# Patient Record
Sex: FEMALE | Race: WHITE | HISPANIC OR LATINO | Employment: OTHER | URBAN - METROPOLITAN AREA
[De-identification: names, ages, dates, MRNs, and addresses within clinical notes are randomized per-mention and may not be internally consistent; named-entity substitution may affect disease eponyms.]

---

## 2017-02-17 ENCOUNTER — HOSPITAL ENCOUNTER (EMERGENCY)
Facility: MEDICAL CENTER | Age: 57
End: 2017-02-17
Payer: MEDICAID

## 2017-02-17 VITALS
WEIGHT: 178 LBS | DIASTOLIC BLOOD PRESSURE: 72 MMHG | TEMPERATURE: 98.2 F | HEART RATE: 105 BPM | BODY MASS INDEX: 29.66 KG/M2 | SYSTOLIC BLOOD PRESSURE: 163 MMHG | HEIGHT: 65 IN | OXYGEN SATURATION: 100 % | RESPIRATION RATE: 18 BRPM

## 2017-02-17 LAB — GLUCOSE BLD-MCNC: 236 MG/DL (ref 65–99)

## 2017-02-17 PROCEDURE — 302449 STATCHG TRIAGE ONLY (STATISTIC)

## 2017-02-17 PROCEDURE — 82962 GLUCOSE BLOOD TEST: CPT

## 2017-02-17 ASSESSMENT — PAIN SCALES - GENERAL: PAINLEVEL_OUTOF10: 9

## 2017-02-18 ENCOUNTER — HOSPITAL ENCOUNTER (EMERGENCY)
Facility: MEDICAL CENTER | Age: 57
End: 2017-02-18
Payer: MEDICAID

## 2017-02-18 VITALS
RESPIRATION RATE: 16 BRPM | HEIGHT: 65 IN | DIASTOLIC BLOOD PRESSURE: 76 MMHG | WEIGHT: 178 LBS | HEART RATE: 101 BPM | BODY MASS INDEX: 29.66 KG/M2 | TEMPERATURE: 97.9 F | OXYGEN SATURATION: 99 % | SYSTOLIC BLOOD PRESSURE: 155 MMHG

## 2017-02-18 PROCEDURE — 302449 STATCHG TRIAGE ONLY (STATISTIC)

## 2017-02-18 NOTE — ED NOTES
Before this nurse can finish triage pt asking how long the wait is, pt informed that the er has a 10 hour wait time currently, pt does not want to wait to see doctor and leaves er, this nurse attempted to encourage pt to stay but she declines

## 2017-02-18 NOTE — ED NOTES
Pt ambs to triage  Chief Complaint   Patient presents with   • N/V     since yesterday, unable to keep water down   • Abdominal Pain     constant   • Foot Pain     fell a few days ago and had a large blood blister, PCP wanted her to see a wound specialist for it, it popped over night last night.  Pt is diabetic   FSBS 238, pt didn't take her morning meds.  Pt requesting the senior lounge.  Triage process explained. Pt asked to await room assignment in lobby. Pt urged to inform triage RN or staff of changes in condition or concerns

## 2017-02-19 ENCOUNTER — HOSPITAL ENCOUNTER (EMERGENCY)
Facility: MEDICAL CENTER | Age: 57
End: 2017-02-19
Attending: EMERGENCY MEDICINE
Payer: MEDICAID

## 2017-02-19 ENCOUNTER — APPOINTMENT (OUTPATIENT)
Dept: RADIOLOGY | Facility: MEDICAL CENTER | Age: 57
End: 2017-02-19
Attending: EMERGENCY MEDICINE
Payer: MEDICAID

## 2017-02-19 VITALS
TEMPERATURE: 97.5 F | DIASTOLIC BLOOD PRESSURE: 71 MMHG | RESPIRATION RATE: 13 BRPM | HEIGHT: 65 IN | BODY MASS INDEX: 29.24 KG/M2 | WEIGHT: 175.49 LBS | OXYGEN SATURATION: 95 % | SYSTOLIC BLOOD PRESSURE: 150 MMHG | HEART RATE: 85 BPM

## 2017-02-19 DIAGNOSIS — R10.84 GENERALIZED ABDOMINAL PAIN: ICD-10-CM

## 2017-02-19 LAB
ALBUMIN SERPL BCP-MCNC: 3.7 G/DL (ref 3.2–4.9)
ALBUMIN/GLOB SERPL: 0.9 G/DL
ALP SERPL-CCNC: 143 U/L (ref 30–99)
ALT SERPL-CCNC: 17 U/L (ref 2–50)
ANION GAP SERPL CALC-SCNC: 9 MMOL/L (ref 0–11.9)
AST SERPL-CCNC: 14 U/L (ref 12–45)
BASOPHILS # BLD AUTO: 0.7 % (ref 0–1.8)
BASOPHILS # BLD: 0.06 K/UL (ref 0–0.12)
BILIRUB SERPL-MCNC: 0.4 MG/DL (ref 0.1–1.5)
BNP SERPL-MCNC: 91 PG/ML (ref 0–100)
BUN SERPL-MCNC: 17 MG/DL (ref 8–22)
CALCIUM SERPL-MCNC: 9.6 MG/DL (ref 8.5–10.5)
CHLORIDE SERPL-SCNC: 103 MMOL/L (ref 96–112)
CO2 SERPL-SCNC: 25 MMOL/L (ref 20–33)
CREAT SERPL-MCNC: 0.88 MG/DL (ref 0.5–1.4)
EOSINOPHIL # BLD AUTO: 0.24 K/UL (ref 0–0.51)
EOSINOPHIL NFR BLD: 2.7 % (ref 0–6.9)
ERYTHROCYTE [DISTWIDTH] IN BLOOD BY AUTOMATED COUNT: 44 FL (ref 35.9–50)
GFR SERPL CREATININE-BSD FRML MDRD: >60 ML/MIN/1.73 M 2
GLOBULIN SER CALC-MCNC: 4.1 G/DL (ref 1.9–3.5)
GLUCOSE SERPL-MCNC: 191 MG/DL (ref 65–99)
HCT VFR BLD AUTO: 33.8 % (ref 37–47)
HGB BLD-MCNC: 10.9 G/DL (ref 12–16)
IMM GRANULOCYTES # BLD AUTO: 0.04 K/UL (ref 0–0.11)
IMM GRANULOCYTES NFR BLD AUTO: 0.4 % (ref 0–0.9)
INR PPP: 0.96 (ref 0.87–1.13)
LIPASE SERPL-CCNC: 8 U/L (ref 11–82)
LYMPHOCYTES # BLD AUTO: 2.57 K/UL (ref 1–4.8)
LYMPHOCYTES NFR BLD: 28.8 % (ref 22–41)
MCH RBC QN AUTO: 26.5 PG (ref 27–33)
MCHC RBC AUTO-ENTMCNC: 32.2 G/DL (ref 33.6–35)
MCV RBC AUTO: 82 FL (ref 81.4–97.8)
MONOCYTES # BLD AUTO: 0.48 K/UL (ref 0–0.85)
MONOCYTES NFR BLD AUTO: 5.4 % (ref 0–13.4)
NEUTROPHILS # BLD AUTO: 5.54 K/UL (ref 2–7.15)
NEUTROPHILS NFR BLD: 62 % (ref 44–72)
NRBC # BLD AUTO: 0 K/UL
NRBC BLD AUTO-RTO: 0 /100 WBC
PLATELET # BLD AUTO: 557 K/UL (ref 164–446)
PMV BLD AUTO: 8.3 FL (ref 9–12.9)
POTASSIUM SERPL-SCNC: 3.7 MMOL/L (ref 3.6–5.5)
PROT SERPL-MCNC: 7.8 G/DL (ref 6–8.2)
PROTHROMBIN TIME: 13.1 SEC (ref 12–14.6)
RBC # BLD AUTO: 4.12 M/UL (ref 4.2–5.4)
SODIUM SERPL-SCNC: 137 MMOL/L (ref 135–145)
TROPONIN I SERPL-MCNC: <0.01 NG/ML (ref 0–0.04)
WBC # BLD AUTO: 8.9 K/UL (ref 4.8–10.8)

## 2017-02-19 PROCEDURE — 73620 X-RAY EXAM OF FOOT: CPT | Mod: LT

## 2017-02-19 PROCEDURE — 73610 X-RAY EXAM OF ANKLE: CPT | Mod: LT

## 2017-02-19 PROCEDURE — 83880 ASSAY OF NATRIURETIC PEPTIDE: CPT

## 2017-02-19 PROCEDURE — 36415 COLL VENOUS BLD VENIPUNCTURE: CPT

## 2017-02-19 PROCEDURE — 96376 TX/PRO/DX INJ SAME DRUG ADON: CPT

## 2017-02-19 PROCEDURE — 99285 EMERGENCY DEPT VISIT HI MDM: CPT

## 2017-02-19 PROCEDURE — 85610 PROTHROMBIN TIME: CPT

## 2017-02-19 PROCEDURE — 83690 ASSAY OF LIPASE: CPT

## 2017-02-19 PROCEDURE — 74176 CT ABD & PELVIS W/O CONTRAST: CPT

## 2017-02-19 PROCEDURE — 80053 COMPREHEN METABOLIC PANEL: CPT

## 2017-02-19 PROCEDURE — 700111 HCHG RX REV CODE 636 W/ 250 OVERRIDE (IP): Performed by: EMERGENCY MEDICINE

## 2017-02-19 PROCEDURE — 700105 HCHG RX REV CODE 258: Performed by: EMERGENCY MEDICINE

## 2017-02-19 PROCEDURE — 96361 HYDRATE IV INFUSION ADD-ON: CPT

## 2017-02-19 PROCEDURE — 85025 COMPLETE CBC W/AUTO DIFF WBC: CPT

## 2017-02-19 PROCEDURE — 96374 THER/PROPH/DIAG INJ IV PUSH: CPT

## 2017-02-19 PROCEDURE — 84484 ASSAY OF TROPONIN QUANT: CPT

## 2017-02-19 PROCEDURE — 96375 TX/PRO/DX INJ NEW DRUG ADDON: CPT

## 2017-02-19 RX ORDER — HYDROCODONE BITARTRATE AND ACETAMINOPHEN 7.5; 325 MG/1; MG/1
1 TABLET ORAL EVERY 6 HOURS PRN
Qty: 15 TAB | Refills: 0 | Status: SHIPPED | OUTPATIENT
Start: 2017-02-19 | End: 2017-02-26

## 2017-02-19 RX ORDER — ONDANSETRON 2 MG/ML
4 INJECTION INTRAMUSCULAR; INTRAVENOUS ONCE
Status: COMPLETED | OUTPATIENT
Start: 2017-02-19 | End: 2017-02-19

## 2017-02-19 RX ORDER — PANTOPRAZOLE SODIUM 20 MG/1
20 TABLET, DELAYED RELEASE ORAL DAILY
Qty: 10 TAB | Refills: 0 | Status: SHIPPED | OUTPATIENT
Start: 2017-02-19 | End: 2017-03-20

## 2017-02-19 RX ORDER — SODIUM CHLORIDE 9 MG/ML
INJECTION, SOLUTION INTRAVENOUS CONTINUOUS
Status: DISCONTINUED | OUTPATIENT
Start: 2017-02-19 | End: 2017-02-19 | Stop reason: HOSPADM

## 2017-02-19 RX ORDER — ONDANSETRON 4 MG/1
4 TABLET, FILM COATED ORAL EVERY 8 HOURS PRN
Qty: 10 EACH | Refills: 0 | Status: SHIPPED | OUTPATIENT
Start: 2017-02-19 | End: 2017-03-20

## 2017-02-19 RX ADMIN — HYDROMORPHONE HYDROCHLORIDE 1 MG: 1 INJECTION, SOLUTION INTRAMUSCULAR; INTRAVENOUS; SUBCUTANEOUS at 09:52

## 2017-02-19 RX ADMIN — HYDROMORPHONE HYDROCHLORIDE 1 MG: 1 INJECTION, SOLUTION INTRAMUSCULAR; INTRAVENOUS; SUBCUTANEOUS at 11:21

## 2017-02-19 RX ADMIN — SODIUM CHLORIDE: 9 INJECTION, SOLUTION INTRAVENOUS at 09:53

## 2017-02-19 RX ADMIN — HYDROMORPHONE HYDROCHLORIDE 1 MG: 1 INJECTION, SOLUTION INTRAMUSCULAR; INTRAVENOUS; SUBCUTANEOUS at 14:56

## 2017-02-19 RX ADMIN — ONDANSETRON 4 MG: 2 INJECTION, SOLUTION INTRAMUSCULAR; INTRAVENOUS at 09:52

## 2017-02-19 ASSESSMENT — PAIN SCALES - GENERAL
PAINLEVEL_OUTOF10: 9
PAINLEVEL_OUTOF10: 10
PAINLEVEL_OUTOF10: 7
PAINLEVEL_OUTOF10: 10
PAINLEVEL_OUTOF10: 8

## 2017-02-19 NOTE — ED AVS SNAPSHOT
2/19/2017          Julisa Carrion  [address]  [place] [address]    Dear Julisa:    [facility] wants to ensure your discharge home is safe and you or your loved ones have had all your questions answered regarding your care after you leave the hospital.    You may receive a telephone call within two days of your discharge.  This call is to make certain you understand your discharge instructions as well as ensure we provided you with the best care possible during your stay with us.     The call will only last approximately 3-5 minutes and will be done by a nurse.    Once again, we want to ensure your discharge home is safe and that you have a clear understanding of any next steps in your care.  If you have any questions or concerns, please do not hesitate to contact us, we are here for you.  Thank you for choosing [facility] for your healthcare needs.    Sincerely,    Ty Enriquez    [facility]

## 2017-02-19 NOTE — ED AVS SNAPSHOT
[employer] Access Code: [email or web address]  Expires: 3/21/2017  3:30 PM    Your email address is not on file at [facility].  Email Addresses are required for you to sign up for [employer], please contact [phone] to verify your personal information and to provide your email address prior to attempting to register for [employer].    Julisa Amadotz  [address] [place] [address]    [employer]  A secure, online tool to manage your health information     [facility]’s [employer]® is a secure, online tool that connects you to your personalized health information from the privacy of your home -- day or night - making it very easy for you to manage your healthcare. Once the activation process is completed, you can even access your medical information using the [employer] rito, which is available for free in the Apple Rito store or Google Play store.     To learn more about [employer], visit [email or web address]    There are two levels of access available (as shown below):   My Chart Features  [facility] Primary Care Doctor [facility]  Specialists [facility]  Urgent  Care Non-[facility] Primary Care Doctor   Email your healthcare team securely and privately 24/7 X X X    Manage appointments: schedule your next appointment; view details of past/upcoming appointments X      Request prescription refills. X      View recent personal medical records, including lab and immunizations X X X X   View health record, including health history, allergies, medications X X X X   Read reports about your outpatient visits, procedures, consult and ER notes X X X X   See your discharge summary, which is a recap of your hospital and/or ER visit that includes your diagnosis, lab results, and care plan X X  X     How to register for [employer]:  Once your e-mail address has been verified, follow the following steps to sign up for [employer].     1. Go to  [email or web address]  2. Click on the Sign Up Now box, which takes you to the New Member Sign Up page. You  will need to provide the following information:  a. Enter your [employer] Access Code exactly as it appears at the top of this page. (You will not need to use this code after you’ve completed the sign-up process. If you do not sign up before the expiration date, you must request a new code.)   b. Enter your date of birth.   c. Enter your home email address.   d. Click Submit, and follow the next screen’s instructions.  3. Create a [employer] ID. This will be your [employer] login ID and cannot be changed, so think of one that is secure and easy to remember.  4. Create a [employer] password. You can change your password at any time.  5. Enter your Password Reset Question and Answer. This can be used at a later time if you forget your password.   6. Enter your e-mail address. This allows you to receive e-mail notifications when new information is available in [employer].  7. Click Sign Up. You can now view your health information.    For assistance activating your [employer] account, call [phone]

## 2017-02-19 NOTE — ED NOTES
"Julisa Carrion  56 y.o.  Chief Complaint   Patient presents with   • Abdominal Pain     x 3-4 days, midepigastric   • N/V     reports 3 emesis episodes in the last 24 hours \"every time I try to take my medication\"      [facility] for above c/o, no interventions PTA.   "

## 2017-02-19 NOTE — ED PROVIDER NOTES
"ED Provider Note  CHIEF COMPLAINT  Chief Complaint   Patient presents with   • Abdominal Pain     x 3-4 days, midepigastric   • N/V     reports 3 emesis episodes in the last 24 hours \"every time I try to take my medication\"        HPI  Julisa Carrion is a 56 y.o. female who presents to planning of some diffuse intermittent crampy moderate sometimes severe periumbilical and epigastric abdominal pain. Associated nausea and vomiting. She's had her gallbladder removed. Still has her appendix. She has a history of diabetes and hypertension and migraines. No headache, no stiff neck, no difficulty breathing. No cough. No constipation or diarrhea.    REVIEW OF SYSTEMS  No headache, no chest pain, no difficulty breathing.  ALL OTHER SYSTEMS NEGATIVE    ALLERGIES  No Known Allergies    CURRENT MEDICATIONS  Home Medications     Reviewed by Isabel Mccabe R.N. (Registered Nurse) on 02/19/17 at 0915  Med List Status: Unable to Obtain    Medication Last Dose Status    aspirin EC (ECOTRIN) 81 MG Tablet Delayed Response 2/19/2017 Active    cyclobenzaprine (FLEXERIL) 10 MG Tab  Active    gabapentin (NEURONTIN) 800 MG tablet 2-3 days Active    lisinopril (PRINIVIL) 5 MG TABS 2-3 days Active    metoclopramide (REGLAN) 5 MG tablet  Active    omeprazole (PRILOSEC) 20 MG delayed-release capsule 2-3 days Active    omeprazole (PRILOSEC) 40 MG delayed-release capsule  Active    ondansetron (ZOFRAN ODT) 4 MG TABLET DISPERSIBLE <2 weeks Active    ondansetron (ZOFRAN ODT) 4 MG TABLET DISPERSIBLE  Active    ondansetron (ZOFRAN ODT) 4 MG TABLET DISPERSIBLE  Active    oxycodone-acetaminophen (PERCOCET-10)  MG Tab  Active    pravastatin (PRAVACHOL) 20 MG TABS 2-3 days Active                PAST MEDICAL HISTORY  Past Medical History   Diagnosis Date   • Hypertension    • Diabetes    • High cholesterol 5/15/2011   • Staph skin infection    • Migraine    • Intestinal mass 2015       SURGICAL HISTORY  Past Surgical History   Procedure " "Laterality Date   • Other abdominal surgery       cholecystectomy   • Gyn surgery        x 3,tubal ligation   • Other orthopedic surgery       right wrist surgery   • Abdominal exploration       Lower intestine d/t mass - benign       FAMILY HISTORY  Family History   Problem Relation Age of Onset   • Cancer Maternal Aunt      Lung cancer d/t smoking       SOCIAL HISTORY  History of cigarette smoking.    PHYSICAL EXAM  GENERAL: Alert female adult  VITAL SIGNS: /71 mmHg  Pulse 85  Temp(Src) 36.4 °C (97.5 °F) (Temporal)  Resp 14  Ht 1.651 m (5' 5\")  Wt 79.6 kg (175 lb 7.8 oz)  BMI 29.20 kg/m2  SpO2 97%  LMP 2009  Constitutional: Alert female adult   HENT: Scalp is normal size and nontender. Ears are clear. Nose is clear. Throat is clear with no stridor no drooling no trismus. Teeth are all intact.  Eyes: Pupils equal round and reactive to light, extraocular motor fall. There is no scleral icterus.  Neck: Neck is supple and nontender. There is no meningismus. No adenitis. No thyromegaly.  Lymphatic: No adenopathy.   Cardiovascular: Heart regular rhythm without murmurs or gallops   Thorax & Lungs: No chest wall tenderness. Lungs are clear. Patient has good breath sounds bilateral. No rales, no rhonchi, no wheezes.  Abdomen: Abdomen is soft, nontender, not rigid, no guarding, and no organomegaly. There is no palpable hernia   Skin: Warm, pink, and dry with no erythema and no rash.   Back: Nontender, no midline bony tenderness, no flank tenderness.                                          Extremities: Full range of motion  No tenderness to palpation and no deformities noted. No calf or thigh swelling. No calf or thigh tenderness. No clinical DVT.  Neurologic: Alert & oriented . Cranial nerves are grossly intact as tested. Patient moves all 4 extremities well. Patient has good strong flexion and extension of the ankle joints knee joints hip joints and elbow joints. Sensation is normal and " symmetrical in the upper and lower extremities.   Psychiatric: Patient is alert oriented coherent and rational.       RADIOLOGY/PROCEDURES  DX-FOOT-2- LEFT   Final Result      1.  Soft tissue swelling over the dorsum aspect of the medial aspect of the left midfoot.      2.  Nonacute partially healed fracture of the distal metaphysis of the proximal phalanx of the 2nd toe.      3.  No acute fracture or dislocation.      DX-ANKLE 3+ VIEWS LEFT   Final Result      1.  Possible old avulsion fracture fragment adjacent to the posterior aspect of the posterior malleolus.      2.  No other suspected acute fracture.      3.  2 screws in the medial malleolus.      4.  Lateral soft tissue swelling. No subcutaneous gas or air.      CT-RENAL COLIC EVALUATION(A/P W/O)   Final Result         1.  Negative noncontrast CT scan of the abdomen and pelvis.      2.  No evidence of bowel obstruction or inflammation.      3.  Contrast appears to be within the intrarenal collecting system and bladder.      4.  Cholecystectomy.      5.  No evidence of appendicitis.      6.  Small hiatal hernia.              COURSE & MEDICAL DECISION MAKING  Patient arrest for evaluation of abdominal pain. She's had her gallbladder removed. Intermittent crampy periumbilical and epigastric. Differential diagnosis: Gastritis, hepatitis, pancreatitis, irritable bowel syndrome, colitis, now obstruction, etc.    Plan: #1 IV #2 IV fluids #3 intravenous Zofran and Dilaudid for nausea and pain #4. CT of the abdomen to rule out bowel obstruction and free air etc. #5. Laboratory including CBC, CMP, lipase, ProTime etc. To rule out infection, metabolic problems etc.    Review of previous medical records: Hypertension, diabetes, migraine. Medications include Percocet, Reglan, Prilosec, Neurontin.    Laboratory and reexamination: 10,000. Hemoglobin 10 No anemia. Differential normal. CO2 25. Liver enzymes normal. Creatinine 0.8. Lipase 8. CT the abdomen is normal. She's  had a previous cholecystectomy. No appendicitis. No bowel obstruction. No kidney stones or hydronephrosis. X-ray of the foot and ankle shows some old chronic changes but no acute fracture.  Results for orders placed or performed during the hospital encounter of 02/19/17   COMP METABOLIC PANEL   Result Value Ref Range    Sodium 137 135 - 145 mmol/L    Potassium 3.7 3.6 - 5.5 mmol/L    Chloride 103 96 - 112 mmol/L    Co2 25 20 - 33 mmol/L    Anion Gap 9.0 0.0 - 11.9    Glucose 191 (H) 65 - 99 mg/dL    Bun 17 8 - 22 mg/dL    Creatinine 0.88 0.50 - 1.40 mg/dL    Calcium 9.6 8.5 - 10.5 mg/dL    AST(SGOT) 14 12 - 45 U/L    ALT(SGPT) 17 2 - 50 U/L    Alkaline Phosphatase 143 (H) 30 - 99 U/L    Total Bilirubin 0.4 0.1 - 1.5 mg/dL    Albumin 3.7 3.2 - 4.9 g/dL    Total Protein 7.8 6.0 - 8.2 g/dL    Globulin 4.1 (H) 1.9 - 3.5 g/dL    A-G Ratio 0.9 g/dL   LIPASE   Result Value Ref Range    Lipase 8 (L) 11 - 82 U/L   TROPONIN   Result Value Ref Range    Troponin I <0.01 0.00 - 0.04 ng/mL   PROTHROMBIN TIME (INR)   Result Value Ref Range    PT 13.1 12.0 - 14.6 sec    INR 0.96 0.87 - 1.13   CBC WITH DIFFERENTIAL   Result Value Ref Range    WBC 8.9 4.8 - 10.8 K/uL    RBC 4.12 (L) 4.20 - 5.40 M/uL    Hemoglobin 10.9 (L) 12.0 - 16.0 g/dL    Hematocrit 33.8 (L) 37.0 - 47.0 %    MCV 82.0 81.4 - 97.8 fL    MCH 26.5 (L) 27.0 - 33.0 pg    MCHC 32.2 (L) 33.6 - 35.0 g/dL    RDW 44.0 35.9 - 50.0 fL    Platelet Count 557 (H) 164 - 446 K/uL    MPV 8.3 (L) 9.0 - 12.9 fL    Neutrophils-Polys 62.00 44.00 - 72.00 %    Lymphocytes 28.80 22.00 - 41.00 %    Monocytes 5.40 0.00 - 13.40 %    Eosinophils 2.70 0.00 - 6.90 %    Basophils 0.70 0.00 - 1.80 %    Immature Granulocytes 0.40 0.00 - 0.90 %    Nucleated RBC 0.00 /100 WBC    Neutrophils (Absolute) 5.54 2.00 - 7.15 K/uL    Lymphs (Absolute) 2.57 1.00 - 4.80 K/uL    Monos (Absolute) 0.48 0.00 - 0.85 K/uL    Eos (Absolute) 0.24 0.00 - 0.51 K/uL    Baso (Absolute) 0.06 0.00 - 0.12 K/uL    Immature  Granulocytes (abs) 0.04 0.00 - 0.11 K/uL    NRBC (Absolute) 0.00 K/uL   BTYPE NATRIURETIC PEPTIDE   Result Value Ref Range    B Natriuretic Peptide 91 0 - 100 pg/mL   ESTIMATED GFR   Result Value Ref Range    GFR If African American >60 >60 mL/min/1.73 m 2    GFR If Non African American >60 >60 mL/min/1.73 m 2        Prior to discharge with repeat abdominal exam shows that the abdomen is soft and nontender with no rigidity and no guarding. She is well-hydrated. Her vital signs are normal. She stable for discharge. X-ray of her ankle and foot shows no acute fracture.    Home treatment: #1 follow-up with her primary care physician Dr. Jason tomorrow or in the next few days number to return here as needed #3 a prescription for Zofran and Protonix and some hydrocodone. Number for instructions on abdominal pain given. Stable on discharge.  FINAL IMPRESSION  1. Abdominal pain  2. Irritable bowel syndrome versus colitis versus other cause.  3. Foot sprain and ankle sprain.      Electronically signed by: Gary Gansert, 2/19/2017 3 PM

## 2017-02-19 NOTE — ED AVS SNAPSHOT
Home Care Instructions                                                                                                                Julisa Carrion   MRN: [id number]    Department:  [facility], Emergency Dept   Date of Visit:  2/19/2017            [facility], Emergency Dept    [address]    [place] [facility] [address]    Phone:  [phone]      You were seen by     Gary Gansert, M.D.      Your Diagnosis Was     Generalized abdominal pain     R10.84       These are the medications you received during your hospitalization from 02/19/2017 0718 to 02/19/2017 1530     Date/Time Order Dose Route Action    02/19/2017 0953 NS infusion   Intravenous New Bag    02/19/2017 0952 HYDROmorphone (DILAUDID) injection 1 mg 1 mg Intravenous Given    02/19/2017 0952 ondansetron (ZOFRAN) syringe/vial injection 4 mg 4 mg Intravenous Given    02/19/2017 1121 HYDROmorphone (DILAUDID) injection 1 mg 1 mg Intravenous Given    02/19/2017 1456 HYDROmorphone (DILAUDID) injection 1 mg 1 mg Intravenous Given      Follow-up Information     1. Follow up with Tre Jason M.D.. Schedule an appointment as soon as possible for a visit in 1 day.    Specialty:  Internal Medicine    Contact information    [address]  [facility] [id number] [phone]        Medication Information     Review all of your home medications and newly ordered medications with your primary doctor and/or pharmacist as soon as possible. Follow medication instructions as directed by your doctor and/or pharmacist.     Please keep your complete medication list with you and share with your physician. Update the information when medications are discontinued, doses are changed, or new medications (including over-the-counter products) are added; and carry medication information at all times in the event of emergency situations.               Medication List      START taking these medications        Instructions    hydrocodone-acetaminophen 7.5-325 MG per tablet   Commonly known as:  NORCO    Take 1 Tab by mouth every 6 hours as needed for up to 7 days.   Dose:  1 Tab       ondansetron 4 MG Tabs tablet   Commonly known as:  ZOFRAN    Take 1 Tab by mouth every 8 hours as needed for Nausea/Vomiting.   Dose:  4 mg       pantoprazole 20 MG tablet   Commonly known as:  PROTONIX    Take 1 Tab by mouth every day.   Dose:  20 mg         ASK your doctor about these medications        Instructions    aspirin EC 81 MG Tbec   Commonly known as:  ECOTRIN    Take 81 mg by mouth every day.   Dose:  81 mg       cyclobenzaprine 10 MG Tabs   Commonly known as:  FLEXERIL    Take 1 Tab by mouth 3 times a day.   Dose:  10 mg       gabapentin 800 MG tablet   Commonly known as:  NEURONTIN    Take 800 mg by mouth 3 times a day.   Dose:  800 mg       lisinopril 5 MG Tabs   Commonly known as:  PRINIVIL    Take 5 mg by mouth every morning.   Dose:  5 mg       metoclopramide 5 MG tablet   Commonly known as:  REGLAN    Take 1 Tab by mouth as needed (after meals as needed) for up to 5 doses.   Dose:  5 mg       * omeprazole 20 MG delayed-release capsule   Commonly known as:  PRILOSEC    Take 20 mg by mouth every day.   Dose:  20 mg       * omeprazole 40 MG delayed-release capsule   Commonly known as:  PRILOSEC    Take 1 Cap by mouth every day.   Dose:  40 mg       * ondansetron 4 MG Tbdp   Commonly known as:  ZOFRAN ODT    Take 1 Tab by mouth every 8 hours as needed for Nausea/Vomiting (give PO if no IV route available).   Dose:  4 mg       * ondansetron 4 MG Tbdp   Commonly known as:  ZOFRAN ODT    Take 1 Tab by mouth every 8 hours as needed for Nausea/Vomiting.   Dose:  4 mg       * ondansetron 4 MG Tbdp   Commonly known as:  ZOFRAN ODT    Take 1 Tab by mouth every 8 hours as needed for Nausea/Vomiting.   Dose:  4 mg       oxycodone-acetaminophen  MG Tabs   Commonly known as:  PERCOCET-10    Take 1 Tab by mouth every 8 hours as needed for Severe  Pain.   Dose:  1 Tab       pravastatin 20 MG Tabs   Commonly known as:  PRAVACHOL    Take 20 mg by mouth every day.   Dose:  20 mg       * Notice:  This list has 5 medication(s) that are the same as other medications prescribed for you. Read the directions carefully, and ask your doctor or other care provider to review them with you.            Procedures and tests performed during your visit     BTYPE NATRIURETIC PEPTIDE    CBC WITH DIFFERENTIAL    COMP METABOLIC PANEL    CT-RENAL COLIC EVALUATION(A/P W/O)    Cardiac Monitoring    DX-ANKLE 3+ VIEWS LEFT    DX-FOOT-2- LEFT    ESTIMATED GFR    IV Saline Lock    LIPASE    Oxygen    PROTHROMBIN TIME (INR)    Pulse Ox    TROPONIN            Patient Information     Patient Information    Following emergency treatment: all patient requiring follow-up care must return either to a private physician or a clinic if your condition worsens before you are able to obtain further medical attention, please return to the emergency room.     Billing Information    At [facility], we work to make the billing process streamlined for our patients.  Our Representatives are here to answer any questions you may have regarding your hospital bill.  If you have insurance coverage and have supplied your insurance information to us, we will submit a claim to your insurer on your behalf.  Should you have any questions regarding your bill, we can be reached online or by phone as follows:  Online: You are able pay your bills online or live chat with our representatives about any billing questions you may have. We are here to help Monday - Friday from 8:00am to 7:30pm and 9:00am - 12:00pm on Saturdays.  Please visit [email or web address]  for more information.   Phone:  [phone] or [phone]    Please note that your emergency physician, surgeon, pathologist, radiologist, anesthesiologist, and other specialists are not employed by [facility] and will therefore  bill separately for their services.  Please contact them directly for any questions concerning their bills at the numbers below:     Emergency Physician Services:  [phone]  [facility]:  [phone]  [facility]:  [phone]  [facility]:  [phone]    1. Your final bill may vary from the amount quoted upon discharge if all procedures are not complete at that time, or if your doctor has additional procedures of which we are not aware. You will receive an additional bill if you return to the Emergency Department at [facility] for suture removal regardless of the facility of which the sutures were placed.     2. Please arrange for settlement of this account at the emergency registration.    3. All self-pay accounts are due in full at the time of treatment.  If you are unable to meet this obligation then payment is expected within 4-5 days.     4. If you have had radiology studies (CT, X-ray, Ultrasound, MRI), you have received a preliminary result during your emergency department visit. Please contact the radiology department [phone] to receive a copy of your final result. Please discuss the Final result with your primary physician or with the follow up physician provided.     Crisis Hotline:  [place] Crisis Hotline:  6-991-TNGMNTO or 1-177.907.3363  [place] Crisis Hotline:    1-763.236.8091 or 417-941-1500         ED Discharge Follow Up Questions    1. In order to provide you with very good care, we would like to follow up with a phone call in the next few days.  May we have your permission to contact you?     YES /  NO    2. What is the best phone number to call you? (       )_____-__________    3. What is the best time to call you?      Morning  /  Afternoon  /  Evening                   Patient Signature:  ____________________________________________________________    Date:  ____________________________________________________________

## 2017-02-19 NOTE — ED NOTES
Pt discharged home. Explained discharge and medication instruction. Pt verbalized understanding of instructions. Pt taken to lobby via wheelchair. VS stable. Pt calling cab for ride home, pt instructed to not drive while taking pain medication.

## 2017-02-20 ENCOUNTER — HOSPITAL ENCOUNTER (EMERGENCY)
Facility: MEDICAL CENTER | Age: 57
End: 2017-02-20
Attending: EMERGENCY MEDICINE
Payer: MEDICAID

## 2017-02-20 VITALS
BODY MASS INDEX: 28.43 KG/M2 | WEIGHT: 170.64 LBS | HEIGHT: 65 IN | HEART RATE: 94 BPM | DIASTOLIC BLOOD PRESSURE: 76 MMHG | SYSTOLIC BLOOD PRESSURE: 115 MMHG | OXYGEN SATURATION: 96 % | RESPIRATION RATE: 16 BRPM | TEMPERATURE: 96.9 F

## 2017-02-20 DIAGNOSIS — G89.29 CHRONIC ABDOMINAL PAIN: ICD-10-CM

## 2017-02-20 DIAGNOSIS — R10.9 CHRONIC ABDOMINAL PAIN: ICD-10-CM

## 2017-02-20 PROCEDURE — 700102 HCHG RX REV CODE 250 W/ 637 OVERRIDE(OP): Performed by: EMERGENCY MEDICINE

## 2017-02-20 PROCEDURE — 99285 EMERGENCY DEPT VISIT HI MDM: CPT

## 2017-02-20 PROCEDURE — 96374 THER/PROPH/DIAG INJ IV PUSH: CPT

## 2017-02-20 PROCEDURE — 96361 HYDRATE IV INFUSION ADD-ON: CPT

## 2017-02-20 PROCEDURE — A9270 NON-COVERED ITEM OR SERVICE: HCPCS | Performed by: EMERGENCY MEDICINE

## 2017-02-20 PROCEDURE — 700105 HCHG RX REV CODE 258: Performed by: EMERGENCY MEDICINE

## 2017-02-20 PROCEDURE — 700111 HCHG RX REV CODE 636 W/ 250 OVERRIDE (IP): Performed by: EMERGENCY MEDICINE

## 2017-02-20 RX ORDER — HYDROMORPHONE HYDROCHLORIDE 2 MG/1
2 TABLET ORAL ONCE
Status: COMPLETED | OUTPATIENT
Start: 2017-02-20 | End: 2017-02-20

## 2017-02-20 RX ORDER — SODIUM CHLORIDE 9 MG/ML
1000 INJECTION, SOLUTION INTRAVENOUS ONCE
Status: COMPLETED | OUTPATIENT
Start: 2017-02-20 | End: 2017-02-20

## 2017-02-20 RX ADMIN — HYDROMORPHONE HYDROCHLORIDE 1 MG: 1 INJECTION, SOLUTION INTRAMUSCULAR; INTRAVENOUS; SUBCUTANEOUS at 03:51

## 2017-02-20 RX ADMIN — LIDOCAINE HYDROCHLORIDE 30 ML: 20 SOLUTION OROPHARYNGEAL at 04:22

## 2017-02-20 RX ADMIN — SODIUM CHLORIDE 1000 ML: 900 INJECTION, SOLUTION INTRAVENOUS at 04:15

## 2017-02-20 RX ADMIN — HYDROMORPHONE HYDROCHLORIDE 2 MG: 2 TABLET ORAL at 04:21

## 2017-02-20 ASSESSMENT — PAIN SCALES - GENERAL: PAINLEVEL_OUTOF10: 4

## 2017-02-20 NOTE — ED NOTES
".  Chief Complaint   Patient presents with   • Abdominal Pain     .Blood pressure 115/76, pulse 88, temperature 36.1 °C (96.9 °F), resp. rate 16, height 1.651 m (5' 5\"), weight 77.4 kg (170 lb 10.2 oz), last menstrual period 02/05/2009, SpO2 98 %.    "

## 2017-02-20 NOTE — ED NOTES
Discharge instructions provided.  Pt verbalized the understanding of discharge instructions to follow up with PCP and GI and to return to ER if condition worsens.  Pt ambulated out of ER without difficulty.

## 2017-02-20 NOTE — DISCHARGE INSTRUCTIONS
Abdominal Pain (Nonspecific)  Your exam might not show the exact reason you have abdominal pain. Since there are many different causes of abdominal pain, another checkup and more tests may be needed. It is very important to follow up for lasting (persistent) or worsening symptoms. A possible cause of abdominal pain in any person who still has his or her appendix is acute appendicitis. Appendicitis is often hard to diagnose. Normal blood tests, urine tests, ultrasound, and CT scans do not completely rule out early appendicitis or other causes of abdominal pain. Sometimes, only the changes that happen over time will allow appendicitis and other causes of abdominal pain to be determined. Other potential problems that may require surgery may also take time to become more apparent. Because of this, it is important that you follow all of the instructions below.  HOME CARE INSTRUCTIONS   · Rest as much as possible.   · Do not eat solid food until your pain is gone.   · While adults or children have pain: A diet of water, weak decaffeinated tea, broth or bouillon, gelatin, oral rehydration solutions (ORS), frozen ice pops, or ice chips may be helpful.   · When pain is gone in adults or children: Start a light diet (dry toast, crackers, applesauce, or white rice). Increase the diet slowly as long as it does not bother you. Eat no dairy products (including cheese and eggs) and no spicy, fatty, fried, or high-fiber foods.   · Use no alcohol, caffeine, or cigarettes.   · Take your regular medicines unless your caregiver told you not to.   · Take any prescribed medicine as directed.   · Only take over-the-counter or prescription medicines for pain, discomfort, or fever as directed by your caregiver. Do not give aspirin to children.   If your caregiver has given you a follow-up appointment, it is very important to keep that appointment. Not keeping the appointment could result in a permanent injury and/or lasting (chronic) pain  and/or disability. If there is any problem keeping the appointment, you must call to reschedule.   SEEK IMMEDIATE MEDICAL CARE IF:   · Your pain is not gone in 24 hours.   · Your pain becomes worse, changes location, or feels different.   · You or your child has an oral temperature above 102° F (38.9° C), not controlled by medicine.   · Your baby is older than 3 months with a rectal temperature of 102° F (38.9° C) or higher.   · Your baby is 3 months old or younger with a rectal temperature of 100.4° F (38° C) or higher.   · You have shaking chills.   · You keep throwing up (vomiting) or cannot drink liquids.   · There is blood in your vomit or you see blood in your bowel movements.   · Your bowel movements become dark or black.   · You have frequent bowel movements.   · Your bowel movements stop (become blocked) or you cannot pass gas.   · You have bloody, frequent, or painful urination.   · You have yellow discoloration in the skin or whites of the eyes.   · Your stomach becomes bloated or bigger.   · You have dizziness or fainting.   · You have chest or back pain.   MAKE SURE YOU:   · Understand these instructions.   · Will watch your condition.   · Will get help right away if you are not doing well or get worse.   Document Released: 12/18/2006 Document Revised: 03/11/2013 Document Reviewed: 11/15/2010  ExitCare® Patient Information ©2013 [employer].

## 2017-02-20 NOTE — ED AVS SNAPSHOT
Home Care Instructions                                                                                                                Julisa Carrion   MRN: [id number]    Department:  [facility], Emergency Dept   Date of Visit:  2/20/2017            [facility], Emergency Dept    [address]    [place] [address]    Phone:  [phone]      You were seen by     Ty Shaffre M.D.      Your Diagnosis Was     Chronic abdominal pain     R10.9, G89.29       These are the medications you received during your hospitalization from 02/20/2017 0315 to 02/20/2017 0450     Date/Time Order Dose Route Action    02/20/2017 0351 HYDROmorphone (DILAUDID) injection 1 mg 1 mg Intravenous Given    02/20/2017 0415 NS infusion 1,000 mL 1,000 mL Intravenous New Bag    02/20/2017 0421 HYDROmorphone (DILAUDID) tablet 2 mg 2 mg Oral Given    02/20/2017 0422 hyoscyamine-maalox plus-lidocaine viscous (GI COCKTAIL) oral susp 30 mL 30 mL Oral Given      Follow-up Information     1. Follow up with Tre Jason M.D..    Specialty:  Internal Medicine    Contact information    [address]  [place] [address]  [phone]        Medication Information     Review all of your home medications and newly ordered medications with your primary doctor and/or pharmacist as soon as possible. Follow medication instructions as directed by your doctor and/or pharmacist.     Please keep your complete medication list with you and share with your physician. Update the information when medications are discontinued, doses are changed, or new medications (including over-the-counter products) are added; and carry medication information at all times in the event of emergency situations.               Medication List      ASK your doctor about these medications        Instructions    aspirin EC 81 MG Tbec   Commonly known as:  ECOTRIN    Take 81 mg by mouth every day.   Dose:  81 mg       cyclobenzaprine  10 MG Tabs   Commonly known as:  FLEXERIL    Take 1 Tab by mouth 3 times a day.   Dose:  10 mg       gabapentin 800 MG tablet   Commonly known as:  NEURONTIN    Take 800 mg by mouth 3 times a day.   Dose:  800 mg       hydrocodone-acetaminophen 7.5-325 MG per tablet   Commonly known as:  NORCO    Take 1 Tab by mouth every 6 hours as needed for up to 7 days.   Dose:  1 Tab       lisinopril 5 MG Tabs   Commonly known as:  PRINIVIL    Take 5 mg by mouth every morning.   Dose:  5 mg       metoclopramide 5 MG tablet   Commonly known as:  REGLAN    Take 1 Tab by mouth as needed (after meals as needed) for up to 5 doses.   Dose:  5 mg       * omeprazole 20 MG delayed-release capsule   Commonly known as:  PRILOSEC    Take 20 mg by mouth every day.   Dose:  20 mg       * omeprazole 40 MG delayed-release capsule   Commonly known as:  PRILOSEC    Take 1 Cap by mouth every day.   Dose:  40 mg       ondansetron 4 MG Tabs tablet   Commonly known as:  ZOFRAN    Take 1 Tab by mouth every 8 hours as needed for Nausea/Vomiting.   Dose:  4 mg       * ondansetron 4 MG Tbdp   Commonly known as:  ZOFRAN ODT    Take 1 Tab by mouth every 8 hours as needed for Nausea/Vomiting (give PO if no IV route available).   Dose:  4 mg       * ondansetron 4 MG Tbdp   Commonly known as:  ZOFRAN ODT    Take 1 Tab by mouth every 8 hours as needed for Nausea/Vomiting.   Dose:  4 mg       * ondansetron 4 MG Tbdp   Commonly known as:  ZOFRAN ODT    Take 1 Tab by mouth every 8 hours as needed for Nausea/Vomiting.   Dose:  4 mg       oxycodone-acetaminophen  MG Tabs   Commonly known as:  PERCOCET-10    Take 1 Tab by mouth every 8 hours as needed for Severe Pain.   Dose:  1 Tab       pantoprazole 20 MG tablet   Commonly known as:  PROTONIX    Take 1 Tab by mouth every day.   Dose:  20 mg       pravastatin 20 MG Tabs   Commonly known as:  PRAVACHOL    Take 20 mg by mouth every day.   Dose:  20 mg       * Notice:  This list has 5 medication(s) that are the  same as other medications prescribed for you. Read the directions carefully, and ask your doctor or other care provider to review them with you.              Discharge Instructions       Abdominal Pain (Nonspecific)  Your exam might not show the exact reason you have abdominal pain. Since there are many different causes of abdominal pain, another checkup and more tests may be needed. It is very important to follow up for lasting (persistent) or worsening symptoms. A possible cause of abdominal pain in any person who still has his or her appendix is acute appendicitis. Appendicitis is often hard to diagnose. Normal blood tests, urine tests, ultrasound, and CT scans do not completely rule out early appendicitis or other causes of abdominal pain. Sometimes, only the changes that happen over time will allow appendicitis and other causes of abdominal pain to be determined. Other potential problems that may require surgery may also take time to become more apparent. Because of this, it is important that you follow all of the instructions below.  HOME CARE INSTRUCTIONS   · Rest as much as possible.   · Do not eat solid food until your pain is gone.   · While adults or children have pain: A diet of water, weak decaffeinated tea, broth or bouillon, gelatin, oral rehydration solutions (ORS), frozen ice pops, or ice chips may be helpful.   · When pain is gone in adults or children: Start a light diet (dry toast, crackers, applesauce, or white rice). Increase the diet slowly as long as it does not bother you. Eat no dairy products (including cheese and eggs) and no spicy, fatty, fried, or high-fiber foods.   · Use no alcohol, caffeine, or cigarettes.   · Take your regular medicines unless your caregiver told you not to.   · Take any prescribed medicine as directed.   · Only take over-the-counter or prescription medicines for pain, discomfort, or fever as directed by your caregiver. Do not give aspirin to children.   If your  caregiver has given you a follow-up appointment, it is very important to keep that appointment. Not keeping the appointment could result in a permanent injury and/or lasting (chronic) pain and/or disability. If there is any problem keeping the appointment, you must call to reschedule.   SEEK IMMEDIATE MEDICAL CARE IF:   · Your pain is not gone in 24 hours.   · Your pain becomes worse, changes location, or feels different.   · You or your child has an oral temperature above 102° F (38.9° C), not controlled by medicine.   · Your baby is older than 3 months with a rectal temperature of 102° F (38.9° C) or higher.   · Your baby is 3 months old or younger with a rectal temperature of 100.4° F (38° C) or higher.   · You have shaking chills.   · You keep throwing up (vomiting) or cannot drink liquids.   · There is blood in your vomit or you see blood in your bowel movements.   · Your bowel movements become dark or black.   · You have frequent bowel movements.   · Your bowel movements stop (become blocked) or you cannot pass gas.   · You have bloody, frequent, or painful urination.   · You have yellow discoloration in the skin or whites of the eyes.   · Your stomach becomes bloated or bigger.   · You have dizziness or fainting.   · You have chest or back pain.   MAKE SURE YOU:   · Understand these instructions.   · Will watch your condition.   · Will get help right away if you are not doing well or get worse.   Document Released: 12/18/2006 Document Revised: 03/11/2013 Document Reviewed: 11/15/2010  ExitCare® Patient Information ©2013 "[employer].          Patient Information     Patient Information    Following emergency treatment: all patient requiring follow-up care must return either to a private physician or a clinic if your condition worsens before you are able to obtain further medical attention, please return to the emergency room.     Billing Information    At [facility], we work to make the billing process  streamlined for our patients.  Our Representatives are here to answer any questions you may have regarding your hospital bill.  If you have insurance coverage and have supplied your insurance information to us, we will submit a claim to your insurer on your behalf.  Should you have any questions regarding your bill, we can be reached online or by phone as follows:  Online: You are able pay your bills online or live chat with our representatives about any billing questions you may have. We are here to help Monday - Friday from 8:00am to 7:30pm and 9:00am - 12:00pm on Saturdays.  Please visit [email or web address]  for more information.   Phone:  [phone] or [phone]    Please note that your emergency physician, surgeon, pathologist, radiologist, anesthesiologist, and other specialists are not employed by [facility] and will therefore bill separately for their services.  Please contact them directly for any questions concerning their bills at the numbers below:     Emergency Physician Services:  [phone]  [facility]:  [phone]  [facility]:  [phone]  [facility]:  [phone]    1. Your final bill may vary from the amount quoted upon discharge if all procedures are not complete at that time, or if your doctor has additional procedures of which we are not aware. You will receive an additional bill if you return to the Emergency Department at [facility] for suture removal regardless of the facility of which the sutures were placed.     2. Please arrange for settlement of this account at the emergency registration.    3. All self-pay accounts are due in full at the time of treatment.  If you are unable to meet this obligation then payment is expected within 4-5 days.     4. If you have had radiology studies (CT, X-ray, Ultrasound, MRI), you have received a preliminary result during your emergency department visit.  Please contact the radiology department [phone] to receive a copy of your final result. Please discuss the Final result with your primary physician or with the follow up physician provided.     Crisis Hotline:  [place] Crisis Hotline:  8-954-[place]HVOEP or 1-724.783.6971  [place] Crisis Hotline:    1-168.143.2437 or 419-992-3116         ED Discharge Follow Up Questions    1. In order to provide you with very good care, we would like to follow up with a phone call in the next few days.  May we have your permission to contact you?     YES /  NO    2. What is the best phone number to call you? (       )_____-__________    3. What is the best time to call you?      Morning  /  Afternoon  /  Evening                   Patient Signature:  ____________________________________________________________    Date:  ____________________________________________________________

## 2017-02-20 NOTE — ED PROVIDER NOTES
ED Provider Note    ED Provider Note    Primary care provider: Tre Jason M.D.  Means of arrival: EMS  History obtained from: Patient    CHIEF COMPLAINT  Chief Complaint   Patient presents with   • Abdominal Pain     Seen at 3:22 AM.   HPI  Julisa Carrion is a 56 y.o. female who presents to the Emergency Department for years of abdominal pain. She states she is having a flare of her chronic abdominal pain. She reports severe epigastric nonradiating and down pain worse with eating. She states the pain has been there for the past 48 hours. She left [facility] less than 12 hours ago after completing a laboratory evaluation as well as a CT without contrast. She has had a small bowel follow-through in the past that shows delayed gastric emptying. She's had multiple upper GI scopes ultrasounds and CTs that failed to reveal any acute process. She is status post cholecystectomy.    She denies fevers, chills, vomiting, diarrhea, hematochezia, melena, headache.    REVIEW OF SYSTEMS  See HPI,   Remainder of ROS negative.     PAST MEDICAL HISTORY   has a past medical history of Hypertension; Diabetes; High cholesterol (5/15/2011); Staph skin infection; Migraine; and Intestinal mass (2015).    SURGICAL HISTORY   has past surgical history that includes other abdominal surgery; gyn surgery; other orthopedic surgery (6-2014); and abdominal exploration.    SOCIAL HISTORY  Social History   Substance Use Topics   • Smoking status: Former Smoker -- 1.00 packs/day for 20 years     Types: Cigarettes     Quit date: 01/01/1996   • Smokeless tobacco: Former User     Quit date: 06/05/1995   • Alcohol Use: No      History   Drug Use No     Comment: just prescribed meds       FAMILY HISTORY  Family History   Problem Relation Age of Onset   • Cancer Maternal Aunt      Lung cancer d/t smoking       CURRENT MEDICATIONS  Reviewed.  See Encounter Summary.     ALLERGIES  No Known Allergies    PHYSICAL EXAM  VITAL SIGNS: /76  "mmHg  Pulse 88  Temp(Src) 36.1 °C (96.9 °F)  Resp 16  Ht 1.651 m (5' 5\")  Wt 77.4 kg (170 lb 10.2 oz)  BMI 28.40 kg/m2  SpO2 98%  LMP 02/05/2009  Constitutional: Awake, alert in no apparent distress. Obese.  HENT: Normocephalic, Bilateral external ears normal. Nose normal.   Eyes: Conjunctiva normal, non-icteric, EOMI.    Thorax & Lungs: Easy unlabored respirations, Clear to ascultation bilaterally.  Cardiovascular: Regular rate, Regular rhythm, No murmurs, rubs or gallops.  Abdomen:  Soft, pinpoint epigastric tenderness with light palpation, nondistended, normal active bowel sounds. No bilateral lower quadrant tenderness.   :    Skin: Visualized skin is  Dry, No erythema, No rash.   Musculoskeletal:   No cyanosis, clubbing or edema.  Neurologic: Alert, Grossly non-focal.   Psychiatric: Normal affect, Normal mood  Lymphatic:  No cervical LAD    \    COURSE & MEDICAL DECISION MAKING  Pertinent Labs & Imaging studies reviewed. (See chart for details)    Differential diagnoses include but are not limited to: Chronic abdominal pain.    3:22 AM - Patient seen and examined at bedside. Patient will be treated with Dilaudid and discharge.     4:15 AM- watching TV, does not appear to be in any distress. She notes that she has continued severe abdominal pain despite 2 mg of IV Dilaudid. I informed her that we will not administer any additional IV pain medications. She will be given by mouth medications such as a GI cocktail and by mouth Dilaudid. After this I anticipate discharge.      Decision Making:  This is a 56 y.o. year old female who is brought in by EMS for chronic abdominal pain. Examination is benign today. She is status post cholecystectomy. She is hemodynamically stable and not hypertensive, I do not suspect acute aortic dissection. She had blood work completed less than 24 hours ago that did not reveal a leukocytosis, elevated lipase or elevated liver function tests. In addition she did not have any " acute kidney injury.  At this point given her exhaustive workup in the past, I do not feel that there is any indication to repeat this workup tonight. Her presentation today appears to be chronic in nature. Prior records suggest drug-seeking behavior.      The patient be discharged home in stable condition.    FINAL IMPRESSION  1. Chronic abdominal pain

## 2017-02-20 NOTE — ED NOTES
Pt medicated for pain, pt stating she has ride home, waiting for a few minutes to watch for possible medication reaction, pt alert and oriented at this time, Pt denies any needs at this time, will cont. Monitoring.

## 2017-02-20 NOTE — ED AVS SNAPSHOT
[employer] Access Code: [email or web address]  Expires: 3/21/2017  3:30 PM    Your email address is not on file at [facility].  Email Addresses are required for you to sign up for [employer], please contact [phone] to verify your personal information and to provide your email address prior to attempting to register for [employer].    Julisa Amadotz  [address] [place] [address]    [employer]  A secure, online tool to manage your health information     [facility]’s [employer]® is a secure, online tool that connects you to your personalized health information from the privacy of your home -- day or night - making it very easy for you to manage your healthcare. Once the activation process is completed, you can even access your medical information using the [employer] rito, which is available for free in the Apple Rito store or Google Play store.     To learn more about [employer], visit [email or web address]    There are two levels of access available (as shown below):   My Chart Features  [facility] Primary Care Doctor [facility]  Specialists [facility]  Urgent  Care Non-[facility] Primary Care Doctor   Email your healthcare team securely and privately 24/7 X X X    Manage appointments: schedule your next appointment; view details of past/upcoming appointments X      Request prescription refills. X      View recent personal medical records, including lab and immunizations X X X X   View health record, including health history, allergies, medications X X X X   Read reports about your outpatient visits, procedures, consult and ER notes X X X X   See your discharge summary, which is a recap of your hospital and/or ER visit that includes your diagnosis, lab results, and care plan X X  X     How to register for [employer]:  Once your e-mail address has been verified, follow the following steps to sign up for [employer].     1. Go to  [email or web address]  2. Click on the Sign Up Now box, which takes you to the New Member Sign Up page. You  will need to provide the following information:  a. Enter your [employer] Access Code exactly as it appears at the top of this page. (You will not need to use this code after you’ve completed the sign-up process. If you do not sign up before the expiration date, you must request a new code.)   b. Enter your date of birth.   c. Enter your home email address.   d. Click Submit, and follow the next screen’s instructions.  3. Create a [employer] ID. This will be your [employer] login ID and cannot be changed, so think of one that is secure and easy to remember.  4. Create a [employer] password. You can change your password at any time.  5. Enter your Password Reset Question and Answer. This can be used at a later time if you forget your password.   6. Enter your e-mail address. This allows you to receive e-mail notifications when new information is available in [employer].  7. Click Sign Up. You can now view your health information.    For assistance activating your [employer] account, call [phone]

## 2017-02-20 NOTE — ED AVS SNAPSHOT
2/20/2017          Julisa Carrion  [address]  [place] [address]    Dear Julisa:    [facility] wants to ensure your discharge home is safe and you or your loved ones have had all your questions answered regarding your care after you leave the hospital.    You may receive a telephone call within two days of your discharge.  This call is to make certain you understand your discharge instructions as well as ensure we provided you with the best care possible during your stay with us.     The call will only last approximately 3-5 minutes and will be done by a nurse.    Once again, we want to ensure your discharge home is safe and that you have a clear understanding of any next steps in your care.  If you have any questions or concerns, please do not hesitate to contact us, we are here for you.  Thank you for choosing [facility] for your healthcare needs.    Sincerely,    Ty Enriquez    [facility]

## 2017-02-24 ENCOUNTER — HOSPITAL ENCOUNTER (EMERGENCY)
Facility: MEDICAL CENTER | Age: 57
End: 2017-02-25
Attending: GENERAL ACUTE CARE HOSPITAL
Payer: MEDICAID

## 2017-02-24 DIAGNOSIS — K31.84 GASTROPARESIS: ICD-10-CM

## 2017-02-24 DIAGNOSIS — G89.29 CHRONIC EPIGASTRIC PAIN: ICD-10-CM

## 2017-02-24 DIAGNOSIS — L03.116 CELLULITIS OF FOOT, LEFT: ICD-10-CM

## 2017-02-24 DIAGNOSIS — R10.13 CHRONIC EPIGASTRIC PAIN: ICD-10-CM

## 2017-02-24 PROCEDURE — 85652 RBC SED RATE AUTOMATED: CPT

## 2017-02-24 PROCEDURE — 85025 COMPLETE CBC W/AUTO DIFF WBC: CPT

## 2017-02-24 PROCEDURE — 86140 C-REACTIVE PROTEIN: CPT | Mod: 91

## 2017-02-24 PROCEDURE — 80053 COMPREHEN METABOLIC PANEL: CPT

## 2017-02-24 PROCEDURE — 99285 EMERGENCY DEPT VISIT HI MDM: CPT

## 2017-02-24 PROCEDURE — 83690 ASSAY OF LIPASE: CPT

## 2017-02-24 NOTE — ED AVS SNAPSHOT
Home Care Instructions                                                                                                                Julisa Carrion   MRN: [id number]    Department:  [facility], Emergency Dept   Date of Visit:  2/24/2017            [facility], Emergency Dept    [address] [address]    Phone:  [phone]      You were seen by     Brendan Gaines M.D.      Your Diagnosis Was     Gastroparesis     K31.84       These are the medications you received during your hospitalization from 02/24/2017 1817 to 02/25/2017 0220     Date/Time Order Dose Route Action    02/25/2017 0022 HYDROmorphone (DILAUDID) injection 1 mg 1 mg Intravenous Given    02/25/2017 0021 ondansetron (ZOFRAN) syringe/vial injection 8 mg 8 mg Intravenous Given    02/25/2017 0021 NS infusion 1,000 mL 1,000 mL Intravenous New Bag    02/25/2017 0021 metoclopramide (REGLAN) injection 10 mg 10 mg Intravenous Given    02/25/2017 0021 lorazepam (ATIVAN) tablet 1 mg 1 mg Oral Given    02/25/2017 0213 ampicillin/sulbactam (UNASYN) injection 3 g 3 g Intravenous Given      Follow-up Information     1. Follow up with Tre Jason M.D..    Specialty:  Internal Medicine    Why:  please see your doctor early next week return if worse    Contact information    [address]  [place] [address] [phone]        Medication Information     Review all of your home medications and newly ordered medications with your primary doctor and/or pharmacist as soon as possible. Follow medication instructions as directed by your doctor and/or pharmacist.     Please keep your complete medication list with you and share with your physician. Update the information when medications are discontinued, doses are changed, or new medications (including over-the-counter products) are added; and carry medication information at all times in the event of emergency situations.               Medication List         START taking these medications        Instructions    amoxicillin-clavulanate 500-125 MG Tabs   Commonly known as:  AUGMENTIN    Take 1 Tab by mouth 3 times a day.   Dose:  1 Tab       famotidine 20 MG Tabs   Commonly known as:  PEPCID    Take 1 Tab by mouth 2 times a day.   Dose:  20 mg         ASK your doctor about these medications        Instructions    aspirin EC 81 MG Tbec   Commonly known as:  ECOTRIN    Take 81 mg by mouth every day.   Dose:  81 mg       cyclobenzaprine 10 MG Tabs   Commonly known as:  FLEXERIL    Take 1 Tab by mouth 3 times a day.   Dose:  10 mg       gabapentin 800 MG tablet   Commonly known as:  NEURONTIN    Take 800 mg by mouth 3 times a day.   Dose:  800 mg       hydrocodone-acetaminophen 7.5-325 MG per tablet   Commonly known as:  NORCO    Take 1 Tab by mouth every 6 hours as needed for up to 7 days.   Dose:  1 Tab       lisinopril 5 MG Tabs   Commonly known as:  PRINIVIL    Take 5 mg by mouth every morning.   Dose:  5 mg       * metoclopramide 5 MG tablet   What changed:  Another medication with the same name was added. Make sure you understand how and when to take each.   Commonly known as:  REGLAN   Ask about: Which instructions should I use?    Take 1 Tab by mouth as needed (after meals as needed) for up to 5 doses.   Dose:  5 mg       * metoclopramide 10 MG Tabs   What changed:  You were already taking a medication with the same name, and this prescription was added. Make sure you understand how and when to take each.   Commonly known as:  REGLAN   Ask about: Which instructions should I use?    Take 1 Tab by mouth 3 times a day before meals.   Dose:  10 mg       * omeprazole 20 MG delayed-release capsule   Commonly known as:  PRILOSEC    Take 20 mg by mouth every day.   Dose:  20 mg       * omeprazole 40 MG delayed-release capsule   Commonly known as:  PRILOSEC    Take 1 Cap by mouth every day.   Dose:  40 mg       * ondansetron 4 MG Tabs tablet   What changed:  Another  medication with the same name was added. Make sure you understand how and when to take each.   Commonly known as:  ZOFRAN   Ask about: Which instructions should I use?    Take 1 Tab by mouth every 8 hours as needed for Nausea/Vomiting.   Dose:  4 mg       * ondansetron 4 MG Tabs tablet   What changed:  You were already taking a medication with the same name, and this prescription was added. Make sure you understand how and when to take each.   Commonly known as:  ZOFRAN   Ask about: Which instructions should I use?    Take 1 Tab by mouth every four hours as needed for Nausea/Vomiting.   Dose:  4 mg       * ondansetron 4 MG Tbdp   Commonly known as:  ZOFRAN ODT    Take 1 Tab by mouth every 8 hours as needed for Nausea/Vomiting (give PO if no IV route available).   Dose:  4 mg       * ondansetron 4 MG Tbdp   Commonly known as:  ZOFRAN ODT    Take 1 Tab by mouth every 8 hours as needed for Nausea/Vomiting.   Dose:  4 mg       * ondansetron 4 MG Tbdp   Commonly known as:  ZOFRAN ODT    Take 1 Tab by mouth every 8 hours as needed for Nausea/Vomiting.   Dose:  4 mg       oxycodone-acetaminophen  MG Tabs   Commonly known as:  PERCOCET-10    Take 1 Tab by mouth every 8 hours as needed for Severe Pain.   Dose:  1 Tab       pantoprazole 20 MG tablet   Commonly known as:  PROTONIX    Take 1 Tab by mouth every day.   Dose:  20 mg       pravastatin 20 MG Tabs   Commonly known as:  PRAVACHOL    Take 20 mg by mouth every day.   Dose:  20 mg       * Notice:  This list has 9 medication(s) that are the same as other medications prescribed for you. Read the directions carefully, and ask your doctor or other care provider to review them with you.            Procedures and tests performed during your visit     Procedure/Test Number of Times Performed    CARDIAC MONITORING 1    CBC WITH DIFFERENTIAL 1    COMP METABOLIC PANEL 1    CRP QUANTITIVE (NON-CARDIAC) 2    DX-FOOT-2- LEFT 1    ESTIMATED GFR 1    LIPASE 1    O2 Protocol 1     SALINE LOCK 1    WESTERGREN SED RATE 1        Discharge Instructions       PLEASE RETURN TO THE HOSPITAL FOR ANY WORSENING SYMPTOMS, start taking Pepcid twice a day as directed along with the other stomach acid medicine your artery on. Tried taking the Reglan every day before you eat to help with the stomach pain. Take the antibiotics 3 times a day until finished for the swelling in her foot please return to the hospital for any worsening signs of infection in her feet like pain redness swelling or drainage. Please wash the skin with soap and water every day and use regular hydrating lotion as needed. They're also welcome to put antibiotic ointment of your choice on the scabs on your feet.     Cellulitis  Cellulitis is an infection of the skin and the tissue under the skin. The infected area is usually red and tender. This happens most often in the arms and lower legs.  HOME CARE   · Take your antibiotic medicine as told. Finish the medicine even if you start to feel better.  · Keep the infected arm or leg raised (elevated).  · Put a warm cloth on the area up to 4 times per day.  · Only take medicines as told by your doctor.  · Keep all doctor visits as told.  GET HELP IF:  · You see red streaks on the skin coming from the infected area.  · Your red area gets bigger or turns a dark color.  · Your bone or joint under the infected area is painful after the skin heals.  · Your infection comes back in the same area or different area.  · You have a puffy (swollen) bump in the infected area.  · You have new symptoms.  · You have a fever.  GET HELP RIGHT AWAY IF:   · You feel very sleepy.  · You throw up (vomit) or have watery poop (diarrhea).  · You feel sick and have muscle aches and pains.  MAKE SURE YOU:   · Understand these instructions.  · Will watch your condition.  · Will get help right away if you are not doing well or get worse.     This information is not intended to replace advice given to you by your health  care provider. Make sure you discuss any questions you have with your health care provider.     Document Released: 06/05/2009 Document Revised: 01/08/2016 Document Reviewed: 03/04/2013  [employer] Interactive Patient Education ©2016 [employer]  Gastroparesis  Gastroparesis, also called delayed gastric emptying, is a condition in which food takes longer than normal to empty from the stomach. The condition is usually long-lasting (chronic).  CAUSES  This condition may be caused by:  · An endocrine disorder, such as hypothyroidism or diabetes. Diabetes is the most common cause of this condition.  · A nervous system disease, such as Parkinson disease or multiple sclerosis.  · Cancer, infection, or surgery of the stomach or vagus nerve.  · A connective tissue disorder, such as scleroderma.  · Certain medicines.  In most cases, the cause is not known.  RISK FACTORS  This condition is more likely to develop in:  · People with certain disorders, including endocrine disorders, eating disorders, amyloidosis, and scleroderma.  · People with certain diseases, including Parkinson disease or multiple sclerosis.  · People with cancer or infection of the stomach or vagus nerve.  · People who have had surgery on the stomach or vagus nerve.  · People who take certain medicines.  · Women.  SYMPTOMS  Symptoms of this condition include:  · An early feeling of fullness when eating.  · Nausea.  · Weight loss.  · Vomiting.  · Heartburn.  · Abdominal bloating.  · Inconsistent blood glucose levels.  · Lack of appetite.  · Acid from the stomach coming up into the esophagus (gastroesophageal reflux).  · Spasms of the stomach.  Symptoms may come and go.  DIAGNOSIS  This condition is diagnosed with tests, such as:  · Tests that check how long it takes food to move through the stomach and intestines. These tests include:  · Upper gastrointestinal (GI) series. In this test, X-rays of the intestines are taken after you drink a liquid. The liquid  makes the intestines show up better on the X-rays.  · Gastric emptying scintigraphy. In this test, scans are taken after you eat food that contains a small amount of radioactive material.  · Wireless capsule GI monitoring system. This test involves swallowing a capsule that records information about movement through the stomach.  · Gastric manometry. This test measures electrical and muscular activity in the stomach. It is done with a thin tube that is passed down the throat and into the stomach.  · Endoscopy. This test checks for abnormalities in the lining of the stomach. It is done with a long, thin tube that is passed down the throat and into the stomach.  · An ultrasound. This test can help rule out gallbladder disease or pancreatitis as a cause of your symptoms. It uses sound waves to take pictures of the inside of your body.  TREATMENT  There is no cure for gastroparesis. This condition may be managed with:  · Treatment of the underlying condition causing the gastroparesis.  · Lifestyle changes, including exercise and dietary changes. Dietary changes can include:  ¨ Changes in what and when you eat.  ¨ Eating smaller meals more often.  ¨ Eating low-fat foods.  ¨ Eating low-fiber forms of high-fiber foods, such as cooked vegetables instead of raw vegetables.  ¨ Having liquid foods in place of solid foods. Liquid foods are easier to digest.  · Medicines. These may be given to control nausea and vomiting and to stimulate stomach muscles.  · Getting food through a feeding tube. This may be done in severe cases.  · A gastric neurostimulator. This is a device that is inserted into the body with surgery. It helps improve stomach emptying and control nausea and vomiting.  HOME CARE INSTRUCTIONS  · Follow your health care provider's instructions about exercise and diet.  · Take medicines only as directed by your health care provider.  SEEK MEDICAL CARE IF:  · Your symptoms do not improve with treatment.  · You have  new symptoms.  SEEK IMMEDIATE MEDICAL CARE IF:  · You have severe abdominal pain that does not improve with treatment.  · You have nausea that does not go away.  · You cannot keep fluids down.     This information is not intended to replace advice given to you by your health care provider. Make sure you discuss any questions you have with your health care provider.     Document Released: 12/18/2006 Document Revised: 05/03/2016 Document Reviewed: 12/14/2015  [employer] Interactive Patient Education ©2016 [employer]            Patient Information     Patient Information    Following emergency treatment: all patient requiring follow-up care must return either to a private physician or a clinic if your condition worsens before you are able to obtain further medical attention, please return to the emergency room.     Billing Information    At [facility], we work to make the billing process streamlined for our patients.  Our Representatives are here to answer any questions you may have regarding your hospital bill.  If you have insurance coverage and have supplied your insurance information to us, we will submit a claim to your insurer on your behalf.  Should you have any questions regarding your bill, we can be reached online or by phone as follows:  Online: You are able pay your bills online or live chat with our representatives about any billing questions you may have. We are here to help Monday - Friday from 8:00am to 7:30pm and 9:00am - 12:00pm on Saturdays.  Please visit [email or web address]  for more information.   Phone:  [phone] or [phone]    Please note that your emergency physician, surgeon, pathologist, radiologist, anesthesiologist, and other specialists are not employed by [facility] and will therefore bill separately for their services.  Please contact them directly for any questions concerning their bills at the numbers below:     Emergency Physician Services:   [phone]  [facility]:  [phone]  [facility]:  [phone]  [facility]:  [phone]    1. Your final bill may vary from the amount quoted upon discharge if all procedures are not complete at that time, or if your doctor has additional procedures of which we are not aware. You will receive an additional bill if you return to the Emergency Department at [facility] for suture removal regardless of the facility of which the sutures were placed.     2. Please arrange for settlement of this account at the emergency registration.    3. All self-pay accounts are due in full at the time of treatment.  If you are unable to meet this obligation then payment is expected within 4-5 days.     4. If you have had radiology studies (CT, X-ray, Ultrasound, MRI), you have received a preliminary result during your emergency department visit. Please contact the radiology department [phone] to receive a copy of your final result. Please discuss the Final result with your primary physician or with the follow up physician provided.     Crisis Hotline:  [place] Crisis Hotline:  1-386-JUFOZFI or 1-349.833.8618  [place] Crisis Hotline:    1-295.274.3452 or 593-906-5508         ED Discharge Follow Up Questions    1. In order to provide you with very good care, we would like to follow up with a phone call in the next few days.  May we have your permission to contact you?     YES /  NO    2. What is the best phone number to call you? (       )_____-__________    3. What is the best time to call you?      Morning  /  Afternoon  /  Evening                   Patient Signature:  ____________________________________________________________    Date:  ____________________________________________________________

## 2017-02-24 NOTE — ED AVS SNAPSHOT
2/25/2017          Julisa Carrion  [address]  [place] [address]    Dear Julisa:    [facility] wants to ensure your discharge home is safe and you or your loved ones have had all your questions answered regarding your care after you leave the hospital.    You may receive a telephone call within two days of your discharge.  This call is to make certain you understand your discharge instructions as well as ensure we provided you with the best care possible during your stay with us.     The call will only last approximately 3-5 minutes and will be done by a nurse.    Once again, we want to ensure your discharge home is safe and that you have a clear understanding of any next steps in your care.  If you have any questions or concerns, please do not hesitate to contact us, we are here for you.  Thank you for choosing [facility] for your healthcare needs.    Sincerely,    Ty Enriquez    [facility]

## 2017-02-24 NOTE — ED AVS SNAPSHOT
PurePlay Access Code: [email or web address]  Expires: 3/21/2017  3:30 PM    Your email address is not on file at [facility].  Email Addresses are required for you to sign up for PurePlay, please contact [phone] to verify your personal information and to provide your email address prior to attempting to register for PurePlay.    Julisa Amadotz  [address] [place] [address]    PurePlay  A secure, online tool to manage your health information     [facility]’s PurePlay® is a secure, online tool that connects you to your personalized health information from the privacy of your home -- day or night - making it very easy for you to manage your healthcare. Once the activation process is completed, you can even access your medical information using the PurePlay rito, which is available for free in the Apple Rito store or Google Play store.     To learn more about PurePlay, visit [email or web address]    There are two levels of access available (as shown below):   My Chart Features  [facility] Primary Care Doctor [facility]  Specialists [facility]  Urgent  Care Non-[facility] Primary Care Doctor   Email your healthcare team securely and privately 24/7 X X X    Manage appointments: schedule your next appointment; view details of past/upcoming appointments X      Request prescription refills. X      View recent personal medical records, including lab and immunizations X X X X   View health record, including health history, allergies, medications X X X X   Read reports about your outpatient visits, procedures, consult and ER notes X X X X   See your discharge summary, which is a recap of your hospital and/or ER visit that includes your diagnosis, lab results, and care plan X X  X     How to register for Status Overloadt:  Once your e-mail address has been verified, follow the following steps to sign up for Status Overloadt.     1. Go to  [email or web address]  2. Click on the Sign Up Now box, which takes you to the New Member Sign Up page. You  will need to provide the following information:  a. Enter your "[employer] Access Code exactly as it appears at the top of this page. (You will not need to use this code after you’ve completed the sign-up process. If you do not sign up before the expiration date, you must request a new code.)   b. Enter your date of birth.   c. Enter your home email address.   d. Click Submit, and follow the next screen’s instructions.  3. Create a GetBulbt ID. This will be your "[employer] login ID and cannot be changed, so think of one that is secure and easy to remember.  4. Create a "[employer] password. You can change your password at any time.  5. Enter your Password Reset Question and Answer. This can be used at a later time if you forget your password.   6. Enter your e-mail address. This allows you to receive e-mail notifications when new information is available in "[employer].  7. Click Sign Up. You can now view your health information.    For assistance activating your "[employer] account, call [phone]

## 2017-02-25 ENCOUNTER — APPOINTMENT (OUTPATIENT)
Dept: RADIOLOGY | Facility: MEDICAL CENTER | Age: 57
End: 2017-02-25
Attending: GENERAL ACUTE CARE HOSPITAL
Payer: MEDICAID

## 2017-02-25 VITALS
OXYGEN SATURATION: 96 % | DIASTOLIC BLOOD PRESSURE: 74 MMHG | BODY MASS INDEX: 29.38 KG/M2 | HEART RATE: 106 BPM | HEIGHT: 65 IN | SYSTOLIC BLOOD PRESSURE: 137 MMHG | TEMPERATURE: 97.1 F | WEIGHT: 176.37 LBS | RESPIRATION RATE: 16 BRPM

## 2017-02-25 LAB
ALBUMIN SERPL BCP-MCNC: 3.8 G/DL (ref 3.2–4.9)
ALBUMIN/GLOB SERPL: 1 G/DL
ALP SERPL-CCNC: 162 U/L (ref 30–99)
ALT SERPL-CCNC: 7 U/L (ref 2–50)
ANION GAP SERPL CALC-SCNC: 8 MMOL/L (ref 0–11.9)
AST SERPL-CCNC: 7 U/L (ref 12–45)
BASOPHILS # BLD AUTO: 0.6 % (ref 0–1.8)
BASOPHILS # BLD: 0.06 K/UL (ref 0–0.12)
BILIRUB SERPL-MCNC: 0.2 MG/DL (ref 0.1–1.5)
BUN SERPL-MCNC: 24 MG/DL (ref 8–22)
CALCIUM SERPL-MCNC: 9.6 MG/DL (ref 8.5–10.5)
CHLORIDE SERPL-SCNC: 106 MMOL/L (ref 96–112)
CO2 SERPL-SCNC: 26 MMOL/L (ref 20–33)
CREAT SERPL-MCNC: 0.87 MG/DL (ref 0.5–1.4)
CRP SERPL HS-MCNC: 0.19 MG/DL (ref 0–0.75)
CRP SERPL HS-MCNC: 0.19 MG/DL (ref 0–0.75)
EOSINOPHIL # BLD AUTO: 0.15 K/UL (ref 0–0.51)
EOSINOPHIL NFR BLD: 1.6 % (ref 0–6.9)
ERYTHROCYTE [DISTWIDTH] IN BLOOD BY AUTOMATED COUNT: 46.2 FL (ref 35.9–50)
ERYTHROCYTE [SEDIMENTATION RATE] IN BLOOD BY WESTERGREN METHOD: 54 MM/HOUR (ref 0–30)
GFR SERPL CREATININE-BSD FRML MDRD: >60 ML/MIN/1.73 M 2
GLOBULIN SER CALC-MCNC: 3.8 G/DL (ref 1.9–3.5)
GLUCOSE SERPL-MCNC: 228 MG/DL (ref 65–99)
HCT VFR BLD AUTO: 35.1 % (ref 37–47)
HGB BLD-MCNC: 11 G/DL (ref 12–16)
IMM GRANULOCYTES # BLD AUTO: 0.03 K/UL (ref 0–0.11)
IMM GRANULOCYTES NFR BLD AUTO: 0.3 % (ref 0–0.9)
LIPASE SERPL-CCNC: 19 U/L (ref 11–82)
LYMPHOCYTES # BLD AUTO: 3.33 K/UL (ref 1–4.8)
LYMPHOCYTES NFR BLD: 35.3 % (ref 22–41)
MCH RBC QN AUTO: 26 PG (ref 27–33)
MCHC RBC AUTO-ENTMCNC: 31.3 G/DL (ref 33.6–35)
MCV RBC AUTO: 83 FL (ref 81.4–97.8)
MONOCYTES # BLD AUTO: 0.54 K/UL (ref 0–0.85)
MONOCYTES NFR BLD AUTO: 5.7 % (ref 0–13.4)
NEUTROPHILS # BLD AUTO: 5.32 K/UL (ref 2–7.15)
NEUTROPHILS NFR BLD: 56.5 % (ref 44–72)
NRBC # BLD AUTO: 0 K/UL
NRBC BLD AUTO-RTO: 0 /100 WBC
PLATELET # BLD AUTO: 488 K/UL (ref 164–446)
PMV BLD AUTO: 8.6 FL (ref 9–12.9)
POTASSIUM SERPL-SCNC: 3.4 MMOL/L (ref 3.6–5.5)
PROT SERPL-MCNC: 7.6 G/DL (ref 6–8.2)
RBC # BLD AUTO: 4.23 M/UL (ref 4.2–5.4)
SODIUM SERPL-SCNC: 140 MMOL/L (ref 135–145)
WBC # BLD AUTO: 9.4 K/UL (ref 4.8–10.8)

## 2017-02-25 PROCEDURE — A9270 NON-COVERED ITEM OR SERVICE: HCPCS | Performed by: GENERAL ACUTE CARE HOSPITAL

## 2017-02-25 PROCEDURE — 700102 HCHG RX REV CODE 250 W/ 637 OVERRIDE(OP): Performed by: GENERAL ACUTE CARE HOSPITAL

## 2017-02-25 PROCEDURE — 96375 TX/PRO/DX INJ NEW DRUG ADDON: CPT

## 2017-02-25 PROCEDURE — 96365 THER/PROPH/DIAG IV INF INIT: CPT

## 2017-02-25 PROCEDURE — 96361 HYDRATE IV INFUSION ADD-ON: CPT

## 2017-02-25 PROCEDURE — 700111 HCHG RX REV CODE 636 W/ 250 OVERRIDE (IP): Performed by: GENERAL ACUTE CARE HOSPITAL

## 2017-02-25 PROCEDURE — 700105 HCHG RX REV CODE 258: Performed by: GENERAL ACUTE CARE HOSPITAL

## 2017-02-25 PROCEDURE — 73620 X-RAY EXAM OF FOOT: CPT | Mod: LT

## 2017-02-25 RX ORDER — ONDANSETRON 4 MG/1
4 TABLET, ORALLY DISINTEGRATING ORAL EVERY 8 HOURS PRN
Qty: 20 TAB | Refills: 0 | Status: SHIPPED | OUTPATIENT
Start: 2017-02-25 | End: 2017-03-20

## 2017-02-25 RX ORDER — ONDANSETRON 4 MG/1
4 TABLET, FILM COATED ORAL EVERY 4 HOURS PRN
Qty: 20 TAB | Refills: 1 | Status: SHIPPED | OUTPATIENT
Start: 2017-02-25 | End: 2017-03-20

## 2017-02-25 RX ORDER — FAMOTIDINE 20 MG/1
20 TABLET, FILM COATED ORAL 2 TIMES DAILY
Qty: 60 TAB | Refills: 0 | Status: SHIPPED | OUTPATIENT
Start: 2017-02-25 | End: 2017-03-20

## 2017-02-25 RX ORDER — AMOXICILLIN AND CLAVULANATE POTASSIUM 500; 125 MG/1; MG/1
1 TABLET, FILM COATED ORAL 3 TIMES DAILY
Qty: 30 TAB | Refills: 0 | Status: SHIPPED | OUTPATIENT
Start: 2017-02-25 | End: 2017-03-20

## 2017-02-25 RX ORDER — ONDANSETRON 2 MG/ML
8 INJECTION INTRAMUSCULAR; INTRAVENOUS ONCE
Status: COMPLETED | OUTPATIENT
Start: 2017-02-25 | End: 2017-02-25

## 2017-02-25 RX ORDER — METOCLOPRAMIDE HYDROCHLORIDE 5 MG/ML
10 INJECTION INTRAMUSCULAR; INTRAVENOUS ONCE
Status: COMPLETED | OUTPATIENT
Start: 2017-02-25 | End: 2017-02-25

## 2017-02-25 RX ORDER — LORAZEPAM 1 MG/1
1 TABLET ORAL ONCE
Status: COMPLETED | OUTPATIENT
Start: 2017-02-25 | End: 2017-02-25

## 2017-02-25 RX ORDER — SODIUM CHLORIDE 9 MG/ML
1000 INJECTION, SOLUTION INTRAVENOUS ONCE
Status: COMPLETED | OUTPATIENT
Start: 2017-02-25 | End: 2017-02-25

## 2017-02-25 RX ORDER — METOCLOPRAMIDE 10 MG/1
10 TABLET ORAL
Qty: 60 TAB | Refills: 1 | Status: SHIPPED | OUTPATIENT
Start: 2017-02-25 | End: 2017-05-07

## 2017-02-25 RX ORDER — AMPICILLIN AND SULBACTAM 2; 1 G/1; G/1
3 INJECTION, POWDER, FOR SOLUTION INTRAMUSCULAR; INTRAVENOUS ONCE
Status: COMPLETED | OUTPATIENT
Start: 2017-02-25 | End: 2017-02-25

## 2017-02-25 RX ADMIN — AMPICILLIN AND SULBACTAM 3 G: 2; 1 INJECTION, POWDER, FOR SOLUTION INTRAMUSCULAR; INTRAVENOUS at 02:13

## 2017-02-25 RX ADMIN — LORAZEPAM 1 MG: 1 TABLET ORAL at 00:21

## 2017-02-25 RX ADMIN — METOCLOPRAMIDE 10 MG: 5 INJECTION, SOLUTION INTRAMUSCULAR; INTRAVENOUS at 00:21

## 2017-02-25 RX ADMIN — HYDROMORPHONE HYDROCHLORIDE 1 MG: 1 INJECTION, SOLUTION INTRAMUSCULAR; INTRAVENOUS; SUBCUTANEOUS at 00:22

## 2017-02-25 RX ADMIN — SODIUM CHLORIDE 1000 ML: 9 INJECTION, SOLUTION INTRAVENOUS at 00:21

## 2017-02-25 RX ADMIN — ONDANSETRON 8 MG: 2 INJECTION, SOLUTION INTRAMUSCULAR; INTRAVENOUS at 00:21

## 2017-02-25 NOTE — ED NOTES
Pt ambs to triage  Chief Complaint   Patient presents with   • Abdominal Pain     seen here for same a few days ago, received script for zofran and pain.  Not improving   had some food today and kept it down but may pain worse.  Painful urination at the end of voiding.  No diarrhea, no vomiting.  Also c/o foot pain, states she had a blister to her foot. Does not appear red and has healed over.  Pt is a diabetic.     Triage process explained. Pt asked to await room assignment in [facility]. Pt urged to inform triage RN or staff of changes in condition or concerns     Multiple visits for abd pain in last week.

## 2017-02-25 NOTE — DISCHARGE INSTRUCTIONS
PLEASE RETURN TO THE HOSPITAL FOR ANY WORSENING SYMPTOMS, start taking Pepcid twice a day as directed along with the other stomach acid medicine your artery on. Tried taking the Reglan every day before you eat to help with the stomach pain. Take the antibiotics 3 times a day until finished for the swelling in her foot please return to the hospital for any worsening signs of infection in her feet like pain redness swelling or drainage. Please wash the skin with soap and water every day and use regular hydrating lotion as needed. They're also welcome to put antibiotic ointment of your choice on the scabs on your feet.     Cellulitis  Cellulitis is an infection of the skin and the tissue under the skin. The infected area is usually red and tender. This happens most often in the arms and lower legs.  HOME CARE   · Take your antibiotic medicine as told. Finish the medicine even if you start to feel better.  · Keep the infected arm or leg raised (elevated).  · Put a warm cloth on the area up to 4 times per day.  · Only take medicines as told by your doctor.  · Keep all doctor visits as told.  GET HELP IF:  · You see red streaks on the skin coming from the infected area.  · Your red area gets bigger or turns a dark color.  · Your bone or joint under the infected area is painful after the skin heals.  · Your infection comes back in the same area or different area.  · You have a puffy (swollen) bump in the infected area.  · You have new symptoms.  · You have a fever.  GET HELP RIGHT AWAY IF:   · You feel very sleepy.  · You throw up (vomit) or have watery poop (diarrhea).  · You feel sick and have muscle aches and pains.  MAKE SURE YOU:   · Understand these instructions.  · Will watch your condition.  · Will get help right away if you are not doing well or get worse.     This information is not intended to replace advice given to you by your health care provider. Make sure you discuss any questions you have with your health  care provider.     Document Released: 06/05/2009 Document Revised: 01/08/2016 Document Reviewed: 03/04/2013  [employer] Interactive Patient Education ©2016 [employer]  Gastroparesis  Gastroparesis, also called delayed gastric emptying, is a condition in which food takes longer than normal to empty from the stomach. The condition is usually long-lasting (chronic).  CAUSES  This condition may be caused by:  · An endocrine disorder, such as hypothyroidism or diabetes. Diabetes is the most common cause of this condition.  · A nervous system disease, such as Parkinson disease or multiple sclerosis.  · Cancer, infection, or surgery of the stomach or vagus nerve.  · A connective tissue disorder, such as scleroderma.  · Certain medicines.  In most cases, the cause is not known.  RISK FACTORS  This condition is more likely to develop in:  · People with certain disorders, including endocrine disorders, eating disorders, amyloidosis, and scleroderma.  · People with certain diseases, including Parkinson disease or multiple sclerosis.  · People with cancer or infection of the stomach or vagus nerve.  · People who have had surgery on the stomach or vagus nerve.  · People who take certain medicines.  · Women.  SYMPTOMS  Symptoms of this condition include:  · An early feeling of fullness when eating.  · Nausea.  · Weight loss.  · Vomiting.  · Heartburn.  · Abdominal bloating.  · Inconsistent blood glucose levels.  · Lack of appetite.  · Acid from the stomach coming up into the esophagus (gastroesophageal reflux).  · Spasms of the stomach.  Symptoms may come and go.  DIAGNOSIS  This condition is diagnosed with tests, such as:  · Tests that check how long it takes food to move through the stomach and intestines. These tests include:  · Upper gastrointestinal (GI) series. In this test, X-rays of the intestines are taken after you drink a liquid. The liquid makes the intestines show up better on the X-rays.  · Gastric emptying  scintigraphy. In this test, scans are taken after you eat food that contains a small amount of radioactive material.  · Wireless capsule GI monitoring system. This test involves swallowing a capsule that records information about movement through the stomach.  · Gastric manometry. This test measures electrical and muscular activity in the stomach. It is done with a thin tube that is passed down the throat and into the stomach.  · Endoscopy. This test checks for abnormalities in the lining of the stomach. It is done with a long, thin tube that is passed down the throat and into the stomach.  · An ultrasound. This test can help rule out gallbladder disease or pancreatitis as a cause of your symptoms. It uses sound waves to take pictures of the inside of your body.  TREATMENT  There is no cure for gastroparesis. This condition may be managed with:  · Treatment of the underlying condition causing the gastroparesis.  · Lifestyle changes, including exercise and dietary changes. Dietary changes can include:  ¨ Changes in what and when you eat.  ¨ Eating smaller meals more often.  ¨ Eating low-fat foods.  ¨ Eating low-fiber forms of high-fiber foods, such as cooked vegetables instead of raw vegetables.  ¨ Having liquid foods in place of solid foods. Liquid foods are easier to digest.  · Medicines. These may be given to control nausea and vomiting and to stimulate stomach muscles.  · Getting food through a feeding tube. This may be done in severe cases.  · A gastric neurostimulator. This is a device that is inserted into the body with surgery. It helps improve stomach emptying and control nausea and vomiting.  HOME CARE INSTRUCTIONS  · Follow your health care provider's instructions about exercise and diet.  · Take medicines only as directed by your health care provider.  SEEK MEDICAL CARE IF:  · Your symptoms do not improve with treatment.  · You have new symptoms.  SEEK IMMEDIATE MEDICAL CARE IF:  · You have severe  abdominal pain that does not improve with treatment.  · You have nausea that does not go away.  · You cannot keep fluids down.     This information is not intended to replace advice given to you by your health care provider. Make sure you discuss any questions you have with your health care provider.     Document Released: 12/18/2006 Document Revised: 05/03/2016 Document Reviewed: 12/14/2015  [employer] Patient Education ©2016 [employer]

## 2017-02-25 NOTE — ED NOTES
Patient called multiple times to be protocoled and there was no response. The pt was discharged from the ER

## 2017-02-25 NOTE — ED NOTES
.Pt discharged in stable condition, ambulatory to waiting room in all belongings with pt, given written and verbal dc instructions no questions voiced

## 2017-02-25 NOTE — ED PROVIDER NOTES
ED Provider Note    Scribed for Brendan Gaines M.D. by Danielle Snyder. 2/24/2017, 11:39 PM.    Primary care provider: Tre Jason M.D.  Means of arrival: Walk-In  History obtained from: Patient  History limited by: None    CHIEF COMPLAINT  Chief Complaint   Patient presents with   • Abdominal Pain     seen here for same a few days ago, received script for zofran and pain.  Not improving       HPI  Julisa Carrion is a 56 y.o. female who presents to the Emergency Department for diffuse abdominal pain that radiates to her back with associated nausea and vomiting, onset five days ago. She was seen here a couple of days ago for the same symptoms and was prescribed Zofran and pain medication with no alleviation. The pain is exacerbated when she eats or drinks and she is unable to hold down food and fluids. She further complains of dysuria at the end of voiding. She denies any hematuria or hematochezia. She complains of left foot pain that started two weeks ago. She says she was seen for her foot and the doctor told her to keep it clean and sent her home. She had a normal x-ray of the foot. She has tried to take her pain medication but is unable to tolerate. The patient has a history of hypertension, diabetes, neuropathy, and hypercholesteremia. She has an appointment with a GI specialist on 3/13/17.     REVIEW OF SYSTEMS  See HPI for further details. All other systems are negative. C.    PAST MEDICAL HISTORY   has a past medical history of Hypertension; Diabetes; High cholesterol (5/15/2011); Staph skin infection; Migraine; and Intestinal mass (2015).    SURGICAL HISTORY   has past surgical history that includes other abdominal surgery; gyn surgery; other orthopedic surgery (6-2014); and abdominal exploration.    SOCIAL HISTORY  Social History   Substance Use Topics   • Smoking status: Former Smoker -- 1.00 packs/day for 20 years     Types: Cigarettes     Quit date: 01/01/1996   • Smokeless tobacco: Former  User     Quit date: 06/05/1995   • Alcohol Use: No      History   Drug Use No     Comment: just prescribed meds       FAMILY HISTORY  Family History   Problem Relation Age of Onset   • Cancer Maternal Aunt      Lung cancer d/t smoking       CURRENT MEDICATIONS  No current facility-administered medications on file prior to encounter.     Current Outpatient Prescriptions on File Prior to Encounter   Medication Sig Dispense Refill   • ondansetron (ZOFRAN) 4 MG Tab tablet Take 1 Tab by mouth every 8 hours as needed for Nausea/Vomiting. 10 Each 0   • pantoprazole (PROTONIX) 20 MG tablet Take 1 Tab by mouth every day. 10 Tab 0   • hydrocodone-acetaminophen (NORCO) 7.5-325 MG per tablet Take 1 Tab by mouth every 6 hours as needed for up to 7 days. 15 Tab 0   • oxycodone-acetaminophen (PERCOCET-10)  MG Tab Take 1 Tab by mouth every 8 hours as needed for Severe Pain. 5 Tab 0   • metoclopramide (REGLAN) 5 MG tablet Take 1 Tab by mouth as needed (after meals as needed) for up to 5 doses. 5 Tab 0   • omeprazole (PRILOSEC) 40 MG delayed-release capsule Take 1 Cap by mouth every day. 30 Cap 0   • ondansetron (ZOFRAN ODT) 4 MG TABLET DISPERSIBLE Take 1 Tab by mouth every 8 hours as needed for Nausea/Vomiting. 10 Tab 0   • ondansetron (ZOFRAN ODT) 4 MG TABLET DISPERSIBLE Take 1 Tab by mouth every 8 hours as needed for Nausea/Vomiting. 10 Tab 0   • cyclobenzaprine (FLEXERIL) 10 MG Tab Take 1 Tab by mouth 3 times a day. 30 Tab 0   • omeprazole (PRILOSEC) 20 MG delayed-release capsule Take 20 mg by mouth every day.     • ondansetron (ZOFRAN ODT) 4 MG TABLET DISPERSIBLE Take 1 Tab by mouth every 8 hours as needed for Nausea/Vomiting (give PO if no IV route available). 20 Tab 0   • aspirin EC (ECOTRIN) 81 MG Tablet Delayed Response Take 81 mg by mouth every day.     • gabapentin (NEURONTIN) 800 MG tablet Take 800 mg by mouth 3 times a day.     • pravastatin (PRAVACHOL) 20 MG TABS Take 20 mg by mouth every day.     • lisinopril  "(PRINIVIL) 5 MG TABS Take 5 mg by mouth every morning.     Reviewed. See Encounter Summary.     ALLERGIES  No Known Allergies    PHYSICAL EXAM  VITAL SIGNS: /74 mmHg  Pulse 88  Temp(Src) 36.2 °C (97.1 °F) (Temporal)  Resp 16  Ht 1.651 m (5' 5\")  Wt 80 kg (176 lb 5.9 oz)  BMI 29.35 kg/m2  SpO2 97%  LMP 02/05/2009   Pulse ox interpretation: I interpret this pulse ox as normal.  Constitutional: Alert in no painful or respiratory distress. Non-toxic   HENT: No signs of trauma, Bilateral external ears normal, Nose normal. MMM  Eyes: Pupils are equal and reactive, Conjunctiva normal, Non-icteric.   Neck: Normal range of motion, No tenderness, Supple, No stridor. No JVD  Lymphatic: No lymphadenopathy noted.   Cardiovascular: Regular rate and rhythm, no murmurs. 2+ radial and DP pulses  Thorax & Lungs: Normal breath sounds, No respiratory distress, No wheezing, No chest tenderness.   Abdomen: Epigastric tenderness, Bowel sounds normal, Soft, no rebound or guarding, No tenderness at McBurney's point, No masses, No pulsatile masses. No peritoneal signs.  Skin: Warm, Dry, No rash.   Back: No bony tenderness, No CVA tenderness.   Extremities: Left dorsum of foot mild swelling and erythema, positive tenderness, scab over third and fourth MTP healing well, no fluctuance or drainage, dry scaly skin. Anterior shin dry chronic appearing stage 1-2 stasis ulcers, no induration, drainage, erythema, or warmth. Intact distal pulses, No edema, No cyanosis,  No warmth or asymmetry  Musculoskeletal: Good range of motion in all major joints. No major deformities noted.   Neurologic: Alert , Normal motor function, Normal sensory function, No focal deficits noted.   Psychiatric: Anxious Affect, Insight poor, judgment poor, no SI.    DIAGNOSTIC STUDIES / PROCEDURES     LABS  Labs Reviewed   CBC WITH DIFFERENTIAL - Abnormal; Notable for the following:     Hemoglobin 11.0 (*)     Hematocrit 35.1 (*)     MCH 26.0 (*)     MCHC 31.3 " (*)     Platelet Count 488 (*)     MPV 8.6 (*)     All other components within normal limits   COMP METABOLIC PANEL - Abnormal; Notable for the following:     Potassium 3.4 (*)     Glucose 228 (*)     Bun 24 (*)     AST(SGOT) 7 (*)     Alkaline Phosphatase 162 (*)     Globulin 3.8 (*)     All other components within normal limits   WESTERGREN SED RATE - Abnormal; Notable for the following:     Sed Rate Westergren 54 (*)     All other components within normal limits   LIPASE   CRP QUANTITIVE (NON-CARDIAC)   ESTIMATED GFR   CRP QUANTITIVE (NON-CARDIAC)   POC URINE PREGNANCY   POC URINALYSIS   All labs were reviewed by me.    RADIOLOGY  DX-FOOT-2- LEFT   Final Result      1.  Dorsal soft tissue swelling of the forefoot.   2.  Deformity of the 2nd proximal phalanx again seen, consistent with partially healed fracture or osteomyelitis.   3.  Superimposed acute osteomyelitis is not excluded on plain film.   4.  No soft tissue gas.      The radiologist's interpretation of all radiological studies have been reviewed by me.    COURSE & MEDICAL DECISION MAKING  Nursing notes, VS, PMSFHx reviewed in chart. Pertinent Labs & Imaging studies reviewed. (See chart for details)    11:39 PM Patient seen and examined at bedside. Ordered for DX left foot, westergren sed rate, CRP quantitive, POC urine pregnancy, CBC with differential, CMP, lipase, and POC urinalysis to evaluate. Patient will be treated with 1mg Dilaudid, 8mg Zofran, 1,000mL NS infusion, 10mg Reglan, and 1mg Ativan for her symptoms. The patient was informed that she will be given medication for the pain. It was discussed with the patient that general lab work will be ordered to evaluate. She understood and verbalized agreement.    Decision Making:  This is a 56 y.o. year old female who presents with epigastric abdominal pain chronic in nature most likely related to gastroparesis. She was seen here recently with a unremarkable workup for abdominal pain. She is now  tolerating by mouth without abdominal pain her abdomen is soft and nontender. She has a old scab on the top of her foot but does have some mild swelling on the dorsum of her foot and we will empirically start antibiotics for her. Because of her diabetes she was given Unasyn in the emergency department and will be sent home with Augmentin by mouth for 10 days. Although foot x-ray says that osteomyelitis is not excluded and her ESR is slightly elevated, her white blood cell count and CRP are normal here today.  Patient does have very poor insight about her diabetes, gastroparesis and foot infection but is very pleasant and nontoxic appearing and states that she understands her discharge diagnosis and instructions and medicines.     The patient will return for new or worsening symptoms and is stable at the time of discharge.    The patient is referred to a primary physician for blood pressure management, diabetic screening, and for all other preventative health concerns.    DISPOSITION:  Patient will be discharged home in stable condition.    FOLLOW UP:  Tre Jason M.D.  [address] [place] [id number]  [phone]      please see your doctor early next week return if worse      OUTPATIENT MEDICATIONS:  New Prescriptions    AMOXICILLIN-CLAVULANATE (AUGMENTIN) 500-125 MG TAB    Take 1 Tab by mouth 3 times a day.    FAMOTIDINE (PEPCID) 20 MG TAB    Take 1 Tab by mouth 2 times a day.    METOCLOPRAMIDE (REGLAN) 10 MG TAB    Take 1 Tab by mouth 3 times a day before meals.    ONDANSETRON (ZOFRAN) 4 MG TAB TABLET    Take 1 Tab by mouth every four hours as needed for Nausea/Vomiting.     FINAL IMPRESSION  1. Gastroparesis    2. Chronic epigastric pain    3. Cellulitis of foot, left          Danielle DELAROSA (Pool), am scribing for, and in the presence of, Brendan Gaines M.D..    Electronically signed by: Danielle Brewer), 2/24/2017    Brendan DELAROSA M.D. personally performed the services  described in this documentation, as scribed by Danielle Snyder in my presence, and it is both accurate and complete.    The note accurately reflects work and decisions made by me.  Brendan Gaines  2/25/2017  2:19 AM

## 2017-03-20 ENCOUNTER — HOSPITAL ENCOUNTER (EMERGENCY)
Facility: MEDICAL CENTER | Age: 57
End: 2017-03-20
Attending: EMERGENCY MEDICINE
Payer: MEDICAID

## 2017-03-20 VITALS
DIASTOLIC BLOOD PRESSURE: 80 MMHG | OXYGEN SATURATION: 97 % | WEIGHT: 173.28 LBS | RESPIRATION RATE: 16 BRPM | TEMPERATURE: 97.3 F | SYSTOLIC BLOOD PRESSURE: 148 MMHG | BODY MASS INDEX: 28.87 KG/M2 | HEART RATE: 92 BPM | HEIGHT: 65 IN

## 2017-03-20 VITALS
HEART RATE: 92 BPM | OXYGEN SATURATION: 94 % | BODY MASS INDEX: 29.66 KG/M2 | WEIGHT: 178 LBS | SYSTOLIC BLOOD PRESSURE: 161 MMHG | HEIGHT: 65 IN | RESPIRATION RATE: 18 BRPM | TEMPERATURE: 98.3 F | DIASTOLIC BLOOD PRESSURE: 69 MMHG

## 2017-03-20 DIAGNOSIS — R11.2 NAUSEA AND VOMITING, INTRACTABILITY OF VOMITING NOT SPECIFIED, UNSPECIFIED VOMITING TYPE: ICD-10-CM

## 2017-03-20 DIAGNOSIS — G89.29 CHRONIC ABDOMINAL PAIN: ICD-10-CM

## 2017-03-20 DIAGNOSIS — F11.93 NARCOTIC WITHDRAWAL (HCC): ICD-10-CM

## 2017-03-20 DIAGNOSIS — H66.003 ACUTE SUPPURATIVE OTITIS MEDIA OF BOTH EARS WITHOUT SPONTANEOUS RUPTURE OF TYMPANIC MEMBRANES, RECURRENCE NOT SPECIFIED: ICD-10-CM

## 2017-03-20 DIAGNOSIS — R10.9 CHRONIC ABDOMINAL PAIN: ICD-10-CM

## 2017-03-20 LAB
ALBUMIN SERPL BCP-MCNC: 3.6 G/DL (ref 3.2–4.9)
ALBUMIN/GLOB SERPL: 0.8 G/DL
ALP SERPL-CCNC: 157 U/L (ref 30–99)
ALT SERPL-CCNC: 9 U/L (ref 2–50)
ANION GAP SERPL CALC-SCNC: 11 MMOL/L (ref 0–11.9)
APPEARANCE UR: CLEAR
AST SERPL-CCNC: 9 U/L (ref 12–45)
BASOPHILS # BLD AUTO: 0.7 % (ref 0–1.8)
BASOPHILS # BLD: 0.05 K/UL (ref 0–0.12)
BILIRUB SERPL-MCNC: 0.2 MG/DL (ref 0.1–1.5)
BILIRUB UR QL STRIP.AUTO: NEGATIVE
BUN SERPL-MCNC: 22 MG/DL (ref 8–22)
CALCIUM SERPL-MCNC: 8.8 MG/DL (ref 8.5–10.5)
CHLORIDE SERPL-SCNC: 103 MMOL/L (ref 96–112)
CO2 SERPL-SCNC: 23 MMOL/L (ref 20–33)
COLOR UR: YELLOW
CREAT SERPL-MCNC: 0.78 MG/DL (ref 0.5–1.4)
CULTURE IF INDICATED INDCX: YES UA CULTURE
EOSINOPHIL # BLD AUTO: 0.04 K/UL (ref 0–0.51)
EOSINOPHIL NFR BLD: 0.6 % (ref 0–6.9)
EPI CELLS #/AREA URNS HPF: ABNORMAL /HPF
ERYTHROCYTE [DISTWIDTH] IN BLOOD BY AUTOMATED COUNT: 45.1 FL (ref 35.9–50)
GFR SERPL CREATININE-BSD FRML MDRD: >60 ML/MIN/1.73 M 2
GLOBULIN SER CALC-MCNC: 4.3 G/DL (ref 1.9–3.5)
GLUCOSE SERPL-MCNC: 295 MG/DL (ref 65–99)
GLUCOSE UR STRIP.AUTO-MCNC: >1000 MG/DL
HCT VFR BLD AUTO: 33.5 % (ref 37–47)
HGB BLD-MCNC: 10.5 G/DL (ref 12–16)
HYALINE CASTS #/AREA URNS LPF: ABNORMAL /LPF
IMM GRANULOCYTES # BLD AUTO: 0.02 K/UL (ref 0–0.11)
IMM GRANULOCYTES NFR BLD AUTO: 0.3 % (ref 0–0.9)
KETONES UR STRIP.AUTO-MCNC: 60 MG/DL
LEUKOCYTE ESTERASE UR QL STRIP.AUTO: NEGATIVE
LIPASE SERPL-CCNC: 9 U/L (ref 11–82)
LYMPHOCYTES # BLD AUTO: 1.98 K/UL (ref 1–4.8)
LYMPHOCYTES NFR BLD: 28.3 % (ref 22–41)
MCH RBC QN AUTO: 26.2 PG (ref 27–33)
MCHC RBC AUTO-ENTMCNC: 31.3 G/DL (ref 33.6–35)
MCV RBC AUTO: 83.5 FL (ref 81.4–97.8)
MICRO URNS: ABNORMAL
MONOCYTES # BLD AUTO: 0.58 K/UL (ref 0–0.85)
MONOCYTES NFR BLD AUTO: 8.3 % (ref 0–13.4)
MUCOUS THREADS #/AREA URNS HPF: ABNORMAL /HPF
NEUTROPHILS # BLD AUTO: 4.33 K/UL (ref 2–7.15)
NEUTROPHILS NFR BLD: 61.8 % (ref 44–72)
NITRITE UR QL STRIP.AUTO: NEGATIVE
NRBC # BLD AUTO: 0 K/UL
NRBC BLD AUTO-RTO: 0 /100 WBC
PH UR STRIP.AUTO: 5.5 [PH]
PLATELET # BLD AUTO: 523 K/UL (ref 164–446)
PMV BLD AUTO: 9.2 FL (ref 9–12.9)
POTASSIUM SERPL-SCNC: 3.7 MMOL/L (ref 3.6–5.5)
PROT SERPL-MCNC: 7.9 G/DL (ref 6–8.2)
PROT UR QL STRIP: 100 MG/DL
RBC # BLD AUTO: 4.01 M/UL (ref 4.2–5.4)
RBC # URNS HPF: ABNORMAL /HPF
RBC UR QL AUTO: NEGATIVE
SODIUM SERPL-SCNC: 137 MMOL/L (ref 135–145)
SP GR UR STRIP.AUTO: 1.03
WBC # BLD AUTO: 7 K/UL (ref 4.8–10.8)
WBC #/AREA URNS HPF: ABNORMAL /HPF
YEAST BUDDING URNS QL: PRESENT /HPF

## 2017-03-20 PROCEDURE — 700111 HCHG RX REV CODE 636 W/ 250 OVERRIDE (IP): Performed by: EMERGENCY MEDICINE

## 2017-03-20 PROCEDURE — 87086 URINE CULTURE/COLONY COUNT: CPT

## 2017-03-20 PROCEDURE — 99284 EMERGENCY DEPT VISIT MOD MDM: CPT

## 2017-03-20 PROCEDURE — 96374 THER/PROPH/DIAG INJ IV PUSH: CPT

## 2017-03-20 PROCEDURE — 96372 THER/PROPH/DIAG INJ SC/IM: CPT

## 2017-03-20 PROCEDURE — 81001 URINALYSIS AUTO W/SCOPE: CPT

## 2017-03-20 PROCEDURE — 85025 COMPLETE CBC W/AUTO DIFF WBC: CPT

## 2017-03-20 PROCEDURE — 96376 TX/PRO/DX INJ SAME DRUG ADON: CPT

## 2017-03-20 PROCEDURE — 80053 COMPREHEN METABOLIC PANEL: CPT

## 2017-03-20 PROCEDURE — 83690 ASSAY OF LIPASE: CPT

## 2017-03-20 PROCEDURE — 96375 TX/PRO/DX INJ NEW DRUG ADDON: CPT

## 2017-03-20 RX ORDER — MELOXICAM 7.5 MG/1
7.5 TABLET ORAL DAILY
Qty: 30 TAB | Refills: 0 | Status: SHIPPED | OUTPATIENT
Start: 2017-03-20 | End: 2017-05-07

## 2017-03-20 RX ORDER — MORPHINE SULFATE 10 MG/ML
5 INJECTION, SOLUTION INTRAMUSCULAR; INTRAVENOUS ONCE
Status: COMPLETED | OUTPATIENT
Start: 2017-03-20 | End: 2017-03-20

## 2017-03-20 RX ORDER — PROMETHAZINE HYDROCHLORIDE 25 MG/1
25 SUPPOSITORY RECTAL EVERY 6 HOURS PRN
Qty: 10 SUPPOSITORY | Refills: 0 | Status: SHIPPED | OUTPATIENT
Start: 2017-03-20 | End: 2017-08-31

## 2017-03-20 RX ORDER — KETOROLAC TROMETHAMINE 30 MG/ML
30 INJECTION, SOLUTION INTRAMUSCULAR; INTRAVENOUS ONCE
Status: COMPLETED | OUTPATIENT
Start: 2017-03-20 | End: 2017-03-20

## 2017-03-20 RX ORDER — AMOXICILLIN 500 MG/1
500 CAPSULE ORAL 3 TIMES DAILY
Qty: 21 CAP | Refills: 0 | Status: SHIPPED | OUTPATIENT
Start: 2017-03-20 | End: 2017-03-27

## 2017-03-20 RX ORDER — ONDANSETRON 2 MG/ML
4 INJECTION INTRAMUSCULAR; INTRAVENOUS ONCE
Status: COMPLETED | OUTPATIENT
Start: 2017-03-20 | End: 2017-03-20

## 2017-03-20 RX ORDER — MORPHINE SULFATE 4 MG/ML
4 INJECTION, SOLUTION INTRAMUSCULAR; INTRAVENOUS ONCE
Status: COMPLETED | OUTPATIENT
Start: 2017-03-20 | End: 2017-03-20

## 2017-03-20 RX ADMIN — ONDANSETRON 4 MG: 2 INJECTION, SOLUTION INTRAMUSCULAR; INTRAVENOUS at 01:21

## 2017-03-20 RX ADMIN — KETOROLAC TROMETHAMINE 30 MG: 30 INJECTION, SOLUTION INTRAMUSCULAR; INTRAVENOUS at 21:29

## 2017-03-20 RX ADMIN — MORPHINE SULFATE 5 MG: 10 INJECTION INTRAVENOUS at 01:21

## 2017-03-20 RX ADMIN — MORPHINE SULFATE 4 MG: 4 INJECTION INTRAVENOUS at 02:20

## 2017-03-20 ASSESSMENT — PAIN SCALES - GENERAL: PAINLEVEL_OUTOF10: 8

## 2017-03-20 NOTE — ED NOTES
Pt arrived ems with c/o n/v. Per ems pt ran out of morphinwe yesterday. Pt hooked to monitor, bp and spo2. alalrm on and audible./ chart up fopr erp

## 2017-03-20 NOTE — ED PROVIDER NOTES
ED Provider Note    Scribed for Salo Godfrey M.D. by Roseline Yusuf. 3/20/2017, 12:41 AM.    Primary care provider: Tre Jason M.D.  Means of arrival: Ambulance  History obtained from: Patient  History limited by: None    CHIEF COMPLAINT  Chief Complaint   Patient presents with   • N/V       HPI  Julisa Carrion is a 56 y.o. female who presents to the Emergency Department via ambulance for abdominal pain, nausea, and vomiting onset two days ago. The pain has gotten progressively worse, and is currently a 9/10 in severity. She reports having had similar symptoms before but is unable to recall what caused the pain. The patient denies dysuria, abnormal discharge, hematemeis, diarrhea, or chest pain.  Does state that she's been having slight constipation. No fevers no chills no headache altered mental status neck pain skin changes or other concerns.    REVIEW OF SYSTEMS  10 systems reviewed and otherwise negative, pertinent positives and negatives listed in the history of present illness.      PAST MEDICAL HISTORY   has a past medical history of Hypertension; Diabetes; High cholesterol (5/15/2011); Staph skin infection; Migraine; and Intestinal mass (2015).    SURGICAL HISTORY   has past surgical history that includes other abdominal surgery; gyn surgery; other orthopedic surgery (6-2014); and abdominal exploration.    SOCIAL HISTORY  Social History   Substance Use Topics   • Smoking status: Former Smoker -- 1.00 packs/day for 20 years     Types: Cigarettes     Quit date: 01/01/1996   • Smokeless tobacco: Former User     Quit date: 06/05/1995   • Alcohol Use: No      History   Drug Use No     Comment: just prescribed meds       FAMILY HISTORY  Non-Contributory    CURRENT MEDICATIONS    No current facility-administered medications on file prior to encounter.     Current Outpatient Prescriptions on File Prior to Encounter   Medication Sig Dispense Refill   • metoclopramide (REGLAN) 10 MG Tab Take 1 Tab by mouth  3 times a day before meals. 60 Tab 1   • ondansetron (ZOFRAN) 4 MG Tab tablet Take 1 Tab by mouth every four hours as needed for Nausea/Vomiting. 20 Tab 1   • amoxicillin-clavulanate (AUGMENTIN) 500-125 MG Tab Take 1 Tab by mouth 3 times a day. 30 Tab 0   • famotidine (PEPCID) 20 MG Tab Take 1 Tab by mouth 2 times a day. 60 Tab 0   • ondansetron (ZOFRAN ODT) 4 MG TABLET DISPERSIBLE Take 1 Tab by mouth every 8 hours as needed for Nausea/Vomiting. 20 Tab 0   • ondansetron (ZOFRAN) 4 MG Tab tablet Take 1 Tab by mouth every 8 hours as needed for Nausea/Vomiting. 10 Each 0   • pantoprazole (PROTONIX) 20 MG tablet Take 1 Tab by mouth every day. 10 Tab 0   • oxycodone-acetaminophen (PERCOCET-10)  MG Tab Take 1 Tab by mouth every 8 hours as needed for Severe Pain. 5 Tab 0   • metoclopramide (REGLAN) 5 MG tablet Take 1 Tab by mouth as needed (after meals as needed) for up to 5 doses. 5 Tab 0   • omeprazole (PRILOSEC) 40 MG delayed-release capsule Take 1 Cap by mouth every day. 30 Cap 0   • ondansetron (ZOFRAN ODT) 4 MG TABLET DISPERSIBLE Take 1 Tab by mouth every 8 hours as needed for Nausea/Vomiting. 10 Tab 0   • ondansetron (ZOFRAN ODT) 4 MG TABLET DISPERSIBLE Take 1 Tab by mouth every 8 hours as needed for Nausea/Vomiting. 10 Tab 0   • cyclobenzaprine (FLEXERIL) 10 MG Tab Take 1 Tab by mouth 3 times a day. 30 Tab 0   • omeprazole (PRILOSEC) 20 MG delayed-release capsule Take 20 mg by mouth every day.     • ondansetron (ZOFRAN ODT) 4 MG TABLET DISPERSIBLE Take 1 Tab by mouth every 8 hours as needed for Nausea/Vomiting (give PO if no IV route available). 20 Tab 0   • aspirin EC (ECOTRIN) 81 MG Tablet Delayed Response Take 81 mg by mouth every day.     • gabapentin (NEURONTIN) 800 MG tablet Take 800 mg by mouth 3 times a day.     • pravastatin (PRAVACHOL) 20 MG TABS Take 20 mg by mouth every day.     • lisinopril (PRINIVIL) 5 MG TABS Take 5 mg by mouth every morning.       ALLERGIES  No Known Allergies    PHYSICAL  "EXAM  VITAL SIGNS: /69 mmHg  Pulse 97  Temp(Src) 36.8 °C (98.3 °F)  Resp 18  Ht 1.651 m (5' 5\")  Wt 80.74 kg (178 lb)  BMI 29.62 kg/m2  LMP 02/05/2009  Pulse ox interpretation: I interpret this pulse ox as normal.  Constitutional: Alert and oriented x 3, Minimal Distress  HEENT: Atraumatic normocephalic, pupils are equal round reactive to light extraocular movements are intact. The nares is clear, external ears are normal, mouth shows moist mucous membranes  Neck: Supple, no JVD no tracheal deviation  Cardiovascular: Regular rate and rhythm no murmur rub or gallop 2+ pulses peripherally x4  Thorax & Lungs: No respiratory distress, no wheezes rales or rhonchi, No chest tenderness.   GI: Soft nontender nondistended positive bowel sounds, no peritoneal signs  Skin: Warm dry no acute rash or lesion  Musculoskeletal: Moving all extremities with full range and 5 of 5 strength, no acute  deformity  Neurologic: Cranial nerves III through XII are grossly intact, no sensory deficit, no cerebellar dysfunction   Psychiatric: Appropriate affect for situation at this time    LABS  Results for orders placed or performed during the hospital encounter of 03/20/17   CBC WITH DIFFERENTIAL   Result Value Ref Range    WBC 7.0 4.8 - 10.8 K/uL    RBC 4.01 (L) 4.20 - 5.40 M/uL    Hemoglobin 10.5 (L) 12.0 - 16.0 g/dL    Hematocrit 33.5 (L) 37.0 - 47.0 %    MCV 83.5 81.4 - 97.8 fL    MCH 26.2 (L) 27.0 - 33.0 pg    MCHC 31.3 (L) 33.6 - 35.0 g/dL    RDW 45.1 35.9 - 50.0 fL    Platelet Count 523 (H) 164 - 446 K/uL    MPV 9.2 9.0 - 12.9 fL    Neutrophils-Polys 61.80 44.00 - 72.00 %    Lymphocytes 28.30 22.00 - 41.00 %    Monocytes 8.30 0.00 - 13.40 %    Eosinophils 0.60 0.00 - 6.90 %    Basophils 0.70 0.00 - 1.80 %    Immature Granulocytes 0.30 0.00 - 0.90 %    Nucleated RBC 0.00 /100 WBC    Neutrophils (Absolute) 4.33 2.00 - 7.15 K/uL    Lymphs (Absolute) 1.98 1.00 - 4.80 K/uL    Monos (Absolute) 0.58 0.00 - 0.85 K/uL    Eos " (Absolute) 0.04 0.00 - 0.51 K/uL    Baso (Absolute) 0.05 0.00 - 0.12 K/uL    Immature Granulocytes (abs) 0.02 0.00 - 0.11 K/uL    NRBC (Absolute) 0.00 K/uL   COMP METABOLIC PANEL   Result Value Ref Range    Sodium 137 135 - 145 mmol/L    Potassium 3.7 3.6 - 5.5 mmol/L    Chloride 103 96 - 112 mmol/L    Co2 23 20 - 33 mmol/L    Anion Gap 11.0 0.0 - 11.9    Glucose 295 (H) 65 - 99 mg/dL    Bun 22 8 - 22 mg/dL    Creatinine 0.78 0.50 - 1.40 mg/dL    Calcium 8.8 8.5 - 10.5 mg/dL    AST(SGOT) 9 (L) 12 - 45 U/L    ALT(SGPT) 9 2 - 50 U/L    Alkaline Phosphatase 157 (H) 30 - 99 U/L    Total Bilirubin 0.2 0.1 - 1.5 mg/dL    Albumin 3.6 3.2 - 4.9 g/dL    Total Protein 7.9 6.0 - 8.2 g/dL    Globulin 4.3 (H) 1.9 - 3.5 g/dL    A-G Ratio 0.8 g/dL   LIPASE   Result Value Ref Range    Lipase 9 (L) 11 - 82 U/L   URINALYSIS CULTURE, IF INDICATED   Result Value Ref Range    Micro Urine Req Microscopic     Color Yellow     Character Clear     Specific Gravity 1.031 <1.035    Ph 5.5 5.0-8.0    Glucose >1000 (A) Negative mg/dL    Ketones 60 (A) Negative mg/dL    Protein 100 (A) Negative mg/dL    Bilirubin Negative Negative    Nitrite Negative Negative    Leukocyte Esterase Negative Negative    Occult Blood Negative Negative    Culture Indicated Yes UA Culture   URINE MICROSCOPIC (W/UA)   Result Value Ref Range    WBC 2-5 /hpf    RBC 2-5 (A) /hpf    Epithelial Cells Few /hpf    Mucous Threads Few /hpf    Hyaline Cast 6-10 (A) /lpf    Budding Yeast Present (A) Absent /hpf   ESTIMATED GFR   Result Value Ref Range    GFR If African American >60 >60 mL/min/1.73 m 2    GFR If Non African American >60 >60 mL/min/1.73 m 2       COURSE & MEDICAL DECISION MAKING  Pertinent Labs & Imaging studies reviewed. (See chart for details)    12:41 AM - Patient seen and examined at bedside. Ordered urine culture, estimated GFR, Urinalysis, Lipase, CMP, CBC, Urine microscopic to evaluate. The patient will be treated with morphine 4mg IV, Zofrn 4mg IV.  "      Patient's prescription monitoring program reviewed stating that she been given large doses of both OxyContin as well as sustained release morphine. I questioned patient has this and she states that she has ran out of her morphine. She was written for 30 day supply of this 10 days prior. Now we explained that she has been taking this inappropriately. Her symptoms E's evening or likely as a result of narcotic withdrawal. She has no further concerning laboratory findings she is hyperglycemic also states that she hasn't been taking her diabetic management appropriately. Instructed to do so she's instructed to follow-up primary care physician 1st thing tomorrow morning to discuss diabetic management as well as to come up with a plan to manage her and manage her chronic pain.    2:59 AM I rechecked with the patient and discussed further plan of care including discharge.   I informed the patient that she will not be receiving narcotics. The patient understands and verbalizes agreement. She is in no acute distress at this time vital signs improved. Discharged home stable condition. Instructed to return for any blood in emesis blood and diarrhea worsening abdominal pain any other acute concerns.  /69 mmHg  Pulse 92  Temp(Src) 36.8 °C (98.3 °F)  Resp 18  Ht 1.651 m (5' 5\")  Wt 80.74 kg (178 lb)  BMI 29.62 kg/m2  SpO2 94%  LMP 02/05/2009       The patient will return for new or worsening symptoms and is stable at the time of discharge.    The patient is referred to a primary physician for blood pressure management, diabetic screening, and for all other preventative health concerns.    DISPOSITION:  Patient will be discharged home in stable condition.    FOLLOW UP:  Tre Jason M.D.  [address] [place] [id number]  [phone]    Schedule an appointment as soon as possible for a visit      [facility], Emergency Dept  [address] [facility] [address] [phone]    in " 12-24 hours if symptoms persist,, immediately if symptoms worsen      FINAL IMPRESSION  1. Chronic abdominal pain    2. Nausea and vomiting, intractability of vomiting not specified, unspecified vomiting type    3. Narcotic withdrawal (CMS-HCC)         This dictation has been created using voice recognition software and/or scribes. The accuracy of the dictation is limited by the abilities of the software and the expertise of the scribes. I expect there may be some errors of grammar and possibly content. I made every attempt to manually correct the errors within my dictation. However, errors related to voice recognition software and/or scribes may still exist and should be interpreted within the appropriate context.     Roseline DELAROSA (Scribe), am scribing for, and in the presence of, Salo Godfrey M.D..    Electronically signed by: Roseline Yusuf (Scribe), 3/20/2017    Salo DELAROSA M.D. personally performed the services described in this documentation, as scribed by Roseline Yusuf in my presence, and it is both accurate and complete.    The note accurately reflects work and decisions made by me.  Salo Godfrey  3/20/2017  7:47 AM

## 2017-03-20 NOTE — ED AVS SNAPSHOT
Home Care Instructions                                                                                                                Julisa Carrion   MRN: [id number]    Department:  [facility], Emergency Dept   Date of Visit:  3/20/2017            [facility], Emergency Dept    [address] [place] [id number]    Phone:  [phone]      You were seen by     Salo Godfrey M.D.      Your Diagnosis Was     Chronic abdominal pain     R10.9, G89.29       These are the medications you received during your hospitalization from 03/20/2017 0013 to 03/20/2017 0317     Date/Time Order Dose Route Action    03/20/2017 0121 morphine (pf) 10 mg/ml injection 5 mg 5 mg Intravenous Given    03/20/2017 0121 ondansetron (ZOFRAN) syringe/vial injection 4 mg 4 mg Intravenous Given    03/20/2017 0220 morphine (pf) 4 mg/ml injection 4 mg 4 mg Intravenous Given      Follow-up Information     1. Schedule an appointment as soon as possible for a visit with Tre Jason M.D..    Specialty:  Internal Medicine    Contact information    [address] [facility] [address] [facility] [address]  [facility] [address] [phone]          2. Follow up with [facility], Emergency Dept.    Specialty:  Emergency Medicine    Why:  in 12-24 hours if symptoms persist,, immediately if symptoms worsen    Contact information    [address] [place] [address] [phone]      Medication Information     Review all of your home medications and newly ordered medications with your primary doctor and/or pharmacist as soon as possible. Follow medication instructions as directed by your doctor and/or pharmacist.     Please keep your complete medication list with you and share with your physician. Update the information when medications are discontinued, doses are changed, or new medications (including over-the-counter products) are added; and carry medication information at all times in the event of emergency  situations.               Medication List      ASK your doctor about these medications        Instructions    Morning Afternoon Evening Bedtime    amoxicillin-clavulanate 500-125 MG Tabs   Commonly known as:  AUGMENTIN        Take 1 Tab by mouth 3 times a day.   Dose:  1 Tab                        aspirin EC 81 MG Tbec   Commonly known as:  ECOTRIN        Take 81 mg by mouth every day.   Dose:  81 mg                        cyclobenzaprine 10 MG Tabs   Commonly known as:  FLEXERIL        Take 1 Tab by mouth 3 times a day.   Dose:  10 mg                        famotidine 20 MG Tabs   Commonly known as:  PEPCID        Take 1 Tab by mouth 2 times a day.   Dose:  20 mg                        gabapentin 800 MG tablet   Commonly known as:  NEURONTIN        Take 800 mg by mouth 3 times a day.   Dose:  800 mg                        lisinopril 5 MG Tabs   Commonly known as:  PRINIVIL        Take 5 mg by mouth every morning.   Dose:  5 mg                        * metoclopramide 5 MG tablet   Commonly known as:  REGLAN        Take 1 Tab by mouth as needed (after meals as needed) for up to 5 doses.   Dose:  5 mg                        * metoclopramide 10 MG Tabs   Commonly known as:  REGLAN        Take 1 Tab by mouth 3 times a day before meals.   Dose:  10 mg                        * omeprazole 20 MG delayed-release capsule   Commonly known as:  PRILOSEC        Take 20 mg by mouth every day.   Dose:  20 mg                        * omeprazole 40 MG delayed-release capsule   Commonly known as:  PRILOSEC        Take 1 Cap by mouth every day.   Dose:  40 mg                        * ondansetron 4 MG Tabs tablet   Commonly known as:  ZOFRAN        Take 1 Tab by mouth every 8 hours as needed for Nausea/Vomiting.   Dose:  4 mg                        * ondansetron 4 MG Tabs tablet   Commonly known as:  ZOFRAN        Take 1 Tab by mouth every four hours as needed for Nausea/Vomiting.   Dose:  4 mg                        * ondansetron 4 MG  Tbdp   Commonly known as:  ZOFRAN ODT        Take 1 Tab by mouth every 8 hours as needed for Nausea/Vomiting (give PO if no IV route available).   Dose:  4 mg                        * ondansetron 4 MG Tbdp   Commonly known as:  ZOFRAN ODT        Take 1 Tab by mouth every 8 hours as needed for Nausea/Vomiting.   Dose:  4 mg                        * ondansetron 4 MG Tbdp   Commonly known as:  ZOFRAN ODT        Take 1 Tab by mouth every 8 hours as needed for Nausea/Vomiting.   Dose:  4 mg                        * ondansetron 4 MG Tbdp   Commonly known as:  ZOFRAN ODT        Take 1 Tab by mouth every 8 hours as needed for Nausea/Vomiting.   Dose:  4 mg                        oxycodone-acetaminophen  MG Tabs   Commonly known as:  PERCOCET-10        Take 1 Tab by mouth every 8 hours as needed for Severe Pain.   Dose:  1 Tab                        pantoprazole 20 MG tablet   Commonly known as:  PROTONIX        Take 1 Tab by mouth every day.   Dose:  20 mg                        pravastatin 20 MG Tabs   Commonly known as:  PRAVACHOL        Take 20 mg by mouth every day.   Dose:  20 mg                        * Notice:  This list has 10 medication(s) that are the same as other medications prescribed for you. Read the directions carefully, and ask your doctor or other care provider to review them with you.            Procedures and tests performed during your visit     CBC WITH DIFFERENTIAL    COMP METABOLIC PANEL    ESTIMATED GFR    LIPASE    URINALYSIS CULTURE, IF INDICATED    URINE CULTURE(NEW)    URINE MICROSCOPIC (W/UA)        Discharge Instructions       Abdominal Pain, Adult  Many things can cause abdominal pain. Usually, abdominal pain is not caused by a disease and will improve without treatment. It can often be observed and treated at home. Your health care provider will do a physical exam and possibly order blood tests and X-rays to help determine the seriousness of your pain. However, in many cases, more time  must pass before a clear cause of the pain can be found. Before that point, your health care provider may not know if you need more testing or further treatment.  HOME CARE INSTRUCTIONS   Monitor your abdominal pain for any changes. The following actions may help to alleviate any discomfort you are experiencing:  · Only take over-the-counter or prescription medicines as directed by your health care provider.  · Do not take laxatives unless directed to do so by your health care provider.  · Try a clear liquid diet (broth, tea, or water) as directed by your health care provider. Slowly move to a bland diet as tolerated.  SEEK MEDICAL CARE IF:  · You have unexplained abdominal pain.  · You have abdominal pain associated with nausea or diarrhea.  · You have pain when you urinate or have a bowel movement.  · You experience abdominal pain that wakes you in the night.  · You have abdominal pain that is worsened or improved by eating food.  · You have abdominal pain that is worsened with eating fatty foods.  · You have a fever.  SEEK IMMEDIATE MEDICAL CARE IF:   · Your pain does not go away within 2 hours.  · You keep throwing up (vomiting).  · Your pain is felt only in portions of the abdomen, such as the right side or the left lower portion of the abdomen.  · You pass bloody or black tarry stools.  MAKE SURE YOU:  · Understand these instructions.    · Will watch your condition.    · Will get help right away if you are not doing well or get worse.       This information is not intended to replace advice given to you by your health care provider. Make sure you discuss any questions you have with your health care provider.     Document Released: 09/27/2006 Document Revised: 01/08/2016 Document Reviewed: 08/27/2014  [employer] Interactive Patient Education ©2016 [employer]    Narcotic Withdrawal  When drug use interferes with normal living activities and relationships, it is abuse. Abuse includes problems with family and  "friends. Psychological dependence has developed when your mind tells you that the drug is needed. This is usually followed by a physical dependence in which you need more of the drug to get the same feeling or \"high.\" This is known as addiction or chemical dependency. Risk is greater when chemical dependency exists in the family.  SYMPTOMS   When tolerance to narcotics has developed, stopping of the narcotic suddenly can cause uncomfortable physical symptoms. Most of the time these are mild and consist of shakes or jitters (tremors) in the hands,a rapid heart rate, rapid breathing, and temperature. Sometimes these symptoms are associated with anxiety, panic attacks, and bad dreams. Other symptoms include:  · Irritability.  · Anxiety.  · Runny nose.  · \"Goose flesh.\"  · Diarrhea.  · Feeling sick to the stomach (nauseous).  · Muscle spasms.  · Sleeplessness.  · Chills.  · Sweats.  · Drug cravings.  · Confusion.  The severity of the withdrawal is based on the individual and varies from person to person. Many people choose to continue using narcotics to get rid of the discomfort of withdrawal. They also use to try to feel normal.  TREATMENT   Quitting an addiction means stopping use of all chemicals. This is hard but may save your life. With continual drug use, possible outcomes are often loss of self respect and esteem, violence, death, and eventually senior living if the use of narcotics has led to the death of another.  Addiction cannot be cured, but it can be stopped. This often requires outside help and the care of professionals. Most hospitals and clinics can refer you to a specialized care center.  It is not necessary for you to go through the uncomfortable symptoms of withdrawal. Your caregiver can provide you with medications that will help you through this difficult period. Try to avoid situations, friends, or alcohol, which may have made it possible for you to continue using narcotics in the past. Learn how to say " no!  HOME CARE INSTRUCTIONS   · Drink fluids, get plenty of rest, and take hot baths.  · Medicines may be prescribed to help control withdrawal symptoms.  · Over-the-counter medicines may be helpful to control diarrhea or an upset stomach.  · If your problems resulted from taking prescription pain medicines, make sure you have a follow-up visit with your caregiver within the next few days. Be open about this problem.  · If you are dependent or addicted to street drugs, contact a local drug and alcohol treatment center or Narcotics Anonymous.  · Have someone with you to monitor your symptoms.  · Engage in healthy activities with friends who do not use drugs.  · Stay away from the drug scene.  It takes a long period of time to overcome addictions to all drugs. There may be times when you feel as though you want to use. Following loss of a physical addiction and going through withdrawal, you have conquered the most difficult part of getting rid of an addiction. Gradually, you will have a lessening of the craving that is telling you that you need narcotics to feel normal. Call your caregiver or a member of your support group if more support is needed. Learn who to talk to in your family and among your friends so that during these periods you can receive outside help.  SEEK IMMEDIATE MEDICAL CARE IF:   · You have vomiting that cannot be controlled, especially if you cannot keep liquids down.  · You are seeing things or hearing voices that are not really there (hallucinating).  · You have a seizure.  Document Released: 03/09/2004 Document Revised: 03/11/2013 Document Reviewed: 12/13/2009  ExitCare® Patient Information ©2014 [employer].            Patient Information     Patient Information    Following emergency treatment: all patient requiring follow-up care must return either to a private physician or a clinic if your condition worsens before you are able to obtain further medical attention, please return to the  emergency room.     Billing Information    [facility] we work to make the billing process streamlined for our patients.  Our Representatives are here to answer any questions you may have regarding your hospital bill.  If you have insurance coverage and have supplied your insurance information to us, we will submit a claim to your insurer on your behalf.  Should you have any questions regarding your bill, we can be reached online or by phone as follows:  Online: You are able pay your bills online or live chat with our representatives about any billing questions you may have. We are here to help Monday - Friday from 8:00am to 7:30pm and 9:00am - 12:00pm on Saturdays.  Please visit [email or web address]  for more information.   Phone:  [phone] or [phone]    Please note that your emergency physician, surgeon, pathologist, radiologist, anesthesiologist, and other specialists are not employed by [facility] and will therefore bill separately for their services.  Please contact them directly for any questions concerning their bills at the numbers below:     Emergency Physician Services:  [phone]  [facility]:  [phone]  [facility]:  [phone]  [facility]:  [phone]    1. Your final bill may vary from the amount quoted upon discharge if all procedures are not complete at that time, or if your doctor has additional procedures of which we are not aware. You will receive an additional bill if you return to the [facility] at [facility] for [facility] regardless of the facility of which the sutures were placed.     2. Please arrange for settlement of this account at the emergency registration.    3. All self-pay accounts are due in full at the time of treatment.  If you are unable to meet this obligation then payment is expected within 4-5 days.     4. If you have had radiology studies (CT, X-ray,  Ultrasound, MRI), you have received a preliminary result during your emergency department visit. Please contact the radiology department [phone] to receive a copy of your final result. Please discuss the Final result with your primary physician or with the follow up physician provided.     Crisis Hotline:  [place] Crisis Hotline:  3-891-SFNCTBJ or 1-918.245.7959  [place] Crisis Hotline:    1-832.231.5405 or 505-966-0172         ED Discharge Follow Up Questions    1. In order to provide you with very good care, we would like to follow up with a phone call in the next few days.  May we have your permission to contact you?     YES /  NO    2. What is the best phone number to call you? (       )_____-__________    3. What is the best time to call you?      Morning  /  Afternoon  /  Evening                   Patient Signature:  ____________________________________________________________    Date:  ____________________________________________________________

## 2017-03-20 NOTE — ED AVS SNAPSHOT
3/20/2017          Julisa Carrion  [address]  [place] [address]    Dear Julisa:    [facility] wants to ensure your discharge home is safe and you or your loved ones have had all your questions answered regarding your care after you leave the hospital.    You may receive a telephone call within two days of your discharge.  This call is to make certain you understand your discharge instructions as well as ensure we provided you with the best care possible during your stay with us.     The call will only last approximately 3-5 minutes and will be done by a nurse.    Once again, we want to ensure your discharge home is safe and that you have a clear understanding of any next steps in your care.  If you have any questions or concerns, please do not hesitate to contact us, we are here for you.  Thank you for choosing [facility] for your healthcare needs.    Sincerely,    Ty Enriquez    [facility]

## 2017-03-20 NOTE — ED AVS SNAPSHOT
Apprema Access Code: [email or web address]  Expires: 3/21/2017  4:30 PM    Your email address is not on file at [facility].  Email Addresses are required for you to sign up for Apprema, please contact [phone] to verify your personal information and to provide your email address prior to attempting to register for Apprema.    Julisa Amadotz  [address] [place] [address]    Apprema  A secure, online tool to manage your health information     [facility]’s Apprema® is a secure, online tool that connects you to your personalized health information from the privacy of your home -- day or night - making it very easy for you to manage your healthcare. Once the activation process is completed, you can even access your medical information using the Apprema rito, which is available for free in the Apple Rito store or Google Play store.     To learn more about Apprema, visit [email or web address]    There are two levels of access available (as shown below):   My Chart Features  [facility] Primary Care Doctor [facility]  Specialists [facility]  Urgent  Care Non-[facility] Primary Care Doctor   Email your healthcare team securely and privately 24/7 X X X    Manage appointments: schedule your next appointment; view details of past/upcoming appointments X      Request prescription refills. X      View recent personal medical records, including lab and immunizations X X X X   View health record, including health history, allergies, medications X X X X   Read reports about your outpatient visits, procedures, consult and ER notes X X X X   See your discharge summary, which is a recap of your hospital and/or ER visit that includes your diagnosis, lab results, and care plan X X  X     How to register for Percentilt:  Once your e-mail address has been verified, follow the following steps to sign up for Percentilt.     1. Go to  [email or web address]  2. Click on the Sign Up Now box, which takes you to the New Member Sign Up page. You  will need to provide the following information:  a. Enter your [facility] Access Code exactly as it appears at the top of this page. (You will not need to use this code after you’ve completed the sign-up process. If you do not sign up before the expiration date, you must request a new code.)   b. Enter your date of birth.   c. Enter your home email address.   d. Click Submit, and follow the next screen’s instructions.  3. Create a [employer] ID. This will be your [facility] login ID and cannot be changed, so think of one that is secure and easy to remember.  4. Create a [facility] password. You can change your password at any time.  5. Enter your Password Reset Question and Answer. This can be used at a later time if you forget your password.   6. Enter your e-mail address. This allows you to receive e-mail notifications when new information is available in [facility].  7. Click Sign Up. You can now view your health information.    For assistance activating your [facility] account, call [phone]

## 2017-03-20 NOTE — ED AVS SNAPSHOT
Home Care Instructions                                                                                                                Julisa Carrion   MRN: [id number]    Department:  [facility], Emergency Dept   Date of Visit:  3/20/2017            [facility], Emergency Dept    [address]    [place] [address]    Phone:  [phone]      You were seen by     1. Alvaro Jacobs M.D.    2. Cristiano Guerra M.D.      Your Diagnosis Was     Acute suppurative otitis media of both ears without spontaneous rupture of tympanic membranes, recurrence not specified     H66.003       Follow-up Information     1. Follow up with Tre Jason M.D..    Specialty:  Internal Medicine    Contact information    [address]  [place] [address] [phone]        Medication Information     Review all of your home medications and newly ordered medications with your primary doctor and/or pharmacist as soon as possible. Follow medication instructions as directed by your doctor and/or pharmacist.     Please keep your complete medication list with you and share with your physician. Update the information when medications are discontinued, doses are changed, or new medications (including over-the-counter products) are added; and carry medication information at all times in the event of emergency situations.               Medication List      START taking these medications        Instructions    Morning Afternoon Evening Bedtime    amoxicillin 500 MG Caps   Commonly known as:  AMOXIL        Take 1 Cap by mouth 3 times a day for 7 days.   Dose:  500 mg                        meloxicam 7.5 MG Tabs   Commonly known as:  MOBIC        Take 1 Tab by mouth every day.   Dose:  7.5 mg                        promethazine 25 MG Supp   Commonly known as:  PHENERGAN        Insert 1 Suppository in rectum every 6 hours as needed for Nausea/Vomiting.   Dose:  25 mg                             ASK your doctor about these medications        Instructions    Morning Afternoon Evening Bedtime    aspirin EC 81 MG Tbec   Commonly known as:  ECOTRIN        Take 81 mg by mouth every day.   Dose:  81 mg                        gabapentin 800 MG tablet   Commonly known as:  NEURONTIN        Take 800 mg by mouth 3 times a day.   Dose:  800 mg                        lisinopril 5 MG Tabs   Commonly known as:  PRINIVIL        Take 5 mg by mouth every morning.   Dose:  5 mg                        metoclopramide 10 MG Tabs   Commonly known as:  REGLAN        Take 1 Tab by mouth 3 times a day before meals.   Dose:  10 mg                        ondansetron 4 MG Tbdp   Commonly known as:  ZOFRAN ODT        Take 1 Tab by mouth every 8 hours as needed for Nausea/Vomiting (give PO if no IV route available).   Dose:  4 mg                        oxycodone-acetaminophen  MG Tabs   Commonly known as:  PERCOCET-10        Take 1 Tab by mouth every 8 hours as needed for Severe Pain.   Dose:  1 Tab                        pravastatin 20 MG Tabs   Commonly known as:  PRAVACHOL        Take 20 mg by mouth every day.   Dose:  20 mg                             Where to Get Your Medications      You can get these medications from any pharmacy     Bring a paper prescription for each of these medications    - amoxicillin 500 MG Caps  - meloxicam 7.5 MG Tabs  - promethazine 25 MG Supp              Discharge Instructions       Otitis Media, Adult  Otitis media is redness, soreness, and inflammation of the middle ear. Otitis media may be caused by allergies or, most commonly, by infection. Often it occurs as a complication of the common cold.  SIGNS AND SYMPTOMS  Symptoms of otitis media may include:  · Earache.  · Fever.  · Ringing in your ear.  · Headache.  · Leakage of fluid from the ear.  DIAGNOSIS  To diagnose otitis media, your health care provider will examine your ear with an otoscope. This is an instrument that allows your health  care provider to see into your ear in order to examine your eardrum. Your health care provider also will ask you questions about your symptoms.  TREATMENT   Typically, otitis media resolves on its own within 3-5 days. Your health care provider may prescribe medicine to ease your symptoms of pain. If otitis media does not resolve within 5 days or is recurrent, your health care provider may prescribe antibiotic medicines if he or she suspects that a bacterial infection is the cause.  HOME CARE INSTRUCTIONS   · If you were prescribed an antibiotic medicine, finish it all even if you start to feel better.  · Take medicines only as directed by your health care provider.  · Keep all follow-up visits as directed by your health care provider.  SEEK MEDICAL CARE IF:  · You have otitis media only in one ear, or bleeding from your nose, or both.  · You notice a lump on your neck.  · You are not getting better in 3-5 days.  · You feel worse instead of better.  SEEK IMMEDIATE MEDICAL CARE IF:   · You have pain that is not controlled with medicine.  · You have swelling, redness, or pain around your ear or stiffness in your neck.  · You notice that part of your face is paralyzed.  · You notice that the bone behind your ear (mastoid) is tender when you touch it.  MAKE SURE YOU:   · Understand these instructions.  · Will watch your condition.  · Will get help right away if you are not doing well or get worse.     This information is not intended to replace advice given to you by your health care provider. Make sure you discuss any questions you have with your health care provider.     Document Released: 09/22/2005 Document Revised: 01/08/2016 Document Reviewed: 07/15/2014  Elsevier Interactive Patient Education ©2016 Elsevier Inc.            Patient Information     Patient Information    Following emergency treatment: all patient requiring follow-up care must return either to a private physician or a clinic if your condition worsens  before you are able to obtain further medical attention, please return to the emergency room.     Billing Information    At [facility], we work to make the billing process streamlined for our patients.  Our Representatives are here to answer any questions you may have regarding your hospital bill.  If you have insurance coverage and have supplied your insurance information to us, we will submit a claim to your insurer on your behalf.  Should you have any questions regarding your bill, we can be reached online or by phone as follows:  Online: You are able pay your bills online or live chat with our representatives about any billing questions you may have. We are here to help Monday - Friday from 8:00am to 7:30pm and 9:00am - 12:00pm on Saturdays.  Please visit [email or web address]  for more information.   Phone:  [phone] or [phone]    Please note that your emergency physician, surgeon, pathologist, radiologist, anesthesiologist, and other specialists are not employed by [facility] and will therefore bill separately for their services.  Please contact them directly for any questions concerning their bills at the numbers below:     Emergency Physician Services:  [phone]  [facility]:  [phone]  [facility]:  [phone]  [facility]:  [phone]    1. Your final bill may vary from the amount quoted upon discharge if all procedures are not complete at that time, or if your doctor has additional procedures of which we are not aware. You will receive an additional bill if you return to the Emergency Department at [facility] for suture removal regardless of the facility of which the sutures were placed.     2. Please arrange for settlement of this account at the emergency registration.    3. All self-pay accounts are due in full at the time of treatment.  If you are unable to meet this obligation then payment is  expected within 4-5 days.     4. If you have had radiology studies (CT, X-ray, Ultrasound, MRI), you have received a preliminary result during your emergency department visit. Please contact the radiology department [phone] to receive a copy of your final result. Please discuss the Final result with your primary physician or with the follow up physician provided.     Crisis Hotline:  [place] Crisis Hotline:  5-725-JXZHHSB or 1-459.765.1091  [place] Crisis Hotline:    1-470.919.7670 or 529-942-3522         ED Discharge Follow Up Questions    1. In order to provide you with very good care, we would like to follow up with a phone call in the next few days.  May we have your permission to contact you?     YES /  NO    2. What is the best phone number to call you? (       )_____-__________    3. What is the best time to call you?      Morning  /  Afternoon  /  Evening                   Patient Signature:  ____________________________________________________________    Date:  ____________________________________________________________

## 2017-03-20 NOTE — ED AVS SNAPSHOT
3/20/2017          Julisa Carrion  [address]  [place] [address]    Dear Julisa:    [facility] wants to ensure your discharge home is safe and you or your loved ones have had all your questions answered regarding your care after you leave the hospital.    You may receive a telephone call within two days of your discharge.  This call is to make certain you understand your discharge instructions as well as ensure we provided you with the best care possible during your stay with us.     The call will only last approximately 3-5 minutes and will be done by a nurse.    Once again, we want to ensure your discharge home is safe and that you have a clear understanding of any next steps in your care.  If you have any questions or concerns, please do not hesitate to contact us, we are here for you.  Thank you for choosing [facility] for your healthcare needs.    Sincerely,    Ty Enriquez    [facility]

## 2017-03-20 NOTE — DISCHARGE INSTRUCTIONS
Abdominal Pain, Adult  Many things can cause abdominal pain. Usually, abdominal pain is not caused by a disease and will improve without treatment. It can often be observed and treated at home. Your health care provider will do a physical exam and possibly order blood tests and X-rays to help determine the seriousness of your pain. However, in many cases, more time must pass before a clear cause of the pain can be found. Before that point, your health care provider may not know if you need more testing or further treatment.  HOME CARE INSTRUCTIONS   Monitor your abdominal pain for any changes. The following actions may help to alleviate any discomfort you are experiencing:  · Only take over-the-counter or prescription medicines as directed by your health care provider.  · Do not take laxatives unless directed to do so by your health care provider.  · Try a clear liquid diet (broth, tea, or water) as directed by your health care provider. Slowly move to a bland diet as tolerated.  SEEK MEDICAL CARE IF:  · You have unexplained abdominal pain.  · You have abdominal pain associated with nausea or diarrhea.  · You have pain when you urinate or have a bowel movement.  · You experience abdominal pain that wakes you in the night.  · You have abdominal pain that is worsened or improved by eating food.  · You have abdominal pain that is worsened with eating fatty foods.  · You have a fever.  SEEK IMMEDIATE MEDICAL CARE IF:   · Your pain does not go away within 2 hours.  · You keep throwing up (vomiting).  · Your pain is felt only in portions of the abdomen, such as the right side or the left lower portion of the abdomen.  · You pass bloody or black tarry stools.  MAKE SURE YOU:  · Understand these instructions.    · Will watch your condition.    · Will get help right away if you are not doing well or get worse.       This information is not intended to replace advice given to you by your health care provider. Make sure you  "discuss any questions you have with your health care provider.     Document Released: 09/27/2006 Document Revised: 01/08/2016 Document Reviewed: 08/27/2014  [employer] Interactive Patient Education ©2016 [employer] Inc.    Narcotic Withdrawal  When drug use interferes with normal living activities and relationships, it is abuse. Abuse includes problems with family and friends. Psychological dependence has developed when your mind tells you that the drug is needed. This is usually followed by a physical dependence in which you need more of the drug to get the same feeling or \"high.\" This is known as addiction or chemical dependency. Risk is greater when chemical dependency exists in the family.  SYMPTOMS   When tolerance to narcotics has developed, stopping of the narcotic suddenly can cause uncomfortable physical symptoms. Most of the time these are mild and consist of shakes or jitters (tremors) in the hands,a rapid heart rate, rapid breathing, and temperature. Sometimes these symptoms are associated with anxiety, panic attacks, and bad dreams. Other symptoms include:  · Irritability.  · Anxiety.  · Runny nose.  · \"Goose flesh.\"  · Diarrhea.  · Feeling sick to the stomach (nauseous).  · Muscle spasms.  · Sleeplessness.  · Chills.  · Sweats.  · Drug cravings.  · Confusion.  The severity of the withdrawal is based on the individual and varies from person to person. Many people choose to continue using narcotics to get rid of the discomfort of withdrawal. They also use to try to feel normal.  TREATMENT   Quitting an addiction means stopping use of all chemicals. This is hard but may save your life. With continual drug use, possible outcomes are often loss of self respect and esteem, violence, death, and eventually jail if the use of narcotics has led to the death of another.  Addiction cannot be cured, but it can be stopped. This often requires outside help and the care of professionals. Most hospitals and clinics can " refer you to a specialized care center.  It is not necessary for you to go through the uncomfortable symptoms of withdrawal. Your caregiver can provide you with medications that will help you through this difficult period. Try to avoid situations, friends, or alcohol, which may have made it possible for you to continue using narcotics in the past. Learn how to say no!  HOME CARE INSTRUCTIONS   · Drink fluids, get plenty of rest, and take hot baths.  · Medicines may be prescribed to help control withdrawal symptoms.  · Over-the-counter medicines may be helpful to control diarrhea or an upset stomach.  · If your problems resulted from taking prescription pain medicines, make sure you have a follow-up visit with your caregiver within the next few days. Be open about this problem.  · If you are dependent or addicted to street drugs, contact a local drug and alcohol treatment center or Narcotics Anonymous.  · Have someone with you to monitor your symptoms.  · Engage in healthy activities with friends who do not use drugs.  · Stay away from the drug scene.  It takes a long period of time to overcome addictions to all drugs. There may be times when you feel as though you want to use. Following loss of a physical addiction and going through withdrawal, you have conquered the most difficult part of getting rid of an addiction. Gradually, you will have a lessening of the craving that is telling you that you need narcotics to feel normal. Call your caregiver or a member of your support group if more support is needed. Learn who to talk to in your family and among your friends so that during these periods you can receive outside help.  SEEK IMMEDIATE MEDICAL CARE IF:   · You have vomiting that cannot be controlled, especially if you cannot keep liquids down.  · You are seeing things or hearing voices that are not really there (hallucinating).  · You have a seizure.  Document Released: 03/09/2004 Document Revised: 03/11/2013  Document Reviewed: 12/13/2009  [employer]® Patient Information ©2014 [employer].

## 2017-03-20 NOTE — ED AVS SNAPSHOT
[employer] Access Code: [email or web address]  Expires: 3/21/2017  4:30 PM    Your email address is not on file at [facility].  Email Addresses are required for you to sign up for [employer], please contact [phone] to verify your personal information and to provide your email address prior to attempting to register for [employer].    Julisa Amdaotz  [address] [place] [address]    [employer]  A secure, online tool to manage your health information     [employer]’s [employer]® is a secure, online tool that connects you to your personalized health information from the privacy of your home -- day or night - making it very easy for you to manage your healthcare. Once the activation process is completed, you can even access your medical information using the [employer] rito, which is available for free in the Apple Rito store or Google Play store.     To learn more about [employer], visit [email or web address]    There are two levels of access available (as shown below):   My Chart Features  [facility] Primary Care Doctor [facility]  Specialists [facility]  Urgent  Care Non-[facility] Primary Care Doctor   Email your healthcare team securely and privately 24/7 X X X    Manage appointments: schedule your next appointment; view details of past/upcoming appointments X      Request prescription refills. X      View recent personal medical records, including lab and immunizations X X X X   View health record, including health history, allergies, medications X X X X   Read reports about your outpatient visits, procedures, consult and ER notes X X X X   See your discharge summary, which is a recap of your hospital and/or ER visit that includes your diagnosis, lab results, and care plan X X  X     How to register for [employer]:  Once your e-mail address has been verified, follow the following steps to sign up for [employer].     1. Go to  [email or web address]  2. Click on the Sign Up Now box, which takes you to the New Member Sign Up page. You  will need to provide the following information:  a. Enter your [employer] Access Code exactly as it appears at the top of this page. (You will not need to use this code after you’ve completed the sign-up process. If you do not sign up before the expiration date, you must request a new code.)   b. Enter your date of birth.   c. Enter your home email address.   d. Click Submit, and follow the next screen’s instructions.  3. Create a R[employer] ID. This will be your [employer] login ID and cannot be changed, so think of one that is secure and easy to remember.  4. Create a [employer] password. You can change your password at any time.  5. Enter your Password Reset Question and Answer. This can be used at a later time if you forget your password.   6. Enter your e-mail address. This allows you to receive e-mail notifications when new information is available in [employer].  7. Click Sign Up. You can now view your health information.    For assistance activating your [employer] account, call [phone]

## 2017-03-21 ENCOUNTER — HOSPITAL ENCOUNTER (EMERGENCY)
Facility: MEDICAL CENTER | Age: 57
End: 2017-03-21
Attending: EMERGENCY MEDICINE
Payer: MEDICAID

## 2017-03-21 VITALS
WEIGHT: 173 LBS | HEIGHT: 65 IN | DIASTOLIC BLOOD PRESSURE: 89 MMHG | TEMPERATURE: 98 F | RESPIRATION RATE: 15 BRPM | SYSTOLIC BLOOD PRESSURE: 165 MMHG | HEART RATE: 99 BPM | OXYGEN SATURATION: 96 % | BODY MASS INDEX: 28.82 KG/M2

## 2017-03-21 DIAGNOSIS — Z76.5 DRUG-SEEKING BEHAVIOR: ICD-10-CM

## 2017-03-21 DIAGNOSIS — G89.4 CHRONIC PAIN SYNDROME: ICD-10-CM

## 2017-03-21 DIAGNOSIS — H92.03 EARACHE SYMPTOMS IN BOTH EARS: ICD-10-CM

## 2017-03-21 PROCEDURE — 99284 EMERGENCY DEPT VISIT MOD MDM: CPT

## 2017-03-21 RX ORDER — NAPROXEN 500 MG/1
500 TABLET ORAL 2 TIMES DAILY WITH MEALS
Qty: 14 TAB | Refills: 0 | Status: ON HOLD | OUTPATIENT
Start: 2017-03-21 | End: 2017-05-02

## 2017-03-21 NOTE — ED AVS SNAPSHOT
[employer] Access Code: [email or web address]  Expires: 3/21/2017  4:30 PM    Your email address is not on file at [employer] [facility].  Email Addresses are required for you to sign up for [employer], please contact [phone] to verify your personal information and to provide your email address prior to attempting to register for [employer].    Julisa Amadotz  [address] [place] [address]    [employer]  A secure, online tool to manage your health information     [employer]’s [employer]® is a secure, online tool that connects you to your personalized health information from the privacy of your home -- day or night - making it very easy for you to manage your healthcare. Once the activation process is completed, you can even access your medical information using the [employer] rito, which is available for free in the Apple Rito store or Google Play store.     To learn more about [employer], visit [email or web address]    There are two levels of access available (as shown below):   My Chart Features  [facility] Primary Care Doctor [facility]  Specialists [facility]  Urgent  Care Non-[facility] Primary Care Doctor   Email your healthcare team securely and privately 24/7 X X X    Manage appointments: schedule your next appointment; view details of past/upcoming appointments X      Request prescription refills. X      View recent personal medical records, including lab and immunizations X X X X   View health record, including health history, allergies, medications X X X X   Read reports about your outpatient visits, procedures, consult and ER notes X X X X   See your discharge summary, which is a recap of your hospital and/or ER visit that includes your diagnosis, lab results, and care plan X X  X     How to register for [employer]:  Once your e-mail address has been verified, follow the following steps to sign up for [employer].     1. Go to  [email or web address]  2. Click on the Sign Up Now box, which takes you to the New Member Sign Up page. You  will need to provide the following information:  a. Enter your [employer] Access Code exactly as it appears at the top of this page. (You will not need to use this code after you’ve completed the sign-up process. If you do not sign up before the expiration date, you must request a new code.)   b. Enter your date of birth.   c. Enter your home email address.   d. Click Submit, and follow the next screen’s instructions.  3. Create a [employer] ID. This will be your [employer] login ID and cannot be changed, so think of one that is secure and easy to remember.  4. Create a [employer] password. You can change your password at any time.  5. Enter your Password Reset Question and Answer. This can be used at a later time if you forget your password.   6. Enter your e-mail address. This allows you to receive e-mail notifications when new information is available in [employer].  7. Click Sign Up. You can now view your health information.    For assistance activating your [employer] account, call [phone]

## 2017-03-21 NOTE — ED PROVIDER NOTES
ED Provider Note    CHIEF COMPLAINT  Chief Complaint   Patient presents with   • Ear Pain     Bilateral ear pain that began this am       HPI  Julisa Carrion is a 56 y.o. female who presents for evaluation of bilateral ear pain. She states her symptoms started this morning. She's had no fevers. She states she feels like she has a little discomfort going down towards her throat from her ears. She's had no drainage. She denies cough or sputum production. She has had some nausea as well as vomiting. In reviewing her past medical history and her prescription monitoring report her narcotic score is 662 meaning that she has 2 active narcotic prescriptions for chronic pain.    REVIEW OF SYSTEMS  See HPI for further details. All other systems are negative.     PAST MEDICAL HISTORY  Past Medical History   Diagnosis Date   • Hypertension    • Diabetes    • High cholesterol 5/15/2011   • Staph skin infection    • Migraine    • Intestinal mass 2015       FAMILY HISTORY  Family History   Problem Relation Age of Onset   • Cancer Maternal Aunt      Lung cancer d/t smoking       SOCIAL HISTORY  Social History     Social History   • Marital Status: Single     Spouse Name: N/A   • Number of Children: N/A   • Years of Education: N/A     Social History Main Topics   • Smoking status: Former Smoker -- 1.00 packs/day for 20 years     Types: Cigarettes     Quit date: 01/01/1996   • Smokeless tobacco: Former User     Quit date: 06/05/1995   • Alcohol Use: No   • Drug Use: No      Comment: just prescribed meds   • Sexual Activity: Not Asked     Other Topics Concern   • None     Social History Narrative    No known exposure to asbestos, dyes, chemicals, or pesticides.  Patient does not work.  She has 3 children and many grand-children.  Has a significant other for about 20 years. Moved to [place] in 2011.        SURGICAL HISTORY  Past Surgical History   Procedure Laterality Date   • Other abdominal surgery       cholecystectomy   • Gyn  "surgery        x 3,tubal ligation   • Other orthopedic surgery  6-     right wrist surgery   • Abdominal exploration       Lower intestine d/t mass - benign       CURRENT MEDICATIONS  Home Medications     Reviewed by Ciera Matamoros R.N. (Registered Nurse) on 17 at 2018  Med List Status: Complete    Medication Last Dose Status    aspirin EC (ECOTRIN) 81 MG Tablet Delayed Response HOLD Active    gabapentin (NEURONTIN) 800 MG tablet 3/20/2017 Active    lisinopril (PRINIVIL) 5 MG TABS 3/20/2017 Active    metoclopramide (REGLAN) 10 MG Tab 3/20/2017 Active    ondansetron (ZOFRAN ODT) 4 MG TABLET DISPERSIBLE  Active    oxycodone-acetaminophen (PERCOCET-10)  MG Tab 3/19/2017 Active    pravastatin (PRAVACHOL) 20 MG TABS 3/19/2017 Active                ALLERGIES  No Known Allergies    PHYSICAL EXAM  VITAL SIGNS: /82 mmHg  Pulse 103  Temp(Src) 36.3 °C (97.3 °F)  Resp 18  Ht 1.651 m (5' 5\")  Wt 78.6 kg (173 lb 4.5 oz)  BMI 28.84 kg/m2  SpO2 97%  LMP 2009    Constitutional: Well developed, Well nourished, No acute distress, Non-toxic appearance.   HENT: Normocephalic, Atraumatic. TMs bilaterally are red and slightly bulging. Oropharynx is clear.  Eyes:  EOMI, Conjunctiva normal, No discharge.   Cardiovascular: Slight tachycardia.   Thorax & Lungs: No respiratory distress.  Skin: Warm, Dry.   Musculoskeletal: Good range of motion in all major joints.  Neurologic: Awake alert.    COURSE & MEDICAL DECISION MAKING  Pertinent Labs & Imaging studies reviewed. (See chart for details)  This is a 56-year-old here for evaluation of bilateral ear pain. She does appear to have otitis media. I will start the patient on amoxicillin. I've explained to her that I cannot provide her a narcotic prescription due to her currently having 2 active narcotic prescriptions for her chronic pain. I will provide her a prescription for meloxicam. I will also provide her a prescription for Phenergan suppositories " and she's been having some vomiting and has a prescription for Zofran at home. She is discharged home in stable condition. She is contact Dr. Jason her primary care provider for follow-up. She is given a discharge instruction sheet on otitis media.    FINAL IMPRESSION  1. Bilateral otitis media  2.   3.         Electronically signed by: Cristiano Guerra, 3/20/2017 9:13 PM

## 2017-03-21 NOTE — ED NOTES
Pt ambualtory to room. Speaking normally and managing secretions  Patient states she was told to return to ED if throat became very sore and felt swollen

## 2017-03-21 NOTE — ED AVS SNAPSHOT
3/21/2017          Julisa Carrion  [address]  [place] [address]    Dear Julisa:    [facility] wants to ensure your discharge home is safe and you or your loved ones have had all your questions answered regarding your care after you leave the hospital.    You may receive a telephone call within two days of your discharge.  This call is to make certain you understand your discharge instructions as well as ensure we provided you with the best care possible during your stay with us.     The call will only last approximately 3-5 minutes and will be done by a nurse.    Once again, we want to ensure your discharge home is safe and that you have a clear understanding of any next steps in your care.  If you have any questions or concerns, please do not hesitate to contact us, we are here for you.  Thank you for choosing [facility] for your healthcare needs.    Sincerely,    Ty Enriquez    [facility]

## 2017-03-21 NOTE — ED NOTES
Pt instructed to have someone to drive home after narcotic administration. Pt agreed on not driving after receiving narcotics. Pt medicated as directed by ERP. Pt to call taxi home

## 2017-03-21 NOTE — ED NOTES
PT has had 3 visits to the ED in the last 24 hours and seen in the ER 7 times since 2/17/2017. PT requesting to see the doctor and requesting pain medication

## 2017-03-21 NOTE — ED NOTES
Pt verbalizes understanding of dishcarge instructions. Patient to follow p with PCP for further treatment. Patient ambulatory to discharge with steady gait. NAD or deficits noted at time of discharge

## 2017-03-21 NOTE — ED AVS SNAPSHOT
Home Care Instructions                                                                                                                Julisa Carrion   MRN: [id number]    Department:  [facility], Emergency Dept   Date of Visit:  3/21/2017            [facility], Emergency Dept    [address]    [place] [address]    Phone:  [phone]      You were seen by     Gary Gansert, M.D.      Your Diagnosis Was     Earache symptoms in both ears     H92.03       Follow-up Information     1. Follow up with [facility]. Schedule an appointment as soon as possible for a visit in 1 day.    Contact information    [facility]  [place] [address]  [phone]        Medication Information     Review all of your home medications and newly ordered medications with your primary doctor and/or pharmacist as soon as possible. Follow medication instructions as directed by your doctor and/or pharmacist.     Please keep your complete medication list with you and share with your physician. Update the information when medications are discontinued, doses are changed, or new medications (including over-the-counter products) are added; and carry medication information at all times in the event of emergency situations.               Medication List      START taking these medications        Instructions    Morning Afternoon Evening Bedtime    naproxen 500 MG Tabs   Commonly known as:  NAPROSYN        Take 1 Tab by mouth 2 times a day, with meals.   Dose:  500 mg                          ASK your doctor about these medications        Instructions    Morning Afternoon Evening Bedtime    amoxicillin 500 MG Caps   Commonly known as:  AMOXIL        Take 1 Cap by mouth 3 times a day for 7 days.   Dose:  500 mg                        aspirin EC 81 MG Tbec   Commonly known as:  ECOTRIN        Take 81 mg by mouth every day.   Dose:  81 mg                        gabapentin 800 MG tablet   Commonly known  as:  NEURONTIN        Take 800 mg by mouth 3 times a day.   Dose:  800 mg                        lisinopril 5 MG Tabs   Commonly known as:  PRINIVIL        Take 5 mg by mouth every morning.   Dose:  5 mg                        meloxicam 7.5 MG Tabs   Commonly known as:  MOBIC        Take 1 Tab by mouth every day.   Dose:  7.5 mg                        metoclopramide 10 MG Tabs   Commonly known as:  REGLAN        Take 1 Tab by mouth 3 times a day before meals.   Dose:  10 mg                        ondansetron 4 MG Tbdp   Commonly known as:  ZOFRAN ODT        Take 1 Tab by mouth every 8 hours as needed for Nausea/Vomiting (give PO if no IV route available).   Dose:  4 mg                        oxycodone-acetaminophen  MG Tabs   Commonly known as:  PERCOCET-10        Take 1 Tab by mouth every 8 hours as needed for Severe Pain.   Dose:  1 Tab                        pravastatin 20 MG Tabs   Commonly known as:  PRAVACHOL        Take 20 mg by mouth every day.   Dose:  20 mg                        promethazine 25 MG Supp   Commonly known as:  PHENERGAN        Insert 1 Suppository in rectum every 6 hours as needed for Nausea/Vomiting.   Dose:  25 mg                             Where to Get Your Medications      These [id number] - [place], [place] - [address] [facility]  [facility] [place] [address] [place] [address]     Phone:  [phone]    - naproxen 500 MG Tabs            Patient Information     Patient Information    Following emergency treatment: all patient requiring follow-up care must return either to a private physician or a clinic if your condition worsens before you are able to obtain further medical attention, please return to the emergency room.     Billing Information    At [facility], we work to make the billing process streamlined for our patients.  Our Representatives are here to answer any questions you may have regarding your hospital bill.  If you  have insurance coverage and have supplied your insurance information to us, we will submit a claim to your insurer on your behalf.  Should you have any questions regarding your bill, we can be reached online or by phone as follows:  Online: You are able pay your bills online or live chat with our representatives about any billing questions you may have. We are here to help Monday - Friday from 8:00am to 7:30pm and 9:00am - 12:00pm on Saturdays.  Please visit [email or web address]  for more information.   Phone:  [phone] or [phone]    Please note that your emergency physician, surgeon, pathologist, radiologist, anesthesiologist, and other specialists are not employed by [facility] and will therefore bill separately for their services.  Please contact them directly for any questions concerning their bills at the numbers below:     [facility]ician [facility]:  [phone]  [facility]:  [phone]  [facility]:  [phone]  [facility]:  [phone]    1. Your final bill may vary from the amount quoted upon discharge if all procedures are not complete at that time, or if your doctor has additional procedures of which we are not aware. You will receive an additional bill if you return to the Emergency Department at [facility] for suture removal regardless of the facility of which the sutures were placed.     2. Please arrange for settlement of this account at the emergency registration.    3. All self-pay accounts are due in full at the time of treatment.  If you are unable to meet this obligation then payment is expected within 4-5 days.     4. If you have had radiology studies (CT, X-ray, Ultrasound, MRI), you have received a preliminary result during your emergency department visit. Please contact the radiology department [phone] to receive a copy of your final result. Please discuss the Final result with your  primary physician or with the follow up physician provided.     Crisis Hotline:  [place] Crisis Hotline:  2-309-NMAGFNQ or 1-692.687.4679  [place] Crisis Hotline:    1-525.636.4623 or 038-231-0470         ED Discharge Follow Up Questions    1. In order to provide you with very good care, we would like to follow up with a phone call in the next few days.  May we have your permission to contact you?     YES /  NO    2. What is the best phone number to call you? (       )_____-__________    3. What is the best time to call you?      Morning  /  Afternoon  /  Evening                   Patient Signature:  ____________________________________________________________    Date:  ____________________________________________________________

## 2017-03-21 NOTE — ED PROVIDER NOTES
"ED Provider Note  CHIEF COMPLAINT  Chief Complaint   Patient presents with   • Ear Pain       HPI  Julisa Carrion is a 56 y.o. female who presents requesting pain medication. The patient was seen at [facility] yesterday and diagnosed with an ear infection . She complains of persistent ear pain. She has a history of chronic pain. She's had multiple visits the ED for various pain complaints. She was given a prescription for some amoxicillin yesterday. No headache, no stiff neck, no chest pain. She has chronic pain syndrome. Chronic back pain and wrist pain.    REVIEW OF SYSTEMS  See HPI for further details. No headache, no stiff neck, chest pain, no abdominal pain.  PAST MEDICAL HISTORY  Past Medical History   Diagnosis Date   • Hypertension    • Diabetes    • High cholesterol 5/15/2011   • Staph skin infection    • Migraine    • Intestinal mass        FAMILY HISTORY  Family History   Problem Relation Age of Onset   • Cancer Maternal Aunt      Lung cancer d/t smoking       SOCIAL HISTORY  Cigarette smoker    SURGICAL HISTORY  Past Surgical History   Procedure Laterality Date   • Other abdominal surgery       cholecystectomy   • Gyn surgery        x 3,tubal ligation   • Other orthopedic surgery       right wrist surgery   • Abdominal exploration       Lower intestine d/t mass - benign       CURRENT MEDICATIONS  Amoxicillin, Phenergan, Reglan, Neurontin, Prinivil,    ALLERGIES  No Known Allergies    PHYSICAL EXAM  VITAL SIGNS: /90 mmHg  Pulse 108  Temp(Src) 36.7 °C (98 °F)  Resp 16  Ht 1.651 m (5' 5\")  Wt 78.472 kg (173 lb)  BMI 28.79 kg/m2  SpO2 97%  LMP 2009  vital signs are noted  Constitutional: Alert healthy-appearing adult in no distress   HENT: Normocephalic . Both TMs are clear. Ears are clear. Throat is clear. No stridor no drooling or trismus.  Eyes: No sign of infection.  Neck: Supple no adenitis.  Cardiovascular: Regular rhythm   Thorax & Lungs: No respiratory " distress   Abdomen: nontender  Skin: Warm, Dry, No rash.   Neurologic: Alert  Normal motor function,  No obvious focal deficits noted.   Psychiatric: Affect normal, and mood normal.       COURSE & MEDICAL DECISION MAKING  Patient has a history of drug-seeking behavior. Multiple visits to ED. She was given a prescription for amoxicillin for an ear infection yesterday. Her ears are clear today. She'll continue on her amoxicillin. I gave her prescription for some Naprosyn. She can return as needed or follow-up with clinic. Stable on discharge.    FINAL IMPRESSION  1. Recent earache treated with amoxicillin  2. Drug-seeking behavior  3. Chronic pain syndrome      Electronically signed by: Gary Gansert, 3/21/2017 8:14 AM

## 2017-03-21 NOTE — ED NOTES
"Chief Complaint   Patient presents with   • Ear Pain     Pt BIB [facility] for ear pain. Was taken to [facility] last night for the same and was given a script for amoxicillin. Pt states it isn't helping.      /90 mmHg  Pulse 108  Temp(Src) 36.7 °C (98 °F)  Resp 16  Ht 1.651 m (5' 5\")  Wt 78.472 kg (173 lb)  BMI 28.79 kg/m2  SpO2 97%  LMP 02/05/2009      Pt Informed regarding triage process and verbalized understanding to inform triage tech or RN for any changes in condition.  Placed in lobby.    "

## 2017-03-21 NOTE — ED NOTES
"Chief Complaint   Patient presents with   • Ear Pain     Bilateral ear pain that began this am     /82 mmHg  Pulse 103  Temp(Src) 36.3 °C (97.3 °F)  Resp 18  Ht 1.651 m (5' 5\")  Wt 78.6 kg (173 lb 4.5 oz)  BMI 28.84 kg/m2  SpO2 97%  LMP 02/05/2009    "

## 2017-03-21 NOTE — DISCHARGE INSTRUCTIONS
Otitis Media, Adult  Otitis media is redness, soreness, and inflammation of the middle ear. Otitis media may be caused by allergies or, most commonly, by infection. Often it occurs as a complication of the common cold.  SIGNS AND SYMPTOMS  Symptoms of otitis media may include:  · Earache.  · Fever.  · Ringing in your ear.  · Headache.  · Leakage of fluid from the ear.  DIAGNOSIS  To diagnose otitis media, your health care provider will examine your ear with an otoscope. This is an instrument that allows your health care provider to see into your ear in order to examine your eardrum. Your health care provider also will ask you questions about your symptoms.  TREATMENT   Typically, otitis media resolves on its own within 3-5 days. Your health care provider may prescribe medicine to ease your symptoms of pain. If otitis media does not resolve within 5 days or is recurrent, your health care provider may prescribe antibiotic medicines if he or she suspects that a bacterial infection is the cause.  HOME CARE INSTRUCTIONS   · If you were prescribed an antibiotic medicine, finish it all even if you start to feel better.  · Take medicines only as directed by your health care provider.  · Keep all follow-up visits as directed by your health care provider.  SEEK MEDICAL CARE IF:  · You have otitis media only in one ear, or bleeding from your nose, or both.  · You notice a lump on your neck.  · You are not getting better in 3-5 days.  · You feel worse instead of better.  SEEK IMMEDIATE MEDICAL CARE IF:   · You have pain that is not controlled with medicine.  · You have swelling, redness, or pain around your ear or stiffness in your neck.  · You notice that part of your face is paralyzed.  · You notice that the bone behind your ear (mastoid) is tender when you touch it.  MAKE SURE YOU:   · Understand these instructions.  · Will watch your condition.  · Will get help right away if you are not doing well or get worse.     This  information is not intended to replace advice given to you by your health care provider. Make sure you discuss any questions you have with your health care provider.     Document Released: 09/22/2005 Document Revised: 01/08/2016 Document Reviewed: 07/15/2014  [employer] Interactive Patient Education ©2016 [employer]

## 2017-03-22 LAB
BACTERIA UR CULT: NORMAL
SIGNIFICANT IND 70042: NORMAL
SITE SITE: NORMAL
SOURCE SOURCE: NORMAL

## 2017-04-26 ENCOUNTER — APPOINTMENT (OUTPATIENT)
Dept: RADIOLOGY | Facility: MEDICAL CENTER | Age: 57
DRG: 389 | End: 2017-04-26
Attending: EMERGENCY MEDICINE
Payer: MEDICAID

## 2017-04-26 ENCOUNTER — HOSPITAL ENCOUNTER (INPATIENT)
Facility: MEDICAL CENTER | Age: 57
LOS: 5 days | DRG: 389 | End: 2017-05-02
Attending: EMERGENCY MEDICINE | Admitting: HOSPITALIST
Payer: MEDICAID

## 2017-04-26 DIAGNOSIS — K56.609 SMALL BOWEL OBSTRUCTION (HCC): ICD-10-CM

## 2017-04-26 LAB
ALBUMIN SERPL BCP-MCNC: 3.3 G/DL (ref 3.2–4.9)
ALBUMIN/GLOB SERPL: 0.8 G/DL
ALP SERPL-CCNC: 99 U/L (ref 30–99)
ALT SERPL-CCNC: 8 U/L (ref 2–50)
ANION GAP SERPL CALC-SCNC: 8 MMOL/L (ref 0–11.9)
APPEARANCE UR: ABNORMAL
AST SERPL-CCNC: 9 U/L (ref 12–45)
BASOPHILS # BLD AUTO: 0.6 % (ref 0–1.8)
BASOPHILS # BLD: 0.04 K/UL (ref 0–0.12)
BILIRUB SERPL-MCNC: 0.2 MG/DL (ref 0.1–1.5)
BILIRUB UR QL STRIP.AUTO: NEGATIVE
BUN SERPL-MCNC: 14 MG/DL (ref 8–22)
CALCIUM SERPL-MCNC: 8.8 MG/DL (ref 8.5–10.5)
CHLORIDE SERPL-SCNC: 107 MMOL/L (ref 96–112)
CO2 SERPL-SCNC: 24 MMOL/L (ref 20–33)
COLOR UR: YELLOW
CREAT SERPL-MCNC: 0.62 MG/DL (ref 0.5–1.4)
CULTURE IF INDICATED INDCX: YES UA CULTURE
EOSINOPHIL # BLD AUTO: 0.05 K/UL (ref 0–0.51)
EOSINOPHIL NFR BLD: 0.7 % (ref 0–6.9)
EPI CELLS #/AREA URNS HPF: ABNORMAL /HPF
ERYTHROCYTE [DISTWIDTH] IN BLOOD BY AUTOMATED COUNT: 42.6 FL (ref 35.9–50)
GFR SERPL CREATININE-BSD FRML MDRD: >60 ML/MIN/1.73 M 2
GLOBULIN SER CALC-MCNC: 4.2 G/DL (ref 1.9–3.5)
GLUCOSE SERPL-MCNC: 162 MG/DL (ref 65–99)
GLUCOSE UR STRIP.AUTO-MCNC: 200 MG/DL
HCT VFR BLD AUTO: 32.1 % (ref 37–47)
HGB BLD-MCNC: 10.2 G/DL (ref 12–16)
HYALINE CASTS #/AREA URNS LPF: ABNORMAL /LPF
IMM GRANULOCYTES # BLD AUTO: 0.02 K/UL (ref 0–0.11)
IMM GRANULOCYTES NFR BLD AUTO: 0.3 % (ref 0–0.9)
KETONES UR STRIP.AUTO-MCNC: ABNORMAL MG/DL
LEUKOCYTE ESTERASE UR QL STRIP.AUTO: ABNORMAL
LIPASE SERPL-CCNC: 13 U/L (ref 11–82)
LYMPHOCYTES # BLD AUTO: 2.22 K/UL (ref 1–4.8)
LYMPHOCYTES NFR BLD: 32.7 % (ref 22–41)
MCH RBC QN AUTO: 26 PG (ref 27–33)
MCHC RBC AUTO-ENTMCNC: 31.8 G/DL (ref 33.6–35)
MCV RBC AUTO: 81.9 FL (ref 81.4–97.8)
MICRO URNS: ABNORMAL
MONOCYTES # BLD AUTO: 0.4 K/UL (ref 0–0.85)
MONOCYTES NFR BLD AUTO: 5.9 % (ref 0–13.4)
MUCOUS THREADS #/AREA URNS HPF: ABNORMAL /HPF
NEUTROPHILS # BLD AUTO: 4.05 K/UL (ref 2–7.15)
NEUTROPHILS NFR BLD: 59.8 % (ref 44–72)
NITRITE UR QL STRIP.AUTO: NEGATIVE
NRBC # BLD AUTO: 0 K/UL
NRBC BLD AUTO-RTO: 0 /100 WBC
PH UR STRIP.AUTO: 6 [PH]
PLATELET # BLD AUTO: 658 K/UL (ref 164–446)
PMV BLD AUTO: 7.9 FL (ref 9–12.9)
POTASSIUM SERPL-SCNC: 3.7 MMOL/L (ref 3.6–5.5)
PROT SERPL-MCNC: 7.5 G/DL (ref 6–8.2)
PROT UR QL STRIP: 30 MG/DL
RBC # BLD AUTO: 3.92 M/UL (ref 4.2–5.4)
RBC # URNS HPF: ABNORMAL /HPF
RBC UR QL AUTO: NEGATIVE
SODIUM SERPL-SCNC: 139 MMOL/L (ref 135–145)
SP GR UR STRIP.AUTO: 1.02
WBC # BLD AUTO: 6.8 K/UL (ref 4.8–10.8)
WBC #/AREA URNS HPF: ABNORMAL /HPF

## 2017-04-26 PROCEDURE — 81001 URINALYSIS AUTO W/SCOPE: CPT

## 2017-04-26 PROCEDURE — 700102 HCHG RX REV CODE 250 W/ 637 OVERRIDE(OP): Performed by: EMERGENCY MEDICINE

## 2017-04-26 PROCEDURE — 85025 COMPLETE CBC W/AUTO DIFF WBC: CPT

## 2017-04-26 PROCEDURE — A9270 NON-COVERED ITEM OR SERVICE: HCPCS | Performed by: EMERGENCY MEDICINE

## 2017-04-26 PROCEDURE — 700105 HCHG RX REV CODE 258: Performed by: EMERGENCY MEDICINE

## 2017-04-26 PROCEDURE — 74022 RADEX COMPL AQT ABD SERIES: CPT

## 2017-04-26 PROCEDURE — 96361 HYDRATE IV INFUSION ADD-ON: CPT

## 2017-04-26 PROCEDURE — 96374 THER/PROPH/DIAG INJ IV PUSH: CPT

## 2017-04-26 PROCEDURE — 80053 COMPREHEN METABOLIC PANEL: CPT

## 2017-04-26 PROCEDURE — 700111 HCHG RX REV CODE 636 W/ 250 OVERRIDE (IP): Performed by: EMERGENCY MEDICINE

## 2017-04-26 PROCEDURE — 87086 URINE CULTURE/COLONY COUNT: CPT

## 2017-04-26 PROCEDURE — 99285 EMERGENCY DEPT VISIT HI MDM: CPT

## 2017-04-26 PROCEDURE — 83690 ASSAY OF LIPASE: CPT

## 2017-04-26 PROCEDURE — 36415 COLL VENOUS BLD VENIPUNCTURE: CPT

## 2017-04-26 PROCEDURE — 80307 DRUG TEST PRSMV CHEM ANLYZR: CPT

## 2017-04-26 PROCEDURE — 700112 HCHG RX REV CODE 229: Performed by: EMERGENCY MEDICINE

## 2017-04-26 RX ORDER — ONDANSETRON 2 MG/ML
4 INJECTION INTRAMUSCULAR; INTRAVENOUS ONCE
Status: COMPLETED | OUTPATIENT
Start: 2017-04-26 | End: 2017-04-26

## 2017-04-26 RX ORDER — SODIUM CHLORIDE 9 MG/ML
1000 INJECTION, SOLUTION INTRAVENOUS ONCE
Status: COMPLETED | OUTPATIENT
Start: 2017-04-26 | End: 2017-04-27

## 2017-04-26 RX ORDER — OXYCODONE AND ACETAMINOPHEN 10; 325 MG/1; MG/1
1 TABLET ORAL ONCE
Status: COMPLETED | OUTPATIENT
Start: 2017-04-27 | End: 2017-04-26

## 2017-04-26 RX ORDER — DOCUSATE SODIUM 50 MG/5ML
100 LIQUID ORAL ONCE
Status: COMPLETED | OUTPATIENT
Start: 2017-04-26 | End: 2017-04-26

## 2017-04-26 RX ADMIN — SODIUM CHLORIDE 1000 ML: 9 INJECTION, SOLUTION INTRAVENOUS at 23:03

## 2017-04-26 RX ADMIN — LIDOCAINE HYDROCHLORIDE 15 ML: 20 SOLUTION OROPHARYNGEAL at 23:03

## 2017-04-26 RX ADMIN — OXYCODONE HYDROCHLORIDE AND ACETAMINOPHEN 1 TABLET: 10; 325 TABLET ORAL at 23:33

## 2017-04-26 RX ADMIN — DOCUSATE SODIUM 100 MG: 50 LIQUID ORAL at 23:03

## 2017-04-26 RX ADMIN — ONDANSETRON 4 MG: 2 INJECTION INTRAMUSCULAR; INTRAVENOUS at 23:03

## 2017-04-26 ASSESSMENT — LIFESTYLE VARIABLES: DO YOU DRINK ALCOHOL: NO

## 2017-04-26 ASSESSMENT — PAIN SCALES - GENERAL
PAINLEVEL_OUTOF10: 10
PAINLEVEL_OUTOF10: 10

## 2017-04-26 NOTE — IP AVS SNAPSHOT
" <p align=\"LEFT\"><IMG SRC=\"//[email or web address]\" alt=\"Image\" WIDTH=\"50%\" HEIGHT=\"200\" BORDER=\"\"></p>                   Name:Julisa Carrion  Medical Record Number:[id number]  CSN: [id number]    YOB: 1960   Age: 56 y.o.  Sex: female  HT:1.651 m (5' 5\") WT: 78 kg (171 lb 15.3 oz)          Admit Date: 4/26/2017     Discharge Date:   Today's Date: 5/2/2017  Attending Doctor:  Waqar Lowe M.D.                  Allergies:  Review of patient's allergies indicates no known allergies.          Follow-up Information     1. Schedule an appointment as soon as possible for a visit with Tre Jason M.D..    Specialty:  Internal Medicine    Why:  Follow up    Contact information    [address] [place] [address]  [phone]           Medication List      Take these Medications        Instructions    aspirin EC 81 MG Tbec   Commonly known as:  ECOTRIN    Take 81 mg by mouth every day.   Dose:  81 mg       ciprofloxacin 500 MG Tabs   Commonly known as:  CIPRO    Take 1 Tab by mouth 2 times a day for 3 days.   Dose:  500 mg       gabapentin 800 MG tablet   Commonly known as:  NEURONTIN    Take 800 mg by mouth 3 times a day.   Dose:  800 mg       lisinopril 5 MG Tabs   Commonly known as:  PRINIVIL    Take 5 mg by mouth every morning.   Dose:  5 mg       meloxicam 7.5 MG Tabs   Commonly known as:  MOBIC    Take 1 Tab by mouth every day.   Dose:  7.5 mg       metoclopramide 10 MG Tabs   Commonly known as:  REGLAN    Take 1 Tab by mouth 3 times a day before meals.   Dose:  10 mg       metronidazole 500 MG Tabs   Commonly known as:  FLAGYL    Take 1 Tab by mouth every 8 hours for 3 days.   Dose:  500 mg       omeprazole 20 MG delayed-release capsule   Commonly known as:  PRILOSEC    Take 1 Cap by mouth every day.   Dose:  20 mg       ondansetron 4 MG Tbdp   Commonly known as:  ZOFRAN ODT    Take 1 Tab by mouth every 8 hours as needed for Nausea/Vomiting (give PO if no IV route available).   Dose:  4 mg      " oxycodone-acetaminophen  MG Tabs   Commonly known as:  PERCOCET-10    Take 1 Tab by mouth every 8 hours as needed for Severe Pain.   Dose:  1 Tab       pravastatin 20 MG Tabs   Commonly known as:  PRAVACHOL    Take 20 mg by mouth every day.   Dose:  20 mg       promethazine 25 MG Supp   Commonly known as:  PHENERGAN    Insert 1 Suppository in rectum every 6 hours as needed for Nausea/Vomiting.   Dose:  25 mg

## 2017-04-26 NOTE — IP AVS SNAPSHOT
5/2/2017    Julsia Carrion  [address] [place] [address]    Dear Julisa:    [facility] wants to ensure your discharge home is safe and you or your loved ones have had all of your questions answered regarding your care after you leave the hospital.    Below is a list of resources and contact information should you have any questions regarding your hospital stay, follow-up instructions, or active medical symptoms.    Questions or Concerns Regarding… Contact   Medical Questions Related to Your Discharge  (7 days a week, 8am-5pm) Contact a Nurse Care Coordinator   [phone]   Medical Questions Not Related to Your Discharge  (24 hours a day / 7 days a week)  Contact the Nurse Health Line   [phone]    Medications or Discharge Instructions Refer to your discharge packet   or contact your [facility] Primary Care Provider   [phone]   Follow-up Appointment(s) Schedule your appointment via [employer]   or contact Scheduling [phone]   Billing Review your statement via [employer]  or contact Billing [phone]   Medical Records Review your records via [employer]   or contact Medical Records [phone]     You may receive a telephone call within two days of discharge. This call is to make certain you understand your discharge instructions and have the opportunity to have any questions answered. You can also easily access your medical information, test results and upcoming appointments via the [employer] free online health management tool. You can learn more and sign up at [employer]. For assistance setting up your [employer] account, please call [phone].    Once again, we want to ensure your discharge home is safe and that you have a clear understanding of any next steps in your care. If you have any questions or concerns, please do not hesitate to contact us, we are here for you. Thank you for choosing [facility] for your healthcare needs.    Sincerely,    Your [facility] Healthcare Team

## 2017-04-26 NOTE — IP AVS SNAPSHOT
" Home Care Instructions                                                                                                                  Name:Julisa Carrion  Medical Record Number:[id number]  CSN: [id number]    YOB: 1960   Age: 56 y.o.  Sex: female  HT:1.651 m (5' 5\") WT: 78 kg (171 lb 15.3 oz)          Admit Date: 4/26/2017     Discharge Date:   Today's Date: 5/2/2017  Attending Doctor:  Waqar Lowe M.D.                  Allergies:  Review of patient's allergies indicates no known allergies.            Discharge Instructions       Discharge Instructions    Discharged to home by car with escort. Discharged via wheelchair, hospital escort: Yes.  Special equipment needed: Not Applicable    Be sure to schedule a follow-up appointment with your primary care doctor or any specialists as instructed.     Discharge Plan:   Diet Plan: Discussed  Activity Level: Discussed  Confirmed Follow up Appointment: Patient to Call and Schedule Appointment  Confirmed Symptoms Management: Discussed  Medication Reconciliation Updated: Yes  Influenza Vaccine Indication: Not indicated: Previously immunized this influenza season and > 8 years of age    I understand that a diet low in cholesterol, fat, and sodium is recommended for good health. Unless I have been given specific instructions below for another diet, I accept this instruction as my diet prescription.   Other diet: Diabetic Diet    Special Instructions: None    · Is patient discharged on Warfarin / Coumadin?   No     · Is patient Post Blood Transfusion?  No    Depression / Suicide Risk    As you are discharged from this [facility] facility, it is important to learn how to keep safe from harming yourself.    Recognize the warning signs:  · Abrupt changes in personality, positive or negative- including increase in energy   · Giving away possessions  · Change in eating patterns- significant weight changes-  positive or negative  · Change in sleeping patterns- unable " to sleep or sleeping all the time   · Unwillingness or inability to communicate  · Depression  · Unusual sadness, discouragement and loneliness  · Talk of wanting to die  · Neglect of personal appearance   · Rebelliousness- reckless behavior  · Withdrawal from people/activities they love  · Confusion- inability to concentrate     If you or a loved one observes any of these behaviors or has concerns about self-harm, here's what you can do:  · Talk about it- your feelings and reasons for harming yourself  · Remove any means that you might use to hurt yourself (examples: pills, rope, extension cords, firearm)  · Get professional help from the community (Mental Health, Substance Abuse, psychological counseling)  · Do not be alone:Call your Safe Contact- someone whom you trust who will be there for you.  · Call your local CRISIS HOTLINE 480-0533 or 906-707-9386  · Call your local Children's Mobile Crisis Response Team [place] (669) 753-8628 or www.Glycosan  · Call the toll free National Suicide Prevention Hotlines   · National Suicide Prevention Lifeline 092-153-DKHR (5282)  · National Hope Line Network 800-SUICIDE (089-2329)    Diabetes Mellitus and Food  It is important for you to manage your blood sugar (glucose) level. Your blood glucose level can be greatly affected by what you eat. Eating healthier foods in the appropriate amounts throughout the day at about the same time each day will help you control your blood glucose level. It can also help slow or prevent worsening of your diabetes mellitus. Healthy eating may even help you improve the level of your blood pressure and reach or maintain a healthy weight.   General recommendations for healthful eating and cooking habits include:  · Eating meals and snacks regularly. Avoid going long periods of time without eating to lose weight.  · Eating a diet that consists mainly of plant-based foods, such as fruits, vegetables, nuts, legumes, and whole  grains.  · Using low-heat cooking methods, such as baking, instead of high-heat cooking methods, such as deep frying.  Work with your dietitian to make sure you understand how to use the Nutrition Facts information on food labels.  HOW CAN FOOD AFFECT ME?  Carbohydrates  Carbohydrates affect your blood glucose level more than any other type of food. Your dietitian will help you determine how many carbohydrates to eat at each meal and teach you how to count carbohydrates. Counting carbohydrates is important to keep your blood glucose at a healthy level, especially if you are using insulin or taking certain medicines for diabetes mellitus.  Alcohol  Alcohol can cause sudden decreases in blood glucose (hypoglycemia), especially if you use insulin or take certain medicines for diabetes mellitus. Hypoglycemia can be a life-threatening condition. Symptoms of hypoglycemia (sleepiness, dizziness, and disorientation) are similar to symptoms of having too much alcohol.   If your health care provider has given you approval to drink alcohol, do so in moderation and use the following guidelines:  · Women should not have more than one drink per day, and men should not have more than two drinks per day. One drink is equal to:  ¨ 12 oz of beer.  ¨ 5 oz of wine.  ¨ 1½ oz of hard liquor.  · Do not drink on an empty stomach.  · Keep yourself hydrated. Have water, diet soda, or unsweetened iced tea.  · Regular soda, juice, and other mixers might contain a lot of carbohydrates and should be counted.  WHAT FOODS ARE NOT RECOMMENDED?  As you make food choices, it is important to remember that all foods are not the same. Some foods have fewer nutrients per serving than other foods, even though they might have the same number of calories or carbohydrates. It is difficult to get your body what it needs when you eat foods with fewer nutrients. Examples of foods that you should avoid that are high in calories and carbohydrates but low in  nutrients include:  · Trans fats (most processed foods list trans fats on the Nutrition Facts label).  · Regular soda.  · Juice.  · Candy.  · Sweets, such as cake, pie, doughnuts, and cookies.  · Fried foods.  WHAT FOODS CAN I EAT?  Eat nutrient-rich foods, which will nourish your body and keep you healthy. The food you should eat also will depend on several factors, including:  · The calories you need.  · The medicines you take.  · Your weight.  · Your blood glucose level.  · Your blood pressure level.  · Your cholesterol level.  You should eat a variety of foods, including:  · Protein.  ¨ Lean cuts of meat.  ¨ Proteins low in saturated fats, such as fish, egg whites, and beans. Avoid processed meats.  · Fruits and vegetables.  ¨ Fruits and vegetables that may help control blood glucose levels, such as apples, mangoes, and yams.  · Dairy products.  ¨ Choose fat-free or low-fat dairy products, such as milk, yogurt, and cheese.  · Grains, bread, pasta, and rice.  ¨ Choose whole grain products, such as multigrain bread, whole oats, and brown rice. These foods may help control blood pressure.  · Fats.  ¨ Foods containing healthful fats, such as nuts, avocado, olive oil, canola oil, and fish.  DOES EVERYONE WITH DIABETES MELLITUS HAVE THE SAME MEAL PLAN?  Because every person with diabetes mellitus is different, there is not one meal plan that works for everyone. It is very important that you meet with a dietitian who will help you create a meal plan that is just right for you.     This information is not intended to replace advice given to you by your health care provider. Make sure you discuss any questions you have with your health care provider.     Document Released: 09/14/2006 Document Revised: 01/08/2016 Document Reviewed: 11/14/2014  [employer] Interactive Patient Education ©2016 [employer]        Follow-up Information     1. Schedule an appointment as soon as possible for a visit with Tre Jason M.D..     Specialty:  Internal Medicine    Why:  Follow up    Contact information    [address]  [place] [address]  [phone]           Discharge Medication Instructions:    Below are the medications your physician expects you to take upon discharge:    Review all your home medications and newly ordered medications with your doctor and/or pharmacist. Follow medication instructions as directed by your doctor and/or pharmacist.    Please keep your medication list with you and share with your physician.               Medication List      START taking these medications        Instructions    Morning Afternoon Evening Bedtime    ciprofloxacin 500 MG Tabs   Commonly known as:  CIPRO        Take 1 Tab by mouth 2 times a day for 3 days.   Dose:  500 mg                        metronidazole 500 MG Tabs   Commonly known as:  FLAGYL        Take 1 Tab by mouth every 8 hours for 3 days.   Dose:  500 mg                        omeprazole 20 MG delayed-release capsule   Last time this was given:  20 mg on 5/2/2017  9:16 AM   Commonly known as:  PRILOSEC        Take 1 Cap by mouth every day.   Dose:  20 mg                          CONTINUE taking these medications        Instructions    Morning Afternoon Evening Bedtime    aspirin EC 81 MG Tbec   Last time this was given:  81 mg on 5/2/2017  9:16 AM   Commonly known as:  ECOTRIN        Take 81 mg by mouth every day.   Dose:  81 mg                        gabapentin 800 MG tablet   Commonly known as:  NEURONTIN        Take 800 mg by mouth 3 times a day.   Dose:  800 mg                        lisinopril 5 MG Tabs   Last time this was given:  5 mg on 5/2/2017  9:16 AM   Commonly known as:  PRINIVIL        Take 5 mg by mouth every morning.   Dose:  5 mg                        meloxicam 7.5 MG Tabs   Commonly known as:  MOBIC        Take 1 Tab by mouth every day.   Dose:  7.5 mg                        metoclopramide 10 MG Tabs   Last time this was given:  10 mg on 5/2/2017 11:07 AM   Commonly  known as:  REGLAN        Take 1 Tab by mouth 3 times a day before meals.   Dose:  10 mg                        ondansetron 4 MG Tbdp   Commonly known as:  ZOFRAN ODT        Take 1 Tab by mouth every 8 hours as needed for Nausea/Vomiting (give PO if no IV route available).   Dose:  4 mg                        oxycodone-acetaminophen  MG Tabs   Last time this was given:  1 Tab on 5/2/2017  9:16 AM   Commonly known as:  PERCOCET-10        Take 1 Tab by mouth every 8 hours as needed for Severe Pain.   Dose:  1 Tab                        pravastatin 20 MG Tabs   Last time this was given:  20 mg on 5/2/2017  9:16 AM   Commonly known as:  PRAVACHOL        Take 20 mg by mouth every day.   Dose:  20 mg                        promethazine 25 MG Supp   Commonly known as:  PHENERGAN        Insert 1 Suppository in rectum every 6 hours as needed for Nausea/Vomiting.   Dose:  25 mg                          STOP taking these medications     naproxen 500 MG Tabs   Commonly known as:  NAPROSYN                    Where to Get Your Medications      Information about where to get these medications is not yet available     ! Ask your nurse or doctor about these medications    - ciprofloxacin 500 MG Tabs  - metronidazole 500 MG Tabs  - omeprazole 20 MG delayed-release capsule            Instructions           Diet / Nutrition:    Follow any diet instructions given to you by your doctor or the dietician, including how much salt (sodium) you are allowed each day.    If you are overweight, talk to your doctor about a weight reduction plan.    Activity:    Remain physically active following your doctor's instructions about exercise and activity.    Rest often.     Any time you become even a little tired or short of breath, SIT DOWN and rest.    Worsening Symptoms:    Report any of the following signs and symptoms to the doctor's office immediately:    *Pain of jaw, arm, or neck  *Chest pain not relieved by medication                                *Dizziness or loss of consciousness  *Difficulty breathing even when at rest   *More tired than usual                                       *Bleeding drainage or swelling of surgical site  *Swelling of feet, ankles, legs or stomach                 *Fever (>100ºF)  *Pink or blood tinged sputum  *Weight gain (3lbs/day or 5lbs /week)           *Shock from internal defibrillator (if applicable)  *Palpitations or irregular heartbeats                *Cool and/or numb extremities    Stroke Awareness    Common Risk Factors for Stroke include:    Age  Atrial Fibrillation  Carotid Artery Stenosis  Diabetes Mellitus  Excessive alcohol consumption  High blood pressure  Overweight   Physical inactivity  Smoking    Warning signs and symptoms of a stroke include:    *Sudden numbness or weakness of the face, arm or leg (especially on one side of the body).  *Sudden confusion, trouble speaking or understanding.  *Sudden trouble seeing in one or both eyes.  *Sudden trouble walking, dizziness, loss of balance or coordination.Sudden severe headache with no known cause.    It is very important to get treatment quickly when a stroke occurs. If you experience any of the above warning signs, call 911 immediately.                   Disclaimer         Quit Smoking / Tobacco Use:    I understand the use of any tobacco products increases my chance of suffering from future heart disease or stroke and could cause other illnesses which may shorten my life. Quitting the use of tobacco products is the single most important thing I can do to improve my health. For further information on smoking / tobacco cessation call a Toll Free Quit Line at 1-120.767.9781 (*National [employer]) or 1-567.941.3558 (American Lung Association) or you can access the web based program at www.lungusa.org.    [place] Tobacco Users Help Line:  (224) 138-7669       Toll Free: 1-483.670.8450  Quit Tobacco Program [facility]  (190) 695-7390    Crisis Hotline:    [place] Crisis Hotline:  7-079-FNYAISO or 1-461.928.6304    [place] Crisis Hotline:    1-444.897.1794 or 232-014-0432    Discharge Survey:   Thank you for choosing [facility]. We hope we did everything we could to make your hospital stay a pleasant one. You may be receiving a phone survey and we would appreciate your time and participation in answering the questions. Your input is very valuable to us in our efforts to improve our service to our patients and their families.        My signature on this form indicates that:    1. I have reviewed and understand the above information.  2. My questions regarding this information have been answered to my satisfaction.  3. I have formulated a plan with my discharge nurse to obtain my prescribed medications for home.                  Disclaimer         __________________________________                     __________       ________                       Patient Signature                                                 Date                    Time

## 2017-04-26 NOTE — IP AVS SNAPSHOT
Better Place Access Code: [email or web address]  Expires: 6/1/2017 11:42 AM    Your email address is not on file at [employer].  Email Addresses are required for you to sign up for Better Place, please contact [phone] to verify your personal information and to provide your email address prior to attempting to register for Better Place.    Julisa Amadotz  [address] [place] [address]    Better Place  A secure, online tool to manage your health information     [employer]’s Better Place® is a secure, online tool that connects you to your personalized health information from the privacy of your home -- day or night - making it very easy for you to manage your healthcare. Once the activation process is completed, you can even access your medical information using the Better Place rito, which is available for free in the Apple Rito store or Google Play store.     To learn more about Better Place, visit [email or web address]    There are two levels of access available (as shown below):   My Chart Features  [facility] Primary Care Doctor [facility]  Specialists [facility]  Urgent  Care Non-[facility] Primary Care Doctor   Email your healthcare team securely and privately 24/7 X X X    Manage appointments: schedule your next appointment; view details of past/upcoming appointments X      Request prescription refills. X      View recent personal medical records, including lab and immunizations X X X X   View health record, including health history, allergies, medications X X X X   Read reports about your outpatient visits, procedures, consult and ER notes X X X X   See your discharge summary, which is a recap of your hospital and/or ER visit that includes your diagnosis, lab results, and care plan X X  X     How to register for PAKt:  Once your e-mail address has been verified, follow the following steps to sign up for PAKt.     1. Go to  [email or web address]  2. Click on the Sign Up Now box, which takes you to the New Member Sign Up page. You  will need to provide the following information:  a. Enter your [employer] Access Code exactly as it appears at the top of this page. (You will not need to use this code after you’ve completed the sign-up process. If you do not sign up before the expiration date, you must request a new code.)   b. Enter your date of birth.   c. Enter your home email address.   d. Click Submit, and follow the next screen’s instructions.  3. Create a [employer] ID. This will be your [employer] login ID and cannot be changed, so think of one that is secure and easy to remember.  4. Create a [employer] password. You can change your password at any time.  5. Enter your Password Reset Question and Answer. This can be used at a later time if you forget your password.   6. Enter your e-mail address. This allows you to receive e-mail notifications when new information is available in [employer].  7. Click Sign Up. You can now view your health information.    For assistance activating your [employer] account, call [phone]

## 2017-04-27 ENCOUNTER — APPOINTMENT (OUTPATIENT)
Dept: RADIOLOGY | Facility: MEDICAL CENTER | Age: 57
DRG: 389 | End: 2017-04-27
Attending: EMERGENCY MEDICINE
Payer: MEDICAID

## 2017-04-27 ENCOUNTER — RESOLUTE PROFESSIONAL BILLING HOSPITAL PROF FEE (OUTPATIENT)
Dept: HOSPITALIST | Facility: MEDICAL CENTER | Age: 57
End: 2017-04-27
Payer: MEDICAID

## 2017-04-27 PROBLEM — K56.600 PARTIAL SMALL BOWEL OBSTRUCTION (HCC): Status: ACTIVE | Noted: 2017-04-27

## 2017-04-27 PROBLEM — E11.43 DIABETIC GASTROPARESIS ASSOCIATED WITH TYPE 2 DIABETES MELLITUS (HCC): Status: ACTIVE | Noted: 2017-04-27

## 2017-04-27 PROBLEM — K31.84 DIABETIC GASTROPARESIS ASSOCIATED WITH TYPE 2 DIABETES MELLITUS (HCC): Status: ACTIVE | Noted: 2017-04-27

## 2017-04-27 PROBLEM — G89.4 CHRONIC PAIN SYNDROME: Status: ACTIVE | Noted: 2017-04-27

## 2017-04-27 PROBLEM — K56.609 SBO (SMALL BOWEL OBSTRUCTION) (HCC): Status: ACTIVE | Noted: 2017-04-27

## 2017-04-27 LAB
AMPHET UR QL SCN: NEGATIVE
BARBITURATES UR QL SCN: NEGATIVE
BENZODIAZ UR QL SCN: NEGATIVE
BZE UR QL SCN: NEGATIVE
CANNABINOIDS UR QL SCN: NEGATIVE
GLUCOSE BLD-MCNC: 106 MG/DL (ref 65–99)
GLUCOSE BLD-MCNC: 120 MG/DL (ref 65–99)
GLUCOSE BLD-MCNC: 122 MG/DL (ref 65–99)
GLUCOSE BLD-MCNC: 97 MG/DL (ref 65–99)
MDMA UR QL SCN: NEGATIVE
METHADONE UR QL SCN: NEGATIVE
OPIATES UR QL SCN: POSITIVE
OXYCODONE UR QL SCN: NEGATIVE
PCP UR QL SCN: NEGATIVE
PROPOXYPH UR QL SCN: NEGATIVE

## 2017-04-27 PROCEDURE — 99223 1ST HOSP IP/OBS HIGH 75: CPT | Performed by: HOSPITALIST

## 2017-04-27 PROCEDURE — 770006 HCHG ROOM/CARE - MED/SURG/GYN SEMI*

## 2017-04-27 PROCEDURE — 700102 HCHG RX REV CODE 250 W/ 637 OVERRIDE(OP): Performed by: NURSE PRACTITIONER

## 2017-04-27 PROCEDURE — 700111 HCHG RX REV CODE 636 W/ 250 OVERRIDE (IP): Performed by: EMERGENCY MEDICINE

## 2017-04-27 PROCEDURE — A9270 NON-COVERED ITEM OR SERVICE: HCPCS | Performed by: HOSPITALIST

## 2017-04-27 PROCEDURE — 700111 HCHG RX REV CODE 636 W/ 250 OVERRIDE (IP): Performed by: INTERNAL MEDICINE

## 2017-04-27 PROCEDURE — 770001 HCHG ROOM/CARE - MED/SURG/GYN PRIV*

## 2017-04-27 PROCEDURE — A9270 NON-COVERED ITEM OR SERVICE: HCPCS | Performed by: NURSE PRACTITIONER

## 2017-04-27 PROCEDURE — 74177 CT ABD & PELVIS W/CONTRAST: CPT

## 2017-04-27 PROCEDURE — 700105 HCHG RX REV CODE 258: Performed by: HOSPITALIST

## 2017-04-27 PROCEDURE — 700111 HCHG RX REV CODE 636 W/ 250 OVERRIDE (IP): Performed by: HOSPITALIST

## 2017-04-27 PROCEDURE — 96375 TX/PRO/DX INJ NEW DRUG ADDON: CPT

## 2017-04-27 PROCEDURE — 82962 GLUCOSE BLOOD TEST: CPT

## 2017-04-27 PROCEDURE — 96376 TX/PRO/DX INJ SAME DRUG ADON: CPT

## 2017-04-27 PROCEDURE — 700102 HCHG RX REV CODE 250 W/ 637 OVERRIDE(OP): Performed by: HOSPITALIST

## 2017-04-27 PROCEDURE — 700117 HCHG RX CONTRAST REV CODE 255: Performed by: EMERGENCY MEDICINE

## 2017-04-27 PROCEDURE — 96361 HYDRATE IV INFUSION ADD-ON: CPT

## 2017-04-27 RX ORDER — ONDANSETRON 4 MG/1
4 TABLET, ORALLY DISINTEGRATING ORAL EVERY 4 HOURS PRN
Status: DISCONTINUED | OUTPATIENT
Start: 2017-04-27 | End: 2017-05-02 | Stop reason: HOSPADM

## 2017-04-27 RX ORDER — MORPHINE SULFATE 4 MG/ML
4 INJECTION, SOLUTION INTRAMUSCULAR; INTRAVENOUS ONCE
Status: COMPLETED | OUTPATIENT
Start: 2017-04-27 | End: 2017-04-27

## 2017-04-27 RX ORDER — PRAVASTATIN SODIUM 10 MG
20 TABLET ORAL DAILY
Status: DISCONTINUED | OUTPATIENT
Start: 2017-04-27 | End: 2017-05-02 | Stop reason: HOSPADM

## 2017-04-27 RX ORDER — GABAPENTIN 400 MG/1
800 CAPSULE ORAL 3 TIMES DAILY
Status: DISCONTINUED | OUTPATIENT
Start: 2017-04-27 | End: 2017-05-02 | Stop reason: HOSPADM

## 2017-04-27 RX ORDER — DIPHENHYDRAMINE HCL 25 MG
25 TABLET ORAL EVERY 6 HOURS PRN
Status: DISCONTINUED | OUTPATIENT
Start: 2017-04-27 | End: 2017-04-27

## 2017-04-27 RX ORDER — MORPHINE SULFATE 4 MG/ML
4 INJECTION, SOLUTION INTRAMUSCULAR; INTRAVENOUS
Status: DISCONTINUED | OUTPATIENT
Start: 2017-04-27 | End: 2017-04-27

## 2017-04-27 RX ORDER — AMOXICILLIN 250 MG
2 CAPSULE ORAL 2 TIMES DAILY
Status: DISCONTINUED | OUTPATIENT
Start: 2017-04-27 | End: 2017-05-02 | Stop reason: HOSPADM

## 2017-04-27 RX ORDER — SODIUM CHLORIDE 9 MG/ML
INJECTION, SOLUTION INTRAVENOUS CONTINUOUS
Status: DISCONTINUED | OUTPATIENT
Start: 2017-04-27 | End: 2017-04-28

## 2017-04-27 RX ORDER — OXYCODONE HYDROCHLORIDE 5 MG/1
5 TABLET ORAL
Status: DISCONTINUED | OUTPATIENT
Start: 2017-04-27 | End: 2017-05-02 | Stop reason: HOSPADM

## 2017-04-27 RX ORDER — LISINOPRIL 10 MG/1
5 TABLET ORAL EVERY MORNING
Status: DISCONTINUED | OUTPATIENT
Start: 2017-04-27 | End: 2017-05-02 | Stop reason: HOSPADM

## 2017-04-27 RX ORDER — DEXTROSE MONOHYDRATE 25 G/50ML
25 INJECTION, SOLUTION INTRAVENOUS
Status: DISCONTINUED | OUTPATIENT
Start: 2017-04-27 | End: 2017-05-02 | Stop reason: HOSPADM

## 2017-04-27 RX ORDER — ONDANSETRON 2 MG/ML
4 INJECTION INTRAMUSCULAR; INTRAVENOUS EVERY 4 HOURS PRN
Status: DISCONTINUED | OUTPATIENT
Start: 2017-04-27 | End: 2017-05-02 | Stop reason: HOSPADM

## 2017-04-27 RX ORDER — OXYCODONE HYDROCHLORIDE 10 MG/1
10 TABLET ORAL
Status: DISCONTINUED | OUTPATIENT
Start: 2017-04-27 | End: 2017-05-02 | Stop reason: HOSPADM

## 2017-04-27 RX ORDER — BISACODYL 10 MG
10 SUPPOSITORY, RECTAL RECTAL
Status: DISCONTINUED | OUTPATIENT
Start: 2017-04-27 | End: 2017-05-02 | Stop reason: HOSPADM

## 2017-04-27 RX ORDER — PROMETHAZINE HYDROCHLORIDE 25 MG/1
12.5-25 TABLET ORAL EVERY 4 HOURS PRN
Status: DISCONTINUED | OUTPATIENT
Start: 2017-04-27 | End: 2017-05-02 | Stop reason: HOSPADM

## 2017-04-27 RX ORDER — ACETAMINOPHEN 325 MG/1
650 TABLET ORAL EVERY 6 HOURS PRN
Status: DISCONTINUED | OUTPATIENT
Start: 2017-04-27 | End: 2017-05-02 | Stop reason: HOSPADM

## 2017-04-27 RX ORDER — METOCLOPRAMIDE 10 MG/1
10 TABLET ORAL
Status: DISCONTINUED | OUTPATIENT
Start: 2017-04-27 | End: 2017-05-02 | Stop reason: HOSPADM

## 2017-04-27 RX ORDER — PROMETHAZINE HYDROCHLORIDE 25 MG/1
12.5-25 SUPPOSITORY RECTAL EVERY 4 HOURS PRN
Status: DISCONTINUED | OUTPATIENT
Start: 2017-04-27 | End: 2017-05-02 | Stop reason: HOSPADM

## 2017-04-27 RX ORDER — DIPHENHYDRAMINE HCL 25 MG
25 TABLET ORAL EVERY 6 HOURS PRN
Status: DISCONTINUED | OUTPATIENT
Start: 2017-04-27 | End: 2017-05-02 | Stop reason: HOSPADM

## 2017-04-27 RX ORDER — POLYETHYLENE GLYCOL 3350 17 G/17G
1 POWDER, FOR SOLUTION ORAL
Status: DISCONTINUED | OUTPATIENT
Start: 2017-04-27 | End: 2017-05-02 | Stop reason: HOSPADM

## 2017-04-27 RX ADMIN — SODIUM CHLORIDE: 9 INJECTION, SOLUTION INTRAVENOUS at 18:17

## 2017-04-27 RX ADMIN — OXYCODONE HYDROCHLORIDE 10 MG: 10 TABLET ORAL at 09:13

## 2017-04-27 RX ADMIN — SODIUM CHLORIDE: 9 INJECTION, SOLUTION INTRAVENOUS at 09:13

## 2017-04-27 RX ADMIN — GABAPENTIN 800 MG: 400 CAPSULE ORAL at 20:10

## 2017-04-27 RX ADMIN — OXYCODONE HYDROCHLORIDE 10 MG: 10 TABLET ORAL at 21:25

## 2017-04-27 RX ADMIN — OXYCODONE HYDROCHLORIDE 10 MG: 10 TABLET ORAL at 12:22

## 2017-04-27 RX ADMIN — OXYCODONE HYDROCHLORIDE 10 MG: 10 TABLET ORAL at 18:17

## 2017-04-27 RX ADMIN — HYDROMORPHONE HYDROCHLORIDE 1 MG: 1 INJECTION, SOLUTION INTRAMUSCULAR; INTRAVENOUS; SUBCUTANEOUS at 23:11

## 2017-04-27 RX ADMIN — STANDARDIZED SENNA CONCENTRATE AND DOCUSATE SODIUM 2 TABLET: 8.6; 5 TABLET, FILM COATED ORAL at 20:10

## 2017-04-27 RX ADMIN — MORPHINE SULFATE 4 MG: 4 INJECTION INTRAVENOUS at 11:04

## 2017-04-27 RX ADMIN — MORPHINE SULFATE 4 MG: 4 INJECTION INTRAVENOUS at 01:09

## 2017-04-27 RX ADMIN — METOCLOPRAMIDE 10 MG: 10 TABLET ORAL at 06:14

## 2017-04-27 RX ADMIN — MORPHINE SULFATE 4 MG: 4 INJECTION INTRAVENOUS at 07:46

## 2017-04-27 RX ADMIN — HYDROMORPHONE HYDROCHLORIDE 1 MG: 1 INJECTION, SOLUTION INTRAMUSCULAR; INTRAVENOUS; SUBCUTANEOUS at 20:11

## 2017-04-27 RX ADMIN — DIPHENHYDRAMINE HCL 25 MG: 25 TABLET ORAL at 23:15

## 2017-04-27 RX ADMIN — IOHEXOL 100 ML: 350 INJECTION, SOLUTION INTRAVENOUS at 00:57

## 2017-04-27 RX ADMIN — ACETAMINOPHEN 650 MG: 325 TABLET, FILM COATED ORAL at 23:11

## 2017-04-27 RX ADMIN — METOCLOPRAMIDE 10 MG: 10 TABLET ORAL at 11:04

## 2017-04-27 RX ADMIN — OXYCODONE HYDROCHLORIDE 10 MG: 10 TABLET ORAL at 05:00

## 2017-04-27 RX ADMIN — OXYCODONE HYDROCHLORIDE 10 MG: 10 TABLET ORAL at 15:45

## 2017-04-27 RX ADMIN — METOCLOPRAMIDE 10 MG: 10 TABLET ORAL at 17:04

## 2017-04-27 RX ADMIN — SODIUM CHLORIDE: 9 INJECTION, SOLUTION INTRAVENOUS at 01:46

## 2017-04-27 RX ADMIN — MORPHINE SULFATE 4 MG: 4 INJECTION INTRAVENOUS at 04:02

## 2017-04-27 RX ADMIN — ONDANSETRON 4 MG: 2 INJECTION INTRAMUSCULAR; INTRAVENOUS at 04:59

## 2017-04-27 RX ADMIN — HYDROMORPHONE HYDROCHLORIDE 0.5 MG: 1 INJECTION, SOLUTION INTRAMUSCULAR; INTRAVENOUS; SUBCUTANEOUS at 14:07

## 2017-04-27 RX ADMIN — GABAPENTIN 800 MG: 400 CAPSULE ORAL at 14:07

## 2017-04-27 RX ADMIN — LISINOPRIL 5 MG: 10 TABLET ORAL at 07:45

## 2017-04-27 RX ADMIN — HYDROMORPHONE HYDROCHLORIDE 1 MG: 1 INJECTION, SOLUTION INTRAMUSCULAR; INTRAVENOUS; SUBCUTANEOUS at 17:04

## 2017-04-27 RX ADMIN — GABAPENTIN 800 MG: 400 CAPSULE ORAL at 07:46

## 2017-04-27 RX ADMIN — PRAVASTATIN SODIUM 20 MG: 10 TABLET ORAL at 07:46

## 2017-04-27 ASSESSMENT — PAIN SCALES - GENERAL
PAINLEVEL_OUTOF10: 10
PAINLEVEL_OUTOF10: 10
PAINLEVEL_OUTOF10: 8
PAINLEVEL_OUTOF10: 10
PAINLEVEL_OUTOF10: 10

## 2017-04-27 ASSESSMENT — LIFESTYLE VARIABLES
ALCOHOL_USE: NO
EVER_SMOKED: YES

## 2017-04-27 NOTE — ED PROVIDER NOTES
ED Provider Note    Scribed for Nani Worrell M.D. by Nguyen Penn. 4/26/2017, 10:24 PM.    Primary care provider: Tre Jason M.D.  Means of arrival: Walk-in  History obtained from: Patient  History limited by: None    CHIEF COMPLAINT  Chief Complaint   Patient presents with   • Abdominal Pain     epigastric pain, x2d   • N/V       HPI  Julisa Carrion is a 56 y.o. female with a history of gastroparesis who presents to the Emergency Department with abdominal pain, nausea, and vomiting for two days. Patient tells me she has had chronic abdominal pain for 4 years and her symptoms feel like an exacerbation of this. She describes her pain as constant, dull, and located in her mid abdomen. She has been taking Zofran, Gapapentin, and Percocet at home for her nausea, chronic back pain and neuropathy. She states she has not been able to tolerate her pain medication due to vomiting for the last two days. Her last bowel movement was yesterday and was normal. She is not currently taking any stool softeners with her chronic pain medications. She denies fever, chills, diarrhea, rash, constipation, painful urination, or other symptoms. Patient is scheduled to see her gastroenterologist next week. Patient denies illicit drug ise.     REVIEW OF SYSTEMS  Pertinent positives include abdominal pain, nausea, vomiting. Pertinent negatives include no fever, chills, diarrhea, rash, constipation, painful urination.  All other systems reviewed and negative. C.    PAST MEDICAL HISTORY   has a past medical history of Hypertension; Diabetes; High cholesterol (5/15/2011); Staph skin infection; Migraine; and Intestinal mass (2015).    SURGICAL HISTORY   has past surgical history that includes other abdominal surgery; gyn surgery; other orthopedic surgery (6-2014); and abdominal exploration.    SOCIAL HISTORY  Social History   Substance Use Topics   • Smoking status: Former Smoker -- 1.00 packs/day for 20 years     Types: Cigarettes  "    Quit date: 01/01/1996   • Smokeless tobacco: Former User     Quit date: 06/05/1995   • Alcohol Use: No      History   Drug Use No     Comment: just prescribed meds       FAMILY HISTORY  Family History   Problem Relation Age of Onset   • Cancer Maternal Aunt      Lung cancer d/t smoking       CURRENT MEDICATIONS  Home Medications     Reviewed by Damari Mahoney R.N. (Registered Nurse) on 04/26/17 at 2212  Med List Status: Complete    Medication Last Dose Status    aspirin EC (ECOTRIN) 81 MG Tablet Delayed Response HOLD Active    gabapentin (NEURONTIN) 800 MG tablet 3/20/2017 Active    lisinopril (PRINIVIL) 5 MG TABS 3/20/2017 Active    meloxicam (MOBIC) 7.5 MG Tab  Active    metoclopramide (REGLAN) 10 MG Tab 3/20/2017 Active    naproxen (NAPROSYN) 500 MG Tab  Active    ondansetron (ZOFRAN ODT) 4 MG TABLET DISPERSIBLE  Active    oxycodone-acetaminophen (PERCOCET-10)  MG Tab 3/19/2017 Active    pravastatin (PRAVACHOL) 20 MG TABS 3/19/2017 Active    promethazine (PHENERGAN) 25 MG Suppos  Active                ALLERGIES  No Known Allergies    PHYSICAL EXAM  Vital Signs: /68 mmHg  Pulse 110  Temp(Src) 36.7 °C (98.1 °F)  Resp 14  Ht 1.651 m (5' 5\")  Wt 78 kg (171 lb 15.3 oz)  BMI 28.62 kg/m2  SpO2 99%  LMP 02/05/2009  Constitutional: Alert, mild distress  HENT: Normocephalic, atraumatic, moist mucus membranes  Eyes: Pupils equal and reactive, normal conjunctiva, non-icteric  Neck: Supple, normal range of motion, no stridor  Cardiovascular: Regular rhythm, Normal peripheral perfusion, no cyanosis, Normal cardiac auscultation  Pulmonary: No respiratory distress, normal work of breathing, no accessory muscle usage, Clear to auscultation bilaterally  Abdomen: Mild diffuse discomfort on abdominal palpation. No guarding, no rebound. Soft, no peritoneal signs, bowel sounds are present.   Skin: Warm, dry, no rashes or lesions  Back: No pain with active range of motion  Musculoskeletal: Normal range of " motion in all extremities, no swelling or deformity noted  Neurologic: Alert, oriented, normal motor function, no speech deficits  Psychiatric: Normal and appropriate mood and affect    DIAGNOSTIC STUDIES/PROCEDURES:    LABS  Labs Reviewed   CBC WITH DIFFERENTIAL - Abnormal; Notable for the following:     RBC 3.92 (*)     Hemoglobin 10.2 (*)     Hematocrit 32.1 (*)     MCH 26.0 (*)     MCHC 31.8 (*)     Platelet Count 658 (*)     MPV 7.9 (*)     All other components within normal limits   COMP METABOLIC PANEL - Abnormal; Notable for the following:     Glucose 162 (*)     AST(SGOT) 9 (*)     Globulin 4.2 (*)     All other components within normal limits   URINE DRUG SCREEN - Abnormal; Notable for the following:     Opiates Positive (*)     All other components within normal limits   URINALYSIS,CULTURE IF INDICATED - Abnormal; Notable for the following:     Character Sl Cloudy (*)     Glucose 200 (*)     Ketones Trace (*)     Protein 30 (*)     Leukocyte Esterase Moderate (*)     All other components within normal limits   URINE MICROSCOPIC (W/UA) - Abnormal; Notable for the following:     WBC 10-20 (*)     RBC 2-5 (*)     All other components within normal limits   LIPASE   ESTIMATED GFR   URINE CULTURE(NEW)   [facility] POC GLUCOSE   [facility] POC GLUCOSE   [facility] POC GLUCOSE   [facility] POC GLUCOSE   [facility] POC GLUCOSE   [facility] POC GLUCOSE   [facility] POC GLUCOSE   [facility] POC GLUCOSE     All labs reviewed by me.    Radiology results revealed:   CT-ABDOMEN-PELVIS WITH   Final Result      1.  Findings suggest enteritis involving mid to distal small bowel with partial obstruction.   2.  No evidence for bowel perforation.   3.  Appendix is partially obscured.  Appendicitis is felt unlikely.      DX-ABDOMEN COMPLETE WITH AP OR PA CXR   Final Result      1.  No pneumonia or pneumothorax.   2.  No bowel obstruction or pneumoperitoneum.   3.  Mild compression deformity of L2, of uncertain acuity, but new from prior exam  dated 5/12/2016.           COURSE & MEDICAL DECISION MAKING  Pertinent Labs & Imaging studies reviewed. (See chart for details)    Differential diagnoses include but are not limited to: Gastroparesis, bowel obstruction, pancreatitis, chronic abdominal pain    10:24 PM - Patient seen and examined at bedside. I explained to the patient I will order labs and an xray to evaluate, and her symptoms will be treated. Patient will be treated with Zofran 4 mg (IV), GI cocktail 30 mL PO, Colace 100 mg PO. Ordered DX-chest, POC urine pregnancy, CBC, CMP, lipase, POC urinalysis, eGFR to evaluate her symptoms. Fluid bolus initiated for tachycardia. On reassessment patient is no longer tachycardic.     12:02 AM Recheck: Patient continues to complaining of pain. Ordered CT-abdomen pelvis for further evaluation.     1:11 AM Spoke with Dr. Pak, hospitalist, concerning patient case. Agreed to admit patient for further treatment and evaluation. Patient will be treated with morphine 4 mg IV.    Decision Making:  This is a 56 y.o. year old female who presents with exacerbation of her chronic abdominal pain. She does have a history of gastroparesis, states that due to vomiting she is not able to tolerate her pain medications. On physical exam she has a benign abdominal exam, no peritoneal signs, no distention. She has not reported any fevers, has no hypotension, normal heart rate, no tachycardia, no evidence of severe infection.    On laboratory evaluation she has a normal white blood count, low hemoglobin but not requiring transfusion. She has no electrolyte abnormalities, glucose is mildly elevated at 162, she does have a normal anion gap, doubt vomiting is secondary to hyperglycemia nor DKA.    She was treated with Zofran as well as her home dose of pain medication but continued to have persistent pain. CT of the abdomen and pelvis was obtained which demonstrates dilated loops of small bowel concerning for enteritis with partial  obstruction. In the emergency department she has not had any vomiting, do not believe NG tube is necessary at this time. Plan is for admission for nothing by mouth status, IV fluids until improving and able to tolerate clears, pain control, and further evaluation if necessary.    Case discussed with hospitalist who kindly agrees to admit the patient    Disposition  Patient will be admitted to Dr. Pak in guarded condition.      FINAL IMPRESSION  1. Small bowel obstruction (CMS-HCC)          Nguyen DELAROSA (Scribe), am scribing for, and in the presence of, Nani Worrell M.D..    Electronically signed by: Nguyen Penn (Scribe), 4/26/2017    Nani DELAROSA M.D. personally performed the services described in this documentation, as scribed by Nguyen Penn in my presence, and it is both accurate and complete.    The note accurately reflects work and decisions made by me.  Nani Worrell  4/27/2017  3:54 AM

## 2017-04-27 NOTE — H&P
CHIEF COMPLAINT:  Abdominal pain.    PRIMARY MEDICAL PHYSICIAN:  Tre Jason M.D.    HISTORY OF PRESENT ILLNESS:  This is a 56-year-old female with the multiple   medical issues including hypertension, diabetes mellitus, hyperlipidemia,   gastroparesis, and chronic pain syndrome, who comes in with complaints of   abdominal pain for the last 2 days.  As noted above, the patient carries a   history of chronic abdominal pain that she does feel like this is worsening of   her usual baseline symptoms.  The pain has been in periumbicular constant,   and associated with nausea, but no vomiting.  She has not been able to   tolerate p.o. at home of either solids or fluids and in fact states that she   has been unable to take her home pain medications leading to this   exacerbation.  She is unsure of her last flatus, but she does know that she   had a formed and regular bowel movement yesterday.  Otherwise, she denies any   fevers, chills, chest pain, shortness of breath, recent diarrhea, or dysuria.    Additionally, the patient has no diarrhea.  I will repeat an abdominal x-ray   after she has had a daily bowel rest, to assess for improvement.    REVIEW OF SYSTEMS:  A full review of systems was completed and all pertinent   positives and negatives are included in the HPI above.    PAST MEDICAL HISTORY:  1.  Hypertension.  2.  Hyperlipidemia.  3.  Type 2 diabetes mellitus.  4.  Gastroparesis.  5.  Chronic pain.    PAST SURGICAL HISTORY:  1.  Cholecystectomy.  2.  .  3.  Tubal ligation.  4.  Right wrist open reduction and internal fixation.  5.  Exploratory laparotomy.    SOCIAL HISTORY:  Patient quit smoking in , but prior to that, she does   have a [id number] history.  She denies any alcohol or illicit drugs.    FAMILY HISTORY:  Maternal aunt with lung cancer.  She was a smoker.    ALLERGIES:  No known drug allergies.    HOME MEDICATIONS:  1.  Naproxen over-the-counter as needed.  2.  Mobic 7.5 mg p.o.  daily.  3.  Phenergan 25 mg suppository every 6 hours p.r.n. for nausea and vomiting.  4.  Reglan 10 mg p.o. t.i.d.  5.  Percocet 10/325 p.o. q. 8 hours p.r.n. for severe pain.  6.  Zofran 4 mg p.o. q. 8 hours p.r.n. for nausea and vomiting.  7.  Aspirin 81 mg p.o. daily.  8.  Gabapentin 800 mg p.o. t.i.d.  9.  Pravachol 20 mg p.o. daily.  10.  Lisinopril 5 mg p.o. daily.    PHYSICAL EXAMINATION:  VITAL SIGNS:  Temperature 98.1, heart rate 110, respiration 14, blood pressure   115/68, and patient is saturating at 99% on room air.  GENERAL:  This is a middle-aged white female who is in no acute distress.  HEENT:  Normocephalic and atraumatic.  EOMI, moist mucous membranes.  NECK:  Supple.  There is no JVD and there is no supraclavicular adenopathy.  CARDIOVASCULAR:  Positive S1 and S2, regular rate and rhythm.  No murmurs,   rubs, or gallops appreciated.  PULMONARY:  Clear to auscultation bilaterally.  No wheezes, rubs, or rhonchi   heard.  GASTROINTESTINAL:  Soft, nontender, and nondistended.  Positive bowel sounds.    No hepatosplenomegaly.  EXTREMITIES:  Warm and well-perfused:  No clubbing, cyanosis, or edema.    Capillary refill is less than 3 seconds.  Distal pulses are intact.  NEUROLOGICAL:  A and O x3.  Cranial nerves II-XII grossly intact.    LABORATORY STUDIES:  WBC 6.8, hemoglobin 10.2, hematocrit 32.1, and platelet   count ____.  Sodium 139, potassium 3.4, chloride 107, CO2 24, glucose ____ 32,   BUN 14, and creatinine 0.62.  GFR is greater than 60, lipase is 13.  Urine   tox screen is positive for opiates.    Urinalysis is positive for leukocyte esterase.    RADIOGRAPHIC STUDIES:  CT of the abdomen and pelvis:  Findings suggest   enteritis involving the mid to distal small bowel partial obstruction, no   evidence for bowel perforation.    ASSESSMENT AND PLAN:  This is a 56-year-old female who comes in with   complaints of abdominal pain.  Review of the medical records indicates that   she has frequent  hospital visits for similar complaints and she does carry a   history of chronic pain.  She has utilized the ER multiple times in the past   for narcotic refills.  During assessment in the ER, she is noted to have CT   evidence of enteritis with partial small-bowel obstruction.  1.  Partial small-bowel obstruction:  The patient has nausea, but she is not   actively vomiting.  I am holding off on placing an nasogastric tube for now.    I do suspect that this is related to her gastroparesis.  I will keep her   n.p.o. for bowel rest except for sips with medications.  She will be on IV   fluid resuscitation and symptomatic management for pain and nausea.  She does   have reported enteritis noted on the CT scan; however, she has no other   evidence of acute infection.  She has no leukocytosis and she is afebrile.  I   am holding off on antibiotic therapy for now.  However, if she has any   worsening, then we will consider initiating empiric antibiotics.  2.  Hypertension - continue home Prinivil.  3.  Hyperlipidemia.  Continue home Pravachol and aspirin.  4.  Diabetes mellitus:  The patient is apparently not on any home medications,   we will monitor with Accu-Cheks and cover with insulin sliding scales for   now.  5.  Gastroparesis:  Continue home Reglan.    DISPOSITION:  Surgical floor.    PROPHYLAXIS:  Sequential compression devices for DVT prophylaxis, no PPI   indicated, and stool softeners were ordered.    CODE:  Full.       ____________________________________     SHOBHA MIRANDA MD    DTC / NTS    DD:  04/27/2017 02:34:42  DT:  04/27/2017 03:16:02    D#:  [id number]  Job#:  [id number]

## 2017-04-27 NOTE — ED NOTES
Pt to blue 22.  AB protocol initiated.  Patient c/o ab pain x2 days.  +N/V.  Pt states last emesis was yesterday.  Pain is describes sharp and located midline.

## 2017-04-27 NOTE — DISCHARGE PLANNING
Medical Social Work    Pt is a 55 y/o female who was admitted for a small bowel obstruction. Pt has insurance through Medicaid HMO.     Plan: Monitor pt on the floor. D/C plan in progress.

## 2017-04-27 NOTE — PROGRESS NOTES
Arrived to unit from er, ambulate to bed w sba, immediately asked me to call Dr for more pain med, head to toe skin assessment revealed multiple wounds including: many scattered scabs across BLE, scabs @ randy shoulders, scab @ R elbow, scab/dark spot @ R heel, open pressure ulcer @ R buttock, skin assessment assisted by Myrna Dyer RN, po oxycodone admin & patient cont to ask to call md for more pain med, i instructed patient that we must first assess results of administered meds

## 2017-04-27 NOTE — PROGRESS NOTES
- seen and admitted by my partner, Dr. Pak,  this morning.  - 56/F with HTN, HLD, T2DM, gastroparesis, and chronic pain, admitted for abdominal pain x 2 days more than her baseline chronic pain. CT A/P showed enteritis involving the mid to distal bowel, with partial obstruction, with no evidence of bowel perforation. Placed on bowel rest, and IVF. Holding antibiotics as no leukocytosis and afebrile.   - agree with A&P per Dr. Pak's H&P.

## 2017-04-27 NOTE — DIETARY
NUTRITION SERVICES - Alert received for newly identified wound. Pt is currently NPO - daily nutrient needs to encourage wound healing are not met at this time.  Consult to wound team is currently pending.      RECOMMEND:  Please start PO diet when medically feasible.   1. For stage I,2 wounds:  Daily vitamin therapy for wound healing: Multivitamin with minerals (Theragran-M) and 500 mg Vitamin C.  2. For stage 3,4 wounds: Daily vitamin therapy for wound healing: Multivitamin with minerals  (Theragran-M) daily and 500 mg Vitamin C BID for 14 days.  3. For patients with renal issues, please start renal MVI (Allbee+C).

## 2017-04-27 NOTE — ED NOTES
Pt amb to triage.  Chief Complaint   Patient presents with   • Abdominal Pain     epigastric pain, x2d   • N/V     Pt given urine collection supplies. Instructed on clean catch technique.

## 2017-04-27 NOTE — PROGRESS NOTES
Cont to complain of abd pain of 10 & requesting to call md for more meds, bp 161/84, Hamzah Hill ANP notified of this & of wounds, nno r/t pain, okay with current wound interventions, will place dressing over buttocks wound & request lalm & wound care consult

## 2017-04-28 ENCOUNTER — APPOINTMENT (OUTPATIENT)
Dept: RADIOLOGY | Facility: MEDICAL CENTER | Age: 57
DRG: 389 | End: 2017-04-28
Attending: HOSPITALIST
Payer: MEDICAID

## 2017-04-28 PROBLEM — L89.309 PRESSURE SORE ON BUTTOCKS: Status: ACTIVE | Noted: 2017-04-28

## 2017-04-28 LAB
ANION GAP SERPL CALC-SCNC: 7 MMOL/L (ref 0–11.9)
BACTERIA UR CULT: NORMAL
BUN SERPL-MCNC: 7 MG/DL (ref 8–22)
CALCIUM SERPL-MCNC: 8.6 MG/DL (ref 8.5–10.5)
CHLORIDE SERPL-SCNC: 106 MMOL/L (ref 96–112)
CO2 SERPL-SCNC: 26 MMOL/L (ref 20–33)
CREAT SERPL-MCNC: 0.59 MG/DL (ref 0.5–1.4)
ERYTHROCYTE [DISTWIDTH] IN BLOOD BY AUTOMATED COUNT: 41.7 FL (ref 35.9–50)
GFR SERPL CREATININE-BSD FRML MDRD: >60 ML/MIN/1.73 M 2
GLUCOSE BLD-MCNC: 122 MG/DL (ref 65–99)
GLUCOSE BLD-MCNC: 79 MG/DL (ref 65–99)
GLUCOSE BLD-MCNC: 86 MG/DL (ref 65–99)
GLUCOSE BLD-MCNC: 91 MG/DL (ref 65–99)
GLUCOSE SERPL-MCNC: 92 MG/DL (ref 65–99)
HCT VFR BLD AUTO: 29.5 % (ref 37–47)
HGB BLD-MCNC: 9.3 G/DL (ref 12–16)
MCH RBC QN AUTO: 25.9 PG (ref 27–33)
MCHC RBC AUTO-ENTMCNC: 31.5 G/DL (ref 33.6–35)
MCV RBC AUTO: 82.2 FL (ref 81.4–97.8)
PLATELET # BLD AUTO: 529 K/UL (ref 164–446)
PMV BLD AUTO: 8.4 FL (ref 9–12.9)
POTASSIUM SERPL-SCNC: 3.4 MMOL/L (ref 3.6–5.5)
RBC # BLD AUTO: 3.59 M/UL (ref 4.2–5.4)
SIGNIFICANT IND 70042: NORMAL
SITE SITE: NORMAL
SODIUM SERPL-SCNC: 139 MMOL/L (ref 135–145)
SOURCE SOURCE: NORMAL
WBC # BLD AUTO: 7.4 K/UL (ref 4.8–10.8)

## 2017-04-28 PROCEDURE — A9270 NON-COVERED ITEM OR SERVICE: HCPCS | Performed by: HOSPITALIST

## 2017-04-28 PROCEDURE — 700102 HCHG RX REV CODE 250 W/ 637 OVERRIDE(OP): Performed by: HOSPITALIST

## 2017-04-28 PROCEDURE — 36415 COLL VENOUS BLD VENIPUNCTURE: CPT

## 2017-04-28 PROCEDURE — 80048 BASIC METABOLIC PNL TOTAL CA: CPT

## 2017-04-28 PROCEDURE — 700102 HCHG RX REV CODE 250 W/ 637 OVERRIDE(OP): Performed by: INTERNAL MEDICINE

## 2017-04-28 PROCEDURE — 82962 GLUCOSE BLOOD TEST: CPT | Mod: 91

## 2017-04-28 PROCEDURE — A9270 NON-COVERED ITEM OR SERVICE: HCPCS | Performed by: INTERNAL MEDICINE

## 2017-04-28 PROCEDURE — 700111 HCHG RX REV CODE 636 W/ 250 OVERRIDE (IP): Performed by: INTERNAL MEDICINE

## 2017-04-28 PROCEDURE — 700105 HCHG RX REV CODE 258: Performed by: HOSPITALIST

## 2017-04-28 PROCEDURE — 74000 DX-ABDOMEN-1 VIEW: CPT

## 2017-04-28 PROCEDURE — A9270 NON-COVERED ITEM OR SERVICE: HCPCS | Performed by: NURSE PRACTITIONER

## 2017-04-28 PROCEDURE — 700102 HCHG RX REV CODE 250 W/ 637 OVERRIDE(OP): Performed by: NURSE PRACTITIONER

## 2017-04-28 PROCEDURE — 85027 COMPLETE CBC AUTOMATED: CPT

## 2017-04-28 PROCEDURE — 770001 HCHG ROOM/CARE - MED/SURG/GYN PRIV*

## 2017-04-28 PROCEDURE — 700101 HCHG RX REV CODE 250: Performed by: INTERNAL MEDICINE

## 2017-04-28 PROCEDURE — 99233 SBSQ HOSP IP/OBS HIGH 50: CPT | Performed by: INTERNAL MEDICINE

## 2017-04-28 RX ORDER — HEPARIN SODIUM 5000 [USP'U]/ML
5000 INJECTION, SOLUTION INTRAVENOUS; SUBCUTANEOUS EVERY 8 HOURS
Status: DISCONTINUED | OUTPATIENT
Start: 2017-04-28 | End: 2017-05-02 | Stop reason: HOSPADM

## 2017-04-28 RX ORDER — SODIUM CHLORIDE AND POTASSIUM CHLORIDE 150; 900 MG/100ML; MG/100ML
INJECTION, SOLUTION INTRAVENOUS CONTINUOUS
Status: DISCONTINUED | OUTPATIENT
Start: 2017-04-28 | End: 2017-05-02 | Stop reason: HOSPADM

## 2017-04-28 RX ORDER — MORPHINE SULFATE 30 MG/1
30 TABLET, FILM COATED, EXTENDED RELEASE ORAL EVERY 12 HOURS
Status: DISCONTINUED | OUTPATIENT
Start: 2017-04-28 | End: 2017-05-02 | Stop reason: HOSPADM

## 2017-04-28 RX ADMIN — OXYCODONE HYDROCHLORIDE 10 MG: 10 TABLET ORAL at 17:50

## 2017-04-28 RX ADMIN — HEPARIN SODIUM 5000 UNITS: 5000 INJECTION, SOLUTION INTRAVENOUS; SUBCUTANEOUS at 21:37

## 2017-04-28 RX ADMIN — HYDROMORPHONE HYDROCHLORIDE 1 MG: 1 INJECTION, SOLUTION INTRAMUSCULAR; INTRAVENOUS; SUBCUTANEOUS at 02:12

## 2017-04-28 RX ADMIN — STANDARDIZED SENNA CONCENTRATE AND DOCUSATE SODIUM 2 TABLET: 8.6; 5 TABLET, FILM COATED ORAL at 08:45

## 2017-04-28 RX ADMIN — MORPHINE SULFATE 30 MG: 30 TABLET, EXTENDED RELEASE ORAL at 21:37

## 2017-04-28 RX ADMIN — OXYCODONE HYDROCHLORIDE 10 MG: 10 TABLET ORAL at 11:20

## 2017-04-28 RX ADMIN — GABAPENTIN 800 MG: 400 CAPSULE ORAL at 14:34

## 2017-04-28 RX ADMIN — OXYCODONE HYDROCHLORIDE 10 MG: 10 TABLET ORAL at 14:34

## 2017-04-28 RX ADMIN — OXYCODONE HYDROCHLORIDE 10 MG: 10 TABLET ORAL at 03:25

## 2017-04-28 RX ADMIN — OXYCODONE HYDROCHLORIDE 10 MG: 10 TABLET ORAL at 00:25

## 2017-04-28 RX ADMIN — HYDROMORPHONE HYDROCHLORIDE 1 MG: 1 INJECTION, SOLUTION INTRAMUSCULAR; INTRAVENOUS; SUBCUTANEOUS at 12:38

## 2017-04-28 RX ADMIN — HEPARIN SODIUM 5000 UNITS: 5000 INJECTION, SOLUTION INTRAVENOUS; SUBCUTANEOUS at 14:34

## 2017-04-28 RX ADMIN — DIPHENHYDRAMINE HCL 25 MG: 25 TABLET ORAL at 06:27

## 2017-04-28 RX ADMIN — POTASSIUM CHLORIDE AND SODIUM CHLORIDE: 900; 150 INJECTION, SOLUTION INTRAVENOUS at 08:53

## 2017-04-28 RX ADMIN — HYDROMORPHONE HYDROCHLORIDE 1 MG: 1 INJECTION, SOLUTION INTRAMUSCULAR; INTRAVENOUS; SUBCUTANEOUS at 22:53

## 2017-04-28 RX ADMIN — GABAPENTIN 800 MG: 400 CAPSULE ORAL at 08:44

## 2017-04-28 RX ADMIN — POTASSIUM CHLORIDE AND SODIUM CHLORIDE: 900; 150 INJECTION, SOLUTION INTRAVENOUS at 21:44

## 2017-04-28 RX ADMIN — OXYCODONE HYDROCHLORIDE 10 MG: 10 TABLET ORAL at 06:27

## 2017-04-28 RX ADMIN — LISINOPRIL 5 MG: 10 TABLET ORAL at 08:46

## 2017-04-28 RX ADMIN — STANDARDIZED SENNA CONCENTRATE AND DOCUSATE SODIUM 2 TABLET: 8.6; 5 TABLET, FILM COATED ORAL at 21:37

## 2017-04-28 RX ADMIN — PRAVASTATIN SODIUM 20 MG: 10 TABLET ORAL at 08:46

## 2017-04-28 RX ADMIN — HYDROMORPHONE HYDROCHLORIDE 1 MG: 1 INJECTION, SOLUTION INTRAMUSCULAR; INTRAVENOUS; SUBCUTANEOUS at 08:40

## 2017-04-28 RX ADMIN — HYDROMORPHONE HYDROCHLORIDE 1 MG: 1 INJECTION, SOLUTION INTRAMUSCULAR; INTRAVENOUS; SUBCUTANEOUS at 19:36

## 2017-04-28 RX ADMIN — MORPHINE SULFATE 30 MG: 30 TABLET, EXTENDED RELEASE ORAL at 08:45

## 2017-04-28 RX ADMIN — HYDROMORPHONE HYDROCHLORIDE 1 MG: 1 INJECTION, SOLUTION INTRAMUSCULAR; INTRAVENOUS; SUBCUTANEOUS at 05:30

## 2017-04-28 RX ADMIN — METOCLOPRAMIDE 10 MG: 10 TABLET ORAL at 17:50

## 2017-04-28 RX ADMIN — HYDROMORPHONE HYDROCHLORIDE 1 MG: 1 INJECTION, SOLUTION INTRAMUSCULAR; INTRAVENOUS; SUBCUTANEOUS at 15:59

## 2017-04-28 RX ADMIN — GABAPENTIN 800 MG: 400 CAPSULE ORAL at 21:37

## 2017-04-28 RX ADMIN — HEPARIN SODIUM 5000 UNITS: 5000 INJECTION, SOLUTION INTRAVENOUS; SUBCUTANEOUS at 08:43

## 2017-04-28 RX ADMIN — SODIUM CHLORIDE: 9 INJECTION, SOLUTION INTRAVENOUS at 03:28

## 2017-04-28 RX ADMIN — METOCLOPRAMIDE 10 MG: 10 TABLET ORAL at 11:20

## 2017-04-28 RX ADMIN — METOCLOPRAMIDE 10 MG: 10 TABLET ORAL at 05:33

## 2017-04-28 ASSESSMENT — PAIN SCALES - GENERAL
PAINLEVEL_OUTOF10: 8
PAINLEVEL_OUTOF10: 9
PAINLEVEL_OUTOF10: 9
PAINLEVEL_OUTOF10: 8
PAINLEVEL_OUTOF10: 10
PAINLEVEL_OUTOF10: 8
PAINLEVEL_OUTOF10: 10
PAINLEVEL_OUTOF10: 8

## 2017-04-28 NOTE — DISCHARGE PLANNING
Medical Social Work    SW spoke with RN regarding pt. Anticipated D/C plan is home with no needs when medically cleared. Pt to D/C once she is tolerating food and pain is managed.

## 2017-04-28 NOTE — PROGRESS NOTES
Report received from Azalia MCWILLIAMS 1430. Assumed care at 1430. Pt in bed T309. A/O x4. VSS. Responds appropriately. Denies SOB or chest pain. Pain is 10/10, previous RN is going to medicate her. Assessment complete.  Discussed POC, pt verbalizes understanding.Explained importance of calling before getting OOB. Call light and belongings within reach. Bed alarm is on due to pain medications on board. Bed in the lowest position. Treaded socks in place. Hourly rounding in progress.

## 2017-04-28 NOTE — WOUND TEAM
"[facility]  Inpatient Services  Initial Wound and Skin Care Evaluation    Admission Date:  04/26/17       HPI, PMH, SH: Reviewed  Unit where seen by Wound Team:  Ortho     WOUND CONSULT RELATED TO:  Right IT and BLE      SUBJECTIVE: \"They start really small then get bigger.\"     Self Report / Pain Level:  No complaints of pain      OBJECTIVE:  In bed, wounds open to air, hydrocolloid thin to right IT      WOUND TYPE, LOCATION, CHARACTERISTICS (Pressure ulcers: location, stage, POA or date identified)    Location and type of wound:  BLE, scattered partial thickness wound crusted over          Periwound:  Dry, intact       Drainage:   None    Tissue Type and %:  100% brown crusted over    Wound Edges:  Attached    Odor:    None    Exposed structure(s): None   S&S of Infection:  None       Location and type of wound:  Right IT, unstageable pressure ulcer, POA          Periwound:  Intact       Drainage:   Scant, SS   Tissue Type and %:  70% pink, 30 yellow    Wound Edges:  Attached    Odor:    None    Exposed structure(s): None   S&S of Infection:  None       Measurements:  (Taken 04/2717)   Length:   3 cm    Width:    3 cm    Depth:   SINAI     INTERVENTIONS BY WOUND TEAM:  Assessed BLE and right IT.  Patient expressed wanted to leave wounds dry and intact as opposed to moistening crust to remove.  Presented patient with both options.  Patient states her doctor advised her to leave them dry.  Wrapped BLE with roll gauze for protection and to prevent patient from scratching.  Patient turned self to left side.  Dressing removed from right IT, assessed and replaced hydrocolloid thin.     Interdisciplinary consultation:  Patient      EVALUATION:  Patient with crusted areas scattered all over BLE posterior and anterior.  Patient states she does not know nor does anyone she has seen know what these wounds are from.  She states they start small and gradually grow.  She appears to scratch and pick at them.  "   Right IT has linear yellow slough through midline.  Patient states it is painful and she thinks she got it from sitting in bed.  Hydrocolloid thin for autolytic debridement and protection.       Factors affecting wound healing:  DM, chronic pain    Goals:  Decrease in wound sizes by 1% each week.     NURSING PLAN OF CARE ORDERS (X):    Dressing changes: See Dressing Maintenance orders: X  Skin care: See Skin Care orders: X  Rectal tube care: See Rectal Tube Care orders:   Other orders:    RSKIN: CURRENT (X) ORDERED (O)  Q shift Jd:  X  Q shift pressure point assessments:  X  Pressure redistribution mattress       X  CORINNE      Bariatric CORINNE      Bariatric foam        Heel float boots       Heels floated on pillows      Barrier wipes      Barrier Cream      Barrier paste      Sacral silicone dressing      Silicone O2 tubing      Anchorfast      Trach with Optifoam split foam       Waffle cushion      Rectal tube or BMS      Antifungal tx    Turn q 2 hours   Ensure patient is turning self.   Up to chair     Ambulate   PT/OT     Dietician      PO  X   TF   TPN     PVN    NPO   # days   Other       WOUND TEAM PLAN OF CARE (X):   NPWT change 3 x week:        Dressing changes by wound team:       Follow up as needed:     X  Other (explain):    Anticipated discharge plans (X):  SNF:           Home Care:         X  Outpatient Wound Center:            Self Care:            Other:

## 2017-04-28 NOTE — PROGRESS NOTES
Hospital Medicine Progress Note, Adult, Complex               Author: Theomontez Birch Date & Time created: 4/28/2017  7:41 AM     Interval History:  56/F with HTN, HLD, T2DM, gastroparesis, and chronic pain, admitted for abdominal pain x 2 days more than her baseline chronic pain. CT A/P showed enteritis involving the mid to distal bowel, with partial obstruction, with no evidence of bowel perforation. Placed on bowel rest, and IVF. Holding antibiotics as no leukocytosis and afebrile.      4/28/2017 - uneventful night. VSS. Afebrile. Saturating well on RA. Remains without leukocytosis. Hgb stable. K 3.4. WOund service seen her for unstageable right IT pressure sore.     > Seen and examined. States still with abdominal pain but improved. She takes 30mg MS contin BID at home. Denied any CP, SOB, nausea, vomiting. (+) small flatus but no BM yet.         Review of Systems:  ROS     Pertinent positives/negatives as mentioned above.     A complete review of systems was done. All other systems were negative.       Physical Exam:  Physical Exam   Constitutional: She is oriented to person, place, and time. She appears well-developed and well-nourished. No distress.   HENT:   Head: Normocephalic and atraumatic.   Mouth/Throat: No oropharyngeal exudate.   Eyes: Conjunctivae are normal. Pupils are equal, round, and reactive to light. No scleral icterus.   Neck: Normal range of motion. Neck supple.   Cardiovascular: Normal rate and regular rhythm.  Exam reveals no gallop and no friction rub.    No murmur heard.  Pulmonary/Chest: Effort normal and breath sounds normal. No respiratory distress. She has no wheezes. She has no rales. She exhibits no tenderness.   Abdominal: Soft. Bowel sounds are normal. She exhibits no distension. There is tenderness (diffuse, more on epigastrium). There is no rebound and no guarding.   Musculoskeletal: Normal range of motion. She exhibits no edema or tenderness.   Lymphadenopathy:     She has no  cervical adenopathy.   Neurological: She is alert and oriented to person, place, and time. No cranial nerve deficit.   Skin: Skin is warm and dry. No rash noted. No erythema. No pallor.   (+) unstageable pressure sore on the right intertrochanteric area   Psychiatric: She has a normal mood and affect. Her behavior is normal. Judgment and thought content normal.   Nursing note and vitals reviewed.      Labs:        Invalid input(s): [id number]      Recent Labs      17   020   SODIUM  139  139   POTASSIUM  3.7  3.4*   CHLORIDE  107  106   CO2  24  26   BUN  14  7*   CREATININE  0.62  0.59   CALCIUM  8.8  8.6     Recent Labs      17   ALTSGPT  8   --    ASTSGOT  9*   --    ALKPHOSPHAT  99   --    TBILIRUBIN  0.2   --    LIPASE  13   --    GLUCOSE  162*  92     Recent Labs      17   RBC  3.92*  3.59*   HEMOGLOBIN  10.2*  9.3*   HEMATOCRIT  32.1*  29.5*   PLATELETCT  658*  529*     Recent Labs      17   0206   WBC  6.8  7.4   NEUTSPOLYS  59.80   --    LYMPHOCYTES  32.70   --    MONOCYTES  5.90   --    EOSINOPHILS  0.70   --    BASOPHILS  0.60   --    ASTSGOT  9*   --    ALTSGPT  8   --    ALKPHOSPHAT  99   --    TBILIRUBIN  0.2   --            Hemodynamics:  Temp (24hrs), Av.6 °C (97.8 °F), Min:36.4 °C (97.5 °F), Max:36.8 °C (98.2 °F)  Temperature: 36.6 °C (97.8 °F)  Pulse  Av.6  Min: 73  Max: 110   Blood Pressure: 144/66 mmHg     Respiratory:    Respiration: 18, Pulse Oximetry: 94 %           Fluids:    Intake/Output Summary (Last 24 hours) at 17 0741  Last data filed at 17 0606   Gross per 24 hour   Intake   2385 ml   Output      0 ml   Net   2385 ml        GI/Nutrition:  Orders Placed This Encounter   Procedures   • Diet NPO     Standing Status: Standing      Number of Occurrences: 1      Standing Expiration Date:      Order Specific Question:  Restrict to:     Answer:  Sips with  Medications [3]     Medical Decision Making, by Problem:  Active Hospital Problems    Diagnosis   • Obesity   - Body mass index is 28.62 kg/(m^2).     • HTN (hypertension) [I10]  - well controlled. Continue home lisinopril.      • Enteritis with partial small bowel obstruction (CMS-HCC) [K56.69]  - will do trial of clear liquid diet. If pain accelerates, will back-off to NPO again. Await return of bowel function.   - resume home dose MS contin 30mg BID. Continue PRN IV dilaudid q3H. Continue PRN antiemetics.   - continue IVF NS+20Meq KCl at 100cc/hr.   - hold off on antibiotics for enteritis as no leukocytosis or fevers. If pain persists or worsens, or spikes fevers/leukocytosis, will consider CTX/Flagyl.      • Chronic pain syndrome with narcotic dependence [G89.4]  - resume MS contin.      • Diabetic gastroparesis associated with type 2 diabetes mellitus (CMS-[facility]) [E11.43, K31.84]  - continue SSI. Continue neurontin, antiemetics.      • Type 2 diabetes mellitus with neuropathy, and autonomic neuropathy [E11.65]  - as above. - continue SSI. Continue neurontin, antiemetics.      • Hyperlipidemia [E78.5]  - continue statin.     Right intertrochanteric pressure sore, unstageable, POA  - continue wound care per wound service.     Hypokalemia  - change IVF to NS+20Meq KCl at 100cc/hr. Check Mg level. BMP in AM.     Dispo - monitor on the floors.       Umanzor catheter: No Umanzor      DVT Prophylaxis: Heparin    Ulcer prophylaxis: Not indicated    Assessed for rehab: Patient returned to prior level of function, rehabilitation not indicated at this time

## 2017-04-28 NOTE — PROGRESS NOTES
Report received from Azalia at 1430. Assumed care at 1430. Pt in bed T306. A/O x2 and very confused. Charge RN notified and pt is going to be moved to T316 bed 1. VSS. Responds appropriately. Denies SOB or chest pain. Pain is 5/10. Assessment complete. Pt is in a chair currently with a lap belt and he is able to unhook it. Explained importance of calling before getting OOB. Call light and belongings within reach. Bed and chair alarms are on. Bed in the lowest position. Treaded socks in place. Hourly rounding in progress.

## 2017-04-28 NOTE — PROGRESS NOTES
Pt a & o x 4. Ambulates with 1 assistance. C/o pain 10 /10 meds given. Pt states she has passed gas, advanced to clear liquid. Per dr cope, if pt still has pain, must be NPO  .   Clear liq diet.  IV intact, infusing . Skin assessment stage II sacrum, dressings intact BLE      Call light and belongings within reach.   Assistive ambulation devices out of reach of pt.   All questions answered.  Will continue to assess.

## 2017-04-29 ENCOUNTER — APPOINTMENT (OUTPATIENT)
Dept: RADIOLOGY | Facility: MEDICAL CENTER | Age: 57
DRG: 389 | End: 2017-04-29
Attending: INTERNAL MEDICINE
Payer: MEDICAID

## 2017-04-29 LAB
ANION GAP SERPL CALC-SCNC: 10 MMOL/L (ref 0–11.9)
BASOPHILS # BLD AUTO: 0.8 % (ref 0–1.8)
BASOPHILS # BLD: 0.06 K/UL (ref 0–0.12)
BUN SERPL-MCNC: 7 MG/DL (ref 8–22)
CALCIUM SERPL-MCNC: 9 MG/DL (ref 8.5–10.5)
CHLORIDE SERPL-SCNC: 104 MMOL/L (ref 96–112)
CO2 SERPL-SCNC: 24 MMOL/L (ref 20–33)
CREAT SERPL-MCNC: 0.62 MG/DL (ref 0.5–1.4)
EOSINOPHIL # BLD AUTO: 0.2 K/UL (ref 0–0.51)
EOSINOPHIL NFR BLD: 2.7 % (ref 0–6.9)
ERYTHROCYTE [DISTWIDTH] IN BLOOD BY AUTOMATED COUNT: 40.2 FL (ref 35.9–50)
GFR SERPL CREATININE-BSD FRML MDRD: >60 ML/MIN/1.73 M 2
GLUCOSE BLD-MCNC: 74 MG/DL (ref 65–99)
GLUCOSE BLD-MCNC: 79 MG/DL (ref 65–99)
GLUCOSE BLD-MCNC: 80 MG/DL (ref 65–99)
GLUCOSE SERPL-MCNC: 86 MG/DL (ref 65–99)
HCT VFR BLD AUTO: 32.3 % (ref 37–47)
HGB BLD-MCNC: 10.2 G/DL (ref 12–16)
IMM GRANULOCYTES # BLD AUTO: 0.02 K/UL (ref 0–0.11)
IMM GRANULOCYTES NFR BLD AUTO: 0.3 % (ref 0–0.9)
LYMPHOCYTES # BLD AUTO: 3.84 K/UL (ref 1–4.8)
LYMPHOCYTES NFR BLD: 51.8 % (ref 22–41)
MCH RBC QN AUTO: 25.6 PG (ref 27–33)
MCHC RBC AUTO-ENTMCNC: 31.6 G/DL (ref 33.6–35)
MCV RBC AUTO: 81.2 FL (ref 81.4–97.8)
MONOCYTES # BLD AUTO: 0.43 K/UL (ref 0–0.85)
MONOCYTES NFR BLD AUTO: 5.8 % (ref 0–13.4)
NEUTROPHILS # BLD AUTO: 2.87 K/UL (ref 2–7.15)
NEUTROPHILS NFR BLD: 38.6 % (ref 44–72)
NRBC # BLD AUTO: 0 K/UL
NRBC BLD AUTO-RTO: 0 /100 WBC
PLATELET # BLD AUTO: 572 K/UL (ref 164–446)
PMV BLD AUTO: 8.1 FL (ref 9–12.9)
POTASSIUM SERPL-SCNC: 4.1 MMOL/L (ref 3.6–5.5)
RBC # BLD AUTO: 3.98 M/UL (ref 4.2–5.4)
SODIUM SERPL-SCNC: 138 MMOL/L (ref 135–145)
WBC # BLD AUTO: 7.4 K/UL (ref 4.8–10.8)

## 2017-04-29 PROCEDURE — 700105 HCHG RX REV CODE 258: Performed by: INTERNAL MEDICINE

## 2017-04-29 PROCEDURE — 80048 BASIC METABOLIC PNL TOTAL CA: CPT

## 2017-04-29 PROCEDURE — 700101 HCHG RX REV CODE 250: Performed by: INTERNAL MEDICINE

## 2017-04-29 PROCEDURE — 82962 GLUCOSE BLOOD TEST: CPT | Mod: 91

## 2017-04-29 PROCEDURE — 700102 HCHG RX REV CODE 250 W/ 637 OVERRIDE(OP): Performed by: HOSPITALIST

## 2017-04-29 PROCEDURE — 36415 COLL VENOUS BLD VENIPUNCTURE: CPT

## 2017-04-29 PROCEDURE — 700102 HCHG RX REV CODE 250 W/ 637 OVERRIDE(OP): Performed by: INTERNAL MEDICINE

## 2017-04-29 PROCEDURE — 85025 COMPLETE CBC W/AUTO DIFF WBC: CPT

## 2017-04-29 PROCEDURE — 99233 SBSQ HOSP IP/OBS HIGH 50: CPT | Performed by: INTERNAL MEDICINE

## 2017-04-29 PROCEDURE — 74020 DX-ABDOMEN-2 VIEWS: CPT

## 2017-04-29 PROCEDURE — A9270 NON-COVERED ITEM OR SERVICE: HCPCS | Performed by: INTERNAL MEDICINE

## 2017-04-29 PROCEDURE — 770001 HCHG ROOM/CARE - MED/SURG/GYN PRIV*

## 2017-04-29 PROCEDURE — 700111 HCHG RX REV CODE 636 W/ 250 OVERRIDE (IP): Performed by: INTERNAL MEDICINE

## 2017-04-29 PROCEDURE — A9270 NON-COVERED ITEM OR SERVICE: HCPCS | Performed by: HOSPITALIST

## 2017-04-29 RX ADMIN — HEPARIN SODIUM 5000 UNITS: 5000 INJECTION, SOLUTION INTRAVENOUS; SUBCUTANEOUS at 05:05

## 2017-04-29 RX ADMIN — HEPARIN SODIUM 5000 UNITS: 5000 INJECTION, SOLUTION INTRAVENOUS; SUBCUTANEOUS at 20:23

## 2017-04-29 RX ADMIN — POTASSIUM CHLORIDE AND SODIUM CHLORIDE: 900; 150 INJECTION, SOLUTION INTRAVENOUS at 20:29

## 2017-04-29 RX ADMIN — OXYCODONE HYDROCHLORIDE 10 MG: 10 TABLET ORAL at 17:58

## 2017-04-29 RX ADMIN — METRONIDAZOLE 500 MG: 500 INJECTION, SOLUTION INTRAVENOUS at 14:35

## 2017-04-29 RX ADMIN — HYDROMORPHONE HYDROCHLORIDE 1 MG: 1 INJECTION, SOLUTION INTRAMUSCULAR; INTRAVENOUS; SUBCUTANEOUS at 02:29

## 2017-04-29 RX ADMIN — METRONIDAZOLE 500 MG: 500 INJECTION, SOLUTION INTRAVENOUS at 08:03

## 2017-04-29 RX ADMIN — LISINOPRIL 5 MG: 10 TABLET ORAL at 08:01

## 2017-04-29 RX ADMIN — HYDROMORPHONE HYDROCHLORIDE 1 MG: 1 INJECTION, SOLUTION INTRAMUSCULAR; INTRAVENOUS; SUBCUTANEOUS at 07:48

## 2017-04-29 RX ADMIN — HEPARIN SODIUM 5000 UNITS: 5000 INJECTION, SOLUTION INTRAVENOUS; SUBCUTANEOUS at 14:34

## 2017-04-29 RX ADMIN — GABAPENTIN 800 MG: 400 CAPSULE ORAL at 14:34

## 2017-04-29 RX ADMIN — GABAPENTIN 800 MG: 400 CAPSULE ORAL at 08:01

## 2017-04-29 RX ADMIN — PRAVASTATIN SODIUM 20 MG: 10 TABLET ORAL at 08:01

## 2017-04-29 RX ADMIN — METOCLOPRAMIDE 10 MG: 10 TABLET ORAL at 05:05

## 2017-04-29 RX ADMIN — HYDROMORPHONE HYDROCHLORIDE 1 MG: 1 INJECTION, SOLUTION INTRAMUSCULAR; INTRAVENOUS; SUBCUTANEOUS at 12:31

## 2017-04-29 RX ADMIN — POTASSIUM CHLORIDE AND SODIUM CHLORIDE: 900; 150 INJECTION, SOLUTION INTRAVENOUS at 07:46

## 2017-04-29 RX ADMIN — CEFTRIAXONE SODIUM 1 G: 1 INJECTION, POWDER, FOR SOLUTION INTRAMUSCULAR; INTRAVENOUS at 09:12

## 2017-04-29 RX ADMIN — METRONIDAZOLE 500 MG: 500 INJECTION, SOLUTION INTRAVENOUS at 20:23

## 2017-04-29 RX ADMIN — OXYCODONE HYDROCHLORIDE 10 MG: 10 TABLET ORAL at 05:05

## 2017-04-29 RX ADMIN — OXYCODONE HYDROCHLORIDE 10 MG: 10 TABLET ORAL at 11:16

## 2017-04-29 RX ADMIN — METOCLOPRAMIDE 10 MG: 10 TABLET ORAL at 11:16

## 2017-04-29 RX ADMIN — GABAPENTIN 800 MG: 400 CAPSULE ORAL at 20:23

## 2017-04-29 RX ADMIN — ASPIRIN 81 MG: 81 TABLET ORAL at 08:01

## 2017-04-29 RX ADMIN — OXYCODONE HYDROCHLORIDE 10 MG: 10 TABLET ORAL at 01:15

## 2017-04-29 RX ADMIN — METOCLOPRAMIDE 10 MG: 10 TABLET ORAL at 17:58

## 2017-04-29 RX ADMIN — HYDROMORPHONE HYDROCHLORIDE 1 MG: 1 INJECTION, SOLUTION INTRAMUSCULAR; INTRAVENOUS; SUBCUTANEOUS at 16:17

## 2017-04-29 RX ADMIN — HYDROMORPHONE HYDROCHLORIDE 1 MG: 1 INJECTION, SOLUTION INTRAMUSCULAR; INTRAVENOUS; SUBCUTANEOUS at 20:24

## 2017-04-29 RX ADMIN — MORPHINE SULFATE 30 MG: 30 TABLET, EXTENDED RELEASE ORAL at 08:01

## 2017-04-29 RX ADMIN — MORPHINE SULFATE 30 MG: 30 TABLET, EXTENDED RELEASE ORAL at 20:24

## 2017-04-29 RX ADMIN — OXYCODONE HYDROCHLORIDE 10 MG: 10 TABLET ORAL at 14:38

## 2017-04-29 RX ADMIN — OXYCODONE HYDROCHLORIDE 10 MG: 10 TABLET ORAL at 22:10

## 2017-04-29 ASSESSMENT — PAIN SCALES - GENERAL
PAINLEVEL_OUTOF10: 7
PAINLEVEL_OUTOF10: 3
PAINLEVEL_OUTOF10: 3
PAINLEVEL_OUTOF10: 7
PAINLEVEL_OUTOF10: 8

## 2017-04-29 NOTE — PROGRESS NOTES
Hospital Medicine Progress Note, Adult, Complex               Author: Theo Birch Date & Time created: 4/29/2017  7:22 AM     Interval History:  56/F with HTN, HLD, T2DM, gastroparesis, and chronic pain, admitted for abdominal pain x 2 days more than her baseline chronic pain. CT A/P showed enteritis involving the mid to distal bowel, with partial obstruction, with no evidence of bowel perforation. Placed on bowel rest, and IVF. Holding antibiotics as no leukocytosis and afebrile.  Wound service seen her for unstageable right IT pressure sore.     4/29/2017 - no overnight events. Remains hemodynamically stable and afebrile. Saturating well on RA. No leukocytosis. Hgb stable. BMP unimpressive. BG well controlled. (+) flatus with stool smear last night.      > Seen and examined. Still with significant abdominal pain, worse with PO intake. No nausea/vomiting, CP, SOB.           Review of Systems:  ROS     Pertinent positives/negatives as mentioned above.     A complete review of systems was done. All other systems were negative.       Physical Exam:  Physical Exam   Constitutional: She is oriented to person, place, and time. She appears well-developed and well-nourished. No distress.   HENT:   Head: Normocephalic and atraumatic.   Mouth/Throat: No oropharyngeal exudate.   Eyes: Conjunctivae are normal. Pupils are equal, round, and reactive to light. No scleral icterus.   Neck: Normal range of motion. Neck supple.   Cardiovascular: Normal rate and regular rhythm.  Exam reveals no gallop and no friction rub.    No murmur heard.  Pulmonary/Chest: Effort normal and breath sounds normal. No respiratory distress. She has no wheezes. She has no rales. She exhibits no tenderness.   Abdominal: Soft. Bowel sounds are normal. She exhibits no distension. There is tenderness (diffuse, more on epigastrium). There is no rebound and no guarding.   Musculoskeletal: Normal range of motion. She exhibits no edema or tenderness.    Lymphadenopathy:     She has no cervical adenopathy.   Neurological: She is alert and oriented to person, place, and time. No cranial nerve deficit.   Skin: Skin is warm and dry. No rash noted. No erythema. No pallor.   (+) unstageable pressure sore on the right intertrochanteric area   Psychiatric: She has a normal mood and affect. Her behavior is normal. Judgment and thought content normal.   Nursing note and vitals reviewed.      Labs:        Invalid input(s): [id number]      Recent Labs      17   SODIUM  139  139  138   POTASSIUM  3.7  3.4*  4.1   CHLORIDE  107  106  104   CO2  24  26  24   BUN  14  7*  7*   CREATININE  0.62  0.59  0.62   CALCIUM  8.8  8.6  9.0     Recent Labs      17   ALTSGPT  8   --    --    ASTSGOT  9*   --    --    ALKPHOSPHAT  99   --    --    TBILIRUBIN  0.2   --    --    LIPASE  13   --    --    GLUCOSE  162*  92  86     Recent Labs      17   RBC  3.92*  3.59*  3.98*   HEMOGLOBIN  10.2*  9.3*  10.2*   HEMATOCRIT  32.1*  29.5*  32.3*   PLATELETCT  658*  529*  572*     Recent Labs      17   WBC  6.8  7.4  7.4   NEUTSPOLYS  59.80   --   38.60*   LYMPHOCYTES  32.70   --   51.80*   MONOCYTES  5.90   --   5.80   EOSINOPHILS  0.70   --   2.70   BASOPHILS  0.60   --   0.80   ASTSGOT  9*   --    --    ALTSGPT  8   --    --    ALKPHOSPHAT  99   --    --    TBILIRUBIN  0.2   --    --            Hemodynamics:  Temp (24hrs), Av.7 °C (98.1 °F), Min:36.4 °C (97.5 °F), Max:37 °C (98.6 °F)  Temperature: 36.7 °C (98.1 °F)  Pulse  Av.5  Min: 71  Max: 110   Blood Pressure: 139/73 mmHg     Respiratory:    Respiration: 16, Pulse Oximetry: 95 %           Fluids:  No intake or output data in the 24 hours ending 17 0722     GI/Nutrition:  Orders Placed This Encounter   Procedures   • DIET NPO     Standing  Status: Standing      Number of Occurrences: 1      Standing Expiration Date:      Order Specific Question:  Restrict to:     Answer:  Sips with Medications [3]     Medical Decision Making, by Problem:  Active Hospital Problems    Diagnosis   • Obesity   - Body mass index is 28.62 kg/(m^2).     • HTN (hypertension) [I10]  - well controlled. Continue home lisinopril.      • Enteritis with partial small bowel obstruction (CMS-[facility]) [K56.69]  - still with significant pain. Maintain on NPO. Start ceftriaxone and flagyl IV for enteritis.   - continue pain control with home dose MS contin 30mg BID, and PRN IV dilaudid q3H. Continue PRN antiemetics. Encourgaed to ambulate.   - continue IVF NS+20Meq KCl at 100cc/hr.      • Chronic pain syndrome with narcotic dependence [G89.4]  - continue MS contin.      • Diabetic gastroparesis associated with type 2 diabetes mellitus (CMS-HCC) [E11.43, K31.84]  - continue SSI. Continue neurontin, antiemetics.      • Type 2 diabetes mellitus with neuropathy, and autonomic neuropathy [E11.65]  - as above.   - continue SSI. Continue neurontin, antiemetics.      • Hyperlipidemia [E78.5]  - continue statin.     Right intertrochanteric pressure sore, unstageable, POA  - continue wound care per wound service.     Hypokalemia  - continue IVF to NS+20Meq KCl at 100cc/hr. Mg WNL. Repeat BMP in AM.     Dispo - monitor on the floors.       Umanzor catheter: No Umanzor      DVT Prophylaxis: Heparin    Ulcer prophylaxis: Not indicated    Assessed for rehab: Patient returned to prior level of function, rehabilitation not indicated at this time

## 2017-04-30 ENCOUNTER — APPOINTMENT (OUTPATIENT)
Dept: RADIOLOGY | Facility: MEDICAL CENTER | Age: 57
DRG: 389 | End: 2017-04-30
Attending: INTERNAL MEDICINE
Payer: MEDICAID

## 2017-04-30 LAB
ANION GAP SERPL CALC-SCNC: 9 MMOL/L (ref 0–11.9)
BASOPHILS # BLD AUTO: 0.8 % (ref 0–1.8)
BASOPHILS # BLD: 0.05 K/UL (ref 0–0.12)
BUN SERPL-MCNC: 10 MG/DL (ref 8–22)
CALCIUM SERPL-MCNC: 8.9 MG/DL (ref 8.5–10.5)
CHLORIDE SERPL-SCNC: 102 MMOL/L (ref 96–112)
CO2 SERPL-SCNC: 25 MMOL/L (ref 20–33)
CREAT SERPL-MCNC: 0.66 MG/DL (ref 0.5–1.4)
EOSINOPHIL # BLD AUTO: 0.16 K/UL (ref 0–0.51)
EOSINOPHIL NFR BLD: 2.4 % (ref 0–6.9)
ERYTHROCYTE [DISTWIDTH] IN BLOOD BY AUTOMATED COUNT: 40.8 FL (ref 35.9–50)
GFR SERPL CREATININE-BSD FRML MDRD: >60 ML/MIN/1.73 M 2
GLUCOSE BLD-MCNC: 102 MG/DL (ref 65–99)
GLUCOSE BLD-MCNC: 115 MG/DL (ref 65–99)
GLUCOSE BLD-MCNC: 67 MG/DL (ref 65–99)
GLUCOSE BLD-MCNC: 78 MG/DL (ref 65–99)
GLUCOSE BLD-MCNC: 97 MG/DL (ref 65–99)
GLUCOSE SERPL-MCNC: 150 MG/DL (ref 65–99)
HCT VFR BLD AUTO: 30.9 % (ref 37–47)
HGB BLD-MCNC: 9.6 G/DL (ref 12–16)
IMM GRANULOCYTES # BLD AUTO: 0.02 K/UL (ref 0–0.11)
IMM GRANULOCYTES NFR BLD AUTO: 0.3 % (ref 0–0.9)
LYMPHOCYTES # BLD AUTO: 2.61 K/UL (ref 1–4.8)
LYMPHOCYTES NFR BLD: 39.9 % (ref 22–41)
MCH RBC QN AUTO: 25.5 PG (ref 27–33)
MCHC RBC AUTO-ENTMCNC: 31.1 G/DL (ref 33.6–35)
MCV RBC AUTO: 82 FL (ref 81.4–97.8)
MONOCYTES # BLD AUTO: 0.4 K/UL (ref 0–0.85)
MONOCYTES NFR BLD AUTO: 6.1 % (ref 0–13.4)
NEUTROPHILS # BLD AUTO: 3.3 K/UL (ref 2–7.15)
NEUTROPHILS NFR BLD: 50.5 % (ref 44–72)
NRBC # BLD AUTO: 0 K/UL
NRBC BLD AUTO-RTO: 0 /100 WBC
PLATELET # BLD AUTO: 506 K/UL (ref 164–446)
PMV BLD AUTO: 8.4 FL (ref 9–12.9)
POTASSIUM SERPL-SCNC: 4.1 MMOL/L (ref 3.6–5.5)
RBC # BLD AUTO: 3.77 M/UL (ref 4.2–5.4)
SODIUM SERPL-SCNC: 136 MMOL/L (ref 135–145)
WBC # BLD AUTO: 6.5 K/UL (ref 4.8–10.8)

## 2017-04-30 PROCEDURE — 36415 COLL VENOUS BLD VENIPUNCTURE: CPT

## 2017-04-30 PROCEDURE — 770001 HCHG ROOM/CARE - MED/SURG/GYN PRIV*

## 2017-04-30 PROCEDURE — 85025 COMPLETE CBC W/AUTO DIFF WBC: CPT

## 2017-04-30 PROCEDURE — 700105 HCHG RX REV CODE 258: Performed by: INTERNAL MEDICINE

## 2017-04-30 PROCEDURE — 80048 BASIC METABOLIC PNL TOTAL CA: CPT

## 2017-04-30 PROCEDURE — 82962 GLUCOSE BLOOD TEST: CPT | Mod: 91

## 2017-04-30 PROCEDURE — 700102 HCHG RX REV CODE 250 W/ 637 OVERRIDE(OP): Performed by: INTERNAL MEDICINE

## 2017-04-30 PROCEDURE — A9270 NON-COVERED ITEM OR SERVICE: HCPCS | Performed by: HOSPITALIST

## 2017-04-30 PROCEDURE — A9270 NON-COVERED ITEM OR SERVICE: HCPCS | Performed by: INTERNAL MEDICINE

## 2017-04-30 PROCEDURE — 74250 X-RAY XM SM INT 1CNTRST STD: CPT

## 2017-04-30 PROCEDURE — 700111 HCHG RX REV CODE 636 W/ 250 OVERRIDE (IP): Performed by: INTERNAL MEDICINE

## 2017-04-30 PROCEDURE — 99233 SBSQ HOSP IP/OBS HIGH 50: CPT | Performed by: INTERNAL MEDICINE

## 2017-04-30 PROCEDURE — 700101 HCHG RX REV CODE 250: Performed by: INTERNAL MEDICINE

## 2017-04-30 PROCEDURE — 700102 HCHG RX REV CODE 250 W/ 637 OVERRIDE(OP): Performed by: HOSPITALIST

## 2017-04-30 RX ADMIN — OXYCODONE HYDROCHLORIDE 10 MG: 10 TABLET ORAL at 16:23

## 2017-04-30 RX ADMIN — OXYCODONE HYDROCHLORIDE 10 MG: 10 TABLET ORAL at 13:22

## 2017-04-30 RX ADMIN — METOCLOPRAMIDE 10 MG: 10 TABLET ORAL at 10:25

## 2017-04-30 RX ADMIN — HYDROMORPHONE HYDROCHLORIDE 1 MG: 1 INJECTION, SOLUTION INTRAMUSCULAR; INTRAVENOUS; SUBCUTANEOUS at 00:18

## 2017-04-30 RX ADMIN — Medication 16 G: at 00:23

## 2017-04-30 RX ADMIN — HYDROMORPHONE HYDROCHLORIDE 1 MG: 1 INJECTION, SOLUTION INTRAMUSCULAR; INTRAVENOUS; SUBCUTANEOUS at 14:51

## 2017-04-30 RX ADMIN — MORPHINE SULFATE 30 MG: 30 TABLET, EXTENDED RELEASE ORAL at 20:01

## 2017-04-30 RX ADMIN — HYDROMORPHONE HYDROCHLORIDE 1 MG: 1 INJECTION, SOLUTION INTRAMUSCULAR; INTRAVENOUS; SUBCUTANEOUS at 03:52

## 2017-04-30 RX ADMIN — METRONIDAZOLE 500 MG: 500 INJECTION, SOLUTION INTRAVENOUS at 14:04

## 2017-04-30 RX ADMIN — METOCLOPRAMIDE 10 MG: 10 TABLET ORAL at 16:23

## 2017-04-30 RX ADMIN — CEFTRIAXONE SODIUM 1 G: 1 INJECTION, POWDER, FOR SOLUTION INTRAMUSCULAR; INTRAVENOUS at 08:40

## 2017-04-30 RX ADMIN — HYDROMORPHONE HYDROCHLORIDE 1 MG: 1 INJECTION, SOLUTION INTRAMUSCULAR; INTRAVENOUS; SUBCUTANEOUS at 11:36

## 2017-04-30 RX ADMIN — POTASSIUM CHLORIDE AND SODIUM CHLORIDE: 900; 150 INJECTION, SOLUTION INTRAVENOUS at 08:45

## 2017-04-30 RX ADMIN — HEPARIN SODIUM 5000 UNITS: 5000 INJECTION, SOLUTION INTRAVENOUS; SUBCUTANEOUS at 06:00

## 2017-04-30 RX ADMIN — OXYCODONE HYDROCHLORIDE 10 MG: 10 TABLET ORAL at 10:25

## 2017-04-30 RX ADMIN — MORPHINE SULFATE 30 MG: 30 TABLET, EXTENDED RELEASE ORAL at 08:39

## 2017-04-30 RX ADMIN — PRAVASTATIN SODIUM 20 MG: 10 TABLET ORAL at 08:39

## 2017-04-30 RX ADMIN — HYDROMORPHONE HYDROCHLORIDE 1 MG: 1 INJECTION, SOLUTION INTRAMUSCULAR; INTRAVENOUS; SUBCUTANEOUS at 17:52

## 2017-04-30 RX ADMIN — HEPARIN SODIUM 5000 UNITS: 5000 INJECTION, SOLUTION INTRAVENOUS; SUBCUTANEOUS at 14:03

## 2017-04-30 RX ADMIN — METRONIDAZOLE 500 MG: 500 INJECTION, SOLUTION INTRAVENOUS at 20:03

## 2017-04-30 RX ADMIN — GABAPENTIN 800 MG: 400 CAPSULE ORAL at 08:39

## 2017-04-30 RX ADMIN — METOCLOPRAMIDE 10 MG: 10 TABLET ORAL at 05:48

## 2017-04-30 RX ADMIN — METRONIDAZOLE 500 MG: 500 INJECTION, SOLUTION INTRAVENOUS at 05:43

## 2017-04-30 RX ADMIN — HYDROMORPHONE HYDROCHLORIDE 1 MG: 1 INJECTION, SOLUTION INTRAMUSCULAR; INTRAVENOUS; SUBCUTANEOUS at 08:39

## 2017-04-30 RX ADMIN — LISINOPRIL 5 MG: 10 TABLET ORAL at 08:39

## 2017-04-30 RX ADMIN — HYDROMORPHONE HYDROCHLORIDE 1 MG: 1 INJECTION, SOLUTION INTRAMUSCULAR; INTRAVENOUS; SUBCUTANEOUS at 21:25

## 2017-04-30 RX ADMIN — HEPARIN SODIUM 5000 UNITS: 5000 INJECTION, SOLUTION INTRAVENOUS; SUBCUTANEOUS at 20:01

## 2017-04-30 RX ADMIN — GABAPENTIN 800 MG: 400 CAPSULE ORAL at 14:03

## 2017-04-30 RX ADMIN — OXYCODONE HYDROCHLORIDE 10 MG: 10 TABLET ORAL at 05:48

## 2017-04-30 RX ADMIN — GABAPENTIN 800 MG: 400 CAPSULE ORAL at 20:01

## 2017-04-30 ASSESSMENT — PAIN SCALES - GENERAL
PAINLEVEL_OUTOF10: 7
PAINLEVEL_OUTOF10: 10
PAINLEVEL_OUTOF10: 8
PAINLEVEL_OUTOF10: 7
PAINLEVEL_OUTOF10: 8
PAINLEVEL_OUTOF10: 7
PAINLEVEL_OUTOF10: 9
PAINLEVEL_OUTOF10: 7
PAINLEVEL_OUTOF10: 7
PAINLEVEL_OUTOF10: 8

## 2017-04-30 ASSESSMENT — COPD QUESTIONNAIRES
DURING THE PAST 4 WEEKS HOW MUCH DID YOU FEEL SHORT OF BREATH: NONE/LITTLE OF THE TIME
COPD SCREENING SCORE: 1
DO YOU EVER COUGH UP ANY MUCUS OR PHLEGM?: NO/ONLY WITH OCCASIONAL COLDS OR INFECTIONS
HAVE YOU SMOKED AT LEAST 100 CIGARETTES IN YOUR ENTIRE LIFE: NO/DON'T KNOW

## 2017-04-30 NOTE — PROGRESS NOTES
Educated pt re fall precautions and received verbal understanding.  However, pt refuses bed alarms at this time.  Pt is alert, oriented, steady on feet and calls appropriately.  Bed alarm turned off per patient's request.

## 2017-04-30 NOTE — PROGRESS NOTES
Patient taken down to x-ray for small bowel study in wheelchair by transport.  Medicated patient for pain prior to leaving.

## 2017-04-30 NOTE — PROGRESS NOTES
Hospital Medicine Progress Note, Adult, Complex               Author: Theo AMOS La Date & Time created: 4/30/2017  7:51 AM     Interval History:  56/F with HTN, HLD, T2DM, gastroparesis, and chronic pain with frequent admissions for pain, admitted for abdominal pain x 2 days more than her baseline chronic pain. CT A/P showed enteritis involving the mid to distal bowel, with partial obstruction, with no evidence of bowel perforation. Placed on bowel rest, and IVF. Holding antibiotics as no leukocytosis and afebrile.  Wound service seen her for unstageable right IT pressure sore.     4/30/2017 - uneventful night. VSS. Afebrile. Stable on RA. No leukocytosis. BMP unimpressive.     > Seen and examined. States she still has pain. (+) nausea. Per RN, had BM last night. No CP, SOB.       Review of Systems:  ROS     Pertinent positives/negatives as mentioned above.     A complete review of systems was done. All other systems were negative.       Physical Exam:  Physical Exam   Constitutional: She is oriented to person, place, and time. She appears well-developed and well-nourished. No distress.   HENT:   Head: Normocephalic and atraumatic.   Mouth/Throat: No oropharyngeal exudate.   Eyes: Conjunctivae are normal. Pupils are equal, round, and reactive to light. No scleral icterus.   Neck: Normal range of motion. Neck supple.   Cardiovascular: Normal rate and regular rhythm.  Exam reveals no gallop and no friction rub.    No murmur heard.  Pulmonary/Chest: Effort normal and breath sounds normal. No respiratory distress. She has no wheezes. She has no rales. She exhibits no tenderness.   Abdominal: Soft. Bowel sounds are normal. She exhibits no distension. There is tenderness (diffuse, more on epigastrium). There is no rebound and no guarding.   Musculoskeletal: Normal range of motion. She exhibits no edema or tenderness.   Lymphadenopathy:     She has no cervical adenopathy.   Neurological: She is alert and oriented to  person, place, and time. No cranial nerve deficit.   Skin: Skin is warm and dry. No rash noted. No erythema. No pallor.   (+) unstageable pressure sore on the right intertrochanteric area   Psychiatric: She has a normal mood and affect. Her behavior is normal. Judgment and thought content normal.   Nursing note and vitals reviewed.      Labs:        Invalid input(s): [id number]      Recent Labs      17   SODIUM  139  138  136   POTASSIUM  3.4*  4.1  4.1   CHLORIDE  106  104  102   CO2  26  24  25   BUN  7*  7*  10   CREATININE  0.59  0.62  0.66   CALCIUM  8.6  9.0  8.9     Recent Labs      17   GLUCOSE  92  86  150*     Recent Labs      17   RBC  3.59*  3.98*  3.77*   HEMOGLOBIN  9.3*  10.2*  9.6*   HEMATOCRIT  29.5*  32.3*  30.9*   PLATELETCT  529*  572*  506*     Recent Labs      17   WBC  7.4  7.4  6.5   NEUTSPOLYS   --   38.60*  50.50   LYMPHOCYTES   --   51.80*  39.90   MONOCYTES   --   5.80  6.10   EOSINOPHILS   --   2.70  2.40   BASOPHILS   --   0.80  0.80           Hemodynamics:  Temp (24hrs), Av.8 °C (98.3 °F), Min:36.7 °C (98 °F), Max:36.9 °C (98.5 °F)  Temperature: 36.9 °C (98.5 °F)  Pulse  Av.6  Min: 71  Max: 110   Blood Pressure: 156/72 mmHg     Respiratory:    Respiration: 17, Pulse Oximetry: 91 %           Fluids:  No intake or output data in the 24 hours ending 17 0751     GI/Nutrition:  Orders Placed This Encounter   Procedures   • DIET NPO     Standing Status: Standing      Number of Occurrences: 1      Standing Expiration Date:      Order Specific Question:  Restrict to:     Answer:  Sips with Medications [3]     Medical Decision Making, by Problem:  Active Hospital Problems    Diagnosis   • Obesity   - Body mass index is 28.62 kg/(m^2).     • HTN (hypertension) [I10]  - well controlled.  Continue home lisinopril.      • Enteritis with partial small bowel obstruction (CMS-[facility]) [K56.69]  - still with significant pain, but has h/o of narcotic seeking behavior.   - will get SBFT. Will keep NPO as long as she's requiring IV pain meds and complaining of pain. Continue ceftriaxone and flagyl IV for enteritis.   - continue pain control with home dose MS contin 30mg BID, and PRN IV dilaudid q3H. Continue PRN antiemetics. Encourage to ambulate.   - continue IVF NS+20Meq KCl at 100cc/hr.      • Chronic pain syndrome with narcotic dependence [G89.4]  - continue MS contin.      • Diabetic gastroparesis associated with type 2 diabetes mellitus (CMS-[facility]) [E11.43, K31.84]  - continue SSI. Continue neurontin, antiemetics.      • Type 2 diabetes mellitus with neuropathy, and autonomic neuropathy [E11.65]  - as above.   - continue SSI. Continue neurontin, antiemetics.      • Hyperlipidemia [E78.5]  - continue statin.     Right intertrochanteric pressure sore, unstageable, POA  - continue wound care per wound service.     Hypokalemia  - continue IVF to NS+20Meq KCl at 100cc/hr. Mg WNL. BMP in AM.     Dispo - monitor on the floors.       Umanzor catheter: No Umanzor      DVT Prophylaxis: Heparin    Ulcer prophylaxis: Not indicated    Assessed for rehab: Patient returned to prior level of function, rehabilitation not indicated at this time

## 2017-04-30 NOTE — PROGRESS NOTES
Pt resting in bed and watching TV in NAD-pt requesting pain medication regularly all shift-pt remains NPO x sips with meds-denies N/V.

## 2017-04-30 NOTE — PROGRESS NOTES
Patient aox4. Pain meds needed throughout shift. VSS. Patient became hypoglycemic and hypoglycemic protocol implemented. Patient finger stick records 102 at 0600. Npo x sips with meds. Patient watching TV and resting in bed. Plan of care ongoing.

## 2017-04-30 NOTE — PROGRESS NOTES
Received report from night shift RN. Assumed care of patient. Pt assessed and stable. VSS. Patient reports 5/10 pain at this time.  No medication available for pain control.  Pt refused non-pharmacologic methods for pain management at this time.  Discussed plan of care for day with patient and received verbal understanding. Call light within reach, bed in low position.  Educated pt re fall precautions and received verbal understanding.  However, pt continues to refuse bed alarms.  Pt is alert, oriented, steady on feet and calls appropriately.

## 2017-04-30 NOTE — PROGRESS NOTES
Pt returned from x-ray early due to feeling dizzy and hypoglycemic.  Checked patient's FSBS and it is 97.  Administered pain medication per MAR and MD order.  Patient resting comfortably in bed.

## 2017-05-01 LAB
ANION GAP SERPL CALC-SCNC: 11 MMOL/L (ref 0–11.9)
BASOPHILS # BLD AUTO: 0.8 % (ref 0–1.8)
BASOPHILS # BLD: 0.05 K/UL (ref 0–0.12)
BUN SERPL-MCNC: 7 MG/DL (ref 8–22)
CALCIUM SERPL-MCNC: 9 MG/DL (ref 8.5–10.5)
CHLORIDE SERPL-SCNC: 106 MMOL/L (ref 96–112)
CO2 SERPL-SCNC: 22 MMOL/L (ref 20–33)
CREAT SERPL-MCNC: 0.53 MG/DL (ref 0.5–1.4)
EOSINOPHIL # BLD AUTO: 0.13 K/UL (ref 0–0.51)
EOSINOPHIL NFR BLD: 2.2 % (ref 0–6.9)
ERYTHROCYTE [DISTWIDTH] IN BLOOD BY AUTOMATED COUNT: 40.9 FL (ref 35.9–50)
GFR SERPL CREATININE-BSD FRML MDRD: >60 ML/MIN/1.73 M 2
GLUCOSE BLD-MCNC: 112 MG/DL (ref 65–99)
GLUCOSE BLD-MCNC: 180 MG/DL (ref 65–99)
GLUCOSE BLD-MCNC: 83 MG/DL (ref 65–99)
GLUCOSE BLD-MCNC: 88 MG/DL (ref 65–99)
GLUCOSE SERPL-MCNC: 89 MG/DL (ref 65–99)
HCT VFR BLD AUTO: 31.9 % (ref 37–47)
HGB BLD-MCNC: 10 G/DL (ref 12–16)
IMM GRANULOCYTES # BLD AUTO: 0.01 K/UL (ref 0–0.11)
IMM GRANULOCYTES NFR BLD AUTO: 0.2 % (ref 0–0.9)
LYMPHOCYTES # BLD AUTO: 2.51 K/UL (ref 1–4.8)
LYMPHOCYTES NFR BLD: 42.1 % (ref 22–41)
MCH RBC QN AUTO: 25.5 PG (ref 27–33)
MCHC RBC AUTO-ENTMCNC: 31.3 G/DL (ref 33.6–35)
MCV RBC AUTO: 81.4 FL (ref 81.4–97.8)
MONOCYTES # BLD AUTO: 0.4 K/UL (ref 0–0.85)
MONOCYTES NFR BLD AUTO: 6.7 % (ref 0–13.4)
NEUTROPHILS # BLD AUTO: 2.86 K/UL (ref 2–7.15)
NEUTROPHILS NFR BLD: 48 % (ref 44–72)
NRBC # BLD AUTO: 0 K/UL
NRBC BLD AUTO-RTO: 0 /100 WBC
PLATELET # BLD AUTO: 484 K/UL (ref 164–446)
PMV BLD AUTO: 8.7 FL (ref 9–12.9)
POTASSIUM SERPL-SCNC: 3.6 MMOL/L (ref 3.6–5.5)
RBC # BLD AUTO: 3.92 M/UL (ref 4.2–5.4)
SODIUM SERPL-SCNC: 139 MMOL/L (ref 135–145)
WBC # BLD AUTO: 6 K/UL (ref 4.8–10.8)

## 2017-05-01 PROCEDURE — 770001 HCHG ROOM/CARE - MED/SURG/GYN PRIV*

## 2017-05-01 PROCEDURE — 80048 BASIC METABOLIC PNL TOTAL CA: CPT

## 2017-05-01 PROCEDURE — 700111 HCHG RX REV CODE 636 W/ 250 OVERRIDE (IP): Performed by: INTERNAL MEDICINE

## 2017-05-01 PROCEDURE — 99232 SBSQ HOSP IP/OBS MODERATE 35: CPT | Performed by: INTERNAL MEDICINE

## 2017-05-01 PROCEDURE — A9270 NON-COVERED ITEM OR SERVICE: HCPCS | Performed by: INTERNAL MEDICINE

## 2017-05-01 PROCEDURE — 700102 HCHG RX REV CODE 250 W/ 637 OVERRIDE(OP): Performed by: HOSPITALIST

## 2017-05-01 PROCEDURE — 700102 HCHG RX REV CODE 250 W/ 637 OVERRIDE(OP): Performed by: INTERNAL MEDICINE

## 2017-05-01 PROCEDURE — A9270 NON-COVERED ITEM OR SERVICE: HCPCS | Performed by: HOSPITALIST

## 2017-05-01 PROCEDURE — 36415 COLL VENOUS BLD VENIPUNCTURE: CPT

## 2017-05-01 PROCEDURE — 82962 GLUCOSE BLOOD TEST: CPT | Mod: 91

## 2017-05-01 PROCEDURE — 700101 HCHG RX REV CODE 250: Performed by: INTERNAL MEDICINE

## 2017-05-01 PROCEDURE — 700105 HCHG RX REV CODE 258: Performed by: INTERNAL MEDICINE

## 2017-05-01 PROCEDURE — 85025 COMPLETE CBC W/AUTO DIFF WBC: CPT

## 2017-05-01 RX ORDER — OMEPRAZOLE 20 MG/1
20 CAPSULE, DELAYED RELEASE ORAL DAILY
Status: DISCONTINUED | OUTPATIENT
Start: 2017-05-01 | End: 2017-05-02 | Stop reason: HOSPADM

## 2017-05-01 RX ORDER — OXYCODONE AND ACETAMINOPHEN 10; 325 MG/1; MG/1
1 TABLET ORAL EVERY 8 HOURS PRN
Status: DISCONTINUED | OUTPATIENT
Start: 2017-05-01 | End: 2017-05-02 | Stop reason: HOSPADM

## 2017-05-01 RX ORDER — ALUMINA, MAGNESIA, AND SIMETHICONE 2400; 2400; 240 MG/30ML; MG/30ML; MG/30ML
10 SUSPENSION ORAL 4 TIMES DAILY PRN
Status: DISCONTINUED | OUTPATIENT
Start: 2017-05-01 | End: 2017-05-02 | Stop reason: HOSPADM

## 2017-05-01 RX ADMIN — HYDROMORPHONE HYDROCHLORIDE 1 MG: 1 INJECTION, SOLUTION INTRAMUSCULAR; INTRAVENOUS; SUBCUTANEOUS at 00:07

## 2017-05-01 RX ADMIN — GABAPENTIN 800 MG: 400 CAPSULE ORAL at 07:42

## 2017-05-01 RX ADMIN — METRONIDAZOLE 500 MG: 500 INJECTION, SOLUTION INTRAVENOUS at 20:29

## 2017-05-01 RX ADMIN — METRONIDAZOLE 500 MG: 500 INJECTION, SOLUTION INTRAVENOUS at 14:42

## 2017-05-01 RX ADMIN — METOCLOPRAMIDE 10 MG: 10 TABLET ORAL at 16:39

## 2017-05-01 RX ADMIN — HEPARIN SODIUM 5000 UNITS: 5000 INJECTION, SOLUTION INTRAVENOUS; SUBCUTANEOUS at 06:00

## 2017-05-01 RX ADMIN — HYDROMORPHONE HYDROCHLORIDE 1 MG: 1 INJECTION, SOLUTION INTRAMUSCULAR; INTRAVENOUS; SUBCUTANEOUS at 07:43

## 2017-05-01 RX ADMIN — INSULIN LISPRO 2 UNITS: 100 INJECTION, SOLUTION INTRAVENOUS; SUBCUTANEOUS at 16:40

## 2017-05-01 RX ADMIN — MORPHINE SULFATE 30 MG: 30 TABLET, EXTENDED RELEASE ORAL at 20:29

## 2017-05-01 RX ADMIN — LISINOPRIL 5 MG: 10 TABLET ORAL at 07:42

## 2017-05-01 RX ADMIN — OXYCODONE HYDROCHLORIDE AND ACETAMINOPHEN 1 TABLET: 10; 325 TABLET ORAL at 14:47

## 2017-05-01 RX ADMIN — HEPARIN SODIUM 5000 UNITS: 5000 INJECTION, SOLUTION INTRAVENOUS; SUBCUTANEOUS at 14:42

## 2017-05-01 RX ADMIN — HYDROMORPHONE HYDROCHLORIDE 1 MG: 1 INJECTION, SOLUTION INTRAMUSCULAR; INTRAVENOUS; SUBCUTANEOUS at 03:57

## 2017-05-01 RX ADMIN — HEPARIN SODIUM 5000 UNITS: 5000 INJECTION, SOLUTION INTRAVENOUS; SUBCUTANEOUS at 20:27

## 2017-05-01 RX ADMIN — CEFTRIAXONE SODIUM 1 G: 1 INJECTION, POWDER, FOR SOLUTION INTRAMUSCULAR; INTRAVENOUS at 07:43

## 2017-05-01 RX ADMIN — POTASSIUM CHLORIDE AND SODIUM CHLORIDE: 900; 150 INJECTION, SOLUTION INTRAVENOUS at 18:24

## 2017-05-01 RX ADMIN — OXYCODONE HYDROCHLORIDE 10 MG: 10 TABLET ORAL at 09:46

## 2017-05-01 RX ADMIN — GABAPENTIN 800 MG: 400 CAPSULE ORAL at 14:42

## 2017-05-01 RX ADMIN — OXYCODONE HYDROCHLORIDE AND ACETAMINOPHEN 1 TABLET: 10; 325 TABLET ORAL at 23:53

## 2017-05-01 RX ADMIN — MORPHINE SULFATE 30 MG: 30 TABLET, EXTENDED RELEASE ORAL at 07:42

## 2017-05-01 RX ADMIN — METRONIDAZOLE 500 MG: 500 INJECTION, SOLUTION INTRAVENOUS at 06:00

## 2017-05-01 RX ADMIN — OXYCODONE HYDROCHLORIDE 10 MG: 10 TABLET ORAL at 18:24

## 2017-05-01 RX ADMIN — METOCLOPRAMIDE 10 MG: 10 TABLET ORAL at 07:00

## 2017-05-01 RX ADMIN — INSULIN LISPRO 2 UNITS: 100 INJECTION, SOLUTION INTRAVENOUS; SUBCUTANEOUS at 23:49

## 2017-05-01 RX ADMIN — METOCLOPRAMIDE 10 MG: 10 TABLET ORAL at 11:01

## 2017-05-01 RX ADMIN — OMEPRAZOLE 20 MG: 20 CAPSULE, DELAYED RELEASE ORAL at 09:46

## 2017-05-01 RX ADMIN — OXYCODONE HYDROCHLORIDE 10 MG: 10 TABLET ORAL at 02:39

## 2017-05-01 RX ADMIN — PRAVASTATIN SODIUM 20 MG: 10 TABLET ORAL at 07:42

## 2017-05-01 RX ADMIN — GABAPENTIN 800 MG: 400 CAPSULE ORAL at 20:26

## 2017-05-01 ASSESSMENT — COPD QUESTIONNAIRES
DURING THE PAST 4 WEEKS HOW MUCH DID YOU FEEL SHORT OF BREATH: NONE/LITTLE OF THE TIME
DO YOU EVER COUGH UP ANY MUCUS OR PHLEGM?: NO/ONLY WITH OCCASIONAL COLDS OR INFECTIONS
HAVE YOU SMOKED AT LEAST 100 CIGARETTES IN YOUR ENTIRE LIFE: NO/DON'T KNOW
COPD SCREENING SCORE: 1

## 2017-05-01 ASSESSMENT — PAIN SCALES - GENERAL
PAINLEVEL_OUTOF10: 7
PAINLEVEL_OUTOF10: 6
PAINLEVEL_OUTOF10: 8
PAINLEVEL_OUTOF10: 7
PAINLEVEL_OUTOF10: 9
PAINLEVEL_OUTOF10: 10
PAINLEVEL_OUTOF10: 7

## 2017-05-01 ASSESSMENT — LIFESTYLE VARIABLES: DO YOU DRINK ALCOHOL: NO

## 2017-05-01 NOTE — PROGRESS NOTES
Patient aox4. Call light within reach. VSS. Pain meds given. Refuses bed alarm. Plan of care ongoing.

## 2017-05-01 NOTE — PROGRESS NOTES
Pt tolerating full liquid diet well. Advanced to GI soft for dinner. No complaints of pain or nausea.

## 2017-05-01 NOTE — PROGRESS NOTES
Assumed care of pt at 0700, pt AAOx4, c/o pain in abd 8/10, PRN medication given, tolerating well. Will advance diet as tolerated, pt educated. Bed is in low and locked position, call light within reach and hourly rounding in place.

## 2017-05-01 NOTE — PROGRESS NOTES
Hospital Medicine Progress Note, Adult, Complex               Author: Theo Birch Date & Time created: 5/1/2017  7:47 AM     Interval History:  56/F with HTN, HLD, T2DM, gastroparesis, and chronic pain with frequent admissions for pain, admitted for abdominal pain x 2 days more than her baseline chronic pain. CT A/P showed enteritis involving the mid to distal bowel, with partial obstruction, with no evidence of bowel perforation. Placed on bowel rest, and IVF. Started on antibiotics for enteritis.  Wound service seen her for unstageable right IT pressure sore.     5/1/2017 - no overnight events. Remains hemodynamically stable and afebrile. Saturating well on RA. No leukocytosis. Hgb 10. BMP unimpressive. SBFT showed NO evidence of bowel obstruction. BG low normal.     > Seen and examined. (+) epigastric pain, exacerbated by drinking pineapple juice. Slight nausea, no vomiting. (+) BM, and flatus. No CP, SOB.       Review of Systems:  ROS     Pertinent positives/negatives as mentioned above.     A complete review of systems was done. All other systems were negative.       Physical Exam:  Physical Exam   Constitutional: She is oriented to person, place, and time. She appears well-developed and well-nourished. No distress.   HENT:   Head: Normocephalic and atraumatic.   Mouth/Throat: No oropharyngeal exudate.   Eyes: Conjunctivae are normal. Pupils are equal, round, and reactive to light. No scleral icterus.   Neck: Normal range of motion. Neck supple.   Cardiovascular: Normal rate and regular rhythm.  Exam reveals no gallop and no friction rub.    No murmur heard.  Pulmonary/Chest: Effort normal and breath sounds normal. No respiratory distress. She has no wheezes. She has no rales. She exhibits no tenderness.   Abdominal: Soft. Bowel sounds are normal. She exhibits no distension. There is tenderness (mild epigastrium, very distractible, pain out of proportion to exam). There is no rebound and no guarding.    Musculoskeletal: Normal range of motion. She exhibits no edema or tenderness.   Lymphadenopathy:     She has no cervical adenopathy.   Neurological: She is alert and oriented to person, place, and time. No cranial nerve deficit.   Skin: Skin is warm and dry. No rash noted. No erythema. No pallor.   (+) unstageable pressure sore on the right intertrochanteric area   Psychiatric: She has a normal mood and affect. Her behavior is normal. Judgment and thought content normal.   Nursing note and vitals reviewed.      Labs:        Invalid input(s): [id number]      Recent Labs      17   0350   SODIUM  138  136  139   POTASSIUM  4.1  4.1  3.6   CHLORIDE  104  102  106   CO2  24  25  22   BUN  7*  10  7*   CREATININE  0.62  0.66  0.53   CALCIUM  9.0  8.9  9.0     Recent Labs      17   0350   GLUCOSE  86  150*  89     Recent Labs      17   0350   RBC  3.98*  3.77*  3.92*   HEMOGLOBIN  10.2*  9.6*  10.0*   HEMATOCRIT  32.3*  30.9*  31.9*   PLATELETCT  572*  506*  484*     Recent Labs      17   0350   WBC  7.4  6.5  6.0   NEUTSPOLYS  38.60*  50.50  48.00   LYMPHOCYTES  51.80*  39.90  42.10*   MONOCYTES  5.80  6.10  6.70   EOSINOPHILS  2.70  2.40  2.20   BASOPHILS  0.80  0.80  0.80           Hemodynamics:  Temp (24hrs), Av.7 °C (98.1 °F), Min:36.4 °C (97.5 °F), Max:36.9 °C (98.5 °F)  Temperature: 36.8 °C (98.2 °F)  Pulse  Av.6  Min: 71  Max: 110   Blood Pressure: 158/76 mmHg     Respiratory:    Respiration: 17, Pulse Oximetry: 96 %           Fluids:    Intake/Output Summary (Last 24 hours) at 17 0757  Last data filed at 17 1800   Gross per 24 hour   Intake   1200 ml   Output      0 ml   Net   1200 ml        GI/Nutrition:  Orders Placed This Encounter   Procedures   • DIET NPO     Standing Status: Standing      Number of Occurrences: 1       Standing Expiration Date:      Order Specific Question:  Restrict to:     Answer:  Sips with Medications [3]     Medical Decision Making, by Problem:  Active Hospital Problems    Diagnosis   • Obesity   - Body mass index is 28.62 kg/(m^2).     • HTN (hypertension) [I10]  - well controlled. Continue home lisinopril.      • Enteritis with partial small bowel obstruction (CMS-HCC) [K56.69]  - still with significant pain, but out of proportion to exam. has h/o of narcotic seeking behavior. No SBO on SBFT. Suspect gastritis too as on NSAIDs and pain worse with food.   - D/C IV narcotics. Resume PO PRN percocet. Continue MS contin. Continue PRN antiemetics.  - Hold NSAIDs. Start PO prilosec, and PRN maalox.   - advance diet as tolerated. Avoid acidic juices.   - continue IVF NS+20Meq KCl at 100cc/hr, D/C once with adequate food intake.       • Chronic pain syndrome with narcotic dependence [G89.4]  - continue MS contin, resume home percocet.      • Diabetic gastroparesis associated with type 2 diabetes mellitus (CMS-HCC) [E11.43, K31.84]  - continue SSI. Continue neurontin, antiemetics.      • Type 2 diabetes mellitus with neuropathy, and autonomic neuropathy [E11.65]  - as above.   - continue SSI. Continue neurontin, antiemetics.      • Hyperlipidemia [E78.5]  - continue statin.     Right intertrochanteric pressure sore, unstageable, POA  - continue wound care per wound service.     Hypokalemia  - continue IVF to NS+20Meq KCl at 100cc/hr. BMP in AM.     Dispo - monitor on the floors. [facility] D/C tomorrow.      Umanzor catheter: No Umanzor      DVT Prophylaxis: Heparin    Ulcer prophylaxis: Not indicated    Assessed for rehab: Patient returned to prior level of function, rehabilitation not indicated at this time

## 2017-05-02 VITALS
HEIGHT: 65 IN | BODY MASS INDEX: 28.65 KG/M2 | OXYGEN SATURATION: 96 % | DIASTOLIC BLOOD PRESSURE: 55 MMHG | WEIGHT: 171.96 LBS | TEMPERATURE: 98.2 F | SYSTOLIC BLOOD PRESSURE: 143 MMHG | HEART RATE: 78 BPM | RESPIRATION RATE: 16 BRPM

## 2017-05-02 LAB
ANION GAP SERPL CALC-SCNC: 9 MMOL/L (ref 0–11.9)
BUN SERPL-MCNC: 9 MG/DL (ref 8–22)
CALCIUM SERPL-MCNC: 8.8 MG/DL (ref 8.5–10.5)
CHLORIDE SERPL-SCNC: 108 MMOL/L (ref 96–112)
CO2 SERPL-SCNC: 22 MMOL/L (ref 20–33)
CREAT SERPL-MCNC: 0.74 MG/DL (ref 0.5–1.4)
GFR SERPL CREATININE-BSD FRML MDRD: >60 ML/MIN/1.73 M 2
GLUCOSE BLD-MCNC: 172 MG/DL (ref 65–99)
GLUCOSE BLD-MCNC: 177 MG/DL (ref 65–99)
GLUCOSE BLD-MCNC: 211 MG/DL (ref 65–99)
GLUCOSE SERPL-MCNC: 186 MG/DL (ref 65–99)
POTASSIUM SERPL-SCNC: 4 MMOL/L (ref 3.6–5.5)
SODIUM SERPL-SCNC: 139 MMOL/L (ref 135–145)

## 2017-05-02 PROCEDURE — 700102 HCHG RX REV CODE 250 W/ 637 OVERRIDE(OP): Performed by: HOSPITALIST

## 2017-05-02 PROCEDURE — 82962 GLUCOSE BLOOD TEST: CPT

## 2017-05-02 PROCEDURE — 36415 COLL VENOUS BLD VENIPUNCTURE: CPT

## 2017-05-02 PROCEDURE — 700105 HCHG RX REV CODE 258: Performed by: INTERNAL MEDICINE

## 2017-05-02 PROCEDURE — 700111 HCHG RX REV CODE 636 W/ 250 OVERRIDE (IP): Performed by: INTERNAL MEDICINE

## 2017-05-02 PROCEDURE — 700102 HCHG RX REV CODE 250 W/ 637 OVERRIDE(OP): Performed by: INTERNAL MEDICINE

## 2017-05-02 PROCEDURE — A9270 NON-COVERED ITEM OR SERVICE: HCPCS | Performed by: HOSPITALIST

## 2017-05-02 PROCEDURE — 700101 HCHG RX REV CODE 250: Performed by: INTERNAL MEDICINE

## 2017-05-02 PROCEDURE — A9270 NON-COVERED ITEM OR SERVICE: HCPCS | Performed by: INTERNAL MEDICINE

## 2017-05-02 PROCEDURE — 80048 BASIC METABOLIC PNL TOTAL CA: CPT

## 2017-05-02 PROCEDURE — 99239 HOSP IP/OBS DSCHRG MGMT >30: CPT | Performed by: INTERNAL MEDICINE

## 2017-05-02 RX ORDER — CIPROFLOXACIN 500 MG/1
500 TABLET, FILM COATED ORAL 2 TIMES DAILY
Qty: 6 TAB | Refills: 0 | Status: SHIPPED | OUTPATIENT
Start: 2017-05-02 | End: 2017-05-05

## 2017-05-02 RX ORDER — OMEPRAZOLE 20 MG/1
20 CAPSULE, DELAYED RELEASE ORAL DAILY
Qty: 30 CAP
Start: 2017-05-02 | End: 2017-08-31

## 2017-05-02 RX ORDER — METRONIDAZOLE 500 MG/1
500 TABLET ORAL EVERY 8 HOURS
Qty: 9 TAB | Refills: 0 | Status: SHIPPED | OUTPATIENT
Start: 2017-05-02 | End: 2017-05-05

## 2017-05-02 RX ADMIN — OXYCODONE HYDROCHLORIDE AND ACETAMINOPHEN 1 TABLET: 10; 325 TABLET ORAL at 09:16

## 2017-05-02 RX ADMIN — HEPARIN SODIUM 5000 UNITS: 5000 INJECTION, SOLUTION INTRAVENOUS; SUBCUTANEOUS at 05:35

## 2017-05-02 RX ADMIN — GABAPENTIN 800 MG: 400 CAPSULE ORAL at 09:16

## 2017-05-02 RX ADMIN — ASPIRIN 81 MG: 81 TABLET ORAL at 09:16

## 2017-05-02 RX ADMIN — INSULIN LISPRO 2 UNITS: 100 INJECTION, SOLUTION INTRAVENOUS; SUBCUTANEOUS at 05:43

## 2017-05-02 RX ADMIN — MORPHINE SULFATE 30 MG: 30 TABLET, EXTENDED RELEASE ORAL at 09:16

## 2017-05-02 RX ADMIN — METOCLOPRAMIDE 10 MG: 10 TABLET ORAL at 05:35

## 2017-05-02 RX ADMIN — POTASSIUM CHLORIDE AND SODIUM CHLORIDE: 900; 150 INJECTION, SOLUTION INTRAVENOUS at 05:34

## 2017-05-02 RX ADMIN — METOCLOPRAMIDE 10 MG: 10 TABLET ORAL at 11:07

## 2017-05-02 RX ADMIN — OXYCODONE HYDROCHLORIDE 10 MG: 10 TABLET ORAL at 05:37

## 2017-05-02 RX ADMIN — OMEPRAZOLE 20 MG: 20 CAPSULE, DELAYED RELEASE ORAL at 09:16

## 2017-05-02 RX ADMIN — METRONIDAZOLE 500 MG: 500 INJECTION, SOLUTION INTRAVENOUS at 05:36

## 2017-05-02 RX ADMIN — LISINOPRIL 5 MG: 10 TABLET ORAL at 09:16

## 2017-05-02 RX ADMIN — CEFTRIAXONE SODIUM 1 G: 1 INJECTION, POWDER, FOR SOLUTION INTRAMUSCULAR; INTRAVENOUS at 09:20

## 2017-05-02 RX ADMIN — INSULIN LISPRO 3 UNITS: 100 INJECTION, SOLUTION INTRAVENOUS; SUBCUTANEOUS at 11:10

## 2017-05-02 RX ADMIN — PRAVASTATIN SODIUM 20 MG: 10 TABLET ORAL at 09:16

## 2017-05-02 ASSESSMENT — PAIN SCALES - GENERAL
PAINLEVEL_OUTOF10: 9
PAINLEVEL_OUTOF10: 7
PAINLEVEL_OUTOF10: 9

## 2017-05-02 ASSESSMENT — LIFESTYLE VARIABLES: EVER_SMOKED: YES

## 2017-05-02 NOTE — DISCHARGE PLANNING
Medical Social Work    SW called MT at [phone] and verified that the pt is eligible for the [facility] transportation benefit. [facility] provides pts with transportation to and from medical appointments and hospitals. SW verified that [facility] would transport the pt to a pharmacy.

## 2017-05-02 NOTE — DISCHARGE PLANNING
Medical Social Work     SW provided pt with care chest information to diabetic supplies, [facility] transportation information, [facility] resource information and [facility] resource information. Pt states that she does not have a valid ID and needs someone with an ID to be able to  her prescriptions from the pharmacy. SW explained that  would not be able to find someone that would be able to  the pt's narcotic prescriptions from the pharmacy.

## 2017-05-02 NOTE — DISCHARGE INSTRUCTIONS
Discharge Instructions    Discharged to home by car with escort. Discharged via wheelchair, hospital escort: Yes.  Special equipment needed: Not Applicable    Be sure to schedule a follow-up appointment with your primary care doctor or any specialists as instructed.     Discharge Plan:   Diet Plan: Discussed  Activity Level: Discussed  Confirmed Follow up Appointment: Patient to Call and Schedule Appointment  Confirmed Symptoms Management: Discussed  Medication Reconciliation Updated: Yes  Influenza Vaccine Indication: Not indicated: Previously immunized this influenza season and > 8 years of age    I understand that a diet low in cholesterol, fat, and sodium is recommended for good health. Unless I have been given specific instructions below for another diet, I accept this instruction as my diet prescription.   Other diet: Diabetic Diet    Special Instructions: None    · Is patient discharged on Warfarin / Coumadin?   No     · Is patient Post Blood Transfusion?  No    Depression / Suicide Risk    As you are discharged from this [facility] facility, it is important to learn how to keep safe from harming yourself.    Recognize the warning signs:  · Abrupt changes in personality, positive or negative- including increase in energy   · Giving away possessions  · Change in eating patterns- significant weight changes-  positive or negative  · Change in sleeping patterns- unable to sleep or sleeping all the time   · Unwillingness or inability to communicate  · Depression  · Unusual sadness, discouragement and loneliness  · Talk of wanting to die  · Neglect of personal appearance   · Rebelliousness- reckless behavior  · Withdrawal from people/activities they love  · Confusion- inability to concentrate     If you or a loved one observes any of these behaviors or has concerns about self-harm, here's what you can do:  · Talk about it- your feelings and reasons for harming yourself  · Remove any means that you might use to  hurt yourself (examples: pills, rope, extension cords, firearm)  · Get professional help from the community (Mental Health, Substance Abuse, psychological counseling)  · Do not be alone:Call your Safe Contact- someone whom you trust who will be there for you.  · Call your local CRISIS HOTLINE 601-0640 or 596-944-9878  · Call your local Children's Mobile Crisis Response Team [place] (605) 366-2970 or [employer]  · Call the toll free National Suicide Prevention Hotlines   · National Suicide Prevention Lifeline 686-064-NXLW (8841)  · [place] Hope Line Network 800-SUICIDE (976-7816)    Diabetes Mellitus and Food  It is important for you to manage your blood sugar (glucose) level. Your blood glucose level can be greatly affected by what you eat. Eating healthier foods in the appropriate amounts throughout the day at about the same time each day will help you control your blood glucose level. It can also help slow or prevent worsening of your diabetes mellitus. Healthy eating may even help you improve the level of your blood pressure and reach or maintain a healthy weight.   General recommendations for healthful eating and cooking habits include:  · Eating meals and snacks regularly. Avoid going long periods of time without eating to lose weight.  · Eating a diet that consists mainly of plant-based foods, such as fruits, vegetables, nuts, legumes, and whole grains.  · Using low-heat cooking methods, such as baking, instead of high-heat cooking methods, such as deep frying.  Work with your dietitian to make sure you understand how to use the Nutrition Facts information on food labels.  HOW CAN FOOD AFFECT ME?  Carbohydrates  Carbohydrates affect your blood glucose level more than any other type of food. Your dietitian will help you determine how many carbohydrates to eat at each meal and teach you how to count carbohydrates. Counting carbohydrates is important to keep your blood glucose at a healthy level,  especially if you are using insulin or taking certain medicines for diabetes mellitus.  Alcohol  Alcohol can cause sudden decreases in blood glucose (hypoglycemia), especially if you use insulin or take certain medicines for diabetes mellitus. Hypoglycemia can be a life-threatening condition. Symptoms of hypoglycemia (sleepiness, dizziness, and disorientation) are similar to symptoms of having too much alcohol.   If your health care provider has given you approval to drink alcohol, do so in moderation and use the following guidelines:  · Women should not have more than one drink per day, and men should not have more than two drinks per day. One drink is equal to:  ¨ 12 oz of beer.  ¨ 5 oz of wine.  ¨ 1½ oz of hard liquor.  · Do not drink on an empty stomach.  · Keep yourself hydrated. Have water, diet soda, or unsweetened iced tea.  · Regular soda, juice, and other mixers might contain a lot of carbohydrates and should be counted.  WHAT FOODS ARE NOT RECOMMENDED?  As you make food choices, it is important to remember that all foods are not the same. Some foods have fewer nutrients per serving than other foods, even though they might have the same number of calories or carbohydrates. It is difficult to get your body what it needs when you eat foods with fewer nutrients. Examples of foods that you should avoid that are high in calories and carbohydrates but low in nutrients include:  · Trans fats (most processed foods list trans fats on the Nutrition Facts label).  · Regular soda.  · Juice.  · Candy.  · Sweets, such as cake, pie, doughnuts, and cookies.  · Fried foods.  WHAT FOODS CAN I EAT?  Eat nutrient-rich foods, which will nourish your body and keep you healthy. The food you should eat also will depend on several factors, including:  · The calories you need.  · The medicines you take.  · Your weight.  · Your blood glucose level.  · Your blood pressure level.  · Your cholesterol level.  You should eat a variety of  foods, including:  · Protein.  ¨ Lean cuts of meat.  ¨ Proteins low in saturated fats, such as fish, egg whites, and beans. Avoid processed meats.  · Fruits and vegetables.  ¨ Fruits and vegetables that may help control blood glucose levels, such as apples, mangoes, and yams.  · Dairy products.  ¨ Choose fat-free or low-fat dairy products, such as milk, yogurt, and cheese.  · Grains, bread, pasta, and rice.  ¨ Choose whole grain products, such as multigrain bread, whole oats, and brown rice. These foods may help control blood pressure.  · Fats.  ¨ Foods containing healthful fats, such as nuts, avocado, olive oil, canola oil, and fish.  DOES EVERYONE WITH DIABETES MELLITUS HAVE THE SAME MEAL PLAN?  Because every person with diabetes mellitus is different, there is not one meal plan that works for everyone. It is very important that you meet with a dietitian who will help you create a meal plan that is just right for you.     This information is not intended to replace advice given to you by your health care provider. Make sure you discuss any questions you have with your health care provider.     Document Released: 09/14/2006 Document Revised: 01/08/2016 Document Reviewed: 11/14/2014  [employer] Patient Education ©2016 [employer]

## 2017-05-02 NOTE — PROGRESS NOTES
Pt dc'd via wheelchair with hospital escort, pt left with all belongings, discharge instructions, prescriptions, and social work resources. Discharge reviewed and all questions addressed at this time.

## 2017-05-02 NOTE — PROGRESS NOTES
Assumed care of pt at 0700, pt AAOx4, c/o generalized pain, medicated with PRN medication, pt tolerating well. Dressings to bilateral shins CDI. Social work notified of pts needs and request to speak with a SW today. Bed is in low and locked position, call light within reach and hourly rounding in place.

## 2017-05-03 ENCOUNTER — PATIENT OUTREACH (OUTPATIENT)
Dept: HEALTH INFORMATION MANAGEMENT | Facility: OTHER | Age: 57
End: 2017-05-03

## 2017-05-03 NOTE — DISCHARGE SUMMARY
DISCHARGE DIAGNOSES:  1.  Enteritis with partial small-bowel obstruction, improved.  2.  Chronic pain syndrome with narcotic dependence.  3.  Diabetic gastroparesis from type 2 diabetes mellitus.  4.  Right intertrochanteric pressure sore.  5.  Essential hypertension.  6.  Obesity.    CONSULTANT:  None.    HOSPITAL SUMMARY:  Please refer to H and P for details.  In short, the patient   is a 56-year-old female with history of chronic pain syndrome with frequent   admission to the hospital for same pain and initially came to the ER with   abdominal pain for 2 days.  Abdominal CT scan was done and found to have   enteritis involving mid and distal bowel with partial obstruction with no   evidence of bowel perforation.  The patient was treated conservatively with IV   fluids and proper pain management.  Also, patient was empirically started on   antibiotic as well.  Patient continued to do better in the hospital and she   was able to tolerate a regular diet.  Her abdominal pain has been improving as   well.  Patient will be discharged home today.    DISCHARGE MEDICATIONS:  Ciprofloxacin 500 mg b.i.d. for 3 days, metronidazole   500 mg t.i.d. for 3 days, omeprazole 20 mg daily, aspirin 81 mg daily,   gabapentin 800 mg t.i.d., lisinopril 5 mg daily, meloxicam 7.5 mg daily,   metoclopramide 10 mg t.i.d., Zofran 4 mg q. 8 hours p.r.n., Percocet 10/325 mg   q. 8 hours p.r.n., pravastatin 20 mg daily, and promethazine 25 mg   suppository q. 6 hours p.r.n.    INSTRUCTIONS:  Patient was instructed to return to the ER in the event for   worsening symptoms.  Follow up with PCP in 1 week.    Time spent on discharge 36 minutes.       ____________________________________     MD USHA MUNOZ / RODRIGO    DD:  05/02/2017 11:20:21  DT:  05/02/2017 23:18:41    D#:  [id number]  Job#:  [id number]

## 2017-05-07 ENCOUNTER — APPOINTMENT (OUTPATIENT)
Dept: RADIOLOGY | Facility: MEDICAL CENTER | Age: 57
DRG: 074 | End: 2017-05-07
Attending: EMERGENCY MEDICINE
Payer: MEDICAID

## 2017-05-07 ENCOUNTER — HOSPITAL ENCOUNTER (INPATIENT)
Facility: MEDICAL CENTER | Age: 57
LOS: 4 days | DRG: 074 | End: 2017-05-11
Attending: EMERGENCY MEDICINE | Admitting: HOSPITALIST
Payer: MEDICAID

## 2017-05-07 ENCOUNTER — RESOLUTE PROFESSIONAL BILLING HOSPITAL PROF FEE (OUTPATIENT)
Dept: HOSPITALIST | Facility: MEDICAL CENTER | Age: 57
End: 2017-05-07
Payer: MEDICAID

## 2017-05-07 DIAGNOSIS — K31.84 GASTROPARESIS: ICD-10-CM

## 2017-05-07 DIAGNOSIS — R11.2 INTRACTABLE VOMITING WITH NAUSEA, UNSPECIFIED VOMITING TYPE: ICD-10-CM

## 2017-05-07 DIAGNOSIS — E86.0 DEHYDRATION: ICD-10-CM

## 2017-05-07 PROBLEM — E87.20 LACTIC ACIDOSIS: Status: ACTIVE | Noted: 2017-05-07

## 2017-05-07 PROBLEM — R11.15 EMESIS, PERSISTENT: Status: ACTIVE | Noted: 2017-05-07

## 2017-05-07 LAB
ALBUMIN SERPL BCP-MCNC: 3.9 G/DL (ref 3.2–4.9)
ALBUMIN/GLOB SERPL: 1 G/DL
ALP SERPL-CCNC: 111 U/L (ref 30–99)
ALT SERPL-CCNC: <5 U/L (ref 2–50)
ANION GAP SERPL CALC-SCNC: 7 MMOL/L (ref 0–11.9)
APPEARANCE UR: CLEAR
AST SERPL-CCNC: 12 U/L (ref 12–45)
BASOPHILS # BLD AUTO: 1 % (ref 0–1.8)
BASOPHILS # BLD: 0.07 K/UL (ref 0–0.12)
BILIRUB SERPL-MCNC: 0.2 MG/DL (ref 0.1–1.5)
BILIRUB UR QL STRIP.AUTO: NEGATIVE
BLOOD CULTURE HOLD CXBCH: NORMAL
BUN SERPL-MCNC: 24 MG/DL (ref 8–22)
CALCIUM SERPL-MCNC: 9.9 MG/DL (ref 8.5–10.5)
CHLORIDE SERPL-SCNC: 103 MMOL/L (ref 96–112)
CO2 SERPL-SCNC: 25 MMOL/L (ref 20–33)
COLOR UR: ABNORMAL
CREAT SERPL-MCNC: 1.05 MG/DL (ref 0.5–1.4)
CRP SERPL HS-MCNC: 0.18 MG/DL (ref 0–0.75)
CULTURE IF INDICATED INDCX: NO UA CULTURE
EKG IMPRESSION: NORMAL
EOSINOPHIL # BLD AUTO: 0.11 K/UL (ref 0–0.51)
EOSINOPHIL NFR BLD: 1.6 % (ref 0–6.9)
ERYTHROCYTE [DISTWIDTH] IN BLOOD BY AUTOMATED COUNT: 47.4 FL (ref 35.9–50)
ERYTHROCYTE [SEDIMENTATION RATE] IN BLOOD BY WESTERGREN METHOD: 63 MM/HOUR (ref 0–30)
EST. AVERAGE GLUCOSE BLD GHB EST-MCNC: 183 MG/DL
GFR SERPL CREATININE-BSD FRML MDRD: 54 ML/MIN/1.73 M 2
GLOBULIN SER CALC-MCNC: 4 G/DL (ref 1.9–3.5)
GLUCOSE SERPL-MCNC: 135 MG/DL (ref 65–99)
GLUCOSE UR STRIP.AUTO-MCNC: 70 MG/DL
HBA1C MFR BLD: 8 % (ref 0–5.6)
HCT VFR BLD AUTO: 34 % (ref 37–47)
HGB BLD-MCNC: 10.4 G/DL (ref 12–16)
IMM GRANULOCYTES # BLD AUTO: 0.02 K/UL (ref 0–0.11)
IMM GRANULOCYTES NFR BLD AUTO: 0.3 % (ref 0–0.9)
KETONES UR STRIP.AUTO-MCNC: NEGATIVE MG/DL
LACTATE BLD-SCNC: 1 MMOL/L (ref 0.5–2)
LACTATE BLD-SCNC: 2.5 MMOL/L (ref 0.5–2)
LEUKOCYTE ESTERASE UR QL STRIP.AUTO: NEGATIVE
LIPASE SERPL-CCNC: 7 U/L (ref 11–82)
LYMPHOCYTES # BLD AUTO: 2.18 K/UL (ref 1–4.8)
LYMPHOCYTES NFR BLD: 31.2 % (ref 22–41)
MCH RBC QN AUTO: 25.7 PG (ref 27–33)
MCHC RBC AUTO-ENTMCNC: 30.6 G/DL (ref 33.6–35)
MCV RBC AUTO: 84 FL (ref 81.4–97.8)
MICRO URNS: ABNORMAL
MONOCYTES # BLD AUTO: 0.29 K/UL (ref 0–0.85)
MONOCYTES NFR BLD AUTO: 4.2 % (ref 0–13.4)
NEUTROPHILS # BLD AUTO: 4.31 K/UL (ref 2–7.15)
NEUTROPHILS NFR BLD: 61.7 % (ref 44–72)
NITRITE UR QL STRIP.AUTO: NEGATIVE
NRBC # BLD AUTO: 0 K/UL
NRBC BLD AUTO-RTO: 0 /100 WBC
PH UR STRIP.AUTO: 6.5 [PH]
PLATELET # BLD AUTO: 434 K/UL (ref 164–446)
PMV BLD AUTO: 8.8 FL (ref 9–12.9)
POTASSIUM SERPL-SCNC: 4.4 MMOL/L (ref 3.6–5.5)
PROT SERPL-MCNC: 7.9 G/DL (ref 6–8.2)
PROT UR QL STRIP: NEGATIVE MG/DL
RBC # BLD AUTO: 4.05 M/UL (ref 4.2–5.4)
RBC UR QL AUTO: NEGATIVE
SODIUM SERPL-SCNC: 135 MMOL/L (ref 135–145)
SP GR UR STRIP.AUTO: >1.035
WBC # BLD AUTO: 7 K/UL (ref 4.8–10.8)

## 2017-05-07 PROCEDURE — 93005 ELECTROCARDIOGRAM TRACING: CPT | Performed by: EMERGENCY MEDICINE

## 2017-05-07 PROCEDURE — 700111 HCHG RX REV CODE 636 W/ 250 OVERRIDE (IP): Performed by: HOSPITALIST

## 2017-05-07 PROCEDURE — 304562 HCHG STAT O2 MASK/CANNULA

## 2017-05-07 PROCEDURE — 85652 RBC SED RATE AUTOMATED: CPT

## 2017-05-07 PROCEDURE — 700111 HCHG RX REV CODE 636 W/ 250 OVERRIDE (IP): Performed by: EMERGENCY MEDICINE

## 2017-05-07 PROCEDURE — 86140 C-REACTIVE PROTEIN: CPT

## 2017-05-07 PROCEDURE — 87040 BLOOD CULTURE FOR BACTERIA: CPT

## 2017-05-07 PROCEDURE — 304561 HCHG STAT O2

## 2017-05-07 PROCEDURE — 83690 ASSAY OF LIPASE: CPT

## 2017-05-07 PROCEDURE — 81003 URINALYSIS AUTO W/O SCOPE: CPT

## 2017-05-07 PROCEDURE — 83036 HEMOGLOBIN GLYCOSYLATED A1C: CPT

## 2017-05-07 PROCEDURE — 700102 HCHG RX REV CODE 250 W/ 637 OVERRIDE(OP): Performed by: HOSPITALIST

## 2017-05-07 PROCEDURE — 36415 COLL VENOUS BLD VENIPUNCTURE: CPT

## 2017-05-07 PROCEDURE — 85025 COMPLETE CBC W/AUTO DIFF WBC: CPT

## 2017-05-07 PROCEDURE — 99285 EMERGENCY DEPT VISIT HI MDM: CPT

## 2017-05-07 PROCEDURE — A9270 NON-COVERED ITEM OR SERVICE: HCPCS | Performed by: HOSPITALIST

## 2017-05-07 PROCEDURE — 96375 TX/PRO/DX INJ NEW DRUG ADDON: CPT

## 2017-05-07 PROCEDURE — 700117 HCHG RX CONTRAST REV CODE 255: Performed by: EMERGENCY MEDICINE

## 2017-05-07 PROCEDURE — 99223 1ST HOSP IP/OBS HIGH 75: CPT | Performed by: HOSPITALIST

## 2017-05-07 PROCEDURE — 83605 ASSAY OF LACTIC ACID: CPT

## 2017-05-07 PROCEDURE — 96361 HYDRATE IV INFUSION ADD-ON: CPT

## 2017-05-07 PROCEDURE — 74177 CT ABD & PELVIS W/CONTRAST: CPT

## 2017-05-07 PROCEDURE — 700105 HCHG RX REV CODE 258: Performed by: EMERGENCY MEDICINE

## 2017-05-07 PROCEDURE — 770009 HCHG ROOM/CARE - ONCOLOGY SEMI PRI*

## 2017-05-07 PROCEDURE — 80053 COMPREHEN METABOLIC PANEL: CPT

## 2017-05-07 PROCEDURE — 96374 THER/PROPH/DIAG INJ IV PUSH: CPT

## 2017-05-07 RX ORDER — PROMETHAZINE HYDROCHLORIDE 25 MG/1
12.5-25 SUPPOSITORY RECTAL EVERY 4 HOURS PRN
Status: DISCONTINUED | OUTPATIENT
Start: 2017-05-07 | End: 2017-05-11 | Stop reason: HOSPADM

## 2017-05-07 RX ORDER — LISINOPRIL 10 MG/1
5 TABLET ORAL EVERY MORNING
Status: DISCONTINUED | OUTPATIENT
Start: 2017-05-08 | End: 2017-05-10

## 2017-05-07 RX ORDER — MORPHINE SULFATE 30 MG/1
30 TABLET, FILM COATED, EXTENDED RELEASE ORAL EVERY 12 HOURS
COMMUNITY
End: 2017-08-31

## 2017-05-07 RX ORDER — METRONIDAZOLE 500 MG/1
500 TABLET ORAL 3 TIMES DAILY
COMMUNITY
End: 2017-05-11

## 2017-05-07 RX ORDER — GABAPENTIN 400 MG/1
800 CAPSULE ORAL 3 TIMES DAILY
Status: DISCONTINUED | OUTPATIENT
Start: 2017-05-07 | End: 2017-05-11 | Stop reason: HOSPADM

## 2017-05-07 RX ORDER — OXYCODONE AND ACETAMINOPHEN 10; 325 MG/1; MG/1
1 TABLET ORAL EVERY 4 HOURS PRN
Status: DISCONTINUED | OUTPATIENT
Start: 2017-05-07 | End: 2017-05-11 | Stop reason: HOSPADM

## 2017-05-07 RX ORDER — ONDANSETRON 2 MG/ML
4 INJECTION INTRAMUSCULAR; INTRAVENOUS ONCE
Status: COMPLETED | OUTPATIENT
Start: 2017-05-07 | End: 2017-05-07

## 2017-05-07 RX ORDER — BISACODYL 10 MG
10 SUPPOSITORY, RECTAL RECTAL
Status: DISCONTINUED | OUTPATIENT
Start: 2017-05-07 | End: 2017-05-11 | Stop reason: HOSPADM

## 2017-05-07 RX ORDER — MORPHINE SULFATE 30 MG/1
30 TABLET, FILM COATED, EXTENDED RELEASE ORAL EVERY 12 HOURS
Status: DISCONTINUED | OUTPATIENT
Start: 2017-05-07 | End: 2017-05-11 | Stop reason: HOSPADM

## 2017-05-07 RX ORDER — ONDANSETRON 4 MG/1
4 TABLET, ORALLY DISINTEGRATING ORAL EVERY 4 HOURS PRN
Status: DISCONTINUED | OUTPATIENT
Start: 2017-05-07 | End: 2017-05-11 | Stop reason: HOSPADM

## 2017-05-07 RX ORDER — SODIUM CHLORIDE 9 MG/ML
INJECTION, SOLUTION INTRAVENOUS CONTINUOUS
Status: DISCONTINUED | OUTPATIENT
Start: 2017-05-07 | End: 2017-05-10

## 2017-05-07 RX ORDER — LORAZEPAM 2 MG/ML
1-2 INJECTION INTRAMUSCULAR
Status: COMPLETED | OUTPATIENT
Start: 2017-05-07 | End: 2017-05-10

## 2017-05-07 RX ORDER — CIPROFLOXACIN 500 MG/1
500 TABLET, FILM COATED ORAL 2 TIMES DAILY
COMMUNITY
End: 2017-05-11

## 2017-05-07 RX ORDER — METOCLOPRAMIDE HYDROCHLORIDE 5 MG/ML
10 INJECTION INTRAMUSCULAR; INTRAVENOUS EVERY 6 HOURS
Status: DISCONTINUED | OUTPATIENT
Start: 2017-05-08 | End: 2017-05-11 | Stop reason: HOSPADM

## 2017-05-07 RX ORDER — PRAVASTATIN SODIUM 10 MG
20 TABLET ORAL EVERY MORNING
Status: DISCONTINUED | OUTPATIENT
Start: 2017-05-08 | End: 2017-05-11 | Stop reason: HOSPADM

## 2017-05-07 RX ORDER — ACETAMINOPHEN 325 MG/1
650 TABLET ORAL EVERY 6 HOURS PRN
Status: DISCONTINUED | OUTPATIENT
Start: 2017-05-07 | End: 2017-05-11 | Stop reason: HOSPADM

## 2017-05-07 RX ORDER — ONDANSETRON 2 MG/ML
4 INJECTION INTRAMUSCULAR; INTRAVENOUS EVERY 4 HOURS PRN
Status: DISCONTINUED | OUTPATIENT
Start: 2017-05-07 | End: 2017-05-11 | Stop reason: HOSPADM

## 2017-05-07 RX ORDER — SODIUM CHLORIDE 9 MG/ML
1000 INJECTION, SOLUTION INTRAVENOUS ONCE
Status: COMPLETED | OUTPATIENT
Start: 2017-05-07 | End: 2017-05-07

## 2017-05-07 RX ORDER — POLYETHYLENE GLYCOL 3350 17 G/17G
1 POWDER, FOR SOLUTION ORAL DAILY
Status: DISCONTINUED | OUTPATIENT
Start: 2017-05-08 | End: 2017-05-11 | Stop reason: HOSPADM

## 2017-05-07 RX ORDER — AMOXICILLIN 250 MG
2 CAPSULE ORAL 2 TIMES DAILY
Status: DISCONTINUED | OUTPATIENT
Start: 2017-05-07 | End: 2017-05-11 | Stop reason: HOSPADM

## 2017-05-07 RX ORDER — PROMETHAZINE HYDROCHLORIDE 25 MG/1
12.5-25 TABLET ORAL EVERY 4 HOURS PRN
Status: DISCONTINUED | OUTPATIENT
Start: 2017-05-07 | End: 2017-05-11 | Stop reason: HOSPADM

## 2017-05-07 RX ORDER — DEXTROSE MONOHYDRATE 25 G/50ML
25 INJECTION, SOLUTION INTRAVENOUS
Status: DISCONTINUED | OUTPATIENT
Start: 2017-05-07 | End: 2017-05-11 | Stop reason: HOSPADM

## 2017-05-07 RX ORDER — POLYETHYLENE GLYCOL 3350 17 G/17G
1 POWDER, FOR SOLUTION ORAL
Status: DISCONTINUED | OUTPATIENT
Start: 2017-05-07 | End: 2017-05-11 | Stop reason: HOSPADM

## 2017-05-07 RX ORDER — MORPHINE SULFATE 4 MG/ML
4 INJECTION, SOLUTION INTRAMUSCULAR; INTRAVENOUS ONCE
Status: COMPLETED | OUTPATIENT
Start: 2017-05-07 | End: 2017-05-07

## 2017-05-07 RX ORDER — MORPHINE SULFATE 4 MG/ML
2-4 INJECTION, SOLUTION INTRAMUSCULAR; INTRAVENOUS
Status: DISCONTINUED | OUTPATIENT
Start: 2017-05-07 | End: 2017-05-09

## 2017-05-07 RX ORDER — OMEPRAZOLE 20 MG/1
20 CAPSULE, DELAYED RELEASE ORAL DAILY
Status: DISCONTINUED | OUTPATIENT
Start: 2017-05-08 | End: 2017-05-11 | Stop reason: HOSPADM

## 2017-05-07 RX ADMIN — IOHEXOL 100 ML: 350 INJECTION, SOLUTION INTRAVENOUS at 14:47

## 2017-05-07 RX ADMIN — HYDROMORPHONE HYDROCHLORIDE 0.5 MG: 1 INJECTION, SOLUTION INTRAMUSCULAR; INTRAVENOUS; SUBCUTANEOUS at 14:45

## 2017-05-07 RX ADMIN — MORPHINE SULFATE 4 MG: 4 INJECTION INTRAVENOUS at 13:21

## 2017-05-07 RX ADMIN — ONDANSETRON 4 MG: 2 INJECTION INTRAMUSCULAR; INTRAVENOUS at 13:21

## 2017-05-07 RX ADMIN — MORPHINE SULFATE 30 MG: 30 TABLET, EXTENDED RELEASE ORAL at 22:09

## 2017-05-07 RX ADMIN — SODIUM CHLORIDE 1000 ML: 9 INJECTION, SOLUTION INTRAVENOUS at 13:23

## 2017-05-07 RX ADMIN — METOCLOPRAMIDE 10 MG: 5 INJECTION, SOLUTION INTRAMUSCULAR; INTRAVENOUS at 23:50

## 2017-05-07 RX ADMIN — MORPHINE SULFATE 2 MG: 4 INJECTION INTRAVENOUS at 23:50

## 2017-05-07 RX ADMIN — GABAPENTIN 800 MG: 400 CAPSULE ORAL at 22:07

## 2017-05-07 ASSESSMENT — PAIN SCALES - GENERAL
PAINLEVEL_OUTOF10: 10
PAINLEVEL_OUTOF10: 8
PAINLEVEL_OUTOF10: 10
PAINLEVEL_OUTOF10: 8

## 2017-05-07 ASSESSMENT — PATIENT HEALTH QUESTIONNAIRE - PHQ9
1. LITTLE INTEREST OR PLEASURE IN DOING THINGS: NOT AT ALL
2. FEELING DOWN, DEPRESSED, IRRITABLE, OR HOPELESS: NOT AT ALL
SUM OF ALL RESPONSES TO PHQ9 QUESTIONS 1 AND 2: 0
SUM OF ALL RESPONSES TO PHQ QUESTIONS 1-9: 0

## 2017-05-07 ASSESSMENT — COPD QUESTIONNAIRES
DO YOU EVER COUGH UP ANY MUCUS OR PHLEGM?: NO/ONLY WITH OCCASIONAL COLDS OR INFECTIONS
COPD SCREENING SCORE: 1
HAVE YOU SMOKED AT LEAST 100 CIGARETTES IN YOUR ENTIRE LIFE: NO/DON'T KNOW
DURING THE PAST 4 WEEKS HOW MUCH DID YOU FEEL SHORT OF BREATH: NONE/LITTLE OF THE TIME

## 2017-05-07 ASSESSMENT — LIFESTYLE VARIABLES
EVER_SMOKED: YES
ALCOHOL_USE: NO

## 2017-05-07 NOTE — ED PROVIDER NOTES
ED Provider Note    CHIEF COMPLAINT  Chief Complaint   Patient presents with   • N/V       HPI  Julisa Carrion is a 56 y.o. female who presents to the ER complaining of nausea, vomiting and epigastric abdominal pain.  Patient's been having epigastric abdominal pain for some time.  It is been acutely worse over the last 2 weeks.  She came into the ER and she was evaluated and was diagnosed with a partial small bowel obstruction was admitted.  She was kept in the hospital for several days until she felt better massaging fluids and was discharged.  Since being home, she had worsening pain, decreasing ability to tolerate both food and fluids.  Now she is unable tolerate food or fluids.  If she has persistent vomiting.  The pain is in her epigastrium is moderately severe and radiates to both upper quadrants.  Other than eating.  Nothing makes it better or worse.  She denies any hematemesis.  No bilious emesis.  She is moving her bowels and passing gas.  No urinary complaints.  No chest pain, shortness of breath, fevers or chills.  No other aggravating or alleviating factors or associated complaints    REVIEW OF SYSTEMS  See HPI for further details. All other systems are negative.    PAST MEDICAL HISTORY  Past Medical History   Diagnosis Date   • Hypertension    • Diabetes    • High cholesterol 5/15/2011   • Staph skin infection    • Migraine    • Intestinal mass 2015       FAMILY HISTORY  Family History   Problem Relation Age of Onset   • Cancer Maternal Aunt      Lung cancer d/t smoking       SOCIAL HISTORY  Social History     Social History   • Marital Status: Single     Spouse Name: N/A   • Number of Children: N/A   • Years of Education: N/A     Social History Main Topics   • Smoking status: Former Smoker -- 1.00 packs/day for 20 years     Types: Cigarettes     Quit date: 01/01/1996   • Smokeless tobacco: Former User     Quit date: 06/05/1995   • Alcohol Use: No   • Drug Use: No      Comment: just prescribed meds  "  • Sexual Activity: Not on file     Other Topics Concern   • Not on file     Social History Narrative    No known exposure to asbestos, dyes, chemicals, or pesticides.  Patient does not work.  She has 3 children and many grand-children.  Has a significant other for about 20 years. Moved to [place] in .        SURGICAL HISTORY  Past Surgical History   Procedure Laterality Date   • Other abdominal surgery       cholecystectomy   • Gyn surgery        x 3,tubal ligation   • Other orthopedic surgery       right wrist surgery   • Abdominal exploration       Lower intestine d/t mass - benign       CURRENT MEDICATIONS  Home Medications     **Home medications have not yet been reviewed for this encounter**       medications on the nurse's notes and have been reviewed    ALLERGIES  No Known Allergies    PHYSICAL EXAM  VITAL SIGNS: /72 mmHg  Pulse 101  Temp(Src) 36.4 °C (97.5 °F)  Resp 16  Ht 1.651 m (5' 5\")  Wt 71.668 kg (158 lb)  BMI 26.29 kg/m2  SpO2 91%  LMP 2009 *  Constitutional: Awake, alert, ill-appearing but in no acute distress   HENT: Normocephalic, Atraumatic, Bilateral external ears normal, Oropharynx dry, No oral exudates, Nose normal.   Eyes: PERRL, EOMI, Conjunctiva normal, No discharge.   Neck: Normal range of motion, No tenderness, Supple, No stridor.   Lymphatic: No lymphadenopathy noted.   Cardiovascular: Normal heart rate, Normal rhythm, No murmurs, No rubs, No gallops.   Thorax & Lungs: Normal breath sounds, No respiratory distress, No wheezing   Abdomen: Bowel sounds normal, Soft, tenderness in the epigastrium.  Skin: Warm, Dry, No erythema, No rash.   Back: No tenderness, No CVA tenderness.   Musculoskeletal: Good range of motion in all major joints.   Neurologic: Alert & oriented x 3, No focal deficits noted.   Psychiatric: Affect normal      EKG Interpretation    Interpreted by me    Rhythm: normal sinus   Rate: normal  Axis: normal  Ectopy: none  Conduction: " normal  ST Segments: no acute change  T Waves: no acute change  Q Waves: none    Clinical Impression: no acute changes and normal EKG      RADIOLOGY/PROCEDURES  CT-ABDOMEN-PELVIS WITH   Final Result      1.  Increased colonic stool suggesting constipation.   2.  No bowel obstruction or evidence of perforation.   3.  Normal appendix.   4.  No focal mesenteric inflammatory process.   5.  Subtle fluid collection with peripheral enhancement in the RIGHT lateral gluteal musculature, just lateral to RIGHT hip, concerning for 3 cm inflammatory phlegmon or possibly developing abscess.        *    COURSE & MEDICAL DECISION MAKING  Pertinent Labs & Imaging studies reviewed. (See chart for details)  An IV is established, brought official diagnosis was considered but not limited to small bowel obstruction, bowel perforation, pancreatitis, or constipation.      The patient's chart that showed a history of diabetic gastroparesis.  I suspect this is the etiology.  Given fluids, pain medications and antibiotics and despite that she continues to have vomiting.  Given additional antiemetics and she again continues to vomit.  A CT was obtained because of the most recent CT showed a small bowel obstruction.  I was concerned that she may have a recurrent small bowel obstruction and arrival.  His CT was obtained.  CT has an incidental findings of fluid collection in her right hip is notable tenderness or redness or signs of infection.  She also some mild constipation but nothing in the vault to suggest impaction.    Despite the ER care, pain medicines, fluids, antiemetics.  She is unable tolerate even ice chips.  She is clinically dry appearing.  She'll be admitted for intractable nausea, vomiting.  Care is transferred to hospice.  Dr. Santiago at 1730.    FINAL IMPRESSION  1. Gastroparesis    2. Intractable vomiting with nausea, unspecified vomiting type    3. Dehydration        2.   3.         Electronically signed by: Erik Baker  5/7/2017 1:47 PM

## 2017-05-07 NOTE — ED NOTES
Pt ambulatory to triage bib ems c/o n/v x 2 weeks. Pt states she was admitted for same approx a week ago and then released when able to hold food down however pt states it has now returned.

## 2017-05-07 NOTE — IP AVS SNAPSHOT
" Home Care Instructions                                                                                                                  Name:Julisa Carrion  Medical Record Number:[id number]  CSN: [id number]    YOB: 1960   Age: 56 y.o.  Sex: female  HT:1.651 m (5' 5\") WT: 71.668 kg (158 lb)          Admit Date: 5/7/2017     Discharge Date:   Today's Date: 5/11/2017  Attending Doctor:  Maite Santiago M.D.                  Allergies:  Review of patient's allergies indicates no known allergies.            Discharge Instructions       Discharge Instructions    Discharged to home by car with friend. Discharged via wheelchair, hospital escort: Yes.  Special equipment needed: Not Applicable    Be sure to schedule a follow-up appointment with your primary care doctor or any specialists as instructed.     Discharge Plan:   Diet Plan: Discussed  Activity Level: Discussed  Confirmed Follow up Appointment: Patient to Call and Schedule Appointment  Confirmed Symptoms Management: Discussed  Medication Reconciliation Updated: Yes  Influenza Vaccine Indication: Not indicated: Previously immunized this influenza season and > 8 years of age    I understand that a diet low in cholesterol, fat, and sodium is recommended for good health. Unless I have been given specific instructions below for another diet, I accept this instruction as my diet prescription.   Other diet: Diabetic    Special Instructions: None    · Is patient discharged on Warfarin / Coumadin?   No     · Is patient Post Blood Transfusion?  No    Depression / Suicide Risk    As [facility], it is important to learn how to keep safe from harming yourself.    Recognize the warning signs:  · Abrupt changes in personality, positive or negative- including increase in energy   · Giving away possessions  · Change in eating patterns- significant weight changes-  positive or negative  · Change in sleeping patterns- unable to " sleep or sleeping all the time   · Unwillingness or inability to communicate  · Depression  · Unusual sadness, discouragement and loneliness  · Talk of wanting to die  · Neglect of personal appearance   · Rebelliousness- reckless behavior  · Withdrawal from people/activities they love  · Confusion- inability to concentrate     If you or a loved one observes any of these behaviors or has concerns about self-harm, here's what you can do:  · Talk about it- your feelings and reasons for harming yourself  · Remove any means that you might use to hurt yourself (examples: pills, rope, extension cords, firearm)  · Get professional help from the community (Mental Health, Substance Abuse, psychological counseling)  · Do not be alone:Call your Safe Contact- someone whom you trust who will be there for you.  · Call your local CRISIS HOTLINE 764-2628 or 381-963-3500  · Call your local Children's Mobile Crisis Response Team [place] (501) 720-8834 or www.[employer]  · Call the toll free National Suicide Prevention Hotlines   · National Suicide Prevention Lifeline 089-166-SBOC (8487)  · National Hope Line Network 800-SUICIDE (900-3012)        Follow-up Information     1. Follow up with Tre Jason M.D. In 1 week.    Specialty:  Internal Medicine    Contact information    [address] [address] [phone]           Discharge Medication Instructions:    Below are the medications your physician expects you to take upon discharge:    Review all your home medications and newly ordered medications with your doctor and/or pharmacist. Follow medication instructions as directed by your doctor and/or pharmacist.    Please keep your medication list with you and share with your physician.               Medication List      START taking these medications        Instructions    Morning Afternoon Evening Bedtime    dicyclomine 10 MG Caps   Commonly known as:  BENTYL        Take 1 Cap by mouth 4 times a day as needed  (abdominal pain).   Dose:  10 mg                          CONTINUE taking these medications        Instructions    Morning Afternoon Evening Bedtime    aspirin EC 81 MG Tbec   Last time this was given:  81 mg on 5/11/2017  8:46 AM   Commonly known as:  ECOTRIN        Take 81 mg by mouth every day.   Dose:  81 mg                        ciprofloxacin 500 MG Tabs   Commonly known as:  CIPRO        Take 500 mg by mouth 2 times a day. Completed 3 days on 5/5/2017   Dose:  500 mg                        gabapentin 800 MG tablet   Commonly known as:  NEURONTIN        Take 800 mg by mouth 3 times a day.   Dose:  800 mg                        lisinopril 5 MG Tabs   Last time this was given:  10 mg on 5/11/2017  8:46 AM   Commonly known as:  PRINIVIL        Take 5 mg by mouth every morning.   Dose:  5 mg                        metronidazole 500 MG Tabs   Commonly known as:  FLAGYL        Take 500 mg by mouth 3 times a day. Completed 3 Days on 5/5/2017   Dose:  500 mg                        morphine ER 30 MG Tbcr tablet   Last time this was given:  30 mg on 5/11/2017  8:46 AM   Commonly known as:  MS CONTIN        Take 30 mg by mouth every 12 hours.   Dose:  30 mg                        omeprazole 20 MG delayed-release capsule   Last time this was given:  20 mg on 5/11/2017  8:45 AM   Commonly known as:  PRILOSEC        Take 1 Cap by mouth every day.   Dose:  20 mg                        oxycodone-acetaminophen  MG Tabs   Last time this was given:  1 Tab on 5/11/2017 10:18 AM   Commonly known as:  PERCOCET-10        Take 1 Tab by mouth every 8 hours as needed for Severe Pain.   Dose:  1 Tab                        pravastatin 20 MG Tabs   Last time this was given:  20 mg on 5/11/2017  8:46 AM   Commonly known as:  PRAVACHOL        Take 20 mg by mouth every morning.   Dose:  20 mg                        promethazine 25 MG Supp   Commonly known as:  PHENERGAN        Insert 1 Suppository in rectum every 6 hours as needed for  Nausea/Vomiting.   Dose:  25 mg                        TRESIBA FLEXTOUCH 100 UNIT/ML Sopn   Generic drug:  Insulin Degludec        Inject 40 Units as instructed every day.   Dose:  40 Units                        VICTOZA 18 MG/3ML Sopn injection   Generic drug:  liraglutide        Inject 1.8 mg as instructed every day.   Dose:  1.8 mg                             Where to Get Your Medications      Information about where to get these medications is not yet available     ! Ask your nurse or doctor about these medications    - dicyclomine 10 MG Caps            Instructions           Diet / Nutrition:    Follow any diet instructions given to you by your doctor or the dietician, including how much salt (sodium) you are allowed each day.    If you are overweight, talk to your doctor about a weight reduction plan.    Activity:    Remain physically active following your doctor's instructions about exercise and activity.    Rest often.     Any time you become even a little tired or short of breath, SIT DOWN and rest.    Worsening Symptoms:    Report any of the following signs and symptoms to the doctor's office immediately:    *Pain of jaw, arm, or neck  *Chest pain not relieved by medication                               *Dizziness or loss of consciousness  *Difficulty breathing even when at rest   *More tired than usual                                       *Bleeding drainage or swelling of surgical site  *Swelling of feet, ankles, legs or stomach                 *Fever (>100ºF)  *Pink or blood tinged sputum  *Weight gain (3lbs/day or 5lbs /week)           *Shock from internal defibrillator (if applicable)  *Palpitations or irregular heartbeats                *Cool and/or numb extremities    Stroke Awareness    Common Risk Factors for Stroke include:    Age  Atrial Fibrillation  Carotid Artery Stenosis  Diabetes Mellitus  Excessive alcohol consumption  High blood pressure  Overweight   Physical  inactivity  Smoking    Warning signs and symptoms of a stroke include:    *Sudden numbness or weakness of the face, arm or leg (especially on one side of the body).  *Sudden confusion, trouble speaking or understanding.  *Sudden trouble seeing in one or both eyes.  *Sudden trouble walking, dizziness, loss of balance or coordination.Sudden severe headache with no known cause.    It is very important to get treatment quickly when a stroke occurs. If you experience any of the above warning signs, call 911 immediately.                   Disclaimer         Quit Smoking / Tobacco Use:    I understand the use of any tobacco products increases my chance of suffering from future heart disease or stroke and could cause other illnesses which may shorten my life. Quitting the use of tobacco products is the single most important thing I can do to improve my health. For further information on smoking / tobacco cessation call a Toll Free Quit Line at 1-675.895.5079 (*National Cancer [place]) or 1-638.898.9957 (American Lung Association) or you can access the web based program at www.[employer]Ne[employer].org.    [place] Tobacco Users Help Line:  (580) 229-6834       Toll Free: 1-503.152.2542  Quit Tobacco Program [facility] Management Services ([phone]    Crisis Hotline:    [place] Crisis Hotline:  6-506-HPGPOXP or 1-369.907.9066    [place] Crisis Hotline:    1-263.960.6638 or 996-650-1232    Discharge Survey:   Thank you for choosing [facility]. We hope we did everything we could to make your hospital stay a pleasant one. You may be receiving a phone survey and we would appreciate your time and participation in answering the questions. Your input is very valuable to us in our efforts to improve our service to our patients and their families.        My signature on this form indicates that:    1. I have reviewed and understand the above information.  2. My questions regarding this information have been answered to my  satisfaction.  3. I have formulated a plan with my discharge nurse to obtain my prescribed medications for home.                  Disclaimer         __________________________________                     __________       ________                       Patient Signature                                                 Date                    Time

## 2017-05-07 NOTE — IP AVS SNAPSHOT
Joule Unlimited Access Code: [email or web address]  Expires: 6/1/2017 11:42 AM    Your email address is not on file at [facility].  Email Addresses are required for you to sign up for [employer], please contact [phone] to verify your personal information and to provide your email address prior to attempting to register for J[employer] Unlimited.    Julisa Amadotz  [address] [place] [address]    Joule Unlimited  A secure, online tool to manage your health information     [facility]’s Joule Unlimited® is a secure, online tool that connects you to your personalized health information from the privacy of your home -- day or night - making it very easy for you to manage your healthcare. Once the activation process is completed, you can even access your medical information using the Joule Unlimited rito, which is available for free in the Apple Rito store or Google Play store.     To learn more about Joule Unlimited, visit [email or web address] [email or web address]    There are two levels of access available (as shown below):   My Chart Features  [facility] Primary Care Doctor [facility]  Specialists [facility]  Urgent  Care Non-[facility] Primary Care Doctor   Email your healthcare team securely and privately 24/7 X X X    Manage appointments: schedule your next appointment; view details of past/upcoming appointments X      Request prescription refills. X      View recent personal medical records, including lab and immunizations X X X X   View health record, including health history, allergies, medications X X X X   Read reports about your outpatient visits, procedures, consult and ER notes X X X X   See your discharge summary, which is a recap of your hospital and/or ER visit that includes your diagnosis, lab results, and care plan X X  X     How to register for Practical EHR Solutionst:  Once your e-mail address has been verified, follow the following steps to sign up for Practical EHR Solutionst.     1. Go to  [email or web address]  2. Click on the Sign Up Now box, which takes you to the New Member Sign Up page. You  will need to provide the following information:  a. Enter your Perfect Access Code exactly as it appears at the top of this page. (You will not need to use this code after you’ve completed the sign-up process. If you do not sign up before the expiration date, you must request a new code.)   b. Enter your date of birth.   c. Enter your home email address.   d. Click Submit, and follow the next screen’s instructions.  3. Create a allyvet ID. This will be your Perfect login ID and cannot be changed, so think of one that is secure and easy to remember.  4. Create a Perfect password. You can change your password at any time.  5. Enter your Password Reset Question and Answer. This can be used at a later time if you forget your password.   6. Enter your e-mail address. This allows you to receive e-mail notifications when new information is available in Perfect.  7. Click Sign Up. You can now view your health information.    For assistance activating your Perfect account, call [phone]

## 2017-05-07 NOTE — IP AVS SNAPSHOT
" <p align=\"LEFT\"><IMG SRC=\"//[email or web address]/blob$/[email or web address]\" alt=\"Image\" WIDTH=\"50%\" HEIGHT=\"200\" BORDER=\"\"></p>                   Name:Julisa Carrion  Medical Record Number:[id number]  CSN: [id number]    YOB: 1960   Age: 56 y.o.  Sex: female  HT:1.651 m (5' 5\") WT: 71.668 kg (158 lb)          Admit Date: 5/7/2017     Discharge Date:   Today's Date: 5/11/2017  Attending Doctor:  Maite Santiago M.D.                  Allergies:  Review of patient's allergies indicates no known allergies.          Follow-up Information     1. Follow up with Tre Jason M.D. In 1 week.    Specialty:  Internal Medicine    Contact information    [address] [id number] [phone]           Medication List      Take these Medications        Instructions    aspirin EC 81 MG Tbec   Commonly known as:  ECOTRIN    Take 81 mg by mouth every day.   Dose:  81 mg       ciprofloxacin 500 MG Tabs   Commonly known as:  CIPRO    Take 500 mg by mouth 2 times a day. Completed 3 days on 5/5/2017   Dose:  500 mg       dicyclomine 10 MG Caps   Commonly known as:  BENTYL    Take 1 Cap by mouth 4 times a day as needed (abdominal pain).   Dose:  10 mg       gabapentin 800 MG tablet   Commonly known as:  NEURONTIN    Take 800 mg by mouth 3 times a day.   Dose:  800 mg       lisinopril 5 MG Tabs   Commonly known as:  PRINIVIL    Take 5 mg by mouth every morning.   Dose:  5 mg       metronidazole 500 MG Tabs   Commonly known as:  FLAGYL    Take 500 mg by mouth 3 times a day. Completed 3 Days on 5/5/2017   Dose:  500 mg       morphine ER 30 MG Tbcr tablet   Commonly known as:  MS CONTIN    Take 30 mg by mouth every 12 hours.   Dose:  30 mg       omeprazole 20 MG delayed-release capsule   Commonly known as:  PRILOSEC    Take 1 Cap by mouth every day.   Dose:  20 mg       oxycodone-acetaminophen  MG Tabs   Commonly known as:  PERCOCET-10    Take 1 Tab by mouth every 8 hours as needed for Severe Pain.   Dose:  1 Tab      " pravastatin 20 MG Tabs   Commonly known as:  PRAVACHOL    Take 20 mg by mouth every morning.   Dose:  20 mg       promethazine 25 MG Supp   Commonly known as:  PHENERGAN    Insert 1 Suppository in rectum every 6 hours as needed for Nausea/Vomiting.   Dose:  25 mg       TRESIBA FLEXTOUCH 100 UNIT/ML Sopn   Generic drug:  Insulin Degludec    Inject 40 Units as instructed every day.   Dose:  40 Units       VICTOZA 18 MG/3ML Sopn injection   Generic drug:  liraglutide    Inject 1.8 mg as instructed every day.   Dose:  1.8 mg

## 2017-05-07 NOTE — IP AVS SNAPSHOT
5/11/2017    Julisa Carrion  [address] [place] [address]    Dear Julisa:    [facility] wants to ensure your discharge home is safe and you or your loved ones have had all of your questions answered regarding your care after you leave the hospital.    Below is a list of resources and contact information should you have any questions regarding your hospital stay, follow-up instructions, or active medical symptoms.    Questions or Concerns Regarding… Contact   Medical Questions Related to Your Discharge  (7 days a week, 8am-5pm) Contact a Nurse Care Coordinator   [phone]   Medical Questions Not Related to Your Discharge  (24 hours a day / 7 days a week)  Contact the Nurse Health Line   [phone]    Medications or Discharge Instructions Refer to your discharge packet   or contact your [facility] Primary Care Provider   [phone]   Follow-up Appointment(s) Schedule your appointment via [employer]   or contact Scheduling [phone]   Billing Review your statement via [employer]  or contact Billing [phone]   Medical Records Review your records via [employer]ayers   or contact Medical Records [phone]     You may receive a telephone call within two days of discharge. This call is to make certain you understand your discharge instructions and have the opportunity to have any questions answered. You can also easily access your medical information, test results and upcoming appointments via the FireLayers free online health management tool. You can learn more and sign up at [employer]/FireLayers. For assistance setting up your FireLayers account, please call [phone].    Once again, we want to ensure your discharge home is safe and that you have a clear understanding of any next steps in your care. If you have any questions or concerns, please do not hesitate to contact us, we are here for you. Thank you for choosing [facility] for your healthcare needs.    Sincerely,    Your [facility] Healthcare Team

## 2017-05-08 ENCOUNTER — APPOINTMENT (OUTPATIENT)
Dept: RADIOLOGY | Facility: MEDICAL CENTER | Age: 57
DRG: 074 | End: 2017-05-08
Attending: INTERNAL MEDICINE
Payer: MEDICAID

## 2017-05-08 PROBLEM — L02.415 ABSCESS OF RIGHT THIGH: Status: ACTIVE | Noted: 2017-05-08

## 2017-05-08 PROBLEM — S81.809A WOUND OF LOWER EXTREMITY: Status: ACTIVE | Noted: 2017-05-08

## 2017-05-08 LAB
ANION GAP SERPL CALC-SCNC: 6 MMOL/L (ref 0–11.9)
APPEARANCE FLD: NORMAL
BASOPHILS # BLD AUTO: 0.8 % (ref 0–1.8)
BASOPHILS # BLD: 0.05 K/UL (ref 0–0.12)
BODY FLD TYPE: NORMAL
BUN SERPL-MCNC: 10 MG/DL (ref 8–22)
CALCIUM SERPL-MCNC: 9 MG/DL (ref 8.5–10.5)
CHLORIDE SERPL-SCNC: 106 MMOL/L (ref 96–112)
CO2 SERPL-SCNC: 23 MMOL/L (ref 20–33)
COLOR FLD: NORMAL
CREAT SERPL-MCNC: 0.62 MG/DL (ref 0.5–1.4)
EOSINOPHIL # BLD AUTO: 0.16 K/UL (ref 0–0.51)
EOSINOPHIL NFR BLD: 2.5 % (ref 0–6.9)
EOSINOPHIL NFR FLD: 3 %
ERYTHROCYTE [DISTWIDTH] IN BLOOD BY AUTOMATED COUNT: 47.3 FL (ref 35.9–50)
GFR SERPL CREATININE-BSD FRML MDRD: >60 ML/MIN/1.73 M 2
GLUCOSE BLD-MCNC: 124 MG/DL (ref 65–99)
GLUCOSE BLD-MCNC: 80 MG/DL (ref 65–99)
GLUCOSE BLD-MCNC: 83 MG/DL (ref 65–99)
GLUCOSE BLD-MCNC: 86 MG/DL (ref 65–99)
GLUCOSE SERPL-MCNC: 92 MG/DL (ref 65–99)
GRAM STN SPEC: NORMAL
HCT VFR BLD AUTO: 31.7 % (ref 37–47)
HGB BLD-MCNC: 10 G/DL (ref 12–16)
IMM GRANULOCYTES # BLD AUTO: 0.02 K/UL (ref 0–0.11)
IMM GRANULOCYTES NFR BLD AUTO: 0.3 % (ref 0–0.9)
INR PPP: 1.05 (ref 0.87–1.13)
IRON SATN MFR SERPL: 11 % (ref 15–55)
IRON SERPL-MCNC: 39 UG/DL (ref 40–170)
LACTATE BLD-SCNC: 1.1 MMOL/L (ref 0.5–2)
LYMPHOCYTES # BLD AUTO: 2.58 K/UL (ref 1–4.8)
LYMPHOCYTES NFR BLD: 41.1 % (ref 22–41)
LYMPHOCYTES NFR FLD: 44 %
MCH RBC QN AUTO: 26.2 PG (ref 27–33)
MCHC RBC AUTO-ENTMCNC: 31.5 G/DL (ref 33.6–35)
MCV RBC AUTO: 83.2 FL (ref 81.4–97.8)
MONOCYTES # BLD AUTO: 0.38 K/UL (ref 0–0.85)
MONOCYTES NFR BLD AUTO: 6.1 % (ref 0–13.4)
MONONUC CELLS NFR FLD: 3 %
NEUTROPHILS # BLD AUTO: 3.09 K/UL (ref 2–7.15)
NEUTROPHILS NFR BLD: 49.2 % (ref 44–72)
NEUTROPHILS NFR FLD: 50 %
NRBC # BLD AUTO: 0 K/UL
NRBC BLD AUTO-RTO: 0 /100 WBC
PLATELET # BLD AUTO: 352 K/UL (ref 164–446)
PMV BLD AUTO: 8.8 FL (ref 9–12.9)
POTASSIUM SERPL-SCNC: 4.7 MMOL/L (ref 3.6–5.5)
PROTHROMBIN TIME: 14 SEC (ref 12–14.6)
RBC # BLD AUTO: 3.81 M/UL (ref 4.2–5.4)
SIGNIFICANT IND 70042: NORMAL
SITE SITE: NORMAL
SODIUM SERPL-SCNC: 135 MMOL/L (ref 135–145)
SOURCE SOURCE: NORMAL
TIBC SERPL-MCNC: 357 UG/DL (ref 250–450)
WBC # BLD AUTO: 6.3 K/UL (ref 4.8–10.8)
WBC # FLD: NORMAL CELLS/UL

## 2017-05-08 PROCEDURE — 700111 HCHG RX REV CODE 636 W/ 250 OVERRIDE (IP)

## 2017-05-08 PROCEDURE — 770006 HCHG ROOM/CARE - MED/SURG/GYN SEMI*

## 2017-05-08 PROCEDURE — 700111 HCHG RX REV CODE 636 W/ 250 OVERRIDE (IP): Performed by: HOSPITALIST

## 2017-05-08 PROCEDURE — 85610 PROTHROMBIN TIME: CPT

## 2017-05-08 PROCEDURE — 99233 SBSQ HOSP IP/OBS HIGH 50: CPT | Performed by: INTERNAL MEDICINE

## 2017-05-08 PROCEDURE — 700105 HCHG RX REV CODE 258: Performed by: HOSPITALIST

## 2017-05-08 PROCEDURE — A6235 HYDROCOLLD DRG >16<=48 W/O B: HCPCS

## 2017-05-08 PROCEDURE — 700105 HCHG RX REV CODE 258

## 2017-05-08 PROCEDURE — 36415 COLL VENOUS BLD VENIPUNCTURE: CPT

## 2017-05-08 PROCEDURE — 87075 CULTR BACTERIA EXCEPT BLOOD: CPT

## 2017-05-08 PROCEDURE — 83540 ASSAY OF IRON: CPT

## 2017-05-08 PROCEDURE — 85025 COMPLETE CBC W/AUTO DIFF WBC: CPT

## 2017-05-08 PROCEDURE — 700111 HCHG RX REV CODE 636 W/ 250 OVERRIDE (IP): Performed by: RADIOLOGY

## 2017-05-08 PROCEDURE — 83605 ASSAY OF LACTIC ACID: CPT

## 2017-05-08 PROCEDURE — G8979 MOBILITY GOAL STATUS: HCPCS | Mod: CI

## 2017-05-08 PROCEDURE — 82962 GLUCOSE BLOOD TEST: CPT

## 2017-05-08 PROCEDURE — 80048 BASIC METABOLIC PNL TOTAL CA: CPT

## 2017-05-08 PROCEDURE — 0J9L3ZZ DRAINAGE OF RIGHT UPPER LEG SUBCUTANEOUS TISSUE AND FASCIA, PERCUTANEOUS APPROACH: ICD-10-PCS | Performed by: RADIOLOGY

## 2017-05-08 PROCEDURE — A9270 NON-COVERED ITEM OR SERVICE: HCPCS | Performed by: HOSPITALIST

## 2017-05-08 PROCEDURE — 89051 BODY FLUID CELL COUNT: CPT

## 2017-05-08 PROCEDURE — 700111 HCHG RX REV CODE 636 W/ 250 OVERRIDE (IP): Performed by: INTERNAL MEDICINE

## 2017-05-08 PROCEDURE — 87205 SMEAR GRAM STAIN: CPT

## 2017-05-08 PROCEDURE — 83550 IRON BINDING TEST: CPT

## 2017-05-08 PROCEDURE — 87070 CULTURE OTHR SPECIMN AEROBIC: CPT

## 2017-05-08 PROCEDURE — 97161 PT EVAL LOW COMPLEX 20 MIN: CPT

## 2017-05-08 PROCEDURE — 10060 I&D ABSCESS SIMPLE/SINGLE: CPT

## 2017-05-08 PROCEDURE — G8978 MOBILITY CURRENT STATUS: HCPCS | Mod: CJ

## 2017-05-08 PROCEDURE — 700105 HCHG RX REV CODE 258: Performed by: INTERNAL MEDICINE

## 2017-05-08 PROCEDURE — 700102 HCHG RX REV CODE 250 W/ 637 OVERRIDE(OP): Performed by: HOSPITALIST

## 2017-05-08 RX ORDER — ONDANSETRON 2 MG/ML
4 INJECTION INTRAMUSCULAR; INTRAVENOUS PRN
Status: ACTIVE | OUTPATIENT
Start: 2017-05-08 | End: 2017-05-08

## 2017-05-08 RX ORDER — MIDAZOLAM HYDROCHLORIDE 1 MG/ML
INJECTION INTRAMUSCULAR; INTRAVENOUS
Status: COMPLETED
Start: 2017-05-08 | End: 2017-05-08

## 2017-05-08 RX ORDER — MIDAZOLAM HYDROCHLORIDE 1 MG/ML
.5-2 INJECTION INTRAMUSCULAR; INTRAVENOUS PRN
Status: ACTIVE | OUTPATIENT
Start: 2017-05-08 | End: 2017-05-08

## 2017-05-08 RX ORDER — SODIUM CHLORIDE 9 MG/ML
500 INJECTION, SOLUTION INTRAVENOUS PRN
Status: DISCONTINUED | OUTPATIENT
Start: 2017-05-08 | End: 2017-05-11 | Stop reason: HOSPADM

## 2017-05-08 RX ORDER — ZOLPIDEM TARTRATE 5 MG/1
5 TABLET ORAL NIGHTLY PRN
Status: DISCONTINUED | OUTPATIENT
Start: 2017-05-08 | End: 2017-05-11 | Stop reason: HOSPADM

## 2017-05-08 RX ADMIN — MORPHINE SULFATE 30 MG: 30 TABLET, EXTENDED RELEASE ORAL at 20:00

## 2017-05-08 RX ADMIN — STANDARDIZED SENNA CONCENTRATE AND DOCUSATE SODIUM 2 TABLET: 8.6; 5 TABLET, FILM COATED ORAL at 20:00

## 2017-05-08 RX ADMIN — MIDAZOLAM HYDROCHLORIDE 1 MG: 1 INJECTION, SOLUTION INTRAMUSCULAR; INTRAVENOUS at 14:17

## 2017-05-08 RX ADMIN — OMEPRAZOLE 20 MG: 20 CAPSULE, DELAYED RELEASE ORAL at 08:02

## 2017-05-08 RX ADMIN — MORPHINE SULFATE 2 MG: 4 INJECTION INTRAVENOUS at 11:38

## 2017-05-08 RX ADMIN — METOCLOPRAMIDE 10 MG: 5 INJECTION, SOLUTION INTRAMUSCULAR; INTRAVENOUS at 05:38

## 2017-05-08 RX ADMIN — MORPHINE SULFATE 2 MG: 4 INJECTION INTRAVENOUS at 22:57

## 2017-05-08 RX ADMIN — METOCLOPRAMIDE 10 MG: 5 INJECTION, SOLUTION INTRAMUSCULAR; INTRAVENOUS at 17:47

## 2017-05-08 RX ADMIN — GABAPENTIN 800 MG: 400 CAPSULE ORAL at 08:02

## 2017-05-08 RX ADMIN — AMPICILLIN SODIUM AND SULBACTAM SODIUM 3 G: 2; 1 INJECTION, POWDER, FOR SOLUTION INTRAMUSCULAR; INTRAVENOUS at 22:57

## 2017-05-08 RX ADMIN — AMPICILLIN SODIUM AND SULBACTAM SODIUM 3 G: 2; 1 INJECTION, POWDER, FOR SOLUTION INTRAMUSCULAR; INTRAVENOUS at 17:48

## 2017-05-08 RX ADMIN — GABAPENTIN 800 MG: 400 CAPSULE ORAL at 20:00

## 2017-05-08 RX ADMIN — FENTANYL CITRATE 25 MCG: 50 INJECTION, SOLUTION INTRAMUSCULAR; INTRAVENOUS at 14:13

## 2017-05-08 RX ADMIN — MORPHINE SULFATE 2 MG: 4 INJECTION INTRAVENOUS at 08:55

## 2017-05-08 RX ADMIN — MIDAZOLAM 1 MG: 1 INJECTION INTRAMUSCULAR; INTRAVENOUS at 14:13

## 2017-05-08 RX ADMIN — VANCOMYCIN HYDROCHLORIDE 1800 MG: 100 INJECTION, POWDER, LYOPHILIZED, FOR SOLUTION INTRAVENOUS at 15:39

## 2017-05-08 RX ADMIN — LISINOPRIL 5 MG: 10 TABLET ORAL at 08:01

## 2017-05-08 RX ADMIN — ASPIRIN 81 MG: 81 TABLET ORAL at 08:01

## 2017-05-08 RX ADMIN — MIDAZOLAM HYDROCHLORIDE 1 MG: 1 INJECTION, SOLUTION INTRAMUSCULAR; INTRAVENOUS at 14:13

## 2017-05-08 RX ADMIN — AMPICILLIN SODIUM AND SULBACTAM SODIUM 3 G: 2; 1 INJECTION, POWDER, FOR SOLUTION INTRAMUSCULAR; INTRAVENOUS at 12:09

## 2017-05-08 RX ADMIN — PRAVASTATIN SODIUM 20 MG: 10 TABLET ORAL at 08:01

## 2017-05-08 RX ADMIN — MORPHINE SULFATE 4 MG: 4 INJECTION INTRAVENOUS at 16:28

## 2017-05-08 RX ADMIN — ENOXAPARIN SODIUM 40 MG: 100 INJECTION SUBCUTANEOUS at 08:02

## 2017-05-08 RX ADMIN — OXYCODONE HYDROCHLORIDE AND ACETAMINOPHEN 1 TABLET: 10; 325 TABLET ORAL at 23:27

## 2017-05-08 RX ADMIN — MORPHINE SULFATE 2 MG: 4 INJECTION INTRAVENOUS at 19:59

## 2017-05-08 RX ADMIN — SODIUM CHLORIDE: 9 INJECTION, SOLUTION INTRAVENOUS at 00:02

## 2017-05-08 RX ADMIN — STANDARDIZED SENNA CONCENTRATE AND DOCUSATE SODIUM 2 TABLET: 8.6; 5 TABLET, FILM COATED ORAL at 08:02

## 2017-05-08 RX ADMIN — FENTANYL CITRATE 25 MCG: 50 INJECTION, SOLUTION INTRAMUSCULAR; INTRAVENOUS at 15:00

## 2017-05-08 RX ADMIN — SODIUM CHLORIDE: 9 INJECTION, SOLUTION INTRAVENOUS at 08:55

## 2017-05-08 RX ADMIN — MIDAZOLAM HYDROCHLORIDE 2 MG: 1 INJECTION, SOLUTION INTRAMUSCULAR; INTRAVENOUS at 14:57

## 2017-05-08 RX ADMIN — METOCLOPRAMIDE 10 MG: 5 INJECTION, SOLUTION INTRAMUSCULAR; INTRAVENOUS at 22:57

## 2017-05-08 RX ADMIN — GABAPENTIN 800 MG: 400 CAPSULE ORAL at 15:39

## 2017-05-08 RX ADMIN — METOCLOPRAMIDE 10 MG: 5 INJECTION, SOLUTION INTRAMUSCULAR; INTRAVENOUS at 11:38

## 2017-05-08 RX ADMIN — SODIUM CHLORIDE: 9 INJECTION, SOLUTION INTRAVENOUS at 22:57

## 2017-05-08 RX ADMIN — FENTANYL CITRATE 50 MCG: 50 INJECTION, SOLUTION INTRAMUSCULAR; INTRAVENOUS at 14:57

## 2017-05-08 RX ADMIN — MORPHINE SULFATE 2 MG: 4 INJECTION INTRAVENOUS at 03:45

## 2017-05-08 RX ADMIN — MORPHINE SULFATE 30 MG: 30 TABLET, EXTENDED RELEASE ORAL at 08:00

## 2017-05-08 ASSESSMENT — GAIT ASSESSMENTS
GAIT LEVEL OF ASSIST: CONTACT GUARD ASSIST
DISTANCE (FEET): 15
ASSISTIVE DEVICE: FRONT WHEEL WALKER
DEVIATION: ANTALGIC;STEP TO

## 2017-05-08 ASSESSMENT — ENCOUNTER SYMPTOMS
COUGH: 0
WEIGHT LOSS: 0
DIZZINESS: 0
STRIDOR: 0
SEIZURES: 0
EYE PAIN: 0
SHORTNESS OF BREATH: 0
CHILLS: 0
MYALGIAS: 0
PALPITATIONS: 0
HEARTBURN: 0
EYE DISCHARGE: 0
BACK PAIN: 0
EYE REDNESS: 0
ORTHOPNEA: 0
FOCAL WEAKNESS: 0
ABDOMINAL PAIN: 1
SPUTUM PRODUCTION: 0
HEADACHES: 0
NECK PAIN: 0
DIARRHEA: 0
BLURRED VISION: 0
WEAKNESS: 1
VOMITING: 1
FEVER: 0
NAUSEA: 1

## 2017-05-08 ASSESSMENT — COPD QUESTIONNAIRES
DO YOU EVER COUGH UP ANY MUCUS OR PHLEGM?: NO/ONLY WITH OCCASIONAL COLDS OR INFECTIONS
DURING THE PAST 4 WEEKS HOW MUCH DID YOU FEEL SHORT OF BREATH: NONE/LITTLE OF THE TIME
HAVE YOU SMOKED AT LEAST 100 CIGARETTES IN YOUR ENTIRE LIFE: YES
COPD SCREENING SCORE: 3

## 2017-05-08 ASSESSMENT — LIFESTYLE VARIABLES: EVER_SMOKED: YES

## 2017-05-08 ASSESSMENT — PAIN SCALES - GENERAL
PAINLEVEL_OUTOF10: 9
PAINLEVEL_OUTOF10: 8
PAINLEVEL_OUTOF10: 0
PAINLEVEL_OUTOF10: 10
PAINLEVEL_OUTOF10: 6

## 2017-05-08 NOTE — ED NOTES
Pt resting quietly in bed.  NAD noted.  Respirations even and unlabored.  VS stable.  No questions or concerns to address at this time.

## 2017-05-08 NOTE — PROGRESS NOTES
Pt arrived from Oncology unit. 2 RN assessment done with Molly.  Pt has multiple scabs and sores on lower legs, 1 on coccyx, left thigh and right elbow. Wound care consulted for proper dressings and plan of care.

## 2017-05-08 NOTE — PROGRESS NOTES
IR RN note:    right thigh abscess drain by MD Fonseca assisted by RT Hernandez, right thigh access site; Patient moved from IR2 to CT due to abscess location after sedation, continue of sedation order  0.5 ml of fluid abscess sent to lab in [place]    Patient tolerated procedure, hemodynamically stable; report given to OJ Zaman;   patient transported back to room via transport

## 2017-05-08 NOTE — PROGRESS NOTES
Called report to OJ Samaniego. Patient is to transfer to [place] [address] bed 2. Patient is sleeping and appears to be comfortable at this time. Will continue to monitor for transport.

## 2017-05-08 NOTE — ED NOTES
Report from OJ Gibson.  Pt resting quietly in bed.  NAD noted.  Respirations even and unlabored.  VS stable.  Pt asking questions about when the hospitalist will see her.  Pt was educated on admit process.  No other questions or concerns at this time.

## 2017-05-08 NOTE — ED NOTES
Report given to OJ Roach.  VS stable.  No complaints or concerns from patient at this time.  Pt going to R 317.

## 2017-05-08 NOTE — DIETARY
NUTRITION SERVICES - Alert received for newly identified wound (per prior hospitalization on 4/27, WT determined patient to have BLE, scattered part thick crusted over; Right IT unstageable pressure ulcer, POA). Pt is on Diabetic/Clear Liquids diet to aid in meeting daily nutrient needs to encourage wound healing.  Consult to wound team is currently pending.      RECOMMEND:   1. For stage I,2 wounds:  Daily vitamin therapy for wound healing: Multivitamin with minerals (Theragran-M) and 500 mg Vitamin C.  2. For stage 3,4 wounds: Daily vitamin therapy for wound healing: Multivitamin with minerals  (Theragran-M) daily and 500 mg Vitamin C BID for 14 days.  3. For patients with renal issues, please start renal MVI (Allbee+C).

## 2017-05-08 NOTE — DISCHARGE PLANNING
Care Transition Team Assessment    Completed screening. Pt has new Medicaid card and would like SW to double check transportation MTM eligibility prior to discharge.     Information Source  Orientation : Oriented x 4  Information Given By: Patient  Informant's Name: Julisa  Who is responsible for making decisions for patient? : Patient    Elopement Risk  Legal Hold: No  Ambulatory or Self Mobile in Wheelchair: Yes  Disoriented: No  Psychiatric Symptoms: None  History of Wandering: No  Elopement this Admit: No  Vocalizing Wanting to Leave: No  Displays Behaviors, Body Language Wanting to Leave: No-Not at Risk for Elopement  Elopement Risk: Not at Risk for Elopement    Interdisciplinary Discharge Planning  Does Admitting Nurse Feel This Could be a Complex Discharge?: No  Primary Care Physician:   Lives with - Patient's Self Care Capacity: Significant Other  Patient or legal guardian wants to designate a caregiver (see row info): No  Support Systems: Spouse / Significant Other  Housing / Facility: [address]  Do You Take your Prescribed Medications Regularly: Yes  Able to Return to Previous ADL's: Yes  Mobility Issues: Yes  Prior Services: None  Patient Expects to be Discharged to:: Home  Assistance Needed: No  Durable Medical Equipment: Walker    Discharge Preparedness  What is your plan after discharge?: Home with help  What are your discharge supports?: Partner  Prior Functional Level: Independent with Activities of Daily Living, Independent with Medication Management, Uses Walker  Difficulity with ADLs: Walking  Difficulty with ADLs Comment: Uses walker and wheelchair  Difficulity with IADLs: Driving  Difficulity with IADL Comments: Uses MTM    Functional Assesment  Prior Functional Level: Independent with Activities of Daily Living, Independent with Medication Management, Uses Walker    Finances  Financial Barriers to Discharge: No  Prescription Coverage: Yes ([facility])    Vision / Hearing  Impairment  Vision Impairment : Yes  Right Eye Vision: Impaired (cataracts)  Left Eye Vision: Impaired (cataracts)  Hearing Impairment : No    Values / Beliefs / Concerns  Values / Beliefs Concerns : Yes (Anab[employer]ist)    Domestic Abuse  Have you ever been the victim of abuse or violence?: No  Physical Abuse or Sexual Abuse: No  Verbal Abuse or Emotional Abuse: No  Possible Abuse Reported to:: Not Applicable    Psychological Assessment  History of Substance Abuse: None    Discharge Risks or Barriers  Discharge risks or barriers?: No    Anticipated Discharge Information  Anticipated discharge disposition: Home  Discharge Address: [address] [place] [address]  Discharge Contact Phone Number: [phone]

## 2017-05-08 NOTE — OR SURGEON
Immediate Post- Operative Note        PostOp Diagnosis: small fluid collection or hematoma overlying RIGHT greater trochanter      Procedure(s): CT guided aspiration      Estimated Blood Loss: Less than 5 ml        Complications: None            5/8/2017     3:02 PM     Paramjit Fonseca

## 2017-05-08 NOTE — PROGRESS NOTES
Received report from ER RN. Patient arrived to  317 at 2330. Patient reports 8/10 pain, medications provided per MAR.     PIV in place to left AC is patent, no s/s of infection or infiltration noted. NS running at 100 mL per MD orders.    Plan of care discussed, questions answered, patient oriented to room and unit. Bed in lowest position, call light within reach, non skid socks provided, hourly rounding. Patient presents no further needs at this time.

## 2017-05-08 NOTE — CARE PLAN
Problem: Pain Management  Goal: Pain level will decrease to patient’s comfort goal  Outcome: PROGRESSING SLOWER THAN EXPECTED  Patient reports 8/10 pain. Medicated per MAR. Will continue to assess pain in hourly rounding.    Problem: Fluid Volume:  Goal: Will maintain balanced intake and output  Outcome: PROGRESSING AS EXPECTED  NS infusing per provider orders at 100 mL per hour. Will continue to monitor for emesis and fluid status.

## 2017-05-08 NOTE — H&P
PRIMARY CARE PROVIDER:  Tre Jason MD    CHIEF COMPLAINT:  Nausea, vomiting for the past 2 weeks, unable to keep down   any food, only small sips of water.    HISTORY OF PRESENT ILLNESS:  This is a 56-year-old female with history of   insulin-dependent type 2 diabetes mellitus, diabetic gastroparesis, chronic   pain syndrome, on chronic pain medications long-acting, who uses a walker and   uses wheelchair long distances, recently admitted to the hospital on   04/26/2017 for partial small-bowel obstruction release on 05/02/2017, presents   with nausea, vomiting, and upper epigastric pain.  She states she has been   feeling weaker.  She states that she was discharged on 05/02/2017 after she   was able to tolerate a small amount of food in the morning, chooses a soft   diet; however, the following day on 05/03/2017, she started vomiting and   having same difficulties.  She denies any hemetemesis, denies any diarrhea.    No melena.  No bilious emesis.  She denies any chest pain.  She does say she   gets short of breath with exertion.  She also has multiple skin breakdowns in   her lower extremities.  She states it is from the bandages that were placed on   her lower legs during her acute hospitalization.  When she took off the   bandages, they tore off the scabs.  She was not getting any wound care   followup.  She also developed a right gluteal decubitus ulcer during her last   hospitalization as far as I can gather, and she has not been getting any wound   care.  She states that it is difficult to heal and she tries to stay off of   her right side as much as possible.    REVIEW OF SYSTEMS:  All systems reviewed and negative other than pertinent   positives and negatives in the HPI.    PAST MEDICAL HISTORY:  Includes diabetes mellitus type 2, insulin-dependent   with complications of diabetic gastroparesis.  She has not seen a GI physician   as of date, but she is scheduled to see Dr. Thomas as an outpatient for the    first time.  She has hypertension, hyperlipidemia, chronic pain syndrome,   recent admission on 2017, discharged on 2017 for a partial   small-bowel obstruction and enteritis.  She was placed on Cipro and Flagyl as   an outpatient.  Development of right gluteal, stage II pressure ulcer appears   to be during hospitalization.    PAST SURGICAL HISTORY:  Cholecystectomy,  x3, tubal ligation, right   wrist ORIF and exploratory laparotomy in the past.    SOCIAL HISTORY:  She states she lives with her boyfriend.  She does not use   any oxygen at home.  She normally needs a walker to get around and a   wheelchair for going out or any distances.  She does not drink any alcohol.    She quit smoking in , formerly was a 20-pack-year with no home O2.    FAMILY HISTORY:  Mother had a CVA.  Father had aortic valve replacement.    Sister also with aortic valve replacement in her 50s.  Diabetes mellitus has   run on her father's side.  Her paternal uncles all had diabetes.  Maternal   aunt had lung cancer.    ALLERGIES:  She has no known drug allergies.    CURRENT MEDICATIONS:  Aspirin 81 mg daily, Cipro 500 b.i.d. completed on   2017, Neurontin 800 mg 2 times daily, insulin degludec or Tresiba, 40   units subQ daily; Victoza 1.8 mg daily, lisinopril 5 mg daily, Flagyl 500 mg 2   times daily completed 2017, MS Contin 30 mg b.i.d., omeprazole 20 mg   daily, Percocet 10/325 one tablet every 8 hours as needed for severe pain,   Pravachol 20 mg p.o. daily, Phenergan 25 mg every 6 hours per rectum as needed   for nausea and vomiting.    PHYSICAL EXAMINATION:  VITAL SIGNS:  Blood pressure 151/72, pulse 101, temperature 36.4, respirations   16, weight 71 Kg, BMI 26, O2 sat 91% on room air.  GENERAL:  She does appear to be chronically ill, pale appearing; but does not   appear to be in any acute respiratory distress or toxic appearing.  HEENT:  Normocephalic, atraumatic.  She does have dry mucous  membranes.    Symmetrical smile and eyebrow raise.  Eyes, pupils equal, round and reactive   to light bilaterally.  EOMI intact.  Conjunctivae normal, no icterus, no   discharge.  LYMPHATICS:  There is no anterior cervical or supraclavicular lymphadenopathy   appreciated.  NECK:  She has no meningeal signs, supple.  Trachea midline.  No JVD or   crepitus.  CHEST:  Normal.  No tenderness.  CARDIOVASCULAR:  She does have IV/VI holosystolic murmur appreciated.  No rub   or gallop noted.  LUNGS:  Clear to auscultation in all lung fields.  No crackles, wheezes or   rhonchi.  BACK:  There is no CVA tenderness or spinal tenderness on palpation.  ABDOMEN:  Soft, nontender, nondistended.  The only tender she had is in the   epigastrium.  Normal bowel sounds throughout.  No distention, rebound, or   guarding.  SKIN:  She has lower extremity open wound areas of her anterior shins   bilaterally.  They do appear to have full thickness into the dermis.  There is   no surrounding erythema or drainage seen.  Examination of her right buttock   and hip, she does have tenderness to palpation of the ischial spine.  She does   have a breakdown of skin, stage II decubitus ulcer about right buttock with   some surrounding erythema.  I do not see any obvious drainage or abscess on my   exam.  NEUROLOGIC:  Cranial nerves II-XII normal.  Motor 5+/5 in all 4 extremities.    Sensory intact in all 4 extremities.  EXTREMITIES:  No edema on the ankles.  She has normal peripheral pulses, 2+,   cap refill.  Normal distal perfusion.  PSYCHIATRIC:  Normal mood, affect, and judgment.  Alert and oriented x3.    DIAGNOSTICS:  EKG, sinus rhythm at 95.  There are Q's noted at II, III, aVF,   otherwise no ST elevations or depression.  A CT abdomen and pelvis showing   increased colonic stool, suggestive of constipation.  No obstruction or   perforation, normal appendix.  There is a rather subtle fluid collection in   the right lateral gluteal musculature,  concerning for a 3 cm inflammatory   process, possible abscess.    LABORATORY DATA:  WBC is 7.0.  H and H 10 and 34, platelet counts 434.  Sodium   135, potassium 4.4, chloride 103, CO2 of 25, glucose 135, BUN 24, creatinine   1.05.  ASTs are normal of 12, ALT 5, alkaline phosphatase 111.  Total   bilirubin 0.2.  GFR of 54.  Urinalysis, specific gravity 1.035.  Negative   nitrites and leukocyte esterase.  I have ordered blood cultures x2.  Lactic   acid is elevated at 2.5.    IMPRESSION:  1.  Nausea, vomiting for 2 weeks with recent admission for partial small-bowel   obstruction and enteritis.  Completed Cipro, Flagyl course of antibiotics now   with epigastric tenderness.  Constipation is seen on CT abdomen and pelvis,   otherwise no acute inflammatory processes noted.  She does not have an acute   abdomen.  I suspect this is partly due to diabetic gastroparesis.  She has   never had any formal testing such as transit time for small bowel.  Transit to   formally document gastroparesis.  I will place the patient on Reglan, IV   antiemetics.  2.  Diabetes mellitus type 2, insulin-dependent, holding her insulin at this   time since she has decreased appetite and ability to take in p.o. foods.    Sliding scale insulin, Accu-Cheks q.a.c. and at bedtime, placed on a clear   liquid diet, diabetic.  3.  Right hip abscess, 3 cm seen on CT scan of the abdomen and pelvis.  I will   order the sed rate, CRP, blood cultures x2 and an MRI of the right hip.  She   does have a decubitus ulcer noted on the right buttock, apparently developed   during the last hospitalization.  I have ordered wound care, frequent position   changes and padding as to keep off the area.  Lactic acid is elevated at 2.5.    I will trend the lactic acid.  I am concerned that possibly her nausea,   vomiting is related to the infectious process given the diabetes.  However,   she does not have any white count.  I hesitate to start vancomycin and Zosyn   at  this time and we will hold off on these antibiotics.  4.  Acute dehydration, specific gravity of 1.035; nausea, vomiting for a week.    I have started normal saline at 100 an hour.  5.  Multiple wounds to the lower extremities with full thickness open areas.    No obvious cellulitis.  I have ordered wound care consult for these concerning   open lesions as well.  6.  Generalized debility.  Patient uses a walker or wheelchair for long   distances.  I have ordered PT and OT evaluation.  7.  Significant heart murmur, IV/VI.  Father and sister both had aortic valve   replacements.  I have ordered an echocardiogram.  She does get short of breath   with exertion, possibly leading to some of her debility.  8.  EKG with old Q-waves.  She does not complain of any chest pain.  She does   not appear to be in acute cardiac event.  EKG without ST elevations or   depressions.  9.  History of claustrophobia.  I have ordered Ativan 1-2 mg IV prior to MRI   of the hip to help even this process.  10.  Chronic pain syndrome, mild.  Continue patient's home chronic pain   medications including MS Contin 30 mg b.i.d., gabapentin 800 t.i.d.  I   increased her oxycodone or Percocet 10/325 from one tablet every 8 hours to 1   tablet every 4 hours as needed and morphine sulfate 2-4 mg every 3 hours as   needed for severe pain.  She seems to relate her pain related to the   epigastrium as opposed to the right hip.  Repeat BMP, CBC, and lactic acid   trending and an A1c.    CODE STATUS:  The patient is full code status.    Deep venous thrombosis prophylaxis is with Lovenox 40 subQ daily.    I anticipate greater than 2 midnight stay.  Admission time is 70 minutes.       ____________________________________     MD LARS Shelton / RODRIGO    DD:  05/07/2017 21:55:55  DT:  05/07/2017 23:11:49    D#:  [id number]  Job#:  [id number]

## 2017-05-08 NOTE — PROGRESS NOTES
Hospital Medicine Progress Note, Adult, Complex               Author: Waqar ROE Mynor Date & Time created: 5/8/2017  8:10 AM     CC: abdominal pain    Interval History:  57 y/o F with pmh of chronic pain syndrome, gastroparesis, DM: admitted for above.    Still having nausea, vomiting, abdominal pain.  Review of Systems:  Review of Systems   Constitutional: Positive for malaise/fatigue. Negative for fever, chills and weight loss.   HENT: Negative for congestion and nosebleeds.    Eyes: Negative for blurred vision, pain, discharge and redness.   Respiratory: Negative for cough, sputum production, shortness of breath and stridor.    Cardiovascular: Negative for chest pain, palpitations and orthopnea.   Gastrointestinal: Positive for nausea, vomiting and abdominal pain. Negative for heartburn and diarrhea.   Genitourinary: Negative for dysuria, urgency and frequency.   Musculoskeletal: Negative for myalgias, back pain and neck pain.   Skin: Negative for itching and rash.   Neurological: Positive for weakness. Negative for dizziness, focal weakness, seizures and headaches.       Physical Exam:  Physical Exam   Constitutional: She is oriented to person, place, and time. No distress.   HENT:   Head: Normocephalic and atraumatic.   Mouth/Throat: Oropharynx is clear and moist.   Eyes: Conjunctivae and EOM are normal. Pupils are equal, round, and reactive to light.   Neck: Normal range of motion. Neck supple. No tracheal deviation present. No thyromegaly present.   Cardiovascular: Normal rate and regular rhythm.    Murmur heard.  Pulmonary/Chest: Effort normal and breath sounds normal. No respiratory distress. She has no wheezes.   Abdominal: Soft. Bowel sounds are normal. She exhibits no distension. There is tenderness. There is no rebound and no guarding.   Musculoskeletal: She exhibits no edema or tenderness.   Bilateral LE wounds multiple   Neurological: She is alert and oriented to person, place, and time. No cranial nerve  deficit.   Skin: Skin is warm and dry. She is not diaphoretic. No erythema.       Labs:        Invalid input(s): [id number]      Recent Labs      17   1324   SODIUM  135   POTASSIUM  4.4   CHLORIDE  103   CO2  25   BUN  24*   CREATININE  1.05   CALCIUM  9.9     Recent Labs      17   1324   ALTSGPT  <5   ASTSGOT  12   ALKPHOSPHAT  111*   TBILIRUBIN  0.2   LIPASE  7*   GLUCOSE  135*     Recent Labs      17   1350   RBC  4.05*   HEMOGLOBIN  10.4*   HEMATOCRIT  34.0*   PLATELETCT  434     Recent Labs      17   1324  17   1350   WBC   --   7.0   NEUTSPOLYS   --   61.70   LYMPHOCYTES   --   31.20   MONOCYTES   --   4.20   EOSINOPHILS   --   1.60   BASOPHILS   --   1.00   ASTSGOT  12   --    ALTSGPT  <5   --    ALKPHOSPHAT  111*   --    TBILIRUBIN  0.2   --            Hemodynamics:  Temp (24hrs), Av.5 °C (97.7 °F), Min:36.3 °C (97.3 °F), Max:36.7 °C (98.1 °F)  Temperature: 36.7 °C (98 °F)  Pulse  Av.3  Min: 71  Max: 110   Blood Pressure: 138/83 mmHg, NIBP: 131/55 mmHg     Respiratory:    Respiration: 18, Pulse Oximetry: 98 %     Work Of Breathing / Effort: Mild  RUL Breath Sounds: Clear, RML Breath Sounds: Clear, RLL Breath Sounds: Clear, LUIS Breath Sounds: Clear, LLL Breath Sounds: Clear  Fluids:  No intake or output data in the 24 hours ending 17 0810  Weight: 71.668 kg (158 lb)  GI/Nutrition:  Orders Placed This Encounter   Procedures   • Diet Order     Standing Status: Standing      Number of Occurrences: 1      Standing Expiration Date:      Order Specific Question:  Diet:     Answer:  Diabetic [3]     Order Specific Question:  Diet:     Answer:  Clear Liquid [10]     Medical Decision Making, by Problem:  Active Hospital Problems    Diagnosis   • Gastroparesis [K31.84]  - related to diabetes  - supportive care     • Dehydration [E86.0]  - on IVF     • Abdominal pain [R10.9]  - CT abd: benign  - supportive care     • HTN (hypertension) [I10]     • Abscess of right thigh  [L02.415]  - 3 cm on CT   - IR drainage ordered  - holding abx for the procedure for better culture result  - to start on abx once IR drainage of abscess with IV vanco and unasyn     • Wound of lower extremity [S81.809A]  - wound care     • Lactic acidosis [E87.2]  - improving     • Emesis, persistent [R11.10]  - likely related to gastroparesis     • Chronic pain [G89.29]  - with history of drug seeking behavior     • Type 2 diabetes mellitus with neuropathy, and autonomic neuropathy [E11.65]         Labs reviewed, Medications reviewed and Radiology images reviewed        DVT Prophylaxis: Enoxaparin (Lovenox)      Antibiotics: Treating active infection/contamination beyond 24 hours perioperative coverage  Assessed for rehab: Patient was assess for and/or received rehabilitation services during this hospitalization

## 2017-05-08 NOTE — WOUND TEAM
"[facility]  Inpatient Services  Initial Wound and Skin Care Evaluation    Admission Date:   5/7/17  HPI, PMH, SH: Reviewed  Unit where seen by Wound Team: T 3    WOUND CONSULT RELATED TO: POA unstageable pressure ulcer R IT, POA multiple partial thickness wounds BLE, L thigh.    SUBJECTIVE:  \" When I took the gauze they put on last time off everything stuck and bled like crazy. \"    Self Report / Pain Level: some soreness with palpation, but minimal    OBJECTIVE: Cooperative, requests follow up for leg wounds post hospitalization    WOUND TYPE, LOCATION, CHARACTERISTICS (Pressure ulcers: location, stage, POA or date identified)    Location and type of wound: POA unstageable pressure ulcer R IT        Periwound: discolored     Drainage: scant serous    Tissue Type and %:  30% red, 70% yellow   Wound Edges: attached    Odor:  none     Exposed structure(s): none  S&S of Infection: wound bed      Measurements: 5/8/17  Length: 3 cm     Width:  0.5 cm     Depth: [facility]   Location and type of wound: POA multiple partial thickness wounds BLE, L thigh        Periwound:  Discolored, flaking    Drainage: min serosanguinous     Tissue Type and %: 100% red/brown/black    Wound Edges: attached    Odor:  none     Exposed structure(s): none  S&S of Infection: none            INTERVENTIONS BY WOUND TEAM: Patient supine in bed, R IT wound examined, cleaned with NS then pictured and measured, Wound covered with thin hydrocolloid. Multiple leg wounds examined, cleaned with NS then wrapped with Xeroform and roll gauze for protection and to prevent sticking to gauze. Dressing maintenance orders written.      Interdisciplinary consultation: Mendy, patient    EVALUATION:Patient reports these wounds appear spontaneously.Over the years she has had clusters of them on her buttocks, abdomen ( pannus ) and now over her legs. She reports having not been given a dx re: cause. Patient was DC'de from hospital on 4/28 and re admitted on " 5/7. Wound team saw patient on previous visit also.    Factors affecting wound healing:unknown dx for wounds, chronicity, DM  Goals:decrease wound size by 1%/ week    NURSING PLAN OF CARE ORDERS (X):    Dressing changes: See Dressing Maintenance orders: X  Skin care: See Skin Care orders:   Rectal tube care: See Rectal Tube Care orders:   Other orders:    RSKIN: CURRENT (X) ORDERED (O)  Q shift Jd:  X  Q shift pressure point assessments:  X  Pressure redistribution mattress  X      CORINNE      Bariatric CORINNE      Bariatric foam        Heel float boots       Heels floated on pillows X     Barrier wipes      Barrier Cream      Barrier paste      Sacral silicone dressing      Silicone O2 tubing      Anchorfast      Trach with Optifoam split foam       Waffle cushion      Rectal tube or BMS      Antifungal tx    Turn q 2 hours x- remind patient to turn herself    Up to chair     Ambulate X  PT/OT     Dietician      PO     TF   TPN     PVN    NPO   # days   Other       WOUND TEAM PLAN OF CARE (X):   NPWT change 3 x week:        Dressing changes by wound team:       Follow up as needed: X      Other (explain):    Anticipated discharge plans (X):  SNF:           Home Care:           Outpatient Wound Center:            Self Care:            Other:   TBD

## 2017-05-08 NOTE — ED NOTES
Pt continues to call on call light. Pt updated multiple times by multiple staff members. Pt continues to ask for pain medications. ERP aware and awaiting evaluation from hospitalist. Pt has been educated multiples times on this.

## 2017-05-09 ENCOUNTER — APPOINTMENT (OUTPATIENT)
Dept: RADIOLOGY | Facility: MEDICAL CENTER | Age: 57
DRG: 074 | End: 2017-05-09
Attending: HOSPITALIST
Payer: MEDICAID

## 2017-05-09 LAB
GLUCOSE BLD-MCNC: 117 MG/DL (ref 65–99)
GLUCOSE BLD-MCNC: 136 MG/DL (ref 65–99)
GLUCOSE BLD-MCNC: 92 MG/DL (ref 65–99)
GLUCOSE BLD-MCNC: 95 MG/DL (ref 65–99)
LV EJECT FRACT  99904: 65
LV EJECT FRACT MOD 2C 99903: 54.82
LV EJECT FRACT MOD 4C 99902: 55.22
LV EJECT FRACT MOD BP 99901: 56.94

## 2017-05-09 PROCEDURE — 700111 HCHG RX REV CODE 636 W/ 250 OVERRIDE (IP)

## 2017-05-09 PROCEDURE — 700102 HCHG RX REV CODE 250 W/ 637 OVERRIDE(OP): Performed by: INTERNAL MEDICINE

## 2017-05-09 PROCEDURE — 97165 OT EVAL LOW COMPLEX 30 MIN: CPT

## 2017-05-09 PROCEDURE — A9270 NON-COVERED ITEM OR SERVICE: HCPCS | Performed by: HOSPITALIST

## 2017-05-09 PROCEDURE — 700102 HCHG RX REV CODE 250 W/ 637 OVERRIDE(OP): Performed by: NURSE PRACTITIONER

## 2017-05-09 PROCEDURE — 700111 HCHG RX REV CODE 636 W/ 250 OVERRIDE (IP): Performed by: INTERNAL MEDICINE

## 2017-05-09 PROCEDURE — 700105 HCHG RX REV CODE 258: Performed by: INTERNAL MEDICINE

## 2017-05-09 PROCEDURE — 93306 TTE W/DOPPLER COMPLETE: CPT | Mod: 26 | Performed by: INTERNAL MEDICINE

## 2017-05-09 PROCEDURE — 700105 HCHG RX REV CODE 258

## 2017-05-09 PROCEDURE — A9270 NON-COVERED ITEM OR SERVICE: HCPCS | Performed by: NURSE PRACTITIONER

## 2017-05-09 PROCEDURE — 93306 TTE W/DOPPLER COMPLETE: CPT

## 2017-05-09 PROCEDURE — 99232 SBSQ HOSP IP/OBS MODERATE 35: CPT | Performed by: INTERNAL MEDICINE

## 2017-05-09 PROCEDURE — 700111 HCHG RX REV CODE 636 W/ 250 OVERRIDE (IP): Performed by: HOSPITALIST

## 2017-05-09 PROCEDURE — 82962 GLUCOSE BLOOD TEST: CPT

## 2017-05-09 PROCEDURE — A9270 NON-COVERED ITEM OR SERVICE: HCPCS | Performed by: INTERNAL MEDICINE

## 2017-05-09 PROCEDURE — 700105 HCHG RX REV CODE 258: Performed by: HOSPITALIST

## 2017-05-09 PROCEDURE — G8989 SELF CARE D/C STATUS: HCPCS | Mod: CI

## 2017-05-09 PROCEDURE — 700102 HCHG RX REV CODE 250 W/ 637 OVERRIDE(OP): Performed by: HOSPITALIST

## 2017-05-09 PROCEDURE — 770006 HCHG ROOM/CARE - MED/SURG/GYN SEMI*

## 2017-05-09 PROCEDURE — G8988 SELF CARE GOAL STATUS: HCPCS | Mod: CI

## 2017-05-09 PROCEDURE — G8987 SELF CARE CURRENT STATUS: HCPCS | Mod: CI

## 2017-05-09 PROCEDURE — 700111 HCHG RX REV CODE 636 W/ 250 OVERRIDE (IP): Performed by: NURSE PRACTITIONER

## 2017-05-09 RX ORDER — DICYCLOMINE HYDROCHLORIDE 10 MG/1
10 CAPSULE ORAL 4 TIMES DAILY PRN
Status: DISCONTINUED | OUTPATIENT
Start: 2017-05-09 | End: 2017-05-11 | Stop reason: HOSPADM

## 2017-05-09 RX ORDER — TRAMADOL HYDROCHLORIDE 50 MG/1
50 TABLET ORAL EVERY 6 HOURS PRN
Status: DISCONTINUED | OUTPATIENT
Start: 2017-05-09 | End: 2017-05-11 | Stop reason: HOSPADM

## 2017-05-09 RX ORDER — KETOROLAC TROMETHAMINE 30 MG/ML
30 INJECTION, SOLUTION INTRAMUSCULAR; INTRAVENOUS EVERY 6 HOURS PRN
Status: DISCONTINUED | OUTPATIENT
Start: 2017-05-09 | End: 2017-05-11 | Stop reason: HOSPADM

## 2017-05-09 RX ADMIN — METOCLOPRAMIDE 10 MG: 5 INJECTION, SOLUTION INTRAMUSCULAR; INTRAVENOUS at 23:41

## 2017-05-09 RX ADMIN — PRAVASTATIN SODIUM 20 MG: 10 TABLET ORAL at 09:01

## 2017-05-09 RX ADMIN — AMPICILLIN SODIUM AND SULBACTAM SODIUM 3 G: 2; 1 INJECTION, POWDER, FOR SOLUTION INTRAMUSCULAR; INTRAVENOUS at 11:10

## 2017-05-09 RX ADMIN — OXYCODONE HYDROCHLORIDE AND ACETAMINOPHEN 1 TABLET: 10; 325 TABLET ORAL at 15:12

## 2017-05-09 RX ADMIN — ENOXAPARIN SODIUM 40 MG: 100 INJECTION SUBCUTANEOUS at 09:00

## 2017-05-09 RX ADMIN — ASPIRIN 81 MG: 81 TABLET ORAL at 09:00

## 2017-05-09 RX ADMIN — MORPHINE SULFATE 2 MG: 4 INJECTION INTRAVENOUS at 13:17

## 2017-05-09 RX ADMIN — SODIUM CHLORIDE: 9 INJECTION, SOLUTION INTRAVENOUS at 11:12

## 2017-05-09 RX ADMIN — GABAPENTIN 800 MG: 400 CAPSULE ORAL at 09:00

## 2017-05-09 RX ADMIN — KETOROLAC TROMETHAMINE 30 MG: 30 INJECTION, SOLUTION INTRAMUSCULAR at 22:22

## 2017-05-09 RX ADMIN — GABAPENTIN 800 MG: 400 CAPSULE ORAL at 15:12

## 2017-05-09 RX ADMIN — MORPHINE SULFATE 30 MG: 30 TABLET, EXTENDED RELEASE ORAL at 20:26

## 2017-05-09 RX ADMIN — VANCOMYCIN HYDROCHLORIDE 1100 MG: 100 INJECTION, POWDER, LYOPHILIZED, FOR SOLUTION INTRAVENOUS at 15:12

## 2017-05-09 RX ADMIN — METOCLOPRAMIDE 10 MG: 5 INJECTION, SOLUTION INTRAMUSCULAR; INTRAVENOUS at 05:39

## 2017-05-09 RX ADMIN — OMEPRAZOLE 20 MG: 20 CAPSULE, DELAYED RELEASE ORAL at 09:01

## 2017-05-09 RX ADMIN — ZOLPIDEM TARTRATE 5 MG: 5 TABLET, FILM COATED ORAL at 23:47

## 2017-05-09 RX ADMIN — LISINOPRIL 5 MG: 10 TABLET ORAL at 09:01

## 2017-05-09 RX ADMIN — OXYCODONE HYDROCHLORIDE AND ACETAMINOPHEN 1 TABLET: 10; 325 TABLET ORAL at 20:26

## 2017-05-09 RX ADMIN — ZOLPIDEM TARTRATE 5 MG: 5 TABLET, FILM COATED ORAL at 00:31

## 2017-05-09 RX ADMIN — VANCOMYCIN HYDROCHLORIDE 1100 MG: 100 INJECTION, POWDER, LYOPHILIZED, FOR SOLUTION INTRAVENOUS at 03:56

## 2017-05-09 RX ADMIN — METOCLOPRAMIDE 10 MG: 5 INJECTION, SOLUTION INTRAMUSCULAR; INTRAVENOUS at 11:09

## 2017-05-09 RX ADMIN — MORPHINE SULFATE 2 MG: 4 INJECTION INTRAVENOUS at 05:39

## 2017-05-09 RX ADMIN — MORPHINE SULFATE 2 MG: 4 INJECTION INTRAVENOUS at 02:42

## 2017-05-09 RX ADMIN — TRAMADOL HYDROCHLORIDE 50 MG: 50 TABLET, COATED ORAL at 18:06

## 2017-05-09 RX ADMIN — GABAPENTIN 800 MG: 400 CAPSULE ORAL at 20:26

## 2017-05-09 RX ADMIN — AMPICILLIN SODIUM AND SULBACTAM SODIUM 3 G: 2; 1 INJECTION, POWDER, FOR SOLUTION INTRAMUSCULAR; INTRAVENOUS at 05:39

## 2017-05-09 RX ADMIN — METOCLOPRAMIDE 10 MG: 5 INJECTION, SOLUTION INTRAMUSCULAR; INTRAVENOUS at 16:49

## 2017-05-09 RX ADMIN — OXYCODONE HYDROCHLORIDE AND ACETAMINOPHEN 1 TABLET: 10; 325 TABLET ORAL at 03:48

## 2017-05-09 RX ADMIN — MORPHINE SULFATE 2 MG: 4 INJECTION INTRAVENOUS at 09:06

## 2017-05-09 RX ADMIN — OXYCODONE HYDROCHLORIDE AND ACETAMINOPHEN 1 TABLET: 10; 325 TABLET ORAL at 11:05

## 2017-05-09 RX ADMIN — TRAMADOL HYDROCHLORIDE 50 MG: 50 TABLET, COATED ORAL at 23:41

## 2017-05-09 RX ADMIN — MORPHINE SULFATE 30 MG: 30 TABLET, EXTENDED RELEASE ORAL at 09:01

## 2017-05-09 RX ADMIN — AMPICILLIN SODIUM AND SULBACTAM SODIUM 3 G: 2; 1 INJECTION, POWDER, FOR SOLUTION INTRAMUSCULAR; INTRAVENOUS at 18:06

## 2017-05-09 RX ADMIN — SODIUM CHLORIDE: 9 INJECTION, SOLUTION INTRAVENOUS at 23:41

## 2017-05-09 RX ADMIN — AMPICILLIN SODIUM AND SULBACTAM SODIUM 3 G: 2; 1 INJECTION, POWDER, FOR SOLUTION INTRAMUSCULAR; INTRAVENOUS at 23:41

## 2017-05-09 ASSESSMENT — ENCOUNTER SYMPTOMS
PALPITATIONS: 0
HEADACHES: 0
NAUSEA: 0
CONSTIPATION: 0
VOMITING: 0
FEVER: 0
DIARRHEA: 0
ABDOMINAL PAIN: 1
SHORTNESS OF BREATH: 0
DIAPHORESIS: 0
CHILLS: 0
COUGH: 0
MYALGIAS: 0

## 2017-05-09 ASSESSMENT — ACTIVITIES OF DAILY LIVING (ADL): TOILETING: INDEPENDENT

## 2017-05-09 ASSESSMENT — PAIN SCALES - GENERAL
PAINLEVEL_OUTOF10: 9
PAINLEVEL_OUTOF10: 10
PAINLEVEL_OUTOF10: 8
PAINLEVEL_OUTOF10: 10
PAINLEVEL_OUTOF10: 8
PAINLEVEL_OUTOF10: 8

## 2017-05-09 NOTE — PROGRESS NOTES
"Pharmacy Kinetics 56 y.o. female on vancomycin day # 1 5/8/2017    Currently loaded with 1800 mg once    Indication for Treatment: SSTI    Pertinent history per medical record: Admitted on 5/7/2017 for Nausea, vomiting for the past 2 weeks, unable to keep down any food, only small sips of water. PMH includes DMT2, diabetic gastroparesis, and chronic pain syndrome. She uses a walker/wheelchair. She was recently admitted to the hospital on 04/26/2017 for partial small-bowel obstruction. She presented to the ED yesterday with nausea, vomiting, and upper epigastric pain. She states she has been feeling weaker. She states that after she was discharged on 05/02/2017 she was able to tolerate a small amount of food in the morning, however, the following day on 05/03/2017, she started vomiting. She denies any hemetemesis, denies any diarrhea. No melena. No bilious emesis. She denies any chest pain. She does say she gets short of breath with exertion. She has multiple skin breakdowns on her lower extremities - states it is from the bandages that were placed on her lower legs during her acute hospitalization. When she took off the bandages, they tore off the scabs. She was not getting any wound care followup. She also developed a right gluteal decubitus ulcer during her last hospitalization. She states that it is difficult to heal and she tries to stay off of her right side as much as possible.    Other antibiotics: ampicillin/sulbactam 6 gm q6h    Allergies: Review of patient's allergies indicates no known allergies.     Pertinent cultures to date:   (5/7) blood x1 - NGTD    Recent Labs      05/07/17   1350  05/08/17   1553   WBC  7.0  6.3   NEUTSPOLYS  61.70  49.20     Recent Labs      05/07/17   1324  05/08/17   1346   BUN  24*  10   CREATININE  1.05  0.62   ALBUMIN  3.9   --      Blood pressure 135/75, pulse 89, temperature 36.6 °C (97.8 °F), resp. rate 16, height 1.651 m (5' 5\"), weight 71.668 kg (158 lb), last menstrual " period 2009, SpO2 95 %, not currently breastfeeding. Temp (24hrs), Av.6 °C (97.8 °F), Min:36.3 °C (97.3 °F), Max:36.7 °C (98.1 °F)    A/P   1. Vancomycin dose change: start 1100 mg q12h  2. Next vancomycin level: 5/10/17 at 0330  3. Goal trough: 12-16 mcg/mL  4. Comments: Patient tolerated vancomycin 1300 mg q12h but that was in . Dose was increased to 1500 mg q12h based on trough of ~10. I will start the patient on our procotol dosing of 15 mg /kg q12h and check a trough. Patient has diabetic neuropathy; may benefit from education and wound care. Recommend control of blood glucose to assist with wound healing. Pharmacy will continue to monitor and adjust dosing as clinically appropriate.    Chanel Nicholson, AmritD

## 2017-05-09 NOTE — PROGRESS NOTES
Pt a x o x 4, up ad ivelisse, steady gait, dsg dry intact, pain 9/10 in abdomen and r hip, no other needs at this time.

## 2017-05-09 NOTE — PROGRESS NOTES
Pt states the pain is better with the percocet 10mg and 2mg of morphine alternating, still rates pain 9/10 though.

## 2017-05-09 NOTE — PROGRESS NOTES
Hospital Medicine Progress Note, Adult, Complex               Author: Siena Lai Date & Time created: 5/9/2017  2:26 PM     Interval History:  The patient has known chronic pain, diabetes and gastroparesis. She presented with intractable nausea and vomiting and concern for abscess of the hip on imaging of 3cm.    Nausea and vomiting have resolved.  Hip fluid aspiration shows no organisms, she is afebrile and no leukocytosis  She is requiring iv morphine regularly.    Review of Systems:  Review of Systems   Constitutional: Negative for fever, chills and diaphoresis.   Respiratory: Negative for cough and shortness of breath.    Cardiovascular: Negative for chest pain and palpitations.   Gastrointestinal: Positive for abdominal pain. Negative for nausea, vomiting, diarrhea and constipation.   Genitourinary: Negative for dysuria and urgency.   Musculoskeletal: Negative for myalgias.   Skin: Negative for rash.   Neurological: Negative for headaches.       Physical Exam:  Physical Exam   Constitutional: No distress.   HENT:   Mouth/Throat: No oropharyngeal exudate.   Eyes: Conjunctivae are normal.   Cardiovascular: Normal rate and regular rhythm.    No murmur heard.  Pulmonary/Chest: Effort normal and breath sounds normal.   Abdominal: Soft. She exhibits no distension. There is no tenderness. There is no rebound and no guarding.   Musculoskeletal: She exhibits no edema.   Neurological: She is alert.   Skin: Skin is warm and dry.   Nursing note and vitals reviewed.      Labs:        Invalid input(s): [id number]      Recent Labs      05/07/17   1324  05/08/17   1346   SODIUM  135  135   POTASSIUM  4.4  4.7   CHLORIDE  103  106   CO2  25  23   BUN  24*  10   CREATININE  1.05  0.62   CALCIUM  9.9  9.0     Recent Labs      05/07/17   1324  05/08/17   1346   ALTSGPT  <5   --    ASTSGOT  12   --    ALKPHOSPHAT  111*   --    TBILIRUBIN  0.2   --    LIPASE  7*   --    GLUCOSE  135*  92     Recent Labs      05/07/17   1350   17   1346  17   1553   RBC  4.05*   --   3.81*   HEMOGLOBIN  10.4*   --   10.0*   HEMATOCRIT  34.0*   --   31.7*   PLATELETCT  434   --   352   PROTHROMBTM   --    --   14.0   INR   --    --   1.05   IRON   --   39*   --    TOTIRONBC   --   357   --      Recent Labs      17   1324  17   1350  17   1500  17   1553   WBC   --   7.0   --   6.3   NEUTSPOLYS   --   61.70   --   49.20   LYMPHOCYTES   --   31.20   --   41.10*   MONOCYTES   --   4.20   --   6.10   EOSINOPHILS   --   1.60  3  2.50   BASOPHILS   --   1.00   --   0.80   ASTSGOT  12   --    --    --    ALTSGPT  <5   --    --    --    ALKPHOSPHAT  111*   --    --    --    TBILIRUBIN  0.2   --    --    --            Hemodynamics:  Temp (24hrs), Av.3 °C (97.4 °F), Min:36.1 °C (96.9 °F), Max:36.6 °C (97.8 °F)  Temperature: 36.1 °C (96.9 °F)  Pulse  Av.8  Min: 71  Max: 110Heart Rate (Monitored): 80  Blood Pressure: 157/84 mmHg, NIBP: 117/50 mmHg     Respiratory:    Respiration: 18, Pulse Oximetry: 99 %     Work Of Breathing / Effort: Mild  RUL Breath Sounds: Clear, RML Breath Sounds: Clear, RLL Breath Sounds: Clear, LUIS Breath Sounds: Clear, LLL Breath Sounds: Clear  Fluids:    Intake/Output Summary (Last 24 hours) at 17 1426  Last data filed at 17 0000   Gross per 24 hour   Intake   1020 ml   Output      0 ml   Net   1020 ml        GI/Nutrition:  Orders Placed This Encounter   Procedures   • Diet Order     Standing Status: Standing      Number of Occurrences: 1      Standing Expiration Date:      Order Specific Question:  Diet:     Answer:  Diabetic [3]     Order Specific Question:  Diet:     Answer:  Full Liquid [11]     Medical Decision Making, by Problem:  Active Hospital Problems    Diagnosis   • Gastroparesis [K31.84] reglan with resolution   • Dehydration [E86.0] iv fluids with resolution   • Abdominal pain [R10.9] improving, will stop iv pain meds and advance diet   • HTN (hypertension) [I10]  controlled on lisinopril will monitor   • Abscess of right thigh [L02.415] drained, continue antibiotics for now   • Wound of lower extremity [S81.809A] wound care   • Lactic acidosis [E87.2] resolved, due to dehydration   • Emesis, persistent [R11.10] reglan   • Chronic pain [G89.29] stop iv pain meds, continue ms contin   • Type 2 diabetes mellitus with neuropathy, and autonomic neuropathy [E11.65] gabapentin       Labs reviewed and Medications reviewed  Umanzor catheter: No Umanzor      DVT Prophylaxis: Enoxaparin (Lovenox)    Ulcer prophylaxis: Yes  Antibiotics: Treating active infection/contamination beyond 24 hours perioperative coverage  Assessed for rehab: Patient returned to prior level of function, rehabilitation not indicated at this time

## 2017-05-09 NOTE — PROGRESS NOTES
Pharmacy Kinetics 56 y.o. female on vancomycin day # 2 5/9/2017    Currently on Vancomycin 1100 mg iv q12hr    Indication for Treatment: SSTI    Pertinent history per medical record: Admitted on 5/7/2017 for Nausea, vomiting for the past 2 weeks, unable to keep down any food, only small sips of water. PMH includes DMT2, diabetic gastroparesis, and chronic pain syndrome. She uses a walker/wheelchair. She was recently admitted to the hospital on 04/26/2017 for partial small-bowel obstruction. She presented to the ED yesterday with nausea, vomiting, and upper epigastric pain. She states she has been feeling weaker. She states that after she was discharged on 05/02/2017 she was able to tolerate a small amount of food in the morning, however, the following day on 05/03/2017, she started vomiting. She denies any hemetemesis, denies any diarrhea. No melena. No bilious emesis. She denies any chest pain. She does say she gets short of breath with exertion. She has multiple skin breakdowns on her lower extremities - states it is from the bandages that were placed on her lower legs during her acute hospitalization. When she took off the bandages, they tore off the scabs. She was not getting any wound care followup. She also developed a right gluteal decubitus ulcer during her last hospitalization. She states that it is difficult to heal and she tries to stay off of her right side as much as possible.    Other antibiotics: ampicillin/sulbactam 6 gm q6h    Allergies: Review of patient's allergies indicates no known allergies.     Pertinent cultures to date:    (5/7) blood x1 - NGTD    Recent Labs      05/07/17   1350  05/08/17   1553   WBC  7.0  6.3   NEUTSPOLYS  61.70  49.20     Recent Labs      05/07/17   1324  05/08/17   1346   BUN  24*  10   CREATININE  1.05  0.62   ALBUMIN  3.9   --      Intake/Output Summary (Last 24 hours) at 05/09/17 0946  Last data filed at 05/09/17 0000   Gross per 24 hour   Intake   1020 ml   Output    "   0 ml   Net   1020 ml      Blood pressure 157/84, pulse 95, temperature 36.1 °C (96.9 °F), resp. rate 18, height 1.651 m (5' 5\"), weight 71.668 kg (158 lb), last menstrual period 2009, SpO2 99 %, not currently breastfeeding. Temp (24hrs), Av.3 °C (97.4 °F), Min:36.1 °C (96.9 °F), Max:36.6 °C (97.8 °F)    A/P   1. Next vancomycin level: 5/10/17 at 0330  2. Goal trough: 12-16 mcg/mL  3. Comments: Patient started on procotol dosing of 15 mg /kg q12h. Trough for tomorrow to assess dosing due to patient requiring higher dosing in . Patient has diabetic neuropathy; may benefit from education and wound care. Recommend control of blood glucose to assist with wound healing. Pharmacy will continue to monitor and adjust dosing as clinically appropriate.    Chanel Nicholson, PharmD      "

## 2017-05-09 NOTE — PROGRESS NOTES
Bedside report received, assumed patient care. A&O x 4, labs/meds/VS reviewed. Resting in bed with eyes closed, chest rising and falling. Call light within reach, will continue to monitor.

## 2017-05-09 NOTE — THERAPY
"Occupational Therapy Evaluation completed.   Functional Status:  Mod I supine > < EOB, supv transfers without AD, supv toileting, supv LB dressing without AE  Plan of Care: Patient with no further skilled OT needs in the acute care setting at this time  Discharge Recommendations:  Equipment: No Equipment Needed. Post-acute therapy Currently anticipate no further skilled therapy needs once patient is discharged from the inpatient setting.    See \"Rehab Therapy-Acute\" Patient Summary Report for complete documentation.    56 y.o. female with recent admission for SBO who was admitted for N+V and weakness. Seen for OT eval. Pt is completing basic self-care and transfers with no more than supv. Pt is able to use standard toilet without difficulty, but wants BSC due to occasional urgency. Educated pt that insurance will not cover BSC for ambulatory patients. Advised pt to utilize [facility] for BSC. Pt is registered with [facility] and agrees to follow up. No further acute OT needs at this time.     "

## 2017-05-09 NOTE — CARE PLAN
Problem: Pain Management  Goal: Pain level will decrease to patient’s comfort goal  Outcome: PROGRESSING AS EXPECTED    Problem: Safety  Goal: Will remain free from injury  Outcome: PROGRESSING AS EXPECTED

## 2017-05-10 ENCOUNTER — APPOINTMENT (OUTPATIENT)
Dept: RADIOLOGY | Facility: MEDICAL CENTER | Age: 57
DRG: 074 | End: 2017-05-10
Attending: HOSPITALIST
Payer: MEDICAID

## 2017-05-10 LAB
GLUCOSE BLD-MCNC: 137 MG/DL (ref 65–99)
GLUCOSE BLD-MCNC: 152 MG/DL (ref 65–99)
GLUCOSE BLD-MCNC: 159 MG/DL (ref 65–99)
GLUCOSE BLD-MCNC: 165 MG/DL (ref 65–99)
VANCOMYCIN TROUGH SERPL-MCNC: 16.8 UG/ML (ref 10–20)

## 2017-05-10 PROCEDURE — A9270 NON-COVERED ITEM OR SERVICE: HCPCS | Performed by: NURSE PRACTITIONER

## 2017-05-10 PROCEDURE — 700111 HCHG RX REV CODE 636 W/ 250 OVERRIDE (IP): Performed by: INTERNAL MEDICINE

## 2017-05-10 PROCEDURE — A9270 NON-COVERED ITEM OR SERVICE: HCPCS | Performed by: HOSPITALIST

## 2017-05-10 PROCEDURE — A9270 NON-COVERED ITEM OR SERVICE: HCPCS | Performed by: INTERNAL MEDICINE

## 2017-05-10 PROCEDURE — 700105 HCHG RX REV CODE 258: Performed by: INTERNAL MEDICINE

## 2017-05-10 PROCEDURE — 700111 HCHG RX REV CODE 636 W/ 250 OVERRIDE (IP): Performed by: NURSE PRACTITIONER

## 2017-05-10 PROCEDURE — 700102 HCHG RX REV CODE 250 W/ 637 OVERRIDE(OP): Performed by: HOSPITALIST

## 2017-05-10 PROCEDURE — 82962 GLUCOSE BLOOD TEST: CPT | Mod: 91

## 2017-05-10 PROCEDURE — 73723 MRI JOINT LWR EXTR W/O&W/DYE: CPT | Mod: RT

## 2017-05-10 PROCEDURE — 700117 HCHG RX CONTRAST REV CODE 255: Performed by: HOSPITALIST

## 2017-05-10 PROCEDURE — 700105 HCHG RX REV CODE 258

## 2017-05-10 PROCEDURE — 700111 HCHG RX REV CODE 636 W/ 250 OVERRIDE (IP): Performed by: HOSPITALIST

## 2017-05-10 PROCEDURE — 36415 COLL VENOUS BLD VENIPUNCTURE: CPT

## 2017-05-10 PROCEDURE — 700111 HCHG RX REV CODE 636 W/ 250 OVERRIDE (IP)

## 2017-05-10 PROCEDURE — 700102 HCHG RX REV CODE 250 W/ 637 OVERRIDE(OP): Performed by: NURSE PRACTITIONER

## 2017-05-10 PROCEDURE — 80202 ASSAY OF VANCOMYCIN: CPT

## 2017-05-10 PROCEDURE — A9579 GAD-BASE MR CONTRAST NOS,1ML: HCPCS | Performed by: HOSPITALIST

## 2017-05-10 PROCEDURE — 770006 HCHG ROOM/CARE - MED/SURG/GYN SEMI*

## 2017-05-10 PROCEDURE — 99232 SBSQ HOSP IP/OBS MODERATE 35: CPT | Performed by: INTERNAL MEDICINE

## 2017-05-10 PROCEDURE — 700102 HCHG RX REV CODE 250 W/ 637 OVERRIDE(OP): Performed by: INTERNAL MEDICINE

## 2017-05-10 RX ORDER — LISINOPRIL 10 MG/1
10 TABLET ORAL EVERY MORNING
Status: DISCONTINUED | OUTPATIENT
Start: 2017-05-11 | End: 2017-05-11 | Stop reason: HOSPADM

## 2017-05-10 RX ADMIN — GABAPENTIN 800 MG: 400 CAPSULE ORAL at 08:00

## 2017-05-10 RX ADMIN — TRAMADOL HYDROCHLORIDE 50 MG: 50 TABLET, COATED ORAL at 06:19

## 2017-05-10 RX ADMIN — GABAPENTIN 800 MG: 400 CAPSULE ORAL at 20:29

## 2017-05-10 RX ADMIN — OXYCODONE HYDROCHLORIDE AND ACETAMINOPHEN 1 TABLET: 10; 325 TABLET ORAL at 21:36

## 2017-05-10 RX ADMIN — VANCOMYCIN HYDROCHLORIDE 1100 MG: 100 INJECTION, POWDER, LYOPHILIZED, FOR SOLUTION INTRAVENOUS at 04:20

## 2017-05-10 RX ADMIN — KETOROLAC TROMETHAMINE 30 MG: 30 INJECTION, SOLUTION INTRAMUSCULAR at 04:20

## 2017-05-10 RX ADMIN — METOCLOPRAMIDE 10 MG: 5 INJECTION, SOLUTION INTRAMUSCULAR; INTRAVENOUS at 17:15

## 2017-05-10 RX ADMIN — METOCLOPRAMIDE 10 MG: 5 INJECTION, SOLUTION INTRAMUSCULAR; INTRAVENOUS at 12:27

## 2017-05-10 RX ADMIN — OXYCODONE HYDROCHLORIDE AND ACETAMINOPHEN 1 TABLET: 10; 325 TABLET ORAL at 04:20

## 2017-05-10 RX ADMIN — TRAMADOL HYDROCHLORIDE 50 MG: 50 TABLET, COATED ORAL at 12:27

## 2017-05-10 RX ADMIN — PRAVASTATIN SODIUM 20 MG: 10 TABLET ORAL at 07:59

## 2017-05-10 RX ADMIN — MORPHINE SULFATE 30 MG: 30 TABLET, EXTENDED RELEASE ORAL at 08:01

## 2017-05-10 RX ADMIN — GADODIAMIDE 15 ML: 287 INJECTION INTRAVENOUS at 09:32

## 2017-05-10 RX ADMIN — INSULIN LISPRO 1 UNITS: 100 INJECTION, SOLUTION INTRAVENOUS; SUBCUTANEOUS at 17:15

## 2017-05-10 RX ADMIN — AMPICILLIN SODIUM AND SULBACTAM SODIUM 3 G: 2; 1 INJECTION, POWDER, FOR SOLUTION INTRAMUSCULAR; INTRAVENOUS at 18:11

## 2017-05-10 RX ADMIN — LISINOPRIL 5 MG: 10 TABLET ORAL at 08:00

## 2017-05-10 RX ADMIN — GABAPENTIN 800 MG: 400 CAPSULE ORAL at 15:33

## 2017-05-10 RX ADMIN — ASPIRIN 81 MG: 81 TABLET ORAL at 08:00

## 2017-05-10 RX ADMIN — MORPHINE SULFATE 30 MG: 30 TABLET, EXTENDED RELEASE ORAL at 20:28

## 2017-05-10 RX ADMIN — ENOXAPARIN SODIUM 40 MG: 100 INJECTION SUBCUTANEOUS at 08:02

## 2017-05-10 RX ADMIN — OXYCODONE HYDROCHLORIDE AND ACETAMINOPHEN 1 TABLET: 10; 325 TABLET ORAL at 07:59

## 2017-05-10 RX ADMIN — OXYCODONE HYDROCHLORIDE AND ACETAMINOPHEN 1 TABLET: 10; 325 TABLET ORAL at 17:14

## 2017-05-10 RX ADMIN — METOCLOPRAMIDE 10 MG: 5 INJECTION, SOLUTION INTRAMUSCULAR; INTRAVENOUS at 06:19

## 2017-05-10 RX ADMIN — INSULIN LISPRO 1 UNITS: 100 INJECTION, SOLUTION INTRAVENOUS; SUBCUTANEOUS at 06:26

## 2017-05-10 RX ADMIN — ZOLPIDEM TARTRATE 5 MG: 5 TABLET, FILM COATED ORAL at 22:33

## 2017-05-10 RX ADMIN — LORAZEPAM 1 MG: 2 INJECTION INTRAMUSCULAR; INTRAVENOUS at 09:10

## 2017-05-10 RX ADMIN — TRAMADOL HYDROCHLORIDE 50 MG: 50 TABLET, COATED ORAL at 20:28

## 2017-05-10 RX ADMIN — AMPICILLIN SODIUM AND SULBACTAM SODIUM 3 G: 2; 1 INJECTION, POWDER, FOR SOLUTION INTRAMUSCULAR; INTRAVENOUS at 06:19

## 2017-05-10 RX ADMIN — AMPICILLIN SODIUM AND SULBACTAM SODIUM 3 G: 2; 1 INJECTION, POWDER, FOR SOLUTION INTRAMUSCULAR; INTRAVENOUS at 12:27

## 2017-05-10 RX ADMIN — OMEPRAZOLE 20 MG: 20 CAPSULE, DELAYED RELEASE ORAL at 08:01

## 2017-05-10 RX ADMIN — VANCOMYCIN HYDROCHLORIDE 1100 MG: 100 INJECTION, POWDER, LYOPHILIZED, FOR SOLUTION INTRAVENOUS at 15:33

## 2017-05-10 ASSESSMENT — ENCOUNTER SYMPTOMS
SHORTNESS OF BREATH: 0
HEADACHES: 0
FEVER: 0
NAUSEA: 0
PALPITATIONS: 0
ABDOMINAL PAIN: 1
COUGH: 0
VOMITING: 0
MYALGIAS: 1
CONSTIPATION: 0
DIARRHEA: 0

## 2017-05-10 ASSESSMENT — PAIN SCALES - GENERAL
PAINLEVEL_OUTOF10: ASSUMED PAIN PRESENT
PAINLEVEL_OUTOF10: 9
PAINLEVEL_OUTOF10: 6
PAINLEVEL_OUTOF10: 6
PAINLEVEL_OUTOF10: 7
PAINLEVEL_OUTOF10: 9

## 2017-05-10 NOTE — PROGRESS NOTES
Bedside report received, assumed care. Pt A&O x4, labs/meds/VS reviewed. RR even and unlabored. Call light in place, will continue to monitor.

## 2017-05-10 NOTE — PROCEDURES
PT brought down for MRI @ approx 2030 Half way thru exam when MRI machine started to malfunction, attempted to restart machine, PT unable to continue on with scan due to pain, images acquired sent to PACS, PT sent back upstairs, RN aware, will finish remainder of exam tomorrow 5/10.

## 2017-05-10 NOTE — PROGRESS NOTES
Pt a x o x 4, advanced diet today tolerating okay, c/o pain 10/10, all dsg dry intact, cont to monitor.

## 2017-05-10 NOTE — PROGRESS NOTES
Paged Kylee BENNETT about pt pain 10/10, refuses the bentyl because she has pain all over. Orders to be placed.

## 2017-05-10 NOTE — PROGRESS NOTES
Pharmacy Kinetics 56 y.o. female on vancomycin day #3 5/10/2017    Currently on Vancomycin 1100 mg iv q12hr    Indication for Treatment: SSTI    Pertinent history per medical record: Admitted on 5/7/2017 for Nausea, vomiting for the past 2 weeks, unable to keep down any food, only small sips of water. PMH includes DMT2, diabetic gastroparesis, and chronic pain syndrome. She uses a walker/wheelchair. She was recently admitted to the hospital on 04/26/2017 for partial small-bowel obstruction. She presented to the ED yesterday with nausea, vomiting, and upper epigastric pain. She states she has been feeling weaker. She states that after she was discharged on 05/02/2017 she was able to tolerate a small amount of food in the morning, however, the following day on 05/03/2017, she started vomiting. She denies any hemetemesis, denies any diarrhea. No melena. No bilious emesis. She denies any chest pain. She does say she gets short of breath with exertion. She has multiple skin breakdowns on her lower extremities - states it is from the bandages that were placed on her lower legs during her acute hospitalization. When she took off the bandages, they tore off the scabs. She was not getting any wound care followup. She also developed a right gluteal decubitus ulcer during her last hospitalization. She states that it is difficult to heal and she tries to stay off of her right side as much as possible.    Other antibiotics: ampicillin/sulbactam 6 gm q6h    Allergies: Review of patient's allergies indicates no known allergies.     Pertinent cultures to date:    (5/8) wound right thigh abscess x3 - NGTD  (5/7) blood x2 - NGTD    Recent Labs      05/08/17   1553   WBC  6.3   NEUTSPOLYS  49.20     Recent Labs      05/08/17   1346   BUN  10   CREATININE  0.62     Recent Labs      05/10/17   0330   VANCOTROUGH  16.8     Intake/Output Summary (Last 24 hours) at 05/10/17 1601  Last data filed at 05/10/17 1000   Gross per 24 hour  "  Intake   1720 ml   Output      0 ml   Net   1720 ml      Blood pressure 166/84, pulse 97, temperature 36.9 °C (98.4 °F), resp. rate 16, height 1.651 m (5' 5\"), weight 71.668 kg (158 lb), last menstrual period 2009, SpO2 98 %, not currently breastfeeding. Temp (24hrs), Av.8 °C (98.3 °F), Min:36.7 °C (98.1 °F), Max:36.9 °C (98.4 °F)    A/P   1. Vancomycin dose change: decrease to 900 mg q12h  2. Next vancomycin level: 17 at 1530  3. Goal trough: 12-16 mcg/mL  4. Comments: Level above goal and not at steady state. Will decrease dose to target a trough closer to 12 mcg/mL and check a level after 3 additional doses. I would recommend de-escalation of vancomycin tomorrow after MRI results are back. Cultures all have no growth to date. Pharmacy will continue to monitor and adjust dosing as clinically appropriate.    Chanel Nicholson, PharmD  "

## 2017-05-10 NOTE — PROGRESS NOTES
"Hospital Medicine Progress Note, Adult, Complex               Author: Siena Lai Date & Time created: 5/10/2017  8:39 AM     Interval History:  The patient has known chronic pain, diabetes and gastroparesis. She presented with intractable nausea and vomiting and concern for abscess of the hip on imaging of 3cm.    Nausea and vomiting have resolved.  Hip fluid aspiration shows no organisms, she is afebrile and no leukocytosis  Overnight she has tolerated a regular diet but could not complete the MRI of her hip due to pain and is requesting iv pain medication again for \"all over pain\"  She is refusing bentyl    Review of Systems:  Review of Systems   Constitutional: Negative for fever.   Respiratory: Negative for cough and shortness of breath.    Cardiovascular: Negative for chest pain and palpitations.   Gastrointestinal: Positive for abdominal pain. Negative for nausea, vomiting, diarrhea and constipation.        Patient is eating without difficulty   Genitourinary: Negative for dysuria and urgency.   Musculoskeletal: Positive for myalgias.        Pain is all over   Skin: Negative for itching and rash.   Neurological: Negative for headaches.       Physical Exam:  Physical Exam   Constitutional: She is oriented to person, place, and time. No distress.   Eyes: No scleral icterus.   Cardiovascular: Normal rate and regular rhythm.    No murmur heard.  Pulmonary/Chest: Effort normal and breath sounds normal.   Abdominal: Soft. Bowel sounds are normal. There is no tenderness. There is no rebound and no guarding.   Musculoskeletal: She exhibits no edema.   Neurological: She is alert and oriented to person, place, and time. Coordination normal.   Skin: Skin is warm and dry.   Psychiatric: Her behavior is normal.   Nursing note and vitals reviewed.      Labs:        Invalid input(s): [id number]      Recent Labs      05/07/17   1324  05/08/17   1346   SODIUM  135  135   POTASSIUM  4.4  4.7   CHLORIDE  103  106   CO2  " 25  23   BUN  24*  10   CREATININE  1.05  0.62   CALCIUM  9.9  9.0     Recent Labs      17   1324  17   1346   ALTSGPT  <5   --    ASTSGOT  12   --    ALKPHOSPHAT  111*   --    TBILIRUBIN  0.2   --    LIPASE  7*   --    GLUCOSE  135*  92     Recent Labs      17   1350  17   1346  17   1553   RBC  4.05*   --   3.81*   HEMOGLOBIN  10.4*   --   10.0*   HEMATOCRIT  34.0*   --   31.7*   PLATELETCT  434   --   352   PROTHROMBTM   --    --   14.0   INR   --    --   1.05   IRON   --   39*   --    TOTIRONBC   --   357   --      Recent Labs      17   1324  17   1350  17   1500  17   1553   WBC   --   7.0   --   6.3   NEUTSPOLYS   --   61.70   --   49.20   LYMPHOCYTES   --   31.20   --   41.10*   MONOCYTES   --   4.20   --   6.10   EOSINOPHILS   --   1.60  3  2.50   BASOPHILS   --   1.00   --   0.80   ASTSGOT  12   --    --    --    ALTSGPT  <5   --    --    --    ALKPHOSPHAT  111*   --    --    --    TBILIRUBIN  0.2   --    --    --            Hemodynamics:  Temp (24hrs), Av.7 °C (98 °F), Min:36.1 °C (97 °F), Max:36.9 °C (98.4 °F)  Temperature: 36.9 °C (98.4 °F)  Pulse  Av  Min: 71  Max: 110   Blood Pressure: (!) 166/84 mmHg     Respiratory:    Respiration: 16, Pulse Oximetry: 98 %     Work Of Breathing / Effort: Mild  RUL Breath Sounds: Clear, RML Breath Sounds: Clear, RLL Breath Sounds: Clear, LUIS Breath Sounds: Clear, LLL Breath Sounds: Clear  Fluids:    Intake/Output Summary (Last 24 hours) at 05/10/17 0839  Last data filed at 17 1800   Gross per 24 hour   Intake   1960 ml   Output      0 ml   Net   1960 ml        GI/Nutrition:  Orders Placed This Encounter   Procedures   • Diet Order     Standing Status: Standing      Number of Occurrences: 1      Standing Expiration Date:      Order Specific Question:  Diet:     Answer:  Diabetic [3]     Medical Decision Making, by Problem:  Active Hospital Problems    Diagnosis   • Gastroparesis [K31.84] reglan with  resolution, no nausea now   • Dehydration [E86.0] iv fluids given with resolution patient drinking fluids   • Abdominal pain [R10.9] improved, will continue to offer bentyl as needed   • HTN (hypertension) [I10] elevated blood pressure today, will increase lisinopril   • Abscess of right thigh [L02.415] drained, continue antibiotics for now, if culture still with no growth today may get MRI outpatient IF she cannot tolerate laying flat, patient states she wants to have it done while she is here and feels she will be able to tolerate laying flat for the MRI, will plan for today   • Wound of lower extremity [S81.809A] wound care   • Lactic acidosis [E87.2] resolved, due to dehydration   • Emesis, persistent [R11.10] reglan   • Chronic pain [G89.29]  continue ms contin   • Type 2 diabetes mellitus with neuropathy, and autonomic neuropathy [E11.65] gabapentin       Labs reviewed and Medications reviewed  Umanzor catheter: No Umanzor      DVT Prophylaxis: Enoxaparin (Lovenox)    Ulcer prophylaxis: Yes  Antibiotics: Treating active infection/contamination beyond 24 hours perioperative coverage  Assessed for rehab: Patient returned to prior level of function, rehabilitation not indicated at this time

## 2017-05-10 NOTE — DISCHARGE PLANNING
Medical Social Work    Pt is eligible for the [facility] transportation benefit. [facility] provides pts with transportation to and from medical appointments and hospitals. SW verified that [facility] would transport the pt to a pharmacy.

## 2017-05-11 VITALS
HEIGHT: 65 IN | SYSTOLIC BLOOD PRESSURE: 147 MMHG | OXYGEN SATURATION: 96 % | RESPIRATION RATE: 17 BRPM | DIASTOLIC BLOOD PRESSURE: 78 MMHG | BODY MASS INDEX: 26.33 KG/M2 | HEART RATE: 87 BPM | WEIGHT: 158 LBS | TEMPERATURE: 96.3 F

## 2017-05-11 LAB
ANION GAP SERPL CALC-SCNC: 9 MMOL/L (ref 0–11.9)
BACTERIA WND AEROBE CULT: NORMAL
BASOPHILS # BLD AUTO: 0.9 % (ref 0–1.8)
BASOPHILS # BLD: 0.06 K/UL (ref 0–0.12)
BUN SERPL-MCNC: 11 MG/DL (ref 8–22)
CALCIUM SERPL-MCNC: 8.6 MG/DL (ref 8.5–10.5)
CHLORIDE SERPL-SCNC: 105 MMOL/L (ref 96–112)
CK SERPL-CCNC: 57 U/L (ref 0–154)
CO2 SERPL-SCNC: 23 MMOL/L (ref 20–33)
CREAT SERPL-MCNC: 0.72 MG/DL (ref 0.5–1.4)
EOSINOPHIL # BLD AUTO: 0.31 K/UL (ref 0–0.51)
EOSINOPHIL NFR BLD: 4.7 % (ref 0–6.9)
ERYTHROCYTE [DISTWIDTH] IN BLOOD BY AUTOMATED COUNT: 46.5 FL (ref 35.9–50)
GFR SERPL CREATININE-BSD FRML MDRD: >60 ML/MIN/1.73 M 2
GLUCOSE BLD-MCNC: 142 MG/DL (ref 65–99)
GLUCOSE BLD-MCNC: 197 MG/DL (ref 65–99)
GLUCOSE SERPL-MCNC: 186 MG/DL (ref 65–99)
GRAM STN SPEC: NORMAL
HCT VFR BLD AUTO: 29.6 % (ref 37–47)
HGB BLD-MCNC: 9.6 G/DL (ref 12–16)
IMM GRANULOCYTES # BLD AUTO: 0.02 K/UL (ref 0–0.11)
IMM GRANULOCYTES NFR BLD AUTO: 0.3 % (ref 0–0.9)
LYMPHOCYTES # BLD AUTO: 2.93 K/UL (ref 1–4.8)
LYMPHOCYTES NFR BLD: 44.8 % (ref 22–41)
MCH RBC QN AUTO: 27.1 PG (ref 27–33)
MCHC RBC AUTO-ENTMCNC: 32.4 G/DL (ref 33.6–35)
MCV RBC AUTO: 83.6 FL (ref 81.4–97.8)
MONOCYTES # BLD AUTO: 0.49 K/UL (ref 0–0.85)
MONOCYTES NFR BLD AUTO: 7.5 % (ref 0–13.4)
NEUTROPHILS # BLD AUTO: 2.73 K/UL (ref 2–7.15)
NEUTROPHILS NFR BLD: 41.8 % (ref 44–72)
NRBC # BLD AUTO: 0 K/UL
NRBC BLD AUTO-RTO: 0 /100 WBC
PLATELET # BLD AUTO: 364 K/UL (ref 164–446)
PMV BLD AUTO: 9.1 FL (ref 9–12.9)
POTASSIUM SERPL-SCNC: 3.7 MMOL/L (ref 3.6–5.5)
RBC # BLD AUTO: 3.54 M/UL (ref 4.2–5.4)
SIGNIFICANT IND 70042: NORMAL
SITE SITE: NORMAL
SODIUM SERPL-SCNC: 137 MMOL/L (ref 135–145)
SOURCE SOURCE: NORMAL
WBC # BLD AUTO: 6.5 K/UL (ref 4.8–10.8)

## 2017-05-11 PROCEDURE — 36415 COLL VENOUS BLD VENIPUNCTURE: CPT

## 2017-05-11 PROCEDURE — 700111 HCHG RX REV CODE 636 W/ 250 OVERRIDE (IP)

## 2017-05-11 PROCEDURE — 80048 BASIC METABOLIC PNL TOTAL CA: CPT

## 2017-05-11 PROCEDURE — 97535 SELF CARE MNGMENT TRAINING: CPT

## 2017-05-11 PROCEDURE — 700105 HCHG RX REV CODE 258: Performed by: INTERNAL MEDICINE

## 2017-05-11 PROCEDURE — 700111 HCHG RX REV CODE 636 W/ 250 OVERRIDE (IP): Performed by: HOSPITALIST

## 2017-05-11 PROCEDURE — A9270 NON-COVERED ITEM OR SERVICE: HCPCS | Performed by: HOSPITALIST

## 2017-05-11 PROCEDURE — 700102 HCHG RX REV CODE 250 W/ 637 OVERRIDE(OP): Performed by: INTERNAL MEDICINE

## 2017-05-11 PROCEDURE — 82962 GLUCOSE BLOOD TEST: CPT | Mod: 91

## 2017-05-11 PROCEDURE — 85025 COMPLETE CBC W/AUTO DIFF WBC: CPT

## 2017-05-11 PROCEDURE — 99239 HOSP IP/OBS DSCHRG MGMT >30: CPT | Performed by: INTERNAL MEDICINE

## 2017-05-11 PROCEDURE — 82550 ASSAY OF CK (CPK): CPT

## 2017-05-11 PROCEDURE — 700111 HCHG RX REV CODE 636 W/ 250 OVERRIDE (IP): Performed by: INTERNAL MEDICINE

## 2017-05-11 PROCEDURE — A9270 NON-COVERED ITEM OR SERVICE: HCPCS | Performed by: INTERNAL MEDICINE

## 2017-05-11 PROCEDURE — 700105 HCHG RX REV CODE 258

## 2017-05-11 PROCEDURE — 97116 GAIT TRAINING THERAPY: CPT

## 2017-05-11 PROCEDURE — 700102 HCHG RX REV CODE 250 W/ 637 OVERRIDE(OP): Performed by: HOSPITALIST

## 2017-05-11 RX ORDER — DICYCLOMINE HYDROCHLORIDE 10 MG/1
10 CAPSULE ORAL 4 TIMES DAILY PRN
Qty: 120 CAP | Refills: 0 | Status: SHIPPED | OUTPATIENT
Start: 2017-05-11 | End: 2017-05-31

## 2017-05-11 RX ADMIN — ASPIRIN 81 MG: 81 TABLET ORAL at 08:46

## 2017-05-11 RX ADMIN — PRAVASTATIN SODIUM 20 MG: 10 TABLET ORAL at 08:46

## 2017-05-11 RX ADMIN — AMPICILLIN SODIUM AND SULBACTAM SODIUM 3 G: 2; 1 INJECTION, POWDER, FOR SOLUTION INTRAMUSCULAR; INTRAVENOUS at 11:56

## 2017-05-11 RX ADMIN — OXYCODONE HYDROCHLORIDE AND ACETAMINOPHEN 1 TABLET: 10; 325 TABLET ORAL at 01:46

## 2017-05-11 RX ADMIN — GABAPENTIN 800 MG: 400 CAPSULE ORAL at 08:46

## 2017-05-11 RX ADMIN — ENOXAPARIN SODIUM 40 MG: 100 INJECTION SUBCUTANEOUS at 08:46

## 2017-05-11 RX ADMIN — METOCLOPRAMIDE 10 MG: 5 INJECTION, SOLUTION INTRAMUSCULAR; INTRAVENOUS at 11:55

## 2017-05-11 RX ADMIN — OXYCODONE HYDROCHLORIDE AND ACETAMINOPHEN 1 TABLET: 10; 325 TABLET ORAL at 10:18

## 2017-05-11 RX ADMIN — AMPICILLIN SODIUM AND SULBACTAM SODIUM 3 G: 2; 1 INJECTION, POWDER, FOR SOLUTION INTRAMUSCULAR; INTRAVENOUS at 06:28

## 2017-05-11 RX ADMIN — MORPHINE SULFATE 30 MG: 30 TABLET, EXTENDED RELEASE ORAL at 08:46

## 2017-05-11 RX ADMIN — VANCOMYCIN HYDROCHLORIDE 900 MG: 100 INJECTION, POWDER, LYOPHILIZED, FOR SOLUTION INTRAVENOUS at 04:55

## 2017-05-11 RX ADMIN — OMEPRAZOLE 20 MG: 20 CAPSULE, DELAYED RELEASE ORAL at 08:45

## 2017-05-11 RX ADMIN — LISINOPRIL 10 MG: 10 TABLET ORAL at 08:46

## 2017-05-11 RX ADMIN — AMPICILLIN SODIUM AND SULBACTAM SODIUM 3 G: 2; 1 INJECTION, POWDER, FOR SOLUTION INTRAMUSCULAR; INTRAVENOUS at 01:06

## 2017-05-11 RX ADMIN — METOCLOPRAMIDE 10 MG: 5 INJECTION, SOLUTION INTRAMUSCULAR; INTRAVENOUS at 06:02

## 2017-05-11 RX ADMIN — OXYCODONE HYDROCHLORIDE AND ACETAMINOPHEN 1 TABLET: 10; 325 TABLET ORAL at 06:08

## 2017-05-11 RX ADMIN — METOCLOPRAMIDE 10 MG: 5 INJECTION, SOLUTION INTRAMUSCULAR; INTRAVENOUS at 01:06

## 2017-05-11 ASSESSMENT — PAIN SCALES - GENERAL
PAINLEVEL_OUTOF10: 9
PAINLEVEL_OUTOF10: 5
PAINLEVEL_OUTOF10: 9
PAINLEVEL_OUTOF10: 5
PAINLEVEL_OUTOF10: ASSUMED PAIN PRESENT
PAINLEVEL_OUTOF10: 5

## 2017-05-11 ASSESSMENT — GAIT ASSESSMENTS
GAIT LEVEL OF ASSIST: SUPERVISED
DEVIATION: DECREASED BASE OF SUPPORT
DISTANCE (FEET): 500
ASSISTIVE DEVICE: FRONT WHEEL WALKER

## 2017-05-11 NOTE — DISCHARGE INSTRUCTIONS
Discharge Instructions    Discharged to home by car with friend. Discharged via wheelchair, hospital escort: Yes.  Special equipment needed: Not Applicable    Be sure to schedule a follow-up appointment with your primary care doctor or any specialists as instructed.     Discharge Plan:   Diet Plan: Discussed  Activity Level: Discussed  Confirmed Follow up Appointment: Patient to Call and Schedule Appointment  Confirmed Symptoms Management: Discussed  Medication Reconciliation Updated: Yes  Influenza Vaccine Indication: Not indicated: Previously immunized this influenza season and > 8 years of age    I understand that a diet low in cholesterol, fat, and sodium is recommended for good health. Unless I have been given specific instructions below for another diet, I accept this instruction as my diet prescription.   Other diet: Diabetic    Special Instructions: None    · Is patient discharged on Warfarin / Coumadin?   No     · Is patient Post Blood Transfusion?  No    Depression / Suicide Risk    As you are discharged from this [facility] facility, it is important to learn how to keep safe from harming yourself.    Recognize the warning signs:  · Abrupt changes in personality, positive or negative- including increase in energy   · Giving away possessions  · Change in eating patterns- significant weight changes-  positive or negative  · Change in sleeping patterns- unable to sleep or sleeping all the time   · Unwillingness or inability to communicate  · Depression  · Unusual sadness, discouragement and loneliness  · Talk of wanting to die  · Neglect of personal appearance   · Rebelliousness- reckless behavior  · Withdrawal from people/activities they love  · Confusion- inability to concentrate     If you or a loved one observes any of these behaviors or has concerns about self-harm, here's what you can do:  · Talk about it- your feelings and reasons for harming yourself  · Remove any means that you might use to hurt  yourself (examples: pills, rope, extension cords, firearm)  · Get professional help from the community (Mental Health, Substance Abuse, psychological counseling)  · Do not be alone:Call your Safe Contact- someone whom you trust who will be there for you.  · Call your local CRISIS HOTLINE 000-4121 or 441-029-6029  · Call your local Children's Mobile Crisis Response Team [place] (482) 289-9073 or www.[employer]  · Call the toll free National Suicide Prevention Hotlines   · National Suicide Prevention Lifeline 904-271-TBPW (1977)  · National Hope Line Network 800-SUICIDE (331-1215)

## 2017-05-11 NOTE — THERAPY
"Pt willing and was LADARIUS for bed mob, supv transfers and gait w/ feet x 2 on indoor outdoor surfaces w/o LOB, Pt needs education regarding DM I mngt and eating habits, Pt open to education ,willing to improve and motivated for change. Pt sat [facility] post tx and will benifit from post acute rehab after D/C home to address endurance and safety awreness issuesPhysical Therapy Treatment completed.   Bed Mobility:  Supine to Sit: Modified Independent  Transfers: Sit to Stand: Modified Independent  Gait: Level Of Assist: Supervised with Front-Wheel Walker       Plan of Care: Will benefit from Physical Therapy 3 times per week  Discharge Recommendations: Equipment: Front-Wheel Walker. Post-acute therapy Currently anticipate no further skilled therapy needs once patient is discharged from the inpatient setting.     See \"Rehab Therapy-Acute\" Patient Summary Report for complete documentation.       "

## 2017-05-11 NOTE — PROGRESS NOTES
Received bedside shift report from days RN  Assessment complete  C/o pain 9/10. Medicated per MAR with po pain medications  Ambulating in hallway independently. Gait steady.  Tolerating diet. Requested lunch box tonight from cafeteria.  fsbs 152. Refused insulin tonight.  LBM today 5/10  Voiding qs  All needs met at this time  Call light within reach  Will continue to monitor.

## 2017-05-11 NOTE — CARE PLAN
Problem: Knowledge Deficit  Goal: Knowledge of disease process/condition, treatment plan, diagnostic tests, and medications will improve  POC: IV Vanco and Unasyn    Problem: Discharge Barriers/Planning  Goal: Patient’s continuum of care needs will be met  DC home today

## 2017-05-11 NOTE — PROGRESS NOTES
Pt D/C'd. PIV removed.  Discharge instructions provided to pt.  Pt states understanding.  Pt states all questions have been answered.  Copy of discharge provided to pt.  Signed copy in chart.  Prescriptions provided to pt, copy in chart. Pt states that all personal belongings are in possession.

## 2017-05-11 NOTE — PROGRESS NOTES
Received report, assumed pt care. Pt a&o 4, VSS, Assessment completed. Resting comfortably in bed with call light, bedside table in reach. No c/o at this time. Side rails up 2. Instructed to use call light when needing to get OOB verbalized understanding. Bed in low position. Will continue to monitor.

## 2017-05-12 ENCOUNTER — PATIENT OUTREACH (OUTPATIENT)
Dept: HEALTH INFORMATION MANAGEMENT | Facility: OTHER | Age: 57
End: 2017-05-12

## 2017-05-12 LAB
BACTERIA BLD CULT: NORMAL
SIGNIFICANT IND 70042: NORMAL
SITE SITE: NORMAL
SOURCE SOURCE: NORMAL

## 2017-05-12 NOTE — DISCHARGE SUMMARY
DATE OF ADMISSION:  05/07/2017    DATE OF DISCHARGE:  05/11/2017.    DISCHARGE DIAGNOSES:  1.  Intractable nausea and vomiting.  2.  Acute dehydration.  3.  Gastroparesis.  4.  Type 2 diabetes mellitus.  5.  Chronic pain.  6.  Essential hypertension.  7.  Dyslipidemia.  8.  Right hip fluid collection with no evidence of infection, abscess had been   ruled out.  9.  Heart murmur, which is chronic.    HOSPITAL COURSE:  The patient is a 56-year-old female who presented to the   hospital with intractable nausea and vomiting ongoing for 2 weeks.  She was   admitted to the hospital and treated with IV fluids as she had evidence of   dehydration.  She was also given pain medication and CT scan of abdomen and   pelvis showed increased stool consistent with constipation and a 3 cm   inflammatory process in the right lateral gluteal muscles concerning for   possible abscess.  Patient was admitted to the hospital and treated with   intravenous antibiotics.  Interventional radiology was consulted on the case   for drainage of the fluid collection in the right muscle.  This showed no   evidence of bacteria and cultures were negative for any growth.  MRI of the   hip was obtained for further evaluation, which showed enhancing gluteus   claudio muscle over the greater trochanter with a central lack of enhancement,   indicating mild necrosis of possible rhabdomyolysis.  Creatinine kinase level   was checked and found to be normal.  It was felt to be due to muscle, injury   such as in the setting of extensive pressure on the area.  Patient had no   evidence of infection, antibiotics were discontinued.  Her nausea and vomiting   did resolve and she was passing stool and ambulating steadily independently.    DISPOSITION:  The patient is discharged home.    DISCHARGE MEDICATIONS:  Victoza 1.8 mg daily, Tresiba FlexTouch 40 units   daily, MS Contin 30 mg twice daily, Bentyl 10 mg 4 times daily as needed for   abdominal pain, Prilosec 20  mg daily, Phenergan 25 mg rectal suppository every   6 hours as needed for nausea or vomiting.    FOLLOWUP:  Follow up is with her primary care provider, Dr. Tre Mercer as   soon as possible.    Time of discharge was 36 minutes reviewing patient's laboratory studies as   well as imaging studies and results there of.       ____________________________________     MD LOGAN ISRAEL / RODRIGO    DD:  05/11/2017 13:12:57  DT:  05/12/2017 02:51:12    D#:  [id number]  Job#:  [id number]    cc: TRE MERCER MD

## 2017-05-13 LAB
BACTERIA BLD CULT: NORMAL
BACTERIA SPEC ANAEROBE CULT: NORMAL
SIGNIFICANT IND 70042: NORMAL
SIGNIFICANT IND 70042: NORMAL
SITE SITE: NORMAL
SITE SITE: NORMAL
SOURCE SOURCE: NORMAL
SOURCE SOURCE: NORMAL

## 2017-05-24 PROCEDURE — 99284 EMERGENCY DEPT VISIT MOD MDM: CPT

## 2017-05-25 ENCOUNTER — HOSPITAL ENCOUNTER (EMERGENCY)
Facility: MEDICAL CENTER | Age: 57
End: 2017-05-25
Attending: EMERGENCY MEDICINE
Payer: MEDICAID

## 2017-05-25 VITALS
WEIGHT: 168.21 LBS | OXYGEN SATURATION: 95 % | SYSTOLIC BLOOD PRESSURE: 143 MMHG | HEIGHT: 65 IN | BODY MASS INDEX: 28.03 KG/M2 | DIASTOLIC BLOOD PRESSURE: 77 MMHG | HEART RATE: 69 BPM | TEMPERATURE: 98.1 F | RESPIRATION RATE: 16 BRPM

## 2017-05-25 DIAGNOSIS — R10.9 ABDOMINAL PAIN, UNSPECIFIED LOCATION: ICD-10-CM

## 2017-05-25 LAB
ALBUMIN SERPL BCP-MCNC: 3.8 G/DL (ref 3.2–4.9)
ALBUMIN/GLOB SERPL: 0.9 G/DL
ALP SERPL-CCNC: 143 U/L (ref 30–99)
ALT SERPL-CCNC: 14 U/L (ref 2–50)
ANION GAP SERPL CALC-SCNC: 9 MMOL/L (ref 0–11.9)
AST SERPL-CCNC: 16 U/L (ref 12–45)
BASOPHILS # BLD AUTO: 0.7 % (ref 0–1.8)
BASOPHILS # BLD: 0.06 K/UL (ref 0–0.12)
BILIRUB SERPL-MCNC: 0.2 MG/DL (ref 0.1–1.5)
BUN SERPL-MCNC: 17 MG/DL (ref 8–22)
CALCIUM SERPL-MCNC: 9.4 MG/DL (ref 8.5–10.5)
CHLORIDE SERPL-SCNC: 106 MMOL/L (ref 96–112)
CO2 SERPL-SCNC: 24 MMOL/L (ref 20–33)
CREAT SERPL-MCNC: 0.69 MG/DL (ref 0.5–1.4)
EOSINOPHIL # BLD AUTO: 0.11 K/UL (ref 0–0.51)
EOSINOPHIL NFR BLD: 1.3 % (ref 0–6.9)
ERYTHROCYTE [DISTWIDTH] IN BLOOD BY AUTOMATED COUNT: 47.8 FL (ref 35.9–50)
GFR SERPL CREATININE-BSD FRML MDRD: >60 ML/MIN/1.73 M 2
GLOBULIN SER CALC-MCNC: 4.3 G/DL (ref 1.9–3.5)
GLUCOSE SERPL-MCNC: 118 MG/DL (ref 65–99)
HCT VFR BLD AUTO: 36.1 % (ref 37–47)
HGB BLD-MCNC: 11.1 G/DL (ref 12–16)
IMM GRANULOCYTES # BLD AUTO: 0.02 K/UL (ref 0–0.11)
IMM GRANULOCYTES NFR BLD AUTO: 0.2 % (ref 0–0.9)
LIPASE SERPL-CCNC: 6 U/L (ref 11–82)
LYMPHOCYTES # BLD AUTO: 2.35 K/UL (ref 1–4.8)
LYMPHOCYTES NFR BLD: 27.6 % (ref 22–41)
MCH RBC QN AUTO: 25.3 PG (ref 27–33)
MCHC RBC AUTO-ENTMCNC: 30.7 G/DL (ref 33.6–35)
MCV RBC AUTO: 82.4 FL (ref 81.4–97.8)
MONOCYTES # BLD AUTO: 0.46 K/UL (ref 0–0.85)
MONOCYTES NFR BLD AUTO: 5.4 % (ref 0–13.4)
NEUTROPHILS # BLD AUTO: 5.51 K/UL (ref 2–7.15)
NEUTROPHILS NFR BLD: 64.8 % (ref 44–72)
NRBC # BLD AUTO: 0 K/UL
NRBC BLD AUTO-RTO: 0 /100 WBC
PLATELET # BLD AUTO: 499 K/UL (ref 164–446)
PMV BLD AUTO: 8.7 FL (ref 9–12.9)
POTASSIUM SERPL-SCNC: 4 MMOL/L (ref 3.6–5.5)
PROT SERPL-MCNC: 8.1 G/DL (ref 6–8.2)
RBC # BLD AUTO: 4.38 M/UL (ref 4.2–5.4)
SODIUM SERPL-SCNC: 139 MMOL/L (ref 135–145)
WBC # BLD AUTO: 8.5 K/UL (ref 4.8–10.8)

## 2017-05-25 PROCEDURE — 80053 COMPREHEN METABOLIC PANEL: CPT

## 2017-05-25 PROCEDURE — 83690 ASSAY OF LIPASE: CPT

## 2017-05-25 PROCEDURE — 700111 HCHG RX REV CODE 636 W/ 250 OVERRIDE (IP): Performed by: EMERGENCY MEDICINE

## 2017-05-25 PROCEDURE — 85025 COMPLETE CBC W/AUTO DIFF WBC: CPT

## 2017-05-25 PROCEDURE — 36415 COLL VENOUS BLD VENIPUNCTURE: CPT

## 2017-05-25 PROCEDURE — 96372 THER/PROPH/DIAG INJ SC/IM: CPT

## 2017-05-25 RX ORDER — DIPHENHYDRAMINE HYDROCHLORIDE 50 MG/ML
25 INJECTION INTRAMUSCULAR; INTRAVENOUS ONCE
Status: COMPLETED | OUTPATIENT
Start: 2017-05-25 | End: 2017-05-25

## 2017-05-25 RX ORDER — HALOPERIDOL 5 MG/ML
5 INJECTION INTRAMUSCULAR ONCE
Status: COMPLETED | OUTPATIENT
Start: 2017-05-25 | End: 2017-05-25

## 2017-05-25 RX ADMIN — DIPHENHYDRAMINE HYDROCHLORIDE 25 MG: 50 INJECTION, SOLUTION INTRAMUSCULAR; INTRAVENOUS at 01:32

## 2017-05-25 RX ADMIN — HALOPERIDOL LACTATE 5 MG: 5 INJECTION, SOLUTION INTRAMUSCULAR at 01:33

## 2017-05-25 NOTE — ED PROVIDER NOTES
"ED Provider Note    CHIEF COMPLAINT  Chief Complaint   Patient presents with   • Abdominal Pain   • N/V       HPI  Julisa Carrion is a 56 y.o. female who presents to the emergency department with abdominal discomfort. The patient states she was recently admitted to the hospital for similar pain. She states she was discharged in there do not tell her was causing her symptoms. She states she's had continued epigastric abdominal pain. She describes it as cramping. She is unaware of any exacerbating or relieving factors. The patient states she has associated nausea and vomiting. She does have diabetes myelitis but states her blood sugar has been controlled. The patient denies marijuana abuse. She states she has not any recent alcohol abuse. She does have chronic pain.    REVIEW OF SYSTEMS  See HPI for further details. All other systems are negative.     PAST MEDICAL HISTORY  Past Medical History   Diagnosis Date   • Hypertension    • Diabetes    • High cholesterol 5/15/2011   • Staph skin infection    • Migraine    • Intestinal mass 2015       SOCIAL HISTORY  Social History     Social History   • Marital Status: Single     Spouse Name: N/A   • Number of Children: N/A   • Years of Education: N/A     Social History Main Topics   • Smoking status: Former Smoker -- 1.00 packs/day for 20 years     Types: Cigarettes     Quit date: 01/01/1996   • Smokeless tobacco: Former User     Quit date: 06/05/1995   • Alcohol Use: No   • Drug Use: No      Comment: just prescribed meds   • Sexual Activity: Not on file     Other Topics Concern   • Not on file     Social History Narrative    No known exposure to asbestos, dyes, chemicals, or pesticides.  Patient does not work.  She has 3 children and many grand-children.  Has a significant other for about 20 years. Moved to [place] in 2011.            PHYSICAL EXAM  VITAL SIGNS: /74 mmHg  Pulse 100  Temp(Src) 36.7 °C (98.1 °F) (Temporal)  Resp 18  Ht 1.651 m (5' 5\")  Wt 76.3 kg " (168 lb 3.4 oz)  BMI 27.99 kg/m2  SpO2 93%  LMP 02/05/2009  Constitutional: Well developed, Well nourished, No acute distress, Non-toxic appearance.   HENT: Normocephalic, Atraumatic, tympanic membranes are intact and nonerythematous bilaterally, Oropharynx moist without exudates or erythema, Nose normal.   Eyes: PERRLA, EOMI, Conjunctiva normal.  Neck: Supple without meningismus  Lymphatic: No lymphadenopathy noted.   Cardiovascular: Normal heart rate, Normal rhythm, No murmurs, No rubs, No gallops.   Thorax & Lungs: Normal breath sounds, No respiratory distress, No wheezing, No chest tenderness.   Abdomen: Epigastric discomfort to deep palpation, no rebound, no guarding, normal bowel sounds  Skin: Warm, Dry, No erythema, No rash.   Back: No tenderness, No CVA tenderness.   Extremities: Atraumatic with symmetric distal pulses, No edema, No tenderness, No cyanosis, No clubbing.   Neurologic: Alert & oriented x 3, cranial nerves II through XII are intact, Normal motor function, Normal sensory function, No focal deficits noted.   Psychiatric: Affect normal, Judgment normal, Mood normal.       COURSE & MEDICAL DECISION MAKING  Pertinent Labs & Imaging studies reviewed. (See chart for details)  This 56-year-old female who presents with abdominal discomfort. Blood work does not show any significant changes from her multiple workups in the past. I suspect she does have gastroparesis. The patient was treated with Haldol and Benadryl intramuscularly. She has not had any emesis here in the emergency department. The patient's abdomen is benign at the time of discharge and she is instructed to follow-up with her primary care provider. She does have chronic pain and this may be adding to her perceived discomfort from her abdominal pain. The patient's anemia is actually improved from her last lab draw.    FINAL IMPRESSION  1. Abdominal pain  2. Chronic pain  3. Stable anemia  Disposition  The patient will be discharged in  stable condition    Electronically signed by: Myles Nicholson, 5/25/2017 1:11 AM

## 2017-05-25 NOTE — ED NOTES
"Chief Complaint   Patient presents with   • Abdominal Pain   • N/V     Pt ambulatory to room for above complaints. Extensive history for same, pt states she was just DC'd from here for above complaints. Pt stating \"no one ever tells me what's going on\". Pt AOx4, denies CP/SOB. VSS. Chart up for ERP.   "

## 2017-05-25 NOTE — ED NOTES
Discharge orders received, monitor discontinued, instructions and education given, follow-up discussed, pt verbalized understanding.

## 2017-05-25 NOTE — ED AVS SNAPSHOT
5/25/2017    Julisa Carrion  [address] [place] [address]    Dear Julisa:    [facility] wants to ensure your discharge home is safe and you or your loved ones have had all of your questions answered regarding your care after you leave the hospital.    Below is a list of resources and contact information should you have any questions regarding your hospital stay, follow-up instructions, or active medical symptoms.    Questions or Concerns Regarding… Contact   Medical Questions Related to Your Discharge  (7 days a week, 8am-5pm) Contact a Nurse Care Coordinator   [phone]   Medical Questions Not Related to Your Discharge  (24 hours a day / 7 days a week)  Contact the Nurse Health Line   [phone]    Medications or Discharge Instructions Refer to your discharge packet   or contact your [facility] Primary Care Provider   [phone]   Follow-up Appointment(s) Schedule your appointment via [employer]   or contact Scheduling [phone]   Billing Review your statement via [employer]  or contact Billing [phone]   Medical Records Review your records via [employer]   or contact Medical Records [phone]     You may receive a telephone call within two days of discharge. This call is to make certain you understand your discharge instructions and have the opportunity to have any questions answered. You can also easily access your medical information, test results and upcoming appointments via the [employer] free online health management tool. You can learn more and sign up at [employer]. For assistance setting up your [employer] account, please call [phone].    Once again, we want to ensure your discharge home is safe and that you have a clear understanding of any next steps in your care. If you have any questions or concerns, please do not hesitate to contact us, we are here for you. Thank you for choosing [facility] for your healthcare needs.    Sincerely,    Your [facility] Healthcare Team

## 2017-05-25 NOTE — ED NOTES
Chief Complaint   Patient presents with   • Abdominal Pain   • N/V     States pain is dull, 10/10, non radiating.  Pt states that she has been unable to keep anything down.  States that she was admitted last week for similar complaints.  States that her symptoms have not resolved since discharge and would like to be re-evaluated.

## 2017-05-25 NOTE — ED NOTES
"Pt on call light. Pt states \"I need some water to take that medication\" when pt asked about medication pt states \"oh I thought you were going to give me something.\" Explain to pt that pt already received her medication.  "

## 2017-05-25 NOTE — ED AVS SNAPSHOT
Home Care Instructions                                                                                                                Julisa Carrion   MRN: [id number]    Department:  [facility], Emergency Dept   Date of Visit:  5/24/2017            [facility], Emergency Dept    [address] [place] [id number]    Phone:  [phone]      You were seen by     Myles Nicholson M.D.      Your Diagnosis Was     Abdominal pain, unspecified location     R10.9       These are the medications you received during your hospitalization from 05/24/2017 2213 to 05/25/2017 0230     Date/Time Order Dose Route Action    05/25/2017 0133 haloperidol lactate (HALDOL) injection 5 mg 5 mg Intramuscular Given    05/25/2017 0132 diphenhydrAMINE (BENADRYL) injection 25 mg 25 mg Intramuscular Given      Follow-up Information     1. Follow up with [facility], Emergency Dept.    Specialty:  Emergency Medicine    Why:  If symptoms worsen    Contact information    [address] [place] [id number] [phone]      Medication Information     Review all of your home medications and newly ordered medications with your primary doctor and/or pharmacist as soon as possible. Follow medication instructions as directed by your doctor and/or pharmacist.     Please keep your complete medication list with you and share with your physician. Update the information when medications are discontinued, doses are changed, or new medications (including over-the-counter products) are added; and carry medication information at all times in the event of emergency situations.               Medication List      ASK your doctor about these medications        Instructions    Morning Afternoon Evening Bedtime    aspirin EC 81 MG Tbec   Commonly known as:  ECOTRIN        Take 81 mg by mouth every day.   Dose:  81 mg                        dicyclomine 10 MG Caps   Commonly known as:  BENTYL       Take 1 Cap by mouth 4 times a day as needed (abdominal pain).   Dose:  10 mg                        gabapentin 800 MG tablet   Commonly known as:  NEURONTIN        Take 800 mg by mouth 3 times a day.   Dose:  800 mg                        lisinopril 5 MG Tabs   Commonly known as:  PRINIVIL        Take 5 mg by mouth every morning.   Dose:  5 mg                        morphine ER 30 MG Tbcr tablet   Commonly known as:  MS CONTIN        Take 30 mg by mouth every 12 hours.   Dose:  30 mg                        omeprazole 20 MG delayed-release capsule   Commonly known as:  PRILOSEC        Take 1 Cap by mouth every day.   Dose:  20 mg                        pravastatin 20 MG Tabs   Commonly known as:  PRAVACHOL        Take 20 mg by mouth every morning.   Dose:  20 mg                        promethazine 25 MG Supp   Commonly known as:  PHENERGAN        Insert 1 Suppository in rectum every 6 hours as needed for Nausea/Vomiting.   Dose:  25 mg                        TRESIBA FLEXTOUCH 100 UNIT/ML Sopn   Generic drug:  Insulin Degludec        Inject 40 Units as instructed every day.   Dose:  40 Units                        VICTOZA 18 MG/3ML Sopn injection   Generic drug:  liraglutide        Inject 1.8 mg as instructed every day.   Dose:  1.8 mg                                Procedures and tests performed during your visit     CBC WITH DIFFERENTIAL    COMP METABOLIC PANEL    ESTIMATED GFR    LIPASE        Discharge Instructions       Abdominal Pain (Nonspecific)  Your exam might not show the exact reason you have abdominal pain. Since there are many different causes of abdominal pain, another checkup and more tests may be needed. It is very important to follow up for lasting (persistent) or worsening symptoms. A possible cause of abdominal pain in any person who still has his or her appendix is acute appendicitis. Appendicitis is often hard to diagnose. Normal blood tests, urine tests, ultrasound, and CT scans do not  completely rule out early appendicitis or other causes of abdominal pain. Sometimes, only the changes that happen over time will allow appendicitis and other causes of abdominal pain to be determined. Other potential problems that may require surgery may also take time to become more apparent. Because of this, it is important that you follow all of the instructions below.  HOME CARE INSTRUCTIONS   · Rest as much as possible.   · Do not eat solid food until your pain is gone.   · While adults or children have pain: A diet of water, weak decaffeinated tea, broth or bouillon, gelatin, oral rehydration solutions (ORS), frozen ice pops, or ice chips may be helpful.   · When pain is gone in adults or children: Start a light diet (dry toast, crackers, applesauce, or white rice). Increase the diet slowly as long as it does not bother you. Eat no dairy products (including cheese and eggs) and no spicy, fatty, fried, or high-fiber foods.   · Use no alcohol, caffeine, or cigarettes.   · Take your regular medicines unless your caregiver told you not to.   · Take any prescribed medicine as directed.   · Only take over-the-counter or prescription medicines for pain, discomfort, or fever as directed by your caregiver. Do not give aspirin to children.   If your caregiver has given you a follow-up appointment, it is very important to keep that appointment. Not keeping the appointment could result in a permanent injury and/or lasting (chronic) pain and/or disability. If there is any problem keeping the appointment, you must call to reschedule.   SEEK IMMEDIATE MEDICAL CARE IF:   · Your pain is not gone in 24 hours.   · Your pain becomes worse, changes location, or feels different.   · You or your child has an oral temperature above 102° F (38.9° C), not controlled by medicine.   · Your baby is older than 3 months with a rectal temperature of 102° F (38.9° C) or higher.   · Your baby is 3 months old or younger with a rectal  temperature of 100.4° F (38° C) or higher.   · You have shaking chills.   · You keep throwing up (vomiting) or cannot drink liquids.   · There is blood in your vomit or you see blood in your bowel movements.   · Your bowel movements become dark or black.   · You have frequent bowel movements.   · Your bowel movements stop (become blocked) or you cannot pass gas.   · You have bloody, frequent, or painful urination.   · You have yellow discoloration in the skin or whites of the eyes.   · Your stomach becomes bloated or bigger.   · You have dizziness or fainting.   · You have chest or back pain.   MAKE SURE YOU:   · Understand these instructions.   · Will watch your condition.   · Will get help right away if you are not doing well or get worse.   Document Released: 12/18/2006 Document Revised: 03/11/2013 Document Reviewed: 11/15/2010  [employer]® Patient Information ©2013 [address].          Patient Information     Patient Information    Following emergency treatment: all patient requiring follow-up care must return either to a private physician or a clinic if your condition worsens before you are able to obtain further medical attention, please return to the emergency room.     Billing Information    At [facility], we work to make the billing process streamlined for our patients.  Our Representatives are here to answer any questions you may have regarding your hospital bill.  If you have insurance coverage and have supplied your insurance information to us, we will submit a claim to your insurer on your behalf.  Should you have any questions regarding your bill, we can be reached online or by phone as follows:  Online: You are able pay your bills online or live chat with our representatives about any billing questions you may have. We are here to help Monday - Friday from 8:00am to 7:30pm and 9:00am - 12:00pm on Saturdays.  Please visit [email or web address]  for more information.    Phone:  [phone] or [phone]    Please note that your emergency physician, surgeon, pathologist, radiologist, anesthesiologist, and other specialists are not employed by [facility] and will therefore bill separately for their services.  Please contact them directly for any questions concerning their bills at the numbers below:     Emergency Physician Services:  [phone]  [facility]:  [phone]  [facility]:  [phone]  [facility]:  [phone]    1. Your final bill may vary from the amount quoted upon discharge if all procedures are not complete at that time, or if your doctor has additional procedures of which we are not aware. You will receive an additional bill if you return to the Emergency Department at [facility] for suture removal regardless of the facility of which the sutures were placed.     2. Please arrange for settlement of this account at the emergency registration.    3. All self-pay accounts are due in full at the time of treatment.  If you are unable to meet this obligation then payment is expected within 4-5 days.     4. If you have had radiology studies (CT, X-ray, Ultrasound, MRI), you have received a preliminary result during your emergency department visit. Please contact the radiology department [phone] to receive a copy of your final result. Please discuss the Final result with your primary physician or with the follow up physician provided.     Crisis Hotline:  [place] Crisis Hotline:  7-075-WIJAMFK or 1-225.268.1911  [place] Crisis Hotline:    1-813.567.3119 or 730-362-2728         ED Discharge Follow Up Questions    1. In order to provide you with very good care, we would like to follow up with a phone call in the next few days.  May we have your permission to contact you?     YES /  NO    2. What is the best phone number to call you? (       )_____-__________    3. What is the best time to call you?       Morning  /  Afternoon  /  Evening                   Patient Signature:  ____________________________________________________________    Date:  ____________________________________________________________

## 2017-05-25 NOTE — ED AVS SNAPSHOT
Avocadoâ„¢ Access Code: [email or web address]  Expires: 6/1/2017 11:42 AM    Your email address is not on file at [facility].  Email Addresses are required for you to sign up for Avocadoâ„¢, please contact [phone] to verify your personal information and to provide your email address prior to attempting to register for Avocadoâ„¢.    Julisa Amadotz  [address] [place] [address]    Avocadoâ„¢  A secure, online tool to manage your health information     [facility]’s Avocadoâ„¢® is a secure, online tool that connects you to your personalized health information from the privacy of your home -- day or night - making it very easy for you to manage your healthcare. Once the activation process is completed, you can even access your medical information using the Avocadoâ„¢ rito, which is available for free in the Apple Rito store or Google Play store.     To learn more about Avocadoâ„¢, visit [email or web address]    There are two levels of access available (as shown below):   My Chart Features  [facility] Primary Care Doctor [facility]  Specialists [facility]  Urgent  Care Non-[facility] Primary Care Doctor   Email your healthcare team securely and privately 24/7 X X X    Manage appointments: schedule your next appointment; view details of past/upcoming appointments X      Request prescription refills. X      View recent personal medical records, including lab and immunizations X X X X   View health record, including health history, allergies, medications X X X X   Read reports about your outpatient visits, procedures, consult and ER notes X X X X   See your discharge summary, which is a recap of your hospital and/or ER visit that includes your diagnosis, lab results, and care plan X X  X     How to register for [employer]:  Once your e-mail address has been verified, follow the following steps to sign up for [employer].     1. Go to  [email or web address]  2. Click on the Sign Up Now box, which takes you to the New Member Sign Up page. You  will need to provide the following information:  a. Enter your [employer] Access Code exactly as it appears at the top of this page. (You will not need to use this code after you’ve completed the sign-up process. If you do not sign up before the expiration date, you must request a new code.)   b. Enter your date of birth.   c. Enter your home email address.   d. Click Submit, and follow the next screen’s instructions.  3. Create a [facility] ID. This will be your [employer] login ID and cannot be changed, so think of one that is secure and easy to remember.  4. Create a [employer] password. You can change your password at any time.  5. Enter your Password Reset Question and Answer. This can be used at a later time if you forget your password.   6. Enter your e-mail address. This allows you to receive e-mail notifications when new information is available in [employer].  7. Click Sign Up. You can now view your health information.    For assistance activating your [employer] account, call [phone]

## 2017-05-25 NOTE — ED NOTES
"Pt on call light. Pt asking that RN give her a shot of pain medication. Explained to pt that medication was already given. Pt states \"well if your not going to give me any more medication than bring me my discharge paperwork.\"  "

## 2017-05-25 NOTE — DISCHARGE INSTRUCTIONS
Abdominal Pain (Nonspecific)  Your exam might not show the exact reason you have abdominal pain. Since there are many different causes of abdominal pain, another checkup and more tests may be needed. It is very important to follow up for lasting (persistent) or worsening symptoms. A possible cause of abdominal pain in any person who still has his or her appendix is acute appendicitis. Appendicitis is often hard to diagnose. Normal blood tests, urine tests, ultrasound, and CT scans do not completely rule out early appendicitis or other causes of abdominal pain. Sometimes, only the changes that happen over time will allow appendicitis and other causes of abdominal pain to be determined. Other potential problems that may require surgery may also take time to become more apparent. Because of this, it is important that you follow all of the instructions below.  HOME CARE INSTRUCTIONS   · Rest as much as possible.   · Do not eat solid food until your pain is gone.   · While adults or children have pain: A diet of water, weak decaffeinated tea, broth or bouillon, gelatin, oral rehydration solutions (ORS), frozen ice pops, or ice chips may be helpful.   · When pain is gone in adults or children: Start a light diet (dry toast, crackers, applesauce, or white rice). Increase the diet slowly as long as it does not bother you. Eat no dairy products (including cheese and eggs) and no spicy, fatty, fried, or high-fiber foods.   · Use no alcohol, caffeine, or cigarettes.   · Take your regular medicines unless your caregiver told you not to.   · Take any prescribed medicine as directed.   · Only take over-the-counter or prescription medicines for pain, discomfort, or fever as directed by your caregiver. Do not give aspirin to children.   If your caregiver has given you a follow-up appointment, it is very important to keep that appointment. Not keeping the appointment could result in a permanent injury and/or lasting (chronic) pain  and/or disability. If there is any problem keeping the appointment, you must call to reschedule.   SEEK IMMEDIATE MEDICAL CARE IF:   · Your pain is not gone in 24 hours.   · Your pain becomes worse, changes location, or feels different.   · You or your child has an oral temperature above 102° F (38.9° C), not controlled by medicine.   · Your baby is older than 3 months with a rectal temperature of 102° F (38.9° C) or higher.   · Your baby is 3 months old or younger with a rectal temperature of 100.4° F (38° C) or higher.   · You have shaking chills.   · You keep throwing up (vomiting) or cannot drink liquids.   · There is blood in your vomit or you see blood in your bowel movements.   · Your bowel movements become dark or black.   · You have frequent bowel movements.   · Your bowel movements stop (become blocked) or you cannot pass gas.   · You have bloody, frequent, or painful urination.   · You have yellow discoloration in the skin or whites of the eyes.   · Your stomach becomes bloated or bigger.   · You have dizziness or fainting.   · You have chest or back pain.   MAKE SURE YOU:   · Understand these instructions.   · Will watch your condition.   · Will get help right away if you are not doing well or get worse.   Document Released: 12/18/2006 Document Revised: 03/11/2013 Document Reviewed: 11/15/2010  [employer]® Patient Information ©2013 [employer].

## 2017-05-31 ENCOUNTER — RESOLUTE PROFESSIONAL BILLING HOSPITAL PROF FEE (OUTPATIENT)
Dept: HOSPITALIST | Facility: MEDICAL CENTER | Age: 57
End: 2017-05-31
Payer: MEDICAID

## 2017-05-31 ENCOUNTER — APPOINTMENT (OUTPATIENT)
Dept: RADIOLOGY | Facility: MEDICAL CENTER | Age: 57
End: 2017-05-31
Attending: EMERGENCY MEDICINE
Payer: MEDICAID

## 2017-05-31 ENCOUNTER — HOSPITAL ENCOUNTER (OUTPATIENT)
Facility: MEDICAL CENTER | Age: 57
End: 2017-06-02
Attending: EMERGENCY MEDICINE | Admitting: HOSPITALIST
Payer: MEDICAID

## 2017-05-31 DIAGNOSIS — F11.20 NARCOTIC DEPENDENCE (HCC): ICD-10-CM

## 2017-05-31 DIAGNOSIS — R11.2 INTRACTABLE VOMITING WITH NAUSEA, UNSPECIFIED VOMITING TYPE: ICD-10-CM

## 2017-05-31 DIAGNOSIS — G89.29 CHRONIC ABDOMINAL PAIN: ICD-10-CM

## 2017-05-31 DIAGNOSIS — R10.9 CHRONIC ABDOMINAL PAIN: ICD-10-CM

## 2017-05-31 PROBLEM — R11.10 INTRACTABLE VOMITING: Status: ACTIVE | Noted: 2017-05-31

## 2017-05-31 LAB
ALBUMIN SERPL BCP-MCNC: 3.8 G/DL (ref 3.2–4.9)
ALBUMIN/GLOB SERPL: 0.8 G/DL
ALP SERPL-CCNC: 149 U/L (ref 30–99)
ALT SERPL-CCNC: 14 U/L (ref 2–50)
ANION GAP SERPL CALC-SCNC: 10 MMOL/L (ref 0–11.9)
AST SERPL-CCNC: 13 U/L (ref 12–45)
BASOPHILS # BLD AUTO: 0.6 % (ref 0–1.8)
BASOPHILS # BLD: 0.05 K/UL (ref 0–0.12)
BILIRUB SERPL-MCNC: 0.3 MG/DL (ref 0.1–1.5)
BUN SERPL-MCNC: 23 MG/DL (ref 8–22)
CALCIUM SERPL-MCNC: 9.2 MG/DL (ref 8.5–10.5)
CHLORIDE SERPL-SCNC: 104 MMOL/L (ref 96–112)
CO2 SERPL-SCNC: 22 MMOL/L (ref 20–33)
CREAT SERPL-MCNC: 0.68 MG/DL (ref 0.5–1.4)
EOSINOPHIL # BLD AUTO: 0.17 K/UL (ref 0–0.51)
EOSINOPHIL NFR BLD: 2.1 % (ref 0–6.9)
ERYTHROCYTE [DISTWIDTH] IN BLOOD BY AUTOMATED COUNT: 44.4 FL (ref 35.9–50)
GFR SERPL CREATININE-BSD FRML MDRD: >60 ML/MIN/1.73 M 2
GLOBULIN SER CALC-MCNC: 4.5 G/DL (ref 1.9–3.5)
GLUCOSE BLD-MCNC: 116 MG/DL (ref 65–99)
GLUCOSE SERPL-MCNC: 173 MG/DL (ref 65–99)
HCT VFR BLD AUTO: 37 % (ref 37–47)
HGB BLD-MCNC: 11.8 G/DL (ref 12–16)
IMM GRANULOCYTES # BLD AUTO: 0.02 K/UL (ref 0–0.11)
IMM GRANULOCYTES NFR BLD AUTO: 0.2 % (ref 0–0.9)
LIPASE SERPL-CCNC: 15 U/L (ref 11–82)
LYMPHOCYTES # BLD AUTO: 2.46 K/UL (ref 1–4.8)
LYMPHOCYTES NFR BLD: 30.4 % (ref 22–41)
MCH RBC QN AUTO: 25.8 PG (ref 27–33)
MCHC RBC AUTO-ENTMCNC: 31.9 G/DL (ref 33.6–35)
MCV RBC AUTO: 80.8 FL (ref 81.4–97.8)
MONOCYTES # BLD AUTO: 0.34 K/UL (ref 0–0.85)
MONOCYTES NFR BLD AUTO: 4.2 % (ref 0–13.4)
NEUTROPHILS # BLD AUTO: 5.04 K/UL (ref 2–7.15)
NEUTROPHILS NFR BLD: 62.5 % (ref 44–72)
NRBC # BLD AUTO: 0.02 K/UL
NRBC BLD AUTO-RTO: 0.2 /100 WBC
PLATELET # BLD AUTO: 571 K/UL (ref 164–446)
PMV BLD AUTO: 8.5 FL (ref 9–12.9)
POTASSIUM SERPL-SCNC: 4.2 MMOL/L (ref 3.6–5.5)
PROT SERPL-MCNC: 8.3 G/DL (ref 6–8.2)
RBC # BLD AUTO: 4.58 M/UL (ref 4.2–5.4)
SODIUM SERPL-SCNC: 136 MMOL/L (ref 135–145)
WBC # BLD AUTO: 8.1 K/UL (ref 4.8–10.8)

## 2017-05-31 PROCEDURE — 83550 IRON BINDING TEST: CPT

## 2017-05-31 PROCEDURE — G0378 HOSPITAL OBSERVATION PER HR: HCPCS

## 2017-05-31 PROCEDURE — 700102 HCHG RX REV CODE 250 W/ 637 OVERRIDE(OP): Performed by: HOSPITALIST

## 2017-05-31 PROCEDURE — 93005 ELECTROCARDIOGRAM TRACING: CPT | Performed by: EMERGENCY MEDICINE

## 2017-05-31 PROCEDURE — 96375 TX/PRO/DX INJ NEW DRUG ADDON: CPT

## 2017-05-31 PROCEDURE — 96361 HYDRATE IV INFUSION ADD-ON: CPT

## 2017-05-31 PROCEDURE — 99285 EMERGENCY DEPT VISIT HI MDM: CPT

## 2017-05-31 PROCEDURE — 83540 ASSAY OF IRON: CPT

## 2017-05-31 PROCEDURE — 80053 COMPREHEN METABOLIC PANEL: CPT

## 2017-05-31 PROCEDURE — 700105 HCHG RX REV CODE 258: Performed by: EMERGENCY MEDICINE

## 2017-05-31 PROCEDURE — 71010 DX-CHEST-PORTABLE (1 VIEW): CPT

## 2017-05-31 PROCEDURE — 96374 THER/PROPH/DIAG INJ IV PUSH: CPT

## 2017-05-31 PROCEDURE — 700111 HCHG RX REV CODE 636 W/ 250 OVERRIDE (IP): Performed by: EMERGENCY MEDICINE

## 2017-05-31 PROCEDURE — 85025 COMPLETE CBC W/AUTO DIFF WBC: CPT

## 2017-05-31 PROCEDURE — 99220 PR INITIAL OBSERVATION CARE,LEVL III: CPT | Performed by: HOSPITALIST

## 2017-05-31 PROCEDURE — 83690 ASSAY OF LIPASE: CPT

## 2017-05-31 PROCEDURE — 700105 HCHG RX REV CODE 258: Performed by: HOSPITALIST

## 2017-05-31 PROCEDURE — 82728 ASSAY OF FERRITIN: CPT

## 2017-05-31 PROCEDURE — A9270 NON-COVERED ITEM OR SERVICE: HCPCS | Performed by: HOSPITALIST

## 2017-05-31 PROCEDURE — 82962 GLUCOSE BLOOD TEST: CPT

## 2017-05-31 RX ORDER — OMEPRAZOLE 20 MG/1
20 CAPSULE, DELAYED RELEASE ORAL DAILY
Status: DISCONTINUED | OUTPATIENT
Start: 2017-06-01 | End: 2017-06-02 | Stop reason: HOSPADM

## 2017-05-31 RX ORDER — PROMETHAZINE HYDROCHLORIDE 25 MG/1
12.5-25 SUPPOSITORY RECTAL EVERY 4 HOURS PRN
Status: DISCONTINUED | OUTPATIENT
Start: 2017-05-31 | End: 2017-06-02 | Stop reason: HOSPADM

## 2017-05-31 RX ORDER — PROMETHAZINE HYDROCHLORIDE 25 MG/1
12.5-25 TABLET ORAL EVERY 4 HOURS PRN
Status: DISCONTINUED | OUTPATIENT
Start: 2017-05-31 | End: 2017-06-02 | Stop reason: HOSPADM

## 2017-05-31 RX ORDER — MORPHINE SULFATE 15 MG/1
30 TABLET, FILM COATED, EXTENDED RELEASE ORAL EVERY 12 HOURS
Status: DISCONTINUED | OUTPATIENT
Start: 2017-05-31 | End: 2017-06-02 | Stop reason: HOSPADM

## 2017-05-31 RX ORDER — SODIUM CHLORIDE 9 MG/ML
INJECTION, SOLUTION INTRAVENOUS CONTINUOUS
Status: DISCONTINUED | OUTPATIENT
Start: 2017-05-31 | End: 2017-06-02 | Stop reason: HOSPADM

## 2017-05-31 RX ORDER — SODIUM CHLORIDE 9 MG/ML
1000 INJECTION, SOLUTION INTRAVENOUS ONCE
Status: COMPLETED | OUTPATIENT
Start: 2017-05-31 | End: 2017-05-31

## 2017-05-31 RX ORDER — HEPARIN SODIUM 5000 [USP'U]/ML
5000 INJECTION, SOLUTION INTRAVENOUS; SUBCUTANEOUS EVERY 8 HOURS
Status: DISCONTINUED | OUTPATIENT
Start: 2017-06-01 | End: 2017-06-02 | Stop reason: HOSPADM

## 2017-05-31 RX ORDER — OXYCODONE AND ACETAMINOPHEN 10; 325 MG/1; MG/1
1 TABLET ORAL EVERY 6 HOURS PRN
Status: ON HOLD | COMMUNITY
End: 2018-10-02

## 2017-05-31 RX ORDER — DEXTROSE MONOHYDRATE 25 G/50ML
25 INJECTION, SOLUTION INTRAVENOUS
Status: DISCONTINUED | OUTPATIENT
Start: 2017-05-31 | End: 2017-06-02 | Stop reason: HOSPADM

## 2017-05-31 RX ORDER — ONDANSETRON 2 MG/ML
4 INJECTION INTRAMUSCULAR; INTRAVENOUS ONCE
Status: COMPLETED | OUTPATIENT
Start: 2017-05-31 | End: 2017-05-31

## 2017-05-31 RX ORDER — OXYCODONE AND ACETAMINOPHEN 10; 325 MG/1; MG/1
1 TABLET ORAL EVERY 8 HOURS PRN
Status: DISCONTINUED | OUTPATIENT
Start: 2017-05-31 | End: 2017-06-02 | Stop reason: HOSPADM

## 2017-05-31 RX ORDER — LISINOPRIL 10 MG/1
5 TABLET ORAL EVERY MORNING
Status: DISCONTINUED | OUTPATIENT
Start: 2017-06-01 | End: 2017-06-02 | Stop reason: HOSPADM

## 2017-05-31 RX ORDER — LORAZEPAM 2 MG/ML
0.5 INJECTION INTRAMUSCULAR EVERY 6 HOURS PRN
Status: DISCONTINUED | OUTPATIENT
Start: 2017-05-31 | End: 2017-06-02 | Stop reason: HOSPADM

## 2017-05-31 RX ORDER — METOCLOPRAMIDE HYDROCHLORIDE 5 MG/ML
10 INJECTION INTRAMUSCULAR; INTRAVENOUS EVERY 6 HOURS
Status: DISCONTINUED | OUTPATIENT
Start: 2017-06-01 | End: 2017-06-02 | Stop reason: HOSPADM

## 2017-05-31 RX ORDER — HALOPERIDOL 5 MG/ML
1 INJECTION INTRAMUSCULAR ONCE
Status: COMPLETED | OUTPATIENT
Start: 2017-05-31 | End: 2017-05-31

## 2017-05-31 RX ORDER — HALOPERIDOL 5 MG/ML
5 INJECTION INTRAMUSCULAR EVERY 4 HOURS PRN
Status: DISCONTINUED | OUTPATIENT
Start: 2017-05-31 | End: 2017-06-02 | Stop reason: HOSPADM

## 2017-05-31 RX ORDER — DEXAMETHASONE SODIUM PHOSPHATE 4 MG/ML
4 INJECTION, SOLUTION INTRA-ARTICULAR; INTRALESIONAL; INTRAMUSCULAR; INTRAVENOUS; SOFT TISSUE EVERY 6 HOURS PRN
Status: DISCONTINUED | OUTPATIENT
Start: 2017-05-31 | End: 2017-06-02 | Stop reason: HOSPADM

## 2017-05-31 RX ORDER — GABAPENTIN 400 MG/1
800 CAPSULE ORAL 3 TIMES DAILY
Status: DISCONTINUED | OUTPATIENT
Start: 2017-05-31 | End: 2017-06-02 | Stop reason: HOSPADM

## 2017-05-31 RX ORDER — PROMETHAZINE HYDROCHLORIDE 25 MG/1
25 SUPPOSITORY RECTAL EVERY 6 HOURS PRN
Status: DISCONTINUED | OUTPATIENT
Start: 2017-05-31 | End: 2017-05-31

## 2017-05-31 RX ORDER — ONDANSETRON 4 MG/1
4 TABLET, ORALLY DISINTEGRATING ORAL EVERY 4 HOURS PRN
Status: DISCONTINUED | OUTPATIENT
Start: 2017-05-31 | End: 2017-06-02 | Stop reason: HOSPADM

## 2017-05-31 RX ORDER — LORAZEPAM 1 MG/1
0.5 TABLET ORAL EVERY 6 HOURS PRN
Status: DISCONTINUED | OUTPATIENT
Start: 2017-05-31 | End: 2017-06-02 | Stop reason: HOSPADM

## 2017-05-31 RX ORDER — ONDANSETRON 2 MG/ML
4 INJECTION INTRAMUSCULAR; INTRAVENOUS EVERY 4 HOURS PRN
Status: DISCONTINUED | OUTPATIENT
Start: 2017-05-31 | End: 2017-06-02 | Stop reason: HOSPADM

## 2017-05-31 RX ORDER — SODIUM CHLORIDE 9 MG/ML
1000 INJECTION, SOLUTION INTRAVENOUS CONTINUOUS
Status: DISCONTINUED | OUTPATIENT
Start: 2017-05-31 | End: 2017-05-31

## 2017-05-31 RX ORDER — PRAVASTATIN SODIUM 20 MG
20 TABLET ORAL EVERY MORNING
Status: DISCONTINUED | OUTPATIENT
Start: 2017-06-01 | End: 2017-06-02 | Stop reason: HOSPADM

## 2017-05-31 RX ADMIN — SODIUM CHLORIDE: 9 INJECTION, SOLUTION INTRAVENOUS at 21:40

## 2017-05-31 RX ADMIN — ONDANSETRON 4 MG: 2 INJECTION INTRAMUSCULAR; INTRAVENOUS at 18:33

## 2017-05-31 RX ADMIN — MORPHINE SULFATE 30 MG: 15 TABLET, EXTENDED RELEASE ORAL at 21:39

## 2017-05-31 RX ADMIN — GABAPENTIN 800 MG: 400 CAPSULE ORAL at 21:39

## 2017-05-31 RX ADMIN — SODIUM CHLORIDE 1000 ML: 9 INJECTION, SOLUTION INTRAVENOUS at 20:45

## 2017-05-31 RX ADMIN — SODIUM CHLORIDE 1000 ML: 9 INJECTION, SOLUTION INTRAVENOUS at 18:00

## 2017-05-31 RX ADMIN — HALOPERIDOL LACTATE 1 MG: 5 INJECTION, SOLUTION INTRAMUSCULAR at 18:33

## 2017-05-31 RX ADMIN — HYDROMORPHONE HYDROCHLORIDE 1 MG: 1 INJECTION, SOLUTION INTRAMUSCULAR; INTRAVENOUS; SUBCUTANEOUS at 18:33

## 2017-05-31 RX ADMIN — LORAZEPAM 0.5 MG: 1 TABLET ORAL at 22:11

## 2017-05-31 ASSESSMENT — COPD QUESTIONNAIRES
DURING THE PAST 4 WEEKS HOW MUCH DID YOU FEEL SHORT OF BREATH: NONE/LITTLE OF THE TIME
DO YOU EVER COUGH UP ANY MUCUS OR PHLEGM?: NO/ONLY WITH OCCASIONAL COLDS OR INFECTIONS

## 2017-05-31 ASSESSMENT — LIFESTYLE VARIABLES
DO YOU DRINK ALCOHOL: NO
EVER_SMOKED: YES
DO YOU DRINK ALCOHOL: NO

## 2017-05-31 ASSESSMENT — PAIN SCALES - GENERAL
PAINLEVEL_OUTOF10: 9
PAINLEVEL_OUTOF10: 7

## 2017-05-31 NOTE — IP AVS SNAPSHOT
" Home Care Instructions                                                                                                                  Name:Julisa Carrion  Medical Record Number:[id number]  CSN: [id number]    YOB: 1960   Age: 56 y.o.  Sex: female  HT:1.651 m (5' 5\") WT: 77.7 kg (171 lb 4.8 oz)          Admit Date: 5/31/2017     Discharge Date:   Today's Date: 6/2/2017  Attending Doctor:  Abisai Monroy M.D.                  Allergies:  Review of patient's allergies indicates no known allergies.            Discharge Instructions       Discharge Instructions    Discharged to home by car with self. Discharged via walking, hospital escort: Refused.  Special equipment needed: Not Applicable    Be sure to schedule a follow-up appointment with your primary care doctor or any specialists as instructed.     Discharge Plan:   Diet Plan: Discussed  Activity Level: Discussed  Confirmed Follow up Appointment: Patient to Call and Schedule Appointment  Confirmed Symptoms Management: Discussed  Medication Reconciliation Updated: Yes  Influenza Vaccine Indication: Not indicated: Previously immunized this influenza season and > 8 years of age    I understand that a diet low in cholesterol, fat, and sodium is recommended for good health. Unless I have been given specific instructions below for another diet, I accept this instruction as my diet prescription.   Other diet: heart healthy    Special Instructions: None    · Is patient discharged on Warfarin / Coumadin?   No     · Is patient Post Blood Transfusion?  No          Depression / Suicide Risk    As you are discharged from this [facility] facility, it is important to learn how to keep safe from harming yourself.    Recognize the warning signs:  · Abrupt changes in personality, positive or negative- including increase in energy   · Giving away possessions  · Change in eating patterns- significant weight changes-  positive or negative  · Change in sleeping patterns- " unable to sleep or sleeping all the time   · Unwillingness or inability to communicate  · Depression  · Unusual sadness, discouragement and loneliness  · Talk of wanting to die  · Neglect of personal appearance   · Rebelliousness- reckless behavior  · Withdrawal from people/activities they love  · Confusion- inability to concentrate     If you or a loved one observes any of these behaviors or has concerns about self-harm, here's what you can do:  · Talk about it- your feelings and reasons for harming yourself  · Remove any means that you might use to hurt yourself (examples: pills, rope, extension cords, firearm)  · Get professional help from the community (Mental Health, Substance Abuse, psychological counseling)  · Do not be alone:Call your Safe Contact- someone whom you trust who will be there for you.  · Call your local CRISIS HOTLINE 827-3898 or 346-279-6303  · Call your local Children's Mobile Crisis Response Team [place] (181) 353-2367 or www.Cabify  · Call the toll free National Suicide Prevention Hotlines   · National Suicide Prevention Lifeline 943-300-GQYD (7590)  · National Hope Line Network 800-SUICIDE (499-7580)        Follow-up Information     1. Follow up with Tre Jason M.D.. Call in 3 days.    Specialty:  Internal Medicine    Why:  to schedule follow up appt to discuss recent hospitalization and need for colonscopy due to age and recently low Iron levels.    Contact information    [address]  [place] [address]  [phone]           Discharge Medication Instructions:    Below are the medications your physician expects you to take upon discharge:    Review all your home medications and newly ordered medications with your doctor and/or pharmacist. Follow medication instructions as directed by your doctor and/or pharmacist.    Please keep your medication list with you and share with your physician.               Medication List      START taking these medications         Instructions    Morning Afternoon Evening Bedtime    ferrous gluconate 324 (38 FE) MG Tabs   Commonly known as:  FERGON        Doctor's comments:  Take with food   Take 1 Tab by mouth with dinner.   Dose:  324 mg                          CONTINUE taking these medications        Instructions    Morning Afternoon Evening Bedtime    gabapentin 800 MG tablet   Commonly known as:  NEURONTIN        Take 800 mg by mouth 3 times a day.   Dose:  800 mg                        lisinopril 5 MG Tabs   Last time this was given:  5 mg on 6/2/2017  7:56 AM   Commonly known as:  PRINIVIL        Take 5 mg by mouth every morning.   Dose:  5 mg                        morphine ER 30 MG Tbcr tablet   Last time this was given:  30 mg on 6/2/2017  7:56 AM   Commonly known as:  MS CONTIN        Take 30 mg by mouth every 12 hours.   Dose:  30 mg                        omeprazole 20 MG delayed-release capsule   Last time this was given:  20 mg on 6/2/2017  7:56 AM   Commonly known as:  PRILOSEC        Take 1 Cap by mouth every day.   Dose:  20 mg                        oxycodone-acetaminophen  MG Tabs   Last time this was given:  1 Tab on 6/2/2017  1:09 AM   Commonly known as:  PERCOCET-10        Take 1 Tab by mouth every 8 hours as needed for Severe Pain.   Dose:  1 Tab                        pravastatin 20 MG Tabs   Last time this was given:  20 mg on 6/2/2017  7:55 AM   Commonly known as:  PRAVACHOL        Take 20 mg by mouth every morning.   Dose:  20 mg                        promethazine 25 MG Supp   Commonly known as:  PHENERGAN        Insert 1 Suppository in rectum every 6 hours as needed for Nausea/Vomiting.   Dose:  25 mg                        TRESIBA FLEXTOUCH 100 UNIT/ML Sopn   Generic drug:  Insulin Degludec        Inject 40 Units as instructed 1 time daily as needed (per pt.).   Dose:  40 Units                        VICTOZA 18 MG/3ML Sopn injection   Generic drug:  liraglutide        Inject 0.6 mg as instructed 1 time  daily as needed (per pt).   Dose:  0.6 mg                          STOP taking these medications     aspirin EC 81 MG Tbec   Commonly known as:  ECOTRIN                    Where to Get Your Medications      Information about where to get these medications is not yet available     ! Ask your nurse or doctor about these medications    - ferrous gluconate 324 (38 FE) MG Tabs            Instructions           Diet / Nutrition:    Follow any diet instructions given to you by your doctor or the dietician, including how much salt (sodium) you are allowed each day.    If you are overweight, talk to your doctor about a weight reduction plan.    Activity:    Remain physically active following your doctor's instructions about exercise and activity.    Rest often.     Any time you become even a little tired or short of breath, SIT DOWN and rest.    Worsening Symptoms:    Report any of the following signs and symptoms to the doctor's office immediately:    *Pain of jaw, arm, or neck  *Chest pain not relieved by medication                               *Dizziness or loss of consciousness  *Difficulty breathing even when at rest   *More tired than usual                                       *Bleeding drainage or swelling of surgical site  *Swelling of feet, ankles, legs or stomach                 *Fever (>100ºF)  *Pink or blood tinged sputum  *Weight gain (3lbs/day or 5lbs /week)           *Shock from internal defibrillator (if applicable)  *Palpitations or irregular heartbeats                *Cool and/or numb extremities    Stroke Awareness    Common Risk Factors for Stroke include:    Age  Atrial Fibrillation  Carotid Artery Stenosis  Diabetes Mellitus  Excessive alcohol consumption  High blood pressure  Overweight   Physical inactivity  Smoking    Warning signs and symptoms of a stroke include:    *Sudden numbness or weakness of the face, arm or leg (especially on one side of the body).  *Sudden confusion, trouble speaking or  understanding.  *Sudden trouble seeing in one or both eyes.  *Sudden trouble walking, dizziness, loss of balance or coordination.Sudden severe headache with no known cause.    It is very important to get treatment quickly when a stroke occurs. If you experience any of the above warning signs, call 911 immediately.                   Disclaimer         Quit Smoking / Tobacco Use:    I understand the use of any tobacco products increases my chance of suffering from future heart disease or stroke and could cause other illnesses which may shorten my life. Quitting the use of tobacco products is the single most important thing I can do to improve my health. For further information on smoking / tobacco cessation call a Toll Free Quit Line at 1-197.336.2998 (*National Cancer [place]) or 1-807.278.5629 (American Lung Association) or you can access the web based program at www.lungusa.org.    [place] Tobacco Users Help Line:  (825) 392-4385       Toll Free: 1-472.712.2371  Quit Tobacco Program [facility] (542)785-2046    Crisis Hotline:    [place] Crisis Hotline:  4-898-PHJRAUQ or 1-557.929.1244    [place] Crisis Hotline:    1-717.785.5304 or 361-722-7620    Discharge Survey:   Thank you for choosing [facility]. We hope we did everything we could to make your hospital stay a pleasant one. You may be receiving a phone survey and we would appreciate your time and participation in answering the questions. Your input is very valuable to us in our efforts to improve our service to our patients and their families.        My signature on this form indicates that:    1. I have reviewed and understand the above information.  2. My questions regarding this information have been answered to my satisfaction.  3. I have formulated a plan with my discharge nurse to obtain my prescribed medications for home.                  Disclaimer         __________________________________                     __________        ________                       Patient Signature                                                 Date                    Time

## 2017-06-01 PROBLEM — E11.43 DIABETIC GASTROPARESIS ASSOCIATED WITH TYPE 2 DIABETES MELLITUS (HCC): Chronic | Status: ACTIVE | Noted: 2017-04-27

## 2017-06-01 PROBLEM — D50.9 IRON DEFICIENCY ANEMIA: Chronic | Status: ACTIVE | Noted: 2017-06-01

## 2017-06-01 PROBLEM — K31.84 DIABETIC GASTROPARESIS ASSOCIATED WITH TYPE 2 DIABETES MELLITUS (HCC): Chronic | Status: ACTIVE | Noted: 2017-04-27

## 2017-06-01 PROBLEM — G89.4 CHRONIC PAIN SYNDROME: Chronic | Status: ACTIVE | Noted: 2017-04-27

## 2017-06-01 LAB
FERRITIN SERPL-MCNC: 35.4 NG/ML (ref 10–291)
GLUCOSE BLD-MCNC: 134 MG/DL (ref 65–99)
GLUCOSE BLD-MCNC: 239 MG/DL (ref 65–99)
GLUCOSE BLD-MCNC: 248 MG/DL (ref 65–99)
GLUCOSE BLD-MCNC: 261 MG/DL (ref 65–99)
IRON SATN MFR SERPL: 11 % (ref 15–55)
IRON SERPL-MCNC: 52 UG/DL (ref 40–170)
TIBC SERPL-MCNC: 461 UG/DL (ref 250–450)

## 2017-06-01 PROCEDURE — A9270 NON-COVERED ITEM OR SERVICE: HCPCS | Performed by: HOSPITALIST

## 2017-06-01 PROCEDURE — 700105 HCHG RX REV CODE 258: Performed by: HOSPITALIST

## 2017-06-01 PROCEDURE — 96376 TX/PRO/DX INJ SAME DRUG ADON: CPT

## 2017-06-01 PROCEDURE — 700102 HCHG RX REV CODE 250 W/ 637 OVERRIDE(OP): Performed by: NURSE PRACTITIONER

## 2017-06-01 PROCEDURE — 99225 PR SUBSEQUENT OBSERVATION CARE,LEVEL II: CPT | Performed by: HOSPITALIST

## 2017-06-01 PROCEDURE — 700102 HCHG RX REV CODE 250 W/ 637 OVERRIDE(OP): Performed by: HOSPITALIST

## 2017-06-01 PROCEDURE — G0378 HOSPITAL OBSERVATION PER HR: HCPCS

## 2017-06-01 PROCEDURE — 96361 HYDRATE IV INFUSION ADD-ON: CPT

## 2017-06-01 PROCEDURE — 96372 THER/PROPH/DIAG INJ SC/IM: CPT

## 2017-06-01 PROCEDURE — 96375 TX/PRO/DX INJ NEW DRUG ADDON: CPT

## 2017-06-01 PROCEDURE — 700111 HCHG RX REV CODE 636 W/ 250 OVERRIDE (IP): Performed by: HOSPITALIST

## 2017-06-01 PROCEDURE — A9270 NON-COVERED ITEM OR SERVICE: HCPCS | Performed by: NURSE PRACTITIONER

## 2017-06-01 PROCEDURE — 82962 GLUCOSE BLOOD TEST: CPT | Mod: 91

## 2017-06-01 RX ORDER — ZOLPIDEM TARTRATE 5 MG/1
2.5 TABLET ORAL NIGHTLY PRN
Status: DISCONTINUED | OUTPATIENT
Start: 2017-06-01 | End: 2017-06-02 | Stop reason: HOSPADM

## 2017-06-01 RX ADMIN — INSULIN LISPRO 2 UNITS: 100 INJECTION, SOLUTION INTRAVENOUS; SUBCUTANEOUS at 17:59

## 2017-06-01 RX ADMIN — ONDANSETRON 4 MG: 2 INJECTION INTRAMUSCULAR; INTRAVENOUS at 07:58

## 2017-06-01 RX ADMIN — OXYCODONE HYDROCHLORIDE AND ACETAMINOPHEN 1 TABLET: 10; 325 TABLET ORAL at 00:32

## 2017-06-01 RX ADMIN — GABAPENTIN 800 MG: 400 CAPSULE ORAL at 14:48

## 2017-06-01 RX ADMIN — SODIUM CHLORIDE: 9 INJECTION, SOLUTION INTRAVENOUS at 03:33

## 2017-06-01 RX ADMIN — ASPIRIN 81 MG: 81 TABLET ORAL at 07:53

## 2017-06-01 RX ADMIN — OMEPRAZOLE 20 MG: 20 CAPSULE, DELAYED RELEASE ORAL at 07:54

## 2017-06-01 RX ADMIN — HEPARIN SODIUM 5000 UNITS: 5000 INJECTION, SOLUTION INTRAVENOUS; SUBCUTANEOUS at 06:14

## 2017-06-01 RX ADMIN — INSULIN LISPRO 2 UNITS: 100 INJECTION, SOLUTION INTRAVENOUS; SUBCUTANEOUS at 21:34

## 2017-06-01 RX ADMIN — GABAPENTIN 800 MG: 400 CAPSULE ORAL at 22:05

## 2017-06-01 RX ADMIN — MORPHINE SULFATE 30 MG: 15 TABLET, EXTENDED RELEASE ORAL at 21:30

## 2017-06-01 RX ADMIN — PRAVASTATIN SODIUM 20 MG: 20 TABLET ORAL at 07:55

## 2017-06-01 RX ADMIN — METOCLOPRAMIDE 10 MG: 5 INJECTION, SOLUTION INTRAMUSCULAR; INTRAVENOUS at 06:12

## 2017-06-01 RX ADMIN — SODIUM CHLORIDE: 9 INJECTION, SOLUTION INTRAVENOUS at 11:45

## 2017-06-01 RX ADMIN — ZOLPIDEM TARTRATE 2.5 MG: 5 TABLET, FILM COATED ORAL at 22:05

## 2017-06-01 RX ADMIN — MORPHINE SULFATE 30 MG: 15 TABLET, EXTENDED RELEASE ORAL at 07:55

## 2017-06-01 RX ADMIN — OXYCODONE HYDROCHLORIDE AND ACETAMINOPHEN 1 TABLET: 10; 325 TABLET ORAL at 17:09

## 2017-06-01 RX ADMIN — INSULIN LISPRO 3 UNITS: 100 INJECTION, SOLUTION INTRAVENOUS; SUBCUTANEOUS at 11:46

## 2017-06-01 RX ADMIN — HEPARIN SODIUM 5000 UNITS: 5000 INJECTION, SOLUTION INTRAVENOUS; SUBCUTANEOUS at 21:31

## 2017-06-01 RX ADMIN — SODIUM CHLORIDE: 9 INJECTION, SOLUTION INTRAVENOUS at 19:52

## 2017-06-01 RX ADMIN — HEPARIN SODIUM 5000 UNITS: 5000 INJECTION, SOLUTION INTRAVENOUS; SUBCUTANEOUS at 14:48

## 2017-06-01 RX ADMIN — METOCLOPRAMIDE 10 MG: 5 INJECTION, SOLUTION INTRAMUSCULAR; INTRAVENOUS at 00:33

## 2017-06-01 RX ADMIN — OXYCODONE HYDROCHLORIDE AND ACETAMINOPHEN 1 TABLET: 10; 325 TABLET ORAL at 09:05

## 2017-06-01 RX ADMIN — METOCLOPRAMIDE 10 MG: 5 INJECTION, SOLUTION INTRAMUSCULAR; INTRAVENOUS at 11:43

## 2017-06-01 RX ADMIN — METOCLOPRAMIDE 10 MG: 5 INJECTION, SOLUTION INTRAMUSCULAR; INTRAVENOUS at 17:57

## 2017-06-01 RX ADMIN — GABAPENTIN 800 MG: 400 CAPSULE ORAL at 07:54

## 2017-06-01 RX ADMIN — LISINOPRIL 5 MG: 10 TABLET ORAL at 07:54

## 2017-06-01 ASSESSMENT — ENCOUNTER SYMPTOMS
SHORTNESS OF BREATH: 0
PALPITATIONS: 0
WEAKNESS: 0
BLURRED VISION: 0
VOMITING: 0
COUGH: 0
CHILLS: 0
EYES NEGATIVE: 1
SORE THROAT: 0
ABDOMINAL PAIN: 1
DOUBLE VISION: 0
DIARRHEA: 0
RESPIRATORY NEGATIVE: 1
NAUSEA: 1
HEADACHES: 0
BACK PAIN: 1
DIZZINESS: 0
FOCAL WEAKNESS: 0
MYALGIAS: 0
FEVER: 0
CONSTIPATION: 0
NEUROLOGICAL NEGATIVE: 1
CONSTITUTIONAL NEGATIVE: 1
BRUISES/BLEEDS EASILY: 0
CARDIOVASCULAR NEGATIVE: 1

## 2017-06-01 ASSESSMENT — PAIN SCALES - GENERAL
PAINLEVEL_OUTOF10: 8
PAINLEVEL_OUTOF10: 4
PAINLEVEL_OUTOF10: 6
PAINLEVEL_OUTOF10: 9

## 2017-06-01 NOTE — PROGRESS NOTES
Pt arrived to unit via gurney.  VS stable.  Assessment complete. No signs of distress noted at this time. Denies nausea states last emesis was yesterday.  Pt has no signs of distress.  Pt educated regarding fall precautions, encouraged use of call light for assistance. Pt denies any additional needs at this time.  Call light within reach. Will continue to monitor.

## 2017-06-01 NOTE — ASSESSMENT & PLAN NOTE
Reviewed anemia and low iron levels  No repletion for now given abd complaints  Rec f/u with PCP when abd issues more stable

## 2017-06-01 NOTE — ASSESSMENT & PLAN NOTE
Cont Reglan- may need short course for home when dc'd  Needs to cont close f/u with PCP, consider endocrinology, and referral to PM

## 2017-06-01 NOTE — PROGRESS NOTES
Patient awake watching television. /56 asymptomatic, IV fluids running per MAR. Other VS stable. No nausea no vomiting.

## 2017-06-01 NOTE — DISCHARGE PLANNING
Care Transition Team Assessment    Pharmacy:  [facility]    Information Source  Orientation : Oriented x 4  Information Given By: Patient  Who is responsible for making decisions for patient? : Patient         Elopement Risk  Legal Hold: No  Ambulatory or Self Mobile in Wheelchair: No-Not an Elopement Risk  Elopement Risk: Not at Risk for Elopement    Interdisciplinary Discharge Planning  Does Admitting Nurse Feel This Could be a Complex Discharge?: No  Primary Care Physician:  (Tre Jason)  Lives with - Patient's Self Care Capacity: Significant Other  Patient or legal guardian wants to designate a caregiver (see row info): No  Support Systems: Family Member(s)  Housing / Facility: [address]  Do You Take your Prescribed Medications Regularly: Yes  Able to Return to Previous ADL's: Yes  Mobility Issues: No  Prior Services: None  Patient Expects to be Discharged to::  (home)  Assistance Needed: No  Durable Medical Equipment: Walker, Other - Specify (walker and w/c)    Discharge Preparedness  What is your plan after discharge?:  (home with Significant Other)  What are your discharge supports?: Other (comment) (significant other)  Prior Functional Level: Independent with Activities of Daily Living    Functional Assesment  Prior Functional Level: Independent with Activities of Daily Living    Finances  Financial Barriers to Discharge: No  Prescription Coverage: Yes    Vision / Hearing Impairment  Vision Impairment : Yes  Right Eye Vision: Wears Glasses (per patient has cataracts)  Left Eye Vision: Wears Glasses  Hearing Impairment : No    Values / Beliefs / Concerns  Values / Beliefs Concerns : No         Domestic Abuse  Have you ever been the victim of abuse or violence?: No  Physical Abuse or Sexual Abuse: No  Verbal Abuse or Emotional Abuse: No  Possible Abuse Reported to:: Not Applicable    Psychological Assessment  History of Substance Abuse: None  History of Psychiatric Problems: No    Discharge  Risks or Barriers  Discharge risks or barriers?: No    Anticipated Discharge Information  Anticipated discharge disposition: Home  Discharge Address:  ([address], [place])  Discharge Contact Phone Number:  ([phone])

## 2017-06-01 NOTE — ASSESSMENT & PLAN NOTE
Unfortunate- likely causing recurrent  N/V  Cont chronic meds for now  Rec close f/u with PCP and get official referral to pain management

## 2017-06-01 NOTE — PROGRESS NOTES
"Hospital Medicine Interval Note  Date of Service:  6/1/2017    Chief Complaint  56 y.o.-year-old female admitted 5/31/2017 with DM gastroparesis with intractable N/V. Started on Reglan, IVF, and anti-emetics    Interval Problem Update  6/1- tolerated cl lquids, concerned because it recurs within a day or two of leaving the hospital. Needs to get into pain management to assist getting off narcotic pain meds- to avoid another exacerbation.    Consultants/Specialty  None    Disposition  -cont IV anti-emetics, IVF resusc, and adving diet as hayden, asked schedulers to try to get her into pain management- got call back that has to be a referral from PCP and usually not able to get in for 4-5 months     Review of Systems   Constitutional: Negative.  Negative for fever, chills and malaise/fatigue.   HENT: Negative.  Negative for congestion and sore throat.    Eyes: Negative.  Negative for blurred vision and double vision.   Respiratory: Negative.  Negative for cough and shortness of breath.    Cardiovascular: Negative.  Negative for chest pain, palpitations and leg swelling.   Gastrointestinal: Positive for nausea and abdominal pain (mild upper, improving). Negative for vomiting, diarrhea and constipation.   Genitourinary: Negative.  Negative for dysuria.   Musculoskeletal: Positive for back pain. Negative for myalgias and joint pain.   Skin: Negative.    Neurological: Negative.  Negative for dizziness, focal weakness, weakness and headaches.   Endo/Heme/Allergies: Negative.  Does not bruise/bleed easily.      Physical Exam Laboratory/Imaging   Filed Vitals:    05/31/17 2133 05/31/17 2344 06/01/17 0339 06/01/17 0725   BP: 143/67 150/70 104/56 121/63   Pulse: 92 92 86 73   Temp: 36.3 °C (97.4 °F) 36.6 °C (97.9 °F) 36.8 °C (98.2 °F) 36.7 °C (98 °F)   Resp: 18 18 18 16   Height: 1.651 m (5' 5\")      Weight: 77.7 kg (171 lb 4.8 oz)      SpO2: 94% 96% 98% 96%     Physical Exam   Constitutional: She is oriented to person, place, " and time. She appears well-developed and well-nourished.   HENT:   Head: Normocephalic and atraumatic.   Mouth/Throat: Oropharynx is clear and moist. No oropharyngeal exudate.   Eyes: Conjunctivae are normal. Right eye exhibits no discharge. Left eye exhibits no discharge. No scleral icterus.   Neck: Normal range of motion. Neck supple. No tracheal deviation present.   Cardiovascular: Normal rate, regular rhythm, normal heart sounds and intact distal pulses.    Pulmonary/Chest: Effort normal and breath sounds normal.   Abdominal: Soft. Bowel sounds are normal. She exhibits no distension. There is no tenderness.   Musculoskeletal: Normal range of motion. She exhibits no edema or tenderness.   Neurological: She is alert and oriented to person, place, and time.   Skin: Skin is warm and dry. No rash noted. She is not diaphoretic. No erythema.   Psychiatric: She has a normal mood and affect. Her behavior is normal. Judgment and thought content normal.    Lab Results   Component Value Date/Time    WBC 8.1 05/31/2017 06:02 PM    HEMOGLOBIN 11.8* 05/31/2017 06:02 PM    HEMATOCRIT 37.0 05/31/2017 06:02 PM    PLATELET COUNT 571* 05/31/2017 06:02 PM     Lab Results   Component Value Date/Time    SODIUM 136 05/31/2017 06:02 PM    POTASSIUM 4.2 05/31/2017 06:02 PM    CHLORIDE 104 05/31/2017 06:02 PM    CO2 22 05/31/2017 06:02 PM    GLUCOSE 173* 05/31/2017 06:02 PM    BUN 23* 05/31/2017 06:02 PM    CREATININE 0.68 05/31/2017 06:02 PM      Assessment/Plan    * Intractable vomiting (present on admission)  Assessment & Plan  Improving with anti-emetics  Expect recurrent DM gastroparesis r/t chronic narcotic use  Cont to adv diet slowly  Monitor today to be sure she tolerates actual solid foods today/tomorrow  Cont IVF    Chronic pain syndrome with narcotic dependence (present on admission)  Assessment & Plan  Unfortunate- likely causing recurrent  N/V  Cont chronic meds for now  Rec close f/u with PCP and get official referral to  pain management    Diabetic gastroparesis associated with type 2 diabetes mellitus (CMS-HCC) (present on admission)  Assessment & Plan  Cont Reglan- may need short course for home when dc'd  Needs to cont close f/u with PCP, consider endocrinology, and referral to PM    Dehydration (present on admission)  Assessment & Plan  Improving  Taking PO better today as well  Cont IVF for now    Iron deficiency anemia (present on admission)  Assessment & Plan  Reviewed anemia and low iron levels  No repletion for now given abd complaints  Rec f/u with PCP when abd issues more stable    HTN (hypertension) (present on admission)  Assessment & Plan  Cont lisinopril  stable    Hyperlipidemia (present on admission)  Assessment & Plan  Cont pravastatin    Diabetes mellitus (CMS-HCC) (present on admission)  Assessment & Plan  Stable BSs  Cont accuchecks  ISS  DM diet when hayden    Neuropathy in diabetes (CMS-HCC) (present on admission)  Assessment & Plan  Cont gabapentin       Radiology images reviewed, Labs reviewed and Medications reviewed  Umanzor catheter: No Umanzor      DVT Prophylaxis: Heparin  DVT prophylaxis - mechanical: Not indicated at this time, ambulatory  Ulcer prophylaxis: Yes (omeprazole)    Assessed for rehab: Patient returned to prior level of function, rehabilitation not indicated at this time

## 2017-06-01 NOTE — ED NOTES
Pt presents via EMS with c/o abdominal pain x2 days. Also c/o n/v. Denies diarrhea. Pt received 100ml NS, 100mcg Fentanyl, 4mg Zofran en route. Pre arrival VS: 140/78, 98, 16, 98% RA.
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
## 2017-06-01 NOTE — PROGRESS NOTES
Assist RN: Pt arrived via gurney w/ transport. Pt ambulated self from gurney to bed. Pt resting comfortably in bed.

## 2017-06-01 NOTE — ASSESSMENT & PLAN NOTE
Improving with anti-emetics  Expect recurrent DM gastroparesis r/t chronic narcotic use  Cont to adv diet slowly  Monitor today to be sure she tolerates actual solid foods today/tomorrow  Cont IVF

## 2017-06-01 NOTE — PROGRESS NOTES
"Assumed patient care. Report received from OJ Ortiz.  Pt A&Ox4.  Respirations even, unlabored on room air.  Pt complains of 9/10 abdominal pain, medicated per MAR.  Pt report \"little\" nausea, medicated per MAR.    Call light and personal belongings within reach.  Pt updated on POC, updated communication board.  Iced water provided.  Needs met, will continue to monitor.   "

## 2017-06-01 NOTE — PROGRESS NOTES
Patient VS stable. Patient reports 8/10 pain to stomach. Medicated per MAR. Patient seems relaxed, no grimicing, no signs of distress noted. Declines nausea. Will continue to monitor.

## 2017-06-02 VITALS
OXYGEN SATURATION: 91 % | WEIGHT: 171.3 LBS | RESPIRATION RATE: 17 BRPM | BODY MASS INDEX: 28.54 KG/M2 | HEART RATE: 87 BPM | HEIGHT: 65 IN | TEMPERATURE: 98.4 F | SYSTOLIC BLOOD PRESSURE: 132 MMHG | DIASTOLIC BLOOD PRESSURE: 87 MMHG

## 2017-06-02 PROBLEM — R11.10 INTRACTABLE VOMITING: Status: RESOLVED | Noted: 2017-05-31 | Resolved: 2017-06-02

## 2017-06-02 LAB
ANION GAP SERPL CALC-SCNC: 7 MMOL/L (ref 0–11.9)
BUN SERPL-MCNC: 21 MG/DL (ref 8–22)
CALCIUM SERPL-MCNC: 8.8 MG/DL (ref 8.5–10.5)
CHLORIDE SERPL-SCNC: 109 MMOL/L (ref 96–112)
CO2 SERPL-SCNC: 19 MMOL/L (ref 20–33)
CREAT SERPL-MCNC: 0.84 MG/DL (ref 0.5–1.4)
ERYTHROCYTE [DISTWIDTH] IN BLOOD BY AUTOMATED COUNT: 46.7 FL (ref 35.9–50)
GFR SERPL CREATININE-BSD FRML MDRD: >60 ML/MIN/1.73 M 2
GLUCOSE BLD-MCNC: 257 MG/DL (ref 65–99)
GLUCOSE SERPL-MCNC: 214 MG/DL (ref 65–99)
HCT VFR BLD AUTO: 28.7 % (ref 37–47)
HGB BLD-MCNC: 8.9 G/DL (ref 12–16)
MCH RBC QN AUTO: 25.6 PG (ref 27–33)
MCHC RBC AUTO-ENTMCNC: 30.6 G/DL (ref 33.6–35)
MCV RBC AUTO: 83.7 FL (ref 81.4–97.8)
PLATELET # BLD AUTO: 439 K/UL (ref 164–446)
PMV BLD AUTO: 8.6 FL (ref 9–12.9)
POTASSIUM SERPL-SCNC: 4.3 MMOL/L (ref 3.6–5.5)
RBC # BLD AUTO: 3.44 M/UL (ref 4.2–5.4)
SODIUM SERPL-SCNC: 135 MMOL/L (ref 135–145)
WBC # BLD AUTO: 7.7 K/UL (ref 4.8–10.8)

## 2017-06-02 PROCEDURE — 36415 COLL VENOUS BLD VENIPUNCTURE: CPT

## 2017-06-02 PROCEDURE — 96376 TX/PRO/DX INJ SAME DRUG ADON: CPT

## 2017-06-02 PROCEDURE — 85027 COMPLETE CBC AUTOMATED: CPT

## 2017-06-02 PROCEDURE — 80048 BASIC METABOLIC PNL TOTAL CA: CPT

## 2017-06-02 PROCEDURE — 700111 HCHG RX REV CODE 636 W/ 250 OVERRIDE (IP): Performed by: HOSPITALIST

## 2017-06-02 PROCEDURE — 96372 THER/PROPH/DIAG INJ SC/IM: CPT

## 2017-06-02 PROCEDURE — 96361 HYDRATE IV INFUSION ADD-ON: CPT

## 2017-06-02 PROCEDURE — G0378 HOSPITAL OBSERVATION PER HR: HCPCS

## 2017-06-02 PROCEDURE — 700102 HCHG RX REV CODE 250 W/ 637 OVERRIDE(OP): Performed by: HOSPITALIST

## 2017-06-02 PROCEDURE — 82962 GLUCOSE BLOOD TEST: CPT

## 2017-06-02 PROCEDURE — 99217 PR OBSERVATION CARE DISCHARGE: CPT | Performed by: HOSPITALIST

## 2017-06-02 PROCEDURE — A9270 NON-COVERED ITEM OR SERVICE: HCPCS | Performed by: HOSPITALIST

## 2017-06-02 RX ORDER — FERROUS GLUCONATE 324(38)MG
324 TABLET ORAL
Status: DISCONTINUED | OUTPATIENT
Start: 2017-06-02 | End: 2017-06-02 | Stop reason: HOSPADM

## 2017-06-02 RX ORDER — FERROUS GLUCONATE 324(38)MG
324 TABLET ORAL
Qty: 30 TAB | Refills: 0 | Status: SHIPPED | OUTPATIENT
Start: 2017-06-02 | End: 2017-06-02

## 2017-06-02 RX ORDER — FERROUS GLUCONATE 324(38)MG
324 TABLET ORAL
Qty: 30 TAB | Refills: 0 | Status: SHIPPED | OUTPATIENT
Start: 2017-06-02 | End: 2017-08-31

## 2017-06-02 RX ADMIN — OMEPRAZOLE 20 MG: 20 CAPSULE, DELAYED RELEASE ORAL at 07:56

## 2017-06-02 RX ADMIN — PRAVASTATIN SODIUM 20 MG: 20 TABLET ORAL at 07:55

## 2017-06-02 RX ADMIN — GABAPENTIN 800 MG: 400 CAPSULE ORAL at 07:55

## 2017-06-02 RX ADMIN — HEPARIN SODIUM 5000 UNITS: 5000 INJECTION, SOLUTION INTRAVENOUS; SUBCUTANEOUS at 06:22

## 2017-06-02 RX ADMIN — OXYCODONE HYDROCHLORIDE AND ACETAMINOPHEN 1 TABLET: 10; 325 TABLET ORAL at 01:09

## 2017-06-02 RX ADMIN — MORPHINE SULFATE 30 MG: 15 TABLET, EXTENDED RELEASE ORAL at 07:56

## 2017-06-02 RX ADMIN — METOCLOPRAMIDE 10 MG: 5 INJECTION, SOLUTION INTRAMUSCULAR; INTRAVENOUS at 07:59

## 2017-06-02 RX ADMIN — ASPIRIN 81 MG: 81 TABLET ORAL at 07:55

## 2017-06-02 RX ADMIN — INSULIN LISPRO 3 UNITS: 100 INJECTION, SOLUTION INTRAVENOUS; SUBCUTANEOUS at 06:25

## 2017-06-02 RX ADMIN — LISINOPRIL 5 MG: 10 TABLET ORAL at 07:56

## 2017-06-02 RX ADMIN — METOCLOPRAMIDE 10 MG: 5 INJECTION, SOLUTION INTRAMUSCULAR; INTRAVENOUS at 00:16

## 2017-06-02 ASSESSMENT — PAIN SCALES - GENERAL
PAINLEVEL_OUTOF10: 10
PAINLEVEL_OUTOF10: 10
PAINLEVEL_OUTOF10: 8
PAINLEVEL_OUTOF10: 4
PAINLEVEL_OUTOF10: 10

## 2017-06-02 NOTE — PROGRESS NOTES
[facility]or/Jonah Cab to  patient within 30 minutes.    IV Dc'd. Discharge instructions provided to patient. Pt verbalizes understanding. Pt states all questions have been answered. Copy of DC provided to patient. Signed copy in chart. 1 prescription provided to patient. Pt states all personal belongings are in possession. Pt escorted off unit by tech via w/c to [facility] to wait for cab, no incident.

## 2017-06-02 NOTE — DISCHARGE INSTRUCTIONS
Discharge Instructions    Discharged to home by car with self. Discharged via walking, hospital escort: Refused.  Special equipment needed: Not Applicable    Be sure to schedule a follow-up appointment with your primary care doctor or any specialists as instructed.     Discharge Plan:   Diet Plan: Discussed  Activity Level: Discussed  Confirmed Follow up Appointment: Patient to Call and Schedule Appointment  Confirmed Symptoms Management: Discussed  Medication Reconciliation Updated: Yes  Influenza Vaccine Indication: Not indicated: Previously immunized this influenza season and > 8 years of age    I understand that a diet low in cholesterol, fat, and sodium is recommended for good health. Unless I have been given specific instructions below for another diet, I accept this instruction as my diet prescription.   Other diet: heart healthy    Special Instructions: None    · Is patient discharged on Warfarin / Coumadin?   No     · Is patient Post Blood Transfusion?  No          Depression / Suicide Risk    As you are discharged from this [facility] facility, it is important to learn how to keep safe from harming yourself.    Recognize the warning signs:  · Abrupt changes in personality, positive or negative- including increase in energy   · Giving away possessions  · Change in eating patterns- significant weight changes-  positive or negative  · Change in sleeping patterns- unable to sleep or sleeping all the time   · Unwillingness or inability to communicate  · Depression  · Unusual sadness, discouragement and loneliness  · Talk of wanting to die  · Neglect of personal appearance   · Rebelliousness- reckless behavior  · Withdrawal from people/activities they love  · Confusion- inability to concentrate     If you or a loved one observes any of these behaviors or has concerns about self-harm, here's what you can do:  · Talk about it- your feelings and reasons for harming yourself  · Remove any means that you might use  to hurt yourself (examples: pills, rope, extension cords, firearm)  · Get professional help from the community (Mental Health, Substance Abuse, psychological counseling)  · Do not be alone:Call your Safe Contact- someone whom you trust who will be there for you.  · Call your local CRISIS HOTLINE 341-7350 or 986-583-5619  · Call your local Children's Mobile Crisis Response Team [place] (132) 589-5640 or www.[facility] [place]  · Call the toll free National Suicide Prevention Hotlines   · National Suicide Prevention Lifeline 082-305-WWQA (2486)  · National Hope Line Network 800-SUICIDE (151-3643)

## 2017-06-02 NOTE — PROGRESS NOTES
Called Medicaid to arrange transportation for patient for d/c.  Will call this RN back with time of pickup.

## 2017-06-02 NOTE — PROGRESS NOTES
Assessment completed. Pt A&O. Respirations are even and unlabored on RA. POC updated. Pt educated on room and call light, pt verbalized understanding. Pain level to abdomen 10/10, previously medicated per MAR. Monitors applied, call light within reach. Needs met.

## 2017-06-02 NOTE — PROGRESS NOTES
Assessment complete. A&Ox4. C/o pain 6/10. Will be medicated per MAR prn. Discussed POC w/ pt. Educated on medications per MAR. Discussed the importance of calling for needs. Pt verbalizes understanding. Call light in reach. Bed in lowest position. No other needs identified. Will continue hourly rounding.

## 2017-06-03 ENCOUNTER — HOSPITAL ENCOUNTER (EMERGENCY)
Facility: MEDICAL CENTER | Age: 57
End: 2017-06-03
Attending: EMERGENCY MEDICINE
Payer: MEDICAID

## 2017-06-03 VITALS
HEART RATE: 85 BPM | BODY MASS INDEX: 29.06 KG/M2 | WEIGHT: 174.6 LBS | DIASTOLIC BLOOD PRESSURE: 62 MMHG | SYSTOLIC BLOOD PRESSURE: 139 MMHG | RESPIRATION RATE: 13 BRPM | TEMPERATURE: 98.6 F | OXYGEN SATURATION: 95 %

## 2017-06-03 DIAGNOSIS — Z76.5 DRUG-SEEKING BEHAVIOR: ICD-10-CM

## 2017-06-03 DIAGNOSIS — G89.29 CHRONIC ABDOMINAL PAIN: ICD-10-CM

## 2017-06-03 DIAGNOSIS — R10.9 CHRONIC ABDOMINAL PAIN: ICD-10-CM

## 2017-06-03 LAB
ALBUMIN SERPL BCP-MCNC: 3.7 G/DL (ref 3.2–4.9)
ALBUMIN/GLOB SERPL: 0.9 G/DL
ALP SERPL-CCNC: 263 U/L (ref 30–99)
ALT SERPL-CCNC: 164 U/L (ref 2–50)
AMPHET UR QL SCN: NEGATIVE
ANION GAP SERPL CALC-SCNC: 8 MMOL/L (ref 0–11.9)
APPEARANCE UR: CLEAR
APPEARANCE UR: CLEAR
AST SERPL-CCNC: 52 U/L (ref 12–45)
BARBITURATES UR QL SCN: NEGATIVE
BASOPHILS # BLD AUTO: 0.7 % (ref 0–1.8)
BASOPHILS # BLD: 0.05 K/UL (ref 0–0.12)
BENZODIAZ UR QL SCN: NEGATIVE
BILIRUB SERPL-MCNC: 0.3 MG/DL (ref 0.1–1.5)
BILIRUB UR QL STRIP.AUTO: NEGATIVE
BUN SERPL-MCNC: 15 MG/DL (ref 8–22)
BZE UR QL SCN: NEGATIVE
CALCIUM SERPL-MCNC: 9.7 MG/DL (ref 8.5–10.5)
CANNABINOIDS UR QL SCN: NEGATIVE
CHLORIDE SERPL-SCNC: 103 MMOL/L (ref 96–112)
CO2 SERPL-SCNC: 26 MMOL/L (ref 20–33)
COLOR UR AUTO: YELLOW
COLOR UR: ABNORMAL
CREAT SERPL-MCNC: 0.65 MG/DL (ref 0.5–1.4)
CULTURE IF INDICATED INDCX: NO UA CULTURE
EOSINOPHIL # BLD AUTO: 0.18 K/UL (ref 0–0.51)
EOSINOPHIL NFR BLD: 2.7 % (ref 0–6.9)
ERYTHROCYTE [DISTWIDTH] IN BLOOD BY AUTOMATED COUNT: 44.2 FL (ref 35.9–50)
GFR SERPL CREATININE-BSD FRML MDRD: >60 ML/MIN/1.73 M 2
GLOBULIN SER CALC-MCNC: 4 G/DL (ref 1.9–3.5)
GLUCOSE BLD-MCNC: 157 MG/DL (ref 65–99)
GLUCOSE SERPL-MCNC: 170 MG/DL (ref 65–99)
GLUCOSE UR QL STRIP.AUTO: 100 MG/DL
GLUCOSE UR STRIP.AUTO-MCNC: 100 MG/DL
HAV IGM SERPL QL IA: NEGATIVE
HBV CORE IGM SER QL: NEGATIVE
HBV SURFACE AG SER QL: NEGATIVE
HCG UR QL: NEGATIVE
HCT VFR BLD AUTO: 32.3 % (ref 37–47)
HCV AB SER QL: NEGATIVE
HGB BLD-MCNC: 10.2 G/DL (ref 12–16)
IMM GRANULOCYTES # BLD AUTO: 0.01 K/UL (ref 0–0.11)
IMM GRANULOCYTES NFR BLD AUTO: 0.1 % (ref 0–0.9)
KETONES UR QL STRIP.AUTO: NEGATIVE MG/DL
KETONES UR STRIP.AUTO-MCNC: NEGATIVE MG/DL
LEUKOCYTE ESTERASE UR QL STRIP.AUTO: NEGATIVE
LEUKOCYTE ESTERASE UR QL STRIP.AUTO: NEGATIVE
LIPASE SERPL-CCNC: 7 U/L (ref 11–82)
LYMPHOCYTES # BLD AUTO: 2.61 K/UL (ref 1–4.8)
LYMPHOCYTES NFR BLD: 38.6 % (ref 22–41)
MCH RBC QN AUTO: 25.5 PG (ref 27–33)
MCHC RBC AUTO-ENTMCNC: 31.6 G/DL (ref 33.6–35)
MCV RBC AUTO: 80.8 FL (ref 81.4–97.8)
MDMA UR QL SCN: NEGATIVE
METHADONE UR QL SCN: NEGATIVE
MICRO URNS: ABNORMAL
MONOCYTES # BLD AUTO: 0.39 K/UL (ref 0–0.85)
MONOCYTES NFR BLD AUTO: 5.8 % (ref 0–13.4)
NEUTROPHILS # BLD AUTO: 3.53 K/UL (ref 2–7.15)
NEUTROPHILS NFR BLD: 52.1 % (ref 44–72)
NITRITE UR QL STRIP.AUTO: NEGATIVE
NITRITE UR QL STRIP.AUTO: NEGATIVE
NRBC # BLD AUTO: 0 K/UL
NRBC BLD AUTO-RTO: 0 /100 WBC
OPIATES UR QL SCN: POSITIVE
OXYCODONE UR QL SCN: POSITIVE
PCP UR QL SCN: NEGATIVE
PH UR STRIP.AUTO: 6 [PH]
PH UR STRIP.AUTO: 6 [PH]
PLATELET # BLD AUTO: 512 K/UL (ref 164–446)
PMV BLD AUTO: 9 FL (ref 9–12.9)
POTASSIUM SERPL-SCNC: 3.8 MMOL/L (ref 3.6–5.5)
PROPOXYPH UR QL SCN: NEGATIVE
PROT SERPL-MCNC: 7.7 G/DL (ref 6–8.2)
PROT UR QL STRIP: ABNORMAL MG/DL
PROT UR QL STRIP: NEGATIVE MG/DL
RBC # BLD AUTO: 4 M/UL (ref 4.2–5.4)
RBC UR QL AUTO: NEGATIVE
RBC UR QL AUTO: NEGATIVE
SODIUM SERPL-SCNC: 137 MMOL/L (ref 135–145)
SP GR UR STRIP.AUTO: 1.01
SP GR UR: 1.02
WBC # BLD AUTO: 6.8 K/UL (ref 4.8–10.8)

## 2017-06-03 RX ORDER — KETOROLAC TROMETHAMINE 30 MG/ML
30 INJECTION, SOLUTION INTRAMUSCULAR; INTRAVENOUS ONCE
Status: COMPLETED | OUTPATIENT
Start: 2017-06-03 | End: 2017-06-03

## 2017-06-03 RX ORDER — SODIUM CHLORIDE 9 MG/ML
1000 INJECTION, SOLUTION INTRAVENOUS ONCE
Status: COMPLETED | OUTPATIENT
Start: 2017-06-03 | End: 2017-06-03

## 2017-06-03 RX ORDER — ONDANSETRON 2 MG/ML
4 INJECTION INTRAMUSCULAR; INTRAVENOUS ONCE
Status: COMPLETED | OUTPATIENT
Start: 2017-06-03 | End: 2017-06-03

## 2017-06-03 RX ADMIN — SODIUM CHLORIDE 1000 ML: 9 INJECTION, SOLUTION INTRAVENOUS at 02:06

## 2017-06-03 RX ADMIN — KETOROLAC TROMETHAMINE 30 MG: 30 INJECTION, SOLUTION INTRAMUSCULAR at 02:06

## 2017-06-03 RX ADMIN — ONDANSETRON 4 MG: 2 INJECTION INTRAMUSCULAR; INTRAVENOUS at 02:06

## 2017-06-03 ASSESSMENT — PAIN SCALES - GENERAL: PAINLEVEL_OUTOF10: 9

## 2017-06-03 NOTE — ED NOTES
Blood drawn in triage, sent to lab. Pt reports she is diabetic, has not checked her BS or taken meds today. FSBG 157 in triage. Apologized for delay. Pt calm, cooperative.
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
## 2017-06-03 NOTE — DISCHARGE INSTRUCTIONS
Please follow-up with your primary care physician for complete recheck. Additionally please contact your gastroenterologist or the gastroenterologists listed for follow-up. Please have your primary care physician recheck your liver enzymes. Return to the emergency department if he developed worsening nausea, vomiting, fevers or any further concerns.      Abdominal Pain, Women  Abdominal (stomach, pelvic, or belly) pain can be caused by many things. It is important to tell your doctor:  · The location of the pain.  · Does it come and go or is it present all the time?  · Are there things that start the pain (eating certain foods, exercise)?  · Are there other symptoms associated with the pain (fever, nausea, vomiting, diarrhea)?  All of this is helpful to know when trying to find the cause of the pain.  CAUSES   · Stomach: virus or bacteria infection, or ulcer.  · Intestine: appendicitis (inflamed appendix), regional ileitis (Crohn's disease), ulcerative colitis (inflamed colon), irritable bowel syndrome, diverticulitis (inflamed diverticulum of the colon), or cancer of the stomach or intestine.  · Gallbladder disease or stones in the gallbladder.  · Kidney disease, kidney stones, or infection.  · Pancreas infection or cancer.  · Fibromyalgia (pain disorder).  · Diseases of the female organs:  · Uterus: fibroid (non-cancerous) tumors or infection.  · Fallopian tubes: infection or tubal pregnancy.  · Ovary: cysts or tumors.  · Pelvic adhesions (scar tissue).  · Endometriosis (uterus lining tissue growing in the pelvis and on the pelvic organs).  · Pelvic congestion syndrome (female organs filling up with blood just before the menstrual period).  · Pain with the menstrual period.  · Pain with ovulation (producing an egg).  · Pain with an IUD (intrauterine device, birth control) in the uterus.  · Cancer of the female organs.  · Functional pain (pain not caused by a disease, may improve without  treatment).  · Psychological pain.  · Depression.  DIAGNOSIS   Your doctor will decide the seriousness of your pain by doing an examination.  · Blood tests.  · X-rays.  · Ultrasound.  · CT scan (computed tomography, special type of X-ray).  · MRI (magnetic resonance imaging).  · Cultures, for infection.  · Barium enema (dye inserted in the large intestine, to better view it with X-rays).  · Colonoscopy (looking in intestine with a lighted tube).  · Laparoscopy (minor surgery, looking in abdomen with a lighted tube).  · Major abdominal exploratory surgery (looking in abdomen with a large incision).  TREATMENT   The treatment will depend on the cause of the pain.   · Many cases can be observed and treated at home.  · Over-the-counter medicines recommended by your caregiver.  · Prescription medicine.  · Antibiotics, for infection.  · Birth control pills, for painful periods or for ovulation pain.  · Hormone treatment, for endometriosis.  · Nerve blocking injections.  · Physical therapy.  · Antidepressants.  · Counseling with a psychologist or psychiatrist.  · Minor or major surgery.  HOME CARE INSTRUCTIONS   · Do not take laxatives, unless directed by your caregiver.  · Take over-the-counter pain medicine only if ordered by your caregiver. Do not take aspirin because it can cause an upset stomach or bleeding.  · Try a clear liquid diet (broth or water) as ordered by your caregiver. Slowly move to a bland diet, as tolerated, if the pain is related to the stomach or intestine.  · Have a thermometer and take your temperature several times a day, and record it.  · Bed rest and sleep, if it helps the pain.  · Avoid sexual intercourse, if it causes pain.  · Avoid stressful situations.  · Keep your follow-up appointments and tests, as your caregiver orders.  · If the pain does not go away with medicine or surgery, you may try:  · Acupuncture.  · Relaxation exercises (yoga, meditation).  · Group therapy.  · Counseling.  SEEK  MEDICAL CARE IF:   · You notice certain foods cause stomach pain.  · Your home care treatment is not helping your pain.  · You need stronger pain medicine.  · You want your IUD removed.  · You feel faint or lightheaded.  · You develop nausea and vomiting.  · You develop a rash.  · You are having side effects or an allergy to your medicine.  SEEK IMMEDIATE MEDICAL CARE IF:   · Your pain does not go away or gets worse.  · You have a fever.  · Your pain is felt only in portions of the abdomen. The right side could possibly be appendicitis. The left lower portion of the abdomen could be colitis or diverticulitis.  · You are passing blood in your stools (bright red or black tarry stools, with or without vomiting).  · You have blood in your urine.  · You develop chills, with or without a fever.  · You pass out.  MAKE SURE YOU:   · Understand these instructions.  · Will watch your condition.  · Will get help right away if you are not doing well or get worse.  Document Released: 10/14/2008 Document Revised: 03/11/2013 Document Reviewed: 11/04/2010  [email or web address]® Patient Information ©2014 [email or web address], [employer].

## 2017-06-03 NOTE — ED NOTES
Pt ambulatory to triage c/o increasing mid abd pain/ n/v x 2 since being d/c from [facility] this afternoon. Denies diarrhea. VSS. Educated on triage process, encouraged to inform staff of any changes.

## 2017-06-03 NOTE — ED PROVIDER NOTES
ED Provider Note    Scribed for Nani Worrell M.D. by Roseline Yusuf. 6/3/2017, 1:18 AM.    Primary care provider: Tre Jason M.D.  Means of arrival: Walk-in  History obtained from: Patient  History limited by: None    CHIEF COMPLAINT  Chief Complaint   Patient presents with   • Abdominal Pain   • N/V       HPI  Julisa Carrion is a 56 y.o. female who presents to the Emergency Department with abdominal pain, nausea, and vomiting 1200 yesterday after being discharged from the ED.  She was recently hospitalized for the same thing and reports that she began to experience the same localized abdominal pain that occasionally migrates to her back when she returned home. The patient also began to experience nausea and vomiting but no diarrhea. She states that she has had chronic dysuria but denies any new pain. While in the hospital, she was told that she might need a colonoscopy and that she has low iron. The patient denies marijuana usage, lower extremity swelling or pain, fever, chest pain, shortness of breath.     REVIEW OF SYSTEMS  Pertinent positives include abdominal pain, nausea, dysuria, neuropathy, impaired immunity secondary to diabetes. Pertinent negatives include no fever, chest pain, shortness of breath, marijuana usage, lower extremity swelling or pain.  All other systems reviewed and negative.  C    PAST MEDICAL HISTORY   has a past medical history of Hypertension; Diabetes; High cholesterol (5/15/2011); Staph skin infection; Migraine; and Intestinal mass (2015).    SURGICAL HISTORY   has past surgical history that includes other abdominal surgery; gyn surgery; other orthopedic surgery (6-2014); and abdominal exploration.    SOCIAL HISTORY  Social History   Substance Use Topics   • Smoking status: Former Smoker -- 1.00 packs/day for 20 years     Types: Cigarettes     Quit date: 01/01/1996   • Smokeless tobacco: Former User     Quit date: 06/05/1995   • Alcohol Use: No      History   Drug Use No      Comment: just prescribed meds       FAMILY HISTORY  Family History   Problem Relation Age of Onset   • Cancer Maternal Aunt      Lung cancer d/t smoking       CURRENT MEDICATIONS    No current facility-administered medications on file prior to encounter.     Current Outpatient Prescriptions on File Prior to Encounter   Medication Sig Dispense Refill   • ferrous gluconate (FERGON) 324 (38 FE) MG Tab Take 1 Tab by mouth with dinner. 30 Tab 0   • oxycodone-acetaminophen (PERCOCET-10)  MG Tab Take 1 Tab by mouth every 8 hours as needed for Severe Pain.     • liraglutide (VICTOZA) 18 MG/3ML Solution Pen-injector injection Inject 0.6 mg as instructed 1 time daily as needed (per pt).     • Insulin Degludec (TRESIBA FLEXTOUCH) 100 UNIT/ML Solution Pen-injector Inject 40 Units as instructed 1 time daily as needed (per pt.).     • morphine ER (MS CONTIN) 30 MG Tab CR tablet Take 30 mg by mouth every 12 hours.     • omeprazole (PRILOSEC) 20 MG delayed-release capsule Take 1 Cap by mouth every day. 30 Cap    • promethazine (PHENERGAN) 25 MG Suppos Insert 1 Suppository in rectum every 6 hours as needed for Nausea/Vomiting. 10 Suppository 0   • gabapentin (NEURONTIN) 800 MG tablet Take 800 mg by mouth 3 times a day.     • pravastatin (PRAVACHOL) 20 MG TABS Take 20 mg by mouth every morning.     • lisinopril (PRINIVIL) 5 MG TABS Take 5 mg by mouth every morning.       ALLERGIES  No Known Allergies    PHYSICAL EXAM  Vital Signs: /62 mmHg  Pulse 86  Temp(Src) 37 °C (98.6 °F)  Resp 14  Wt 79.2 kg (174 lb 9.7 oz)  SpO2 96%  LMP 02/05/2009  Constitutional: Alert, no acute distress  HENT: Normocephalic, atraumatic, moist mucus membranes  Eyes: Pupils equal and reactive, normal conjunctiva, non-icteric  Neck: Supple, normal range of motion, no stridor  Cardiovascular: Regular rhythm, Normal peripheral perfusion, no cyanosis, Normal cardiac auscultation  Pulmonary: No respiratory distress, normal work of breathing, no  accessory muscle usage, Clear to auscultation bilaterally  Abdomen: Soft, no peritoneal signs, bowel sounds are present. Diffuse mild abdominal discomfort  along lower abdomen palpation, no rebound or guarding  Skin: Warm, dry, Chronic excoriated lesions on bilateral shins secondary to diabetes  Back: No pain with active range of motion  Musculoskeletal: Normal range of motion in all extremities, no swelling or deformity noted  Neurologic: Alert, oriented, normal motor function, no speech deficits  Psychiatric: Normal and appropriate mood and affect    DIAGNOSTIC STUDIES/PROCEDURES:    LABS  Labs Reviewed   CBC WITH DIFFERENTIAL - Abnormal; Notable for the following:     RBC 4.00 (*)     Hemoglobin 10.2 (*)     Hematocrit 32.3 (*)     MCV 80.8 (*)     MCH 25.5 (*)     MCHC 31.6 (*)     Platelet Count 512 (*)     All other components within normal limits   COMP METABOLIC PANEL - Abnormal; Notable for the following:     Glucose 170 (*)     AST(SGOT) 52 (*)     ALT(SGPT) 164 (*)     Alkaline Phosphatase 263 (*)     Globulin 4.0 (*)     All other components within normal limits   LIPASE - Abnormal; Notable for the following:     Lipase 7 (*)     All other components within normal limits   URINALYSIS,CULTURE IF INDICATED - Abnormal; Notable for the following:     Glucose 100 (*)     All other components within normal limits   URINE DRUG SCREEN - Abnormal; Notable for the following:     Opiates Positive (*)     Oxycodone Positive (*)     All other components within normal limits   ACCU-CHEK GLUCOSE - Abnormal; Notable for the following:     Glucose - Accu-Ck 157 (*)     All other components within normal limits   POC UA - Abnormal; Notable for the following:     POC Glucose 100 (*)     POC Protein Trace (*)     All other components within normal limits   ESTIMATED GFR   HEPATITIS PANEL ACUTE(4 COMPONENTS)   POC URINE PREGNANCY   POC URINE PREGNANCY   POC URINALYSIS     All labs reviewed by me.    COURSE & MEDICAL DECISION  MAKING  Pertinent Labs & Imaging studies reviewed. (See chart for details)    Review of old medical records for continuity of care. Emergency department visit reviewed from 6/1/2017. Patient presented for nausea and vomiting. Noted to have a history of narcotic dependence. White blood count elevated at that time. She was treated with Dilaudid, Haldol and Zofran. States she felt nauseated and requesting additional pain medication, admitted for intractable nausea and vomiting.    Differential diagnoses include but are not limited to: Gastroparesis, chronic abdominal pain, intra-abdominal infection, appendicitis, urinary tract infection, pyelonephritis    1:39 AM - Patient seen and examined at bedside. Patient will be treated with IV fluids secondary to being unable to tolerate oral fluids at this time, Toradol 30mg IV, Zofran 4mg IVl. Ordered Urine pregnancy, UA, CBC, CMP, Lipase, Urinalysis, Estimated GFR, Accu-chek glucose to evaluate her symptoms.     Decision Making:  This is a 56 y.o. year old female who presents with complaint of vomiting and lower abdominal pain. She has a long-standing history of abdominal pain, states he pain is similar to previous exacerbations. On physical exam she has got some mild discomfort on lower abdominal palpation, no peritoneal signs, no evidence of acute abdomen. Urinalysis is negative for evidence of infection. Urine drug screen positive for oxycodone. White blood count is normal at 6.8. Hemoglobin 10.2 which is consistent with her baseline. She has no significant electrolyte abnormalities, liver enzymes are mildly elevated, etiology uncertain. Lipase is negative without evidence of pancreatitis. Her hepatitis panel is negative. She has had cholecystectomy ruling out cholecystitis as cause of her pain.    Her stay in the emergency department she has had no episodes of vomiting. She was treated with fluids due to her reported inability to tolerate by mouth fluids. She was treated  with Zofran. At this time I do not believe she requires admission for further diagnostics. Her pain was treated with Toradol. She continued to request Dilaudid multiple times for her nausea. She states she is not able to keep her opiates down at home. I did offer a trial of her home pain medications by mouth to see if she was able to tolerate them as she has had no vomiting here. She declined this stating that she only wanted IV pain medications. I explained that there is no indication for IV narcotics at this time, she then requested discharge without any further interventions.     Plan at this time is for discharge home. She will follow up with her primary care physician for liver enzyme recheck in 5-7 days. She'll return to the emergency department if she develops any new or worsening symptoms including worsening pain, if her vomiting returns, she has fevers or any further concerns.    Discharge home in stable condition    FINAL IMPRESSION  1. Chronic abdominal pain    2. Drug-seeking behavior          Roseline DELAROSA (Pool), am scribing for, and in the presence of, Nani Worrell M.D..    Electronically signed by: Roseline Yusuf (Korinaibsonal), 6/3/2017    Nani DELAROSA M.D. personally performed the services described in this documentation, as scribed by Roseline Yusuf in my presence, and it is both accurate and complete.    The note accurately reflects work and decisions made by me.  Nani Worrell  6/3/2017  4:50 AM

## 2017-07-07 ENCOUNTER — APPOINTMENT (OUTPATIENT)
Dept: RADIOLOGY | Facility: MEDICAL CENTER | Age: 57
End: 2017-07-07
Attending: EMERGENCY MEDICINE
Payer: MEDICAID

## 2017-07-07 ENCOUNTER — HOSPITAL ENCOUNTER (EMERGENCY)
Facility: MEDICAL CENTER | Age: 57
End: 2017-07-07
Attending: EMERGENCY MEDICINE | Admitting: EMERGENCY MEDICINE
Payer: MEDICAID

## 2017-07-07 VITALS
DIASTOLIC BLOOD PRESSURE: 75 MMHG | WEIGHT: 170 LBS | RESPIRATION RATE: 16 BRPM | BODY MASS INDEX: 28.32 KG/M2 | HEART RATE: 80 BPM | OXYGEN SATURATION: 97 % | HEIGHT: 65 IN | TEMPERATURE: 98.2 F | SYSTOLIC BLOOD PRESSURE: 165 MMHG

## 2017-07-07 DIAGNOSIS — G89.29 CHRONIC ABDOMINAL PAIN: ICD-10-CM

## 2017-07-07 DIAGNOSIS — R10.9 CHRONIC ABDOMINAL PAIN: ICD-10-CM

## 2017-07-07 DIAGNOSIS — K31.84 GASTROPARESIS: ICD-10-CM

## 2017-07-07 LAB
ALBUMIN SERPL BCP-MCNC: 3.4 G/DL (ref 3.2–4.9)
ALBUMIN/GLOB SERPL: 0.8 G/DL
ALP SERPL-CCNC: 138 U/L (ref 30–99)
ALT SERPL-CCNC: 7 U/L (ref 2–50)
ANION GAP SERPL CALC-SCNC: 8 MMOL/L (ref 0–11.9)
ANISOCYTOSIS BLD QL SMEAR: ABNORMAL
APPEARANCE UR: CLEAR
AST SERPL-CCNC: 9 U/L (ref 12–45)
BACTERIA #/AREA URNS HPF: NEGATIVE /HPF
BASOPHILS # BLD AUTO: 0.9 % (ref 0–1.8)
BASOPHILS # BLD: 0.07 K/UL (ref 0–0.12)
BILIRUB SERPL-MCNC: 0.3 MG/DL (ref 0.1–1.5)
BILIRUB UR QL STRIP.AUTO: NEGATIVE
BUN SERPL-MCNC: 11 MG/DL (ref 8–22)
CALCIUM SERPL-MCNC: 9.1 MG/DL (ref 8.5–10.5)
CHLORIDE SERPL-SCNC: 102 MMOL/L (ref 96–112)
CO2 SERPL-SCNC: 24 MMOL/L (ref 20–33)
COLOR UR: YELLOW
CREAT SERPL-MCNC: 0.72 MG/DL (ref 0.5–1.4)
EOSINOPHIL # BLD AUTO: 0.14 K/UL (ref 0–0.51)
EOSINOPHIL NFR BLD: 1.7 % (ref 0–6.9)
EPI CELLS #/AREA URNS HPF: NEGATIVE /HPF
ERYTHROCYTE [DISTWIDTH] IN BLOOD BY AUTOMATED COUNT: 42.8 FL (ref 35.9–50)
GFR SERPL CREATININE-BSD FRML MDRD: >60 ML/MIN/1.73 M 2
GLOBULIN SER CALC-MCNC: 4.3 G/DL (ref 1.9–3.5)
GLUCOSE SERPL-MCNC: 311 MG/DL (ref 65–99)
GLUCOSE UR STRIP.AUTO-MCNC: >=1000 MG/DL
HCT VFR BLD AUTO: 32.6 % (ref 37–47)
HGB BLD-MCNC: 10.2 G/DL (ref 12–16)
HYALINE CASTS #/AREA URNS LPF: NORMAL /LPF
KETONES UR STRIP.AUTO-MCNC: NEGATIVE MG/DL
LEUKOCYTE ESTERASE UR QL STRIP.AUTO: NEGATIVE
LIPASE SERPL-CCNC: 9 U/L (ref 11–82)
LYMPHOCYTES # BLD AUTO: 1.92 K/UL (ref 1–4.8)
LYMPHOCYTES NFR BLD: 23.7 % (ref 22–41)
MANUAL DIFF BLD: NORMAL
MCH RBC QN AUTO: 24.8 PG (ref 27–33)
MCHC RBC AUTO-ENTMCNC: 31.3 G/DL (ref 33.6–35)
MCV RBC AUTO: 79.1 FL (ref 81.4–97.8)
MICRO URNS: ABNORMAL
MICROCYTES BLD QL SMEAR: ABNORMAL
MONOCYTES # BLD AUTO: 0.36 K/UL (ref 0–0.85)
MONOCYTES NFR BLD AUTO: 4.4 % (ref 0–13.4)
MORPHOLOGY BLD-IMP: NORMAL
NEUTROPHILS # BLD AUTO: 5.61 K/UL (ref 2–7.15)
NEUTROPHILS NFR BLD: 69.3 % (ref 44–72)
NITRITE UR QL STRIP.AUTO: NEGATIVE
NRBC # BLD AUTO: 0 K/UL
NRBC BLD AUTO-RTO: 0 /100 WBC
PH UR STRIP.AUTO: 7 [PH]
PLATELET # BLD AUTO: 466 K/UL (ref 164–446)
PLATELET BLD QL SMEAR: NORMAL
PMV BLD AUTO: 8.8 FL (ref 9–12.9)
POTASSIUM SERPL-SCNC: 4 MMOL/L (ref 3.6–5.5)
PROT SERPL-MCNC: 7.7 G/DL (ref 6–8.2)
PROT UR QL STRIP: 30 MG/DL
RBC # BLD AUTO: 4.12 M/UL (ref 4.2–5.4)
RBC # URNS HPF: NORMAL /HPF
RBC BLD AUTO: PRESENT
RBC UR QL AUTO: NEGATIVE
SODIUM SERPL-SCNC: 134 MMOL/L (ref 135–145)
SP GR UR STRIP.AUTO: 1.02
WBC # BLD AUTO: 8.1 K/UL (ref 4.8–10.8)
WBC #/AREA URNS HPF: NORMAL /HPF

## 2017-07-07 PROCEDURE — 96376 TX/PRO/DX INJ SAME DRUG ADON: CPT

## 2017-07-07 PROCEDURE — 85027 COMPLETE CBC AUTOMATED: CPT

## 2017-07-07 PROCEDURE — 83690 ASSAY OF LIPASE: CPT

## 2017-07-07 PROCEDURE — 96375 TX/PRO/DX INJ NEW DRUG ADDON: CPT

## 2017-07-07 PROCEDURE — 81001 URINALYSIS AUTO W/SCOPE: CPT

## 2017-07-07 PROCEDURE — 80053 COMPREHEN METABOLIC PANEL: CPT

## 2017-07-07 PROCEDURE — 85007 BL SMEAR W/DIFF WBC COUNT: CPT

## 2017-07-07 PROCEDURE — 700117 HCHG RX CONTRAST REV CODE 255: Performed by: EMERGENCY MEDICINE

## 2017-07-07 PROCEDURE — 74177 CT ABD & PELVIS W/CONTRAST: CPT

## 2017-07-07 PROCEDURE — 36415 COLL VENOUS BLD VENIPUNCTURE: CPT

## 2017-07-07 PROCEDURE — 700111 HCHG RX REV CODE 636 W/ 250 OVERRIDE (IP): Performed by: EMERGENCY MEDICINE

## 2017-07-07 PROCEDURE — 700105 HCHG RX REV CODE 258: Performed by: EMERGENCY MEDICINE

## 2017-07-07 PROCEDURE — 96374 THER/PROPH/DIAG INJ IV PUSH: CPT

## 2017-07-07 PROCEDURE — 99285 EMERGENCY DEPT VISIT HI MDM: CPT

## 2017-07-07 RX ORDER — SODIUM CHLORIDE 9 MG/ML
1000 INJECTION, SOLUTION INTRAVENOUS ONCE
Status: COMPLETED | OUTPATIENT
Start: 2017-07-07 | End: 2017-07-07

## 2017-07-07 RX ORDER — ONDANSETRON 2 MG/ML
4 INJECTION INTRAMUSCULAR; INTRAVENOUS ONCE
Status: COMPLETED | OUTPATIENT
Start: 2017-07-07 | End: 2017-07-07

## 2017-07-07 RX ADMIN — ONDANSETRON 4 MG: 2 INJECTION INTRAMUSCULAR; INTRAVENOUS at 16:16

## 2017-07-07 RX ADMIN — HYDROMORPHONE HYDROCHLORIDE 1 MG: 1 INJECTION, SOLUTION INTRAMUSCULAR; INTRAVENOUS; SUBCUTANEOUS at 18:03

## 2017-07-07 RX ADMIN — SODIUM CHLORIDE 1000 ML: 9 INJECTION, SOLUTION INTRAVENOUS at 16:15

## 2017-07-07 RX ADMIN — HYDROMORPHONE HYDROCHLORIDE 1 MG: 1 INJECTION, SOLUTION INTRAMUSCULAR; INTRAVENOUS; SUBCUTANEOUS at 16:17

## 2017-07-07 RX ADMIN — IOHEXOL 100 ML: 350 INJECTION, SOLUTION INTRAVENOUS at 17:39

## 2017-07-07 ASSESSMENT — PAIN SCALES - GENERAL
PAINLEVEL_OUTOF10: 10
PAINLEVEL_OUTOF10: 8
PAINLEVEL_OUTOF10: 8

## 2017-07-07 NOTE — ED AVS SNAPSHOT
Home Care Instructions                                                                                                                Julisa Carrion   MRN: [id number]    Department:  [facility], Emergency Dept   Date of Visit:  7/7/2017            [facility], Emergency Dept    [address]    [place] [address]    Phone:  [phone]      You were seen by     Erik Naranjo M.D.      Your Diagnosis Was     Chronic abdominal pain     R10.9, G89.29       These are the medications you received during your hospitalization from 07/07/2017 1502 to 07/07/2017 1757     Date/Time Order Dose Route Action    07/07/2017 1615 NS infusion 1,000 mL 1,000 mL Intravenous New Bag    07/07/2017 1616 ondansetron (ZOFRAN) syringe/vial injection 4 mg 4 mg Intravenous Given    07/07/2017 1617 HYDROmorphone (DILAUDID) injection 1 mg 1 mg Intravenous Given    07/07/2017 1731 iohexol (OMNIPAQUE) 350 mg/mL 100 mL Intravenous Given      Medication Information     Review all of your home medications and newly ordered medications with your primary doctor and/or pharmacist as soon as possible. Follow medication instructions as directed by your doctor and/or pharmacist.     Please keep your complete medication list with you and share with your physician. Update the information when medications are discontinued, doses are changed, or new medications (including over-the-counter products) are added; and carry medication information at all times in the event of emergency situations.               Medication List      ASK your doctor about these medications        Instructions    Morning Afternoon Evening Bedtime    ferrous gluconate 324 (38 FE) MG Tabs   Commonly known as:  FERGON        Doctor's comments:  Take with food   Take 1 Tab by mouth with dinner.   Dose:  324 mg                        gabapentin 800 MG tablet   Commonly known as:  NEURONTIN        Take 800 mg by mouth 3 times a day.   Dose:   800 mg                        lisinopril 5 MG Tabs   Commonly known as:  PRINIVIL        Take 5 mg by mouth every morning.   Dose:  5 mg                        morphine ER 30 MG Tbcr tablet   Commonly known as:  MS CONTIN        Take 30 mg by mouth every 12 hours.   Dose:  30 mg                        omeprazole 20 MG delayed-release capsule   Commonly known as:  PRILOSEC        Take 1 Cap by mouth every day.   Dose:  20 mg                        oxycodone-acetaminophen  MG Tabs   Commonly known as:  PERCOCET-10        Take 1 Tab by mouth every 8 hours as needed for Severe Pain.   Dose:  1 Tab                        pravastatin 20 MG Tabs   Commonly known as:  PRAVACHOL        Take 20 mg by mouth every morning.   Dose:  20 mg                        promethazine 25 MG Supp   Commonly known as:  PHENERGAN        Insert 1 Suppository in rectum every 6 hours as needed for Nausea/Vomiting.   Dose:  25 mg                        TRESIBA FLEXTOUCH 100 UNIT/ML Sopn   Generic drug:  Insulin Degludec        Inject 40 Units as instructed 1 time daily as needed (per pt.).   Dose:  40 Units                        VICTOZA 18 MG/3ML Sopn injection   Generic drug:  liraglutide        Inject 0.6 mg as instructed 1 time daily as needed (per pt).   Dose:  0.6 mg                                Procedures and tests performed during your visit     CBC WITH DIFFERENTIAL    COMP METABOLIC PANEL    CONSENT FOR CONTRAST INJECTION    CT-ABDOMEN-PELVIS WITH    DIFFERENTIAL MANUAL    ESTIMATED GFR    LIPASE    MORPHOLOGY    PERIPHERAL SMEAR REVIEW    PLATELET ESTIMATE    SALINE LOCK    URINALYSIS    URINE MICROSCOPIC (W/UA)        Discharge Instructions       Abdominal Pain, Adult  Many things can cause belly (abdominal) pain. Most times, the belly pain is not dangerous. Many cases of belly pain can be watched and treated at home.  HOME CARE   · Do not take medicines that help you go poop (laxatives) unless told to by your doctor.  · Only  take medicine as told by your doctor.  · Eat or drink as told by your doctor. Your doctor will tell you if you should be on a special diet.  GET HELP IF:  · You do not know what is causing your belly pain.  · You have belly pain while you are sick to your stomach (nauseous) or have runny poop (diarrhea).  · You have pain while you pee or poop.  · Your belly pain wakes you up at night.  · You have belly pain that gets worse or better when you eat.  · You have belly pain that gets worse when you eat fatty foods.  · You have a fever.  GET HELP RIGHT AWAY IF:   · The pain does not go away within 2 hours.  · You keep throwing up (vomiting).  · The pain changes and is only in the right or left part of the belly.  · You have bloody or tarry looking poop.  MAKE SURE YOU:   · Understand these instructions.  · Will watch your condition.  · Will get help right away if you are not doing well or get worse.     This information is not intended to replace advice given to you by your health care provider. Make sure you discuss any questions you have with your health care provider.     Document Released: 06/05/2009 Document Revised: 01/08/2016 Document Reviewed: 08/27/2014  [employer] Patient Education ©2016 [employer]            Patient Information     Patient Information    Following emergency treatment: all patient requiring follow-up care must return either to a private physician or a clinic if your condition worsens before you are able to obtain further medical attention, please return to the emergency room.     Billing Information    At [facility], we work to make the billing process streamlined for our patients.  Our Representatives are here to answer any questions you may have regarding your hospital bill.  If you have insurance coverage and have supplied your insurance information to us, we will submit a claim to your insurer on your behalf.  Should you have any questions regarding your bill, we can be  reached online or by phone as follows:  Online: You are able pay your bills online or live chat with our representatives about any billing questions you may have. We are here to help Monday - Friday from 8:00am to 7:30pm and 9:00am - 12:00pm on Saturdays.  Please visit [email or web address]  for more information.   Phone:  [phone] or [phone]    Please note that your emergency physician, surgeon, pathologist, radiologist, anesthesiologist, and other specialists are not employed by [facility] and will therefore bill separately for their services.  Please contact them directly for any questions concerning their bills at the numbers below:     Emergency Physician Services:  [phone]  [facility]:  [phone]  [facility]:  [phone]  [facility]:  [phone]    1. Your final bill may vary from the amount quoted upon discharge if all procedures are not complete at that time, or if your doctor has additional procedures of which we are not aware. You will receive an additional bill if you return to the Emergency Department at [facility] for suture removal regardless of the facility of which the sutures were placed.     2. Please arrange for settlement of this account at the emergency registration.    3. All self-pay accounts are due in full at the time of treatment.  If you are unable to meet this obligation then payment is expected within 4-5 days.     4. If you have had radiology studies (CT, X-ray, Ultrasound, MRI), you have received a preliminary result during your emergency department visit. Please contact the radiology department [phone] to receive a copy of your final result. Please discuss the Final result with your primary physician or with the follow up physician provided.     Crisis Hotline:  [place] Crisis Hotline:  8-521-GUEWAFJ or 1-461.683.7842  [place] Crisis Hotline:    1-629.632.9985 or  637.950.6540         ED Discharge Follow Up Questions    1. In order to provide you with very good care, we would like to follow up with a phone call in the next few days.  May we have your permission to contact you?     YES /  NO    2. What is the best phone number to call you? (       )_____-__________    3. What is the best time to call you?      Morning  /  Afternoon  /  Evening                   Patient Signature:  ____________________________________________________________    Date:  ____________________________________________________________

## 2017-07-07 NOTE — ED PROVIDER NOTES
ED Provider Note    CHIEF COMPLAINT  Chief Complaint   Patient presents with   • Abdominal Pain   • Nausea       HPI  Julisa Carrion is a 57 y.o. female who presents for evaluation of epigastric pain and nausea. The patient has been accepted past medical history as listed below. She has a long-standing history of chronic abdominal pain gastroparesis and narcotic seeking behavior. She specifically denies fevers hematemesis hematochezia no chest pain. No abdominal distention no flank pain or hematuria. She reports 6 out of 10 epigastric discomfort with nausea vomiting no diarrhea. Symptoms present for 2-3 days thing makes it better or worse    REVIEW OF SYSTEMS  See HPI for further details. No lethargy fevers chills night sweats or weight loss hematochezia melena All other systems are negative.     PAST MEDICAL HISTORY  Past Medical History   Diagnosis Date   • Hypertension    • Diabetes    • High cholesterol 5/15/2011   • Staph skin infection    • Migraine    • Intestinal mass 2015       FAMILY HISTORY  No history of bleeding disorder    SOCIAL HISTORY  Social History     Social History   • Marital Status: Single     Spouse Name: N/A   • Number of Children: N/A   • Years of Education: N/A     Social History Main Topics   • Smoking status: Former Smoker -- 1.00 packs/day for 20 years     Types: Cigarettes     Quit date: 1996   • Smokeless tobacco: Former User     Quit date: 1995   • Alcohol Use: No   • Drug Use: No      Comment: just prescribed meds   • Sexual Activity: Not Asked     Other Topics Concern   • None     Social History Narrative    No known exposure to asbestos, dyes, chemicals, or pesticides.  Patient does not work.  She has 3 children and many grand-children.  Has a significant other for about 20 years. Moved to [place] in .        SURGICAL HISTORY  Past Surgical History   Procedure Laterality Date   • Other abdominal surgery       cholecystectomy   • Gyn surgery        x  "3,tubal ligation   • Other orthopedic surgery  6-2014     right wrist surgery   • Abdominal exploration       Lower intestine d/t mass - benign       CURRENT MEDICATIONS    Current facility-administered medications:   •  HYDROmorphone (DILAUDID) injection 1 mg, 1 mg, Intravenous, Once, Erik Naranjo M.D.    Current outpatient prescriptions:   •  ferrous gluconate (FERGON) 324 (38 FE) MG Tab, Take 1 Tab by mouth with dinner., Disp: 30 Tab, Rfl: 0  •  oxycodone-acetaminophen (PERCOCET-10)  MG Tab, Take 1 Tab by mouth every 8 hours as needed for Severe Pain., Disp: , Rfl:   •  liraglutide (VICTOZA) 18 MG/3ML Solution Pen-injector injection, Inject 0.6 mg as instructed 1 time daily as needed (per pt)., Disp: , Rfl:   •  Insulin Degludec (TRESIBA FLEXTOUCH) 100 UNIT/ML Solution Pen-injector, Inject 40 Units as instructed 1 time daily as needed (per pt.)., Disp: , Rfl:   •  morphine ER (MS CONTIN) 30 MG Tab CR tablet, Take 30 mg by mouth every 12 hours., Disp: , Rfl:   •  omeprazole (PRILOSEC) 20 MG delayed-release capsule, Take 1 Cap by mouth every day., Disp: 30 Cap, Rfl:   •  promethazine (PHENERGAN) 25 MG Suppos, Insert 1 Suppository in rectum every 6 hours as needed for Nausea/Vomiting., Disp: 10 Suppository, Rfl: 0  •  gabapentin (NEURONTIN) 800 MG tablet, Take 800 mg by mouth 3 times a day., Disp: , Rfl:   •  pravastatin (PRAVACHOL) 20 MG TABS, Take 20 mg by mouth every morning., Disp: , Rfl:   •  lisinopril (PRINIVIL) 5 MG TABS, Take 5 mg by mouth every morning., Disp: , Rfl:       ALLERGIES  No Known Allergies    PHYSICAL EXAM  VITAL SIGNS: /75 mmHg  Pulse 91  Temp(Src) 36.8 °C (98.2 °F)  Resp 16  Ht 1.651 m (5' 5\")  Wt 77.111 kg (170 lb)  BMI 28.29 kg/m2  LMP 02/05/2009 Room air O2: 97    Constitutional: Patient appears uncomfortable  HENT: Normocephalic, Atraumatic, Bilateral external ears normal, Oropharynx moist, No oral exudates, Nose normal.   Eyes: PERRLA, EOMI, Conjunctiva normal, No " discharge.   Neck: Normal range of motion, No tenderness, Supple, No stridor.   Cardiovascular: Normal heart rate, Normal rhythm, No murmurs, No rubs, No gallops.   Thorax & Lungs: Normal breath sounds, No respiratory distress, No wheezing, No chest tenderness.   Abdomen: Bowel sounds normal, Soft, mild epigastric discomfort no rebound guarding or rigidity.   Skin: Warm, Dry, No erythema, No rash.   Back: No tenderness, No CVA tenderness.   Extremities: Intact distal pulses, No edema, No tenderness, No cyanosis, No clubbing.   Musculoskeletal: Good range of motion in all major joints. No tenderness to palpation or major deformities noted.   Neurologic: Alert & oriented x 3, Normal motor function, Normal sensory function, No focal deficits noted.   Psychiatric: Anxious      RADIOLOGY/PROCEDURES  Results for orders placed or performed during the hospital encounter of 07/07/17   CBC WITH DIFFERENTIAL   Result Value Ref Range    WBC 8.1 4.8 - 10.8 K/uL    RBC 4.12 (L) 4.20 - 5.40 M/uL    Hemoglobin 10.2 (L) 12.0 - 16.0 g/dL    Hematocrit 32.6 (L) 37.0 - 47.0 %    MCV 79.1 (L) 81.4 - 97.8 fL    MCH 24.8 (L) 27.0 - 33.0 pg    MCHC 31.3 (L) 33.6 - 35.0 g/dL    RDW 42.8 35.9 - 50.0 fL    Platelet Count 466 (H) 164 - 446 K/uL    MPV 8.8 (L) 9.0 - 12.9 fL    Nucleated RBC 0.00 /100 WBC    NRBC (Absolute) 0.00 K/uL    Neutrophils-Polys 69.30 44.00 - 72.00 %    Lymphocytes 23.70 22.00 - 41.00 %    Monocytes 4.40 0.00 - 13.40 %    Eosinophils 1.70 0.00 - 6.90 %    Basophils 0.90 0.00 - 1.80 %    Neutrophils (Absolute) 5.61 2.00 - 7.15 K/uL    Lymphs (Absolute) 1.92 1.00 - 4.80 K/uL    Monos (Absolute) 0.36 0.00 - 0.85 K/uL    Eos (Absolute) 0.14 0.00 - 0.51 K/uL    Baso (Absolute) 0.07 0.00 - 0.12 K/uL    Anisocytosis 1+     Microcytosis 1+    COMP METABOLIC PANEL   Result Value Ref Range    Sodium 134 (L) 135 - 145 mmol/L    Potassium 4.0 3.6 - 5.5 mmol/L    Chloride 102 96 - 112 mmol/L    Co2 24 20 - 33 mmol/L    Anion Gap 8.0  0.0 - 11.9    Glucose 311 (H) 65 - 99 mg/dL    Bun 11 8 - 22 mg/dL    Creatinine 0.72 0.50 - 1.40 mg/dL    Calcium 9.1 8.5 - 10.5 mg/dL    AST(SGOT) 9 (L) 12 - 45 U/L    ALT(SGPT) 7 2 - 50 U/L    Alkaline Phosphatase 138 (H) 30 - 99 U/L    Total Bilirubin 0.3 0.1 - 1.5 mg/dL    Albumin 3.4 3.2 - 4.9 g/dL    Total Protein 7.7 6.0 - 8.2 g/dL    Globulin 4.3 (H) 1.9 - 3.5 g/dL    A-G Ratio 0.8 g/dL   LIPASE   Result Value Ref Range    Lipase 9 (L) 11 - 82 U/L   URINALYSIS   Result Value Ref Range    Color Yellow     Character Clear     Specific Gravity 1.023 <1.035    Ph 7.0 5.0-8.0    Glucose >=1000 (A) Negative mg/dL    Ketones Negative Negative mg/dL    Protein 30 (A) Negative mg/dL    Bilirubin Negative Negative    Nitrite Negative Negative    Leukocyte Esterase Negative Negative    Occult Blood Negative Negative    Micro Urine Req Microscopic    ESTIMATED GFR   Result Value Ref Range    GFR If African American >60 >60 mL/min/1.73 m 2    GFR If Non African American >60 >60 mL/min/1.73 m 2   URINE MICROSCOPIC (W/UA)   Result Value Ref Range    WBC 2-5 /hpf    RBC 0-2 /hpf    Bacteria Negative None /hpf    Epithelial Cells Negative /hpf    Hyaline Cast 0-2 /lpf   DIFFERENTIAL MANUAL   Result Value Ref Range    Manual Diff Status PERFORMED    PERIPHERAL SMEAR REVIEW   Result Value Ref Range    Peripheral Smear Review see below    PLATELET ESTIMATE   Result Value Ref Range    Plt Estimation Increased    MORPHOLOGY   Result Value Ref Range    RBC Morphology Present       Results for orders placed or performed during the hospital encounter of 07/07/17   CBC WITH DIFFERENTIAL   Result Value Ref Range    WBC 8.1 4.8 - 10.8 K/uL    RBC 4.12 (L) 4.20 - 5.40 M/uL    Hemoglobin 10.2 (L) 12.0 - 16.0 g/dL    Hematocrit 32.6 (L) 37.0 - 47.0 %    MCV 79.1 (L) 81.4 - 97.8 fL    MCH 24.8 (L) 27.0 - 33.0 pg    MCHC 31.3 (L) 33.6 - 35.0 g/dL    RDW 42.8 35.9 - 50.0 fL    Platelet Count 466 (H) 164 - 446 K/uL    MPV 8.8 (L) 9.0 - 12.9  fL    Nucleated RBC 0.00 /100 WBC    NRBC (Absolute) 0.00 K/uL    Neutrophils-Polys 69.30 44.00 - 72.00 %    Lymphocytes 23.70 22.00 - 41.00 %    Monocytes 4.40 0.00 - 13.40 %    Eosinophils 1.70 0.00 - 6.90 %    Basophils 0.90 0.00 - 1.80 %    Neutrophils (Absolute) 5.61 2.00 - 7.15 K/uL    Lymphs (Absolute) 1.92 1.00 - 4.80 K/uL    Monos (Absolute) 0.36 0.00 - 0.85 K/uL    Eos (Absolute) 0.14 0.00 - 0.51 K/uL    Baso (Absolute) 0.07 0.00 - 0.12 K/uL    Anisocytosis 1+     Microcytosis 1+    COMP METABOLIC PANEL   Result Value Ref Range    Sodium 134 (L) 135 - 145 mmol/L    Potassium 4.0 3.6 - 5.5 mmol/L    Chloride 102 96 - 112 mmol/L    Co2 24 20 - 33 mmol/L    Anion Gap 8.0 0.0 - 11.9    Glucose 311 (H) 65 - 99 mg/dL    Bun 11 8 - 22 mg/dL    Creatinine 0.72 0.50 - 1.40 mg/dL    Calcium 9.1 8.5 - 10.5 mg/dL    AST(SGOT) 9 (L) 12 - 45 U/L    ALT(SGPT) 7 2 - 50 U/L    Alkaline Phosphatase 138 (H) 30 - 99 U/L    Total Bilirubin 0.3 0.1 - 1.5 mg/dL    Albumin 3.4 3.2 - 4.9 g/dL    Total Protein 7.7 6.0 - 8.2 g/dL    Globulin 4.3 (H) 1.9 - 3.5 g/dL    A-G Ratio 0.8 g/dL   LIPASE   Result Value Ref Range    Lipase 9 (L) 11 - 82 U/L   URINALYSIS   Result Value Ref Range    Color Yellow     Character Clear     Specific Gravity 1.023 <1.035    Ph 7.0 5.0-8.0    Glucose >=1000 (A) Negative mg/dL    Ketones Negative Negative mg/dL    Protein 30 (A) Negative mg/dL    Bilirubin Negative Negative    Nitrite Negative Negative    Leukocyte Esterase Negative Negative    Occult Blood Negative Negative    Micro Urine Req Microscopic    ESTIMATED GFR   Result Value Ref Range    GFR If African American >60 >60 mL/min/1.73 m 2    GFR If Non African American >60 >60 mL/min/1.73 m 2   URINE MICROSCOPIC (W/UA)   Result Value Ref Range    WBC 2-5 /hpf    RBC 0-2 /hpf    Bacteria Negative None /hpf    Epithelial Cells Negative /hpf    Hyaline Cast 0-2 /lpf   DIFFERENTIAL MANUAL   Result Value Ref Range    Manual Diff Status PERFORMED     PERIPHERAL SMEAR REVIEW   Result Value Ref Range    Peripheral Smear Review see below    PLATELET ESTIMATE   Result Value Ref Range    Plt Estimation Increased    MORPHOLOGY   Result Value Ref Range    RBC Morphology Present         COURSE & MEDICAL DECISION MAKING  Pertinent Labs & Imaging studies reviewed. (See chart for details)  An IV established. The patient given IV fluids nausea and pain medication. Differential diagnosis included Dubois paresis pancreatitis cholecystitis enteritis gastritis peptic ulcer disease. An extended workup was performed. Laboratory studies were all normal other than mild hyperglycemia without acidosis. CT scan of abdomen and pelvis was performed. There is no obstructive or inflammatory process. I performed an extensive chart review. The patient has had several visits for the same. She feels better after treatment. I recommended follow-up with her PCP for ongoing management    FINAL IMPRESSION  1.   1. Chronic abdominal pain      2. Gastroparesis    Electronically signed by: Erik Naranjo, 7/7/2017 3:15 PM

## 2017-07-07 NOTE — ED AVS SNAPSHOT
7/7/2017    Julisa Carrion  [address] [place] [address]    Dear Julisa:    [facility] wants to ensure your discharge home is safe and you or your loved ones have had all of your questions answered regarding your care after you leave the hospital.    Below is a list of resources and contact information should you have any questions regarding your hospital stay, follow-up instructions, or active medical symptoms.    Questions or Concerns Regarding… Contact   Medical Questions Related to Your Discharge  (7 days a week, 8am-5pm) Contact a Nurse Care Coordinator   [phone]   Medical Questions Not Related to Your Discharge  (24 hours a day / 7 days a week)  Contact the Nurse Health Line   [phone]    Medications or Discharge Instructions Refer to your discharge packet   or contact your [facility] Primary Care Provider   [phone]   Follow-up Appointment(s) Schedule your appointment via [employer]   or contact Scheduling [phone]   Billing Review your statement via [employer]  or contact Billing [phone]   Medical Records Review your records via [employer]   or contact Medical Records [phone]     You may receive a telephone call within two days of discharge. This call is to make certain you understand your discharge instructions and have the opportunity to have any questions answered. You can also easily access your medical information, test results and upcoming appointments via the [employer] free online health management tool. You can learn more and sign up at [email or web address]. For assistance setting up your [employer] account, please call [phone].    Once again, we want to ensure your discharge home is safe and that you have a clear understanding of any next steps in your care. If you have any questions or concerns, please do not hesitate to contact us, we are here for you. Thank you for choosing [facility] for your healthcare needs.    Sincerely,    Your [facility] Healthcare Team

## 2017-07-07 NOTE — ED AVS SNAPSHOT
[employer] Access Code: [email or web address]  Expires: 8/6/2017  5:57 PM    Your email address is not on file at [facility].  Email Addresses are required for you to sign up for [employer], please contact [phone] to verify your personal information and to provide your email address prior to attempting to register for [employer].    Julisa Amadotz  [address] [place] [address]    [employer]  A secure, online tool to manage your health information     [employer]’s [employer]® is a secure, online tool that connects you to your personalized health information from the privacy of your home -- day or night - making it very easy for you to manage your healthcare. Once the activation process is completed, you can even access your medical information using the [employer] rito, which is available for free in the Apple Rito store or Google Play store.     To learn more about [employer], visit [email or web address]    There are two levels of access available (as shown below):   My Chart Features  [facility] Primary Care Doctor [facility]  Specialists [facility]  Urgent  Care Non-[facility] Primary Care Doctor   Email your healthcare team securely and privately 24/7 X X X    Manage appointments: schedule your next appointment; view details of past/upcoming appointments X      Request prescription refills. X      View recent personal medical records, including lab and immunizations X X X X   View health record, including health history, allergies, medications X X X X   Read reports about your outpatient visits, procedures, consult and ER notes X X X X   See your discharge summary, which is a recap of your hospital and/or ER visit that includes your diagnosis, lab results, and care plan X X  X     How to register for [email or web address]:  Once your e-mail address has been verified, follow the following steps to sign up for [email or web address].     1. Go to  [email or web address]  2. Click on the Sign Up Now box, which takes you to the New Member Sign Up page. You  will need to provide the following information:  a. Enter your [employer] Access Code exactly as it appears at the top of this page. (You will not need to use this code after you’ve completed the sign-up process. If you do not sign up before the expiration date, you must request a new code.)   b. Enter your date of birth.   c. Enter your home email address.   d. Click Submit, and follow the next screen’s instructions.  3. Create a [employer] ID. This will be your [employer] login ID and cannot be changed, so think of one that is secure and easy to remember.  4. Create a [employer] password. You can change your password at any time.  5. Enter your Password Reset Question and Answer. This can be used at a later time if you forget your password.   6. Enter your e-mail address. This allows you to receive e-mail notifications when new information is available in [employer].  7. Click Sign Up. You can now view your health information.    For assistance activating your [employer] account, call [phone]

## 2017-07-08 ENCOUNTER — HOSPITAL ENCOUNTER (EMERGENCY)
Facility: MEDICAL CENTER | Age: 57
End: 2017-07-08
Attending: EMERGENCY MEDICINE
Payer: MEDICAID

## 2017-07-08 VITALS
HEIGHT: 65 IN | BODY MASS INDEX: 27.92 KG/M2 | OXYGEN SATURATION: 99 % | SYSTOLIC BLOOD PRESSURE: 136 MMHG | DIASTOLIC BLOOD PRESSURE: 84 MMHG | TEMPERATURE: 98 F | HEART RATE: 83 BPM | RESPIRATION RATE: 14 BRPM | WEIGHT: 167.55 LBS

## 2017-07-08 DIAGNOSIS — G89.4 CHRONIC PAIN SYNDROME: ICD-10-CM

## 2017-07-08 DIAGNOSIS — R10.13 EPIGASTRIC PAIN: ICD-10-CM

## 2017-07-08 LAB
ALBUMIN SERPL BCP-MCNC: 3.6 G/DL (ref 3.2–4.9)
ALBUMIN/GLOB SERPL: 0.9 G/DL
ALP SERPL-CCNC: 140 U/L (ref 30–99)
ALT SERPL-CCNC: 7 U/L (ref 2–50)
ANION GAP SERPL CALC-SCNC: 7 MMOL/L (ref 0–11.9)
AST SERPL-CCNC: 10 U/L (ref 12–45)
BASOPHILS # BLD AUTO: 0.8 % (ref 0–1.8)
BASOPHILS # BLD: 0.06 K/UL (ref 0–0.12)
BILIRUB SERPL-MCNC: 0.3 MG/DL (ref 0.1–1.5)
BUN SERPL-MCNC: 17 MG/DL (ref 8–22)
CALCIUM SERPL-MCNC: 9.2 MG/DL (ref 8.5–10.5)
CHLORIDE SERPL-SCNC: 105 MMOL/L (ref 96–112)
CO2 SERPL-SCNC: 24 MMOL/L (ref 20–33)
CREAT SERPL-MCNC: 0.69 MG/DL (ref 0.5–1.4)
EKG IMPRESSION: NORMAL
EOSINOPHIL # BLD AUTO: 0.12 K/UL (ref 0–0.51)
EOSINOPHIL NFR BLD: 1.7 % (ref 0–6.9)
ERYTHROCYTE [DISTWIDTH] IN BLOOD BY AUTOMATED COUNT: 43.1 FL (ref 35.9–50)
GFR SERPL CREATININE-BSD FRML MDRD: >60 ML/MIN/1.73 M 2
GLOBULIN SER CALC-MCNC: 4 G/DL (ref 1.9–3.5)
GLUCOSE SERPL-MCNC: 226 MG/DL (ref 65–99)
HCT VFR BLD AUTO: 33.3 % (ref 37–47)
HGB BLD-MCNC: 10.4 G/DL (ref 12–16)
IMM GRANULOCYTES # BLD AUTO: 0.01 K/UL (ref 0–0.11)
IMM GRANULOCYTES NFR BLD AUTO: 0.1 % (ref 0–0.9)
LIPASE SERPL-CCNC: 17 U/L (ref 11–82)
LYMPHOCYTES # BLD AUTO: 2.75 K/UL (ref 1–4.8)
LYMPHOCYTES NFR BLD: 38.5 % (ref 22–41)
MCH RBC QN AUTO: 24.7 PG (ref 27–33)
MCHC RBC AUTO-ENTMCNC: 31.2 G/DL (ref 33.6–35)
MCV RBC AUTO: 79.1 FL (ref 81.4–97.8)
MONOCYTES # BLD AUTO: 0.41 K/UL (ref 0–0.85)
MONOCYTES NFR BLD AUTO: 5.7 % (ref 0–13.4)
NEUTROPHILS # BLD AUTO: 3.8 K/UL (ref 2–7.15)
NEUTROPHILS NFR BLD: 53.2 % (ref 44–72)
NRBC # BLD AUTO: 0 K/UL
NRBC BLD AUTO-RTO: 0 /100 WBC
PLATELET # BLD AUTO: 502 K/UL (ref 164–446)
PMV BLD AUTO: 8.7 FL (ref 9–12.9)
POTASSIUM SERPL-SCNC: 4.1 MMOL/L (ref 3.6–5.5)
PROT SERPL-MCNC: 7.6 G/DL (ref 6–8.2)
RBC # BLD AUTO: 4.21 M/UL (ref 4.2–5.4)
SODIUM SERPL-SCNC: 136 MMOL/L (ref 135–145)
TROPONIN I SERPL-MCNC: <0.01 NG/ML (ref 0–0.04)
WBC # BLD AUTO: 7.2 K/UL (ref 4.8–10.8)

## 2017-07-08 PROCEDURE — 93005 ELECTROCARDIOGRAM TRACING: CPT | Performed by: EMERGENCY MEDICINE

## 2017-07-08 PROCEDURE — 83690 ASSAY OF LIPASE: CPT

## 2017-07-08 PROCEDURE — 700102 HCHG RX REV CODE 250 W/ 637 OVERRIDE(OP): Performed by: EMERGENCY MEDICINE

## 2017-07-08 PROCEDURE — 700105 HCHG RX REV CODE 258: Performed by: EMERGENCY MEDICINE

## 2017-07-08 PROCEDURE — A9270 NON-COVERED ITEM OR SERVICE: HCPCS | Performed by: EMERGENCY MEDICINE

## 2017-07-08 PROCEDURE — 85025 COMPLETE CBC W/AUTO DIFF WBC: CPT

## 2017-07-08 PROCEDURE — 96375 TX/PRO/DX INJ NEW DRUG ADDON: CPT

## 2017-07-08 PROCEDURE — 99285 EMERGENCY DEPT VISIT HI MDM: CPT

## 2017-07-08 PROCEDURE — 80053 COMPREHEN METABOLIC PANEL: CPT

## 2017-07-08 PROCEDURE — 96372 THER/PROPH/DIAG INJ SC/IM: CPT | Mod: XU

## 2017-07-08 PROCEDURE — 84484 ASSAY OF TROPONIN QUANT: CPT

## 2017-07-08 PROCEDURE — 96374 THER/PROPH/DIAG INJ IV PUSH: CPT

## 2017-07-08 PROCEDURE — 700111 HCHG RX REV CODE 636 W/ 250 OVERRIDE (IP): Performed by: EMERGENCY MEDICINE

## 2017-07-08 RX ORDER — SODIUM CHLORIDE 9 MG/ML
1000 INJECTION, SOLUTION INTRAVENOUS ONCE
Status: COMPLETED | OUTPATIENT
Start: 2017-07-08 | End: 2017-07-08

## 2017-07-08 RX ORDER — ONDANSETRON 4 MG/1
4 TABLET, ORALLY DISINTEGRATING ORAL EVERY 8 HOURS PRN
Qty: 10 TAB | Refills: 0 | Status: SHIPPED | OUTPATIENT
Start: 2017-07-08 | End: 2017-08-31

## 2017-07-08 RX ORDER — ONDANSETRON 2 MG/ML
4 INJECTION INTRAMUSCULAR; INTRAVENOUS ONCE
Status: COMPLETED | OUTPATIENT
Start: 2017-07-08 | End: 2017-07-08

## 2017-07-08 RX ORDER — OXYCODONE HYDROCHLORIDE 5 MG/1
5 TABLET ORAL ONCE
Status: COMPLETED | OUTPATIENT
Start: 2017-07-08 | End: 2017-07-08

## 2017-07-08 RX ORDER — DICYCLOMINE HYDROCHLORIDE 10 MG/ML
10 INJECTION INTRAMUSCULAR ONCE
Status: COMPLETED | OUTPATIENT
Start: 2017-07-08 | End: 2017-07-08

## 2017-07-08 RX ADMIN — LIDOCAINE HYDROCHLORIDE 30 ML: 20 SOLUTION OROPHARYNGEAL at 21:07

## 2017-07-08 RX ADMIN — DICYCLOMINE HYDROCHLORIDE 10 MG: 10 INJECTION INTRAMUSCULAR at 21:08

## 2017-07-08 RX ADMIN — HYDROMORPHONE HYDROCHLORIDE 0.5 MG: 1 INJECTION, SOLUTION INTRAMUSCULAR; INTRAVENOUS; SUBCUTANEOUS at 20:30

## 2017-07-08 RX ADMIN — SODIUM CHLORIDE 1000 ML: 9 INJECTION, SOLUTION INTRAVENOUS at 20:00

## 2017-07-08 RX ADMIN — OXYCODONE HYDROCHLORIDE 5 MG: 5 TABLET ORAL at 22:07

## 2017-07-08 RX ADMIN — ONDANSETRON 4 MG: 2 INJECTION INTRAMUSCULAR; INTRAVENOUS at 20:00

## 2017-07-08 ASSESSMENT — PAIN SCALES - GENERAL: PAINLEVEL_OUTOF10: 10

## 2017-07-08 NOTE — ED NOTES
Patient to desk asking about how many people were in front of her.  Explained that it was only two, patient thankful, back to lobby

## 2017-07-08 NOTE — DISCHARGE INSTRUCTIONS
Abdominal Pain, Adult  Many things can cause belly (abdominal) pain. Most times, the belly pain is not dangerous. Many cases of belly pain can be watched and treated at home.  HOME CARE   · Do not take medicines that help you go poop (laxatives) unless told to by your doctor.  · Only take medicine as told by your doctor.  · Eat or drink as told by your doctor. Your doctor will tell you if you should be on a special diet.  GET HELP IF:  · You do not know what is causing your belly pain.  · You have belly pain while you are sick to your stomach (nauseous) or have runny poop (diarrhea).  · You have pain while you pee or poop.  · Your belly pain wakes you up at night.  · You have belly pain that gets worse or better when you eat.  · You have belly pain that gets worse when you eat fatty foods.  · You have a fever.  GET HELP RIGHT AWAY IF:   · The pain does not go away within 2 hours.  · You keep throwing up (vomiting).  · The pain changes and is only in the right or left part of the belly.  · You have bloody or tarry looking poop.  MAKE SURE YOU:   · Understand these instructions.  · Will watch your condition.  · Will get help right away if you are not doing well or get worse.     This information is not intended to replace advice given to you by your health care provider. Make sure you discuss any questions you have with your health care provider.     Document Released: 06/05/2009 Document Revised: 01/08/2016 Document Reviewed: 08/27/2014  [employer] Patient Education ©2016 [employer]

## 2017-07-08 NOTE — ED AVS SNAPSHOT
Home Care Instructions                                                                                                                Julisa Carrion   MRN: [id number]    Department:  [facility], Emergency Dept   Date of Visit:  7/8/2017            [facility], Emergency Dept    [address] [place] [address]    Phone:  [phone]      You were seen by     Saida Galan M.D.      Your Diagnosis Was     Epigastric pain     R10.13       These are the medications you received during your hospitalization from 07/08/2017 1518 to 07/08/2017 2221     Date/Time Order Dose Route Action    07/08/2017 2000 NS infusion 1,000 mL 1,000 mL Intravenous New Bag    07/08/2017 2000 ondansetron (ZOFRAN) syringe/vial injection 4 mg 4 mg Intravenous Given    07/08/2017 2030 HYDROmorphone (DILAUDID) injection 0.5 mg 0.5 mg Intravenous Given    07/08/2017 2107 hyoscyamine-maalox plus-lidocaine viscous (GI COCKTAIL) oral susp 30 mL 30 mL Oral Given    07/08/2017 2108 dicyclomine (BENTYL) injection 10 mg 10 mg Intramuscular Given    07/08/2017 2207 oxycodone immediate-release (ROXICODONE) tablet 5 mg 5 mg Oral Given      Follow-up Information     1. Follow up with [facility], Emergency Dept.    Specialty:  Emergency Medicine    Why:  As needed, If symptoms worsen    Contact information    [address] [place] [address] [phone]        2. Follow up with Tre Jason M.D.. Schedule an appointment as soon as possible for a visit in 2 days.    Specialty:  Internal Medicine    Contact information    [address]  [facility] [address] [phone]          3. Follow up with [facility]. Schedule an appointment as soon as possible for a visit in 2 days.    Contact information    [address]  [facility] [address] [phone]      Medication Information     Review all of your home medications and newly ordered medications with  your primary doctor and/or pharmacist as soon as possible. Follow medication instructions as directed by your doctor and/or pharmacist.     Please keep your complete medication list with you and share with your physician. Update the information when medications are discontinued, doses are changed, or new medications (including over-the-counter products) are added; and carry medication information at all times in the event of emergency situations.               Medication List      START taking these medications        Instructions    Morning Afternoon Evening Bedtime    ondansetron 4 MG Tbdp   Commonly known as:  ZOFRAN ODT        Take 1 Tab by mouth every 8 hours as needed for Nausea/Vomiting.   Dose:  4 mg                          ASK your doctor about these medications        Instructions    Morning Afternoon Evening Bedtime    ferrous gluconate 324 (38 FE) MG Tabs   Commonly known as:  FERGON        Doctor's comments:  Take with food   Take 1 Tab by mouth with dinner.   Dose:  324 mg                        gabapentin 800 MG tablet   Commonly known as:  NEURONTIN        Take 800 mg by mouth 3 times a day.   Dose:  800 mg                        lisinopril 5 MG Tabs   Commonly known as:  PRINIVIL        Take 5 mg by mouth every morning.   Dose:  5 mg                        morphine ER 30 MG Tbcr tablet   Commonly known as:  MS CONTIN        Take 30 mg by mouth every 12 hours.   Dose:  30 mg                        omeprazole 20 MG delayed-release capsule   Commonly known as:  PRILOSEC        Take 1 Cap by mouth every day.   Dose:  20 mg                        oxycodone-acetaminophen  MG Tabs   Commonly known as:  PERCOCET-10        Take 1 Tab by mouth every 8 hours as needed for Severe Pain.   Dose:  1 Tab                        pravastatin 20 MG Tabs   Commonly known as:  PRAVACHOL        Take 20 mg by mouth every morning.   Dose:  20 mg                        promethazine 25 MG Supp   Commonly known as:   PHENERGAN        Insert 1 Suppository in rectum every 6 hours as needed for Nausea/Vomiting.   Dose:  25 mg                        TRESIBA FLEXTOUCH 100 UNIT/ML Sopn   Generic drug:  Insulin Degludec        Inject 40 Units as instructed 1 time daily as needed (per pt.).   Dose:  40 Units                        VICTOZA 18 MG/3ML Sopn injection   Generic drug:  liraglutide        Inject 0.6 mg as instructed 1 time daily as needed (per pt).   Dose:  0.6 mg                             Where to Get Your Medications      These medications were sent to [facility] [id number] - [place], [place] - [address] [facility] [address]  [address] [place] [address] [place] [address]     Phone:  [phone]    - ondansetron 4 MG Tbdp            Procedures and tests performed during your visit     CBC WITH DIFFERENTIAL    CMP    EKG (ER)    ESTIMATED GFR    LIPASE    SALINE LOCK    TROPONIN        Discharge Instructions       THE EXACT CAUSE OF YOUR PAIN IS UNCLEAR--THIS MIGHT BE EARLY IN THE DISEASE PROCESS AND WE ARE UNABLE TO IDENTIFY IT AT THIS TIME     RETURN TO ER IN 8-12 HRS UNLESS YOU ARE FEELING COMPLETELY BETTER.    RETURN SOONER FOR PAIN, VOMITING NOT CONTROLLED BY MEDICATIONS, FEVER, ANY OTHER CONCERN.    CLEAR LIQUIDS FOR NEXT 12 HOURS.  ADVANCE DIET AS TOLERATED.    Zofran as needed for nausea  Follow-up with your primary care provider on Monday for recheck  See a gastroenterologist for follow-up this week      Abdominal Pain, Adult  Many things can cause abdominal pain. Usually, abdominal pain is not caused by a disease and will improve without treatment. It can often be observed and treated at home. Your health care provider will do a physical exam and possibly order blood tests and X-rays to help determine the seriousness of your pain. However, in many cases, more time must pass before a clear cause of the pain can be found. Before that point, your health care provider may not know if you need more testing or  further treatment.  HOME CARE INSTRUCTIONS   Monitor your abdominal pain for any changes. The following actions may help to alleviate any discomfort you are experiencing:  · Only take over-the-counter or prescription medicines as directed by your health care provider.  · Do not take laxatives unless directed to do so by your health care provider.  · Try a clear liquid diet (broth, tea, or water) as directed by your health care provider. Slowly move to a bland diet as tolerated.  SEEK MEDICAL CARE IF:  · You have unexplained abdominal pain.  · You have abdominal pain associated with nausea or diarrhea.  · You have pain when you urinate or have a bowel movement.  · You experience abdominal pain that wakes you in the night.  · You have abdominal pain that is worsened or improved by eating food.  · You have abdominal pain that is worsened with eating fatty foods.  · You have a fever.  SEEK IMMEDIATE MEDICAL CARE IF:   · Your pain does not go away within 2 hours.  · You keep throwing up (vomiting).  · Your pain is felt only in portions of the abdomen, such as the right side or the left lower portion of the abdomen.  · You pass bloody or black tarry stools.  MAKE SURE YOU:  · Understand these instructions.    · Will watch your condition.    · Will get help right away if you are not doing well or get worse.       This information is not intended to replace advice given to you by your health care provider. Make sure you discuss any questions you have with your health care provider.     Document Released: 09/27/2006 Document Revised: 01/08/2016 Document Reviewed: 08/27/2014  [employer] Patient Education ©2016 [employer]                Patient Information     Patient Information    Following emergency treatment: all patient requiring follow-up care must return either to a private physician or a clinic if your condition worsens before you are able to obtain further medical attention, please return to the emergency  room.     Billing Information    At [facility], we work to make the billing process streamlined for our patients.  Our Representatives are here to answer any questions you may have regarding your hospital bill.  If you have insurance coverage and have supplied your insurance information to us, we will submit a claim to your insurer on your behalf.  Should you have any questions regarding your bill, we can be reached online or by phone as follows:  Online: You are able pay your bills online or live chat with our representatives about any billing questions you may have. We are here to help Monday - Friday from 8:00am to 7:30pm and 9:00am - 12:00pm on Saturdays.  Please visit [email or web address]  for more information.   Phone:  [phone] or [phone]    Please note that your emergency physician, surgeon, pathologist, radiologist, anesthesiologist, and other specialists are not employed by [facility] and will therefore bill separately for their services.  Please contact them directly for any questions concerning their bills at the numbers below:     Emergency Physician Services:  [phone]  [facility]:  [phone]  [facility]:  [phone]  [facility]:  [phone]    1. Your final bill may vary from the amount quoted upon discharge if all procedures are not complete at that time, or if your doctor has additional procedures of which we are not aware. You will receive an additional bill if you return to the Emergency Department at [facility] for suture removal regardless of the facility of which the sutures were placed.     2. Please arrange for settlement of this account at the emergency registration.    3. All self-pay accounts are due in full at the time of treatment.  If you are unable to meet this obligation then payment is expected within 4-5 days.     4. If you have had radiology studies (CT, X-ray, Ultrasound,  MRI), you have received a preliminary result during your emergency department visit. Please contact the radiology department [phone] to receive a copy of your final result. Please discuss the Final result with your primary physician or with the follow up physician provided.     Crisis Hotline:  [place] Crisis Hotline:  8-354-LOILAKW or 1-560.669.7867  [place] Crisis Hotline:    1-416.983.6362 or 423-210-7517         ED Discharge Follow Up Questions    1. In order to provide you with very good care, we would like to follow up with a phone call in the next few days.  May we have your permission to contact you?     YES /  NO    2. What is the best phone number to call you? (       )_____-__________    3. What is the best time to call you?      Morning  /  Afternoon  /  Evening                   Patient Signature:  ____________________________________________________________    Date:  ____________________________________________________________

## 2017-07-08 NOTE — ED AVS SNAPSHOT
7/8/2017    Julisa Carrion  [address] [place] [address]    Dear Julisa:    Critical access hospital wants to ensure your discharge home is safe and you or your loved ones have had all of your questions answered regarding your care after you leave the hospital.    Below is a list of resources and contact information should you have any questions regarding your hospital stay, follow-up instructions, or active medical symptoms.    Questions or Concerns Regarding… Contact   Medical Questions Related to Your Discharge  (7 days a week, 8am-5pm) Contact a Nurse Care Coordinator   [phone]   Medical Questions Not Related to Your Discharge  (24 hours a day / 7 days a week)  Contact the Nurse Health Line   [phone]    Medications or Discharge Instructions Refer to your discharge packet   or contact your [facility] Primary Care Provider   [phone]   Follow-up Appointment(s) Schedule your appointment via [employer]   or contact Scheduling [phone]   Billing Review your statement via [employer]  or contact Billing [phone]   Medical Records Review your records via [employer]   or contact Medical Records [phone]     You may receive a telephone call within two days of discharge. This call is to make certain you understand your discharge instructions and have the opportunity to have any questions answered. You can also easily access your medical information, test results and upcoming appointments via the [employer] free online health management tool. You can learn more and sign up at [employer]. For assistance setting up your [employer] account, please call [phone].    Once again, we want to ensure your discharge home is safe and that you have a clear understanding of any next steps in your care. If you have any questions or concerns, please do not hesitate to contact us, we are here for you. Thank you for choosing [facility] for your healthcare needs.    Sincerely,    Your [facility] Healthcare Team

## 2017-07-08 NOTE — ED NOTES
Pt amb to triage. BIB [facility] from home.  Chief Complaint   Patient presents with   • Abdominal Pain   • N/V     Seen for the same yesterday. States she is not feeling better.

## 2017-07-08 NOTE — ED AVS SNAPSHOT
A[employer] Access Code: [email or web address]  Expires: 8/6/2017  5:57 PM    Your email address is not on file at [facility].  Email Addresses are required for you to sign up for [employer], please contact [phone] to verify your personal information and to provide your email address prior to attempting to register for [employer].    Julisa Amadotz  [address] [place] [address]    [employer]eter[employer]  A secure, online tool to manage your health information     [facility]’s A[employer]® is a secure, online tool that connects you to your personalized health information from the privacy of your home -- day or night - making it very easy for you to manage your healthcare. Once the activation process is completed, you can even access your medical information using the Aeter[employer]us[employer] rito, which is available for free in the Apple Rito store or Google Play store.     To learn more about [employer], visit [email or web address]    There are two levels of access available (as shown below):   My Chart Features  [facility] Primary Care Doctor [facility]  Specialists [facility]  Urgent  Care Non-[facility] Primary Care Doctor   Email your healthcare team securely and privately 24/7 X X X    Manage appointments: schedule your next appointment; view details of past/upcoming appointments X      Request prescription refills. X      View recent personal medical records, including lab and immunizations X X X X   View health record, including health history, allergies, medications X X X X   Read reports about your outpatient visits, procedures, consult and ER notes X X X X   See your discharge summary, which is a recap of your hospital and/or ER visit that includes your diagnosis, lab results, and care plan X X  X     How to register for dentaZOOMt:  Once your e-mail address has been verified, follow the following steps to sign up for dentaZOOMt.     1. Go to  [email or web address]  2. Click on the Sign Up Now box, which takes you to the New Member Sign Up page. You  will need to provide the following information:  a. Enter your [employer] Access Code exactly as it appears at the top of this page. (You will not need to use this code after you’ve completed the sign-up process. If you do not sign up before the expiration date, you must request a new code.)   b. Enter your date of birth.   c. Enter your home email address.   d. Click Submit, and follow the next screen’s instructions.  3. Create a Teacher[employer] ID. This will be your [employer] login ID and cannot be changed, so think of one that is secure and easy to remember.  4. Create a [employer] password. You can change your password at any time.  5. Enter your Password Reset Question and Answer. This can be used at a later time if you forget your password.   6. Enter your e-mail address. This allows you to receive e-mail notifications when new information is available in C[employer]me.  7. Click Sign Up. You can now view your health information.    For assistance activating your [employer] account, call [phone]

## 2017-07-09 ENCOUNTER — PATIENT OUTREACH (OUTPATIENT)
Dept: HEALTH INFORMATION MANAGEMENT | Facility: OTHER | Age: 57
End: 2017-07-09

## 2017-07-09 ENCOUNTER — HOSPITAL ENCOUNTER (EMERGENCY)
Facility: MEDICAL CENTER | Age: 57
End: 2017-07-10
Attending: EMERGENCY MEDICINE
Payer: MEDICAID

## 2017-07-09 DIAGNOSIS — G89.4 CHRONIC PAIN SYNDROME: ICD-10-CM

## 2017-07-09 DIAGNOSIS — R10.84 GENERALIZED ABDOMINAL PAIN: ICD-10-CM

## 2017-07-09 DIAGNOSIS — Z76.5 DRUG-SEEKING BEHAVIOR: ICD-10-CM

## 2017-07-09 LAB
BASOPHILS # BLD AUTO: 0.7 % (ref 0–1.8)
BASOPHILS # BLD: 0.04 K/UL (ref 0–0.12)
EOSINOPHIL # BLD AUTO: 0.1 K/UL (ref 0–0.51)
EOSINOPHIL NFR BLD: 1.7 % (ref 0–6.9)
ERYTHROCYTE [DISTWIDTH] IN BLOOD BY AUTOMATED COUNT: 42 FL (ref 35.9–50)
HCT VFR BLD AUTO: 32.3 % (ref 37–47)
HGB BLD-MCNC: 10.1 G/DL (ref 12–16)
IMM GRANULOCYTES # BLD AUTO: 0.01 K/UL (ref 0–0.11)
IMM GRANULOCYTES NFR BLD AUTO: 0.2 % (ref 0–0.9)
LYMPHOCYTES # BLD AUTO: 2.71 K/UL (ref 1–4.8)
LYMPHOCYTES NFR BLD: 44.8 % (ref 22–41)
MCH RBC QN AUTO: 24.5 PG (ref 27–33)
MCHC RBC AUTO-ENTMCNC: 31.3 G/DL (ref 33.6–35)
MCV RBC AUTO: 78.2 FL (ref 81.4–97.8)
MONOCYTES # BLD AUTO: 0.43 K/UL (ref 0–0.85)
MONOCYTES NFR BLD AUTO: 7.1 % (ref 0–13.4)
NEUTROPHILS # BLD AUTO: 2.76 K/UL (ref 2–7.15)
NEUTROPHILS NFR BLD: 45.5 % (ref 44–72)
NRBC # BLD AUTO: 0 K/UL
NRBC BLD AUTO-RTO: 0 /100 WBC
PLATELET # BLD AUTO: 484 K/UL (ref 164–446)
PMV BLD AUTO: 8.8 FL (ref 9–12.9)
RBC # BLD AUTO: 4.13 M/UL (ref 4.2–5.4)
WBC # BLD AUTO: 6.1 K/UL (ref 4.8–10.8)

## 2017-07-09 PROCEDURE — 83690 ASSAY OF LIPASE: CPT

## 2017-07-09 PROCEDURE — 700105 HCHG RX REV CODE 258: Performed by: HOSPITALIST

## 2017-07-09 PROCEDURE — 99284 EMERGENCY DEPT VISIT MOD MDM: CPT

## 2017-07-09 PROCEDURE — 80053 COMPREHEN METABOLIC PANEL: CPT

## 2017-07-09 PROCEDURE — 85025 COMPLETE CBC W/AUTO DIFF WBC: CPT

## 2017-07-09 PROCEDURE — 96360 HYDRATION IV INFUSION INIT: CPT

## 2017-07-09 PROCEDURE — 36415 COLL VENOUS BLD VENIPUNCTURE: CPT

## 2017-07-09 RX ORDER — SODIUM CHLORIDE 9 MG/ML
1000 INJECTION, SOLUTION INTRAVENOUS ONCE
Status: COMPLETED | OUTPATIENT
Start: 2017-07-09 | End: 2017-07-09

## 2017-07-09 RX ADMIN — SODIUM CHLORIDE 1000 ML: 9 INJECTION, SOLUTION INTRAVENOUS at 23:46

## 2017-07-09 NOTE — ED PROVIDER NOTES
"ED Provider Note    CHIEF COMPLAINT  Chief Complaint   Patient presents with   • Abdominal Pain   • N/V       HPI  Julisa Carrion is a 57 y.o. female with a h/o HTN, DL and DM who presents complaining of abdominal pain and vomiting.    Patient reports she has had epigastric pain like this for 4 years. She is unclear what the etiology is. She describes it as achy and mainly nonradiating but occasionally radiates over to her left upper quadrant. Patient states she's been vomiting for 3 days. She denies chest pain, shortness of breath, hematemesis, hematochezia, melena, diarrhea, fever, chills, urinary symptoms.    Patient sees Dr. Jason/PCP. Patient does not have a current gastroenterologist. She believes she had an endoscopy 1-2 years ago which was normal. She states they did find \"a mass\" on a colonoscopy.    Patient states she takes morphine and Percocet for her pain    ALLERGIES  No Known Allergies    CURRENT MEDICATIONS  Home Medications     Reviewed by Raymundo Roa R.N. (Registered Nurse) on 07/08/17 at 2017  Med List Status: Partial    Medication Last Dose Status    ferrous gluconate (FERGON) 324 (38 FE) MG Tab  Active    gabapentin (NEURONTIN) 800 MG tablet 5/30/2017 Active    Insulin Degludec (TRESIBA FLEXTOUCH) 100 UNIT/ML Solution Pen-injector unknown Active    liraglutide (VICTOZA) 18 MG/3ML Solution Pen-injector injection unknown Active    lisinopril (PRINIVIL) 5 MG TABS 5/30/2017 Active    morphine ER (MS CONTIN) 30 MG Tab CR tablet 5/30/2017 Active    omeprazole (PRILOSEC) 20 MG delayed-release capsule 5/30/2017 Active    oxycodone-acetaminophen (PERCOCET-10)  MG Tab unknown Active    pravastatin (PRAVACHOL) 20 MG TABS 5/30/2017 Active    promethazine (PHENERGAN) 25 MG Suppos unknown Active                PAST MEDICAL HISTORY   has a past medical history of Hypertension; Diabetes; High cholesterol (5/15/2011); Staph skin infection; Migraine; and Intestinal mass (2015).    SURGICAL HISTORY   " "has past surgical history that includes other abdominal surgery; gyn surgery; other orthopedic surgery (6-2014); and abdominal exploration.    SOCIAL HISTORY  Social History     Social History Main Topics   • Smoking status: Former Smoker -- 1.00 packs/day for 20 years     Types: Cigarettes     Quit date: 01/01/1996   • Smokeless tobacco: Former User     Quit date: 06/05/1995   • Alcohol Use: No   • Drug Use: No      Comment: just prescribed meds   • Sexual Activity: Not on file       Family Hx:  No family history of coronary artery disease      REVIEW OF SYSTEMS  See HPI for further details.  All other systems are negative except as above in HPI.    PHYSICAL EXAM  VITAL SIGNS: /84 mmHg  Pulse 95  Temp(Src) 36.7 °C (98 °F) (Temporal)  Resp 16  Ht 1.651 m (5' 5\")  Wt 76 kg (167 lb 8.8 oz)  BMI 27.88 kg/m2  SpO2 98%  LMP 02/05/2009    General:  WDWN, nontoxic appearing in NAD; A+Ox3; V/S as above; afebrile  Skin: warm and dry; good color; no rash  HEENT: NCAT; EOMs intact; PERRL; no scleral icterus   Neck: FROM; no meningismus, no LAD  Cardiovascular: Regular heart rate and rhythm.  2/6 blowing systolic murmur heard best at the left sternal border No rubs, or gallops; pulses 2+ bilaterally radially and DP areas  Lungs: Clear to auscultation with good air movement bilaterally.  No wheezes, rhonchi, or rales.   Abdomen: BS present; soft; NTND; no rebound, guarding, or rigidity.  No organomegaly or pulsatile mass; no CVAT  Extremities: MCNAMARA x 4; no e/o trauma; no pedal edema  Neurologic: CNs III-XII grossly intact; speech clear; distal sensation intact; strength 5/5 UE/LEs; DTRs 2+ bilaterally in patellar/BR areas  Psychiatric: Appropriate affect, anxious mood    LABS  Results for orders placed or performed during the hospital encounter of 07/08/17   CBC WITH DIFFERENTIAL   Result Value Ref Range    WBC 7.2 4.8 - 10.8 K/uL    RBC 4.21 4.20 - 5.40 M/uL    Hemoglobin 10.4 (L) 12.0 - 16.0 g/dL    Hematocrit " 33.3 (L) 37.0 - 47.0 %    MCV 79.1 (L) 81.4 - 97.8 fL    MCH 24.7 (L) 27.0 - 33.0 pg    MCHC 31.2 (L) 33.6 - 35.0 g/dL    RDW 43.1 35.9 - 50.0 fL    Platelet Count 502 (H) 164 - 446 K/uL    MPV 8.7 (L) 9.0 - 12.9 fL    Neutrophils-Polys 53.20 44.00 - 72.00 %    Lymphocytes 38.50 22.00 - 41.00 %    Monocytes 5.70 0.00 - 13.40 %    Eosinophils 1.70 0.00 - 6.90 %    Basophils 0.80 0.00 - 1.80 %    Immature Granulocytes 0.10 0.00 - 0.90 %    Nucleated RBC 0.00 /100 WBC    Neutrophils (Absolute) 3.80 2.00 - 7.15 K/uL    Lymphs (Absolute) 2.75 1.00 - 4.80 K/uL    Monos (Absolute) 0.41 0.00 - 0.85 K/uL    Eos (Absolute) 0.12 0.00 - 0.51 K/uL    Baso (Absolute) 0.06 0.00 - 0.12 K/uL    Immature Granulocytes (abs) 0.01 0.00 - 0.11 K/uL    NRBC (Absolute) 0.00 K/uL   CMP   Result Value Ref Range    Sodium 136 135 - 145 mmol/L    Potassium 4.1 3.6 - 5.5 mmol/L    Chloride 105 96 - 112 mmol/L    Co2 24 20 - 33 mmol/L    Anion Gap 7.0 0.0 - 11.9    Glucose 226 (H) 65 - 99 mg/dL    Bun 17 8 - 22 mg/dL    Creatinine 0.69 0.50 - 1.40 mg/dL    Calcium 9.2 8.5 - 10.5 mg/dL    AST(SGOT) 10 (L) 12 - 45 U/L    ALT(SGPT) 7 2 - 50 U/L    Alkaline Phosphatase 140 (H) 30 - 99 U/L    Total Bilirubin 0.3 0.1 - 1.5 mg/dL    Albumin 3.6 3.2 - 4.9 g/dL    Total Protein 7.6 6.0 - 8.2 g/dL    Globulin 4.0 (H) 1.9 - 3.5 g/dL    A-G Ratio 0.9 g/dL   LIPASE   Result Value Ref Range    Lipase 17 11 - 82 U/L   ESTIMATED GFR   Result Value Ref Range    GFR If African American >60 >60 mL/min/1.73 m 2    GFR If Non African American >60 >60 mL/min/1.73 m 2   TROPONIN   Result Value Ref Range    Troponin I <0.01 0.00 - 0.04 ng/mL   EKG (ER)   Result Value Ref Range    Report       [facility] Emergency Dept.    Test Date:  2017  Pt Name:    ALIDA HUTCHINSON                 Department: ER  MRN:        [id number]                      Room:       [facility]  Gender:     F                            Technician: [id number]  :        [id number]                    Requested By:TIMOTHY FOLEY  Order #:    [id number]                    Reading MD: TIMOTHY FOLEY MD    Measurements  Intervals                                Axis  Rate:       85                           P:          41  VA:         156                          QRS:        3  QRSD:       84                           T:          39  QT:         400  QTc:        476    Interpretive Statements  SINUS RHYTHM  LEFT VENTRICULAR HYPERTROPHY  Compared to ECG 05/31/2017 18:33:37  No significant changes    Electronically Signed On 7-8-2017 20:51:47 PDT by TIMOTHY FOLEY MD         EKG  12 Lead EKG obtained at 2040 and interpreted by me to show:  Rhythm: Sinus rhythm   Rate: 85  Intervals:   VA: 156   QRS: 84   QTC: 476   All intervals are within normal limits  No ectopy  Leftward axis  No ST changes  Clinical Impression: sinus rhythm with no acute ST changes  Compared to previous EKG dated 5/31/17 which shows no changes  EKG has been confirmed in [facility]     MEDICAL RECORD  I have reviewed patient's medical record and pertinent results are listed below.    CT negative for acute pathology on 7/7/17      COURSE & MEDICAL DECISION MAKING  I have reviewed any medical record information, laboratory studies and radiographic results as noted.    Julisa Carrion is a 57 y.o. female who presents complaining of epigastric pain and vomiting for 3 days. Patient has chronic abdominal pain. She was seen here yesterday and had a CT scan that was negative. Blood work revealed an anemia which is stable today, glucose of 226, alk phos 140. Patient received normal saline IV for likely dehydration given her report of vomiting for the last 3 days. Chest received Dilaudid 0.5 mg IV for reported pain. Patient is afebrile and has a soft, nontender, nonsurgical abdomen. She had a normal CT scan yesterday which I will not repeat today given her benign exam.    EKG demonstrated no acute ST changes when compared to EKG from May of  "this year.       Pt was re-evaluated at 10:02 PM  No vomiting here in ER.  I advised the patient that her labs demonstrate hyperglycemia and a slight elevation of alk phos.  Review of her surgical history demonstrates she has had a cholecystectomy    Pt was advised that she will be referred to GI for outpatient follow-up. She states she is unable to call her PCP for follow-up and unable to call GI to make an appointment as she does not have a functional phone.  She states if she is discharged she will \"just come back tomorrow.\" She used a phone to dial 911 today but it only makes emergency phone calls. Patient states her neighbor who typically helps her is out of town for one month. I've asked social work to evaluate the patient.    I attempted to contact the ER  to make the patient an appointment for GI they are not available now, after hours.    Per social work recommendation after discussing with the patient, I left a message for the ER  to establish an appointment time was GI for 3 weeks from now. Between now and then, the patient will be able to retrieve the message with the appointment information on it.    Patient was advised to return to the ER for continued pain, worsening symptoms, fever, blood in the vomit, or other issues.      Pt's blood pressure was noted to be above 120/80 here in the ER.  Pt was informed and advised to follow up as an outpatient for recheck for possible dx/management of hypertension.      FINAL IMPRESSION  1. Epigastric pain    2. Chronic pain syndrome        Electronically signed by: Saida Galan, 7/8/2017 7:24 PM          "

## 2017-07-09 NOTE — ED AVS SNAPSHOT
Home Care Instructions                                                                                                                Julisa Carrion   MRN: [id number]    Department:  [facility], Emergency Dept   Date of Visit:  7/9/2017            [facility], Emergency Dept    [address] [place] [id number]    Phone:  [phone]      You were seen by     Nani Worrell M.D.      Your Diagnosis Was     Chronic pain syndrome     G89.4       These are the medications you received during your hospitalization from 07/09/2017 1921 to 07/10/2017 0016     Date/Time Order Dose Route Action    07/09/2017 2346 NS (BOLUS) infusion 1,000 mL 1,000 mL Intravenous Given      Follow-up Information     1. Follow up with Tre Jason M.D.. Go in 1 day.    Specialty:  Internal Medicine    Why:  For complete abdominal recheck    Contact information    [address]  [facility] [address] [phone]          2. Go to [facility], Emergency Dept.    Specialty:  Emergency Medicine    Why:  If symptoms worsen    Contact information    [address] [place] [id number] [phone]      Medication Information     Review all of your home medications and newly ordered medications with your primary doctor and/or pharmacist as soon as possible. Follow medication instructions as directed by your doctor and/or pharmacist.     Please keep your complete medication list with you and share with your physician. Update the information when medications are discontinued, doses are changed, or new medications (including over-the-counter products) are added; and carry medication information at all times in the event of emergency situations.               Medication List      ASK your doctor about these medications        Instructions    Morning Afternoon Evening Bedtime    ferrous gluconate 324 (38 FE) MG Tabs   Commonly known as:  FERGON        Doctor's comments:  Take with  food   Take 1 Tab by mouth with dinner.   Dose:  324 mg                        gabapentin 800 MG tablet   Commonly known as:  NEURONTIN        Take 800 mg by mouth 3 times a day.   Dose:  800 mg                        lisinopril 5 MG Tabs   Commonly known as:  PRINIVIL        Take 5 mg by mouth every morning.   Dose:  5 mg                        morphine ER 30 MG Tbcr tablet   Commonly known as:  MS CONTIN        Take 30 mg by mouth every 12 hours.   Dose:  30 mg                        omeprazole 20 MG delayed-release capsule   Commonly known as:  PRILOSEC        Take 1 Cap by mouth every day.   Dose:  20 mg                        ondansetron 4 MG Tbdp   Commonly known as:  ZOFRAN ODT        Take 1 Tab by mouth every 8 hours as needed for Nausea/Vomiting.   Dose:  4 mg                        oxycodone-acetaminophen  MG Tabs   Commonly known as:  PERCOCET-10        Take 1 Tab by mouth every 8 hours as needed for Severe Pain.   Dose:  1 Tab                        pravastatin 20 MG Tabs   Commonly known as:  PRAVACHOL        Take 20 mg by mouth every morning.   Dose:  20 mg                        promethazine 25 MG Supp   Commonly known as:  PHENERGAN        Insert 1 Suppository in rectum every 6 hours as needed for Nausea/Vomiting.   Dose:  25 mg                        TRESIBA FLEXTOUCH 100 UNIT/ML Sopn   Generic drug:  Insulin Degludec        Inject 40 Units as instructed 1 time daily as needed (per pt.).   Dose:  40 Units                        VICTOZA 18 MG/3ML Sopn injection   Generic drug:  liraglutide        Inject 0.6 mg as instructed 1 time daily as needed (per pt).   Dose:  0.6 mg                                Procedures and tests performed during your visit     CBC WITH DIFFERENTIAL    COMP METABOLIC PANEL    ESTIMATED GFR    LIPASE        Discharge Instructions       You have elected to leave against medical advice. As the next best alternative to completing her evaluation in the emergency  department, please follow-up with your primary care physician tomorrow morning. You may return at any time to complete your evaluation and you encouraged to do so. Please return to the emergency department immediately if he develops any new or worsening symptoms including fevers, nausea, vomiting, worsening pain or any further concerns. As we discussed, for your safety, please limit your emergency department and primary care visits to one hospital system if you're unable to remember the dates of presentation and tests you had done at various emergency departments. It can be dangerous if you have multiple scans or multiple doses of medications that are done at different facilities because the providers have no records or knowledge of your other visits.      Abdominal Pain, Women  Abdominal (stomach, pelvic, or belly) pain can be caused by many things. It is important to tell your doctor:  · The location of the pain.  · Does it come and go or is it present all the time?  · Are there things that start the pain (eating certain foods, exercise)?  · Are there other symptoms associated with the pain (fever, nausea, vomiting, diarrhea)?  All of this is helpful to know when trying to find the cause of the pain.  CAUSES   · Stomach: virus or bacteria infection, or ulcer.  · Intestine: appendicitis (inflamed appendix), regional ileitis (Crohn's disease), ulcerative colitis (inflamed colon), irritable bowel syndrome, diverticulitis (inflamed diverticulum of the colon), or cancer of the stomach or intestine.  · Gallbladder disease or stones in the gallbladder.  · Kidney disease, kidney stones, or infection.  · Pancreas infection or cancer.  · Fibromyalgia (pain disorder).  · Diseases of the female organs:  · Uterus: fibroid (non-cancerous) tumors or infection.  · Fallopian tubes: infection or tubal pregnancy.  · Ovary: cysts or tumors.  · Pelvic adhesions (scar tissue).  · Endometriosis (uterus lining tissue growing in the  pelvis and on the pelvic organs).  · Pelvic congestion syndrome (female organs filling up with blood just before the menstrual period).  · Pain with the menstrual period.  · Pain with ovulation (producing an egg).  · Pain with an IUD (intrauterine device, birth control) in the uterus.  · Cancer of the female organs.  · Functional pain (pain not caused by a disease, may improve without treatment).  · Psychological pain.  · Depression.  DIAGNOSIS   Your doctor will decide the seriousness of your pain by doing an examination.  · Blood tests.  · X-rays.  · Ultrasound.  · CT scan (computed tomography, special type of X-ray).  · MRI (magnetic resonance imaging).  · Cultures, for infection.  · Barium enema (dye inserted in the large intestine, to better view it with X-rays).  · Colonoscopy (looking in intestine with a lighted tube).  · Laparoscopy (minor surgery, looking in abdomen with a lighted tube).  · Major abdominal exploratory surgery (looking in abdomen with a large incision).  TREATMENT   The treatment will depend on the cause of the pain.   · Many cases can be observed and treated at home.  · Over-the-counter medicines recommended by your caregiver.  · Prescription medicine.  · Antibiotics, for infection.  · Birth control pills, for painful periods or for ovulation pain.  · Hormone treatment, for endometriosis.  · Nerve blocking injections.  · Physical therapy.  · Antidepressants.  · Counseling with a psychologist or psychiatrist.  · Minor or major surgery.  HOME CARE INSTRUCTIONS   · Do not take laxatives, unless directed by your caregiver.  · Take over-the-counter pain medicine only if ordered by your caregiver. Do not take aspirin because it can cause an upset stomach or bleeding.  · Try a clear liquid diet (broth or water) as ordered by your caregiver. Slowly move to a bland diet, as tolerated, if the pain is related to the stomach or intestine.  · Have a thermometer and take your temperature several times a  day, and record it.  · Bed rest and sleep, if it helps the pain.  · Avoid sexual intercourse, if it causes pain.  · Avoid stressful situations.  · Keep your follow-up appointments and tests, as your caregiver orders.  · If the pain does not go away with medicine or surgery, you may try:  · Acupuncture.  · Relaxation exercises (yoga, meditation).  · Group therapy.  · Counseling.  SEEK MEDICAL CARE IF:   · You notice certain foods cause stomach pain.  · Your home care treatment is not helping your pain.  · You need stronger pain medicine.  · You want your IUD removed.  · You feel faint or lightheaded.  · You develop nausea and vomiting.  · You develop a rash.  · You are having side effects or an allergy to your medicine.  SEEK IMMEDIATE MEDICAL CARE IF:   · Your pain does not go away or gets worse.  · You have a fever.  · Your pain is felt only in portions of the abdomen. The right side could possibly be appendicitis. The left lower portion of the abdomen could be colitis or diverticulitis.  · You are passing blood in your stools (bright red or black tarry stools, with or without vomiting).  · You have blood in your urine.  · You develop chills, with or without a fever.  · You pass out.  MAKE SURE YOU:   · Understand these instructions.  · Will watch your condition.  · Will get help right away if you are not doing well or get worse.  Document Released: 10/14/2008 Document Revised: 03/11/2013 Document Reviewed: 11/04/2010  ExitCare® Patient Information ©2014 [employer].            Patient Information     Patient Information    Following emergency treatment: all patient requiring follow-up care must return either to a private physician or a clinic if your condition worsens before you are able to obtain further medical attention, please return to the emergency room.     Billing Information    At [facility], we work to make the billing process streamlined for our patients.  Our Representatives are here to answer any  questions you may have regarding your hospital bill.  If you have insurance coverage and have supplied your insurance information to us, we will submit a claim to your insurer on your behalf.  Should you have any questions regarding your bill, we can be reached online or by phone as follows:  Online: You are able pay your bills online or live chat with our representatives about any billing questions you may have. We are here to help Monday - Friday from 8:00am to 7:30pm and 9:00am - 12:00pm on Saturdays.  Please visit [email or web address]  for more information.   Phone:  [phone] or [phone]    Please note that your emergency physician, surgeon, pathologist, radiologist, anesthesiologist, and other specialists are not employed by [facility] and will therefore bill separately for their services.  Please contact them directly for any questions concerning their bills at the numbers below:     Emergency Physician Services:  [phone]  [facility]:  [phone]  [facility]:  [phone]  [facility]:  [phone]    1. Your final bill may vary from the amount quoted upon discharge if all procedures are not complete at that time, or if your doctor has additional procedures of which we are not aware. You will receive an additional bill if you return to the Emergency Department at [facility] for suture removal regardless of the facility of which the sutures were placed.     2. Please arrange for settlement of this account at the emergency registration.    3. All self-pay accounts are due in full at the time of treatment.  If you are unable to meet this obligation then payment is expected within 4-5 days.     4. If you have had radiology studies (CT, X-ray, Ultrasound, MRI), you have received a preliminary result during your emergency department visit. Please contact the radiology department [phone] to receive a copy of  your final result. Please discuss the Final result with your primary physician or with the follow up physician provided.     Crisis Hotline:  [place] Crisis Hotline:  1-952-XNHFYTS or 1-652.799.4087  [place] Crisis Hotline:    1-205.389.9520 or 666-500-4294         ED Discharge Follow Up Questions    1. In order to provide you with very good care, we would like to follow up with a phone call in the next few days.  May we have your permission to contact you?     YES /  NO    2. What is the best phone number to call you? (       )_____-__________    3. What is the best time to call you?      Morning  /  Afternoon  /  Evening                   Patient Signature:  ____________________________________________________________    Date:  ____________________________________________________________

## 2017-07-09 NOTE — ED AVS SNAPSHOT
7/10/2017    Julisa Carrion  [address] [place] [address]    Dear Julisa:    [facility] wants to ensure your discharge home is safe and you or your loved ones have had all of your questions answered regarding your care after you leave the hospital.    Below is a list of resources and contact information should you have any questions regarding your hospital stay, follow-up instructions, or active medical symptoms.    Questions or Concerns Regarding… Contact   Medical Questions Related to Your Discharge  (7 days a week, 8am-5pm) Contact a Nurse Care Coordinator   [phone]   Medical Questions Not Related to Your Discharge  (24 hours a day / 7 days a week)  Contact the Nurse Health Line   [phone]    Medications or Discharge Instructions Refer to your discharge packet   or contact your [facility] Primary Care Provider   [phone]   Follow-up Appointment(s) Schedule your appointment via Talkpush   or contact Scheduling [phone]   Billing Review your statement via Talkpush  or contact Billing [phone]   Medical Records Review your records via Talkpush   or contact Medical Records [phone]     You may receive a telephone call within two days of discharge. This call is to make certain you understand your discharge instructions and have the opportunity to have any questions answered. You can also easily access your medical information, test results and upcoming appointments via the Talkpush free online health management tool. You can learn more and sign up at [employer]/Talkpush. For assistance setting up your Talkpush account, please call [phone].    Once again, we want to ensure your discharge home is safe and that you have a clear understanding of any next steps in your care. If you have any questions or concerns, please do not hesitate to contact us, we are here for you. Thank you for choosing [facility] for your healthcare needs.    Sincerely,    Your [facility] Healthcare Team

## 2017-07-09 NOTE — ED NOTES
Pt attached to monitor in gown, and resting comfortably in bed. Pt states she is back to ER because her phone is broken and she is unable to make call to follow up apt. Tried to call scheduling at [phone], closed at 1830. VSS, no needs at this time

## 2017-07-09 NOTE — DISCHARGE INSTRUCTIONS
THE EXACT CAUSE OF YOUR PAIN IS UNCLEAR--THIS MIGHT BE EARLY IN THE DISEASE PROCESS AND WE ARE UNABLE TO IDENTIFY IT AT THIS TIME     RETURN TO ER IN 8-12 HRS UNLESS YOU ARE FEELING COMPLETELY BETTER.    RETURN SOONER FOR PAIN, VOMITING NOT CONTROLLED BY MEDICATIONS, FEVER, ANY OTHER CONCERN.    CLEAR LIQUIDS FOR NEXT 12 HOURS.  ADVANCE DIET AS TOLERATED.    Zofran as needed for nausea  Follow-up with your primary care provider on Monday for recheck  See a gastroenterologist for follow-up this week      Abdominal Pain, Adult  Many things can cause abdominal pain. Usually, abdominal pain is not caused by a disease and will improve without treatment. It can often be observed and treated at home. Your health care provider will do a physical exam and possibly order blood tests and X-rays to help determine the seriousness of your pain. However, in many cases, more time must pass before a clear cause of the pain can be found. Before that point, your health care provider may not know if you need more testing or further treatment.  HOME CARE INSTRUCTIONS   Monitor your abdominal pain for any changes. The following actions may help to alleviate any discomfort you are experiencing:  · Only take over-the-counter or prescription medicines as directed by your health care provider.  · Do not take laxatives unless directed to do so by your health care provider.  · Try a clear liquid diet (broth, tea, or water) as directed by your health care provider. Slowly move to a bland diet as tolerated.  SEEK MEDICAL CARE IF:  · You have unexplained abdominal pain.  · You have abdominal pain associated with nausea or diarrhea.  · You have pain when you urinate or have a bowel movement.  · You experience abdominal pain that wakes you in the night.  · You have abdominal pain that is worsened or improved by eating food.  · You have abdominal pain that is worsened with eating fatty foods.  · You have a fever.  SEEK IMMEDIATE MEDICAL CARE IF:    · Your pain does not go away within 2 hours.  · You keep throwing up (vomiting).  · Your pain is felt only in portions of the abdomen, such as the right side or the left lower portion of the abdomen.  · You pass bloody or black tarry stools.  MAKE SURE YOU:  · Understand these instructions.    · Will watch your condition.    · Will get help right away if you are not doing well or get worse.       This information is not intended to replace advice given to you by your health care provider. Make sure you discuss any questions you have with your health care provider.     Document Released: 09/27/2006 Document Revised: 01/08/2016 Document Reviewed: 08/27/2014  Elsevier Interactive Patient Education ©2016 Elsevier Inc.

## 2017-07-09 NOTE — ED AVS SNAPSHOT
Locally Access Code: [email or web address]  Expires: 8/6/2017  5:57 PM    Your email address is not on file at [facility].  Email Addresses are required for you to sign up for Locally, please contact [phone] to verify your personal information and to provide your email address prior to attempting to register for Locally.    Julisa Amadotz  [address] [place] [address]    Locally  A secure, online tool to manage your health information     [facility]’s Locally® is a secure, online tool that connects you to your personalized health information from the privacy of your home -- day or night - making it very easy for you to manage your healthcare. Once the activation process is completed, you can even access your medical information using the Locally rito, which is available for free in the Apple Rito store or Google Play store.     To learn more about Locally, visit [email or web address]    There are two levels of access available (as shown below):   My Chart Features  [facility] Primary Care Doctor [facility]  Specialists [facility]  Urgent  Care Non-[facility] Primary Care Doctor   Email your healthcare team securely and privately 24/7 X X X    Manage appointments: schedule your next appointment; view details of past/upcoming appointments X      Request prescription refills. X      View recent personal medical records, including lab and immunizations X X X X   View health record, including health history, allergies, medications X X X X   Read reports about your outpatient visits, procedures, consult and ER notes X X X X   See your discharge summary, which is a recap of your hospital and/or ER visit that includes your diagnosis, lab results, and care plan X X  X     How to register for NetSparkt:  Once your e-mail address has been verified, follow the following steps to sign up for NetSparkt.     1. Go to  [email or web address]  2. Click on the Sign Up Now box, which takes you to the New Member Sign Up page. You  will need to provide the following information:  a. Enter your [employer] Access Code exactly as it appears at the top of this page. (You will not need to use this code after you’ve completed the sign-up process. If you do not sign up before the expiration date, you must request a new code.)   b. Enter your date of birth.   c. Enter your home email address.   d. Click Submit, and follow the next screen’s instructions.  3. Create a Thismomentt ID. This will be your [employer] login ID and cannot be changed, so think of one that is secure and easy to remember.  4. Create a [employer] password. You can change your password at any time.  5. Enter your Password Reset Question and Answer. This can be used at a later time if you forget your password.   6. Enter your e-mail address. This allows you to receive e-mail notifications when new information is available in [employer].  7. Click Sign Up. You can now view your health information.    For assistance activating your [employer] account, call [phone]

## 2017-07-10 VITALS
WEIGHT: 167 LBS | BODY MASS INDEX: 27.79 KG/M2 | DIASTOLIC BLOOD PRESSURE: 71 MMHG | TEMPERATURE: 98.1 F | SYSTOLIC BLOOD PRESSURE: 108 MMHG | OXYGEN SATURATION: 97 % | RESPIRATION RATE: 16 BRPM | HEART RATE: 88 BPM

## 2017-07-10 LAB
ALBUMIN SERPL BCP-MCNC: 3.7 G/DL (ref 3.2–4.9)
ALBUMIN/GLOB SERPL: 0.9 G/DL
ALP SERPL-CCNC: 133 U/L (ref 30–99)
ALT SERPL-CCNC: 8 U/L (ref 2–50)
ANION GAP SERPL CALC-SCNC: 7 MMOL/L (ref 0–11.9)
AST SERPL-CCNC: 9 U/L (ref 12–45)
BILIRUB SERPL-MCNC: 0.2 MG/DL (ref 0.1–1.5)
BUN SERPL-MCNC: 21 MG/DL (ref 8–22)
CALCIUM SERPL-MCNC: 9 MG/DL (ref 8.5–10.5)
CHLORIDE SERPL-SCNC: 104 MMOL/L (ref 96–112)
CO2 SERPL-SCNC: 23 MMOL/L (ref 20–33)
CREAT SERPL-MCNC: 0.73 MG/DL (ref 0.5–1.4)
GFR SERPL CREATININE-BSD FRML MDRD: >60 ML/MIN/1.73 M 2
GLOBULIN SER CALC-MCNC: 3.9 G/DL (ref 1.9–3.5)
GLUCOSE SERPL-MCNC: 296 MG/DL (ref 65–99)
LIPASE SERPL-CCNC: 25 U/L (ref 11–82)
POTASSIUM SERPL-SCNC: 3.7 MMOL/L (ref 3.6–5.5)
PROT SERPL-MCNC: 7.6 G/DL (ref 6–8.2)
SODIUM SERPL-SCNC: 134 MMOL/L (ref 135–145)

## 2017-07-10 NOTE — ED NOTES
..  Chief Complaint   Patient presents with   • N/V     pt states n/v and mid center abd pain for 1 week now, been in er everyday this week for same per pt, bib remsa   • Abdominal Pain     mid center, denies diarrhea or urinary symptoms   .Pt Informed regarding triage process and verbalized understanding to inform triage tech or RN for any changes in condition.  Placed in lobby.

## 2017-07-10 NOTE — ED NOTES
Reviewed discharge instructions, pt verbalized understanding of instructions. Reviewed AMA instructions. ERP reviewed with pt. States she will follow-up with GI. Denies further questions at this time. Pt taken to [facility] in stable condition. NAD noted.

## 2017-07-10 NOTE — ED PROVIDER NOTES
CHIEF COMPLAINT  Chief Complaint   Patient presents with   • N/V     pt states n/v and mid center abd pain for 1 week now, been in er everyday this week for same per pt, bib remsa   • Abdominal Pain     mid center, denies diarrhea or urinary symptoms       HPI (1,4)    Julisa Carrion is a 57 y.o. female who presents 1 week history of epigastric abdominal pain, ongoing nausea and vomitting. Patient has history of DM, gastroparesis and chronic narcotic dependence. Reports that this constellation of symptoms have been ongoing for over 4 years, and they seem to occasionally flare up. This current episodes has been going on for the past 7 days, reports being seen in ER, reports having been told that her work up and CT have been negative but her symptoms have continued. Reports non bloody emesis, unable to recall how many times. Abd pain is epigastric, reports its constant, no radiation, no melena or bloody stools. Reports her BM have been regular, normal. Last BM was yesterday. Denies any fevers, chills, chest pain, dyspnea, diarrhea, urinary complaints.       REVIEW OF SYSTEMS(2/10)  Pertinent positives include: Epigastric abd pain. Nausea and vomitting  Pertinent negatives include: Bloody emesis, melena, bloody stools, diarrhea.   All other systems are negative.     PAST MEDICAL HISTORY(PFS1,2)  Past Medical History   Diagnosis Date   • Hypertension    • Diabetes    • High cholesterol 5/15/2011   • Staph skin infection    • Migraine    • Intestinal mass 2015       FAMILY HISTORY  Family History   Problem Relation Age of Onset   • Cancer Maternal Aunt      Lung cancer d/t smoking       SOCIAL HISTORY  Social History   Substance Use Topics   • Smoking status: Former Smoker -- 1.00 packs/day for 20 years     Types: Cigarettes     Quit date: 01/01/1996   • Smokeless tobacco: Former User     Quit date: 06/05/1995   • Alcohol Use: No     History   Drug Use No     Comment: just prescribed meds       SURGICAL  HISTORY  Past Surgical History   Procedure Laterality Date   • Other abdominal surgery       cholecystectomy   • Gyn surgery        x 3,tubal ligation   • Other orthopedic surgery  6-     right wrist surgery   • Abdominal exploration       Lower intestine d/t mass - benign       CURRENT MEDICATIONS  Home Medications     Reviewed by Orlando Saul R.N. (Registered Nurse) on 17 at 2242  Med List Status: Partial    Medication Last Dose Status    ferrous gluconate (FERGON) 324 (38 FE) MG Tab  Active    gabapentin (NEURONTIN) 800 MG tablet  Active    Insulin Degludec (TRESIBA FLEXTOUCH) 100 UNIT/ML Solution Pen-injector  Active    liraglutide (VICTOZA) 18 MG/3ML Solution Pen-injector injection  Active    lisinopril (PRINIVIL) 5 MG TABS  Active    morphine ER (MS CONTIN) 30 MG Tab CR tablet 2017 Active    omeprazole (PRILOSEC) 20 MG delayed-release capsule  Active    ondansetron (ZOFRAN ODT) 4 MG TABLET DISPERSIBLE  Active    oxycodone-acetaminophen (PERCOCET-10)  MG Tab 2017 Active    pravastatin (PRAVACHOL) 20 MG TABS  Active    promethazine (PHENERGAN) 25 MG Suppos  Active                ALLERGIES  No Known Allergies    PHYSICAL EXAM (2,8)  VITAL SIGNS: /74 mmHg  Pulse 102  Temp(Src) 36.7 °C (98 °F) (Temporal)  Resp 18  Wt 75.751 kg (167 lb)  SpO2 96%  LMP 2009   Constitutional: AAOx4, NAD,  HENT:EOMI, PERRLA, Mucus membranes moist. No palpable LNs.  Cardiovascular: No murmurs.RRR  Respiratory: .  Gastrointestinal: CTABL, No wheezing/crackles.  Skin: Lower ext wounds-healing    Neurologic: No focal deficits. Moves all extremities      DIFFERENTIAL DIAGNOSIS:    Ddx: Diabetic gastroparesis vs chronic narcotic bowel vs gastritis vs enteritis vs less likely PUD or bowel obstruction.    EKG  n/a.    RADIOLOGY/PROCEDURES  No orders to display       LABORATORY: Reviewed as below.  Results for orders placed or performed during the hospital encounter of 17   CBC WITH  DIFFERENTIAL   Result Value Ref Range    WBC 7.2 4.8 - 10.8 K/uL    RBC 4.21 4.20 - 5.40 M/uL    Hemoglobin 10.4 (L) 12.0 - 16.0 g/dL    Hematocrit 33.3 (L) 37.0 - 47.0 %    MCV 79.1 (L) 81.4 - 97.8 fL    MCH 24.7 (L) 27.0 - 33.0 pg    MCHC 31.2 (L) 33.6 - 35.0 g/dL    RDW 43.1 35.9 - 50.0 fL    Platelet Count 502 (H) 164 - 446 K/uL    MPV 8.7 (L) 9.0 - 12.9 fL    Neutrophils-Polys 53.20 44.00 - 72.00 %    Lymphocytes 38.50 22.00 - 41.00 %    Monocytes 5.70 0.00 - 13.40 %    Eosinophils 1.70 0.00 - 6.90 %    Basophils 0.80 0.00 - 1.80 %    Immature Granulocytes 0.10 0.00 - 0.90 %    Nucleated RBC 0.00 /100 WBC    Neutrophils (Absolute) 3.80 2.00 - 7.15 K/uL    Lymphs (Absolute) 2.75 1.00 - 4.80 K/uL    Monos (Absolute) 0.41 0.00 - 0.85 K/uL    Eos (Absolute) 0.12 0.00 - 0.51 K/uL    Baso (Absolute) 0.06 0.00 - 0.12 K/uL    Immature Granulocytes (abs) 0.01 0.00 - 0.11 K/uL    NRBC (Absolute) 0.00 K/uL   CMP   Result Value Ref Range    Sodium 136 135 - 145 mmol/L    Potassium 4.1 3.6 - 5.5 mmol/L    Chloride 105 96 - 112 mmol/L    Co2 24 20 - 33 mmol/L    Anion Gap 7.0 0.0 - 11.9    Glucose 226 (H) 65 - 99 mg/dL    Bun 17 8 - 22 mg/dL    Creatinine 0.69 0.50 - 1.40 mg/dL    Calcium 9.2 8.5 - 10.5 mg/dL    AST(SGOT) 10 (L) 12 - 45 U/L    ALT(SGPT) 7 2 - 50 U/L    Alkaline Phosphatase 140 (H) 30 - 99 U/L    Total Bilirubin 0.3 0.1 - 1.5 mg/dL    Albumin 3.6 3.2 - 4.9 g/dL    Total Protein 7.6 6.0 - 8.2 g/dL    Globulin 4.0 (H) 1.9 - 3.5 g/dL    A-G Ratio 0.9 g/dL   LIPASE   Result Value Ref Range    Lipase 17 11 - 82 U/L   ESTIMATED GFR   Result Value Ref Range    GFR If African American >60 >60 mL/min/1.73 m 2    GFR If Non African American >60 >60 mL/min/1.73 m 2   TROPONIN   Result Value Ref Range    Troponin I <0.01 0.00 - 0.04 ng/mL   EKG (ER)   Result Value Ref Range    Report       [facility]    Test Date:  2017-07-08  Pt Name:    ALIDA HUTCHINSON                 Department:  ER  MRN:        [id number]                      Room:        06  Gender:     F                            Technician: [id number]  :        1960                   Requested By:TIMOTHY FOLEY  Order #:    [id number]                    Reading MD: TIMOTHY FOLEY MD    Measurements  Intervals                                Axis  Rate:       85                           P:          41  IA:         156                          QRS:        3  QRSD:       84                           T:          39  QT:         400  QTc:        476    Interpretive Statements  SINUS RHYTHM  LEFT VENTRICULAR HYPERTROPHY  Compared to ECG 2017 18:33:37  No significant changes    Electronically Signed On 2017 20:51:47 PDT by TIMOTHY FOLEY MD         INTERVENTIONS:  Medications   NS (BOLUS) infusion 1,000 mL (not administered)   IV fluid given tachycardia      COURSE & MEDICAL DECISION MAKING  Acute on chronic epigastric abdominal pain/nausea/vomitting.  Patient has been evaluated extensively for above mentioned complaints over the past week- labs including cbc, cmp, lipase, CT abd have been fairly unremarkable. Patient has been admitted multiple times over the past couple of years for similar complaints.   Current Ddx: Diabetic gastroparesis vs chronic narcotic bowel vs gastritis vs enteritis vs less likely PUD or bowel obstruction.  Given light tachycardia- will bolus with IV fluids  Check CBC, CMP, Lipase  Given negative CT Abd/Pelvis 2 days ago, will not repeat.  While in ER, patient did not have any episodes of emesis. Was comfortably resting in bed.  Patient requesting IV Pain medications. Given chronic nature of pain and high likelihood that patient's symptoms are due to chronic narcotic use, patient was educated on importance of treating pain appropriately.  Later, patient decided to leave AMA. Patient was informed to risks of leaving AMA, prior to workup being completed. However patient declined any further workup.  Understood the risk of leaving AMA.       PLAN:  Pt decided to leave AMA.       CONDITION: fair.    FINAL IMPRESSION  No diagnosis found.      Electronically signed by: Zac Javier, 7/9/2017 11:13 PM

## 2017-07-10 NOTE — DISCHARGE INSTRUCTIONS
You have elected to leave against medical advice. As the next best alternative to completing her evaluation in the emergency department, please follow-up with your primary care physician tomorrow morning. You may return at any time to complete your evaluation and you encouraged to do so. Please return to the emergency department immediately if he develops any new or worsening symptoms including fevers, nausea, vomiting, worsening pain or any further concerns. As we discussed, for your safety, please limit your emergency department and primary care visits to one hospital system if you're unable to remember the dates of presentation and tests you had done at various emergency departments. It can be dangerous if you have multiple scans or multiple doses of medications that are done at different facilities because the providers have no records or knowledge of your other visits.      Abdominal Pain, Women  Abdominal (stomach, pelvic, or belly) pain can be caused by many things. It is important to tell your doctor:  · The location of the pain.  · Does it come and go or is it present all the time?  · Are there things that start the pain (eating certain foods, exercise)?  · Are there other symptoms associated with the pain (fever, nausea, vomiting, diarrhea)?  All of this is helpful to know when trying to find the cause of the pain.  CAUSES   · Stomach: virus or bacteria infection, or ulcer.  · Intestine: appendicitis (inflamed appendix), regional ileitis (Crohn's disease), ulcerative colitis (inflamed colon), irritable bowel syndrome, diverticulitis (inflamed diverticulum of the colon), or cancer of the stomach or intestine.  · Gallbladder disease or stones in the gallbladder.  · Kidney disease, kidney stones, or infection.  · Pancreas infection or cancer.  · Fibromyalgia (pain disorder).  · Diseases of the female organs:  · Uterus: fibroid (non-cancerous) tumors or infection.  · Fallopian tubes: infection or tubal  pregnancy.  · Ovary: cysts or tumors.  · Pelvic adhesions (scar tissue).  · Endometriosis (uterus lining tissue growing in the pelvis and on the pelvic organs).  · Pelvic congestion syndrome (female organs filling up with blood just before the menstrual period).  · Pain with the menstrual period.  · Pain with ovulation (producing an egg).  · Pain with an IUD (intrauterine device, birth control) in the uterus.  · Cancer of the female organs.  · Functional pain (pain not caused by a disease, may improve without treatment).  · Psychological pain.  · Depression.  DIAGNOSIS   Your doctor will decide the seriousness of your pain by doing an examination.  · Blood tests.  · X-rays.  · Ultrasound.  · CT scan (computed tomography, special type of X-ray).  · MRI (magnetic resonance imaging).  · Cultures, for infection.  · Barium enema (dye inserted in the large intestine, to better view it with X-rays).  · Colonoscopy (looking in intestine with a lighted tube).  · Laparoscopy (minor surgery, looking in abdomen with a lighted tube).  · Major abdominal exploratory surgery (looking in abdomen with a large incision).  TREATMENT   The treatment will depend on the cause of the pain.   · Many cases can be observed and treated at home.  · Over-the-counter medicines recommended by your caregiver.  · Prescription medicine.  · Antibiotics, for infection.  · Birth control pills, for painful periods or for ovulation pain.  · Hormone treatment, for endometriosis.  · Nerve blocking injections.  · Physical therapy.  · Antidepressants.  · Counseling with a psychologist or psychiatrist.  · Minor or major surgery.  HOME CARE INSTRUCTIONS   · Do not take laxatives, unless directed by your caregiver.  · Take over-the-counter pain medicine only if ordered by your caregiver. Do not take aspirin because it can cause an upset stomach or bleeding.  · Try a clear liquid diet (broth or water) as ordered by your caregiver. Slowly move to a bland diet, as  tolerated, if the pain is related to the stomach or intestine.  · Have a thermometer and take your temperature several times a day, and record it.  · Bed rest and sleep, if it helps the pain.  · Avoid sexual intercourse, if it causes pain.  · Avoid stressful situations.  · Keep your follow-up appointments and tests, as your caregiver orders.  · If the pain does not go away with medicine or surgery, you may try:  · Acupuncture.  · Relaxation exercises (yoga, meditation).  · Group therapy.  · Counseling.  SEEK MEDICAL CARE IF:   · You notice certain foods cause stomach pain.  · Your home care treatment is not helping your pain.  · You need stronger pain medicine.  · You want your IUD removed.  · You feel faint or lightheaded.  · You develop nausea and vomiting.  · You develop a rash.  · You are having side effects or an allergy to your medicine.  SEEK IMMEDIATE MEDICAL CARE IF:   · Your pain does not go away or gets worse.  · You have a fever.  · Your pain is felt only in portions of the abdomen. The right side could possibly be appendicitis. The left lower portion of the abdomen could be colitis or diverticulitis.  · You are passing blood in your stools (bright red or black tarry stools, with or without vomiting).  · You have blood in your urine.  · You develop chills, with or without a fever.  · You pass out.  MAKE SURE YOU:   · Understand these instructions.  · Will watch your condition.  · Will get help right away if you are not doing well or get worse.  Document Released: 10/14/2008 Document Revised: 03/11/2013 Document Reviewed: 11/04/2010  [employer]® Patient Information ©2014 [employer].

## 2017-08-05 ENCOUNTER — HOSPITAL ENCOUNTER (EMERGENCY)
Facility: MEDICAL CENTER | Age: 57
End: 2017-08-05
Attending: EMERGENCY MEDICINE
Payer: MEDICAID

## 2017-08-05 VITALS
HEIGHT: 65 IN | HEART RATE: 90 BPM | WEIGHT: 180 LBS | DIASTOLIC BLOOD PRESSURE: 59 MMHG | BODY MASS INDEX: 29.99 KG/M2 | OXYGEN SATURATION: 96 % | SYSTOLIC BLOOD PRESSURE: 89 MMHG | TEMPERATURE: 97.5 F | RESPIRATION RATE: 17 BRPM

## 2017-08-05 DIAGNOSIS — R10.9 ABDOMINAL PAIN, UNSPECIFIED LOCATION: ICD-10-CM

## 2017-08-05 LAB
ALBUMIN SERPL BCP-MCNC: 3.8 G/DL (ref 3.2–4.9)
ALBUMIN/GLOB SERPL: 0.9 G/DL
ALP SERPL-CCNC: 156 U/L (ref 30–99)
ALT SERPL-CCNC: 14 U/L (ref 2–50)
ANION GAP SERPL CALC-SCNC: 11 MMOL/L (ref 0–11.9)
AST SERPL-CCNC: 12 U/L (ref 12–45)
BASOPHILS # BLD AUTO: 0.6 % (ref 0–1.8)
BASOPHILS # BLD: 0.04 K/UL (ref 0–0.12)
BILIRUB SERPL-MCNC: 0.4 MG/DL (ref 0.1–1.5)
BUN SERPL-MCNC: 27 MG/DL (ref 8–22)
CALCIUM SERPL-MCNC: 9.4 MG/DL (ref 8.5–10.5)
CHLORIDE SERPL-SCNC: 105 MMOL/L (ref 96–112)
CO2 SERPL-SCNC: 22 MMOL/L (ref 20–33)
CREAT SERPL-MCNC: 0.81 MG/DL (ref 0.5–1.4)
EKG IMPRESSION: NORMAL
EOSINOPHIL # BLD AUTO: 0.08 K/UL (ref 0–0.51)
EOSINOPHIL NFR BLD: 1.2 % (ref 0–6.9)
ERYTHROCYTE [DISTWIDTH] IN BLOOD BY AUTOMATED COUNT: 44.5 FL (ref 35.9–50)
GFR SERPL CREATININE-BSD FRML MDRD: >60 ML/MIN/1.73 M 2
GLOBULIN SER CALC-MCNC: 4.3 G/DL (ref 1.9–3.5)
GLUCOSE SERPL-MCNC: 226 MG/DL (ref 65–99)
HCT VFR BLD AUTO: 34.8 % (ref 37–47)
HGB BLD-MCNC: 10.8 G/DL (ref 12–16)
IMM GRANULOCYTES # BLD AUTO: 0.03 K/UL (ref 0–0.11)
IMM GRANULOCYTES NFR BLD AUTO: 0.4 % (ref 0–0.9)
LIPASE SERPL-CCNC: 16 U/L (ref 11–82)
LYMPHOCYTES # BLD AUTO: 2.06 K/UL (ref 1–4.8)
LYMPHOCYTES NFR BLD: 30.4 % (ref 22–41)
MCH RBC QN AUTO: 24.4 PG (ref 27–33)
MCHC RBC AUTO-ENTMCNC: 31 G/DL (ref 33.6–35)
MCV RBC AUTO: 78.6 FL (ref 81.4–97.8)
MONOCYTES # BLD AUTO: 0.45 K/UL (ref 0–0.85)
MONOCYTES NFR BLD AUTO: 6.6 % (ref 0–13.4)
NEUTROPHILS # BLD AUTO: 4.12 K/UL (ref 2–7.15)
NEUTROPHILS NFR BLD: 60.8 % (ref 44–72)
NRBC # BLD AUTO: 0 K/UL
NRBC BLD AUTO-RTO: 0 /100 WBC
PLATELET # BLD AUTO: 620 K/UL (ref 164–446)
PMV BLD AUTO: 8.5 FL (ref 9–12.9)
POTASSIUM SERPL-SCNC: 4.1 MMOL/L (ref 3.6–5.5)
PROT SERPL-MCNC: 8.1 G/DL (ref 6–8.2)
RBC # BLD AUTO: 4.43 M/UL (ref 4.2–5.4)
SODIUM SERPL-SCNC: 138 MMOL/L (ref 135–145)
WBC # BLD AUTO: 6.8 K/UL (ref 4.8–10.8)

## 2017-08-05 PROCEDURE — 96374 THER/PROPH/DIAG INJ IV PUSH: CPT

## 2017-08-05 PROCEDURE — 83690 ASSAY OF LIPASE: CPT

## 2017-08-05 PROCEDURE — 85025 COMPLETE CBC W/AUTO DIFF WBC: CPT

## 2017-08-05 PROCEDURE — 96375 TX/PRO/DX INJ NEW DRUG ADDON: CPT

## 2017-08-05 PROCEDURE — 700111 HCHG RX REV CODE 636 W/ 250 OVERRIDE (IP): Performed by: EMERGENCY MEDICINE

## 2017-08-05 PROCEDURE — 80053 COMPREHEN METABOLIC PANEL: CPT

## 2017-08-05 PROCEDURE — 700105 HCHG RX REV CODE 258: Performed by: EMERGENCY MEDICINE

## 2017-08-05 PROCEDURE — 99285 EMERGENCY DEPT VISIT HI MDM: CPT

## 2017-08-05 PROCEDURE — 93005 ELECTROCARDIOGRAM TRACING: CPT | Performed by: EMERGENCY MEDICINE

## 2017-08-05 PROCEDURE — 96361 HYDRATE IV INFUSION ADD-ON: CPT

## 2017-08-05 RX ORDER — SODIUM CHLORIDE 9 MG/ML
1000 INJECTION, SOLUTION INTRAVENOUS ONCE
Status: COMPLETED | OUTPATIENT
Start: 2017-08-05 | End: 2017-08-05

## 2017-08-05 RX ORDER — ONDANSETRON 2 MG/ML
4 INJECTION INTRAMUSCULAR; INTRAVENOUS ONCE
Status: COMPLETED | OUTPATIENT
Start: 2017-08-05 | End: 2017-08-05

## 2017-08-05 RX ADMIN — ONDANSETRON 4 MG: 2 INJECTION INTRAMUSCULAR; INTRAVENOUS at 18:34

## 2017-08-05 RX ADMIN — HYDROMORPHONE HYDROCHLORIDE 0.5 MG: 1 INJECTION, SOLUTION INTRAMUSCULAR; INTRAVENOUS; SUBCUTANEOUS at 18:34

## 2017-08-05 RX ADMIN — SODIUM CHLORIDE 1000 ML: 9 INJECTION, SOLUTION INTRAVENOUS at 19:32

## 2017-08-05 ASSESSMENT — PAIN SCALES - GENERAL: PAINLEVEL_OUTOF10: 8

## 2017-08-05 NOTE — ED AVS SNAPSHOT
Home Care Instructions                                                                                                                Julisa Carrion   MRN: [id number]    Department:  [facility], Emergency Dept   Date of Visit:  8/5/2017            [facility], Emergency Dept    [address]    [facility] [address]    Phone:  [phone]      You were seen by     Tre Stokes M.D.      Your Diagnosis Was     Abdominal pain, unspecified location     R10.9       These are the medications you received during your hospitalization from 08/05/2017 1756 to 08/05/2017 2025     Date/Time Order Dose Route Action    08/05/2017 1834 ondansetron (ZOFRAN) syringe/vial injection 4 mg 4 mg Intravenous Given    08/05/2017 1834 HYDROmorphone (DILAUDID) injection 0.5 mg 0.5 mg Intravenous Given    08/05/2017 1932 NS infusion 1,000 mL 1,000 mL Intravenous New Bag      Follow-up Information     1. Follow up with Tre Jason M.D. In 1 day.    Specialty:  Internal Medicine    Contact information    [address]  [facility] [address] [phone]        Medication Information     Review all of your home medications and newly ordered medications with your primary doctor and/or pharmacist as soon as possible. Follow medication instructions as directed by your doctor and/or pharmacist.     Please keep your complete medication list with you and share with your physician. Update the information when medications are discontinued, doses are changed, or new medications (including over-the-counter products) are added; and carry medication information at all times in the event of emergency situations.               Medication List      ASK your doctor about these medications        Instructions    Morning Afternoon Evening Bedtime    ferrous gluconate 324 (38 FE) MG Tabs   Commonly known as:  FERGON        Doctor's comments:  Take with food   Take 1 Tab by mouth with dinner.   Dose:  324 mg                       gabapentin 800 MG tablet   Commonly known as:  NEURONTIN        Take 800 mg by mouth 3 times a day.   Dose:  800 mg                        lisinopril 5 MG Tabs   Commonly known as:  PRINIVIL        Take 5 mg by mouth every morning.   Dose:  5 mg                        morphine ER 30 MG Tbcr tablet   Commonly known as:  MS CONTIN        Take 30 mg by mouth every 12 hours.   Dose:  30 mg                        omeprazole 20 MG delayed-release capsule   Commonly known as:  PRILOSEC        Take 1 Cap by mouth every day.   Dose:  20 mg                        ondansetron 4 MG Tbdp   Commonly known as:  ZOFRAN ODT        Take 1 Tab by mouth every 8 hours as needed for Nausea/Vomiting.   Dose:  4 mg                        oxycodone-acetaminophen  MG Tabs   Commonly known as:  PERCOCET-10        Take 1 Tab by mouth every 8 hours as needed for Severe Pain.   Dose:  1 Tab                        pravastatin 20 MG Tabs   Commonly known as:  PRAVACHOL        Take 20 mg by mouth every morning.   Dose:  20 mg                        promethazine 25 MG Supp   Commonly known as:  PHENERGAN        Insert 1 Suppository in rectum every 6 hours as needed for Nausea/Vomiting.   Dose:  25 mg                        TRESIBA FLEXTOUCH 100 UNIT/ML Sopn   Generic drug:  Insulin Degludec        Inject 40 Units as instructed 1 time daily as needed (per pt.).   Dose:  40 Units                        VICTOZA 18 MG/3ML Sopn injection   Generic drug:  liraglutide        Inject 0.6 mg as instructed 1 time daily as needed (per pt).   Dose:  0.6 mg                                Procedures and tests performed during your visit     CBC WITH DIFFERENTIAL    COMP METABOLIC PANEL    EKG (NOW)    ESTIMATED GFR    LIPASE        Discharge Instructions       Abdominal Pain, Adult  Many things can cause belly (abdominal) pain. Most times, the belly pain is not dangerous. Many cases of belly pain can be watched and treated at home.  HOME CARE    · Do not take medicines that help you go poop (laxatives) unless told to by your doctor.  · Only take medicine as told by your doctor.  · Eat or drink as told by your doctor. Your doctor will tell you if you should be on a special diet.  GET HELP IF:  · You do not know what is causing your belly pain.  · You have belly pain while you are sick to your stomach (nauseous) or have runny poop (diarrhea).  · You have pain while you pee or poop.  · Your belly pain wakes you up at night.  · You have belly pain that gets worse or better when you eat.  · You have belly pain that gets worse when you eat fatty foods.  · You have a fever.  GET HELP RIGHT AWAY IF:   · The pain does not go away within 2 hours.  · You keep throwing up (vomiting).  · The pain changes and is only in the right or left part of the belly.  · You have bloody or tarry looking poop.  MAKE SURE YOU:   · Understand these instructions.  · Will watch your condition.  · Will get help right away if you are not doing well or get worse.     This information is not intended to replace advice given to you by your health care provider. Make sure you discuss any questions you have with your health care provider.     Document Released: 06/05/2009 Document Revised: 01/08/2016 Document Reviewed: 08/27/2014  [employer] Interactive Patient Education ©2016 [employer]            Patient Information     Patient Information    Following emergency treatment: all patient requiring follow-up care must return either to a private physician or a clinic if your condition worsens before you are able to obtain further medical attention, please return to the emergency room.     Billing Information    At [facility], we work to make the billing process streamlined for our patients.  Our Representatives are here to answer any questions you may have regarding your hospital bill.  If you have insurance coverage and have supplied your insurance information to us, we will submit a claim  to your insurer on your behalf.  Should you have any questions regarding your bill, we can be reached online or by phone as follows:  Online: You are able pay your bills online or live chat with our representatives about any billing questions you may have. We are here to help Monday - Friday from 8:00am to 7:30pm and 9:00am - 12:00pm on Saturdays.  Please visit [email or web address]  for more information.   Phone:  [phone] or [phone]    Please note that your emergency physician, surgeon, pathologist, radiologist, anesthesiologist, and other specialists are not employed by [facility] and will therefore bill separately for their services.  Please contact them directly for any questions concerning their bills at the numbers below:     Emergency Physician Services:  [phone]  [facility]:  [phone]  [facility]:  [phone]  [facility]:  [phone]    1. Your final bill may vary from the amount quoted upon discharge if all procedures are not complete at that time, or if your doctor has additional procedures of which we are not aware. You will receive an additional bill if you return to the Emergency Department at [facility] for suture removal regardless of the facility of which the sutures were placed.     2. Please arrange for settlement of this account at the emergency registration.    3. All self-pay accounts are due in full at the time of treatment.  If you are unable to meet this obligation then payment is expected within 4-5 days.     4. If you have had radiology studies (CT, X-ray, Ultrasound, MRI), you have received a preliminary result during your emergency department visit. Please contact the radiology department [phone] to receive a copy of your final result. Please discuss the Final result with your primary physician or with the follow up physician provided.     Crisis Hotline:  National Crisis  Hotline:  6-944-UFECAHP or 1-967.105.2431  [place] Crisis Hotline:    1-505.135.4767 or 417-665-7842         ED Discharge Follow Up Questions    1. In order to provide you with very good care, we would like to follow up with a phone call in the next few days.  May we have your permission to contact you?     YES /  NO    2. What is the best phone number to call you? (       )_____-__________    3. What is the best time to call you?      Morning  /  Afternoon  /  Evening                   Patient Signature:  ____________________________________________________________    Date:  ____________________________________________________________

## 2017-08-05 NOTE — ED AVS SNAPSHOT
8/5/2017    Julisa Carrion  [address] [place] [address]    Dear Julisa:    [facility] wants to ensure your discharge home is safe and you or your loved ones have had all of your questions answered regarding your care after you leave the hospital.    Below is a list of resources and contact information should you have any questions regarding your hospital stay, follow-up instructions, or active medical symptoms.    Questions or Concerns Regarding… Contact   Medical Questions Related to Your Discharge  (7 days a week, 8am-5pm) Contact a Nurse Care Coordinator   [phone]   Medical Questions Not Related to Your Discharge  (24 hours a day / 7 days a week)  Contact the Nurse Health Line   [phone]    Medications or Discharge Instructions Refer to your discharge packet   or contact your [facility] Primary Care Provider   [phone]   Follow-up Appointment(s) Schedule your appointment via [employer]   or contact Scheduling [phone]   Billing Review your statement via [employer]  or contact Billing [phone]   Medical Records Review your records via [employer]   or contact Medical Records [phone]     You may receive a telephone call within two days of discharge. This call is to make certain you understand your discharge instructions and have the opportunity to have any questions answered. You can also easily access your medical information, test results and upcoming appointments via the [employer] free online health management tool. You can learn more and sign up at [email or web address]. For assistance setting up your [employer] account, please call [phone].    Once again, we want to ensure your discharge home is safe and that you have a clear understanding of any next steps in your care. If you have any questions or concerns, please do not hesitate to contact us, we are here for you. Thank you for choosing [facility] for your healthcare needs.    Sincerely,    Your [facility] Healthcare Team

## 2017-08-06 NOTE — ED NOTES
Discharge isntructions given to patient; patient verbalized understanding. Patient's VS WNL upon discharge. Patient ambulatory upon leaving ED.

## 2017-08-06 NOTE — ED NOTES
Pt arrived via [facility], c/o epigastric pain x 3 days with nausea/vomiting. Pt states she has had this pain before and has appt with GI in Oct. Denies diarrhea. Pt denies blood in emesis. Pt has hx of DM, . States she has been unable to take any of her medications d/t vomiting. Pt was given 500 cc NS, 4 mg IV zofran, and 10 mg IV morphine PTA. A/o x 4, speaking in full sentences.

## 2017-08-06 NOTE — DISCHARGE INSTRUCTIONS
Abdominal Pain, Adult  Many things can cause belly (abdominal) pain. Most times, the belly pain is not dangerous. Many cases of belly pain can be watched and treated at home.  HOME CARE   · Do not take medicines that help you go poop (laxatives) unless told to by your doctor.  · Only take medicine as told by your doctor.  · Eat or drink as told by your doctor. Your doctor will tell you if you should be on a special diet.  GET HELP IF:  · You do not know what is causing your belly pain.  · You have belly pain while you are sick to your stomach (nauseous) or have runny poop (diarrhea).  · You have pain while you pee or poop.  · Your belly pain wakes you up at night.  · You have belly pain that gets worse or better when you eat.  · You have belly pain that gets worse when you eat fatty foods.  · You have a fever.  GET HELP RIGHT AWAY IF:   · The pain does not go away within 2 hours.  · You keep throwing up (vomiting).  · The pain changes and is only in the right or left part of the belly.  · You have bloody or tarry looking poop.  MAKE SURE YOU:   · Understand these instructions.  · Will watch your condition.  · Will get help right away if you are not doing well or get worse.     This information is not intended to replace advice given to you by your health care provider. Make sure you discuss any questions you have with your health care provider.     Document Released: 06/05/2009 Document Revised: 01/08/2016 Document Reviewed: 08/27/2014  [employer] Interactive Patient Education ©2016 [employer]

## 2017-08-06 NOTE — ED PROVIDER NOTES
ED Provider Note    Scribed for Dr. Tre Stokes M.D. by Cristina Newton. 8/5/2017  6:08 PM    Primary care provider: Tre Jason M.D.  Means of arrival: Ambulance   History obtained from: Patient   History limited by: None     CHIEF COMPLAINT  Chief Complaint   Patient presents with   • Abdominal Pain   • N/V       HPI  Julisa Carrion is a 57 y.o. female who presents to the Emergency Department due to severe worsening abdominal pain onset 3 days ago. Patient reports nausea and one episode of associated vomiting prior to arrival. She states she has not eaten very much today because she has been unable to tolerate PO intake. She has a history of diabetes mellitus and generally checks her blood sugar frequently, however she has not done so recently because she ran out of test strips. Patient has a history of similar abdominal pain, and she is pending an appointment with gastroenterology in October for further evaluation. She denies fever, chills, or diarrhea.     REVIEW OF SYSTEMS  Pertinent positives include abdominal pain, nausea, vomiting. Pertinent negatives include no fever, chills, diarrhea. As above, all other systems reviewed and are negative.   See HPI for further details. C     PAST MEDICAL HISTORY   has a past medical history of Hypertension; Diabetes; High cholesterol (5/15/2011); Staph skin infection; Migraine; and Intestinal mass (2015).    SURGICAL HISTORY   has past surgical history that includes other abdominal surgery; gyn surgery; other orthopedic surgery (6-2014); and abdominal exploration.    SOCIAL HISTORY  Social History   Substance Use Topics   • Smoking status: Former Smoker -- 1.00 packs/day for 20 years     Types: Cigarettes     Quit date: 01/01/1996   • Smokeless tobacco: Former User     Quit date: 06/05/1995   • Alcohol Use: No      History   Drug Use No     Comment: just prescribed meds       FAMILY HISTORY  Family History   Problem Relation Age of Onset   • Cancer Maternal Aunt      " Lung cancer d/t smoking       CURRENT MEDICATIONS  Home Medications     Reviewed by Irene Lu R.N. (Registered Nurse) on 08/05/17 at 1802  Med List Status: Partial    Medication Last Dose Status    ferrous gluconate (FERGON) 324 (38 FE) MG Tab  Active    gabapentin (NEURONTIN) 800 MG tablet  Active    Insulin Degludec (TRESIBA FLEXTOUCH) 100 UNIT/ML Solution Pen-injector  Active    liraglutide (VICTOZA) 18 MG/3ML Solution Pen-injector injection  Active    lisinopril (PRINIVIL) 5 MG TABS  Active    morphine ER (MS CONTIN) 30 MG Tab CR tablet 7/8/2017 Active    omeprazole (PRILOSEC) 20 MG delayed-release capsule  Active    ondansetron (ZOFRAN ODT) 4 MG TABLET DISPERSIBLE  Active    oxycodone-acetaminophen (PERCOCET-10)  MG Tab 7/8/2017 Active    pravastatin (PRAVACHOL) 20 MG TABS  Active    promethazine (PHENERGAN) 25 MG Suppos  Active                ALLERGIES  No Known Allergies    PHYSICAL EXAM  VITAL SIGNS: BP 89/59 mmHg  Pulse 99  Temp(Src) 36.4 °C (97.5 °F)  Resp 16  Ht 1.651 m (5' 5\")  Wt 81.647 kg (180 lb)  BMI 29.95 kg/m2  LMP 02/05/2009    Constitutional: Well developed, Well nourished, Mild distress, Non-toxic appearance.   HENT: Normocephalic, Atraumatic, Bilateral external ears normal, Oropharynx dry, No oral exudates.   Eyes: PERRLA, EOMI, Conjunctiva normal, No discharge.   Neck: No tenderness, Supple, No stridor.   Lymphatic: No lymphadenopathy noted.   Cardiovascular: Normal heart rate, Normal rhythm.   Thorax & Lungs: Clear to auscultation bilaterally, No respiratory distress, No wheezing, No crackles.   Abdomen: Soft, No masses, No pulsatile masses. Epigastric tenderness.   Skin: Warm, Dry, No erythema, No rash.   Extremities:, No edema No cyanosis.   Musculoskeletal: No tenderness to palpation or major deformities noted.  Intact distal pulses  Neurologic: Awake, alert. Moves all extremities spontaneously.  Psychiatric: Affect normal, Judgment normal, Mood normal. "     LABS  Results for orders placed or performed during the hospital encounter of 08/05/17   CBC WITH DIFFERENTIAL   Result Value Ref Range    WBC 6.8 4.8 - 10.8 K/uL    RBC 4.43 4.20 - 5.40 M/uL    Hemoglobin 10.8 (L) 12.0 - 16.0 g/dL    Hematocrit 34.8 (L) 37.0 - 47.0 %    MCV 78.6 (L) 81.4 - 97.8 fL    MCH 24.4 (L) 27.0 - 33.0 pg    MCHC 31.0 (L) 33.6 - 35.0 g/dL    RDW 44.5 35.9 - 50.0 fL    Platelet Count 620 (H) 164 - 446 K/uL    MPV 8.5 (L) 9.0 - 12.9 fL    Neutrophils-Polys 60.80 44.00 - 72.00 %    Lymphocytes 30.40 22.00 - 41.00 %    Monocytes 6.60 0.00 - 13.40 %    Eosinophils 1.20 0.00 - 6.90 %    Basophils 0.60 0.00 - 1.80 %    Immature Granulocytes 0.40 0.00 - 0.90 %    Nucleated RBC 0.00 /100 WBC    Neutrophils (Absolute) 4.12 2.00 - 7.15 K/uL    Lymphs (Absolute) 2.06 1.00 - 4.80 K/uL    Monos (Absolute) 0.45 0.00 - 0.85 K/uL    Eos (Absolute) 0.08 0.00 - 0.51 K/uL    Baso (Absolute) 0.04 0.00 - 0.12 K/uL    Immature Granulocytes (abs) 0.03 0.00 - 0.11 K/uL    NRBC (Absolute) 0.00 K/uL   COMP METABOLIC PANEL   Result Value Ref Range    Sodium 138 135 - 145 mmol/L    Potassium 4.1 3.6 - 5.5 mmol/L    Chloride 105 96 - 112 mmol/L    Co2 22 20 - 33 mmol/L    Anion Gap 11.0 0.0 - 11.9    Glucose 226 (H) 65 - 99 mg/dL    Bun 27 (H) 8 - 22 mg/dL    Creatinine 0.81 0.50 - 1.40 mg/dL    Calcium 9.4 8.5 - 10.5 mg/dL    AST(SGOT) 12 12 - 45 U/L    ALT(SGPT) 14 2 - 50 U/L    Alkaline Phosphatase 156 (H) 30 - 99 U/L    Total Bilirubin 0.4 0.1 - 1.5 mg/dL    Albumin 3.8 3.2 - 4.9 g/dL    Total Protein 8.1 6.0 - 8.2 g/dL    Globulin 4.3 (H) 1.9 - 3.5 g/dL    A-G Ratio 0.9 g/dL   LIPASE   Result Value Ref Range    Lipase 16 11 - 82 U/L   ESTIMATED GFR   Result Value Ref Range    GFR If African American >60 >60 mL/min/1.73 m 2    GFR If Non African American >60 >60 mL/min/1.73 m 2   EKG (NOW)   Result Value Ref Range    Report       [facility] Emergency Dept.    Test Date:  2017-08-05  Pt Name:     ALIDA HUTCHINSON                 Department: ER  MRN:        [id number]                      Room:       [facility]  Gender:     F                            Technician: [id number]  :        [id number]                   Requested By:ER TRIAGE PROTOCOL  Order #:    [id number]                    Reading MD: CHARLEY PIÑA MD    Measurements  Intervals                                Axis  Rate:       96                           P:          48  SC:         144                          QRS:        -2  QRSD:       80                           T:          48  QT:         384  QTc:        486    Interpretive Statements  SINUS RHYTHM  CONSIDER LEFT ATRIAL ABNORMALITY  LEFT VENTRICULAR HYPERTROPHY  BORDERLINE PROLONGED QT INTERVAL  BASELINE WANDER IN LEAD(S) V1  Compared to ECG 2017 20:40:17  No significant changes    Electronically Signed On 2017 18:12:42 PDT by CHARLEY PIÑA MD     All labs reviewed by me.    EKG  Interpreted by me    Rhythm:  Normal sinus rhythm   Rate: 96   Axis: normal  Ectopy: none  Conduction: left ventricular hypertrophy   ST Segments: no acute change  T Waves: no acute change  Q Waves: none  Clinical Impression: left ventricular hypertrophy unchanged from 17     COURSE & MEDICAL DECISION MAKING  Pertinent Labs & Imaging studies reviewed. (See chart for details)    6:08 PM - Patient seen and examined at bedside. Patient will be treated with Zofran 4 mg IV and Dilaudid 0.5 mg IV. Ordered CBC with differential, CMP, lipase, and EKG to evaluate her symptoms. The differential diagnoses include but are not limited to: abdominal pain, narcotic seeking   7:25 PM Reviewed patient's lab results, as indicated above. She will be treated with IV fluids due to dehydration.     8:20 PM Recheck: patient is resting in bed. Her hydration appears improved after IV fluids. I informed her of lab results, as indicated above. She has been seen in the ED frequently due to similar abdominal pain without known cause. She  is stable for discharge home at this time. I advised her to follow up with gastroenterology as planned in October. She can return to the ED for worsening symptoms. Patient understands and agrees.     Decision Making:  I have reviewed this patient's records and her mini ER visits for non-specific abdominal pain she has been extensively evaluated and this is at least some component of narcotic dependence and/or narcotic seeking. At this point I do not feel comfortable pursuing further treatment of her pain with narcotics. Her workup is negative today appears comfortable. Vital signs are normal at discharge although it is noted there was recorded low blood pressure initially. She may have been slightly dehydrated. She does not appear to have an acute abdomen does not appear to require admission or further emergent workup. She is notably unhappy with my assessment that she does not require further narcotic medications    The patient will return for new or worsening symptoms and is stable at the time of discharge.    DISPOSITION:  Patient will be discharged home in stable condition.    FOLLOW UP:  Tre Jason M.D.  [address]  [facility] [address]  [phone]    In 1 day        OUTPATIENT MEDICATIONS:  New Prescriptions    No medications on file           FINAL IMPRESSION  1. Abdominal pain, unspecified location     2. Unhappiness     Cristina DELAROSA (Scribe), am scribing for, and in the presence of, Tre Stokes M.D..    Electronically signed by: Cristina Newton (Scribe), 8/5/2017    Tre DELAROSA M.D. personally performed the services described in this documentation, as scribed by Cristina Newton in my presence, and it is both accurate and complete.    The note accurately reflects work and decisions made by me.  Tre Stokes  8/5/2017  11:53 PM

## 2017-08-18 PROCEDURE — 96375 TX/PRO/DX INJ NEW DRUG ADDON: CPT

## 2017-08-18 PROCEDURE — 99285 EMERGENCY DEPT VISIT HI MDM: CPT

## 2017-08-18 PROCEDURE — 96374 THER/PROPH/DIAG INJ IV PUSH: CPT

## 2017-08-18 ASSESSMENT — PAIN SCALES - GENERAL
PAINLEVEL_OUTOF10: 10
PAINLEVEL_OUTOF10: 10

## 2017-08-19 ENCOUNTER — HOSPITAL ENCOUNTER (EMERGENCY)
Facility: MEDICAL CENTER | Age: 57
End: 2017-08-19
Attending: EMERGENCY MEDICINE
Payer: MEDICAID

## 2017-08-19 VITALS
WEIGHT: 170.42 LBS | HEART RATE: 88 BPM | TEMPERATURE: 97.9 F | RESPIRATION RATE: 13 BRPM | SYSTOLIC BLOOD PRESSURE: 137 MMHG | OXYGEN SATURATION: 100 % | HEIGHT: 65 IN | DIASTOLIC BLOOD PRESSURE: 118 MMHG | BODY MASS INDEX: 28.39 KG/M2

## 2017-08-19 DIAGNOSIS — R11.2 NON-INTRACTABLE VOMITING WITH NAUSEA, UNSPECIFIED VOMITING TYPE: ICD-10-CM

## 2017-08-19 DIAGNOSIS — R10.13 EPIGASTRIC PAIN: ICD-10-CM

## 2017-08-19 DIAGNOSIS — L03.313 CELLULITIS OF CHEST WALL: ICD-10-CM

## 2017-08-19 LAB
ALBUMIN SERPL BCP-MCNC: 3.5 G/DL (ref 3.2–4.9)
ALBUMIN/GLOB SERPL: 0.9 G/DL
ALP SERPL-CCNC: 123 U/L (ref 30–99)
ALT SERPL-CCNC: 5 U/L (ref 2–50)
ANION GAP SERPL CALC-SCNC: 10 MMOL/L (ref 0–11.9)
APPEARANCE UR: CLEAR
AST SERPL-CCNC: 9 U/L (ref 12–45)
BACTERIA #/AREA URNS HPF: NEGATIVE /HPF
BASOPHILS # BLD AUTO: 0.6 % (ref 0–1.8)
BASOPHILS # BLD: 0.04 K/UL (ref 0–0.12)
BILIRUB SERPL-MCNC: 0.2 MG/DL (ref 0.1–1.5)
BILIRUB UR QL STRIP.AUTO: NEGATIVE
BUN SERPL-MCNC: 11 MG/DL (ref 8–22)
CALCIUM SERPL-MCNC: 9.2 MG/DL (ref 8.5–10.5)
CHLORIDE SERPL-SCNC: 105 MMOL/L (ref 96–112)
CO2 SERPL-SCNC: 23 MMOL/L (ref 20–33)
COLOR UR: YELLOW
CREAT SERPL-MCNC: 0.7 MG/DL (ref 0.5–1.4)
CULTURE IF INDICATED INDCX: YES UA CULTURE
EOSINOPHIL # BLD AUTO: 0.05 K/UL (ref 0–0.51)
EOSINOPHIL NFR BLD: 0.8 % (ref 0–6.9)
EPI CELLS #/AREA URNS HPF: NORMAL /HPF
ERYTHROCYTE [DISTWIDTH] IN BLOOD BY AUTOMATED COUNT: 44 FL (ref 35.9–50)
GFR SERPL CREATININE-BSD FRML MDRD: >60 ML/MIN/1.73 M 2
GLOBULIN SER CALC-MCNC: 4.1 G/DL (ref 1.9–3.5)
GLUCOSE SERPL-MCNC: 139 MG/DL (ref 65–99)
GLUCOSE UR STRIP.AUTO-MCNC: 250 MG/DL
HCT VFR BLD AUTO: 31 % (ref 37–47)
HGB BLD-MCNC: 9.6 G/DL (ref 12–16)
HYALINE CASTS #/AREA URNS LPF: NORMAL /LPF
IMM GRANULOCYTES # BLD AUTO: 0.04 K/UL (ref 0–0.11)
IMM GRANULOCYTES NFR BLD AUTO: 0.6 % (ref 0–0.9)
KETONES UR STRIP.AUTO-MCNC: NEGATIVE MG/DL
LEUKOCYTE ESTERASE UR QL STRIP.AUTO: ABNORMAL
LIPASE SERPL-CCNC: 10 U/L (ref 11–82)
LYMPHOCYTES # BLD AUTO: 1.66 K/UL (ref 1–4.8)
LYMPHOCYTES NFR BLD: 25.8 % (ref 22–41)
MCH RBC QN AUTO: 24.3 PG (ref 27–33)
MCHC RBC AUTO-ENTMCNC: 31 G/DL (ref 33.6–35)
MCV RBC AUTO: 78.5 FL (ref 81.4–97.8)
MICRO URNS: ABNORMAL
MONOCYTES # BLD AUTO: 0.44 K/UL (ref 0–0.85)
MONOCYTES NFR BLD AUTO: 6.8 % (ref 0–13.4)
NEUTROPHILS # BLD AUTO: 4.2 K/UL (ref 2–7.15)
NEUTROPHILS NFR BLD: 65.4 % (ref 44–72)
NITRITE UR QL STRIP.AUTO: NEGATIVE
NRBC # BLD AUTO: 0 K/UL
NRBC BLD AUTO-RTO: 0 /100 WBC
PH UR STRIP.AUTO: 7.5 [PH]
PLATELET # BLD AUTO: 419 K/UL (ref 164–446)
PMV BLD AUTO: 8.5 FL (ref 9–12.9)
POTASSIUM SERPL-SCNC: 3.9 MMOL/L (ref 3.6–5.5)
PROT SERPL-MCNC: 7.6 G/DL (ref 6–8.2)
PROT UR QL STRIP: NEGATIVE MG/DL
RBC # BLD AUTO: 3.95 M/UL (ref 4.2–5.4)
RBC # URNS HPF: NORMAL /HPF
RBC UR QL AUTO: NEGATIVE
SODIUM SERPL-SCNC: 138 MMOL/L (ref 135–145)
SP GR UR STRIP.AUTO: 1.01
TROPONIN I SERPL-MCNC: <0.01 NG/ML (ref 0–0.04)
UROBILINOGEN UR STRIP.AUTO-MCNC: 0.2 MG/DL
WBC # BLD AUTO: 6.4 K/UL (ref 4.8–10.8)
WBC #/AREA URNS HPF: NORMAL /HPF

## 2017-08-19 PROCEDURE — 87086 URINE CULTURE/COLONY COUNT: CPT

## 2017-08-19 PROCEDURE — 80053 COMPREHEN METABOLIC PANEL: CPT

## 2017-08-19 PROCEDURE — 81001 URINALYSIS AUTO W/SCOPE: CPT

## 2017-08-19 PROCEDURE — 36415 COLL VENOUS BLD VENIPUNCTURE: CPT

## 2017-08-19 PROCEDURE — 84484 ASSAY OF TROPONIN QUANT: CPT

## 2017-08-19 PROCEDURE — 83690 ASSAY OF LIPASE: CPT

## 2017-08-19 PROCEDURE — A9270 NON-COVERED ITEM OR SERVICE: HCPCS | Performed by: EMERGENCY MEDICINE

## 2017-08-19 PROCEDURE — 93005 ELECTROCARDIOGRAM TRACING: CPT | Performed by: EMERGENCY MEDICINE

## 2017-08-19 PROCEDURE — 700102 HCHG RX REV CODE 250 W/ 637 OVERRIDE(OP): Performed by: EMERGENCY MEDICINE

## 2017-08-19 PROCEDURE — 85025 COMPLETE CBC W/AUTO DIFF WBC: CPT

## 2017-08-19 PROCEDURE — 700111 HCHG RX REV CODE 636 W/ 250 OVERRIDE (IP): Performed by: EMERGENCY MEDICINE

## 2017-08-19 RX ORDER — CEPHALEXIN 250 MG/1
250 CAPSULE ORAL 4 TIMES DAILY
Qty: 28 CAP | Refills: 0 | Status: SHIPPED | OUTPATIENT
Start: 2017-08-19 | End: 2017-08-26

## 2017-08-19 RX ORDER — KETOROLAC TROMETHAMINE 30 MG/ML
30 INJECTION, SOLUTION INTRAMUSCULAR; INTRAVENOUS ONCE
Status: COMPLETED | OUTPATIENT
Start: 2017-08-19 | End: 2017-08-19

## 2017-08-19 RX ORDER — DICYCLOMINE HYDROCHLORIDE 10 MG/1
10 CAPSULE ORAL
Qty: 12 CAP | Refills: 0 | Status: SHIPPED | OUTPATIENT
Start: 2017-08-19 | End: 2017-08-22

## 2017-08-19 RX ORDER — MORPHINE SULFATE 4 MG/ML
4 INJECTION, SOLUTION INTRAMUSCULAR; INTRAVENOUS ONCE
Status: COMPLETED | OUTPATIENT
Start: 2017-08-19 | End: 2017-08-19

## 2017-08-19 RX ORDER — ONDANSETRON 4 MG/1
4 TABLET, ORALLY DISINTEGRATING ORAL EVERY 8 HOURS PRN
Qty: 10 TAB | Refills: 0 | Status: SHIPPED | OUTPATIENT
Start: 2017-08-19 | End: 2017-08-31

## 2017-08-19 RX ORDER — ONDANSETRON 2 MG/ML
4 INJECTION INTRAMUSCULAR; INTRAVENOUS ONCE
Status: COMPLETED | OUTPATIENT
Start: 2017-08-19 | End: 2017-08-19

## 2017-08-19 RX ADMIN — LIDOCAINE HYDROCHLORIDE 15 ML: 20 SOLUTION OROPHARYNGEAL at 00:53

## 2017-08-19 RX ADMIN — ONDANSETRON 4 MG: 2 INJECTION INTRAMUSCULAR; INTRAVENOUS at 00:54

## 2017-08-19 RX ADMIN — KETOROLAC TROMETHAMINE 30 MG: 30 INJECTION, SOLUTION INTRAMUSCULAR at 03:04

## 2017-08-19 RX ADMIN — MORPHINE SULFATE 4 MG: 4 INJECTION INTRAVENOUS at 00:54

## 2017-08-19 NOTE — ED NOTES
"Chief Complaint   Patient presents with   • N/V     onset 2 days ago   • Abdominal Pain     onset 2 days ago   • Open Wound     open sores to BLE and back     Pt ambulatory to triage for above complaints. Pt is AOx4, denies CP/SOB. PT states abd pain and NV started 2 days ago, and the open sores to BLE and back have been ongoing for a while. Pt states \"I think they are becoming infected\". /76 mmHg  Pulse 110  Temp(Src) 36.3 °C (97.3 °F) (Temporal)  Resp 14  Ht 1.651 m (5' 5\")  Wt 77.3 kg (170 lb 6.7 oz)  BMI 28.36 kg/m2  SpO2 100%  LMP 02/05/2009  Pt informed and educated on triage process and wait times. Pt verbalizes understanding to inform either triage tech or RN to alert staff for any changes in condition. Pt placed in lobby.    "

## 2017-08-19 NOTE — ED AVS SNAPSHOT
Omeros Access Code: [email or web address]  Expires: 9/18/2017  3:09 AM    Your email address is not on file at [facility].  Email Addresses are required for you to sign up for Omeros, please contact [phone] to verify your personal information and to provide your email address prior to attempting to register for Omeros.    Julisa Carrion  [address] [place] [address]    Omeros  A secure, online tool to manage your health information     [facility]’s Omeros® is a secure, online tool that connects you to your personalized health information from the privacy of your home -- day or night - making it very easy for you to manage your healthcare. Once the activation process is completed, you can even access your medical information using the Omeros rito, which is available for free in the Apple Rito store or Google Play store.     To learn more about Omeros, visit [email or web address]    There are two levels of access available (as shown below):   My Chart Features  [facility] Primary Care Doctor [facility]  Specialists [facility]  Urgent  Care Non-[facility] Primary Care Doctor   Email your healthcare team securely and privately 24/7 X X X    Manage appointments: schedule your next appointment; view details of past/upcoming appointments X      Request prescription refills. X      View recent personal medical records, including lab and immunizations X X X X   View health record, including health history, allergies, medications X X X X   Read reports about your outpatient visits, procedures, consult and ER notes X X X X   See your discharge summary, which is a recap of your hospital and/or ER visit that includes your diagnosis, lab results, and care plan X X  X     How to register for [employer]:  Once your e-mail address has been verified, follow the following steps to sign up for Omeros.     1. Go to  [email or web address]  2. Click on the Sign Up Now box, which takes you to the New Member Sign Up page. You  will need to provide the following information:  a. Enter your [employer] Access Code exactly as it appears at the top of this page. (You will not need to use this code after you’ve completed the sign-up process. If you do not sign up before the expiration date, you must request a new code.)   b. Enter your date of birth.   c. Enter your home email address.   d. Click Submit, and follow the next screen’s instructions.  3. Create a Strapt ID. This will be your Global Talent Track login ID and cannot be changed, so think of one that is secure and easy to remember.  4. Create a [employer] Track password. You can change your password at any time.  5. Enter your Password Reset Question and Answer. This can be used at a later time if you forget your password.   6. Enter your e-mail address. This allows you to receive e-mail notifications when new information is available in Global Talent Track.  7. Click Sign Up. You can now view your health information.    For assistance activating your Global Talent Track account, call [phone]

## 2017-08-19 NOTE — ED AVS SNAPSHOT
Home Care Instructions                                                                                                                Julisa Carrion   MRN: [id number]    Department:  [facility], Emergency Dept   Date of Visit:  8/18/2017            [facility], Emergency Dept    [address] [address]    Phone:  [phone]      You were seen by     Little Carrillo M.D.      Your Diagnosis Was     Epigastric pain     R10.13       These are the medications you received during your hospitalization from 08/18/2017 2204 to 08/19/2017 0309     Date/Time Order Dose Route Action    08/19/2017 0054 ondansetron (ZOFRAN) syringe/vial injection 4 mg 4 mg Intravenous Given    08/19/2017 0054 morphine (pf) 4 mg/ml injection 4 mg 4 mg Intravenous Given    08/19/2017 0053 hyoscyamine-maalox plus-lidocaine viscous (GI COCKTAIL) oral susp 15 mL 15 mL Oral Given    08/19/2017 0304 ketorolac (TORADOL) injection 30 mg 30 mg Intravenous Given      Follow-up Information     1. Schedule an appointment as soon as possible for a visit with Tre Jason M.D..    Specialty:  Internal Medicine    Contact information    [address]  [facility] [address]  [phone]          2. Follow up with [facility], Emergency Dept In 1 day.    Specialty:  Emergency Medicine    Why:  If symptoms worsen    Contact information    [address] [place] [id number] [phone]      Medication Information     Review all of your home medications and newly ordered medications with your primary doctor and/or pharmacist as soon as possible. Follow medication instructions as directed by your doctor and/or pharmacist.     Please keep your complete medication list with you and share with your physician. Update the information when medications are discontinued, doses are changed, or new medications (including over-the-counter products) are added; and carry medication information  at all times in the event of emergency situations.               Medication List      START taking these medications        Instructions    Morning Afternoon Evening Bedtime    cephALEXin 250 MG Caps   Commonly known as:  KEFLEX        Take 1 Cap by mouth 4 times a day for 7 days.   Dose:  250 mg                        dicyclomine 10 MG Caps   Commonly known as:  BENTYL        Take 1 Cap by mouth 4 Times a Day,Before Meals and at Bedtime for 3 days.   Dose:  10 mg                          CHANGE how you take these medications        Instructions    Morning Afternoon Evening Bedtime    * ondansetron 4 MG Tbdp   What changed:  Another medication with the same name was added. Make sure you understand how and when to take each.   Commonly known as:  ZOFRAN ODT        Take 1 Tab by mouth every 8 hours as needed for Nausea/Vomiting.   Dose:  4 mg                        * ondansetron 4 MG Tbdp   What changed:  You were already taking a medication with the same name, and this prescription was added. Make sure you understand how and when to take each.   Commonly known as:  ZOFRAN ODT        Take 1 Tab by mouth every 8 hours as needed for Nausea/Vomiting.   Dose:  4 mg                        * Notice:  This list has 2 medication(s) that are the same as other medications prescribed for you. Read the directions carefully, and ask your doctor or other care provider to review them with you.      ASK your doctor about these medications        Instructions    Morning Afternoon Evening Bedtime    ferrous gluconate 324 (38 Fe) MG Tabs   Commonly known as:  FERGON        Doctor's comments:  Take with food   Take 1 Tab by mouth with dinner.   Dose:  324 mg                        gabapentin 800 MG tablet   Commonly known as:  NEURONTIN        Take 800 mg by mouth 3 times a day.   Dose:  800 mg                        lisinopril 5 MG Tabs   Commonly known as:  PRINIVIL        Take 5 mg by mouth every morning.   Dose:  5 mg                         morphine ER 30 MG Tbcr tablet   Commonly known as:  MS CONTIN        Take 30 mg by mouth every 12 hours.   Dose:  30 mg                        omeprazole 20 MG delayed-release capsule   Commonly known as:  PRILOSEC        Take 1 Cap by mouth every day.   Dose:  20 mg                        oxycodone-acetaminophen  MG Tabs   Commonly known as:  PERCOCET-10        Take 1 Tab by mouth every 8 hours as needed for Severe Pain.   Dose:  1 Tab                        pravastatin 20 MG Tabs   Commonly known as:  PRAVACHOL        Take 20 mg by mouth every morning.   Dose:  20 mg                        promethazine 25 MG Supp   Commonly known as:  PHENERGAN        Insert 1 Suppository in rectum every 6 hours as needed for Nausea/Vomiting.   Dose:  25 mg                        TRESIBA FLEXTOUCH 100 UNIT/ML Sopn   Generic drug:  Insulin Degludec        Inject 40 Units as instructed 1 time daily as needed (per pt.).   Dose:  40 Units                        VICTOZA 18 MG/3ML Sopn injection   Generic drug:  liraglutide        Inject 0.6 mg as instructed 1 time daily as needed (per pt).   Dose:  0.6 mg                             Where to Get Your Medications      These medications were sent to [id number] - [place], [place] - [address] AT [facility] [address] [employer] [address]  [address] [place] [address] [place] [address]     Phone:  [phone]    - cephALEXin 250 MG Caps  - dicyclomine 10 MG Caps  - ondansetron 4 MG Tbdp            Procedures and tests performed during your visit     CBC WITH DIFFERENTIAL    COMP METABOLIC PANEL    EKG (NOW)    ESTIMATED GFR    IV SALINE LOCK    LIPASE    TROPONIN    URINALYSIS CULTURE, IF INDICATED    URINE CULTURE(NEW)    URINE MICROSCOPIC (W/UA)        Discharge Instructions       Abdominal Pain, Adult  Many things can cause abdominal pain. Usually, abdominal pain is not caused by a disease and will improve without treatment. It can often be observed and treated at home. Your health  care provider will do a physical exam and possibly order blood tests and X-rays to help determine the seriousness of your pain. However, in many cases, more time must pass before a clear cause of the pain can be found. Before that point, your health care provider may not know if you need more testing or further treatment.  HOME CARE INSTRUCTIONS   Monitor your abdominal pain for any changes. The following actions may help to alleviate any discomfort you are experiencing:  · Only take over-the-counter or prescription medicines as directed by your health care provider.  · Do not take laxatives unless directed to do so by your health care provider.  · Try a clear liquid diet (broth, tea, or water) as directed by your health care provider. Slowly move to a bland diet as tolerated.  SEEK MEDICAL CARE IF:  · You have unexplained abdominal pain.  · You have abdominal pain associated with nausea or diarrhea.  · You have pain when you urinate or have a bowel movement.  · You experience abdominal pain that wakes you in the night.  · You have abdominal pain that is worsened or improved by eating food.  · You have abdominal pain that is worsened with eating fatty foods.  · You have a fever.  SEEK IMMEDIATE MEDICAL CARE IF:   · Your pain does not go away within 2 hours.  · You keep throwing up (vomiting).  · Your pain is felt only in portions of the abdomen, such as the right side or the left lower portion of the abdomen.  · You pass bloody or black tarry stools.  MAKE SURE YOU:  · Understand these instructions.    · Will watch your condition.    · Will get help right away if you are not doing well or get worse.       This information is not intended to replace advice given to you by your health care provider. Make sure you discuss any questions you have with your health care provider.     Document Released: 09/27/2006 Document Revised: 01/08/2016 Document Reviewed: 08/27/2014  Elsevier Interactive Patient Education ©2016  Elsevier Inc.            Patient Information     Patient Information    Following emergency treatment: all patient requiring follow-up care must return either to a private physician or a clinic if your condition worsens before you are able to obtain further medical attention, please return to the emergency room.     Billing Information    At [facility], we work to make the billing process streamlined for our patients.  Our Representatives are here to answer any questions you may have regarding your hospital bill.  If you have insurance coverage and have supplied your insurance information to us, we will submit a claim to your insurer on your behalf.  Should you have any questions regarding your bill, we can be reached online or by phone as follows:  Online: You are able pay your bills online or live chat with our representatives about any billing questions you may have. We are here to help Monday - Friday from 8:00am to 7:30pm and 9:00am - 12:00pm on Saturdays.  Please visit [email or web address]  for more information.   Phone:  [phone] or [phone]    Please note that your emergency physician, surgeon, pathologist, radiologist, anesthesiologist, and other specialists are not employed by [facility] and will therefore bill separately for their services.  Please contact them directly for any questions concerning their bills at the numbers below:     Emergency Physician Services:  [phone]  [facility]:  [phone]  [facility]:  [phone]  [facility]:  [phone]    1. Your final bill may vary from the amount quoted upon discharge if all procedures are not complete at that time, or if your doctor has additional procedures of which we are not aware. You will receive an additional bill if you return to the Emergency Department at [facility] for suture removal regardless of the facility of which the sutures were  placed.     2. Please arrange for settlement of this account at the emergency registration.    3. All self-pay accounts are due in full at the time of treatment.  If you are unable to meet this obligation then payment is expected within 4-5 days.     4. If you have had radiology studies (CT, X-ray, Ultrasound, MRI), you have received a preliminary result during your emergency department visit. Please contact the radiology department [phone] to receive a copy of your final result. Please discuss the Final result with your primary physician or with the follow up physician provided.     Crisis Hotline:  [place] Crisis Hotline:  3-797-PRILUYH or 1-814.497.4821  [place] Crisis Hotline:    1-800.276.4227 or 264-682-6377         ED Discharge Follow Up Questions    1. In order to provide you with very good care, we would like to follow up with a phone call in the next few days.  May we have your permission to contact you?     YES /  NO    2. What is the best phone number to call you? (       )_____-__________    3. What is the best time to call you?      Morning  /  Afternoon  /  Evening                   Patient Signature:  ____________________________________________________________    Date:  ____________________________________________________________

## 2017-08-19 NOTE — ED AVS SNAPSHOT
8/19/2017    Julisa Carrion  [address] [place] [address]    Dear Julisa:    [facility] wants to ensure your discharge home is safe and you or your loved ones have had all of your questions answered regarding your care after you leave the hospital.    Below is a list of resources and contact information should you have any questions regarding your hospital stay, follow-up instructions, or active medical symptoms.    Questions or Concerns Regarding… Contact   Medical Questions Related to Your Discharge  (7 days a week, 8am-5pm) Contact a Nurse Care Coordinator   [phone]   Medical Questions Not Related to Your Discharge  (24 hours a day / 7 days a week)  Contact the Nurse Health Line   [phone]    Medications or Discharge Instructions Refer to your discharge packet   or contact your [facility] Primary Care Provider   [phone]   Follow-up Appointment(s) Schedule your appointment via [employer]   or contact Scheduling [phone]   Billing Review your statement via [employer]  or contact Billing [phone]   Medical Records Review your records via [employer]   or contact Medical Records [phone]     You may receive a telephone call within two days of discharge. This call is to make certain you understand your discharge instructions and have the opportunity to have any questions answered. You can also easily access your medical information, test results and upcoming appointments via the [employer] free online health management tool. You can learn more and sign up at [employer]. For assistance setting up your [employer] account, please call [phone].    Once again, we want to ensure your discharge home is safe and that you have a clear understanding of any next steps in your care. If you have any questions or concerns, please do not hesitate to contact us, we are here for you. Thank you for choosing [facility] for your healthcare needs.    Sincerely,    Your [facility] Healthcare Team

## 2017-08-21 LAB
BACTERIA UR CULT: NORMAL
SIGNIFICANT IND 70042: NORMAL
SITE SITE: NORMAL
SOURCE SOURCE: NORMAL

## 2017-08-24 LAB — EKG IMPRESSION: NORMAL

## 2017-08-30 LAB — EKG IMPRESSION: NORMAL

## 2017-08-31 ENCOUNTER — APPOINTMENT (OUTPATIENT)
Dept: RADIOLOGY | Facility: MEDICAL CENTER | Age: 57
DRG: 074 | End: 2017-08-31
Attending: EMERGENCY MEDICINE
Payer: MEDICAID

## 2017-08-31 ENCOUNTER — HOSPITAL ENCOUNTER (INPATIENT)
Facility: MEDICAL CENTER | Age: 57
LOS: 3 days | DRG: 074 | End: 2017-09-05
Attending: EMERGENCY MEDICINE | Admitting: INTERNAL MEDICINE
Payer: MEDICAID

## 2017-08-31 DIAGNOSIS — R94.31 EKG ABNORMALITIES: ICD-10-CM

## 2017-08-31 DIAGNOSIS — R10.30 LOWER ABDOMINAL PAIN: ICD-10-CM

## 2017-08-31 DIAGNOSIS — S81.801A WOUND OF LOWER EXTREMITY, RIGHT, INITIAL ENCOUNTER: ICD-10-CM

## 2017-08-31 DIAGNOSIS — E11.65 POORLY CONTROLLED DIABETES MELLITUS (HCC): ICD-10-CM

## 2017-08-31 PROBLEM — L97.929 ULCERS OF BOTH LOWER LEGS (HCC): Status: ACTIVE | Noted: 2017-08-31

## 2017-08-31 PROBLEM — R07.9 CHEST PAIN: Status: ACTIVE | Noted: 2017-08-31

## 2017-08-31 PROBLEM — R10.13 EPIGASTRIC PAIN: Status: ACTIVE | Noted: 2017-08-31

## 2017-08-31 PROBLEM — L97.919 ULCERS OF BOTH LOWER LEGS (HCC): Status: ACTIVE | Noted: 2017-08-31

## 2017-08-31 LAB
ALBUMIN SERPL BCP-MCNC: 4.1 G/DL (ref 3.2–4.9)
ALBUMIN/GLOB SERPL: 0.8 G/DL
ALP SERPL-CCNC: 162 U/L (ref 30–99)
ALT SERPL-CCNC: 14 U/L (ref 2–50)
ANION GAP SERPL CALC-SCNC: 11 MMOL/L (ref 0–11.9)
APPEARANCE UR: ABNORMAL
AST SERPL-CCNC: 9 U/L (ref 12–45)
BACTERIA #/AREA URNS HPF: NEGATIVE /HPF
BASOPHILS # BLD AUTO: 0.8 % (ref 0–1.8)
BASOPHILS # BLD: 0.06 K/UL (ref 0–0.12)
BILIRUB SERPL-MCNC: 0.3 MG/DL (ref 0.1–1.5)
BILIRUB UR QL STRIP.AUTO: NEGATIVE
BUN SERPL-MCNC: 22 MG/DL (ref 8–22)
CALCIUM SERPL-MCNC: 10.1 MG/DL (ref 8.5–10.5)
CHLORIDE SERPL-SCNC: 102 MMOL/L (ref 96–112)
CO2 SERPL-SCNC: 21 MMOL/L (ref 20–33)
COLOR UR: YELLOW
CREAT SERPL-MCNC: 0.82 MG/DL (ref 0.5–1.4)
EKG IMPRESSION: NORMAL
EOSINOPHIL # BLD AUTO: 0.07 K/UL (ref 0–0.51)
EOSINOPHIL NFR BLD: 0.9 % (ref 0–6.9)
EPI CELLS #/AREA URNS HPF: ABNORMAL /HPF
ERYTHROCYTE [DISTWIDTH] IN BLOOD BY AUTOMATED COUNT: 43.4 FL (ref 35.9–50)
GFR SERPL CREATININE-BSD FRML MDRD: >60 ML/MIN/1.73 M 2
GLOBULIN SER CALC-MCNC: 4.9 G/DL (ref 1.9–3.5)
GLUCOSE BLD-MCNC: 195 MG/DL (ref 65–99)
GLUCOSE SERPL-MCNC: 290 MG/DL (ref 65–99)
GLUCOSE UR STRIP.AUTO-MCNC: >=1000 MG/DL
GRAN CASTS #/AREA URNS LPF: ABNORMAL /LPF
HCG UR QL: NEGATIVE
HCT VFR BLD AUTO: 36.8 % (ref 37–47)
HGB BLD-MCNC: 11.3 G/DL (ref 12–16)
HYALINE CASTS #/AREA URNS LPF: ABNORMAL /LPF
IMM GRANULOCYTES # BLD AUTO: 0.01 K/UL (ref 0–0.11)
IMM GRANULOCYTES NFR BLD AUTO: 0.1 % (ref 0–0.9)
KETONES UR STRIP.AUTO-MCNC: NEGATIVE MG/DL
LEUKOCYTE ESTERASE UR QL STRIP.AUTO: NEGATIVE
LIPASE SERPL-CCNC: 15 U/L (ref 11–82)
LYMPHOCYTES # BLD AUTO: 2.14 K/UL (ref 1–4.8)
LYMPHOCYTES NFR BLD: 27.8 % (ref 22–41)
MCH RBC QN AUTO: 23.1 PG (ref 27–33)
MCHC RBC AUTO-ENTMCNC: 30.7 G/DL (ref 33.6–35)
MCV RBC AUTO: 75.3 FL (ref 81.4–97.8)
MICRO URNS: ABNORMAL
MONOCYTES # BLD AUTO: 0.37 K/UL (ref 0–0.85)
MONOCYTES NFR BLD AUTO: 4.8 % (ref 0–13.4)
NEUTROPHILS # BLD AUTO: 5.06 K/UL (ref 2–7.15)
NEUTROPHILS NFR BLD: 65.6 % (ref 44–72)
NITRITE UR QL STRIP.AUTO: NEGATIVE
NRBC # BLD AUTO: 0 K/UL
NRBC BLD AUTO-RTO: 0 /100 WBC
PH UR STRIP.AUTO: 6 [PH]
PLATELET # BLD AUTO: 680 K/UL (ref 164–446)
PMV BLD AUTO: 8.5 FL (ref 9–12.9)
POTASSIUM SERPL-SCNC: 3.8 MMOL/L (ref 3.6–5.5)
PROT SERPL-MCNC: 9 G/DL (ref 6–8.2)
PROT UR QL STRIP: 100 MG/DL
RBC # BLD AUTO: 4.89 M/UL (ref 4.2–5.4)
RBC # URNS HPF: ABNORMAL /HPF
RBC UR QL AUTO: NEGATIVE
RENAL EPI CELLS #/AREA URNS HPF: ABNORMAL /HPF
SODIUM SERPL-SCNC: 134 MMOL/L (ref 135–145)
SP GR UR REFRACTOMETRY: 1.03
SP GR UR STRIP.AUTO: 1.03
TROPONIN I SERPL-MCNC: <0.01 NG/ML (ref 0–0.04)
UROBILINOGEN UR STRIP.AUTO-MCNC: 0.2 MG/DL
WBC # BLD AUTO: 7.7 K/UL (ref 4.8–10.8)
WBC #/AREA URNS HPF: ABNORMAL /HPF

## 2017-08-31 PROCEDURE — 36415 COLL VENOUS BLD VENIPUNCTURE: CPT

## 2017-08-31 PROCEDURE — 85025 COMPLETE CBC W/AUTO DIFF WBC: CPT

## 2017-08-31 PROCEDURE — 99220 PR INITIAL OBSERVATION CARE,LEVL III: CPT | Performed by: INTERNAL MEDICINE

## 2017-08-31 PROCEDURE — 93005 ELECTROCARDIOGRAM TRACING: CPT | Performed by: EMERGENCY MEDICINE

## 2017-08-31 PROCEDURE — 82962 GLUCOSE BLOOD TEST: CPT

## 2017-08-31 PROCEDURE — 80053 COMPREHEN METABOLIC PANEL: CPT

## 2017-08-31 PROCEDURE — 700105 HCHG RX REV CODE 258: Performed by: INTERNAL MEDICINE

## 2017-08-31 PROCEDURE — 83690 ASSAY OF LIPASE: CPT

## 2017-08-31 PROCEDURE — 83036 HEMOGLOBIN GLYCOSYLATED A1C: CPT

## 2017-08-31 PROCEDURE — A9270 NON-COVERED ITEM OR SERVICE: HCPCS | Performed by: INTERNAL MEDICINE

## 2017-08-31 PROCEDURE — G0378 HOSPITAL OBSERVATION PER HR: HCPCS

## 2017-08-31 PROCEDURE — 74020 DX-ABDOMEN-2 VIEWS: CPT

## 2017-08-31 PROCEDURE — 81025 URINE PREGNANCY TEST: CPT

## 2017-08-31 PROCEDURE — 700111 HCHG RX REV CODE 636 W/ 250 OVERRIDE (IP): Performed by: EMERGENCY MEDICINE

## 2017-08-31 PROCEDURE — 80048 BASIC METABOLIC PNL TOTAL CA: CPT

## 2017-08-31 PROCEDURE — 700111 HCHG RX REV CODE 636 W/ 250 OVERRIDE (IP): Performed by: INTERNAL MEDICINE

## 2017-08-31 PROCEDURE — 84484 ASSAY OF TROPONIN QUANT: CPT | Mod: 91

## 2017-08-31 PROCEDURE — 93005 ELECTROCARDIOGRAM TRACING: CPT | Performed by: INTERNAL MEDICINE

## 2017-08-31 PROCEDURE — 96374 THER/PROPH/DIAG INJ IV PUSH: CPT

## 2017-08-31 PROCEDURE — 99285 EMERGENCY DEPT VISIT HI MDM: CPT

## 2017-08-31 PROCEDURE — 96375 TX/PRO/DX INJ NEW DRUG ADDON: CPT

## 2017-08-31 PROCEDURE — 85027 COMPLETE CBC AUTOMATED: CPT | Mod: 59

## 2017-08-31 PROCEDURE — 700102 HCHG RX REV CODE 250 W/ 637 OVERRIDE(OP): Performed by: INTERNAL MEDICINE

## 2017-08-31 PROCEDURE — 96376 TX/PRO/DX INJ SAME DRUG ADON: CPT

## 2017-08-31 PROCEDURE — 81001 URINALYSIS AUTO W/SCOPE: CPT

## 2017-08-31 RX ORDER — ASPIRIN 81 MG/1
324 TABLET, CHEWABLE ORAL DAILY
Status: DISCONTINUED | OUTPATIENT
Start: 2017-09-01 | End: 2017-09-05

## 2017-08-31 RX ORDER — ONDANSETRON 2 MG/ML
4 INJECTION INTRAMUSCULAR; INTRAVENOUS EVERY 4 HOURS PRN
Status: DISCONTINUED | OUTPATIENT
Start: 2017-08-31 | End: 2017-09-05 | Stop reason: HOSPADM

## 2017-08-31 RX ORDER — SODIUM CHLORIDE 9 MG/ML
INJECTION, SOLUTION INTRAVENOUS CONTINUOUS
Status: DISCONTINUED | OUTPATIENT
Start: 2017-08-31 | End: 2017-09-05 | Stop reason: HOSPADM

## 2017-08-31 RX ORDER — AMOXICILLIN 250 MG
2 CAPSULE ORAL 2 TIMES DAILY
Status: DISCONTINUED | OUTPATIENT
Start: 2017-08-31 | End: 2017-09-05 | Stop reason: HOSPADM

## 2017-08-31 RX ORDER — PROMETHAZINE HYDROCHLORIDE 25 MG/1
12.5-25 TABLET ORAL EVERY 4 HOURS PRN
Status: DISCONTINUED | OUTPATIENT
Start: 2017-08-31 | End: 2017-09-05 | Stop reason: HOSPADM

## 2017-08-31 RX ORDER — OXYCODONE AND ACETAMINOPHEN 10; 325 MG/1; MG/1
1 TABLET ORAL EVERY 6 HOURS PRN
Status: DISCONTINUED | OUTPATIENT
Start: 2017-08-31 | End: 2017-09-05 | Stop reason: HOSPADM

## 2017-08-31 RX ORDER — LABETALOL HYDROCHLORIDE 5 MG/ML
10 INJECTION, SOLUTION INTRAVENOUS EVERY 4 HOURS PRN
Status: DISCONTINUED | OUTPATIENT
Start: 2017-08-31 | End: 2017-09-05 | Stop reason: HOSPADM

## 2017-08-31 RX ORDER — PROMETHAZINE HYDROCHLORIDE 25 MG/1
12.5-25 SUPPOSITORY RECTAL EVERY 4 HOURS PRN
Status: DISCONTINUED | OUTPATIENT
Start: 2017-08-31 | End: 2017-09-05 | Stop reason: HOSPADM

## 2017-08-31 RX ORDER — ASPIRIN 300 MG/1
300 SUPPOSITORY RECTAL DAILY
Status: DISCONTINUED | OUTPATIENT
Start: 2017-09-01 | End: 2017-09-05

## 2017-08-31 RX ORDER — MORPHINE SULFATE 60 MG/1
60 TABLET, FILM COATED, EXTENDED RELEASE ORAL EVERY 12 HOURS
Status: ON HOLD | COMMUNITY
End: 2018-10-02

## 2017-08-31 RX ORDER — ASPIRIN 325 MG
325 TABLET ORAL DAILY
Status: DISCONTINUED | OUTPATIENT
Start: 2017-09-01 | End: 2017-09-05

## 2017-08-31 RX ORDER — LISINOPRIL 5 MG/1
5 TABLET ORAL EVERY MORNING
Status: DISCONTINUED | OUTPATIENT
Start: 2017-09-01 | End: 2017-09-02

## 2017-08-31 RX ORDER — PRAVASTATIN SODIUM 20 MG
20 TABLET ORAL EVERY MORNING
Status: DISCONTINUED | OUTPATIENT
Start: 2017-09-01 | End: 2017-09-05 | Stop reason: HOSPADM

## 2017-08-31 RX ORDER — ONDANSETRON 4 MG/1
4 TABLET, ORALLY DISINTEGRATING ORAL EVERY 4 HOURS PRN
Status: DISCONTINUED | OUTPATIENT
Start: 2017-08-31 | End: 2017-09-05 | Stop reason: HOSPADM

## 2017-08-31 RX ORDER — POLYETHYLENE GLYCOL 3350 17 G/17G
1 POWDER, FOR SOLUTION ORAL
Status: DISCONTINUED | OUTPATIENT
Start: 2017-08-31 | End: 2017-09-05 | Stop reason: HOSPADM

## 2017-08-31 RX ORDER — MORPHINE SULFATE 10 MG/ML
5 INJECTION, SOLUTION INTRAMUSCULAR; INTRAVENOUS ONCE
Status: COMPLETED | OUTPATIENT
Start: 2017-08-31 | End: 2017-08-31

## 2017-08-31 RX ORDER — GABAPENTIN 400 MG/1
800 CAPSULE ORAL 3 TIMES DAILY
Status: DISCONTINUED | OUTPATIENT
Start: 2017-08-31 | End: 2017-09-05 | Stop reason: HOSPADM

## 2017-08-31 RX ORDER — ALPRAZOLAM 0.5 MG/1
0.5 TABLET ORAL NIGHTLY PRN
Status: DISCONTINUED | OUTPATIENT
Start: 2017-08-31 | End: 2017-09-05 | Stop reason: HOSPADM

## 2017-08-31 RX ORDER — HEPARIN SODIUM 5000 [USP'U]/ML
5000 INJECTION, SOLUTION INTRAVENOUS; SUBCUTANEOUS EVERY 8 HOURS
Status: DISCONTINUED | OUTPATIENT
Start: 2017-08-31 | End: 2017-09-05 | Stop reason: HOSPADM

## 2017-08-31 RX ORDER — BISACODYL 10 MG
10 SUPPOSITORY, RECTAL RECTAL
Status: DISCONTINUED | OUTPATIENT
Start: 2017-08-31 | End: 2017-09-05 | Stop reason: HOSPADM

## 2017-08-31 RX ORDER — MORPHINE SULFATE 60 MG/1
60 TABLET, FILM COATED, EXTENDED RELEASE ORAL EVERY 12 HOURS
Status: DISCONTINUED | OUTPATIENT
Start: 2017-08-31 | End: 2017-09-05 | Stop reason: HOSPADM

## 2017-08-31 RX ORDER — MORPHINE SULFATE 4 MG/ML
4 INJECTION, SOLUTION INTRAMUSCULAR; INTRAVENOUS ONCE
Status: COMPLETED | OUTPATIENT
Start: 2017-08-31 | End: 2017-08-31

## 2017-08-31 RX ORDER — ALPRAZOLAM 0.5 MG/1
0.5 TABLET ORAL 3 TIMES DAILY PRN
Status: ON HOLD | COMMUNITY
End: 2018-10-02

## 2017-08-31 RX ORDER — ONDANSETRON 2 MG/ML
4 INJECTION INTRAMUSCULAR; INTRAVENOUS ONCE
Status: COMPLETED | OUTPATIENT
Start: 2017-08-31 | End: 2017-08-31

## 2017-08-31 RX ORDER — ACETAMINOPHEN 325 MG/1
650 TABLET ORAL EVERY 6 HOURS PRN
Status: DISCONTINUED | OUTPATIENT
Start: 2017-08-31 | End: 2017-09-05 | Stop reason: HOSPADM

## 2017-08-31 RX ADMIN — MORPHINE SULFATE 5 MG: 10 INJECTION INTRAVENOUS at 18:30

## 2017-08-31 RX ADMIN — LIDOCAINE HYDROCHLORIDE 30 ML: 20 SOLUTION OROPHARYNGEAL at 22:38

## 2017-08-31 RX ADMIN — MORPHINE SULFATE 4 MG: 4 INJECTION INTRAVENOUS at 21:23

## 2017-08-31 RX ADMIN — FENTANYL CITRATE 100 MCG: 50 INJECTION, SOLUTION INTRAMUSCULAR; INTRAVENOUS at 15:20

## 2017-08-31 RX ADMIN — GABAPENTIN 800 MG: 400 CAPSULE ORAL at 23:12

## 2017-08-31 RX ADMIN — MORPHINE SULFATE 60 MG: 60 TABLET, EXTENDED RELEASE ORAL at 22:38

## 2017-08-31 RX ADMIN — ONDANSETRON 4 MG: 2 INJECTION INTRAMUSCULAR; INTRAVENOUS at 15:20

## 2017-08-31 RX ADMIN — SODIUM CHLORIDE: 9 INJECTION, SOLUTION INTRAVENOUS at 23:17

## 2017-08-31 RX ADMIN — HEPARIN SODIUM 5000 UNITS: 5000 INJECTION, SOLUTION INTRAVENOUS; SUBCUTANEOUS at 23:14

## 2017-08-31 ASSESSMENT — PATIENT HEALTH QUESTIONNAIRE - PHQ9
SUM OF ALL RESPONSES TO PHQ9 QUESTIONS 1 AND 2: 0
2. FEELING DOWN, DEPRESSED, IRRITABLE, OR HOPELESS: NOT AT ALL
SUM OF ALL RESPONSES TO PHQ QUESTIONS 1-9: 0
1. LITTLE INTEREST OR PLEASURE IN DOING THINGS: NOT AT ALL

## 2017-08-31 ASSESSMENT — ENCOUNTER SYMPTOMS
DIARRHEA: 0
PHOTOPHOBIA: 0
BLURRED VISION: 0
INSOMNIA: 0
CLAUDICATION: 0
SHORTNESS OF BREATH: 0
HEADACHES: 0
SENSORY CHANGE: 0
VOMITING: 1
WEAKNESS: 0
CONSTIPATION: 0
CHILLS: 0
MYALGIAS: 0
HEARTBURN: 0
NERVOUS/ANXIOUS: 0
COUGH: 0
DEPRESSION: 0
FEVER: 0
ABDOMINAL PAIN: 1
SPEECH CHANGE: 0
DIZZINESS: 0
NAUSEA: 1

## 2017-08-31 ASSESSMENT — PAIN SCALES - GENERAL
PAINLEVEL_OUTOF10: 4
PAINLEVEL_OUTOF10: 9
PAINLEVEL_OUTOF10: 5
PAINLEVEL_OUTOF10: 6

## 2017-08-31 ASSESSMENT — LIFESTYLE VARIABLES
DO YOU DRINK ALCOHOL: NO
ALCOHOL_USE: NO
EVER_SMOKED: YES

## 2017-08-31 NOTE — ED NOTES
Pt to green 35, changing into gown, states that she has diverticulitis and all she has had to eat is granola which caused a flare up.

## 2017-08-31 NOTE — ED PROVIDER NOTES
ED Provider Note    CHIEF COMPLAINT  Chief Complaint   Patient presents with   • Abdominal Pain     mid abdomen x 2 days   • N/V       HPI  Julisa Carrion is a 57 y.o. female Here for upper abdominal pain or chest pain.  The patient also states she has nausea and vomiting for the last 2 days, but denies any diarrhea. She has no radiation of her pain, and states that it stays in the lower chest and upper abdomen. She has no fever, and no back pain. She denies any leg pain. Patient did not take anything for the pain, and has no ill contacts.    PAST MEDICAL HISTORY   has a past medical history of Diabetes; High cholesterol (5/15/2011); Hypertension; Intestinal mass (2015); Migraine; and Staph skin infection.    SOCIAL HISTORY  Social History     Social History Main Topics   • Smoking status: Former Smoker     Packs/day: 1.00     Years: 20.00     Types: Cigarettes     Quit date: 1/1/1996   • Smokeless tobacco: Former User     Quit date: 6/5/1995   • Alcohol use No   • Drug use: No      Comment: just prescribed meds   • Sexual activity: Not on file       SURGICAL HISTORY   has a past surgical history that includes other abdominal surgery; gyn surgery; other orthopedic surgery (6-2014); and abdominal exploration.    CURRENT MEDICATIONS  Home Medications     Reviewed by Kalee Penn R.N. (Registered Nurse) on 08/31/17 at 1429  Med List Status: <None>   Medication Last Dose Status   ferrous gluconate (FERGON) 324 (38 FE) MG Tab  Active   gabapentin (NEURONTIN) 800 MG tablet  Active   Insulin Degludec (TRESIBA FLEXTOUCH) 100 UNIT/ML Solution Pen-injector  Active   liraglutide (VICTOZA) 18 MG/3ML Solution Pen-injector injection  Active   lisinopril (PRINIVIL) 5 MG TABS  Active   morphine ER (MS CONTIN) 30 MG Tab CR tablet 7/8/2017 Active   omeprazole (PRILOSEC) 20 MG delayed-release capsule  Active   ondansetron (ZOFRAN ODT) 4 MG TABLET DISPERSIBLE  Active   ondansetron (ZOFRAN ODT) 4 MG TABLET DISPERSIBLE  Active  "  oxycodone-acetaminophen (PERCOCET-10)  MG Tab 7/8/2017 Active   pravastatin (PRAVACHOL) 20 MG TABS  Active   promethazine (PHENERGAN) 25 MG Suppos  Active                ALLERGIES  No Known Allergies    REVIEW OF SYSTEMS  See HPI for further details. Review of systems as above, otherwise all other systems are negative.     PHYSICAL EXAM  VITAL SIGNS: /61   Pulse 95   Temp 37.1 °C (98.7 °F)   Resp 16   Ht 1.651 m (5' 5\")   Wt 75.6 kg (166 lb 10.7 oz)   LMP 02/05/2009   SpO2 98%   BMI 27.73 kg/m²     Constitutional: Well appearing patient.  Not toxic, nor ill in appearance.  HEENT: NC/AT.  Extra Ocular Muscles Intact. Posterior pharynx clear, and without exudate. No uvula edema.  Neck: Full range of motion; non tender. no meningismus.  Cardiovascular: Regular heart rate and rhythm.  No murmurs, rubs, nor gallop appreciated.   Back:  Non tender midline.  No obvious step off or deformity.  Thorax & Lungs: Lungs are clear to auscultation with good air movement bilaterally.  No wheeze, rhonchi, nor rales.   Abdomen: Soft, with mild diffuse tenderness, rebound nor guarding.  No mass, pulsatile mass, nor hepatosplenomegaly appreciated.  Skin: No purpura nor petechia noted.  No rash.  Extremities/Musculoskeletal: No sign of trauma.    Musculoskeletal: Range of motion is intact in all major joints.  Neurologic: Alert & oriented x 3.  CN II-XII grossly intact.   Strength and sensation is intact. Clear speech, appropriate, cooperative.  Psychiatric: Normal affect appropriate for the clinical situation.    Results for orders placed or performed during the hospital encounter of 08/31/17   CBC WITH DIFFERENTIAL   Result Value Ref Range    WBC 7.7 4.8 - 10.8 K/uL    RBC 4.89 4.20 - 5.40 M/uL    Hemoglobin 11.3 (L) 12.0 - 16.0 g/dL    Hematocrit 36.8 (L) 37.0 - 47.0 %    MCV 75.3 (L) 81.4 - 97.8 fL    MCH 23.1 (L) 27.0 - 33.0 pg    MCHC 30.7 (L) 33.6 - 35.0 g/dL    RDW 43.4 35.9 - 50.0 fL    Platelet Count 680 " (H) 164 - 446 K/uL    MPV 8.5 (L) 9.0 - 12.9 fL    Neutrophils-Polys 65.60 44.00 - 72.00 %    Lymphocytes 27.80 22.00 - 41.00 %    Monocytes 4.80 0.00 - 13.40 %    Eosinophils 0.90 0.00 - 6.90 %    Basophils 0.80 0.00 - 1.80 %    Immature Granulocytes 0.10 0.00 - 0.90 %    Nucleated RBC 0.00 /100 WBC    Neutrophils (Absolute) 5.06 2.00 - 7.15 K/uL    Lymphs (Absolute) 2.14 1.00 - 4.80 K/uL    Monos (Absolute) 0.37 0.00 - 0.85 K/uL    Eos (Absolute) 0.07 0.00 - 0.51 K/uL    Baso (Absolute) 0.06 0.00 - 0.12 K/uL    Immature Granulocytes (abs) 0.01 0.00 - 0.11 K/uL    NRBC (Absolute) 0.00 K/uL   COMP METABOLIC PANEL   Result Value Ref Range    Sodium 134 (L) 135 - 145 mmol/L    Potassium 3.8 3.6 - 5.5 mmol/L    Chloride 102 96 - 112 mmol/L    Co2 21 20 - 33 mmol/L    Anion Gap 11.0 0.0 - 11.9    Glucose 290 (H) 65 - 99 mg/dL    Bun 22 8 - 22 mg/dL    Creatinine 0.82 0.50 - 1.40 mg/dL    Calcium 10.1 8.5 - 10.5 mg/dL    AST(SGOT) 9 (L) 12 - 45 U/L    ALT(SGPT) 14 2 - 50 U/L    Alkaline Phosphatase 162 (H) 30 - 99 U/L    Total Bilirubin 0.3 0.1 - 1.5 mg/dL    Albumin 4.1 3.2 - 4.9 g/dL    Total Protein 9.0 (H) 6.0 - 8.2 g/dL    Globulin 4.9 (H) 1.9 - 3.5 g/dL    A-G Ratio 0.8 g/dL   LIPASE   Result Value Ref Range    Lipase 15 11 - 82 U/L   ESTIMATED GFR   Result Value Ref Range    GFR If African American >60 >60 mL/min/1.73 m 2    GFR If Non African American >60 >60 mL/min/1.73 m 2   URINALYSIS   Result Value Ref Range    Color Yellow     Character Cloudy (A)     Specific Gravity 1.035 <1.035    Ph 6.0 5.0 - 8.0    Glucose >=1000 (A) Negative mg/dL    Ketones Negative Negative mg/dL    Protein 100 (A) Negative mg/dL    Bilirubin Negative Negative    Urobilinogen, Urine 0.2 Negative    Nitrite Negative Negative    Leukocyte Esterase Negative Negative    Occult Blood Negative Negative    Micro Urine Req Microscopic    URINE MICROSCOPIC (W/UA)   Result Value Ref Range    WBC 2-5 /hpf    RBC 5-10 (A) /hpf    Bacteria  Negative None /hpf    Epithelial Cells Moderate (A) /hpf    Epithelial Cells Renal Few /hpf    Hyaline Cast 11-20 (A) /lpf    Granular Casts 0-2 /lpf   TROPONIN   Result Value Ref Range    Troponin I <0.01 0.00 - 0.04 ng/mL   EKG (ER)   Result Value Ref Range    Report       [facility] Emergency Dept.    Test Date:  2017  Pt Name:    ALIDA HUTCHINSON                 Department: ER  MRN:        [id number]                      Room[facility]  Gender:     F                            Technician: [id number]  :        1960                   Requested By:JAYME LAFLEUR  Order #:    [id number]                    Reading MD:    Measurements  Intervals                                Axis  Rate:       92                           P:          55  NV:         148                          QRS:        -7  QRSD:       84                           T:          56  QT:         384  QTc:        476    Interpretive Statements  SINUS RHYTHM  PROBABLE LEFT ATRIAL ABNORMALITY  LEFT VENTRICULAR HYPERTROPHY  BORDERLINE INFERIOR Q WAVES  Compared to ECG 2017 00:48:13  No significant changes       [id number] VIEWS   Final Result      1.  Previous cholecystectomy.      2.  No evidence of bowel obstruction and/or free air.      3.  Mild sigmoid scoliosis of lumbar spine.            PROCEDURES     MEDICAL RECORD  I have reviewed patient's medical record and pertinent results are listed above.    COURSE & MEDICAL DECISION MAKING  I have reviewed any medical record information, laboratory studies and radiographic results as noted above.    7:51 PM  The pt will be admitted to Dr. Padilla for a chest pain rule out.   Pt comfortable.  Non toxic appearing.      FINAL IMPRESSION  1. Lower abdominal pain    2. EKG abnormalities        Electronically signed by: Jayme Lafleur, 2017 3:14 PM

## 2017-09-01 ENCOUNTER — APPOINTMENT (OUTPATIENT)
Dept: RADIOLOGY | Facility: MEDICAL CENTER | Age: 57
DRG: 074 | End: 2017-09-01
Attending: INTERNAL MEDICINE
Payer: MEDICAID

## 2017-09-01 LAB
ANION GAP SERPL CALC-SCNC: 12 MMOL/L (ref 0–11.9)
BUN SERPL-MCNC: 29 MG/DL (ref 8–22)
CALCIUM SERPL-MCNC: 9.6 MG/DL (ref 8.5–10.5)
CHLORIDE SERPL-SCNC: 102 MMOL/L (ref 96–112)
CO2 SERPL-SCNC: 22 MMOL/L (ref 20–33)
CREAT SERPL-MCNC: 1.16 MG/DL (ref 0.5–1.4)
EKG IMPRESSION: NORMAL
ERYTHROCYTE [DISTWIDTH] IN BLOOD BY AUTOMATED COUNT: 44.1 FL (ref 35.9–50)
EST. AVERAGE GLUCOSE BLD GHB EST-MCNC: 223 MG/DL
GFR SERPL CREATININE-BSD FRML MDRD: 48 ML/MIN/1.73 M 2
GLUCOSE BLD-MCNC: 158 MG/DL (ref 65–99)
GLUCOSE BLD-MCNC: 175 MG/DL (ref 65–99)
GLUCOSE BLD-MCNC: 189 MG/DL (ref 65–99)
GLUCOSE BLD-MCNC: 278 MG/DL (ref 65–99)
GLUCOSE SERPL-MCNC: 214 MG/DL (ref 65–99)
HBA1C MFR BLD: 9.4 % (ref 0–5.6)
HCT VFR BLD AUTO: 33.1 % (ref 37–47)
HGB BLD-MCNC: 9.9 G/DL (ref 12–16)
MCH RBC QN AUTO: 22.8 PG (ref 27–33)
MCHC RBC AUTO-ENTMCNC: 29.9 G/DL (ref 33.6–35)
MCV RBC AUTO: 76.1 FL (ref 81.4–97.8)
PLATELET # BLD AUTO: 558 K/UL (ref 164–446)
PMV BLD AUTO: 8.5 FL (ref 9–12.9)
POTASSIUM SERPL-SCNC: 3.7 MMOL/L (ref 3.6–5.5)
RBC # BLD AUTO: 4.35 M/UL (ref 4.2–5.4)
SODIUM SERPL-SCNC: 136 MMOL/L (ref 135–145)
TROPONIN I SERPL-MCNC: <0.01 NG/ML (ref 0–0.04)
TROPONIN I SERPL-MCNC: <0.01 NG/ML (ref 0–0.04)
WBC # BLD AUTO: 8.7 K/UL (ref 4.8–10.8)

## 2017-09-01 PROCEDURE — 700102 HCHG RX REV CODE 250 W/ 637 OVERRIDE(OP): Performed by: INTERNAL MEDICINE

## 2017-09-01 PROCEDURE — 700111 HCHG RX REV CODE 636 W/ 250 OVERRIDE (IP)

## 2017-09-01 PROCEDURE — G0378 HOSPITAL OBSERVATION PER HR: HCPCS

## 2017-09-01 PROCEDURE — A9270 NON-COVERED ITEM OR SERVICE: HCPCS | Performed by: INTERNAL MEDICINE

## 2017-09-01 PROCEDURE — A9270 NON-COVERED ITEM OR SERVICE: HCPCS | Performed by: NURSE PRACTITIONER

## 2017-09-01 PROCEDURE — 700111 HCHG RX REV CODE 636 W/ 250 OVERRIDE (IP): Performed by: INTERNAL MEDICINE

## 2017-09-01 PROCEDURE — 99226 PR SUBSEQUENT OBSERVATION CARE,LEVEL III: CPT | Performed by: INTERNAL MEDICINE

## 2017-09-01 PROCEDURE — 700105 HCHG RX REV CODE 258: Performed by: NURSE PRACTITIONER

## 2017-09-01 PROCEDURE — 36415 COLL VENOUS BLD VENIPUNCTURE: CPT

## 2017-09-01 PROCEDURE — 82962 GLUCOSE BLOOD TEST: CPT | Mod: 91

## 2017-09-01 PROCEDURE — 84484 ASSAY OF TROPONIN QUANT: CPT

## 2017-09-01 PROCEDURE — 93010 ELECTROCARDIOGRAM REPORT: CPT | Performed by: INTERNAL MEDICINE

## 2017-09-01 PROCEDURE — 700105 HCHG RX REV CODE 258: Performed by: INTERNAL MEDICINE

## 2017-09-01 PROCEDURE — A9502 TC99M TETROFOSMIN: HCPCS

## 2017-09-01 PROCEDURE — 700102 HCHG RX REV CODE 250 W/ 637 OVERRIDE(OP): Performed by: NURSE PRACTITIONER

## 2017-09-01 PROCEDURE — 93005 ELECTROCARDIOGRAM TRACING: CPT | Performed by: INTERNAL MEDICINE

## 2017-09-01 RX ORDER — FAMOTIDINE 20 MG/1
20 TABLET, FILM COATED ORAL 2 TIMES DAILY
Status: DISCONTINUED | OUTPATIENT
Start: 2017-09-01 | End: 2017-09-05 | Stop reason: HOSPADM

## 2017-09-01 RX ORDER — SUCRALFATE ORAL 1 G/10ML
1 SUSPENSION ORAL
Status: DISCONTINUED | OUTPATIENT
Start: 2017-09-01 | End: 2017-09-05 | Stop reason: HOSPADM

## 2017-09-01 RX ORDER — SODIUM CHLORIDE 9 MG/ML
500 INJECTION, SOLUTION INTRAVENOUS ONCE
Status: COMPLETED | OUTPATIENT
Start: 2017-09-01 | End: 2017-09-01

## 2017-09-01 RX ORDER — REGADENOSON 0.08 MG/ML
INJECTION, SOLUTION INTRAVENOUS
Status: COMPLETED
Start: 2017-09-01 | End: 2017-09-01

## 2017-09-01 RX ORDER — SODIUM CHLORIDE 9 MG/ML
INJECTION, SOLUTION INTRAVENOUS
Status: ACTIVE
Start: 2017-09-01 | End: 2017-09-02

## 2017-09-01 RX ADMIN — GABAPENTIN 800 MG: 400 CAPSULE ORAL at 20:58

## 2017-09-01 RX ADMIN — SODIUM CHLORIDE 500 ML: 9 INJECTION, SOLUTION INTRAVENOUS at 22:20

## 2017-09-01 RX ADMIN — ASPIRIN 325 MG: 325 TABLET, COATED ORAL at 08:30

## 2017-09-01 RX ADMIN — REGADENOSON 0.4 MG: 0.08 INJECTION, SOLUTION INTRAVENOUS at 14:00

## 2017-09-01 RX ADMIN — OXYCODONE HYDROCHLORIDE AND ACETAMINOPHEN 1 TABLET: 10; 325 TABLET ORAL at 08:30

## 2017-09-01 RX ADMIN — GABAPENTIN 800 MG: 400 CAPSULE ORAL at 15:22

## 2017-09-01 RX ADMIN — LISINOPRIL 5 MG: 5 TABLET ORAL at 08:29

## 2017-09-01 RX ADMIN — MORPHINE SULFATE 60 MG: 60 TABLET, EXTENDED RELEASE ORAL at 20:58

## 2017-09-01 RX ADMIN — OXYCODONE HYDROCHLORIDE AND ACETAMINOPHEN 1 TABLET: 10; 325 TABLET ORAL at 15:22

## 2017-09-01 RX ADMIN — HEPARIN SODIUM 5000 UNITS: 5000 INJECTION, SOLUTION INTRAVENOUS; SUBCUTANEOUS at 21:00

## 2017-09-01 RX ADMIN — MORPHINE SULFATE 60 MG: 60 TABLET, EXTENDED RELEASE ORAL at 08:29

## 2017-09-01 RX ADMIN — GABAPENTIN 800 MG: 400 CAPSULE ORAL at 08:29

## 2017-09-01 RX ADMIN — INSULIN LISPRO 3 UNITS: 100 INJECTION, SOLUTION INTRAVENOUS; SUBCUTANEOUS at 11:45

## 2017-09-01 RX ADMIN — INSULIN LISPRO 3 UNITS: 100 INJECTION, SOLUTION INTRAVENOUS; SUBCUTANEOUS at 21:54

## 2017-09-01 RX ADMIN — HEPARIN SODIUM 5000 UNITS: 5000 INJECTION, SOLUTION INTRAVENOUS; SUBCUTANEOUS at 06:15

## 2017-09-01 RX ADMIN — SUCRALFATE 1 G: 1 SUSPENSION ORAL at 20:59

## 2017-09-01 RX ADMIN — HYDROMORPHONE HYDROCHLORIDE 0.5 MG: 1 INJECTION, SOLUTION INTRAMUSCULAR; INTRAVENOUS; SUBCUTANEOUS at 04:38

## 2017-09-01 RX ADMIN — FAMOTIDINE 20 MG: 20 TABLET ORAL at 20:58

## 2017-09-01 RX ADMIN — SODIUM CHLORIDE: 9 INJECTION, SOLUTION INTRAVENOUS at 20:57

## 2017-09-01 RX ADMIN — INSULIN LISPRO 3 UNITS: 100 INJECTION, SOLUTION INTRAVENOUS; SUBCUTANEOUS at 06:16

## 2017-09-01 RX ADMIN — INSULIN LISPRO 7 UNITS: 100 INJECTION, SOLUTION INTRAVENOUS; SUBCUTANEOUS at 18:37

## 2017-09-01 RX ADMIN — ALPRAZOLAM 0.5 MG: 0.5 TABLET ORAL at 00:23

## 2017-09-01 RX ADMIN — HEPARIN SODIUM 5000 UNITS: 5000 INJECTION, SOLUTION INTRAVENOUS; SUBCUTANEOUS at 15:22

## 2017-09-01 RX ADMIN — PRAVASTATIN SODIUM 20 MG: 20 TABLET ORAL at 08:30

## 2017-09-01 RX ADMIN — OXYCODONE HYDROCHLORIDE AND ACETAMINOPHEN 1 TABLET: 10; 325 TABLET ORAL at 00:24

## 2017-09-01 RX ADMIN — SUCRALFATE 1 G: 1 SUSPENSION ORAL at 18:35

## 2017-09-01 ASSESSMENT — ENCOUNTER SYMPTOMS
LOSS OF CONSCIOUSNESS: 0
CHILLS: 0
CONSTIPATION: 0
WHEEZING: 0
HEMOPTYSIS: 0
PALPITATIONS: 0
INSOMNIA: 0
FEVER: 0
TREMORS: 0
MYALGIAS: 0
EYE PAIN: 0
COUGH: 0
DIZZINESS: 0
WEAKNESS: 0
DIARRHEA: 0
NAUSEA: 1
SEIZURES: 0
ABDOMINAL PAIN: 1
HEADACHES: 0
NERVOUS/ANXIOUS: 0
FALLS: 0
BLOOD IN STOOL: 0
EYE REDNESS: 0
SHORTNESS OF BREATH: 0
FOCAL WEAKNESS: 0
VOMITING: 1

## 2017-09-01 ASSESSMENT — PAIN SCALES - GENERAL
PAINLEVEL_OUTOF10: 7
PAINLEVEL_OUTOF10: 8
PAINLEVEL_OUTOF10: 9
PAINLEVEL_OUTOF10: 7
PAINLEVEL_OUTOF10: 8
PAINLEVEL_OUTOF10: 6
PAINLEVEL_OUTOF10: 7

## 2017-09-01 ASSESSMENT — LIFESTYLE VARIABLES: DO YOU DRINK ALCOHOL: NO

## 2017-09-01 NOTE — CARE PLAN
Problem: Communication  Goal: The ability to communicate needs accurately and effectively will improve  Discussed Plan of Care, daily medications with patient, reviewed diet, and activity.  Patient verbalized understanding    Problem: Safety  Goal: Will remain free from injury  Bed locked and in lowest position. Bed alarm on. Treaded socks. Call light and belongings with reach. Patient educated to call for assistance. Pt verbalized understanding. Hourly rounding in place.

## 2017-09-01 NOTE — CARE PLAN
Problem: Nutritional:  Goal: Achieve adequate nutritional intake  Patient will consume 50% of meals    Intervention: Advance diet as tolerated  Not met

## 2017-09-01 NOTE — PROGRESS NOTES
Bedside report received. Patient A&O X 3, tele- SR,  RA,  Voids- independent, Activity- self, Diet- NPO for Stress test. Complains of pain 9/10 medicated per MAR. POC discussed with patient. Pt verbalized understanding. Call light and belongings with in reach.  Bed locked and in lowest position, alarm and fall precautions in place.

## 2017-09-01 NOTE — ASSESSMENT & PLAN NOTE
Epigastric pain versus chest pain  Poorly controlled diabetes and high risk factor for coronary artery disease  Initiate aspirin  Chemical stress test done, came back normal, no ischemia.

## 2017-09-01 NOTE — H&P
Hospital Medicine History and Physical    Date of Service  8/31/2017    Chief Complaint  Chief Complaint   Patient presents with   • Abdominal Pain     mid abdomen x 2 days   • N/V       History of Presenting Illness  57 y.o. female who presented 8/31/2017 with onset of abdominal pain with nausea vomiting this afternoon. She states it was not provoked with certain meal. She is a diabetic who states that she hasn't checked her sugars for the last 3 days because she ran out of strips. She has multiple ulcers on bilateral lower extremity in various states of healing. She has a high risk factor of coronary artery disease is a sister had a heart attack in her 40s. Patient denies any diarrhea. She states pain is midline lower chest upper abdomen. Patient states the abdominal pain did not improve with emesis.  Patient states she has been talked to about gastroparesis but has never had the actual diagnosis made. She does not know her last hemoglobin A1c.    Primary Care Physician  Tre Jason M.D.    Consultants  None    Code Status  Full    Review of Systems  Review of Systems   Constitutional: Negative for chills and fever.   HENT: Negative for congestion.    Eyes: Negative for blurred vision and photophobia.   Respiratory: Negative for cough and shortness of breath.    Cardiovascular: Positive for chest pain. Negative for claudication and leg swelling.   Gastrointestinal: Positive for abdominal pain, nausea and vomiting. Negative for constipation, diarrhea and heartburn.   Genitourinary: Negative for dysuria and hematuria.   Musculoskeletal: Negative for joint pain and myalgias.   Skin: Negative for itching and rash.   Neurological: Negative for dizziness, sensory change, speech change, weakness and headaches.   Psychiatric/Behavioral: Negative for depression. The patient is not nervous/anxious and does not have insomnia.         Past Medical History  Past Medical History:   Diagnosis Date   • Intestinal mass 2015    • High cholesterol 5/15/2011   • Diabetes    • Hypertension    • Migraine    • Staph skin infection        Surgical History  Past Surgical History:   Procedure Laterality Date   • OTHER ORTHOPEDIC SURGERY      right wrist surgery   • ABDOMINAL EXPLORATION      Lower intestine d/t mass - benign   • GYN SURGERY       x 3,tubal ligation   • OTHER ABDOMINAL SURGERY      cholecystectomy       Medications  No current facility-administered medications on file prior to encounter.      Current Outpatient Prescriptions on File Prior to Encounter   Medication Sig Dispense Refill   • oxycodone-acetaminophen (PERCOCET-10)  MG Tab Take 1 Tab by mouth every 6 hours as needed for Severe Pain.     • liraglutide (VICTOZA) 18 MG/3ML Solution Pen-injector injection Inject 0.6 mg as instructed 1 time daily as needed (per pt).     • Insulin Degludec (TRESIBA FLEXTOUCH) 100 UNIT/ML Solution Pen-injector Inject 40 Units as instructed 1 time daily as needed (per pt.).     • gabapentin (NEURONTIN) 800 MG tablet Take 800 mg by mouth 3 times a day.     • pravastatin (PRAVACHOL) 20 MG TABS Take 20 mg by mouth every morning.     • lisinopril (PRINIVIL) 5 MG TABS Take 5 mg by mouth every morning.         Family History  Family History   Problem Relation Age of Onset   • Cancer Maternal Aunt      Lung cancer d/t smoking       Social History  Social History   Substance Use Topics   • Smoking status: Former Smoker     Packs/day: 1.00     Years: 20.00     Types: Cigarettes     Quit date: 1996   • Smokeless tobacco: Former User     Quit date: 1995   • Alcohol use No       Allergies  No Known Allergies     Physical Exam  Laboratory   Hemodynamics  Temp (24hrs), Av.1 °C (98.7 °F), Min:37.1 °C (98.7 °F), Max:37.1 °C (98.7 °F)   Temperature: 37.1 °C (98.7 °F)  Pulse  Av.2  Min: 92  Max: 105 Heart Rate (Monitored): 90  Blood Pressure: 107/61, NIBP: 121/63      Respiratory      Respiration: 16, Pulse Oximetry: 98 %              Physical Exam   Constitutional: She is oriented to person, place, and time. She appears well-developed and well-nourished.   HENT:   Head: Normocephalic and atraumatic.   Eyes: Conjunctivae are normal. No scleral icterus.   Neck: Neck supple. No JVD present.   Cardiovascular: Normal rate, regular rhythm and normal heart sounds.  Exam reveals no gallop and no friction rub.    No murmur heard.  Pulmonary/Chest: Effort normal and breath sounds normal. No respiratory distress. She exhibits no tenderness.   Abdominal: Soft. Bowel sounds are normal. She exhibits no distension and no mass. There is tenderness (epigastric pain with palpation). There is no rebound and no guarding.   Musculoskeletal: She exhibits no edema or tenderness.   Neurological: She is alert and oriented to person, place, and time. No cranial nerve deficit.   Skin: Skin is warm and dry. No rash noted. She is not diaphoretic.   Multiple lesions on bilateral lower extremities consistent with poorly healing diabetic ulcers. No evidence of acute cellulitis at this time   Psychiatric: She has a normal mood and affect. Her behavior is normal.   Nursing note and vitals reviewed.      Recent Labs      08/31/17   1430   WBC  7.7   RBC  4.89   HEMOGLOBIN  11.3*   HEMATOCRIT  36.8*   MCV  75.3*   MCH  23.1*   MCHC  30.7*   RDW  43.4   PLATELETCT  680*   MPV  8.5*     Recent Labs      08/31/17   1430   SODIUM  134*   POTASSIUM  3.8   CHLORIDE  102   CO2  21   GLUCOSE  290*   BUN  22   CREATININE  0.82   CALCIUM  10.1     Recent Labs      08/31/17   1430   ALTSGPT  14   ASTSGOT  9*   ALKPHOSPHAT  162*   TBILIRUBIN  0.3   LIPASE  15   GLUCOSE  290*                 Lab Results   Component Value Date    TROPONINI <0.01 08/31/2017     Urinalysis:    Lab Results  Component Value Date/Time   SPECGRAVITY 1.035 08/31/2017 1512   GLUCOSEUR >=1000 (A) 08/31/2017 1512   KETONES Negative 08/31/2017 1512   NITRITE Negative 08/31/2017 1512   WBCURINE 2-5 08/31/2017  1512   RBCURINE 5-10 (A) 08/31/2017 1512   BACTERIA Negative 08/31/2017 1512   EPITHELCELL Moderate (A) 08/31/2017 1512        Imaging  Abdominal x-ray: Previous cholecystectomy, no evidence bowel obstruction or free air, mild sigmoid scoliosis lumbar spine     EKG: Sinus rhythm with probable left atrial abnormality, borderline inferior Q waves    Assessment/Plan     I anticipate this patient is appropriate for observation status at this time.    Chest pain   Assessment & Plan    Epigastric pain versus chest pain  Poorly controlled diabetes and high risk factor for coronary artery disease  Initiate aspirin  Chemical stress test in a.m.        Ulcers of both lower legs (CMS-[facility])   Assessment & Plan    Wound care consult        Poorly controlled diabetes mellitus (CMS-[facility])   Assessment & Plan    Diabetic diet  Scale insulin  Check A1c          Epigastric pain   Assessment & Plan    Gastritis versus gastroparesis  Antiemetics as necessary  Continue IV hydration  Pain management          Hyperlipidemia- (present on admission)   Assessment & Plan    Resume pravastatin        HTN (hypertension)- (present on admission)   Assessment & Plan    Resume lisinopril            VTE prophylaxis:SCDs .

## 2017-09-01 NOTE — ED NOTES
Med Rec completed per patient at bedside.  Allergies reviewed   No antibiotics within the last 30 days.    Patient stated he has not taken her meds for 3 days.

## 2017-09-01 NOTE — ED NOTES
Received report from OJ Garcia.  Pt updated on POC, call light in place, faviola in low/locked position.

## 2017-09-01 NOTE — DIETARY
Nutrition Services - Poor PO    56 YO F admitted r/t abdominal pain with nausea vomiting. PMH significant for intestinal mass, DM, HTN, high cholesterol. PSH significant for cholecystectomy.     Medication and labs reviewed. Glucose 214, GFR 48 and HbA1c 9.4. Pt with generalized edema to BL LE. Consult to wound team pending. Last BM 8/30.     Weight is 74.8kg and BMI is 27.44 kg/m2, which is overweight. Pt reports losing 36lbs (18%) x 6 months r/t not wanting to eat because it hurts her stomach. Per MD. R/o gastritis vs gastroparesis (will need small frequent meals if gastroparesis)    Pt is NPO at this time     Plan/Recommendation    If it is not medically feasible to advance to PO feedings within 48-72 hours, consider nutrition support to meet needs.      Will await wound staging to make recommendations if appropriate. RD will monitor per dept policy.    RD following

## 2017-09-01 NOTE — PROGRESS NOTES
Nursing care plan includes knowledge deficit, potential for discomfort, potential for compromised cardiac output. POC includes teaching, comfort measures and reassurance, and access to code cart, cardiology stand by and availability of rapid response team. Pt verbalizes good understanding of benefits and risks of pharmacological cardiac stress test. Informed consent obtained. Lexiscan given, pt developed the following symptoms SOB, chest heaviness. Pt BP dropped during procedure to 54/39, then back up to 82/52. Patient baseline was 95/58. Will continue to monitor.  Patient VS stable, major symptoms resolved. To waiting room, caffeinated fluids and/or snacks given, awaiting second scan. Nursing goals met. 1435 BP is 91/44.

## 2017-09-01 NOTE — CARE PLAN
Problem: Pain Management  Goal: Pain level will decrease to patient's comfort goal  Outcome: NOT MET  Medicated per MAR for pain.    Problem: Safety  Goal: Will remain free from injury  Outcome: PROGRESSING AS EXPECTED  Bed locked and in lowest position. Bed alarm on. Treaded socks. Call light and belongings with reach. Patient educated to call for assistance. Pt verbalized understanding. Hourly rounding in place.      Problem: Knowledge Deficit  Goal: Knowledge of disease process/condition, treatment plan, diagnostic tests, and medications will improve  Outcome: PROGRESSING AS EXPECTED  Updated pt on plan of care. Educated on orders (diet, activity). Will cont to update pt as needed.

## 2017-09-01 NOTE — PROGRESS NOTES
2 RN skin check completed. Diabetic skin wounds noted to both legs. Multiple scabs at various stages of healing noted. Scabs to bilateral shoulders. Scabs to lower back noted. Scratch to middle chest noted. No pressure ulcers seen. Pictures of scabs uploaded into chart and documented.

## 2017-09-01 NOTE — ASSESSMENT & PLAN NOTE
Was followed by Dr. Gomes in the past, had endoscopy 2 years ago  Diagnosed with gastroparesis; she however still complained of pain and was seeking second opinion; was to se Dr. Castañeda.  History of cholecystectomy  Consulted and discussed with Dr. Carter, GI  EGD/colonoscopy on 9/3 showed insufficiency of esophageal sphincter and required dilation. Gastric emptying study today.  Planned for esophageal study 9/5.

## 2017-09-01 NOTE — ED NOTES
Pt medicated per MAR.  Report called to RN. Pt to floor with hospital transport.  Pt has tele box.  Patient has chart and all belongings.  Floor notified. Vitals up to date.

## 2017-09-02 PROBLEM — E11.65 POORLY CONTROLLED DIABETES MELLITUS (HCC): Status: ACTIVE | Noted: 2017-09-02

## 2017-09-02 LAB
ANION GAP SERPL CALC-SCNC: 4 MMOL/L (ref 0–11.9)
BUN SERPL-MCNC: 40 MG/DL (ref 8–22)
CALCIUM SERPL-MCNC: 8.7 MG/DL (ref 8.5–10.5)
CHLORIDE SERPL-SCNC: 107 MMOL/L (ref 96–112)
CO2 SERPL-SCNC: 22 MMOL/L (ref 20–33)
CREAT SERPL-MCNC: 1.47 MG/DL (ref 0.5–1.4)
ERYTHROCYTE [DISTWIDTH] IN BLOOD BY AUTOMATED COUNT: 46.6 FL (ref 35.9–50)
GFR SERPL CREATININE-BSD FRML MDRD: 37 ML/MIN/1.73 M 2
GLUCOSE BLD-MCNC: 181 MG/DL (ref 65–99)
GLUCOSE BLD-MCNC: 184 MG/DL (ref 65–99)
GLUCOSE BLD-MCNC: 194 MG/DL (ref 65–99)
GLUCOSE BLD-MCNC: 246 MG/DL (ref 65–99)
GLUCOSE SERPL-MCNC: 198 MG/DL (ref 65–99)
HCT VFR BLD AUTO: 27.2 % (ref 37–47)
HGB BLD-MCNC: 8 G/DL (ref 12–16)
MCH RBC QN AUTO: 23.5 PG (ref 27–33)
MCHC RBC AUTO-ENTMCNC: 29.4 G/DL (ref 33.6–35)
MCV RBC AUTO: 79.8 FL (ref 81.4–97.8)
PLATELET # BLD AUTO: 435 K/UL (ref 164–446)
PMV BLD AUTO: 9.1 FL (ref 9–12.9)
POTASSIUM SERPL-SCNC: 4.2 MMOL/L (ref 3.6–5.5)
RBC # BLD AUTO: 3.41 M/UL (ref 4.2–5.4)
SODIUM SERPL-SCNC: 133 MMOL/L (ref 135–145)
WBC # BLD AUTO: 7.9 K/UL (ref 4.8–10.8)

## 2017-09-02 PROCEDURE — A9270 NON-COVERED ITEM OR SERVICE: HCPCS | Performed by: INTERNAL MEDICINE

## 2017-09-02 PROCEDURE — A9270 NON-COVERED ITEM OR SERVICE: HCPCS | Performed by: NURSE PRACTITIONER

## 2017-09-02 PROCEDURE — 770020 HCHG ROOM/CARE - TELE (206)

## 2017-09-02 PROCEDURE — 700102 HCHG RX REV CODE 250 W/ 637 OVERRIDE(OP): Performed by: INTERNAL MEDICINE

## 2017-09-02 PROCEDURE — 700105 HCHG RX REV CODE 258: Performed by: INTERNAL MEDICINE

## 2017-09-02 PROCEDURE — 700102 HCHG RX REV CODE 250 W/ 637 OVERRIDE(OP): Performed by: NURSE PRACTITIONER

## 2017-09-02 PROCEDURE — 80048 BASIC METABOLIC PNL TOTAL CA: CPT

## 2017-09-02 PROCEDURE — 700111 HCHG RX REV CODE 636 W/ 250 OVERRIDE (IP): Performed by: INTERNAL MEDICINE

## 2017-09-02 PROCEDURE — A6212 FOAM DRG <=16 SQ IN W/BORDER: HCPCS | Performed by: INTERNAL MEDICINE

## 2017-09-02 PROCEDURE — 85027 COMPLETE CBC AUTOMATED: CPT

## 2017-09-02 PROCEDURE — 82962 GLUCOSE BLOOD TEST: CPT | Mod: 91

## 2017-09-02 PROCEDURE — 302101 FENESTRATED FOAM: Performed by: INTERNAL MEDICINE

## 2017-09-02 PROCEDURE — 36415 COLL VENOUS BLD VENIPUNCTURE: CPT

## 2017-09-02 PROCEDURE — 99226 PR SUBSEQUENT OBSERVATION CARE,LEVEL III: CPT | Performed by: INTERNAL MEDICINE

## 2017-09-02 RX ORDER — LISINOPRIL 5 MG/1
5 TABLET ORAL EVERY MORNING
Status: DISCONTINUED | OUTPATIENT
Start: 2017-09-03 | End: 2017-09-05 | Stop reason: HOSPADM

## 2017-09-02 RX ORDER — PEG-3350, SODIUM SULFATE, SODIUM CHLORIDE, POTASSIUM CHLORIDE, SODIUM ASCORBATE AND ASCORBIC ACID 7.5-2.691G
100 KIT ORAL 2 TIMES DAILY
Status: COMPLETED | OUTPATIENT
Start: 2017-09-02 | End: 2017-09-02

## 2017-09-02 RX ADMIN — HEPARIN SODIUM 5000 UNITS: 5000 INJECTION, SOLUTION INTRAVENOUS; SUBCUTANEOUS at 15:30

## 2017-09-02 RX ADMIN — MORPHINE SULFATE 60 MG: 60 TABLET, EXTENDED RELEASE ORAL at 20:55

## 2017-09-02 RX ADMIN — GABAPENTIN 800 MG: 400 CAPSULE ORAL at 15:29

## 2017-09-02 RX ADMIN — SUCRALFATE 1 G: 1 SUSPENSION ORAL at 20:56

## 2017-09-02 RX ADMIN — POLYETHYLENE GLYCOL 3350, SODIUM SULFATE, SODIUM CHLORIDE, POTASSIUM CHLORIDE, ASCORBIC ACID, SODIUM ASCORBATE 100 G: KIT at 22:00

## 2017-09-02 RX ADMIN — SUCRALFATE 1 G: 1 SUSPENSION ORAL at 10:10

## 2017-09-02 RX ADMIN — OXYCODONE HYDROCHLORIDE AND ACETAMINOPHEN 1 TABLET: 10; 325 TABLET ORAL at 21:58

## 2017-09-02 RX ADMIN — INSULIN LISPRO 3 UNITS: 100 INJECTION, SOLUTION INTRAVENOUS; SUBCUTANEOUS at 06:13

## 2017-09-02 RX ADMIN — MORPHINE SULFATE 60 MG: 60 TABLET, EXTENDED RELEASE ORAL at 10:10

## 2017-09-02 RX ADMIN — FAMOTIDINE 20 MG: 20 TABLET ORAL at 20:55

## 2017-09-02 RX ADMIN — SUCRALFATE 1 G: 1 SUSPENSION ORAL at 17:16

## 2017-09-02 RX ADMIN — GABAPENTIN 800 MG: 400 CAPSULE ORAL at 20:55

## 2017-09-02 RX ADMIN — FAMOTIDINE 20 MG: 20 TABLET ORAL at 10:10

## 2017-09-02 RX ADMIN — OXYCODONE HYDROCHLORIDE AND ACETAMINOPHEN 1 TABLET: 10; 325 TABLET ORAL at 06:22

## 2017-09-02 RX ADMIN — GABAPENTIN 800 MG: 400 CAPSULE ORAL at 10:09

## 2017-09-02 RX ADMIN — POLYETHYLENE GLYCOL 3350, SODIUM SULFATE, SODIUM CHLORIDE, POTASSIUM CHLORIDE, ASCORBIC ACID, SODIUM ASCORBATE 100 G: KIT at 17:16

## 2017-09-02 RX ADMIN — INSULIN LISPRO 3 UNITS: 100 INJECTION, SOLUTION INTRAVENOUS; SUBCUTANEOUS at 17:15

## 2017-09-02 RX ADMIN — ALPRAZOLAM 0.5 MG: 0.5 TABLET ORAL at 21:58

## 2017-09-02 RX ADMIN — HEPARIN SODIUM 5000 UNITS: 5000 INJECTION, SOLUTION INTRAVENOUS; SUBCUTANEOUS at 06:12

## 2017-09-02 RX ADMIN — SODIUM CHLORIDE: 9 INJECTION, SOLUTION INTRAVENOUS at 21:59

## 2017-09-02 RX ADMIN — INSULIN LISPRO 4 UNITS: 100 INJECTION, SOLUTION INTRAVENOUS; SUBCUTANEOUS at 21:01

## 2017-09-02 RX ADMIN — INSULIN LISPRO 3 UNITS: 100 INJECTION, SOLUTION INTRAVENOUS; SUBCUTANEOUS at 11:53

## 2017-09-02 RX ADMIN — HEPARIN SODIUM 5000 UNITS: 5000 INJECTION, SOLUTION INTRAVENOUS; SUBCUTANEOUS at 20:56

## 2017-09-02 RX ADMIN — PRAVASTATIN SODIUM 20 MG: 20 TABLET ORAL at 10:09

## 2017-09-02 RX ADMIN — OXYCODONE HYDROCHLORIDE AND ACETAMINOPHEN 1 TABLET: 10; 325 TABLET ORAL at 15:32

## 2017-09-02 RX ADMIN — ASPIRIN 325 MG: 325 TABLET, COATED ORAL at 10:09

## 2017-09-02 RX ADMIN — SUCRALFATE 1 G: 1 SUSPENSION ORAL at 06:12

## 2017-09-02 ASSESSMENT — PAIN SCALES - GENERAL
PAINLEVEL_OUTOF10: 7
PAINLEVEL_OUTOF10: 7
PAINLEVEL_OUTOF10: 8
PAINLEVEL_OUTOF10: 7
PAINLEVEL_OUTOF10: 9
PAINLEVEL_OUTOF10: 7
PAINLEVEL_OUTOF10: 6

## 2017-09-02 NOTE — CARE PLAN
Problem: Pain Management  Goal: Pain level will decrease to patient's comfort goal  Outcome: NOT MET  Medicated per MAR for pain. Pt has hx chronic pain. States pain is at a 7 at its best. States she frequently at 8-9. Takes morphine PO, and Percocet PO. Pt states she has appointment with pain clinic after she is discharged. Will cont to monitor pain and pain control.

## 2017-09-02 NOTE — PROGRESS NOTES
"GI Consults  Re: epigastric pain    Impression:  1.  Epigastric pain made worse with POs and relieved with pain medication.  Takes ASA but no additional NSAIDs.  No PUD.  No GERD.  2.  Esophageal dysphagia, intermittent  3.  Odynophagia  4.  N/V.  None presently  5.  S/p segmental colectomy for \"mass.\"  Patient does not know if she had cancer or not.    6.  Intermittent fecal urgency and incontinence  7.  Microcytic anemia  8. Thrombocytosis  9.  Pruritus  10.  Elevated alk phos  11.  Type II diabetes mellitus  12.  Multiple skin lesions pre-tibial, ?necrobiosis  13.  Chronic back pain with narcotic dependence  14.  Peripheral neuropathy  15.  OJ.  Normal GFR early August    Recs:  1.  Check GGT and AMA--ordered  2.  EGD and colonoscopy tomorrow with anesthesia  3.  If EGD negative, then gastric emptying scan but knows she must hold all narcotics x 12 hours prior  4.  Check iron indices if not already done--ordered  5.  Continue Pepcid and Carafate  "

## 2017-09-02 NOTE — CARE PLAN
Problem: Knowledge Deficit  Goal: Knowledge of disease process/condition, treatment plan, diagnostic tests, and medications will improve  Outcome: PROGRESSING AS EXPECTED  Updated pt on plan of care. Educated on orders (diet, activity). Will cont to update pt as needed.

## 2017-09-02 NOTE — WOUND TEAM
"[facility]  Inpatient Services  Initial Wound and Skin Care Evaluation    Admission Date:  08/31/17     HPI, PMH, SH: Reviewed  Unit where seen by Wound Team:  T7    WOUND CONSULT RELATED TO:  BLE        SUBJECTIVE:  \"I was told it was from my uncontrolled sugars.\"      Self Report / Pain Level:  Tolerated well      OBJECTIVE:  In bed, wounds open to air    WOUND TYPE, LOCATION, CHARACTERISTICS (Pressure ulcers: location, stage, POA or date identified)    Location and type of wound:  BLE, bilateral back/shoulder, midline chest, scatter partial thickness wounds with excoriation         Periwound:  Excoriation, intact       Drainage:   None    Tissue Type and %:  100% red    Wound Edges:  Attached    Odor:    None    Exposed structure(s): None   S&S of Infection:  None       Measurements:  NM    INTERVENTIONS BY WOUND TEAM:  All scabbed areas cleansed with NS and gauze.  Covered with hydrogel gauze, covered with cut piece of telfa and secured with roll gauze.  Midline chest covered with hydrogel, covered with telfa and secured with adhesive foam.  Transport waiting to take patient to stress test, RN agreed to dress back/should wounds with hydrogel and telfa (if patient wants) and/or adhesive foam for protection.      Interdisciplinary consultation:  RN, patient      EVALUATION:  Patient advised she is unsure how she got these wounds, she has had several episodes of healing and recurring scattered wounds mostly to BLE.  She admits to scratching and uncontrolled diabetes.  Hydrogel to provide moisture and roll gauze to keep BLE covered and keep patient from scratching.    Factors affecting wound healing:  Scratching, DM   Goals:  Decrease in wound size by 1% each week    NURSING PLAN OF CARE ORDERS (X):    Dressing changes: See Dressing Maintenance orders: X  Skin care: See Skin Care orders:   Rectal tube care: See Rectal Tube Care orders:   Other orders:    RSKIN: CURRENT (X) ORDERED (O)  Q shift " Jd:  X  Q shift pressure point assessments:  X  Pressure redistribution mattress      X  CORINNE      Bariatric CORINNE      Bariatric foam        Heel float boots       Heels floated on pillows    X  Barrier wipes      Barrier Cream      Barrier paste      Sacral silicone dressing    PRN  Silicone O2 tubing      Anchorfast      Trach with Optifoam split foam       Waffle cushion      Rectal tube or BMS      Antifungal tx    Turn q 2 hours   Ensure patient is turning   Up to chair     Ambulate   PT/OT     Dietician      PO   X  TF   TPN     PVN    NPO   # days   Other       WOUND TEAM PLAN OF CARE (X):   NPWT change 3 x week:        Dressing changes by wound team:       Follow up as needed:     X  Other (explain):    Anticipated discharge plans (X):  SNF:           Home Care:           Outpatient Wound Center:   Patient requesting OP wound care.         Self Care:            Other:

## 2017-09-02 NOTE — PROGRESS NOTES
Bedside report received. Patient A&O X 3, tele- SR,  RA,  Voids- up to the bathroom, Activity- serl, Diet- Liq/DM. Complains of pain 7/10 medicated per MAR. POC discussed with patient. Pt verbalized understanding. Call light and belongings with in reach.  Bed locked and in lowest position, alarm and fall precautions in place.

## 2017-09-02 NOTE — H&P
Hospital Medicine History and Physical    Date of Service  9/2/2017    Chief Complaint  Chief Complaint   Patient presents with   • Abdominal Pain     mid abdomen x 2 days   • N/V       History of Presenting Illness  57 y.o. female who presented 8/31/2017 with Abdominal Pain (mid abdomen x 2 days) and N/V  Please see Dr. Padilla's admission note for H/P admission to observation.  Her stress test came back negative. She was still having epigastric pain related to food and hence won;t eat. She admitted to a background history of gastroparesis, chronic narcotic use and was followed by Dr. Gomes. She is seeking a second opinion in [place] for chronic epigastric pain. We called Dr. Carter.    Primary Care Physician  Tre Jason M.D.    Consultants  Gastroenterology    Code Status  Full    Review of Systems  ROS   Physical Exam   Constitutional: She appears well-developed and well-nourished.   HENT:   Head: Normocephalic and atraumatic.   Eyes: Conjunctivae and EOM are normal. No scleral icterus.   Neck: Normal range of motion. Neck supple.   Cardiovascular: Normal rate and regular rhythm.  Exam reveals no gallop and no friction rub.    No murmur heard.  Pulmonary/Chest: Effort normal and breath sounds normal. No respiratory distress. She has no wheezes. She has no rales.   Abdominal: Soft. Bowel sounds are normal. She exhibits no distension. There is tenderness (mild epigastric tenderness). There is no rebound and no guarding.   Musculoskeletal: She exhibits no edema or tenderness.   Neurological: She is alert.   Skin: Skin is warm.   Psychiatric: She has a normal mood and affect. Her behavior is normal.      Past Medical History  Past Medical History:   Diagnosis Date   • Intestinal mass 2015   • High cholesterol 5/15/2011   • Diabetes    • Hypertension    • Migraine    • Staph skin infection        Surgical History  Past Surgical History:   Procedure Laterality Date   • OTHER ORTHOPEDIC SURGERY  6-2014    right  wrist surgery   • ABDOMINAL EXPLORATION      Lower intestine d/t mass - benign   • GYN SURGERY       x 3,tubal ligation   • OTHER ABDOMINAL SURGERY      cholecystectomy       Medications  No current facility-administered medications on file prior to encounter.      Current Outpatient Prescriptions on File Prior to Encounter   Medication Sig Dispense Refill   • oxycodone-acetaminophen (PERCOCET-10)  MG Tab Take 1 Tab by mouth every 6 hours as needed for Severe Pain.     • liraglutide (VICTOZA) 18 MG/3ML Solution Pen-injector injection Inject 0.6 mg as instructed 1 time daily as needed (per pt).     • Insulin Degludec (TRESIBA FLEXTOUCH) 100 UNIT/ML Solution Pen-injector Inject 40 Units as instructed 1 time daily as needed (per pt.).     • gabapentin (NEURONTIN) 800 MG tablet Take 800 mg by mouth 3 times a day.     • pravastatin (PRAVACHOL) 20 MG TABS Take 20 mg by mouth every morning.     • lisinopril (PRINIVIL) 5 MG TABS Take 5 mg by mouth every morning.         Family History  Family History   Problem Relation Age of Onset   • Cancer Maternal Aunt      Lung cancer d/t smoking       Social History  Social History   Substance Use Topics   • Smoking status: Former Smoker     Packs/day: 1.00     Years: 20.00     Types: Cigarettes     Quit date: 1996   • Smokeless tobacco: Former User     Quit date: 1995   • Alcohol use No       Allergies  No Known Allergies     Physical Exam  Laboratory   Hemodynamics  Temp (24hrs), Av.7 °C (98.1 °F), Min:36.5 °C (97.7 °F), Max:37.2 °C (98.9 °F)   Temperature: 36.8 °C (98.2 °F)  Pulse  Av.8  Min: 75  Max: 105    Blood Pressure: 110/53      Respiratory      Respiration: 16, Pulse Oximetry: 98 %             Physical Exam   Physical Exam   Constitutional: She appears well-developed and well-nourished.   HENT:   Head: Normocephalic and atraumatic.   Eyes: Conjunctivae and EOM are normal. No scleral icterus.   Neck: Normal range of motion. Neck supple.    Cardiovascular: Normal rate and regular rhythm.  Exam reveals no gallop and no friction rub.    No murmur heard.  Pulmonary/Chest: Effort normal and breath sounds normal. No respiratory distress. She has no wheezes. She has no rales.   Abdominal: Soft. Bowel sounds are normal. She exhibits no distension. There is tenderness (mild epigastric tenderness). There is no rebound and no guarding.   Musculoskeletal: She exhibits no edema or tenderness.   Neurological: She is alert.   Skin: Skin is warm.   Psychiatric: She has a normal mood and affect. Her behavior is normal.     Recent Labs      08/31/17   1430  08/31/17   2342  09/02/17   0301   WBC  7.7  8.7  7.9   RBC  4.89  4.35  3.41*   HEMOGLOBIN  11.3*  9.9*  8.0*   HEMATOCRIT  36.8*  33.1*  27.2*   MCV  75.3*  76.1*  79.8*   MCH  23.1*  22.8*  23.5*   MCHC  30.7*  29.9*  29.4*   RDW  43.4  44.1  46.6   PLATELETCT  680*  558*  435   MPV  8.5*  8.5*  9.1     Recent Labs      08/31/17   1430  08/31/17   2342  09/02/17   0301   SODIUM  134*  136  133*   POTASSIUM  3.8  3.7  4.2   CHLORIDE  102  102  107   CO2  21  22  22   GLUCOSE  290*  214*  198*   BUN  22  29*  40*   CREATININE  0.82  1.16  1.47*   CALCIUM  10.1  9.6  8.7     Recent Labs      08/31/17   1430  08/31/17   2342  09/02/17   0301   ALTSGPT  14   --    --    ASTSGOT  9*   --    --    ALKPHOSPHAT  162*   --    --    TBILIRUBIN  0.3   --    --    LIPASE  15   --    --    GLUCOSE  290*  214*  198*                 Lab Results   Component Value Date    TROPONINI <0.01 09/01/2017     Urinalysis:    Lab Results  Component Value Date/Time   SPECGRAVITY 1.035 08/31/2017 1512   SPECGRAVITY 1.035 08/31/2017 1512   GLUCOSEUR >=1000 (A) 08/31/2017 1512   KETONES Negative 08/31/2017 1512   NITRITE Negative 08/31/2017 1512   WBCURINE 2-5 08/31/2017 1512   RBCURINE 5-10 (A) 08/31/2017 1512   BACTERIA Negative 08/31/2017 1512   EPITHELCELL Moderate (A) 08/31/2017 1512        Imaging  Gb-yrrbhlo-8 Views    Result Date:  2017 3:52 PM HISTORY/REASON FOR EXAM:  Vomiting Mid abdominal pain TECHNIQUE/EXAM DESCRIPTION AND NUMBER OF VIEWS:  3 views of the abdomen. COMPARISON: None FINDINGS: Previous cholecystectomy Flat and upright views of the abdomen show no free air, abnormal bowel dilatation or unusual calcification.  Stool volume is within normal limits. There is a mild sigmoid scoliosis of the lumbar spine there is left lateral bridging osteophytosis at the L1-2 level     1.  Previous cholecystectomy. 2.  No evidence of bowel obstruction and/or free air. 3.  Mild sigmoid scoliosis of lumbar spine.    Nm-cardiac Stress Test    Addendum Date: 2017    Baseline ECG:Normal ECG Stress ECG: No ischemic T wave changes with peak stress Impression: Normal stress ECG, nuclear images pending    Result Date: 2017   Myocardial Perfusion  Report  NUCLEAR IMAGING INTERPRETATION  No evidence of significant jeopardized viable myocardium or prior myocardial  infarction.  Normal left ventricular size, ejection fraction, and wall motion.  ALIDA HUTCHINSON  MRN:    [id number]         Gender:    F  Exam Date: 2017 06:50  Exam Location:      Inpatient  Ordering Phys:     SCOTT TURNER  NucMed Tech:       Beulah Trujillo  Age:    57    :    1960        Ht (in):     65  Wt (lb):     165    BMI:    27.47       Radiologist  Risk Factors:             Family history of coronary disease, Diabetes,                            Hypertension  Indications:              Other chest pain  ICD Codes:                  Cardiac History:          Positive risk factors, Chest Pain  Cardiac Meds:  Meds Past 24 hrs:  Pretest Chest Pain:       No symptoms  STRESS TEST      Pharmacologi                   c  Quintin   Lexiscan       Dose: 0.4 mg  ol:  Post-Injection Exercise:        No exercise followed the intravenous injection  Resting HR (bpm):      78  Peak HR (bpm):         92  Resting BP (mmHg):       95     /   58  Peak BP (mmHg):       95     /   58  MaxPHR:     165     Target HR (bpm):       140  % MaxPHR:     56  Double Product:       8740  BP Response:  Stress Termination:       Completed Protocol  Stress Symptoms:  Chest Pain: None, SOB,  ECG  Resting ECG:  Stress ECG:  IMAGE PROTOCOL      Rest/Stress 1                      Day          RadiopharmaceuticalDose (mCi)   Imaging  Date      Imaging  Time         Inj to Img Time (min)  Rest:   Tc-99m             7.4          01-Sep-2017        13:30                 25          Tetrofosmin  Stress: Tc-99m             24.4         01-Sep-2017        14:25                 25          Tetrofosmin  Rest:  Administration Site:       Right antecubital                             fossa  Administered by:      Beulah Trujillo  Stress:  Administration Site:       Right antecubital                             fossa  Administered by:      ENA Roberson  % Percent HR Achieved:  SPECT RESULTS  Technical Quality:       Good  Raw Data Analysis:  Summed Stress Score:    0  Summed Rest Score:    0  Summed Difference Score:        1  PERFUSION:  Normal myocardial perfusion with no ischemia.  FUNCTIONAL RESULTS (calculated via Gated SPECT)  Stress Image LV EF:        66     %  Upper Normal Limit  Stress EDV:      88     ml   EDVI:    48      ml/m²  Stress ESV:      30     ml   ESVI:    16      ml/m²  TID:    1.17   TID - 1.19      TID (ed) - 1.23  LV Function:  Normal left ventricular wall motion.  LV ejection fraction = 66%.  Jacoby Arias  (Electronically Signed)  Final Date:      01 September 2017                   15:50     Assessment/Plan     I anticipate this patient will require at least two midnights for appropriate medical management, necessitating inpatient admission.    * Epigastric pain   Assessment & Plan    Was followed by Dr. Gomes in the past, had endoscopy 2 years ago  Diagnosed with gastroparesis; she however still complained of pain and was seeking second opinion; was to se Dr. Castañeda.  History of  cholecystectomy  Consulted and discussed with Dr. Carter, GI          Chest pain   Assessment & Plan    Epigastric pain versus chest pain  Poorly controlled diabetes and high risk factor for coronary artery disease  Initiate aspirin  Chemical stress test done, came back normal, no ischemia.        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

## 2017-09-02 NOTE — PROGRESS NOTES
[facility] Hospitalist Progress Note    Date of Service: 2017    Chief Complaint  57 y.o. female admitted 2017 with Abdominal Pain (mid abdomen x 2 days) and N/V        Interval Problem Update  Mild epigastric pain however it gets severe with food, so she is refusing PO. Stress test today.    Consultants/Specialty  Dr. Yepez, GI    Disposition  Home        Review of Systems   Constitutional: Negative for chills and fever.   HENT: Negative for congestion, hearing loss and nosebleeds.    Eyes: Negative for pain and redness.   Respiratory: Negative for cough, hemoptysis, shortness of breath and wheezing.    Cardiovascular: Positive for chest pain. Negative for palpitations.   Gastrointestinal: Positive for abdominal pain, nausea and vomiting. Negative for blood in stool, constipation and diarrhea.   Genitourinary: Negative for dysuria, frequency and hematuria.   Musculoskeletal: Negative for falls, joint pain and myalgias.   Skin: Negative for rash.   Neurological: Negative for dizziness, tremors, focal weakness, seizures, loss of consciousness, weakness and headaches.   Psychiatric/Behavioral: The patient is not nervous/anxious and does not have insomnia.    All other systems reviewed and are negative.     Physical Exam  Laboratory/Imaging   Hemodynamics  Temp (24hrs), Av.3 °C (97.4 °F), Min:36.1 °C (97 °F), Max:36.5 °C (97.7 °F)   Temperature: 36.1 °C (97 °F)  Pulse  Av.4  Min: 75  Max: 105 Heart Rate (Monitored): 80  Blood Pressure: 112/59, NIBP: 114/63      Respiratory      Respiration: 16, Pulse Oximetry: 99 %             Fluids    Intake/Output Summary (Last 24 hours) at 17 1701  Last data filed at 17 1200   Gross per 24 hour   Intake             2240 ml   Output                0 ml   Net             2240 ml       Nutrition  Orders Placed This Encounter   Procedures   • DIET ORDER     Standing Status:   Standing     Number of Occurrences:   1     Order Specific Question:   Diet:      Answer:   Diabetic [3]     Physical Exam   Constitutional: She appears well-developed and well-nourished.   HENT:   Head: Normocephalic and atraumatic.   Eyes: Conjunctivae and EOM are normal. No scleral icterus.   Neck: Normal range of motion. Neck supple.   Cardiovascular: Normal rate and regular rhythm.  Exam reveals no gallop and no friction rub.    No murmur heard.  Pulmonary/Chest: Effort normal and breath sounds normal. No respiratory distress. She has no wheezes. She has no rales.   Abdominal: Soft. Bowel sounds are normal. She exhibits no distension. There is tenderness (mild epigastric tenderness). There is no rebound and no guarding.   Musculoskeletal: She exhibits no edema or tenderness.   Neurological: She is alert.   Skin: Skin is warm.   Psychiatric: She has a normal mood and affect. Her behavior is normal.       Recent Labs      08/31/17   1430  08/31/17   2342   WBC  7.7  8.7   RBC  4.89  4.35   HEMOGLOBIN  11.3*  9.9*   HEMATOCRIT  36.8*  33.1*   MCV  75.3*  76.1*   MCH  23.1*  22.8*   MCHC  30.7*  29.9*   RDW  43.4  44.1   PLATELETCT  680*  558*   MPV  8.5*  8.5*     Recent Labs      08/31/17   1430  08/31/17   2342   SODIUM  134*  136   POTASSIUM  3.8  3.7   CHLORIDE  102  102   CO2  21  22   GLUCOSE  290*  214*   BUN  22  29*   CREATININE  0.82  1.16   CALCIUM  10.1  9.6                      Assessment/Plan     * Epigastric pain   Assessment & Plan    Was followed by Dr. Gomes in the past, had endoscopy 2 years ago  Diagnosed with gastroparesis; she however still complained of pain and was seeking second opinion; was to se Dr. Castañeda.  History of cholecystectomy  Consulted and discussed with Dr. Carter, GI          Chest pain   Assessment & Plan    Epigastric pain versus chest pain  Poorly controlled diabetes and high risk factor for coronary artery disease  Initiate aspirin  Chemical stress test done, came back normal, no ischemia.        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

## 2017-09-02 NOTE — CONSULTS
"DATE OF SERVICE:  2017    REFERRING PHYSICIAN:  Dr. Chacko.    REASON FOR THE CONSULT:  Epigastric pain.    HISTORY OF PRESENT ILLNESS:  The patient is a pleasant 57-year-old female who   presented to the ER on  with 2-day history of mid abdominal pain,   nausea and vomiting.  The pain in the upper abdomen is made worse by eating or   drinking.  It is relieved with her morphine or Percocet pain medication that   she takes for chronic back pain.  She denies symptoms of GERD.  She does not   take any antacids or acid blockers at home.  She has never been diagnosed with   peptic ulcer disease.  She does take aspirin 81 mg daily for more than a   year.  She has never had thrush.  She does have some odynophagia and has some   intermittent dysphagia.    She had a colon resection in  for a \"mass.\"  She truly does not know if   she had cancer or not.  She did not have chemotherapy or radiation.  Dr. Gomes   performed surgery in .  She has not had a colonoscopy since that time.    She does experience fecal urgency and incontinence about twice a month.  She   will have diarrhea, but takes Pepto-Bismol and that seems to help control the   diarrhea.    ALLERGIES:  None.    MEDICATIONS PRIOR TO ADMISSION:  Oxycodone/acetaminophen 10/325 one tablet   every 6 hours as needed, Victoza 0.6 mg 1 time daily as needed, Tresiba   FlexTouch 40 units 1 time daily as needed, gabapentin 800 mg 3 times daily,   pravastatin 20 mg every day, lisinopril 5 mg every day, aspirin 81 mg daily.    SURGERIES:  Cholecystectomy,  section x3, right wrist surgery, left   ankle surgery, bilateral tubal ligation, and colon resection.    MEDICAL ILLNESSES:  1.  Type 2 diabetes mellitus, insulin requiring, with neuropathy.  2.  Hyperlipidemia.  3.  Hypertension.  4.  Colonic mass,? type.  5.  Chronic back pain.    SOCIAL HISTORY:  She stopped smoking in , a 20-pack-year history.  She   does not drink alcohol.    FAMILY " "HISTORY:  Sister had coronary artery disease at a young age.  Father   had coronary artery disease.  No family history of colorectal cancer.    REVIEW OF SYSTEMS:  No fevers or chills.  She did have nausea, vomiting.  No   epistaxis.  No chest pain or shortness of breath.  GASTROINTESTINAL:  As above.  GENITOURINARY:  No hematuria.  MUSCULOSKELETAL:  No new onset joint pain.  SKIN:  Multiple lesions in arms, chest, but predominantly lower extremities.  ENDOCRINE:  Positive for diabetes.  No thyroid disease.  NEUROLOGIC:  Positive for neuropathy.  No syncope, seizure, or stroke.    PHYSICAL EXAMINATION:  VITAL SIGNS:  Temperature 36.7, pulse 91, respirations 18, blood pressure   128/74.  GENERAL:  This is a very pleasant 57-year-old female, in no acute distress.  HEENT:  Her pupils are round.  Sclerae are anicteric.  No oral lesions.  NECK:  Soft.  No adenopathy.  LUNGS:  Clear to auscultation.  CARDIOVASCULAR:  S1 and S2 with a II/VI systolic murmur.  ABDOMEN:  Bowel sounds present, soft, mild tenderness in the epigastrium.  No   palpable masses.  No hepatosplenomegaly.  EXTREMITIES:  No peripheral edema.  Lower extremity dressings were not removed   at this time.  NEUROLOGIC:  She is awake, alert, moving all extremities.    LABORATORY DATA:  On admission, WBC 7.7, hemoglobin 11.3, platelets 680, MCV   75.  CMP notable for alkaline phosphatase 162 September 2nd, hemoglobin 8,   hematocrit 27.2, MCV 79.8, BUN 40, creatinine 1.47.  Hemoglobin A1c 9.4.  CT   abdomen and pelvis with contrast July 7th, no abnormality.    IMPRESSION:  1.  Epigastric pain.  2.  Intermittent esophageal dysphagia.  3.  Mild odynophagia.  4.  Nausea and vomiting, improved.  5.  Status post segmental colectomy for \"mass.\"  Incomplete database.  6.  Intermittent fecal urgency and incontinence.  7.  Microcytic anemia.  8.  Thrombocytosis.  9.  Pruritus.  10.  Elevated alkaline phosphatase.  11.  Type 2 diabetes mellitus.  12.  Multiple skin " lesions, pretibial,? necrobiosis.  13.  Chronic back pain with narcotic dependence.  14.  Peripheral neuropathy.  15.  Acute kidney injury with normal GFR early August.    RECOMMENDATIONS:  1.  I will arrange for upper endoscopy and colonoscopy tomorrow with   anesthesia.  I have gone over the colonoscopy preparation, procedure, risks   and benefits with her.  2.  If the upper endoscopy is negative, can consider gastric emptying scan,   but patient know she must hold narcotics for 12 hours preprocedure.  3.  I will check her iron indices and ferritin in the morning in the fasting   state.  4.  I also check GGT and antimitochondrial antibody for elevated alkaline   phosphatase and pruritus.  5.  Continue Pepcid and Carafate.    Thank you for involving us in the care of your patient.       ____________________________________     MD ERIC YODER / RODRIGO    DD:  09/02/2017 08:31:35  DT:  09/02/2017 08:58:32    D#:  [id number]  Job#:  [id number]

## 2017-09-03 LAB
FERRITIN SERPL-MCNC: 11.5 NG/ML (ref 10–291)
GGT SERPL-CCNC: 31 U/L (ref 7–34)
GLUCOSE BLD-MCNC: 149 MG/DL (ref 65–99)
GLUCOSE BLD-MCNC: 165 MG/DL (ref 65–99)
GLUCOSE BLD-MCNC: 292 MG/DL (ref 65–99)
GLUCOSE BLD-MCNC: 348 MG/DL (ref 65–99)
IRON SATN MFR SERPL: 9 % (ref 15–55)
IRON SERPL-MCNC: 35 UG/DL (ref 40–170)
TIBC SERPL-MCNC: 398 UG/DL (ref 250–450)

## 2017-09-03 PROCEDURE — 770006 HCHG ROOM/CARE - MED/SURG/GYN SEMI*

## 2017-09-03 PROCEDURE — 700102 HCHG RX REV CODE 250 W/ 637 OVERRIDE(OP): Performed by: NURSE PRACTITIONER

## 2017-09-03 PROCEDURE — 160048 HCHG OR STATISTICAL LEVEL 1-5: Performed by: INTERNAL MEDICINE

## 2017-09-03 PROCEDURE — 82962 GLUCOSE BLOOD TEST: CPT

## 2017-09-03 PROCEDURE — 502240 HCHG MISC OR SUPPLY RC 0272: Performed by: INTERNAL MEDICINE

## 2017-09-03 PROCEDURE — 83516 IMMUNOASSAY NONANTIBODY: CPT

## 2017-09-03 PROCEDURE — 700111 HCHG RX REV CODE 636 W/ 250 OVERRIDE (IP): Performed by: INTERNAL MEDICINE

## 2017-09-03 PROCEDURE — 700102 HCHG RX REV CODE 250 W/ 637 OVERRIDE(OP): Performed by: INTERNAL MEDICINE

## 2017-09-03 PROCEDURE — 160209 HCHG ENDO MINUTES - EA ADDL 1 MIN LEVEL 5: Performed by: INTERNAL MEDICINE

## 2017-09-03 PROCEDURE — 82728 ASSAY OF FERRITIN: CPT

## 2017-09-03 PROCEDURE — 160009 HCHG ANES TIME/MIN: Performed by: INTERNAL MEDICINE

## 2017-09-03 PROCEDURE — 700105 HCHG RX REV CODE 258: Performed by: INTERNAL MEDICINE

## 2017-09-03 PROCEDURE — 82977 ASSAY OF GGT: CPT

## 2017-09-03 PROCEDURE — 700111 HCHG RX REV CODE 636 W/ 250 OVERRIDE (IP)

## 2017-09-03 PROCEDURE — 0D738ZZ DILATION OF LOWER ESOPHAGUS, VIA NATURAL OR ARTIFICIAL OPENING ENDOSCOPIC: ICD-10-PCS | Performed by: INTERNAL MEDICINE

## 2017-09-03 PROCEDURE — 500066 HCHG BITE BLOCK, ECT: Performed by: INTERNAL MEDICINE

## 2017-09-03 PROCEDURE — 86255 FLUORESCENT ANTIBODY SCREEN: CPT

## 2017-09-03 PROCEDURE — 99226 PR SUBSEQUENT OBSERVATION CARE,LEVEL III: CPT | Performed by: INTERNAL MEDICINE

## 2017-09-03 PROCEDURE — 36415 COLL VENOUS BLD VENIPUNCTURE: CPT

## 2017-09-03 PROCEDURE — 83540 ASSAY OF IRON: CPT

## 2017-09-03 PROCEDURE — A9270 NON-COVERED ITEM OR SERVICE: HCPCS | Performed by: INTERNAL MEDICINE

## 2017-09-03 PROCEDURE — 160035 HCHG PACU - 1ST 60 MINS PHASE I: Performed by: INTERNAL MEDICINE

## 2017-09-03 PROCEDURE — C1726 CATH, BAL DIL, NON-VASCULAR: HCPCS | Performed by: INTERNAL MEDICINE

## 2017-09-03 PROCEDURE — A9270 NON-COVERED ITEM OR SERVICE: HCPCS | Performed by: NURSE PRACTITIONER

## 2017-09-03 PROCEDURE — 82784 ASSAY IGA/IGD/IGG/IGM EACH: CPT

## 2017-09-03 PROCEDURE — 700101 HCHG RX REV CODE 250

## 2017-09-03 PROCEDURE — 700101 HCHG RX REV CODE 250: Performed by: INTERNAL MEDICINE

## 2017-09-03 PROCEDURE — 0DJD8ZZ INSPECTION OF LOWER INTESTINAL TRACT, VIA NATURAL OR ARTIFICIAL OPENING ENDOSCOPIC: ICD-10-PCS | Performed by: INTERNAL MEDICINE

## 2017-09-03 PROCEDURE — 160002 HCHG RECOVERY MINUTES (STAT): Performed by: INTERNAL MEDICINE

## 2017-09-03 PROCEDURE — 83550 IRON BINDING TEST: CPT

## 2017-09-03 PROCEDURE — 160204 HCHG ENDO MINUTES - 1ST 30 MINS LEVEL 5: Performed by: INTERNAL MEDICINE

## 2017-09-03 RX ORDER — DIPHENHYDRAMINE HCL 25 MG
25 TABLET ORAL NIGHTLY PRN
Status: DISCONTINUED | OUTPATIENT
Start: 2017-09-03 | End: 2017-09-05 | Stop reason: HOSPADM

## 2017-09-03 RX ADMIN — MORPHINE SULFATE 60 MG: 60 TABLET, EXTENDED RELEASE ORAL at 20:33

## 2017-09-03 RX ADMIN — INSULIN LISPRO 3 UNITS: 100 INJECTION, SOLUTION INTRAVENOUS; SUBCUTANEOUS at 11:52

## 2017-09-03 RX ADMIN — HEPARIN SODIUM 5000 UNITS: 5000 INJECTION, SOLUTION INTRAVENOUS; SUBCUTANEOUS at 16:48

## 2017-09-03 RX ADMIN — GABAPENTIN 800 MG: 400 CAPSULE ORAL at 16:49

## 2017-09-03 RX ADMIN — SUCRALFATE 1 G: 1 SUSPENSION ORAL at 06:38

## 2017-09-03 RX ADMIN — SUCRALFATE 1 G: 1 SUSPENSION ORAL at 16:51

## 2017-09-03 RX ADMIN — GABAPENTIN 800 MG: 400 CAPSULE ORAL at 08:44

## 2017-09-03 RX ADMIN — FAMOTIDINE 20 MG: 20 TABLET ORAL at 08:43

## 2017-09-03 RX ADMIN — INSULIN LISPRO 10 UNITS: 100 INJECTION, SOLUTION INTRAVENOUS; SUBCUTANEOUS at 20:53

## 2017-09-03 RX ADMIN — DIPHENHYDRAMINE HCL 25 MG: 25 TABLET ORAL at 20:34

## 2017-09-03 RX ADMIN — SODIUM CHLORIDE: 9 INJECTION, SOLUTION INTRAVENOUS at 08:51

## 2017-09-03 RX ADMIN — ACETAMINOPHEN 650 MG: 325 TABLET, FILM COATED ORAL at 11:43

## 2017-09-03 RX ADMIN — INSULIN LISPRO 7 UNITS: 100 INJECTION, SOLUTION INTRAVENOUS; SUBCUTANEOUS at 16:57

## 2017-09-03 RX ADMIN — SUCRALFATE 1 G: 1 SUSPENSION ORAL at 20:33

## 2017-09-03 RX ADMIN — LABETALOL HYDROCHLORIDE 10 MG: 5 INJECTION INTRAVENOUS at 17:07

## 2017-09-03 RX ADMIN — PRAVASTATIN SODIUM 20 MG: 20 TABLET ORAL at 08:44

## 2017-09-03 RX ADMIN — FAMOTIDINE 20 MG: 20 TABLET ORAL at 20:33

## 2017-09-03 RX ADMIN — GABAPENTIN 800 MG: 400 CAPSULE ORAL at 20:33

## 2017-09-03 RX ADMIN — LISINOPRIL 5 MG: 5 TABLET ORAL at 08:45

## 2017-09-03 ASSESSMENT — PAIN SCALES - GENERAL
PAINLEVEL_OUTOF10: 8
PAINLEVEL_OUTOF10: 8
PAINLEVEL_OUTOF10: 1
PAINLEVEL_OUTOF10: 7
PAINLEVEL_OUTOF10: 1
PAINLEVEL_OUTOF10: 1

## 2017-09-03 ASSESSMENT — ENCOUNTER SYMPTOMS
MYALGIAS: 0
FEVER: 0
PALPITATIONS: 0
CONSTIPATION: 0
DIZZINESS: 0
FOCAL WEAKNESS: 0
VOMITING: 1
WHEEZING: 0
DIARRHEA: 0
TREMORS: 0
NAUSEA: 1
EYE REDNESS: 0
EYE PAIN: 0
COUGH: 0
WEAKNESS: 0
ABDOMINAL PAIN: 1
CHILLS: 0
SEIZURES: 0
BLOOD IN STOOL: 0
LOSS OF CONSCIOUSNESS: 0
NERVOUS/ANXIOUS: 0
FALLS: 0
HEADACHES: 0
HEMOPTYSIS: 0
SHORTNESS OF BREATH: 0
INSOMNIA: 0

## 2017-09-03 NOTE — OR SURGEON
Operative Report    PreOp Diagnosis: epigastric pain, occ odynophagia, occ esoph dysphagia, possible gastroparesis; sigmoid resection for mass 2015    PostOp Diagnosis: prolonged relaxation of LES, s/p balloon dilation 15 mm, 16mm balloon; side to side sigmoid resection at 15 cm otherwise normal colon    Procedure(s):  GASTROSCOPY WITH DILATION - Wound Class: Clean Contaminated  COLONOSCOPY - Wound Class: Clean Contaminated    Surgeon(s):  Kerri Carter M.D.    Anesthesiologist/Type of Anesthesia:  Anesthesiologist: Marcy Mitchell M.D./General    Surgical Staff:  Circulator: Sol Zafar R.N.  Endoscopy Technician: Bartolo Celis    Specimens:  * No specimens in log *    Estimated Blood Loss: none    Findings:   Esoph:  Delayed relaxation of LES, lumen diameter with relaxation approx 11-12 mm, dilated with 15mm and 16mm balloon and no mucosal disruption  Stomach: normal  Duod: normal    Colonoscope to cecum.  Prep fair.  Appendiceal orifice noted.  Normal exam to anastamosis at 15 cm    Complications: none     Recs:  Check gastric emptying exam after holding narcotics (probably Tuesday)  ? Etiology for iron deficiency--can be worked up further as outpatient  Will check celiac antibody as duodenal biopsies not taken .    Elev alk phos with normal GGT        9/3/2017 2:23 PM Kerri Carter

## 2017-09-03 NOTE — PROCEDURES
DATE OF SERVICE:  09/03/2017    PROCEDURE #1:  Flexible esophagogastroduodenoscopy with esophageal balloon   dilation.    PREPROCEDURE DIAGNOSES:  Epigastric pain, occasional odynophagia, occasional   esophageal dysphagia.    POSTPROCEDURE DIAGNOSES:  1.  Prolonged relaxation of the lower esophageal sphincter.  2.  Status post balloon dilation 15 mm and 16 mm.    CONSENT:  Risks, benefits, and alternatives were explained to the patient.    The patient gave consent to proceed.    ANESTHESIA:  General anesthesia.    ANESTHESIOLOGIST:  Marcy Mitchell MD    DESCRIPTION OF PROCEDURE:  Patient was then in the left lateral decubitus   position and monitoring was in place.  The Olympus adult flexible endoscope   was inserted into the esophagus under direct visualization.  It was advanced   slowly to the distal esophagus.  The LES remained static between 35 and 40 cm.    When I did open, there was no distinct stricture, no esophagitis, no mucosal   abnormalities.  The endoscope was advanced into the stomach where air was   insufflated.  There was no retained food in the stomach.  There were no   mucosal abnormalities in the body or the antrum.  Retroflexion was performed   and no abnormalities in the proximal stomach.  Retroflexion was taken down and   the endoscope was advanced through the pylorus into the first and second   portions of the duodenum.  I did not appreciate any mucosal abnormalities.    The endoscope was pulled back into the stomach.  The TTS balloon was advanced   through the endoscope and the balloon was then positioned between 35 and 40 cm   at 15 mm for 1 minute.  The balloon was taken down.  There was no mucosal   disruption.  However, there was a drop in the atmospheric pressure with the   dilation, so I was reluctant to  increase to 16.5, so I increased to 16 mm x1   minute.  The balloon was taken down and again no mucosal disruption.  I   suspect this is more of an esophageal dysmotility than an  anatomic   abnormality.  Air was removed and the endoscope was removed.  The patient   tolerated the procedure well with no complications.    PROCEDURE #2:  Colonoscopy.    PRE-PROCEDURE DIAGNOSIS:  Patient gives a history of a sigmoid mass that was   resected 2 years ago.  The patient is unsure if it was a benign mass of   malignancy and she has had no followup procedure.    POSTPROCEDURE DIAGNOSIS:  Side-to-side colonic anastomosis at 15 cm, otherwise   normal colonoscopy.    CONSENT:  Risks, benefits, and alternatives were explained to patient.    Patient gave consent to proceed.  ANESTHESIA:  General anesthesia.    ANESTHESIOLOGIST:  Marcy Mitchell MD    DESCRIPTION OF PROCEDURE:  Patient was in the left lateral decubitus position   and monitoring was in place.  The Olympus adjustable pediatric colonoscope was   inserted into the rectum under direct visualization.  At 15 cm, there was a   side-to-side anastomosis.  The colonoscope was advanced to the cecum without   difficulty.  The preparation of the colon was fair.  The appendiceal orifice   was identified.  The colonoscope was slowly withdrawn through the ascending   colon, hepatic flexure across the transverse colon.  No mucosal abnormalities   appreciated.  Colonoscope was brought through the splenic flexure down through   the descending colon, sigmoid colon and into the rectum.  Retroflexion was   performed and no abnormalities were noted.  Retroflexion was taken down.  Air   was removed and the colonoscope was removed.  The patient tolerated the   procedure well with no complications.       ____________________________________     MD ERIC YODER / NTS    DD:  09/03/2017 14:34:18  DT:  09/03/2017 15:58:16    D#:  [id number]  Job#:  [id number]

## 2017-09-03 NOTE — PROGRESS NOTES
Report received, assumed care, assessment done. Pt aware of plan of care. Will monitor. C/o abdominal pain. Pain meds held per MD order. Pt awaiting colonoscopy/egd.

## 2017-09-03 NOTE — PROGRESS NOTES
[facility]    Date of Service: 9/3/2017    Chief Complaint  57 y.o. female admitted 2017 with epigastric pain    Interval Problem Update  Anticipating EGD/colonoscopy. She still has some epigastric pain.    Consultants/Specialty  Dr. Rea, GI    Disposition  Home        Review of Systems   Constitutional: Negative for chills and fever.   HENT: Negative for congestion, hearing loss and nosebleeds.    Eyes: Negative for pain and redness.   Respiratory: Negative for cough, hemoptysis, shortness of breath and wheezing.    Cardiovascular: Positive for chest pain. Negative for palpitations.   Gastrointestinal: Positive for abdominal pain, nausea and vomiting. Negative for blood in stool, constipation and diarrhea.   Genitourinary: Negative for dysuria, frequency and hematuria.   Musculoskeletal: Negative for falls, joint pain and myalgias.   Skin: Negative for rash.   Neurological: Negative for dizziness, tremors, focal weakness, seizures, loss of consciousness, weakness and headaches.   Psychiatric/Behavioral: The patient is not nervous/anxious and does not have insomnia.    All other systems reviewed and are negative.     Physical Exam  Laboratory/Imaging   Hemodynamics  Temp (24hrs), Av.9 °C (98.4 °F), Min:36.3 °C (97.3 °F), Max:38.3 °C (100.9 °F)   Temperature: 36.3 °C (97.3 °F)  Pulse  Av.9  Min: 75  Max: 105 Heart Rate (Monitored): 89  Blood Pressure: (!) 161/71, NIBP: 144/101      Respiratory      Respiration: 13, Pulse Oximetry: 96 %             Fluids    Intake/Output Summary (Last 24 hours) at 17 1633  Last data filed at 17 0400   Gross per 24 hour   Intake             3340 ml   Output               20 ml   Net             3320 ml       Nutrition  Orders Placed This Encounter   Procedures   • DIET ORDER     Standing Status:   Standing     Number of Occurrences:   1     Order Specific Question:   Diet:     Answer:   Full Liquid [11]     Physical Exam    Constitutional: She appears well-developed and well-nourished.   HENT:   Head: Normocephalic and atraumatic.   Eyes: Conjunctivae and EOM are normal. No scleral icterus.   Neck: Normal range of motion. Neck supple.   Cardiovascular: Normal rate and regular rhythm.  Exam reveals no gallop and no friction rub.    No murmur heard.  Pulmonary/Chest: Effort normal and breath sounds normal. No respiratory distress. She has no wheezes. She has no rales.   Abdominal: Soft. Bowel sounds are normal. She exhibits no distension. There is tenderness (mild epigastric). There is no rebound and no guarding.   Musculoskeletal: She exhibits no edema or tenderness.   Neurological: She is alert.   Skin: Skin is warm.   Psychiatric: She has a normal mood and affect. Her behavior is normal.       Recent Labs      08/31/17 2342  09/02/17   0301   WBC  8.7  7.9   RBC  4.35  3.41*   HEMOGLOBIN  9.9*  8.0*   HEMATOCRIT  33.1*  27.2*   MCV  76.1*  79.8*   MCH  22.8*  23.5*   MCHC  29.9*  29.4*   RDW  44.1  46.6   PLATELETCT  558*  435   MPV  8.5*  9.1     Recent Labs      08/31/17   2342  09/02/17   0301   SODIUM  136  133*   POTASSIUM  3.7  4.2   CHLORIDE  102  107   CO2  22  22   GLUCOSE  214*  198*   BUN  29*  40*   CREATININE  1.16  1.47*   CALCIUM  9.6  8.7                      Assessment/Plan     * Epigastric pain   Assessment & Plan    Was followed by Dr. Gomes in the past, had endoscopy 2 years ago  Diagnosed with gastroparesis; she however still complained of pain and was seeking second opinion; was to se Dr. Castañeda.  History of cholecystectomy  Consulted and discussed with Dr. Carter, GI  EGD/colonoscopy? Planned 9/3          Chest pain   Assessment & Plan    Epigastric pain versus chest pain  Poorly controlled diabetes and high risk factor for coronary artery disease  Initiate aspirin  Chemical stress test done, came back normal, no ischemia.        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

## 2017-09-04 ENCOUNTER — RESOLUTE PROFESSIONAL BILLING HOSPITAL PROF FEE (OUTPATIENT)
Dept: HOSPITALIST | Facility: MEDICAL CENTER | Age: 57
End: 2017-09-04
Payer: MEDICAID

## 2017-09-04 ENCOUNTER — APPOINTMENT (OUTPATIENT)
Dept: RADIOLOGY | Facility: MEDICAL CENTER | Age: 57
DRG: 074 | End: 2017-09-04
Attending: INTERNAL MEDICINE
Payer: MEDICAID

## 2017-09-04 PROBLEM — D64.9 ANEMIA: Status: ACTIVE | Noted: 2017-09-04

## 2017-09-04 LAB
ANION GAP SERPL CALC-SCNC: 11 MMOL/L (ref 0–11.9)
BUN SERPL-MCNC: 7 MG/DL (ref 8–22)
CALCIUM SERPL-MCNC: 9 MG/DL (ref 8.5–10.5)
CHLORIDE SERPL-SCNC: 106 MMOL/L (ref 96–112)
CO2 SERPL-SCNC: 20 MMOL/L (ref 20–33)
CREAT SERPL-MCNC: 0.62 MG/DL (ref 0.5–1.4)
FERRITIN SERPL-MCNC: 13.6 NG/ML (ref 10–291)
FOLATE SERPL-MCNC: 6.8 NG/ML
GFR SERPL CREATININE-BSD FRML MDRD: >60 ML/MIN/1.73 M 2
GLUCOSE BLD-MCNC: 147 MG/DL (ref 65–99)
GLUCOSE BLD-MCNC: 161 MG/DL (ref 65–99)
GLUCOSE BLD-MCNC: 314 MG/DL (ref 65–99)
GLUCOSE BLD-MCNC: 319 MG/DL (ref 65–99)
GLUCOSE SERPL-MCNC: 315 MG/DL (ref 65–99)
IRON SATN MFR SERPL: 5 % (ref 15–55)
IRON SERPL-MCNC: 24 UG/DL (ref 40–170)
MITOCHONDRIA M2 IGG SER-ACNC: 2.3 UNITS (ref 0–20)
POTASSIUM SERPL-SCNC: 3.7 MMOL/L (ref 3.6–5.5)
SODIUM SERPL-SCNC: 137 MMOL/L (ref 135–145)
TIBC SERPL-MCNC: 469 UG/DL (ref 250–450)
VIT B12 SERPL-MCNC: 272 PG/ML (ref 211–911)

## 2017-09-04 PROCEDURE — 99223 1ST HOSP IP/OBS HIGH 75: CPT | Performed by: INTERNAL MEDICINE

## 2017-09-04 PROCEDURE — 83550 IRON BINDING TEST: CPT

## 2017-09-04 PROCEDURE — 700102 HCHG RX REV CODE 250 W/ 637 OVERRIDE(OP): Performed by: INTERNAL MEDICINE

## 2017-09-04 PROCEDURE — 82746 ASSAY OF FOLIC ACID SERUM: CPT

## 2017-09-04 PROCEDURE — 83540 ASSAY OF IRON: CPT

## 2017-09-04 PROCEDURE — 700111 HCHG RX REV CODE 636 W/ 250 OVERRIDE (IP): Performed by: INTERNAL MEDICINE

## 2017-09-04 PROCEDURE — A9270 NON-COVERED ITEM OR SERVICE: HCPCS | Performed by: INTERNAL MEDICINE

## 2017-09-04 PROCEDURE — 36415 COLL VENOUS BLD VENIPUNCTURE: CPT

## 2017-09-04 PROCEDURE — 700105 HCHG RX REV CODE 258: Performed by: INTERNAL MEDICINE

## 2017-09-04 PROCEDURE — 90471 IMMUNIZATION ADMIN: CPT

## 2017-09-04 PROCEDURE — 82607 VITAMIN B-12: CPT

## 2017-09-04 PROCEDURE — 700102 HCHG RX REV CODE 250 W/ 637 OVERRIDE(OP): Performed by: NURSE PRACTITIONER

## 2017-09-04 PROCEDURE — 82962 GLUCOSE BLOOD TEST: CPT | Mod: 91

## 2017-09-04 PROCEDURE — 770006 HCHG ROOM/CARE - MED/SURG/GYN SEMI*

## 2017-09-04 PROCEDURE — 3E0234Z INTRODUCTION OF SERUM, TOXOID AND VACCINE INTO MUSCLE, PERCUTANEOUS APPROACH: ICD-10-PCS | Performed by: INTERNAL MEDICINE

## 2017-09-04 PROCEDURE — 82728 ASSAY OF FERRITIN: CPT

## 2017-09-04 PROCEDURE — 90686 IIV4 VACC NO PRSV 0.5 ML IM: CPT | Performed by: INTERNAL MEDICINE

## 2017-09-04 PROCEDURE — A9541 TC99M SULFUR COLLOID: HCPCS

## 2017-09-04 PROCEDURE — 80048 BASIC METABOLIC PNL TOTAL CA: CPT

## 2017-09-04 PROCEDURE — A9270 NON-COVERED ITEM OR SERVICE: HCPCS | Performed by: NURSE PRACTITIONER

## 2017-09-04 RX ORDER — NORTRIPTYLINE HYDROCHLORIDE 25 MG/1
25 CAPSULE ORAL EVERY EVENING
Status: DISCONTINUED | OUTPATIENT
Start: 2017-09-04 | End: 2017-09-05 | Stop reason: HOSPADM

## 2017-09-04 RX ADMIN — HEPARIN SODIUM 5000 UNITS: 5000 INJECTION, SOLUTION INTRAVENOUS; SUBCUTANEOUS at 14:26

## 2017-09-04 RX ADMIN — HYDROMORPHONE HYDROCHLORIDE 0.5 MG: 1 INJECTION, SOLUTION INTRAMUSCULAR; INTRAVENOUS; SUBCUTANEOUS at 22:04

## 2017-09-04 RX ADMIN — GABAPENTIN 800 MG: 400 CAPSULE ORAL at 11:30

## 2017-09-04 RX ADMIN — OXYCODONE HYDROCHLORIDE AND ACETAMINOPHEN 1 TABLET: 10; 325 TABLET ORAL at 23:57

## 2017-09-04 RX ADMIN — SODIUM CHLORIDE: 9 INJECTION, SOLUTION INTRAVENOUS at 07:37

## 2017-09-04 RX ADMIN — OXYCODONE HYDROCHLORIDE AND ACETAMINOPHEN 1 TABLET: 10; 325 TABLET ORAL at 11:38

## 2017-09-04 RX ADMIN — HEPARIN SODIUM 5000 UNITS: 5000 INJECTION, SOLUTION INTRAVENOUS; SUBCUTANEOUS at 00:04

## 2017-09-04 RX ADMIN — FAMOTIDINE 20 MG: 20 TABLET ORAL at 11:31

## 2017-09-04 RX ADMIN — PRAVASTATIN SODIUM 20 MG: 20 TABLET ORAL at 11:31

## 2017-09-04 RX ADMIN — MORPHINE SULFATE 60 MG: 60 TABLET, EXTENDED RELEASE ORAL at 20:23

## 2017-09-04 RX ADMIN — SUCRALFATE 1 G: 1 SUSPENSION ORAL at 20:23

## 2017-09-04 RX ADMIN — GABAPENTIN 800 MG: 400 CAPSULE ORAL at 16:54

## 2017-09-04 RX ADMIN — NORTRIPTYLINE HYDROCHLORIDE 25 MG: 25 CAPSULE ORAL at 21:09

## 2017-09-04 RX ADMIN — ONDANSETRON 4 MG: 4 TABLET, ORALLY DISINTEGRATING ORAL at 21:58

## 2017-09-04 RX ADMIN — SODIUM CHLORIDE: 9 INJECTION, SOLUTION INTRAVENOUS at 20:58

## 2017-09-04 RX ADMIN — ALPRAZOLAM 0.5 MG: 0.5 TABLET ORAL at 20:30

## 2017-09-04 RX ADMIN — HEPARIN SODIUM 5000 UNITS: 5000 INJECTION, SOLUTION INTRAVENOUS; SUBCUTANEOUS at 06:28

## 2017-09-04 RX ADMIN — INFLUENZA A VIRUS A/MICHIGAN/45/2015 X-275 (H1N1) ANTIGEN (FORMALDEHYDE INACTIVATED), INFLUENZA A VIRUS A/HONG KONG/4801/2014 X-263B (H3N2) ANTIGEN (FORMALDEHYDE INACTIVATED), INFLUENZA B VIRUS B/PHUKET/3073/2013 ANTIGEN (FORMALDEHYDE INACTIVATED), AND INFLUENZA B VIRUS B/BRISBANE/60/2008 ANTIGEN (FORMALDEHYDE INACTIVATED) 0.5 ML: 15; 15; 15; 15 INJECTION, SUSPENSION INTRAMUSCULAR at 01:33

## 2017-09-04 RX ADMIN — LISINOPRIL 5 MG: 5 TABLET ORAL at 11:31

## 2017-09-04 RX ADMIN — HEPARIN SODIUM 5000 UNITS: 5000 INJECTION, SOLUTION INTRAVENOUS; SUBCUTANEOUS at 21:03

## 2017-09-04 RX ADMIN — SUCRALFATE 1 G: 1 SUSPENSION ORAL at 16:54

## 2017-09-04 RX ADMIN — MORPHINE SULFATE 60 MG: 60 TABLET, EXTENDED RELEASE ORAL at 11:31

## 2017-09-04 RX ADMIN — SUCRALFATE 1 G: 1 SUSPENSION ORAL at 11:30

## 2017-09-04 RX ADMIN — FAMOTIDINE 20 MG: 20 TABLET ORAL at 20:23

## 2017-09-04 RX ADMIN — GABAPENTIN 800 MG: 400 CAPSULE ORAL at 20:23

## 2017-09-04 RX ADMIN — INSULIN LISPRO 10 UNITS: 100 INJECTION, SOLUTION INTRAVENOUS; SUBCUTANEOUS at 17:57

## 2017-09-04 RX ADMIN — INSULIN LISPRO 10 UNITS: 100 INJECTION, SOLUTION INTRAVENOUS; SUBCUTANEOUS at 06:24

## 2017-09-04 RX ADMIN — ASPIRIN 325 MG: 325 TABLET, COATED ORAL at 11:30

## 2017-09-04 RX ADMIN — OXYCODONE HYDROCHLORIDE AND ACETAMINOPHEN 1 TABLET: 10; 325 TABLET ORAL at 17:13

## 2017-09-04 ASSESSMENT — PAIN SCALES - GENERAL
PAINLEVEL_OUTOF10: 9
PAINLEVEL_OUTOF10: 8
PAINLEVEL_OUTOF10: 8
PAINLEVEL_OUTOF10: 10
PAINLEVEL_OUTOF10: 10
PAINLEVEL_OUTOF10: 9
PAINLEVEL_OUTOF10: 8
PAINLEVEL_OUTOF10: 5
PAINLEVEL_OUTOF10: 7
PAINLEVEL_OUTOF10: 7
PAINLEVEL_OUTOF10: 8

## 2017-09-04 ASSESSMENT — PATIENT HEALTH QUESTIONNAIRE - PHQ9
1. LITTLE INTEREST OR PLEASURE IN DOING THINGS: NOT AT ALL
SUM OF ALL RESPONSES TO PHQ QUESTIONS 1-9: 0
2. FEELING DOWN, DEPRESSED, IRRITABLE, OR HOPELESS: NOT AT ALL
SUM OF ALL RESPONSES TO PHQ9 QUESTIONS 1 AND 2: 0

## 2017-09-04 ASSESSMENT — ENCOUNTER SYMPTOMS
SEIZURES: 0
RESPIRATORY NEGATIVE: 1
DIZZINESS: 0
CARDIOVASCULAR NEGATIVE: 1
LOSS OF CONSCIOUSNESS: 0
EYE PAIN: 0
HEADACHES: 0
EYE REDNESS: 0
WHEEZING: 0
HEMOPTYSIS: 0
VOMITING: 1
CHILLS: 0
FEVER: 0
TREMORS: 0
INSOMNIA: 0
SHORTNESS OF BREATH: 0
PALPITATIONS: 0
ABDOMINAL PAIN: 1
FALLS: 0
NERVOUS/ANXIOUS: 0
COUGH: 0
NAUSEA: 1
MYALGIAS: 0
CONSTIPATION: 0
BLOOD IN STOOL: 0
FOCAL WEAKNESS: 0
DIARRHEA: 0
WEAKNESS: 0

## 2017-09-04 ASSESSMENT — LIFESTYLE VARIABLES: DO YOU DRINK ALCOHOL: NO

## 2017-09-04 NOTE — CARE PLAN
Problem: Infection  Goal: Will remain free from infection    Intervention: Assess signs and symptoms of infection  Pt afebrile. Dressing changed to wounds per wound nurse order. No pus noted on wound sites.       Problem: Bowel/Gastric:  Goal: Normal bowel function is maintained or improved  Pt reports watery stool this am. Abdomen rounded, soft. No n/v.

## 2017-09-04 NOTE — PROGRESS NOTES
Gastroenterology Progress Note     Author: Robi LARA Ted   Date & Time Created: 9/4/2017 1:04 PM    Interval History:  Problem: dysphagia, iron defic anemia, recurrent N/V, abd pain.  S: EGD and colonoscopy findings from yesterday reviewed. Nothing to explain her microcytic anemia. Today she says she's still having problems swallowing. Gastric emptying study was normal. She says she gets episodes of NV about every 2-4 weeks that come on late at night or wake her from sleep and are always the same. Between episodes she is fine. Today she's complaining of new lower abdominal pain but exam is benign.     Review of Systems:  Review of Systems   Respiratory: Negative.    Cardiovascular: Negative.    Gastrointestinal: Positive for abdominal pain, nausea and vomiting.       Physical Exam:  Physical Exam   Constitutional: She is oriented to person, place, and time. She appears well-developed and well-nourished.   Cardiovascular: Normal rate and regular rhythm.    Pulmonary/Chest: Effort normal and breath sounds normal.   Abdominal: Soft. Bowel sounds are normal. She exhibits no distension and no mass. There is tenderness. There is no rebound and no guarding.   Minimal lower abdominal pain. Distractable.    Musculoskeletal: Normal range of motion. She exhibits no edema.   Neurological: She is alert and oriented to person, place, and time.   Skin: Skin is warm and dry.   Psychiatric: She has a normal mood and affect.       Labs:        Invalid input(s): [id number]      Recent Labs      09/02/17 0301 09/04/17 0312   SODIUM  133*  137   POTASSIUM  4.2  3.7   CHLORIDE  107  106   CO2  22  20   BUN  40*  7*   CREATININE  1.47*  0.62   CALCIUM  8.7  9.0     Recent Labs      09/02/17 0301 09/03/17 0226 09/04/17 0312   GAMMAGT   --   31   --    GLUCOSE  198*   --   315*     Recent Labs      09/02/17 0301 09/03/17 0226   RBC  3.41*   --    HEMOGLOBIN  8.0*   --    HEMATOCRIT  27.2*   --    PLATELETCT  435    --    IRON   --   35*   FERRITIN   --   11.5   [facility]   --   398     Recent Labs      17   0301   WBC  7.9     Hemodynamics:  Temp (24hrs), Av.5 °C (97.7 °F), Min:36.2 °C (97.1 °F), Max:36.9 °C (98.4 °F)  Temperature: 36.4 °C (97.6 °F)  Pulse  Av.2  Min: 75  Max: 109Heart Rate (Monitored): 89  Blood Pressure: (!) 171/66, NIBP: 144/101     Respiratory:    Respiration: 16, Pulse Oximetry: 98 %           Fluids:    Intake/Output Summary (Last 24 hours) at 17 1304  Last data filed at 17 0400   Gross per 24 hour   Intake             2300 ml   Output             1300 ml   Net             1000 ml        GI/Nutrition:  Orders Placed This Encounter   Procedures   • DIET ORDER     Standing Status:   Standing     Number of Occurrences:   1     Order Specific Question:   Diet:     Answer:   Diabetic [3]     Order Specific Question:   Texture/Fiber modifications:     Answer:   Dysphagia 3(Mechanical Soft)specify fluid consistency(question 6) [3]     Comments:   Advance diet as tolerated     Medical Decision Making, by Problem:  Active Hospital Problems    Diagnosis   • *Epigastric pain [R10.13]   • Chest pain [R07.9]   • HTN (hypertension) [I10]   • Hyperlipidemia [E78.5]   • Poorly controlled diabetes mellitus (CMS-HCC) [E11.65]   • 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 Epigastric pain. Not an issue today but she's complaining of lower abd pain.  2. Cyclical vomiting syndrome.  3. Dysphagia  4. Migraines  5. DM with diabetic neuropathy. GES did not show gastroparesis.    Plan:  1. Start low dose nortriptyline at hs.  2. Cyclical vomiting syndrome discussed with her in detail. She should have some Zofran ODT at home so she can get it on board quickly if she feels a seige coming on.   3. Barium swallow with 13mm tablet in am.       Reviewed items::  Labs reviewed,  Medications reviewed and Radiology images reviewed

## 2017-09-04 NOTE — CARE PLAN
Problem: Nutritional:  Goal: Achieve adequate nutritional intake  Patient will consume 50% of meals   Outcome: MET Date Met: 09/04/17

## 2017-09-04 NOTE — PROGRESS NOTES
"Pt a&o. Anxious. Pt complaints of chronic pain. Scheduled MS contin given tonight as per order and will readdress narcotic requirements in am. Per GI note and patient, gastric emptying study will be performed \"probably Tuesday\". Passing gas. Abdomen rounded, soft. Good po intake without any s/sx of aspiration noted. On ra. Ambulatory. Wound dressing changed to bilat le, anterior chest and right shoulder. Plan of care reviewed with patient. Verbalized understanding and agrees. Able to make needs known.   "

## 2017-09-04 NOTE — CARE PLAN
Problem: Safety  Goal: Will remain free from injury  Outcome: PROGRESSING AS EXPECTED    Goal: Will remain free from falls  Outcome: PROGRESSING AS EXPECTED      Problem: Infection  Goal: Will remain free from infection  Outcome: PROGRESSING AS EXPECTED

## 2017-09-04 NOTE — PROGRESS NOTES
Pt observed. Pt c/o generalized pain at this time  Pt AAOx4. Pt NPO for possible gastric emptying study today. No other signs or symptoms of distress, fall precautions in place, call light within reach, all questions answered, will continue to monitor.

## 2017-09-05 ENCOUNTER — APPOINTMENT (OUTPATIENT)
Dept: RADIOLOGY | Facility: MEDICAL CENTER | Age: 57
DRG: 074 | End: 2017-09-05
Attending: INTERNAL MEDICINE
Payer: MEDICAID

## 2017-09-05 ENCOUNTER — PATIENT OUTREACH (OUTPATIENT)
Dept: HEALTH INFORMATION MANAGEMENT | Facility: OTHER | Age: 57
End: 2017-09-05

## 2017-09-05 VITALS
HEART RATE: 73 BPM | HEIGHT: 65 IN | BODY MASS INDEX: 29.42 KG/M2 | DIASTOLIC BLOOD PRESSURE: 73 MMHG | OXYGEN SATURATION: 96 % | RESPIRATION RATE: 16 BRPM | TEMPERATURE: 97.5 F | SYSTOLIC BLOOD PRESSURE: 159 MMHG | WEIGHT: 176.59 LBS

## 2017-09-05 LAB
ANION GAP SERPL CALC-SCNC: 10 MMOL/L (ref 0–11.9)
BUN SERPL-MCNC: 13 MG/DL (ref 8–22)
CALCIUM SERPL-MCNC: 8.7 MG/DL (ref 8.5–10.5)
CHLORIDE SERPL-SCNC: 104 MMOL/L (ref 96–112)
CO2 SERPL-SCNC: 22 MMOL/L (ref 20–33)
CREAT SERPL-MCNC: 0.79 MG/DL (ref 0.5–1.4)
ERYTHROCYTE [DISTWIDTH] IN BLOOD BY AUTOMATED COUNT: 43.4 FL (ref 35.9–50)
GFR SERPL CREATININE-BSD FRML MDRD: >60 ML/MIN/1.73 M 2
GLUCOSE BLD-MCNC: 190 MG/DL (ref 65–99)
GLUCOSE BLD-MCNC: 288 MG/DL (ref 65–99)
GLUCOSE SERPL-MCNC: 284 MG/DL (ref 65–99)
HCT VFR BLD AUTO: 27.8 % (ref 37–47)
HGB BLD-MCNC: 8.6 G/DL (ref 12–16)
IGA SERPL-MCNC: 399 MG/DL (ref 68–408)
MCH RBC QN AUTO: 23.8 PG (ref 27–33)
MCHC RBC AUTO-ENTMCNC: 30.9 G/DL (ref 33.6–35)
MCV RBC AUTO: 76.8 FL (ref 81.4–97.8)
PLATELET # BLD AUTO: 419 K/UL (ref 164–446)
PMV BLD AUTO: 9.1 FL (ref 9–12.9)
POTASSIUM SERPL-SCNC: 3.7 MMOL/L (ref 3.6–5.5)
RBC # BLD AUTO: 3.62 M/UL (ref 4.2–5.4)
SODIUM SERPL-SCNC: 136 MMOL/L (ref 135–145)
TSH SERPL DL<=0.005 MIU/L-ACNC: 3.82 UIU/ML (ref 0.3–3.7)
TTG IGA SER IA-ACNC: 0 U/ML (ref 0–3)
WBC # BLD AUTO: 8.6 K/UL (ref 4.8–10.8)

## 2017-09-05 PROCEDURE — 86255 FLUORESCENT ANTIBODY SCREEN: CPT

## 2017-09-05 PROCEDURE — 700111 HCHG RX REV CODE 636 W/ 250 OVERRIDE (IP): Performed by: INTERNAL MEDICINE

## 2017-09-05 PROCEDURE — A9270 NON-COVERED ITEM OR SERVICE: HCPCS | Performed by: INTERNAL MEDICINE

## 2017-09-05 PROCEDURE — 36415 COLL VENOUS BLD VENIPUNCTURE: CPT

## 2017-09-05 PROCEDURE — 700101 HCHG RX REV CODE 250: Performed by: INTERNAL MEDICINE

## 2017-09-05 PROCEDURE — 700102 HCHG RX REV CODE 250 W/ 637 OVERRIDE(OP): Performed by: INTERNAL MEDICINE

## 2017-09-05 PROCEDURE — 85027 COMPLETE CBC AUTOMATED: CPT

## 2017-09-05 PROCEDURE — 84443 ASSAY THYROID STIM HORMONE: CPT

## 2017-09-05 PROCEDURE — 74220 X-RAY XM ESOPHAGUS 1CNTRST: CPT

## 2017-09-05 PROCEDURE — 80048 BASIC METABOLIC PNL TOTAL CA: CPT

## 2017-09-05 PROCEDURE — 700117 HCHG RX CONTRAST REV CODE 255: Performed by: INTERNAL MEDICINE

## 2017-09-05 PROCEDURE — 700105 HCHG RX REV CODE 258: Performed by: INTERNAL MEDICINE

## 2017-09-05 PROCEDURE — 99239 HOSP IP/OBS DSCHRG MGMT >30: CPT | Performed by: INTERNAL MEDICINE

## 2017-09-05 PROCEDURE — 82962 GLUCOSE BLOOD TEST: CPT | Mod: 91

## 2017-09-05 RX ORDER — POLYETHYLENE GLYCOL 3350 17 G/17G
1 POWDER, FOR SOLUTION ORAL 2 TIMES DAILY
Status: DISCONTINUED | OUTPATIENT
Start: 2017-09-05 | End: 2017-09-05 | Stop reason: HOSPADM

## 2017-09-05 RX ORDER — ASCORBIC ACID 500 MG
500 TABLET ORAL DAILY
Status: DISCONTINUED | OUTPATIENT
Start: 2017-09-05 | End: 2017-09-05 | Stop reason: HOSPADM

## 2017-09-05 RX ORDER — SUCRALFATE ORAL 1 G/10ML
1 SUSPENSION ORAL
Qty: 1200 ML | Refills: 0 | Status: SHIPPED | OUTPATIENT
Start: 2017-09-05 | End: 2017-10-05

## 2017-09-05 RX ORDER — FAMOTIDINE 20 MG/1
20 TABLET, FILM COATED ORAL 2 TIMES DAILY
Qty: 60 TAB | Refills: 0 | Status: SHIPPED | OUTPATIENT
Start: 2017-09-05 | End: 2017-10-05

## 2017-09-05 RX ORDER — POLYETHYLENE GLYCOL 3350 17 G/17G
17 POWDER, FOR SOLUTION ORAL 2 TIMES DAILY
Qty: 180 EACH | Refills: 0 | Status: SHIPPED | OUTPATIENT
Start: 2017-09-05 | End: 2017-12-04

## 2017-09-05 RX ORDER — NORTRIPTYLINE HYDROCHLORIDE 25 MG/1
25 CAPSULE ORAL EVERY EVENING
Qty: 30 CAP | Refills: 0 | Status: SHIPPED | OUTPATIENT
Start: 2017-09-05 | End: 2018-09-26 | Stop reason: CLARIF

## 2017-09-05 RX ORDER — ASCORBIC ACID 500 MG
500 TABLET ORAL DAILY
Qty: 90 TAB | Refills: 0 | Status: SHIPPED | OUTPATIENT
Start: 2017-09-05 | End: 2017-12-04

## 2017-09-05 RX ADMIN — OXYCODONE HYDROCHLORIDE AND ACETAMINOPHEN 500 MG: 500 TABLET ORAL at 12:37

## 2017-09-05 RX ADMIN — INSULIN LISPRO 7 UNITS: 100 INJECTION, SOLUTION INTRAVENOUS; SUBCUTANEOUS at 06:17

## 2017-09-05 RX ADMIN — IOHEXOL 100 ML: 300 INJECTION, SOLUTION INTRAVENOUS at 10:00

## 2017-09-05 RX ADMIN — GABAPENTIN 800 MG: 400 CAPSULE ORAL at 09:17

## 2017-09-05 RX ADMIN — OXYCODONE HYDROCHLORIDE AND ACETAMINOPHEN 1 TABLET: 10; 325 TABLET ORAL at 05:46

## 2017-09-05 RX ADMIN — SODIUM CHLORIDE: 9 INJECTION, SOLUTION INTRAVENOUS at 05:46

## 2017-09-05 RX ADMIN — OXYCODONE HYDROCHLORIDE AND ACETAMINOPHEN 1 TABLET: 10; 325 TABLET ORAL at 12:37

## 2017-09-05 RX ADMIN — FAMOTIDINE 20 MG: 20 TABLET ORAL at 09:17

## 2017-09-05 RX ADMIN — HEPARIN SODIUM 5000 UNITS: 5000 INJECTION, SOLUTION INTRAVENOUS; SUBCUTANEOUS at 05:47

## 2017-09-05 RX ADMIN — PRAVASTATIN SODIUM 20 MG: 20 TABLET ORAL at 09:17

## 2017-09-05 RX ADMIN — ASPIRIN 325 MG: 325 TABLET, COATED ORAL at 09:16

## 2017-09-05 RX ADMIN — MORPHINE SULFATE 60 MG: 60 TABLET, EXTENDED RELEASE ORAL at 09:17

## 2017-09-05 RX ADMIN — SUCRALFATE 1 G: 1 SUSPENSION ORAL at 12:37

## 2017-09-05 RX ADMIN — POLYETHYLENE GLYCOL 3350 1 PACKET: 17 POWDER, FOR SOLUTION ORAL at 12:37

## 2017-09-05 RX ADMIN — BARIUM SULFATE 700 MG: 700 TABLET ORAL at 08:00

## 2017-09-05 RX ADMIN — SUCRALFATE 1 G: 1 SUSPENSION ORAL at 05:46

## 2017-09-05 RX ADMIN — INSULIN LISPRO 3 UNITS: 100 INJECTION, SOLUTION INTRAVENOUS; SUBCUTANEOUS at 12:00

## 2017-09-05 RX ADMIN — STANDARDIZED SENNA CONCENTRATE AND DOCUSATE SODIUM 2 TABLET: 8.6; 5 TABLET, FILM COATED ORAL at 09:17

## 2017-09-05 RX ADMIN — THERA TABS 1 TABLET: TAB at 12:37

## 2017-09-05 RX ADMIN — LISINOPRIL 5 MG: 5 TABLET ORAL at 09:17

## 2017-09-05 ASSESSMENT — PAIN SCALES - GENERAL
PAINLEVEL_OUTOF10: 5
PAINLEVEL_OUTOF10: 9
PAINLEVEL_OUTOF10: 7
PAINLEVEL_OUTOF10: 8
PAINLEVEL_OUTOF10: 7
PAINLEVEL_OUTOF10: 7

## 2017-09-05 NOTE — CARE PLAN
Problem: Nutritional:  Goal: Patient to verbalize or demonstrate understanding of diet  Outcome: MET Date Met: 09/05/17  Diabetic diet instruction provided, pt receptive and reports understanding. RD available PRN.

## 2017-09-05 NOTE — PROGRESS NOTES
Patient returned from [facility] states pain is 8/10 in upper abdomen area, back and legs.

## 2017-09-05 NOTE — DISCHARGE INSTRUCTIONS
Discharge Instructions    Discharged to home by car with self. Discharged via wheelchair, hospital escort: Yes.  Special equipment needed: Not Applicable    Be sure to schedule a follow-up appointment with your primary care doctor or any specialists as instructed.     Discharge Plan:   Diet Plan: Discussed  Activity Level: Discussed  Smoking Cessation Offered: Patient Refused  Influenza Vaccine Indication: Indicated: 9 to 64 years of age  Influenza Vaccine Given - only chart on this line when given: Influenza Vaccine Given (See MAR)    I understand that a diet low in cholesterol, fat, and sodium is recommended for good health. Unless I have been given specific instructions below for another diet, I accept this instruction as my diet prescription.   Other diet: Diabetic diet    Special Instructions: Diagnosis:  Acute Coronary Syndrome (ACS) is a diagnosis that encompasses cardiac-related chest pain and heart attack. ACS occurs when the blood flow to the heart muscle is severely reduced or cut off completely due to a slow process called atherosclerosis.  Atherosclerosis is a disease in which the coronary arteries become narrow from a buildup of fat, cholesterol, and other substances that combine to form plaque. If the plaque breaks, a blood clot will form and block the blood flow to the heart muscle. This lack of blood flow can cause damage or death to the heart muscle which is called a heart attack or Myocardial Infarction (MI). There are two different types of MIs:  ST Elevation Myocardial Infarction or STEMI (the most severe type of heart attack) and Non-ST Elevation Myocardial Infarction or NSTEMI.    Treatment Plan:  · Cardiac Diet  - Low fat, low salt, low cholesterol   · Cardiac Rehab  - Your doctor has ordered you a referral to [facility].  Call [phone] to schedule an appointment.  · Attend my follow-up appointment with my Cardiologist.  · Take my medications as prescribed by my doctor  · Exercise  daily  · Control my diabetes    Medications:  Certain medications are used to treat ACS.  Remember to always take medications as prescribed and never stop talking medications unless told by your doctor.    You have been prescribed the following medicatons:    Aspirin - Aspirin is used as a blood thinning medication and you will require this medication indefinitely.    · Is patient discharged on Warfarin / Coumadin?   No     · Is patient Post Blood Transfusion?  No    Depression / Suicide Risk    As [facility] are [facility], it is important to learn how to keep safe from harming yourself.    Recognize the warning signs:  · Abrupt changes in personality, positive or negative- including increase in energy   · Giving away possessions  · Change in eating patterns- significant weight changes-  positive or negative  · Change in sleeping patterns- unable to sleep or sleeping all the time   · Unwillingness or inability to communicate  · Depression  · Unusual sadness, discouragement and loneliness  · Talk of wanting to die  · Neglect of personal appearance   · Rebelliousness- reckless behavior  · Withdrawal from people/activities they love  · Confusion- inability to concentrate     If you or a loved one observes any of these behaviors or has concerns about self-harm, here's what you can do:  · Talk about it- your feelings and reasons for harming yourself  · Remove any means that you might use to hurt yourself (examples: pills, rope, extension cords, firearm)  · Get professional help from the community (Mental Health, Substance Abuse, psychological counseling)  · Do not be alone:Call your Safe Contact- someone whom you trust who will be there for you.  · Call your local CRISIS HOTLINE 792-3923 or 897-921-8486  · Call your local Children's Mobile Crisis Response Team [place] (828) 124-9185 or www.[employer]  · Call the toll free National Suicide Prevention Hotlines   · National Suicide  Prevention Lifeline 192-652-FDWE (1823)  · National [place] Line Network 800-SUICIDE (778-5714)

## 2017-09-05 NOTE — CARE PLAN
Problem: Pain Management  Goal: Pain level will decrease to patient's comfort goal  Outcome: NOT MET  Pt has acute abdominal cramping pain 10/10. Pt stated she had pain earlier this AM and notified her GI doc. Dilaudid given for pain. Will continue to monitor pain and pain control.     Problem: Knowledge Deficit  Goal: Knowledge of disease process/condition, treatment plan, diagnostic tests, and medications will improve  Outcome: PROGRESSING AS EXPECTED  Updated pt on plan of care. Educated on orders (diet, activity). Educated on Barium swallow study. Will cont to update pt as needed.

## 2017-09-05 NOTE — PROGRESS NOTES
[facility] Hospitalist Progress Note    Date of Service: 2017    Chief Complaint  57 y.o. female admitted 2017 with epigastric pain    Interval Problem Update  Still has pain. COntinued evaluation for esophageal disease with barium swallow in the AM. GI following.    Consultants/Specialty  Dr. Rea, GI    Disposition  Home        Review of Systems   Constitutional: Negative for chills and fever.   HENT: Negative for congestion, hearing loss and nosebleeds.    Eyes: Negative for pain and redness.   Respiratory: Negative for cough, hemoptysis, shortness of breath and wheezing.    Cardiovascular: Positive for chest pain. Negative for palpitations.   Gastrointestinal: Positive for abdominal pain, nausea and vomiting. Negative for blood in stool, constipation and diarrhea.   Genitourinary: Negative for dysuria, frequency and hematuria.   Musculoskeletal: Negative for falls, joint pain and myalgias.   Skin: Negative for rash.   Neurological: Negative for dizziness, tremors, focal weakness, seizures, loss of consciousness, weakness and headaches.   Psychiatric/Behavioral: The patient is not nervous/anxious and does not have insomnia.    All other systems reviewed and are negative.     Physical Exam  Laboratory/Imaging   Hemodynamics  Temp (24hrs), Av.6 °C (97.8 °F), Min:36.2 °C (97.1 °F), Max:36.8 °C (98.3 °F)   Temperature: 36.6 °C (97.8 °F)  Pulse  Av  Min: 75  Max: 109    Blood Pressure: 124/46      Respiratory      Respiration: 16, Pulse Oximetry: 98 %             Fluids    Intake/Output Summary (Last 24 hours) at 17 5679  Last data filed at 17 1600   Gross per 24 hour   Intake             3360 ml   Output              800 ml   Net             2560 ml       Nutrition  Orders Placed This Encounter   Procedures   • DIET ORDER     Standing Status:   Standing     Number of Occurrences:   1     Order Specific Question:   Diet:     Answer:   Diabetic [3]     Order Specific Question:    Texture/Fiber modifications:     Answer:   Dysphagia 3(Mechanical Soft)specify fluid consistency(question 6) [3]     Comments:   Advance diet as tolerated     Physical Exam   Constitutional: She appears well-developed and well-nourished.   HENT:   Head: Normocephalic and atraumatic.   Eyes: Conjunctivae and EOM are normal. No scleral icterus.   Neck: Normal range of motion. Neck supple.   Cardiovascular: Normal rate and regular rhythm.  Exam reveals no gallop and no friction rub.    No murmur heard.  Pulmonary/Chest: Effort normal and breath sounds normal. No respiratory distress. She has no wheezes. She has no rales.   Abdominal: Soft. Bowel sounds are normal. She exhibits no distension. There is tenderness (mild epigastric, unchanged). There is no rebound and no guarding.   Musculoskeletal: She exhibits no edema or tenderness.   Neurological: She is alert.   Skin: Skin is warm.   Psychiatric: She has a normal mood and affect. Her behavior is normal.       Recent Labs      09/02/17   0301   WBC  7.9   RBC  3.41*   HEMOGLOBIN  8.0*   HEMATOCRIT  27.2*   MCV  79.8*   MCH  23.5*   MCHC  29.4*   RDW  46.6   PLATELETCT  435   MPV  9.1     Recent Labs      09/02/17   0301  09/04/17   0312   SODIUM  133*  137   POTASSIUM  4.2  3.7   CHLORIDE  107  106   CO2  22  20   GLUCOSE  198*  315*   BUN  40*  7*   CREATININE  1.47*  0.62   CALCIUM  8.7  9.0                      Assessment/Plan     * Epigastric pain   Assessment & Plan    Was followed by Dr. Gomes in the past, had endoscopy 2 years ago  Diagnosed with gastroparesis; she however still complained of pain and was seeking second opinion; was to se Dr. Castañeda.  History of cholecystectomy  Consulted and discussed with Dr. Carter, GI  EGD/colonoscopy on 9/3 showed insufficiency of esophageal sphincter and required dilation. Gastric emptying study today.  Planned for esophageal study 9/5.          Chest pain   Assessment & Plan    Epigastric pain versus chest pain  Poorly  controlled diabetes and high risk factor for coronary artery disease  Initiate aspirin  Chemical stress test done, came back normal, no ischemia.        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

## 2017-09-05 NOTE — PROGRESS NOTES
GI follow up:  She started nortriptyline last night for cyclical vomiting syndrome. Appears to be tolerating it.  Ba swallow shows esophageal dysmotility and 13mm tablet hangs up at the GE junction.   Does not look like classic achalasia and it may be diabetic esophageal dysmotility. She still complains of abdominal pain which is likely functional. She'll need to follow up with us as outpatient and I'll arrange for outpatient esophageal manometry.  There was no obvious source for her microcytic anemia seen on EGD or colonoscopy.   Consider iron infusion prior to DC or sending her home on oral iron.  GI signing off and I'll arrange outpatient follow up.

## 2017-09-05 NOTE — PROGRESS NOTES
Patient to [facility] for barium study via cart with zabrina Braxton.

## 2017-09-05 NOTE — DISCHARGE PLANNING
Care Transition Team Assessment    IHD met with patient bedside. She stated she lives with her boyfriend and she uses MTM to get to appointments. She uses a walker and wheelchair at home, no O2 or services. She stated she needs to go to the welfare offices to reinstate her food stamps. We discussed bringing a record of her recent ED visits for that meeting.    Information Source  Orientation : Oriented x 4  Information Given By: Patient  Informant's Name: Julisa  Who is responsible for making decisions for patient? : Patient    Readmission Evaluation  Is this a readmission?: No    Elopement Risk  Legal Hold: No  Ambulatory or Self Mobile in Wheelchair: Yes  Disoriented: No  Psychiatric Symptoms: None  History of Wandering: No  Elopement this Admit: No  Vocalizing Wanting to Leave: No  Displays Behaviors, Body Language Wanting to Leave: No-Not at Risk for Elopement  Elopement Risk: Not at Risk for Elopement    Interdisciplinary Discharge Planning  Does Admitting Nurse Feel This Could be a Complex Discharge?: No  Primary Care Physician: Dr. Jason  Lives with - Patient's Self Care Capacity: Significant Other  Patient or legal guardian wants to designate a caregiver (see row info): No  Support Systems: Family Member(s), Friends / Neighbors  Housing / Facility: [address]  Do You Take your Prescribed Medications Regularly: No  Reasons Why Not Taking Medications : Didn't Refill Prescriptions   Able to Return to Previous ADL's: Yes  Mobility Issues: No  Prior Services: None  Assistance Needed: No  Durable Medical Equipment: Not Applicable    Discharge Preparedness  What is your plan after discharge?: Home with help  What are your discharge supports?: Other (comment) (Boyfriend)  Prior Functional Level: Ambulatory, Independent with Activities of Daily Living, Independent with Medication Management, Uses Walker, Uses Wheelchair  Difficulity with ADLs: Walking  Difficulty with ADLs Comment: uses walker or wheelchair at  home  Difficulity with IADLs: Driving  Difficulity with IADL Comments: Uses MTM    Functional Assesment  Prior Functional Level: Ambulatory, Independent with Activities of Daily Living, Independent with Medication Management, Uses Walker, Uses Wheelchair    Finances  Financial Barriers to Discharge: No  Prescription Coverage: Yes ([facility] on [employer])    Vision / Hearing Impairment  Vision Impairment : Yes  Right Eye Vision: Impaired, Wears Glasses  Left Eye Vision: Impaired, Wears Glasses  Hearing Impairment : No    Values / Beliefs / Concerns  Values / Beliefs Concerns : No    Advance Directive  Advance Directive?: None    Domestic Abuse  Have you ever been the victim of abuse or violence?: No  Physical Abuse or Sexual Abuse: No  Verbal Abuse or Emotional Abuse: No  Possible Abuse Reported to:: Not Applicable    Psychological Assessment  History of Substance Abuse: None  History of Psychiatric Problems: No  Non-compliant with Treatment: No  Newly Diagnosed Illness: No    Discharge Risks or Barriers  Discharge risks or barriers?: No  Patient risk factors: Multiple ED visits    Anticipated Discharge Information  Anticipated discharge disposition: Discharge needs currently unknown  [address] [address] [address] [place] [address]  Discharge Contact Phone Number: [phone]

## 2017-09-06 ENCOUNTER — PATIENT OUTREACH (OUTPATIENT)
Dept: HEALTH INFORMATION MANAGEMENT | Facility: OTHER | Age: 57
End: 2017-09-06

## 2017-09-06 LAB — MITOCHONDRIA M2 IGG SER-ACNC: 3.4 UNITS (ref 0–20)

## 2017-09-06 NOTE — CONSULTS
Diabetes education: Pt has hx of diabetes on Tresiba and Victoza at home. Pt's current Hg A1c was 9.4% . Pt was discharged before seen by CDE.

## 2017-09-06 NOTE — DISCHARGE SUMMARY
C O N F I D E N T I A L I N F O R M A T I O N   -------------------------------------------------------------------------------------------------------------------   DISCHARGE SUMMARY    Patient ID:  Julisa Carrion  [id number]  57 y.o.female  1960    Admit date: 8/31/2017    Discharge date and time: 09/05/17    Admitting Physician: Silvia Padilla D.O.    Discharge Physician: Verona Aldridge    CODE STATUS: Full Code    Admission Diagnoses:   Chest pain  Epigastric pain  Poorly controlled diabetes mellitus (CMS-[facility])  Ulcers of both lower legs (CMS-HCC)    Discharge Diagnoses:   Chest pain, resolved, pain free  Diabetic esophageal dysmotility  Distal esophageal stricture  Hypertension  Uncontrolled diabetes mellitus type II  Hyperlipidemia  Chronic pain  Chronic lower extremity ulcerations  Diabetic gastroparesis  Current deficiency anemia  Chronic narcotic dependence    Admission Condition: poor    Discharged Condition: good    Indication for Admission: Abdominal pain, nausea and vomiting    HPI: As noted by Dr. Padilla in August 31, 2017    Hospital Course: This is a 57 years old female who was admitted to the hospital on August 31, 2017 presenting with abdominal pain, nausea and vomiting. Patient has underlying history of uncontrolled diabetes mellitus which has been complicated by gastroparesis in the past. Also chronic pain with chronic narcotic dependence. Patient with chronic bilateral lower extremity wound for which she has been following with wound clinic in the outpatient setting. Previous multiple admissions this year. On presentation there was concern for chest pain. Cardiac evaluation was obtained with a stress test and this was negative for any evidence of ischemia. Subsequently patient with ongoing epigastric pain. Given her known history gastroenterology consultation was obtained. Patient was evaluated by GI consultants during hospital stay. Gastric emptying study was performed which revealed gastric  emptying halftime measured at 63 minutes. Subsequently upper endoscopy was performed, this revealed delayed relaxation of the lower esophageal sphincter and distal esophageal stricture which was dilated. There was some ongoing persistent epigastric pain. Subsequently esophagram was performed. This revealed esophageal dysmotility and the barium tablet became lodged in the distal esophagus. Dr. Jean from gastroenterology initiated the patient on nortriptyline for concern of underlying cyclical vomiting syndrome. Consider underlying diabetic esophageal dysmotility. Plan for obtaining outpatient esophageal manometric. In addition patient was found to have profound microcytic anemia related to iron deficiency. Patient was seen and evaluated by me on September 5, 2017. Markedly improved. Requesting to be discharged at this point in time. No further acute inpatient needs were noted and patient cleared for discharge from gastroenterology perspective. From her anemia perspective I initiated the patient on iron supplementation and multivitamin support. Advised follow-up with primary care physician for ongoing monitoring. Advised close follow-up with gastroenterology consultants. Advised improved glycemic control and advised to further discuss with primary care physician following discharge. Patient will maintain outpatient follow up with wound clinic for underlying lower extremity wounds. Discharge planning discussed in detail with the patient. Patient discharged home in stable condition. Prescriptions were represcribed to patient's preferred pharmacy. Advise nursing prior to discharge to educate patient regarding diet modifications.    Things to follow up after discharge:   1) Follow up with PCP post discharge  2) Follow up with wound clinic post discharge  3) Follow up with GI consultants post discharge  4) outpatient esophageal manometry testing will be arranged by GI consultants, please contact them regarding this  5)  Recommend improved blood sugar control. Discuss with PCP.   6) You are found to have iron deficiency anemia. Start Iron / Multivitamin as prescribed for next three months. At three months your PCP should monitor Iron levels and counts.     Consults: GI    Significant Studies:   DX-ESOPHAGUS - BARIUM SWALLOW   Final Result      Esophageal dysmotility.      Barium tablet became lodged in the distal esophagus.      NM-GASTRIC EMPTYING   Final Result      Gastric emptying halftime measured at 63 minutes.      NM-CARDIAC STRESS TEST   Final Result   Addendum 1 of 1   Baseline ECG:Normal ECG   Stress ECG: No ischemic T wave changes with peak stress   Impression: Normal stress ECG, nuclear images pending      Final      [id number]-0 VIEWS   Final Result      1.  Previous cholecystectomy.      2.  No evidence of bowel obstruction and/or free air.      3.  Mild sigmoid scoliosis of lumbar spine.          Procedures Performed While Hospitalized: EGD    Operations During Hospitalization: None    Disposition: Home    Patient Instructions: Given  Activity: activity as tolerated  Diet: As instructed  Wound Care: as directed    Medications at discharge:   Julisa Carrion   Home Medication Instructions GIANNA:[id number]    Printed on:09/05/17 2039   Medication Information                      alprazolam (XANAX) 0.5 MG Tab  Take 0.5 mg by mouth at bedtime as needed for Sleep.             ascorbic acid (VITAMIN C) 500 MG tablet  Take 1 Tab by mouth every day for 90 days.             aspirin EC (ECOTRIN) 81 MG Tablet Delayed Response  Take 81 mg by mouth every day.             famotidine (PEPCID) 20 MG Tab  Take 1 Tab by mouth 2 Times a Day for 30 days.             ferrous sulfate (FEOSOL) 220 (44 Fe) MG/5ML Elixir  Take 7.4 mL by mouth 3 times a day, with meals for 90 days.             gabapentin (NEURONTIN) 800 MG tablet  Take 800 mg by mouth 3 times a day.             Insulin Degludec (TRESIBA FLEXTOUCH) 100 UNIT/ML Solution  Pen-injector  Inject 40 Units as instructed 1 time daily as needed (per pt.).             liraglutide (VICTOZA) 18 MG/3ML Solution Pen-injector injection  Inject 0.6 mg as instructed 1 time daily as needed (per pt).             lisinopril (PRINIVIL) 5 MG TABS  Take 5 mg by mouth every morning.             morphine ER (MS CONTIN) 60 MG Tab CR tablet  Take 60 mg by mouth every 12 hours.             multivitamin (MULTI-DELYN) Liquid  Take 10 mL by mouth every day for 90 days.             nortriptyline (PAMELOR) 25 MG Cap  Take 1 Cap by mouth every evening.             oxycodone-acetaminophen (PERCOCET-10)  MG Tab  Take 1 Tab by mouth every 6 hours as needed for Severe Pain.             polyethylene glycol/lytes (MIRALAX) Pack  Take 1 Packet by mouth 2 Times a Day for 90 days.             pravastatin (PRAVACHOL) 20 MG TABS  Take 20 mg by mouth every morning.             sucralfate (CARAFATE) 1 GM/10ML Suspension  Take 10 mL by mouth 4 Times a Day,Before Meals and at Bedtime for 30 days.                 Opioid prescription history checked: N/A. None prescribed.     Time spend preparing discharge: 35 minutes. This included face to face with the patient, medication reconciliation, care co ordination with RN involved in patient care and discussion and co ordination with case management.     Signed:  Verona Aldridge  9/5/2017  8:39 PM

## 2017-09-25 ENCOUNTER — APPOINTMENT (OUTPATIENT)
Dept: WOUND CARE | Facility: MEDICAL CENTER | Age: 57
End: 2017-09-25
Payer: MEDICAID

## 2017-09-27 ENCOUNTER — APPOINTMENT (OUTPATIENT)
Dept: WOUND CARE | Facility: MEDICAL CENTER | Age: 57
End: 2017-09-27
Attending: INTERNAL MEDICINE
Payer: MEDICAID

## 2018-01-26 NOTE — PROGRESS NOTES
Clear liquid meal provided to patient.  Patient consumed 100%, tolerated well, denies nausea/vomiting.  Will advance diet for lunch per order   Final Anesthesia Post-op Assessment    Patient: Mi Hess  Procedure(s) Performed: REPEAT  SECTIONBILAT. SALPINGECTOMY   Anesthesia type: Spinal    Vital Last Value   Temperature 36.9 °C (98.4 °F) (18 0550)   Pulse 78 (18 0550)   Respiratory Rate 16 (18 0550)   Non-Invasive   Blood Pressure 118/70 (18 0550)   Arterial  Blood Pressure     Pulse Oximetry 100 % (18 0550)     Last 24 I/O:   Intake/Output Summary (Last 24 hours) at 18 0702  Last data filed at 18 0547   Gross per 24 hour   Intake             1000 ml   Output              970 ml   Net               30 ml       PATIENT LOCATION: [facility] Surgical Floor  POST-OP VITAL SIGNS: stable  LEVEL OF CONSCIOUSNESS: participates in exam, awake, oriented, alert and answers questions appropriately  RESPIRATORY STATUS: spontaneous ventilation, unassisted and room air  CARDIOVASCULAR: blood pressure returned to baseline and stable  HYDRATION: euvolemic    PAIN MANAGEMENT: well controlled  NAUSEA: None  AIRWAY PATENCY: patent  POST-OP ASSESSMENT: no complications, patient tolerated procedure well with no complications and sufficiently recovered from acute administration of anesthesia effects and able to participate in evaluation  COMPLICATIONS: none

## 2018-02-01 PROBLEM — F16.10: Status: ACTIVE | Noted: 2018-02-01

## 2018-02-01 PROBLEM — L02.415 ABSCESS OF RIGHT THIGH: Status: RESOLVED | Noted: 2017-05-08 | Resolved: 2018-02-01

## 2018-02-01 PROBLEM — Z87.19 HISTORY OF ESOPHAGEAL STRICTURE: Status: ACTIVE | Noted: 2018-02-01

## 2018-02-01 PROBLEM — R07.9 CHEST PAIN: Status: RESOLVED | Noted: 2017-08-31 | Resolved: 2018-02-01

## 2018-02-01 PROBLEM — E87.20 LACTIC ACIDOSIS: Status: RESOLVED | Noted: 2017-05-07 | Resolved: 2018-02-01

## 2018-02-01 PROBLEM — L89.309 PRESSURE SORE ON BUTTOCKS: Status: RESOLVED | Noted: 2017-04-28 | Resolved: 2018-02-01

## 2018-02-01 PROBLEM — R11.15 EMESIS, PERSISTENT: Status: RESOLVED | Noted: 2017-05-07 | Resolved: 2018-02-01

## 2018-02-01 PROBLEM — E11.65 POORLY CONTROLLED DIABETES MELLITUS (HCC): Status: RESOLVED | Noted: 2017-09-02 | Resolved: 2018-02-01

## 2018-02-01 PROBLEM — D64.9 ANEMIA: Status: RESOLVED | Noted: 2017-09-04 | Resolved: 2018-02-01

## 2018-02-01 PROBLEM — R10.13 EPIGASTRIC PAIN: Status: RESOLVED | Noted: 2017-08-31 | Resolved: 2018-02-01

## 2018-02-01 PROBLEM — S81.809A WOUND OF LOWER EXTREMITY: Status: RESOLVED | Noted: 2017-05-08 | Resolved: 2018-02-01

## 2018-02-01 PROBLEM — K56.600 PARTIAL SMALL BOWEL OBSTRUCTION (HCC): Status: RESOLVED | Noted: 2017-04-27 | Resolved: 2018-02-01

## 2018-04-09 DIAGNOSIS — R07.9 CHEST PAIN AT REST: ICD-10-CM

## 2018-04-09 LAB — EKG IMPRESSION: NORMAL

## 2018-07-08 ENCOUNTER — APPOINTMENT (OUTPATIENT)
Dept: RADIOLOGY | Facility: MEDICAL CENTER | Age: 58
End: 2018-07-08
Attending: EMERGENCY MEDICINE
Payer: MEDICAID

## 2018-07-08 ENCOUNTER — HOSPITAL ENCOUNTER (EMERGENCY)
Facility: MEDICAL CENTER | Age: 58
End: 2018-07-09
Attending: EMERGENCY MEDICINE
Payer: MEDICAID

## 2018-07-08 DIAGNOSIS — E11.621 DIABETIC ULCER OF RIGHT HEEL ASSOCIATED WITH TYPE 2 DIABETES MELLITUS, LIMITED TO BREAKDOWN OF SKIN (HCC): ICD-10-CM

## 2018-07-08 DIAGNOSIS — L97.411 DIABETIC ULCER OF RIGHT HEEL ASSOCIATED WITH TYPE 2 DIABETES MELLITUS, LIMITED TO BREAKDOWN OF SKIN (HCC): ICD-10-CM

## 2018-07-08 DIAGNOSIS — R73.9 HYPERGLYCEMIA: ICD-10-CM

## 2018-07-08 DIAGNOSIS — R10.13 EPIGASTRIC PAIN: ICD-10-CM

## 2018-07-08 DIAGNOSIS — R11.2 NON-INTRACTABLE VOMITING WITH NAUSEA, UNSPECIFIED VOMITING TYPE: ICD-10-CM

## 2018-07-08 LAB
ALBUMIN SERPL BCP-MCNC: 4 G/DL (ref 3.2–4.9)
ALBUMIN/GLOB SERPL: 1 G/DL
ALP SERPL-CCNC: 130 U/L (ref 30–99)
ALT SERPL-CCNC: 17 U/L (ref 2–50)
ANION GAP SERPL CALC-SCNC: 11 MMOL/L (ref 0–11.9)
APPEARANCE UR: CLEAR
AST SERPL-CCNC: 11 U/L (ref 12–45)
B-OH-BUTYR SERPL-MCNC: 0.97 MMOL/L (ref 0.02–0.27)
BACTERIA #/AREA URNS HPF: NEGATIVE /HPF
BASE EXCESS BLDV CALC-SCNC: -1 MMOL/L
BASOPHILS # BLD AUTO: 0.6 % (ref 0–1.8)
BASOPHILS # BLD: 0.05 K/UL (ref 0–0.12)
BILIRUB SERPL-MCNC: 0.3 MG/DL (ref 0.1–1.5)
BILIRUB UR QL STRIP.AUTO: NEGATIVE
BODY TEMPERATURE: ABNORMAL CENTIGRADE
BUN SERPL-MCNC: 20 MG/DL (ref 8–22)
CALCIUM SERPL-MCNC: 9.7 MG/DL (ref 8.5–10.5)
CHLORIDE SERPL-SCNC: 100 MMOL/L (ref 96–112)
CO2 SERPL-SCNC: 25 MMOL/L (ref 20–33)
COLOR UR: YELLOW
CREAT SERPL-MCNC: 0.92 MG/DL (ref 0.5–1.4)
CRP SERPL HS-MCNC: 0.79 MG/DL (ref 0–0.75)
EOSINOPHIL # BLD AUTO: 0.07 K/UL (ref 0–0.51)
EOSINOPHIL NFR BLD: 0.8 % (ref 0–6.9)
EPI CELLS #/AREA URNS HPF: ABNORMAL /HPF
ERYTHROCYTE [DISTWIDTH] IN BLOOD BY AUTOMATED COUNT: 44.4 FL (ref 35.9–50)
ERYTHROCYTE [SEDIMENTATION RATE] IN BLOOD BY WESTERGREN METHOD: 62 MM/HOUR (ref 0–30)
GLOBULIN SER CALC-MCNC: 4 G/DL (ref 1.9–3.5)
GLUCOSE BLD-MCNC: 361 MG/DL (ref 65–99)
GLUCOSE SERPL-MCNC: 420 MG/DL (ref 65–99)
GLUCOSE UR STRIP.AUTO-MCNC: >=1000 MG/DL
HCO3 BLDV-SCNC: 24 MMOL/L (ref 24–28)
HCT VFR BLD AUTO: 43.3 % (ref 37–47)
HGB BLD-MCNC: 13.9 G/DL (ref 12–16)
HYALINE CASTS #/AREA URNS LPF: ABNORMAL /LPF
IMM GRANULOCYTES # BLD AUTO: 0.03 K/UL (ref 0–0.11)
IMM GRANULOCYTES NFR BLD AUTO: 0.3 % (ref 0–0.9)
KETONES UR STRIP.AUTO-MCNC: 40 MG/DL
LACTATE BLD-SCNC: 2.2 MMOL/L (ref 0.5–2)
LEUKOCYTE ESTERASE UR QL STRIP.AUTO: NEGATIVE
LIPASE SERPL-CCNC: 12 U/L (ref 11–82)
LYMPHOCYTES # BLD AUTO: 1.77 K/UL (ref 1–4.8)
LYMPHOCYTES NFR BLD: 20.5 % (ref 22–41)
MAGNESIUM SERPL-MCNC: 1.9 MG/DL (ref 1.5–2.5)
MCH RBC QN AUTO: 26.7 PG (ref 27–33)
MCHC RBC AUTO-ENTMCNC: 32.1 G/DL (ref 33.6–35)
MCV RBC AUTO: 83.1 FL (ref 81.4–97.8)
MICRO URNS: ABNORMAL
MONOCYTES # BLD AUTO: 0.49 K/UL (ref 0–0.85)
MONOCYTES NFR BLD AUTO: 5.7 % (ref 0–13.4)
NEUTROPHILS # BLD AUTO: 6.22 K/UL (ref 2–7.15)
NEUTROPHILS NFR BLD: 72.1 % (ref 44–72)
NITRITE UR QL STRIP.AUTO: NEGATIVE
NRBC # BLD AUTO: 0 K/UL
NRBC BLD-RTO: 0 /100 WBC
PCO2 BLDV: 40.4 MMHG (ref 41–51)
PH BLDV: 7.4 [PH] (ref 7.31–7.45)
PH UR STRIP.AUTO: 5.5 [PH]
PLATELET # BLD AUTO: 427 K/UL (ref 164–446)
PMV BLD AUTO: 9.8 FL (ref 9–12.9)
PO2 BLDV: 37.7 MMHG (ref 25–40)
POTASSIUM SERPL-SCNC: 4.3 MMOL/L (ref 3.6–5.5)
PROT SERPL-MCNC: 8 G/DL (ref 6–8.2)
PROT UR QL STRIP: 100 MG/DL
RBC # BLD AUTO: 5.21 M/UL (ref 4.2–5.4)
RBC # URNS HPF: ABNORMAL /HPF
RBC UR QL AUTO: ABNORMAL
SAO2 % BLDV: 68.2 %
SODIUM SERPL-SCNC: 136 MMOL/L (ref 135–145)
SP GR UR STRIP.AUTO: 1.04
TROPONIN I SERPL-MCNC: <0.01 NG/ML (ref 0–0.04)
UROBILINOGEN UR STRIP.AUTO-MCNC: 0.2 MG/DL
WBC # BLD AUTO: 8.6 K/UL (ref 4.8–10.8)
WBC #/AREA URNS HPF: ABNORMAL /HPF

## 2018-07-08 PROCEDURE — 73630 X-RAY EXAM OF FOOT: CPT | Mod: RT

## 2018-07-08 PROCEDURE — 93005 ELECTROCARDIOGRAM TRACING: CPT | Performed by: EMERGENCY MEDICINE

## 2018-07-08 PROCEDURE — 700117 HCHG RX CONTRAST REV CODE 255: Performed by: EMERGENCY MEDICINE

## 2018-07-08 PROCEDURE — 84484 ASSAY OF TROPONIN QUANT: CPT

## 2018-07-08 PROCEDURE — 85652 RBC SED RATE AUTOMATED: CPT

## 2018-07-08 PROCEDURE — 99285 EMERGENCY DEPT VISIT HI MDM: CPT

## 2018-07-08 PROCEDURE — 81001 URINALYSIS AUTO W/SCOPE: CPT

## 2018-07-08 PROCEDURE — 96361 HYDRATE IV INFUSION ADD-ON: CPT

## 2018-07-08 PROCEDURE — 82962 GLUCOSE BLOOD TEST: CPT

## 2018-07-08 PROCEDURE — 83690 ASSAY OF LIPASE: CPT

## 2018-07-08 PROCEDURE — 96374 THER/PROPH/DIAG INJ IV PUSH: CPT | Mod: XU

## 2018-07-08 PROCEDURE — 96375 TX/PRO/DX INJ NEW DRUG ADDON: CPT

## 2018-07-08 PROCEDURE — 86140 C-REACTIVE PROTEIN: CPT

## 2018-07-08 PROCEDURE — 80053 COMPREHEN METABOLIC PANEL: CPT

## 2018-07-08 PROCEDURE — 700102 HCHG RX REV CODE 250 W/ 637 OVERRIDE(OP): Performed by: EMERGENCY MEDICINE

## 2018-07-08 PROCEDURE — 74177 CT ABD & PELVIS W/CONTRAST: CPT

## 2018-07-08 PROCEDURE — 82010 KETONE BODYS QUAN: CPT

## 2018-07-08 PROCEDURE — 82803 BLOOD GASES ANY COMBINATION: CPT

## 2018-07-08 PROCEDURE — 700111 HCHG RX REV CODE 636 W/ 250 OVERRIDE (IP): Performed by: EMERGENCY MEDICINE

## 2018-07-08 PROCEDURE — 83735 ASSAY OF MAGNESIUM: CPT

## 2018-07-08 PROCEDURE — 700105 HCHG RX REV CODE 258: Performed by: EMERGENCY MEDICINE

## 2018-07-08 PROCEDURE — 83605 ASSAY OF LACTIC ACID: CPT

## 2018-07-08 PROCEDURE — 85025 COMPLETE CBC W/AUTO DIFF WBC: CPT

## 2018-07-08 RX ORDER — SODIUM CHLORIDE 9 MG/ML
1000 INJECTION, SOLUTION INTRAVENOUS ONCE
Status: COMPLETED | OUTPATIENT
Start: 2018-07-08 | End: 2018-07-08

## 2018-07-08 RX ORDER — MORPHINE SULFATE 4 MG/ML
4 INJECTION, SOLUTION INTRAMUSCULAR; INTRAVENOUS ONCE
Status: COMPLETED | OUTPATIENT
Start: 2018-07-08 | End: 2018-07-08

## 2018-07-08 RX ORDER — KETOROLAC TROMETHAMINE 30 MG/ML
30 INJECTION, SOLUTION INTRAMUSCULAR; INTRAVENOUS ONCE
Status: COMPLETED | OUTPATIENT
Start: 2018-07-08 | End: 2018-07-08

## 2018-07-08 RX ORDER — ONDANSETRON 2 MG/ML
4 INJECTION INTRAMUSCULAR; INTRAVENOUS ONCE
Status: COMPLETED | OUTPATIENT
Start: 2018-07-08 | End: 2018-07-08

## 2018-07-08 RX ADMIN — MORPHINE SULFATE 4 MG: 4 INJECTION INTRAVENOUS at 21:45

## 2018-07-08 RX ADMIN — SODIUM CHLORIDE 1000 ML: 9 INJECTION, SOLUTION INTRAVENOUS at 21:45

## 2018-07-08 RX ADMIN — ONDANSETRON HYDROCHLORIDE 4 MG: 2 INJECTION, SOLUTION INTRAMUSCULAR; INTRAVENOUS at 21:45

## 2018-07-08 RX ADMIN — INSULIN HUMAN 10 UNITS: 100 INJECTION, SOLUTION PARENTERAL at 22:11

## 2018-07-08 RX ADMIN — IOHEXOL 100 ML: 350 INJECTION, SOLUTION INTRAVENOUS at 23:10

## 2018-07-08 RX ADMIN — KETOROLAC TROMETHAMINE 30 MG: 30 INJECTION, SOLUTION INTRAMUSCULAR at 22:45

## 2018-07-08 RX ADMIN — SODIUM CHLORIDE 1000 ML: 9 INJECTION, SOLUTION INTRAVENOUS at 22:12

## 2018-07-08 ASSESSMENT — PAIN SCALES - GENERAL
PAINLEVEL_OUTOF10: 2
PAINLEVEL_OUTOF10: 0
PAINLEVEL_OUTOF10: 2
PAINLEVEL_OUTOF10: 2

## 2018-07-08 ASSESSMENT — PAIN SCALES - WONG BAKER
WONGBAKER_NUMERICALRESPONSE: HURTS JUST A LITTLE BIT
WONGBAKER_NUMERICALRESPONSE: DOESN'T HURT AT ALL

## 2018-07-09 VITALS
WEIGHT: 180 LBS | BODY MASS INDEX: 28.25 KG/M2 | HEART RATE: 75 BPM | RESPIRATION RATE: 20 BRPM | TEMPERATURE: 98.6 F | DIASTOLIC BLOOD PRESSURE: 67 MMHG | SYSTOLIC BLOOD PRESSURE: 134 MMHG | HEIGHT: 67 IN | OXYGEN SATURATION: 97 %

## 2018-07-09 LAB — GLUCOSE BLD-MCNC: 254 MG/DL (ref 65–99)

## 2018-07-09 PROCEDURE — 82962 GLUCOSE BLOOD TEST: CPT

## 2018-07-09 RX ORDER — ONDANSETRON 4 MG/1
4 TABLET, ORALLY DISINTEGRATING ORAL EVERY 8 HOURS PRN
Qty: 10 TAB | Refills: 0 | Status: SHIPPED | OUTPATIENT
Start: 2018-07-09 | End: 2018-09-26 | Stop reason: CLARIF

## 2018-07-09 RX ORDER — SULFAMETHOXAZOLE AND TRIMETHOPRIM 800; 160 MG/1; MG/1
1 TABLET ORAL EVERY 12 HOURS
Qty: 20 TAB | Refills: 0 | Status: SHIPPED | OUTPATIENT
Start: 2018-07-09 | End: 2018-07-19

## 2018-07-09 ASSESSMENT — PAIN SCALES - GENERAL: PAINLEVEL_OUTOF10: 0

## 2018-07-09 ASSESSMENT — PAIN SCALES - WONG BAKER: WONGBAKER_NUMERICALRESPONSE: DOESN'T HURT AT ALL

## 2018-07-09 NOTE — ED NOTES
PT IS AA0X4, PT IS IN NAD. PT IS BEING D/C. PT WAS GIVEN D/C INSTRUCTIONS AND SHE STATED UNDERSTANDING. PT WAS GIVEN FOLLOW UP APPOINTMENTS.. PT IS ABLE TO AMB WOI ASSISTANCE, PT LEFT WITH FAMILY .

## 2018-07-09 NOTE — ED PROVIDER NOTES
"ED Provider Note    Scribed for Jean-Paul Rosario M.D. by Nick Toth. 7/8/2018, 8:24 PM.    Primary care provider: Tre Jason M.D.  Means of arrival: Ambulance  History obtained from: Patient  History limited by: None    CHIEF COMPLAINT  Chief Complaint   Patient presents with   • N/V   • Abdominal Pain     WAS SEEN AT [facility] ER YESTERDAY FOR THE SAME PROBLEM.        EDWAR Carrion is a 58 y.o. female with a history of diabetes who presents to the Emergency Department with complaints of diffuse abdominal pain onset 2-3 days ago. The patient explains that about 2-3 days ago she began experiencing nausea and shortly after she began experiencing abdominal pain. She notes that her abdominal pain has been constant and worsening since the onset and she rates her current abdominal pain as 10/10. Patient also admits that she has been vomiting over the last few days and states that her emesis has been \"white and foamy\". Patient also endorses associated difficulty passing gas over the last few days. However, she notes that she has been passing some stool. Patient does have a history of a mass on her lower intestine which was benign. She denies any dysuria, chest pain, hematuria, diarrhea, or fever since the onset. She has had a cholecystectomy in the past as well as 3 C-sections. She denies any alcohol abuse or drug use.The patient does not have a past medical history of cardiac or pulmonary problems and she denies any past bowel obstructions.     The patient is also complaining of right ankle pain and admits that she has had a blister on this ankle since December which has not been healing. She admits to having zain ankle wrapped 2 days ago. She does not believe that there has been any discharge from this wound. The patient denies any allergies to medications.     REVIEW OF SYSTEMS  Pertinent positives include nausea, vomiting, abdominal pain, difficulty passing gas, right ankle wound with associated " pain. Pertinent negatives include dysuria, chest pain, hematuria, diarrhea, fever, or wound discharge. All other systems negative.    C.     PAST MEDICAL HISTORY   has a past medical history of Diabetes; High cholesterol (5/15/2011); Hypertension; Intestinal mass (2015); Migraine; and Staph skin infection.    SURGICAL HISTORY   has a past surgical history that includes other abdominal surgery; gyn surgery; other orthopedic surgery (6-2014); abdominal exploration; gastroscopy (9/3/2017); and colonoscopy (9/3/2017).    SOCIAL HISTORY  Social History   Substance Use Topics   • Smoking status: Former Smoker     Packs/day: 1.00     Years: 20.00     Types: Cigarettes     Quit date: 1/1/1996   • Smokeless tobacco: Former User     Quit date: 6/5/1995   • Alcohol use No      History   Drug Use No     Comment: just prescribed meds       FAMILY HISTORY  Family History   Problem Relation Age of Onset   • Cancer Maternal Aunt      Lung cancer d/t smoking       CURRENT MEDICATIONS  No current facility-administered medications on file prior to encounter.      Current Outpatient Prescriptions on File Prior to Encounter   Medication Sig Dispense Refill   • nortriptyline (PAMELOR) 25 MG Cap Take 1 Cap by mouth every evening. 30 Cap 0   • morphine ER (MS CONTIN) 60 MG Tab CR tablet Take 60 mg by mouth every 12 hours.     • aspirin EC (ECOTRIN) 81 MG Tablet Delayed Response Take 81 mg by mouth every day.     • alprazolam (XANAX) 0.5 MG Tab Take 0.5 mg by mouth at bedtime as needed for Sleep.     • oxycodone-acetaminophen (PERCOCET-10)  MG Tab Take 1 Tab by mouth every 6 hours as needed for Severe Pain.     • gabapentin (NEURONTIN) 800 MG tablet Take 800 mg by mouth 3 times a day.     • pravastatin (PRAVACHOL) 20 MG TABS Take 20 mg by mouth every morning.     • lisinopril (PRINIVIL) 5 MG TABS Take 5 mg by mouth every morning.           ALLERGIES  No Known Allergies    PHYSICAL EXAM  VITAL SIGNS: /86   Pulse 98   Temp 37 °C  "(98.6 °F)   Resp 18   Ht 1.702 m (5' 7\")   Wt 81.6 kg (180 lb)   LMP 02/05/2009   SpO2 98%   BMI 28.19 kg/m²     Constitutional: Well developed, Well nourished, mild distress.   HENT: Normocephalic, Atraumatic, Dry mucous membranes   Eyes: Conjunctiva normal, No discharge.   Cardiovascular: Normal heart rate, Normal rhythm, No murmurs, equal pulses.   Pulmonary: Normal breath sounds, No respiratory distress, No wheezing, No rales, No rhonchi.  Chest: No chest wall tenderness or deformity.   Abdomen:Soft, tender in epigastric to mid abdominal region. There is a firm mass in middle of abdomen which is tender to touch. No rebound, no guarding. Decreased bowel sounds  Back: No CVA tenderness.   Musculoskeletal: Nickel sized wound on the medial aspect of her right heel with pururelent drainage. No surrounding erythema or warmth, mildly tender to touch.   Skin: Warm, Dry, No erythema, No rash.   Neurologic: Alert & oriented x 3, Normal motor function,  No focal deficits noted.   Psychiatric: Affect normal, Judgment normal, Mood normal.     LABS  Results for orders placed or performed during the hospital encounter of 07/08/18   CBC WITH DIFFERENTIAL   Result Value Ref Range    WBC 8.6 4.8 - 10.8 K/uL    RBC 5.21 4.20 - 5.40 M/uL    Hemoglobin 13.9 12.0 - 16.0 g/dL    Hematocrit 43.3 37.0 - 47.0 %    MCV 83.1 81.4 - 97.8 fL    MCH 26.7 (L) 27.0 - 33.0 pg    MCHC 32.1 (L) 33.6 - 35.0 g/dL    RDW 44.4 35.9 - 50.0 fL    Platelet Count 427 164 - 446 K/uL    MPV 9.8 9.0 - 12.9 fL    Neutrophils-Polys 72.10 (H) 44.00 - 72.00 %    Lymphocytes 20.50 (L) 22.00 - 41.00 %    Monocytes 5.70 0.00 - 13.40 %    Eosinophils 0.80 0.00 - 6.90 %    Basophils 0.60 0.00 - 1.80 %    Immature Granulocytes 0.30 0.00 - 0.90 %    Nucleated RBC 0.00 /100 WBC    Neutrophils (Absolute) 6.22 2.00 - 7.15 K/uL    Lymphs (Absolute) 1.77 1.00 - 4.80 K/uL    Monos (Absolute) 0.49 0.00 - 0.85 K/uL    Eos (Absolute) 0.07 0.00 - 0.51 K/uL    Baso " (Absolute) 0.05 0.00 - 0.12 K/uL    Immature Granulocytes (abs) 0.03 0.00 - 0.11 K/uL    NRBC (Absolute) 0.00 K/uL   COMP METABOLIC PANEL   Result Value Ref Range    Sodium 136 135 - 145 mmol/L    Potassium 4.3 3.6 - 5.5 mmol/L    Chloride 100 96 - 112 mmol/L    Co2 25 20 - 33 mmol/L    Anion Gap 11.0 0.0 - 11.9    Glucose 420 (H) 65 - 99 mg/dL    Bun 20 8 - 22 mg/dL    Creatinine 0.92 0.50 - 1.40 mg/dL    Calcium 9.7 8.5 - 10.5 mg/dL    AST(SGOT) 11 (L) 12 - 45 U/L    ALT(SGPT) 17 2 - 50 U/L    Alkaline Phosphatase 130 (H) 30 - 99 U/L    Total Bilirubin 0.3 0.1 - 1.5 mg/dL    Albumin 4.0 3.2 - 4.9 g/dL    Total Protein 8.0 6.0 - 8.2 g/dL    Globulin 4.0 (H) 1.9 - 3.5 g/dL    A-G Ratio 1.0 g/dL   TROPONIN   Result Value Ref Range    Troponin I <0.01 0.00 - 0.04 ng/mL   LIPASE   Result Value Ref Range    Lipase 12 11 - 82 U/L   LACTIC ACID   Result Value Ref Range    Lactic Acid 2.2 (H) 0.5 - 2.0 mmol/L   URINALYSIS (UA)   Result Value Ref Range    Color Yellow     Character Clear     Specific Gravity 1.044 <1.035    Ph 5.5 5.0 - 8.0    Glucose >=1000 (A) Negative mg/dL    Ketones 40 (A) Negative mg/dL    Protein 100 (A) Negative mg/dL    Bilirubin Negative Negative    Urobilinogen, Urine 0.2 Negative    Nitrite Negative Negative    Leukocyte Esterase Negative Negative    Occult Blood Moderate (A) Negative    Micro Urine Req Microscopic    MAGNESIUM   Result Value Ref Range    Magnesium 1.9 1.5 - 2.5 mg/dL   VENOUS BLOOD GAS   Result Value Ref Range    Venous Bg Ph 7.40 7.31 - 7.45    Venous Bg Pco2 40.4 (L) 41.0 - 51.0 mmHg    Venous Bg Po2 37.7 25.0 - 40.0 mmHg    Venous Bg O2 Saturation 68.2 %    Venous Bg Hco3 24 24 - 28 mmol/L    Venous Bg Base Excess -1 mmol/L    Body Temp see below Centigrade   BETA-HYDROXYBUTYRIC ACID   Result Value Ref Range    beta-Hydroxybutyric Acid 0.97 (H) 0.02 - 0.27 mmol/L   SAMUELREN SED RATE   Result Value Ref Range    Sed Rate Polly 62 (H) 0 - 30 mm/hour   CRP QUANTITIVE  (NON-CARDIAC)   Result Value Ref Range    Stat C-Reactive Protein 0.79 (H) 0.00 - 0.75 mg/dL   ESTIMATED GFR   Result Value Ref Range    GFR If African American >60 >60 mL/min/1.73 m 2    GFR If Non African American >60 >60 mL/min/1.73 m 2   URINE MICROSCOPIC (W/UA)   Result Value Ref Range    WBC 5-10 (A) /hpf    RBC  (A) /hpf    Bacteria Negative None /hpf    Epithelial Cells Few /hpf    Hyaline Cast 3-5 (A) /lpf   ACCU-CHEK GLUCOSE   Result Value Ref Range    Glucose - Accu-Ck 361 (H) 65 - 99 mg/dL   ACCU-CHEK GLUCOSE   Result Value Ref Range    Glucose - Accu-Ck 254 (H) 65 - 99 mg/dL   EKG (NOW)   Result Value Ref Range    Report       [facility] Emergency Dept.    Test Date:  2018  Pt Name:    ALIDA HUTCHINSON                 Department: ER  MRN:        [id number]                      Room:       [facility]  Gender:     Female                       Technician: [id number]  :        1960                   Requested By:SHANON SAMS  Order #:    [id number]                    Reading MD:    Measurements  Intervals                                Axis  Rate:       104                          P:          43  KS:         172                          QRS:        -11  QRSD:       88                           T:          44  QT:         372  QTc:        490    Interpretive Statements  SINUS TACHYCARDIA  PROBABLE LEFT ATRIAL ABNORMALITY  LEFT VENTRICULAR HYPERTROPHY  INFERIOR INFARCT, OLD  BORDERLINE PROLONGED QT INTERVAL  BASELINE WANDER IN LEAD(S) V5  Compared to ECG 2018 18:36:35  Left ventricular hypertrophy now present  Myocardial infarct finding now present  Sinus rhythm no longer present         All labs reviewed by me.    EKG  12 Lead EKG interpreted by me shows a sinus tachycardia at a rate of 104. Leftward axis deviation. Left ventricular hypertrophy. No ST elevations. Old inferior MI. Old EKG from 2018 shows Q waves in III and aVF are deeper than previously. Final  impression: Sinus tachycardia with left ventricular hypertrophy, non specific    RADIOLOGY  CT-ABDOMEN-PELVIS WITH   Final Result         1.  No acute abnormality.   2.  Hepatomegaly      DX-FOOT-COMPLETE 3+ RIGHT   Final Result         1.  No acute traumatic bony injury.        The radiologist's interpretation of all radiological studies have been reviewed by me.    COURSE & MEDICAL DECISION MAKING  Pertinent Labs & Imaging studies reviewed. (See chart for details)    8:24 PM - Patient seen and examined at bedside. Patient will be treated with 4 mg Zofran injection, 4 mg morphine injection. The patient will receive IV fluids for dehydration. Ordered DX-foot complete 3+ right, CT-abdomen-pelvis with, Accu-check glucose, magnesium, venous blood gas, Beta-hydroxybut to evaluate her symptoms. The differential diagnoses include but are not limited to: bowel obstruction, pancreatitis, DKA     12:07 AM - Patient was reevaluated at bedside. There was a positive response to IV fluids after patient reevaluation. Discussed lab and radiology results with the patient and informed them that she should be sent home with antibiotics for possible infection. Patient explains that she always get C-diff when she takes at home antibiotics. However, she was informed that the pharmacist will be talked to and she will be given the most appropriate antibiotic so that she has the lowest chance of amanda C-diff. Also noted that she was not in DKA at this time. Patient understands and verbalizes agreement with the the plan of care.     Medical Decision Making: Patient presents with nausea vomiting and abdominal pain.  At this point time I do not have a clear etiology for the patient's abdominal pain.  She does not appear to be in DKA although she was hyperglycemic.  Her blood sugars have been brought down to a safer level with IV fluids and IV insulin.  She does appear to have a diabetic foot ulcer with some purulent drainage.  There is  no surrounding cellulitis I will start her on Bactrim for this.  X-ray was done and does not show any bony involvement therefore I do not think this is an osteomyelitis.  I will have the patient follow-up with wound care for this.  I will have the patient follow-up with her doctor I have discussed with her that she needs to take her diabetic medications and will give her a short prescription for these.  The patient will return for new or worsening symptoms and is stable at the time of discharge.    DISPOSITION:  Patient will be discharged home in stable condition.    FOLLOW UP:  Tre Jason M.D.  [address] [facility] [address]  [phone]    Schedule an appointment as soon as possible for a visit in 2 days  For wound re-check    [facility]  [address]  [address]  [facility] [address]  [phone]    Schedule an appointment as soon as possible for a visit in 3 days        OUTPATIENT MEDICATIONS:  Discharge Medication List as of 7/9/2018  1:17 AM      START taking these medications    Details   sulfamethoxazole-trimethoprim (BACTRIM DS) 800-160 MG tablet Take 1 Tab by mouth every 12 hours for 10 days., Disp-20 Tab, R-0, Print Rx Paper      ondansetron (ZOFRAN ODT) 4 MG TABLET DISPERSIBLE Take 1 Tab by mouth every 8 hours as needed., Disp-10 Tab, R-0, Print Rx Paper                FINAL IMPRESSION  1. Epigastric pain    2. Non-intractable vomiting with nausea, unspecified vomiting type    3. Diabetic ulcer of right heel associated with type 2 diabetes mellitus, limited to breakdown of skin (HCC)    4. Hyperglycemia          Nick DELAROSA (Pool), am scribing for, and in the presence of, Jean-Paul Rosario M.D.    Electronically signed by: Nick Toth (Pool), 7/8/2018    Jean-Paul DELAROSA M.D. personally performed the services described in this documentation, as scribed by Nick Toth in my presence, and it is both accurate and complete.    The note accurately reflects work and  decisions made by me.  Jean-Paul Rosario  7/9/2018  3:06 AM

## 2018-07-09 NOTE — DISCHARGE INSTRUCTIONS
Return if you have increasing pain, fever, severe vomiting, blood in vomit or stool.   Abdominal Pain, Adult  Abdominal pain can be caused by many things. Often, abdominal pain is not serious and it gets better with no treatment or by being treated at home. However, sometimes abdominal pain is serious. Your health care provider will do a medical history and a physical exam to try to determine the cause of your abdominal pain.  Follow these instructions at home:  · Take over-the-counter and prescription medicines only as told by your health care provider. Do not take a laxative unless told by your health care provider.  · Drink enough fluid to keep your urine clear or pale yellow.  · Watch your condition for any changes.  · Keep all follow-up visits as told by your health care provider. This is important.  Contact a health care provider if:  · Your abdominal pain changes or gets worse.  · You are not hungry or you lose weight without trying.  · You are constipated or have diarrhea for more than 2-3 days.  · You have pain when you urinate or have a bowel movement.  · Your abdominal pain wakes you up at night.  · Your pain gets worse with meals, after eating, or with certain foods.  · You are throwing up and cannot keep anything down.  · You have a fever.  Get help right away if:  · Your pain does not go away as soon as your health care provider told you to expect.  · You cannot stop throwing up.  · Your pain is only in areas of the abdomen, such as the right side or the left lower portion of the abdomen.  · You have bloody or black stools, or stools that look like tar.  · You have severe pain, cramping, or bloating in your abdomen.  · You have signs of dehydration, such as:  ¨ Dark urine, very little urine, or no urine.  ¨ Cracked lips.  ¨ Dry mouth.  ¨ Sunken eyes.  ¨ Sleepiness.  ¨ Weakness.  This information is not intended to replace advice given to you by your health care provider. Make sure you discuss any  questions you have with your health care provider.  Document Released: 09/27/2006 Document Revised: 07/07/2017 Document Reviewed: 05/31/2017  [employer] Interactive Patient Education © 2017 [employer]        Hyperglycemia  Hyperglycemia is when the sugar (glucose) level in your blood is too high. It may not cause symptoms. If you do have symptoms, they may include warning signs, such as:  · Feeling more thirsty than normal.  · Hunger.  · Feeling tired.  · Needing to pee (urinate) more than normal.  · Blurry eyesight (vision).  You may get other symptoms as it gets worse, such as:  · Dry mouth.  · Not being hungry (loss of appetite).  · Fruity-smelling breath.  · Weakness.  · Weight gain or loss that is not planned. Weight loss may be fast.  · A tingling or numb feeling in your hands or feet.  · Headache.  · Skin that does not bounce back quickly when it is lightly pinched and released (poor skin turgor).  · Pain in your belly (abdomen).  · Cuts or bruises that heal slowly.  High blood sugar can happen to people who do or do not have diabetes. High blood sugar can happen slowly or quickly, and it can be an emergency.  Follow these instructions at home:  General instructions  · Take over-the-counter and prescription medicines only as told by your doctor.  · Do not use products that contain nicotine or tobacco, such as cigarettes and e-cigarettes. If you need help quitting, ask your doctor.  · Limit alcohol intake to no more than 1 drink per day for nonpregnant women and 2 drinks per day for men. One drink equals 12 oz of beer, 5 oz of wine, or 1½ oz of hard liquor.  · Manage stress. If you need help with this, ask your doctor.  · Keep all follow-up visits as told by your doctor. This is important.  Eating and drinking  · Stay at a healthy weight.  · Exercise regularly, as told by your doctor.  · Drink enough fluid, especially when you:  ¨ Exercise.  ¨ Get sick.  ¨ Are in hot temperatures.  · Eat healthy foods, such  as:  ¨ Low-fat (lean) proteins.  ¨ Complex carbs (complex carbohydrates), such as whole wheat bread or brown rice.  ¨ Fresh fruits and vegetables.  ¨ Low-fat dairy products.  ¨ Healthy fats.  · Drink enough fluid to keep your pee (urine) clear or pale yellow.  If you have diabetes:  · Make sure you know the symptoms of hyperglycemia.  · Follow your diabetes management plan, as told by your doctor. Make sure you:  ¨ Take insulin and medicines as told.  ¨ Follow your exercise plan.  ¨ Follow your meal plan. Eat on time. Do not skip meals.  ¨ Check your blood sugar as often as told. Make sure to check before and after exercise. If you exercise longer or in a different way than you normally do, check your blood sugar more often.  ¨ Follow your sick day plan whenever you cannot eat or drink normally. Make this plan ahead of time with your doctor.  · Share your diabetes management plan with people in your workplace, school, and household.  · Check your urine for ketones when you are ill and as told by your doctor.  · Carry a card or wear jewelry that says that you have diabetes.  Contact a doctor if:  · Your blood sugar level is higher than 240 mg/dL (13.3 mmol/L) for 2 days in a row.  · You have problems keeping your blood sugar in your target range.  · High blood sugar happens often for you.  Get help right away if:  · You have trouble breathing.  · You have a change in how you think, feel, or act (mental status).  · You feel sick to your stomach (nauseous), and that feeling does not go away.  · You cannot stop throwing up (vomiting).  These symptoms may be an emergency. Do not wait to see if the symptoms will go away. Get medical help right away. Call your local emergency services (911 in the [place] Do not drive yourself to the hospital.   Summary  · Hyperglycemia is when the sugar (glucose) level in your blood is too high.  · High blood sugar can happen to people who do or do not have diabetes.  · Make sure you drink  enough fluids, eat healthy foods, and exercise regularly.  · Contact your doctor if you have problems keeping your blood sugar in your target range.  This information is not intended to replace advice given to you by your health care provider. Make sure you discuss any questions you have with your health care provider.  Document Released: 10/15/2010 Document Revised: 09/04/2017 Document Reviewed: 09/04/2017  [employer] Interactive Patient Education © 2017 [employer]    Nausea and Vomiting, Adult  Introduction  Feeling sick to your stomach (nausea) means that your stomach is upset or you feel like you have to throw up (vomit). Feeling more and more sick to your stomach can lead to throwing up. Throwing up happens when food and liquid from your stomach are thrown up and out the mouth. Throwing up can make you feel weak and cause you to get dehydrated. Dehydration can make you tired and thirsty, make you have a dry mouth, and make it so you pee (urinate) less often. Older adults and people with other diseases or a weak defense system (immune system) are at higher risk for dehydration. If you feel sick to your stomach or if you throw up, it is important to follow instructions from your doctor about how to take care of yourself.  Follow these instructions at home:  Eating and drinking  Follow these instructions as told by your doctor:  · Take an oral rehydration solution (ORS). This is a drink that is sold at pharmacies and stores.  · Drink clear fluids in small amounts as you are able, such as:  ¨ Water.  ¨ Ice chips.  ¨ Diluted fruit juice.  ¨ Low-calorie sports drinks.  · Eat bland, easy-to-digest foods in small amounts as you are able, such as:  ¨ Bananas.  ¨ Applesauce.  ¨ Rice.  ¨ Low-fat (lean) meats.  ¨ Toast.  ¨ Crackers.  · Avoid fluids that have a lot of sugar or caffeine in them.  · Avoid alcohol.  · Avoid spicy or fatty foods.  General instructions  · Drink enough fluid to keep your pee (urine) clear or  pale yellow.  · Wash your hands often. If you cannot use soap and water, use hand .  · Make sure that all people in your home wash their hands well and often.  · Take over-the-counter and prescription medicines only as told by your doctor.  · Rest at home while you get better.  · Watch your condition for any changes.  · Breathe slowly and deeply when you feel sick to your stomach.  · Keep all follow-up visits as told by your doctor. This is important.  Contact a doctor if:  · You have a fever.  · You cannot keep fluids down.  · Your symptoms get worse.  · You have new symptoms.  · You feel sick to your stomach for more than two days.  · You feel light-headed or dizzy.  · You have a headache.  · You have muscle cramps.  Get help right away if:  · You have pain in your chest, neck, arm, or jaw.  · You feel very weak or you pass out (faint).  · You throw up again and again.  · You see blood in your throw-up.  · Your throw-up looks like black coffee grounds.  · You have bloody or black poop (stools) or poop that look like tar.  · You have a very bad headache, a stiff neck, or both.  · You have a rash.  · You have very bad pain, cramping, or bloating in your belly (abdomen).  · You have trouble breathing.  · You are breathing very quickly.  · Your heart is beating very quickly.  · Your skin feels cold and clammy.  · You feel confused.  · You have pain when you pee.  · You have signs of dehydration, such as:  ¨ Dark pee, hardly any pee, or no pee.  ¨ Cracked lips.  ¨ Dry mouth.  ¨ Sunken eyes.  ¨ Sleepiness.  ¨ Weakness.  These symptoms may be an emergency. Do not wait to see if the symptoms will go away. Get medical help right away. Call your local emergency services (911 in the [place] Do not drive yourself to the hospital.   This information is not intended to replace advice given to you by your health care provider. Make sure you discuss any questions you have with your health care provider.  Document  Released: 06/05/2009 Document Revised: 07/07/2017 Document Reviewed: 08/23/2016  © 2017 [employer]

## 2018-07-11 LAB — EKG IMPRESSION: NORMAL

## 2018-09-26 ENCOUNTER — HOSPITAL ENCOUNTER (INPATIENT)
Facility: MEDICAL CENTER | Age: 58
LOS: 6 days | DRG: 637 | End: 2018-10-02
Attending: EMERGENCY MEDICINE | Admitting: INTERNAL MEDICINE
Payer: MEDICAID

## 2018-09-26 ENCOUNTER — APPOINTMENT (OUTPATIENT)
Dept: RADIOLOGY | Facility: MEDICAL CENTER | Age: 58
DRG: 637 | End: 2018-09-26
Attending: EMERGENCY MEDICINE
Payer: MEDICAID

## 2018-09-26 PROBLEM — E11.10 DKA (DIABETIC KETOACIDOSES): Status: ACTIVE | Noted: 2018-09-26

## 2018-09-26 PROBLEM — E87.29 HIGH ANION GAP METABOLIC ACIDOSIS: Status: ACTIVE | Noted: 2018-09-26

## 2018-09-26 PROBLEM — L89.612 DECUBITUS ULCER OF RIGHT HEEL, STAGE 2 (HCC): Status: ACTIVE | Noted: 2018-09-26

## 2018-09-26 PROBLEM — S91.301A OPEN WOUND OF RIGHT HEEL: Status: ACTIVE | Noted: 2018-09-26

## 2018-09-26 LAB
ALBUMIN SERPL BCP-MCNC: 3.8 G/DL (ref 3.2–4.9)
ALBUMIN/GLOB SERPL: 0.8 G/DL
ALP SERPL-CCNC: 117 U/L (ref 30–99)
ALT SERPL-CCNC: 6 U/L (ref 2–50)
ANION GAP SERPL CALC-SCNC: 17 MMOL/L (ref 0–11.9)
ANION GAP SERPL CALC-SCNC: 25 MMOL/L (ref 0–11.9)
APPEARANCE UR: CLEAR
AST SERPL-CCNC: 6 U/L (ref 12–45)
B-OH-BUTYR SERPL-MCNC: >8 MMOL/L (ref 0.02–0.27)
BACTERIA #/AREA URNS HPF: ABNORMAL /HPF
BASE EXCESS BLDV CALC-SCNC: -15 MMOL/L
BASOPHILS # BLD AUTO: 0.4 % (ref 0–1.8)
BASOPHILS # BLD: 0.03 K/UL (ref 0–0.12)
BILIRUB SERPL-MCNC: 0.3 MG/DL (ref 0.1–1.5)
BILIRUB UR QL STRIP.AUTO: NEGATIVE
BODY TEMPERATURE: ABNORMAL CENTIGRADE
BUN SERPL-MCNC: 24 MG/DL (ref 8–22)
BUN SERPL-MCNC: 26 MG/DL (ref 8–22)
CALCIUM SERPL-MCNC: 10.3 MG/DL (ref 8.5–10.5)
CALCIUM SERPL-MCNC: 9.1 MG/DL (ref 8.5–10.5)
CHLORIDE SERPL-SCNC: 102 MMOL/L (ref 96–112)
CHLORIDE SERPL-SCNC: 110 MMOL/L (ref 96–112)
CO2 SERPL-SCNC: 10 MMOL/L (ref 20–33)
CO2 SERPL-SCNC: 13 MMOL/L (ref 20–33)
COLOR UR: YELLOW
CREAT SERPL-MCNC: 1.02 MG/DL (ref 0.5–1.4)
CREAT SERPL-MCNC: 1.11 MG/DL (ref 0.5–1.4)
EKG IMPRESSION: NORMAL
EOSINOPHIL # BLD AUTO: 0 K/UL (ref 0–0.51)
EOSINOPHIL NFR BLD: 0 % (ref 0–6.9)
EPI CELLS #/AREA URNS HPF: ABNORMAL /HPF
ERYTHROCYTE [DISTWIDTH] IN BLOOD BY AUTOMATED COUNT: 48.6 FL (ref 35.9–50)
GLOBULIN SER CALC-MCNC: 4.8 G/DL (ref 1.9–3.5)
GLUCOSE BLD-MCNC: 175 MG/DL (ref 65–99)
GLUCOSE BLD-MCNC: 220 MG/DL (ref 65–99)
GLUCOSE BLD-MCNC: 256 MG/DL (ref 65–99)
GLUCOSE BLD-MCNC: 342 MG/DL (ref 65–99)
GLUCOSE BLD-MCNC: 384 MG/DL (ref 65–99)
GLUCOSE BLD-MCNC: 463 MG/DL (ref 65–99)
GLUCOSE SERPL-MCNC: 360 MG/DL (ref 65–99)
GLUCOSE SERPL-MCNC: 535 MG/DL (ref 65–99)
GLUCOSE UR STRIP.AUTO-MCNC: >=1000 MG/DL
HCO3 BLDV-SCNC: 10 MMOL/L (ref 24–28)
HCT VFR BLD AUTO: 43.3 % (ref 37–47)
HGB BLD-MCNC: 14.1 G/DL (ref 12–16)
HYALINE CASTS #/AREA URNS LPF: ABNORMAL /LPF
IMM GRANULOCYTES # BLD AUTO: 0.1 K/UL (ref 0–0.11)
IMM GRANULOCYTES NFR BLD AUTO: 1.2 % (ref 0–0.9)
KETONES UR STRIP.AUTO-MCNC: >=160 MG/DL
LACTATE BLD-SCNC: 2 MMOL/L (ref 0.5–2)
LACTATE BLD-SCNC: 3.1 MMOL/L (ref 0.5–2)
LEUKOCYTE ESTERASE UR QL STRIP.AUTO: NEGATIVE
LYMPHOCYTES # BLD AUTO: 1.15 K/UL (ref 1–4.8)
LYMPHOCYTES NFR BLD: 13.9 % (ref 22–41)
MAGNESIUM SERPL-MCNC: 2.5 MG/DL (ref 1.5–2.5)
MAGNESIUM SERPL-MCNC: 2.7 MG/DL (ref 1.5–2.5)
MCH RBC QN AUTO: 28.1 PG (ref 27–33)
MCHC RBC AUTO-ENTMCNC: 32.6 G/DL (ref 33.6–35)
MCV RBC AUTO: 86.3 FL (ref 81.4–97.8)
MICRO URNS: ABNORMAL
MONOCYTES # BLD AUTO: 0.39 K/UL (ref 0–0.85)
MONOCYTES NFR BLD AUTO: 4.7 % (ref 0–13.4)
NEUTROPHILS # BLD AUTO: 6.61 K/UL (ref 2–7.15)
NEUTROPHILS NFR BLD: 79.8 % (ref 44–72)
NITRITE UR QL STRIP.AUTO: NEGATIVE
NRBC # BLD AUTO: 0 K/UL
NRBC BLD-RTO: 0 /100 WBC
PCO2 BLDV: 22.3 MMHG (ref 41–51)
PH BLDV: 7.25 [PH] (ref 7.31–7.45)
PH UR STRIP.AUTO: 5 [PH]
PHOSPHATE SERPL-MCNC: 1.9 MG/DL (ref 2.5–4.5)
PHOSPHATE SERPL-MCNC: 3.7 MG/DL (ref 2.5–4.5)
PLATELET # BLD AUTO: 370 K/UL (ref 164–446)
PMV BLD AUTO: 9.6 FL (ref 9–12.9)
PO2 BLDV: 49.9 MMHG (ref 25–40)
POTASSIUM SERPL-SCNC: 3.9 MMOL/L (ref 3.6–5.5)
POTASSIUM SERPL-SCNC: 4.5 MMOL/L (ref 3.6–5.5)
PROT SERPL-MCNC: 8.6 G/DL (ref 6–8.2)
PROT UR QL STRIP: 100 MG/DL
RBC # BLD AUTO: 5.02 M/UL (ref 4.2–5.4)
RBC # URNS HPF: ABNORMAL /HPF
RBC UR QL AUTO: NEGATIVE
SAO2 % BLDV: 80.9 %
SODIUM SERPL-SCNC: 137 MMOL/L (ref 135–145)
SODIUM SERPL-SCNC: 140 MMOL/L (ref 135–145)
SP GR UR STRIP.AUTO: 1.03
UROBILINOGEN UR STRIP.AUTO-MCNC: 0.2 MG/DL
WBC # BLD AUTO: 8.3 K/UL (ref 4.8–10.8)
WBC #/AREA URNS HPF: ABNORMAL /HPF

## 2018-09-26 PROCEDURE — 51701 INSERT BLADDER CATHETER: CPT

## 2018-09-26 PROCEDURE — 71045 X-RAY EXAM CHEST 1 VIEW: CPT

## 2018-09-26 PROCEDURE — 96375 TX/PRO/DX INJ NEW DRUG ADDON: CPT

## 2018-09-26 PROCEDURE — 83735 ASSAY OF MAGNESIUM: CPT

## 2018-09-26 PROCEDURE — 700111 HCHG RX REV CODE 636 W/ 250 OVERRIDE (IP): Performed by: STUDENT IN AN ORGANIZED HEALTH CARE EDUCATION/TRAINING PROGRAM

## 2018-09-26 PROCEDURE — 87040 BLOOD CULTURE FOR BACTERIA: CPT

## 2018-09-26 PROCEDURE — 93010 ELECTROCARDIOGRAM REPORT: CPT | Performed by: INTERNAL MEDICINE

## 2018-09-26 PROCEDURE — 700105 HCHG RX REV CODE 258: Performed by: INTERNAL MEDICINE

## 2018-09-26 PROCEDURE — 99291 CRITICAL CARE FIRST HOUR: CPT

## 2018-09-26 PROCEDURE — 700111 HCHG RX REV CODE 636 W/ 250 OVERRIDE (IP): Performed by: EMERGENCY MEDICINE

## 2018-09-26 PROCEDURE — 83605 ASSAY OF LACTIC ACID: CPT

## 2018-09-26 PROCEDURE — 770022 HCHG ROOM/CARE - ICU (200)

## 2018-09-26 PROCEDURE — 84100 ASSAY OF PHOSPHORUS: CPT

## 2018-09-26 PROCEDURE — 700105 HCHG RX REV CODE 258: Performed by: EMERGENCY MEDICINE

## 2018-09-26 PROCEDURE — 93005 ELECTROCARDIOGRAM TRACING: CPT | Performed by: STUDENT IN AN ORGANIZED HEALTH CARE EDUCATION/TRAINING PROGRAM

## 2018-09-26 PROCEDURE — 700102 HCHG RX REV CODE 250 W/ 637 OVERRIDE(OP): Performed by: EMERGENCY MEDICINE

## 2018-09-26 PROCEDURE — 99291 CRITICAL CARE FIRST HOUR: CPT | Performed by: INTERNAL MEDICINE

## 2018-09-26 PROCEDURE — 96365 THER/PROPH/DIAG IV INF INIT: CPT

## 2018-09-26 PROCEDURE — 700102 HCHG RX REV CODE 250 W/ 637 OVERRIDE(OP): Performed by: INTERNAL MEDICINE

## 2018-09-26 PROCEDURE — 80053 COMPREHEN METABOLIC PANEL: CPT

## 2018-09-26 PROCEDURE — 700111 HCHG RX REV CODE 636 W/ 250 OVERRIDE (IP): Performed by: INTERNAL MEDICINE

## 2018-09-26 PROCEDURE — 82803 BLOOD GASES ANY COMBINATION: CPT

## 2018-09-26 PROCEDURE — 85025 COMPLETE CBC W/AUTO DIFF WBC: CPT

## 2018-09-26 PROCEDURE — 80048 BASIC METABOLIC PNL TOTAL CA: CPT

## 2018-09-26 PROCEDURE — 82962 GLUCOSE BLOOD TEST: CPT

## 2018-09-26 PROCEDURE — 700101 HCHG RX REV CODE 250: Performed by: INTERNAL MEDICINE

## 2018-09-26 PROCEDURE — 36415 COLL VENOUS BLD VENIPUNCTURE: CPT

## 2018-09-26 PROCEDURE — 82010 KETONE BODYS QUAN: CPT

## 2018-09-26 PROCEDURE — 81001 URINALYSIS AUTO W/SCOPE: CPT

## 2018-09-26 PROCEDURE — 87086 URINE CULTURE/COLONY COUNT: CPT

## 2018-09-26 RX ORDER — MAGNESIUM SULFATE HEPTAHYDRATE 40 MG/ML
4 INJECTION, SOLUTION INTRAVENOUS
Status: DISCONTINUED | OUTPATIENT
Start: 2018-09-26 | End: 2018-09-27

## 2018-09-26 RX ORDER — METOCLOPRAMIDE HYDROCHLORIDE 5 MG/ML
5 INJECTION INTRAMUSCULAR; INTRAVENOUS ONCE
Status: COMPLETED | OUTPATIENT
Start: 2018-09-26 | End: 2018-09-26

## 2018-09-26 RX ORDER — HEPARIN SODIUM 5000 [USP'U]/ML
5000 INJECTION, SOLUTION INTRAVENOUS; SUBCUTANEOUS EVERY 8 HOURS
Status: DISCONTINUED | OUTPATIENT
Start: 2018-09-26 | End: 2018-10-02 | Stop reason: HOSPADM

## 2018-09-26 RX ORDER — ALPRAZOLAM 0.5 MG/1
0.5 TABLET ORAL 3 TIMES DAILY PRN
Status: DISCONTINUED | OUTPATIENT
Start: 2018-09-26 | End: 2018-10-01

## 2018-09-26 RX ORDER — HALOPERIDOL 5 MG/ML
5 INJECTION INTRAMUSCULAR ONCE
Status: ACTIVE | OUTPATIENT
Start: 2018-09-26 | End: 2018-09-27

## 2018-09-26 RX ORDER — TRAZODONE HYDROCHLORIDE 100 MG/1
50 TABLET ORAL
Status: ON HOLD | COMMUNITY
End: 2023-04-04

## 2018-09-26 RX ORDER — ONDANSETRON 2 MG/ML
4 INJECTION INTRAMUSCULAR; INTRAVENOUS EVERY 4 HOURS PRN
Status: DISCONTINUED | OUTPATIENT
Start: 2018-09-26 | End: 2018-10-02 | Stop reason: HOSPADM

## 2018-09-26 RX ORDER — DEXTROSE AND SODIUM CHLORIDE 10; .45 G/100ML; G/100ML
INJECTION, SOLUTION INTRAVENOUS CONTINUOUS
Status: ACTIVE | OUTPATIENT
Start: 2018-09-26 | End: 2018-09-27

## 2018-09-26 RX ORDER — METOCLOPRAMIDE HYDROCHLORIDE 5 MG/ML
10 INJECTION INTRAMUSCULAR; INTRAVENOUS EVERY 6 HOURS
Status: DISCONTINUED | OUTPATIENT
Start: 2018-09-27 | End: 2018-09-27

## 2018-09-26 RX ORDER — DEXTROSE, SODIUM CHLORIDE, SODIUM LACTATE, POTASSIUM CHLORIDE, AND CALCIUM CHLORIDE 5; .6; .31; .03; .02 G/100ML; G/100ML; G/100ML; G/100ML; G/100ML
INJECTION, SOLUTION INTRAVENOUS CONTINUOUS
Status: DISCONTINUED | OUTPATIENT
Start: 2018-09-26 | End: 2018-09-27

## 2018-09-26 RX ORDER — SODIUM CHLORIDE 9 MG/ML
2000 INJECTION, SOLUTION INTRAVENOUS ONCE
Status: DISCONTINUED | OUTPATIENT
Start: 2018-09-26 | End: 2018-09-26

## 2018-09-26 RX ORDER — ONDANSETRON 4 MG/1
4 TABLET, ORALLY DISINTEGRATING ORAL EVERY 6 HOURS PRN
COMMUNITY
End: 2018-12-20

## 2018-09-26 RX ORDER — GABAPENTIN 800 MG/1
800 TABLET ORAL 3 TIMES DAILY
Status: ON HOLD | COMMUNITY
End: 2023-04-24

## 2018-09-26 RX ORDER — PRAVASTATIN SODIUM 20 MG
20 TABLET ORAL EVERY EVENING
Status: DISCONTINUED | OUTPATIENT
Start: 2018-09-26 | End: 2018-10-02 | Stop reason: HOSPADM

## 2018-09-26 RX ORDER — TRAZODONE HYDROCHLORIDE 50 MG/1
100-400 TABLET ORAL 2 TIMES DAILY
Status: DISCONTINUED | OUTPATIENT
Start: 2018-09-26 | End: 2018-09-26

## 2018-09-26 RX ORDER — SODIUM CHLORIDE 9 MG/ML
30 INJECTION, SOLUTION INTRAVENOUS ONCE
Status: COMPLETED | OUTPATIENT
Start: 2018-09-26 | End: 2018-09-26

## 2018-09-26 RX ORDER — MAGNESIUM SULFATE HEPTAHYDRATE 40 MG/ML
2 INJECTION, SOLUTION INTRAVENOUS
Status: DISCONTINUED | OUTPATIENT
Start: 2018-09-26 | End: 2018-09-27

## 2018-09-26 RX ORDER — TRAZODONE HYDROCHLORIDE 50 MG/1
100 TABLET ORAL EVERY MORNING
Status: DISCONTINUED | OUTPATIENT
Start: 2018-09-27 | End: 2018-10-02 | Stop reason: HOSPADM

## 2018-09-26 RX ORDER — ONDANSETRON 2 MG/ML
4 INJECTION INTRAMUSCULAR; INTRAVENOUS ONCE
Status: COMPLETED | OUTPATIENT
Start: 2018-09-26 | End: 2018-09-26

## 2018-09-26 RX ORDER — SODIUM CHLORIDE, SODIUM LACTATE, POTASSIUM CHLORIDE, CALCIUM CHLORIDE 600; 310; 30; 20 MG/100ML; MG/100ML; MG/100ML; MG/100ML
2000 INJECTION, SOLUTION INTRAVENOUS ONCE
Status: COMPLETED | OUTPATIENT
Start: 2018-09-26 | End: 2018-09-26

## 2018-09-26 RX ORDER — DICYCLOMINE HCL 20 MG
20 TABLET ORAL EVERY 6 HOURS PRN
COMMUNITY
End: 2018-12-20

## 2018-09-26 RX ORDER — SODIUM CHLORIDE, SODIUM LACTATE, POTASSIUM CHLORIDE, CALCIUM CHLORIDE 600; 310; 30; 20 MG/100ML; MG/100ML; MG/100ML; MG/100ML
INJECTION, SOLUTION INTRAVENOUS CONTINUOUS
Status: DISCONTINUED | OUTPATIENT
Start: 2018-09-26 | End: 2018-09-27 | Stop reason: ALTCHOICE

## 2018-09-26 RX ORDER — METOCLOPRAMIDE HYDROCHLORIDE 5 MG/ML
10 INJECTION INTRAMUSCULAR; INTRAVENOUS EVERY 6 HOURS
Status: DISCONTINUED | OUTPATIENT
Start: 2018-09-26 | End: 2018-09-26

## 2018-09-26 RX ORDER — GABAPENTIN 300 MG/1
900 CAPSULE ORAL 3 TIMES DAILY
Status: DISCONTINUED | OUTPATIENT
Start: 2018-09-26 | End: 2018-10-02 | Stop reason: HOSPADM

## 2018-09-26 RX ORDER — DICYCLOMINE HCL 20 MG
20 TABLET ORAL EVERY 6 HOURS PRN
Status: DISCONTINUED | OUTPATIENT
Start: 2018-09-26 | End: 2018-10-01

## 2018-09-26 RX ORDER — TRAZODONE HYDROCHLORIDE 50 MG/1
400 TABLET ORAL
Status: DISCONTINUED | OUTPATIENT
Start: 2018-09-26 | End: 2018-10-02 | Stop reason: HOSPADM

## 2018-09-26 RX ORDER — MORPHINE SULFATE 4 MG/ML
2-4 INJECTION, SOLUTION INTRAMUSCULAR; INTRAVENOUS EVERY 4 HOURS PRN
Status: DISCONTINUED | OUTPATIENT
Start: 2018-09-26 | End: 2018-09-28

## 2018-09-26 RX ADMIN — SODIUM CHLORIDE, POTASSIUM CHLORIDE, SODIUM LACTATE AND CALCIUM CHLORIDE 2000 ML: 600; 310; 30; 20 INJECTION, SOLUTION INTRAVENOUS at 20:48

## 2018-09-26 RX ADMIN — SODIUM CHLORIDE 4 UNITS/HR: 9 INJECTION, SOLUTION INTRAVENOUS at 16:58

## 2018-09-26 RX ADMIN — SODIUM CHLORIDE, SODIUM LACTATE, POTASSIUM CHLORIDE, CALCIUM CHLORIDE AND DEXTROSE MONOHYDRATE: 5; 600; 310; 30; 20 INJECTION, SOLUTION INTRAVENOUS at 22:17

## 2018-09-26 RX ADMIN — POTASSIUM PHOSPHATE, MONOBASIC AND POTASSIUM PHOSPHATE, DIBASIC 30 MMOL: 224; 236 INJECTION, SOLUTION INTRAVENOUS at 23:11

## 2018-09-26 RX ADMIN — METOCLOPRAMIDE 10 MG: 5 INJECTION, SOLUTION INTRAMUSCULAR; INTRAVENOUS at 21:56

## 2018-09-26 RX ADMIN — HEPARIN SODIUM 5000 UNITS: 5000 INJECTION, SOLUTION INTRAVENOUS; SUBCUTANEOUS at 21:55

## 2018-09-26 RX ADMIN — ONDANSETRON 4 MG: 2 INJECTION INTRAMUSCULAR; INTRAVENOUS at 15:04

## 2018-09-26 RX ADMIN — METOCLOPRAMIDE 5 MG: 5 INJECTION, SOLUTION INTRAMUSCULAR; INTRAVENOUS at 16:30

## 2018-09-26 RX ADMIN — SODIUM CHLORIDE 2448 ML: 9 INJECTION, SOLUTION INTRAVENOUS at 15:14

## 2018-09-26 ASSESSMENT — ENCOUNTER SYMPTOMS
NAUSEA: 1
VOMITING: 1
COUGH: 0
DIARRHEA: 0
MYALGIAS: 1
CONFUSION: 1
ABDOMINAL PAIN: 1
WHEEZING: 0
VOICE CHANGE: 0
PALPITATIONS: 0
SHORTNESS OF BREATH: 0
FATIGUE: 1

## 2018-09-26 ASSESSMENT — PATIENT HEALTH QUESTIONNAIRE - PHQ9
SUM OF ALL RESPONSES TO PHQ9 QUESTIONS 1 AND 2: 0
2. FEELING DOWN, DEPRESSED, IRRITABLE, OR HOPELESS: NOT AT ALL
1. LITTLE INTEREST OR PLEASURE IN DOING THINGS: NOT AT ALL

## 2018-09-26 ASSESSMENT — LIFESTYLE VARIABLES: DO YOU DRINK ALCOHOL: NO

## 2018-09-26 ASSESSMENT — PAIN SCALES - GENERAL
PAINLEVEL_OUTOF10: 0

## 2018-09-26 NOTE — ED NOTES
Med rec updated and complete.  Allergies reviewed per pts home pharmacy.  Pt is unable at this time to participate in an interview.  No antibiotics noted on profile.

## 2018-09-26 NOTE — ED TRIAGE NOTES
Patient arrives via EMS from home. Family reporting changes in LOC, unclear when last normal was. Family reports pt stopped taking home medications 3 days ago, unclear as to why. Pt is oriented to self only, not able to answer why she is in hospital. Reports nausea, abdominal pain. Denies painful urination. Sepsis screening 3. IV in place from EMS. Received 8 mg zofran PTA
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
## 2018-09-26 NOTE — ED NOTES
Lab called with critical result of glucose 535 at 1458. Critical lab result read back.   Dr. Stokes notified of critical lab result at 1500.  Critical lab result read back by Dr. Stokes.

## 2018-09-26 NOTE — ED PROVIDER NOTES
ED Provider Note    Scribed for Dr. Tre Stokes M.D. by Jihan Jarvis. 9/26/2018  2:24 PM    Primary care provider: Tre Jason M.D.  Means of arrival: ambulance   History obtained from: patient's nurse  History limited by: patient's altered mental status     CHIEF COMPLAINT  Chief Complaint   Patient presents with   • ALOC   • Altered Mental Status       HPI  Julisa Carrion is a 58 y.o. female with a history of diabetes, hypercholesteremia, and hypertension who presents to the Emergency Department via ambulance for evaluation of altered mental status today. Per nurses note, the family did not know when the patient was last normal. She did stop taking her home medications 3 days ago, however, it is unclear why she did so. Patient is reporting abdominal pain that is exacerbated with palpation.    Further HPI is limited secondary to the patient's altered mental status.     REVIEW OF SYSTEMS  Pertinent positives include altered mental status, abdominal pain.   See HPI for further details.     Further ROS is limited secondary to the patient's altered mental status.     PAST MEDICAL HISTORY   has a past medical history of Diabetes; High cholesterol (5/15/2011); Hypertension; Intestinal mass (2015); Migraine; and Staph skin infection.    SURGICAL HISTORY   has a past surgical history that includes other abdominal surgery; gyn surgery; other orthopedic surgery (6-2014); abdominal exploration; gastroscopy (9/3/2017); and colonoscopy (9/3/2017).    SOCIAL HISTORY  Social History   Substance Use Topics   • Smoking status: Former Smoker     Packs/day: 1.00     Years: 20.00     Types: Cigarettes     Quit date: 1/1/1996   • Smokeless tobacco: Former User     Quit date: 6/5/1995   • Alcohol use No      History   Drug Use No     Comment: just prescribed meds       FAMILY HISTORY  Family History   Problem Relation Age of Onset   • Cancer Maternal Aunt         Lung cancer d/t smoking       CURRENT MEDICATIONS  Home  "Medications     Reviewed by Addi Merino (Pharmacy Tech) on 09/26/18 at 1519  Med List Status: Complete   Medication Last Dose Status   alprazolam (XANAX) 0.5 MG Tab unknown Active   aspirin EC (ECOTRIN) 81 MG Tablet Delayed Response unknown Active   dicyclomine (BENTYL) 20 MG Tab unknown Active   gabapentin (NEURONTIN) 300 MG Cap unknown Active   lisinopril (PRINIVIL) 5 MG TABS unknown Active   morphine ER (MS CONTIN) 60 MG Tab CR tablet unknown Active   ondansetron (ZOFRAN ODT) 4 MG TABLET DISPERSIBLE unknown Active   oxycodone-acetaminophen (PERCOCET-10)  MG Tab unknown Active   pravastatin (PRAVACHOL) 20 MG TABS unknown Active   traZODone (DESYREL) 100 MG Tab unknown Active                ALLERGIES  No Known Allergies    PHYSICAL EXAM  VITAL SIGNS: /70   Pulse (!) 115   Temp 36.7 °C (98.1 °F)   Resp 16   Ht 1.702 m (5' 7\")   Wt 81.6 kg (180 lb)   LMP 02/05/2009   BMI 28.19 kg/m²     Constitutional: Patient is lethargic and confused. She smells strongly of urine.  Appears ill  HENT: Normocephalic, Atraumatic, Bilateral external ears normal, Oropharynx dry, No oral exudates.   Eyes: PERRLA, EOMI, Conjunctiva normal, No discharge.   Neck: No tenderness, Supple, No stridor.   Lymphatic: No lymphadenopathy noted.   Cardiovascular: Tachycardic, Normal rhythm.   Thorax & Lungs: Clear to auscultation bilaterally, No respiratory distress, No wheezing, No crackles.   Abdomen: Soft, diffuse tenderness, No masses, No pulsatile masses.   Skin: Warm, Dry, No erythema, No rash.   Extremities:, No edema. No cyanosis.   Musculoskeletal: No major deformities noted.  Intact distal pulses  Neurologic: Awake, alert to person only. Patient appears confused. Moves all extremities spontaneously.   Psychiatric: Patient is confused. Unable to fully assess secondary to the patient's altered mental status.     LABS  Results for orders placed or performed during the hospital encounter of 09/26/18   Lactic acid " (lactate)   Result Value Ref Range    Lactic Acid 3.1 (H) 0.5 - 2.0 mmol/L   CBC WITH DIFFERENTIAL   Result Value Ref Range    WBC 8.3 4.8 - 10.8 K/uL    RBC 5.02 4.20 - 5.40 M/uL    Hemoglobin 14.1 12.0 - 16.0 g/dL    Hematocrit 43.3 37.0 - 47.0 %    MCV 86.3 81.4 - 97.8 fL    MCH 28.1 27.0 - 33.0 pg    MCHC 32.6 (L) 33.6 - 35.0 g/dL    RDW 48.6 35.9 - 50.0 fL    Platelet Count 370 164 - 446 K/uL    MPV 9.6 9.0 - 12.9 fL    Neutrophils-Polys 79.80 (H) 44.00 - 72.00 %    Lymphocytes 13.90 (L) 22.00 - 41.00 %    Monocytes 4.70 0.00 - 13.40 %    Eosinophils 0.00 0.00 - 6.90 %    Basophils 0.40 0.00 - 1.80 %    Immature Granulocytes 1.20 (H) 0.00 - 0.90 %    Nucleated RBC 0.00 /100 WBC    Neutrophils (Absolute) 6.61 2.00 - 7.15 K/uL    Lymphs (Absolute) 1.15 1.00 - 4.80 K/uL    Monos (Absolute) 0.39 0.00 - 0.85 K/uL    Eos (Absolute) 0.00 0.00 - 0.51 K/uL    Baso (Absolute) 0.03 0.00 - 0.12 K/uL    Immature Granulocytes (abs) 0.10 0.00 - 0.11 K/uL    NRBC (Absolute) 0.00 K/uL   COMP METABOLIC PANEL   Result Value Ref Range    Sodium 137 135 - 145 mmol/L    Potassium 4.5 3.6 - 5.5 mmol/L    Chloride 102 96 - 112 mmol/L    Co2 10 (L) 20 - 33 mmol/L    Anion Gap 25.0 (H) 0.0 - 11.9    Glucose 535 (HH) 65 - 99 mg/dL    Bun 24 (H) 8 - 22 mg/dL    Creatinine 1.11 0.50 - 1.40 mg/dL    Calcium 10.3 8.5 - 10.5 mg/dL    AST(SGOT) 6 (L) 12 - 45 U/L    ALT(SGPT) 6 2 - 50 U/L    Alkaline Phosphatase 117 (H) 30 - 99 U/L    Total Bilirubin 0.3 0.1 - 1.5 mg/dL    Albumin 3.8 3.2 - 4.9 g/dL    Total Protein 8.6 (H) 6.0 - 8.2 g/dL    Globulin 4.8 (H) 1.9 - 3.5 g/dL    A-G Ratio 0.8 g/dL   URINALYSIS   Result Value Ref Range    Color Yellow     Character Clear     Specific Gravity 1.035 <1.035    Ph 5.0 5.0 - 8.0    Glucose >=1000 (A) Negative mg/dL    Ketones >=160 Negative mg/dL    Protein 100 (A) Negative mg/dL    Bilirubin Negative Negative    Urobilinogen, Urine 0.2 Negative    Nitrite Negative Negative    Leukocyte Esterase  Negative Negative    Occult Blood Negative Negative    Micro Urine Req Microscopic    ESTIMATED GFR   Result Value Ref Range    GFR If African American >60 >60 mL/min/1.73 m 2    GFR If Non  51 (A) >60 mL/min/1.73 m 2   BETA-HYDROXYBUTYRIC ACID   Result Value Ref Range    beta-Hydroxybutyric Acid >8.00 (H) 0.02 - 0.27 mmol/L   VENOUS BLOOD GAS   Result Value Ref Range    Venous Bg Ph 7.25 (L) 7.31 - 7.45    Venous Bg Pco2 22.3 (L) 41.0 - 51.0 mmHg    Venous Bg Po2 49.9 (H) 25.0 - 40.0 mmHg    Venous Bg O2 Saturation 80.9 %    Venous Bg Hco3 10 (L) 24 - 28 mmol/L    Venous Bg Base Excess -15 mmol/L    Body Temp see below Centigrade   URINE MICROSCOPIC (W/UA)   Result Value Ref Range    WBC 0-2 /hpf    RBC 0-2 /hpf    Bacteria Few (A) None /hpf    Epithelial Cells Few /hpf    Hyaline Cast 0-2 /lpf   MAGNESIUM   Result Value Ref Range    Magnesium 2.7 (H) 1.5 - 2.5 mg/dL   PHOSPHORUS   Result Value Ref Range    Phosphorus 3.7 2.5 - 4.5 mg/dL   LACTIC ACID   Result Value Ref Range    Lactic Acid 2.0 0.5 - 2.0 mmol/L   ACCU-CHEK GLUCOSE   Result Value Ref Range    Glucose - Accu-Ck 463 (HH) 65 - 99 mg/dL   EKG   Result Value Ref Range    Report       [facility]    Test Date:  2018  Pt Name:    ALIDA HUTCHINSON                 Department: ER  MRN:        [id number]                      Room:       R100  Gender:     Female                       Technician: [facility]  :        1960                   Requested By:LUIS LÓPEZ  Order #:    [id number]                    Reading MD: Evens Gerber MD    Measurements  Intervals                                Axis  Rate:       118                          P:          48  MI:         156                          QRS:        3  QRSD:       90                           T:          173  QT:         332  QTc:        466    Interpretive Statements  SINUS TACHYCARDIA  PROBABLE INFERIOR INFARCT, AGE INDETERMINATE  NONSPECIFIC ST-T WAVE CHANGES, INFERIOR AND  LATERAL LEADS  Compared to ECG 07/08/2018 21:45:39  Left ventricular hypertrophy no longer present  Myocardial infarct finding still present    Electronically Signed On 9- 18:43:23 PDT by Evens Gerber MD       All labs reviewed by me.    RADIOLOGY  DX-CHEST-PORTABLE (1 VIEW)   Final Result      Borderline enlargement of the cardiomediastinal silhouette without definite acute cardiopulmonary abnormality.      EC-ECHOCARDIOGRAM COMPLETE W/O CONT    (Results Pending)     The radiologist's interpretation of all radiological studies have been reviewed by me.    COURSE & MEDICAL DECISION MAKING  Pertinent Labs & Imaging studies reviewed. (See chart for details)    2:24 PM - Patient seen and examined at bedside. Patient will be treated with Zofran 4 mg. The patient will be resuscitated with NS IV for her dry mucous membranes and tachycardia. Ordered DX chest, lactic acid, CBC, CMP, urinalysis, urine culture, blood culture, estimated GFR to evaluate her symptoms. The differential diagnoses include but are not limited to: DKA, sepsis, UTI.    3:36 PM- Reviewed the patient's lab and imaging results. Patient's lactic acid is 3.1 and her glucose is 535. Her bicarbonate is 10.     4:21 PM- Patient will be treated with Reglan 5 mg and Humulin 62.5 units in  mL infusion.    4:34 PM- Paged [facility] gold team for admission to the ICU.    4:45 PM-Spoke with [facility] gold team concerning patient case. Agreed to admit patient for further treatment and evaluation.     5:00 PM- Patient was reassessed. There was a good response to IV fluids as the patient's tachycardia is improving. Patient will be admitted for further care.     Decision Making:  Patient presenting acutely ill with diabetic ketoacidosis and acidotic with a bicarb of 10 and is confused.  She was fluid resuscitated and insulin drip started will need ICU care care discussed with urinary cold team who will be admitting her      CRITICAL CARE  The very real possibilty of  a deterioration of this patient's condition required the highest level of my preparedness for sudden, emergent intervention.  I provided critical care services, which included medication orders, frequent reevaluations of the patient's condition and response to treatment, ordering and reviewing test results, and discussing the case with various consultants.  The critical care time associated with the care of the patient was 35 minutes. Review chart for interventions. This time is exclusive of any other billable procedures.     DISPOSITION:  Patient will be admitted to [facility] team in critical condition.    FINAL IMPRESSION  Diabetic ketoacidosis    Critical care time of 35 minutes.      Jihan DELAROSA (Pool), am scribing for, and in the presence of, Tre Stokes M.D..    Electronically signed by: Jihan Jarvis (Pool), 9/26/2018    Tre DELAROSA M.D. personally performed the services described in this documentation, as scribed by Jihan Jarvis in my presence, and it is both accurate and complete. C.     The note accurately reflects work and decisions made by me.  Tre Stokes  9/26/2018  8:49 PM

## 2018-09-26 NOTE — ED NOTES
"This RN at bedside. Pt tearful and was able to verbalize that she knew she was brought into the hospital because \"her boyfriend was worried about her because she hasn't taken her medication in weeks.\" When   "

## 2018-09-27 ENCOUNTER — APPOINTMENT (OUTPATIENT)
Dept: CARDIOLOGY | Facility: MEDICAL CENTER | Age: 58
DRG: 637 | End: 2018-09-27
Attending: INTERNAL MEDICINE
Payer: MEDICAID

## 2018-09-27 PROBLEM — R01.1 MURMUR, CARDIAC: Status: ACTIVE | Noted: 2018-09-27

## 2018-09-27 LAB
ANION GAP SERPL CALC-SCNC: 10 MMOL/L (ref 0–11.9)
ANION GAP SERPL CALC-SCNC: 8 MMOL/L (ref 0–11.9)
ANION GAP SERPL CALC-SCNC: 9 MMOL/L (ref 0–11.9)
B-OH-BUTYR SERPL-MCNC: 0.36 MMOL/L (ref 0.02–0.27)
BUN SERPL-MCNC: 11 MG/DL (ref 8–22)
BUN SERPL-MCNC: 18 MG/DL (ref 8–22)
BUN SERPL-MCNC: 20 MG/DL (ref 8–22)
CALCIUM SERPL-MCNC: 8.4 MG/DL (ref 8.5–10.5)
CALCIUM SERPL-MCNC: 8.4 MG/DL (ref 8.5–10.5)
CALCIUM SERPL-MCNC: 8.8 MG/DL (ref 8.5–10.5)
CHLORIDE SERPL-SCNC: 106 MMOL/L (ref 96–112)
CHLORIDE SERPL-SCNC: 114 MMOL/L (ref 96–112)
CHLORIDE SERPL-SCNC: 115 MMOL/L (ref 96–112)
CO2 SERPL-SCNC: 18 MMOL/L (ref 20–33)
CO2 SERPL-SCNC: 19 MMOL/L (ref 20–33)
CO2 SERPL-SCNC: 19 MMOL/L (ref 20–33)
CREAT SERPL-MCNC: 0.73 MG/DL (ref 0.5–1.4)
CREAT SERPL-MCNC: 0.74 MG/DL (ref 0.5–1.4)
CREAT SERPL-MCNC: 0.77 MG/DL (ref 0.5–1.4)
GLUCOSE BLD-MCNC: 134 MG/DL (ref 65–99)
GLUCOSE BLD-MCNC: 136 MG/DL (ref 65–99)
GLUCOSE BLD-MCNC: 143 MG/DL (ref 65–99)
GLUCOSE BLD-MCNC: 143 MG/DL (ref 65–99)
GLUCOSE BLD-MCNC: 146 MG/DL (ref 65–99)
GLUCOSE BLD-MCNC: 148 MG/DL (ref 65–99)
GLUCOSE BLD-MCNC: 151 MG/DL (ref 65–99)
GLUCOSE BLD-MCNC: 151 MG/DL (ref 65–99)
GLUCOSE BLD-MCNC: 159 MG/DL (ref 65–99)
GLUCOSE BLD-MCNC: 163 MG/DL (ref 65–99)
GLUCOSE BLD-MCNC: 166 MG/DL (ref 65–99)
GLUCOSE BLD-MCNC: 243 MG/DL (ref 65–99)
GLUCOSE BLD-MCNC: 320 MG/DL (ref 65–99)
GLUCOSE SERPL-MCNC: 147 MG/DL (ref 65–99)
GLUCOSE SERPL-MCNC: 167 MG/DL (ref 65–99)
GLUCOSE SERPL-MCNC: 317 MG/DL (ref 65–99)
LACTATE BLD-SCNC: 1.3 MMOL/L (ref 0.5–2)
LACTATE BLD-SCNC: 1.4 MMOL/L (ref 0.5–2)
LIPASE SERPL-CCNC: 17 U/L (ref 11–82)
LV EJECT FRACT  99904: 60
LV EJECT FRACT MOD 2C 99903: 55.49
LV EJECT FRACT MOD 4C 99902: 56.56
LV EJECT FRACT MOD BP 99901: 54.25
MAGNESIUM SERPL-MCNC: 2 MG/DL (ref 1.5–2.5)
MAGNESIUM SERPL-MCNC: 2.1 MG/DL (ref 1.5–2.5)
OSMOLALITY SERPL: 304 MOSM/KG H2O (ref 278–298)
PHOSPHATE SERPL-MCNC: 1.9 MG/DL (ref 2.5–4.5)
PHOSPHATE SERPL-MCNC: 2.2 MG/DL (ref 2.5–4.5)
POTASSIUM SERPL-SCNC: 3.6 MMOL/L (ref 3.6–5.5)
POTASSIUM SERPL-SCNC: 3.9 MMOL/L (ref 3.6–5.5)
POTASSIUM SERPL-SCNC: 4 MMOL/L (ref 3.6–5.5)
SODIUM SERPL-SCNC: 134 MMOL/L (ref 135–145)
SODIUM SERPL-SCNC: 142 MMOL/L (ref 135–145)
SODIUM SERPL-SCNC: 142 MMOL/L (ref 135–145)

## 2018-09-27 PROCEDURE — 770022 HCHG ROOM/CARE - ICU (200)

## 2018-09-27 PROCEDURE — 83930 ASSAY OF BLOOD OSMOLALITY: CPT

## 2018-09-27 PROCEDURE — 700105 HCHG RX REV CODE 258: Performed by: INTERNAL MEDICINE

## 2018-09-27 PROCEDURE — 82010 KETONE BODYS QUAN: CPT

## 2018-09-27 PROCEDURE — 82962 GLUCOSE BLOOD TEST: CPT | Mod: 91

## 2018-09-27 PROCEDURE — 700101 HCHG RX REV CODE 250: Performed by: INTERNAL MEDICINE

## 2018-09-27 PROCEDURE — 700102 HCHG RX REV CODE 250 W/ 637 OVERRIDE(OP): Performed by: INTERNAL MEDICINE

## 2018-09-27 PROCEDURE — 83735 ASSAY OF MAGNESIUM: CPT | Mod: 91

## 2018-09-27 PROCEDURE — 84100 ASSAY OF PHOSPHORUS: CPT | Mod: 91

## 2018-09-27 PROCEDURE — 83690 ASSAY OF LIPASE: CPT

## 2018-09-27 PROCEDURE — 700105 HCHG RX REV CODE 258: Performed by: STUDENT IN AN ORGANIZED HEALTH CARE EDUCATION/TRAINING PROGRAM

## 2018-09-27 PROCEDURE — 83605 ASSAY OF LACTIC ACID: CPT

## 2018-09-27 PROCEDURE — A9270 NON-COVERED ITEM OR SERVICE: HCPCS | Performed by: INTERNAL MEDICINE

## 2018-09-27 PROCEDURE — 700111 HCHG RX REV CODE 636 W/ 250 OVERRIDE (IP): Performed by: INTERNAL MEDICINE

## 2018-09-27 PROCEDURE — 80048 BASIC METABOLIC PNL TOTAL CA: CPT

## 2018-09-27 PROCEDURE — 93306 TTE W/DOPPLER COMPLETE: CPT | Mod: 26 | Performed by: INTERNAL MEDICINE

## 2018-09-27 PROCEDURE — A6212 FOAM DRG <=16 SQ IN W/BORDER: HCPCS

## 2018-09-27 PROCEDURE — 93306 TTE W/DOPPLER COMPLETE: CPT

## 2018-09-27 PROCEDURE — 700111 HCHG RX REV CODE 636 W/ 250 OVERRIDE (IP): Performed by: STUDENT IN AN ORGANIZED HEALTH CARE EDUCATION/TRAINING PROGRAM

## 2018-09-27 PROCEDURE — 99233 SBSQ HOSP IP/OBS HIGH 50: CPT | Performed by: INTERNAL MEDICINE

## 2018-09-27 PROCEDURE — 700102 HCHG RX REV CODE 250 W/ 637 OVERRIDE(OP): Performed by: STUDENT IN AN ORGANIZED HEALTH CARE EDUCATION/TRAINING PROGRAM

## 2018-09-27 RX ORDER — POTASSIUM CHLORIDE 7.45 MG/ML
10 INJECTION INTRAVENOUS
Status: COMPLETED | OUTPATIENT
Start: 2018-09-27 | End: 2018-09-27

## 2018-09-27 RX ORDER — LABETALOL HYDROCHLORIDE 5 MG/ML
10 INJECTION, SOLUTION INTRAVENOUS EVERY 4 HOURS PRN
Status: DISCONTINUED | OUTPATIENT
Start: 2018-09-27 | End: 2018-10-02 | Stop reason: HOSPADM

## 2018-09-27 RX ORDER — INSULIN GLARGINE 100 [IU]/ML
0.2 INJECTION, SOLUTION SUBCUTANEOUS EVERY MORNING
Status: DISCONTINUED | OUTPATIENT
Start: 2018-09-27 | End: 2018-09-29

## 2018-09-27 RX ORDER — POTASSIUM CHLORIDE 7.45 MG/ML
10 INJECTION INTRAVENOUS
Status: DISCONTINUED | OUTPATIENT
Start: 2018-09-27 | End: 2018-09-27

## 2018-09-27 RX ORDER — HYDRALAZINE HYDROCHLORIDE 20 MG/ML
20 INJECTION INTRAMUSCULAR; INTRAVENOUS EVERY 4 HOURS PRN
Status: DISCONTINUED | OUTPATIENT
Start: 2018-09-27 | End: 2018-09-30

## 2018-09-27 RX ORDER — ACETAMINOPHEN 500 MG
1000 TABLET ORAL EVERY 6 HOURS PRN
Status: DISCONTINUED | OUTPATIENT
Start: 2018-09-27 | End: 2018-09-30

## 2018-09-27 RX ORDER — DEXTROSE MONOHYDRATE 25 G/50ML
25 INJECTION, SOLUTION INTRAVENOUS
Status: DISCONTINUED | OUTPATIENT
Start: 2018-09-27 | End: 2018-09-29

## 2018-09-27 RX ORDER — METOCLOPRAMIDE HYDROCHLORIDE 5 MG/ML
10 INJECTION INTRAMUSCULAR; INTRAVENOUS EVERY 6 HOURS PRN
Status: DISCONTINUED | OUTPATIENT
Start: 2018-09-27 | End: 2018-10-02 | Stop reason: HOSPADM

## 2018-09-27 RX ORDER — SODIUM CHLORIDE, SODIUM LACTATE, POTASSIUM CHLORIDE, CALCIUM CHLORIDE 600; 310; 30; 20 MG/100ML; MG/100ML; MG/100ML; MG/100ML
INJECTION, SOLUTION INTRAVENOUS CONTINUOUS
Status: DISCONTINUED | OUTPATIENT
Start: 2018-09-27 | End: 2018-09-28

## 2018-09-27 RX ADMIN — POTASSIUM CHLORIDE 10 MEQ: 7.46 INJECTION, SOLUTION INTRAVENOUS at 03:59

## 2018-09-27 RX ADMIN — POTASSIUM PHOSPHATE, MONOBASIC AND POTASSIUM PHOSPHATE, DIBASIC 30 MMOL: 224; 236 INJECTION, SOLUTION INTRAVENOUS at 08:49

## 2018-09-27 RX ADMIN — DEXTROSE AND SODIUM CHLORIDE: 10; .45 INJECTION, SOLUTION INTRAVENOUS at 01:15

## 2018-09-27 RX ADMIN — MORPHINE SULFATE 4 MG: 4 INJECTION INTRAVENOUS at 01:48

## 2018-09-27 RX ADMIN — PRAVASTATIN SODIUM 20 MG: 20 TABLET ORAL at 19:36

## 2018-09-27 RX ADMIN — INSULIN LISPRO 10 UNITS: 100 INJECTION, SOLUTION INTRAVENOUS; SUBCUTANEOUS at 21:07

## 2018-09-27 RX ADMIN — HEPARIN SODIUM 5000 UNITS: 5000 INJECTION, SOLUTION INTRAVENOUS; SUBCUTANEOUS at 05:29

## 2018-09-27 RX ADMIN — INSULIN GLARGINE 18 UNITS: 100 INJECTION, SOLUTION SUBCUTANEOUS at 10:46

## 2018-09-27 RX ADMIN — ONDANSETRON 4 MG: 2 INJECTION INTRAMUSCULAR; INTRAVENOUS at 05:29

## 2018-09-27 RX ADMIN — METOCLOPRAMIDE 10 MG: 5 INJECTION, SOLUTION INTRAMUSCULAR; INTRAVENOUS at 11:17

## 2018-09-27 RX ADMIN — SODIUM CHLORIDE 6 UNITS/HR: 9 INJECTION, SOLUTION INTRAVENOUS at 04:56

## 2018-09-27 RX ADMIN — TRAZODONE HYDROCHLORIDE 400 MG: 50 TABLET ORAL at 23:03

## 2018-09-27 RX ADMIN — HEPARIN SODIUM 5000 UNITS: 5000 INJECTION, SOLUTION INTRAVENOUS; SUBCUTANEOUS at 14:53

## 2018-09-27 RX ADMIN — SODIUM CHLORIDE, POTASSIUM CHLORIDE, SODIUM LACTATE AND CALCIUM CHLORIDE: 600; 310; 30; 20 INJECTION, SOLUTION INTRAVENOUS at 12:43

## 2018-09-27 RX ADMIN — HEPARIN SODIUM 5000 UNITS: 5000 INJECTION, SOLUTION INTRAVENOUS; SUBCUTANEOUS at 20:55

## 2018-09-27 RX ADMIN — GABAPENTIN 900 MG: 300 CAPSULE ORAL at 19:36

## 2018-09-27 RX ADMIN — GABAPENTIN 900 MG: 300 CAPSULE ORAL at 09:30

## 2018-09-27 RX ADMIN — INSULIN LISPRO 4 UNITS: 100 INJECTION, SOLUTION INTRAVENOUS; SUBCUTANEOUS at 19:38

## 2018-09-27 RX ADMIN — SODIUM CHLORIDE, POTASSIUM CHLORIDE, SODIUM LACTATE AND CALCIUM CHLORIDE: 600; 310; 30; 20 INJECTION, SOLUTION INTRAVENOUS at 21:13

## 2018-09-27 RX ADMIN — HYDRALAZINE HYDROCHLORIDE 20 MG: 20 INJECTION INTRAMUSCULAR; INTRAVENOUS at 20:55

## 2018-09-27 RX ADMIN — POTASSIUM CHLORIDE 10 MEQ: 7.46 INJECTION, SOLUTION INTRAVENOUS at 05:26

## 2018-09-27 RX ADMIN — METOCLOPRAMIDE 10 MG: 5 INJECTION, SOLUTION INTRAMUSCULAR; INTRAVENOUS at 04:18

## 2018-09-27 ASSESSMENT — ENCOUNTER SYMPTOMS
DIAPHORESIS: 0
SORE THROAT: 0
WEAKNESS: 0
HEADACHES: 0
SHORTNESS OF BREATH: 0
CHILLS: 0
DIARRHEA: 0
ABDOMINAL PAIN: 1
FLANK PAIN: 0
POLYDIPSIA: 1
SPEECH DIFFICULTY: 0
COUGH: 0
FEVER: 0
NAUSEA: 1
MYALGIAS: 1
VOMITING: 0
POLYPHAGIA: 0
NUMBNESS: 0
ARTHRALGIAS: 1

## 2018-09-27 ASSESSMENT — PAIN SCALES - GENERAL
PAINLEVEL_OUTOF10: 0
PAINLEVEL_OUTOF10: 5
PAINLEVEL_OUTOF10: 4
PAINLEVEL_OUTOF10: 4
PAINLEVEL_OUTOF10: 0
PAINLEVEL_OUTOF10: 0
PAINLEVEL_OUTOF10: 5
PAINLEVEL_OUTOF10: 6
PAINLEVEL_OUTOF10: 0
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
## 2018-09-27 NOTE — ASSESSMENT & PLAN NOTE
RESOLVED  Likely due to underlying metabolic disturbance, toxicologic also in the differential as patient is on multiple opiates at home  UDS negative  All neuro issues have resolved imaging  We will continue to limit sedatives

## 2018-09-27 NOTE — ASSESSMENT & PLAN NOTE
Wound consult pending still?  No notes seen  Does not appear clinically infected, afebrile with normal white count  No new complaints

## 2018-09-27 NOTE — PROGRESS NOTES
Patient is being followed up per DKA protocol  Her Phosphorous was low at 1.9 and drip was changed to K-phos      Vitals:    09/26/18 2100 09/26/18 2200 09/26/18 2300 09/27/18 0000   BP:       Pulse: (!) 114 (!) 116 (!) 111 (!) 114   Resp: 17 18 17 19   Temp:  37.2 °C (99 °F)  36.7 °C (98 °F)   SpO2: 97% 98% 98% 94%   Weight:       Height:           Lab Results   Component Value Date/Time    CHOLSTRLTOT 138 12/27/2016 02:11 AM    LDL 87 12/27/2016 02:11 AM    HDL 28 (A) 12/27/2016 02:11 AM    TRIGLYCERIDE 113 12/27/2016 02:11 AM       Lab Results   Component Value Date/Time    SODIUM 140 09/26/2018 08:10 PM    POTASSIUM 3.9 09/26/2018 08:10 PM    CHLORIDE 110 09/26/2018 08:10 PM    CO2 13 (L) 09/26/2018 08:10 PM    GLUCOSE 360 (H) 09/26/2018 08:10 PM    BUN 26 (H) 09/26/2018 08:10 PM    CREATININE 1.02 09/26/2018 08:10 PM     Lab Results   Component Value Date/Time    ALKPHOSPHAT 117 (H) 09/26/2018 02:05 PM    ASTSGOT 6 (L) 09/26/2018 02:05 PM    ALTSGPT 6 09/26/2018 02:05 PM    TBILIRUBIN 0.3 09/26/2018 02:05 PM      Blood Glucose at trending 360> 146    A-Gap improving 25>17    Continue DKA protocol

## 2018-09-27 NOTE — CONSULTS
"Diabetes education: Pt has a long standing hx of diabetes on insulin, most recently not taking medications. Pt known from previous education. Please see below.  Admission blood sugar was 535. A1 c needs to be collected.  Pt is currently on Lantus 18 units in AM and Humalog 6 units ac with Humalog sliding scale all started today.  Pt not appropriate for assessment at this time.  Plan: Pt will be seen and needs assessed when pt more appropriate. Please call [phone]. Pt needs Hg A1c collected.        Previous education:   Consults  Date of Service: 12/31/2014 4:51 PM  Joya Hobbs R.N.      Diabetes education: Met with Julisa regarding diabetes education. Pt was seen in 2013:              Consults signed by Joya Hobbs R.N. at 01/21/13 1869      Author:  Joya Hobbs R.N.  Service:  (none)  Author Type:  Diabetes Health Educator     Filed:  01/21/13 6652  Note Time:  01/21/13 1245                     Diabetes Education: Met with Julisa who has a history of diabetes. Per Julisa, she was using Humalog 2 times a day before breakfast and dinner. She was also using Glipizide as needed when her blood sugars were \"high\". She was following with the [facility] but stopped 6 months ago when her  hurt his wrist and could not work. She was unable to afford to go to [facility] or get her medications.   She stated she had accessed [facility], but \"they only cover 100.00 for one year and that was one prescription\". She states she has a [employer] meter but cannot afford even those strips as \"we are barely paying our bills\".   She stated she had gone to classes on diabetes in the past.   Pt has met with [employer] who helped her with applications. Discussed assistance program for the insulin but needed PCP.   Plan: CDE will continue to follow and assist when discharge plans/meds are known. Pt will need assistance with medications at discharge. Pt currently on Novolog and Levemir (basal/bolus coverage) and would not be able to afford them. Not " "sure if she could even afford the generic NPH and Regular at [facility]. Pt may be able to get a meter and some strips from [facility].      Discussed insulin (she uses vials not pens), site rotation (she only uses the left side of her abdomen as she has \"sores\" on the right side), discussed rotating through out the left side as well as buttocks and legs. Arms are a possibility if the volume was less. Discussed asking her PCP to split the dose of Lantus and if going home on fast acting insulin ac only. Discussed hs snacks, finger sticks ( her one touch meter is acting up), calling [facility] when she is discharged. She test twice a day to 4 times a day depending on how she feels. Pt was given and instructed on a one touch mini (screen bigger and she can see it).   She is on Lantus, Metformin and Januvia at home. She states her PCP gets a blood test (a1c )every month but does not know the value. Her current test is still pending.     Plan: Pt would benefit from having finger sticks and coverage ac not every six hours. If going home on fast acting insulin should be ac only and may benefit from splitting dose of Lantus am and hs. Please call [phone] for questions or if needs change.                                             "

## 2018-09-27 NOTE — ASSESSMENT & PLAN NOTE
Murmur identified on admit, hopefully just flow murmur  Echocardiogram with Doppler grossly negative - calcified Ao valve - f/u exam and repeat ECHO with primary MD?

## 2018-09-27 NOTE — CARE PLAN
Problem: Safety  Goal: Will remain free from falls    Intervention: Implement fall precautions  Patient disoriented and confused. Educated on the importance of staying in bed to prevent falls. Patient continues to be confused and tries to pull lines and get out of bed. Soft wrist restraints applied. Bed in low position and call light within reach.      Problem: Skin Integrity  Goal: Risk for impaired skin integrity will decrease    Intervention: Assess risk factors for impaired skin integrity and/or pressure ulcers  Patient turned and repositioned every two hours. Skin integrity assessed during turns. Elbows and heels floating on pillows.

## 2018-09-27 NOTE — PROGRESS NOTES
Diabetes education: Pt not appropriate for assessment at this time. Please see consult note for previous education.  Plan: Pt will be seen and needs assessed when pt more appropriate. Please call [phone]. Pt needs Hg A1c collected.

## 2018-09-27 NOTE — ASSESSMENT & PLAN NOTE
Behavior modification and weight loss was discussed with the patient at length again  ROS for ROBINA grossly negative  Counseling ongoing, to need to monitor for ROBINA

## 2018-09-27 NOTE — ASSESSMENT & PLAN NOTE
This problem is noted in her EHR chart - we will monitor for this behavior  Does have Hx chronic pain - resume CAMILLA and Trazadone when clinically appropriate - limit narcotics  Pain MD chirinos as an outpatient?

## 2018-09-27 NOTE — ASSESSMENT & PLAN NOTE
Hold home narcotics while patient is n.p.o.  Patient may require small amounts of opiate to prevent withdrawal, prevent overuse  Restart home regimen when able, attempt to reduce overall dose and use nonnarcotic alternatives  Pain management consult at some point as an outpatient?  This was encouraged

## 2018-09-27 NOTE — ASSESSMENT & PLAN NOTE
Resolved    Status post optimization of intravascular volume with fluids  S/p  balanced electrolyte solution for resuscitation and maintenance fluids to prevent hyperchloremic non-anion gap metabolic acidosis  DKA protocol with insulin drip complete  Every hour Accu-Cheks, every 4 hour BMP -> reduced, mag, phos -electrolyte replacement protocols  Goal Magnesium: 2, Phosphorus: 2, K 5  Transition to sliding scale insulin, anion gap closed and bicarb now low normal, 9/27  Some history of compliance issues, diabetic education will be helpful here hopefully
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
## 2018-09-27 NOTE — ASSESSMENT & PLAN NOTE
2/2 DKA, completed resuscitation  Anion gap closed, lactic acid level now normal as well  Patient educated Re: Severity of her problem and need for compliance

## 2018-09-27 NOTE — PROGRESS NOTES
Patient arrived on RICU with ACLS nurse, RN, and CCT with monitor. Patient confused, not answering questions. Insulin drip running per MAR.

## 2018-09-27 NOTE — ASSESSMENT & PLAN NOTE
- Resolved  -likely due to lactic acidosis + diabetic ketoacidosis (vs HHS)  -aggressive IVF resuscitation on DKA protocol,   -AG remains closed, CO2 >17.   -Transitioned 9/27 from insulin drip to pre-meal + ISS + basal insulin and LR discontinued
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
## 2018-09-27 NOTE — WOUND TEAM
"[facility]  Inpatient Services  Initial Wound and Skin Care Evaluation    Admission Date:  2018   HPI, PMH, SH: Reviewed  Unit where seen by Wound Team: R100/00    WOUND CONSULT RELATED TO:  Right heel.     SUBJECTIVE:  \"Are you going to put a dressing on it?\"      Self Report / Pain Level:  Patient denied.       OBJECTIVE:  Agreeable, slightly confused.    WOUND TYPE, LOCATION, CHARACTERISTICS (Pressure ulcers: location, stage, POA or date identified)    Location and type of wound:Right heel, medial: Diabetic ulcer.         Periwound:    Moderately callused.      Drainage:     Moderate, sanguinous post debridement. None pre debridement.      Tissue Type and %:    100% red.     Wound Edges:   Attached, undefined.  Odor:     None  Exposed structure(s):  None  S&S of Infection:   None      Measurements: Taken 2018  Length:    3 cm  Width:     0.5 cm   Depth:     0.3 cm  Tracts/Underminin cm    Vascular:    Dorsal Pedal pulses:  1+ palpable  Posterior tib pulses:  1+Palpable.    YANET:     NA not applying compression.    Lab Values:    WBC:       WBC   Date/Time Value Ref Range Status   2018 02:05 PM 8.3 4.8 - 10.8 K/uL Final     AIC:      Lab Results   Component Value Date/Time    HBA1C 9.4 (H) 2017 11:42 PM         Culture:   Completed NA no signs of infection.    INTERVENTIONS BY WOUND TEAM:  Removed sock. Cleaned wound and periwound with saline and gauze. Used curette to debride off approximately 0.5 cm of callused periwound. Cleaned wound again. Dressed wound.Left heel assessed, intact.     Dressing selection:  Adhesive silicone foam.         Interdisciplinary consultation:  RN, patient.     EVALUATION:  Adhesive silicone foam is protective, absorbant, and helps to maintain optimal moisture level needed for wound healing.      Factors affecting wound healing:  Diabetes with neuropathy, altered mental status.   Goals:  Steady decrease in wound area and depth weekly. "     NURSING PLAN OF CARE ORDERS (X):    Dressing changes: See Dressing Care orders:   X  Skin care: See Skin Care orders: X  Rectal tube care: See Rectal Tube Care orders:   Other orders:    RSKIN: CURRENT (X) ORDERED (O)  Q shift Jd:  X  Q shift pressure point assessments:  X  Pressure redistribution mattress        Waffle overlay  CORINNE    X ICU bed.  Bariatric CORINNE      Bariatric foam        Heel float boots       Heels floated on pillows    X  Barrier wipes      Barrier Cream      Barrier paste      Sacral silicone dressing      Silicone O2 tubing    X  Anchorfast      Trach with Optifoam split foam       Waffle cushion      Rectal tube or BMS      Antifungal tx    Turn q 2 hours   X  Up to chair     Ambulate   PT/OT     Dietician      PO   X  TF   TPN   NPO   # days   Other       WOUND TEAM PLAN OF CARE (X):   NPWT change 3 x week:        Dressing changes by wound team:       Follow up as needed:    X   Other (explain):    Anticipated discharge plans (X):  SNF:           Home Care:           Outpatient Wound Center:            Self Care:            Other:         To be determined.

## 2018-09-27 NOTE — ASSESSMENT & PLAN NOTE
-presented with bicarb 10, pH (on VBG) 7.25,  in setting of chronic DM med noncompliance  -DKA resolved. See T2DM  - Follow up with PCP for endocrinology referral  - Pt is medically cleared for discharge  - provided diabetic education

## 2018-09-27 NOTE — ASSESSMENT & PLAN NOTE
-Completed diabetic education and PT/OT evaluation.  -Hemoglobin A1C 14.3.  -Blood glucose 176-321.  Received 33 units of glargine and 49 units of lispro insulin yesterday.   -Lispro insulin 11 units with meals.    -Increased glargine insulin to 44 units in the mornings.  Fasting glucose in the morning was 261  -Follow up with pcp/endocrinology as out patient

## 2018-09-27 NOTE — PROGRESS NOTES
Spoke to [facility] resident Dr. Sheikh regarding discrepancy in insulin orders and deviation of ordered fluids from DKA protocol.     No new orders received. Dr. Sheikh to call back with orders. Per Dr. Sheikh, continue insulin gtt at 4u/hr.

## 2018-09-27 NOTE — ASSESSMENT & PLAN NOTE
Consider adding metoclopramide, no for now, improved with time and mobilization  Continue to mobilize  Monitor as patient starts eating again
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
## 2018-09-27 NOTE — ASSESSMENT & PLAN NOTE
HgA1c > 14 !!!  DKA treatment as above - resolved  Diabetic education prior to discharge ongoing, education team and patient still not connected completely  Educated in particular about compliance again by me  I described the significance of her severely elevated HgbA1c  Needs outpatient education and follow-up with endocrine, Dr. Arteaga  Continue daily adjustments with clinical pharmacist of Lantus dose as well as sliding scale insulin regimen along with pre-meal scheduled regular insulin

## 2018-09-27 NOTE — ASSESSMENT & PLAN NOTE
Lipase normal  Symptomatic control  Consider imaging if worsening or changing exam, exam benign, continue to monitor

## 2018-09-28 PROBLEM — G62.9 POLYNEUROPATHY: Status: ACTIVE | Noted: 2018-09-28

## 2018-09-28 LAB
AMPHET UR QL SCN: NEGATIVE
ANION GAP SERPL CALC-SCNC: 9 MMOL/L (ref 0–11.9)
BACTERIA UR CULT: NORMAL
BARBITURATES UR QL SCN: NEGATIVE
BASOPHILS # BLD AUTO: 0.8 % (ref 0–1.8)
BASOPHILS # BLD: 0.05 K/UL (ref 0–0.12)
BENZODIAZ UR QL SCN: NEGATIVE
BUN SERPL-MCNC: 11 MG/DL (ref 8–22)
BZE UR QL SCN: NEGATIVE
CALCIUM SERPL-MCNC: 8.8 MG/DL (ref 8.5–10.5)
CANNABINOIDS UR QL SCN: NEGATIVE
CHLORIDE SERPL-SCNC: 106 MMOL/L (ref 96–112)
CO2 SERPL-SCNC: 22 MMOL/L (ref 20–33)
CREAT SERPL-MCNC: 0.7 MG/DL (ref 0.5–1.4)
EOSINOPHIL # BLD AUTO: 0.12 K/UL (ref 0–0.51)
EOSINOPHIL NFR BLD: 1.8 % (ref 0–6.9)
ERYTHROCYTE [DISTWIDTH] IN BLOOD BY AUTOMATED COUNT: 47.8 FL (ref 35.9–50)
EST. AVERAGE GLUCOSE BLD GHB EST-MCNC: 364 MG/DL
GLUCOSE BLD-MCNC: 190 MG/DL (ref 65–99)
GLUCOSE BLD-MCNC: 288 MG/DL (ref 65–99)
GLUCOSE BLD-MCNC: 293 MG/DL (ref 65–99)
GLUCOSE BLD-MCNC: 305 MG/DL (ref 65–99)
GLUCOSE BLD-MCNC: 323 MG/DL (ref 65–99)
GLUCOSE SERPL-MCNC: 329 MG/DL (ref 65–99)
HBA1C MFR BLD: 14.3 % (ref 0–5.6)
HCT VFR BLD AUTO: 35.8 % (ref 37–47)
HGB BLD-MCNC: 11.6 G/DL (ref 12–16)
IMM GRANULOCYTES # BLD AUTO: 0.03 K/UL (ref 0–0.11)
IMM GRANULOCYTES NFR BLD AUTO: 0.5 % (ref 0–0.9)
LYMPHOCYTES # BLD AUTO: 2.67 K/UL (ref 1–4.8)
LYMPHOCYTES NFR BLD: 40.8 % (ref 22–41)
MCH RBC QN AUTO: 27.2 PG (ref 27–33)
MCHC RBC AUTO-ENTMCNC: 32.4 G/DL (ref 33.6–35)
MCV RBC AUTO: 84 FL (ref 81.4–97.8)
METHADONE UR QL SCN: NEGATIVE
MONOCYTES # BLD AUTO: 0.57 K/UL (ref 0–0.85)
MONOCYTES NFR BLD AUTO: 8.7 % (ref 0–13.4)
NEUTROPHILS # BLD AUTO: 3.1 K/UL (ref 2–7.15)
NEUTROPHILS NFR BLD: 47.4 % (ref 44–72)
NRBC # BLD AUTO: 0 K/UL
NRBC BLD-RTO: 0 /100 WBC
OPIATES UR QL SCN: NEGATIVE
OXYCODONE UR QL SCN: NEGATIVE
PCP UR QL SCN: NEGATIVE
PLATELET # BLD AUTO: 271 K/UL (ref 164–446)
PMV BLD AUTO: 9.6 FL (ref 9–12.9)
POTASSIUM SERPL-SCNC: 3.5 MMOL/L (ref 3.6–5.5)
PROPOXYPH UR QL SCN: NEGATIVE
RBC # BLD AUTO: 4.26 M/UL (ref 4.2–5.4)
SIGNIFICANT IND 70042: NORMAL
SITE SITE: NORMAL
SODIUM SERPL-SCNC: 137 MMOL/L (ref 135–145)
SOURCE SOURCE: NORMAL
WBC # BLD AUTO: 6.5 K/UL (ref 4.8–10.8)

## 2018-09-28 PROCEDURE — 99233 SBSQ HOSP IP/OBS HIGH 50: CPT | Performed by: INTERNAL MEDICINE

## 2018-09-28 PROCEDURE — 90686 IIV4 VACC NO PRSV 0.5 ML IM: CPT | Performed by: INTERNAL MEDICINE

## 2018-09-28 PROCEDURE — 85025 COMPLETE CBC W/AUTO DIFF WBC: CPT

## 2018-09-28 PROCEDURE — 80048 BASIC METABOLIC PNL TOTAL CA: CPT

## 2018-09-28 PROCEDURE — 83036 HEMOGLOBIN GLYCOSYLATED A1C: CPT

## 2018-09-28 PROCEDURE — 90471 IMMUNIZATION ADMIN: CPT

## 2018-09-28 PROCEDURE — 700102 HCHG RX REV CODE 250 W/ 637 OVERRIDE(OP): Performed by: INTERNAL MEDICINE

## 2018-09-28 PROCEDURE — 700111 HCHG RX REV CODE 636 W/ 250 OVERRIDE (IP): Performed by: INTERNAL MEDICINE

## 2018-09-28 PROCEDURE — 770001 HCHG ROOM/CARE - MED/SURG/GYN PRIV*

## 2018-09-28 PROCEDURE — 700111 HCHG RX REV CODE 636 W/ 250 OVERRIDE (IP): Performed by: STUDENT IN AN ORGANIZED HEALTH CARE EDUCATION/TRAINING PROGRAM

## 2018-09-28 PROCEDURE — A9270 NON-COVERED ITEM OR SERVICE: HCPCS | Performed by: STUDENT IN AN ORGANIZED HEALTH CARE EDUCATION/TRAINING PROGRAM

## 2018-09-28 PROCEDURE — 82962 GLUCOSE BLOOD TEST: CPT | Mod: 91

## 2018-09-28 PROCEDURE — 700105 HCHG RX REV CODE 258: Performed by: STUDENT IN AN ORGANIZED HEALTH CARE EDUCATION/TRAINING PROGRAM

## 2018-09-28 PROCEDURE — 80307 DRUG TEST PRSMV CHEM ANLYZR: CPT

## 2018-09-28 PROCEDURE — 700102 HCHG RX REV CODE 250 W/ 637 OVERRIDE(OP): Performed by: STUDENT IN AN ORGANIZED HEALTH CARE EDUCATION/TRAINING PROGRAM

## 2018-09-28 PROCEDURE — A9270 NON-COVERED ITEM OR SERVICE: HCPCS | Performed by: INTERNAL MEDICINE

## 2018-09-28 RX ORDER — POTASSIUM CHLORIDE 20 MEQ/1
20 TABLET, EXTENDED RELEASE ORAL 3 TIMES DAILY
Status: COMPLETED | OUTPATIENT
Start: 2018-09-28 | End: 2018-09-28

## 2018-09-28 RX ORDER — CEPHALEXIN 500 MG/1
500 CAPSULE ORAL EVERY 6 HOURS
Status: DISCONTINUED | OUTPATIENT
Start: 2018-09-28 | End: 2018-09-28

## 2018-09-28 RX ORDER — DOXYCYCLINE 100 MG/1
100 TABLET ORAL EVERY 12 HOURS
Status: DISCONTINUED | OUTPATIENT
Start: 2018-09-28 | End: 2018-10-02 | Stop reason: HOSPADM

## 2018-09-28 RX ADMIN — TRAZODONE HYDROCHLORIDE 100 MG: 50 TABLET ORAL at 05:14

## 2018-09-28 RX ADMIN — INSULIN GLARGINE 18 UNITS: 100 INJECTION, SOLUTION SUBCUTANEOUS at 05:22

## 2018-09-28 RX ADMIN — SODIUM CHLORIDE, POTASSIUM CHLORIDE, SODIUM LACTATE AND CALCIUM CHLORIDE: 600; 310; 30; 20 INJECTION, SOLUTION INTRAVENOUS at 05:14

## 2018-09-28 RX ADMIN — GABAPENTIN 900 MG: 300 CAPSULE ORAL at 08:34

## 2018-09-28 RX ADMIN — PRAVASTATIN SODIUM 20 MG: 20 TABLET ORAL at 18:08

## 2018-09-28 RX ADMIN — HEPARIN SODIUM 5000 UNITS: 5000 INJECTION, SOLUTION INTRAVENOUS; SUBCUTANEOUS at 14:00

## 2018-09-28 RX ADMIN — INSULIN LISPRO 7 UNITS: 100 INJECTION, SOLUTION INTRAVENOUS; SUBCUTANEOUS at 21:02

## 2018-09-28 RX ADMIN — DOXYCYCLINE 100 MG: 100 TABLET ORAL at 18:08

## 2018-09-28 RX ADMIN — HEPARIN SODIUM 5000 UNITS: 5000 INJECTION, SOLUTION INTRAVENOUS; SUBCUTANEOUS at 05:14

## 2018-09-28 RX ADMIN — DOXYCYCLINE 100 MG: 100 TABLET ORAL at 10:57

## 2018-09-28 RX ADMIN — INSULIN LISPRO 10 UNITS: 100 INJECTION, SOLUTION INTRAVENOUS; SUBCUTANEOUS at 09:42

## 2018-09-28 RX ADMIN — INFLUENZA A VIRUS A/MICHIGAN/45/2015 X-275 (H1N1) ANTIGEN (FORMALDEHYDE INACTIVATED), INFLUENZA A VIRUS A/SINGAPORE/INFIMH-16-0019/2016 IVR-186 (H3N2) ANTIGEN (FORMALDEHYDE INACTIVATED), INFLUENZA B VIRUS B/PHUKET/3073/2013 ANTIGEN (FORMALDEHYDE INACTIVATED), AND INFLUENZA B VIRUS B/MARYLAND/15/2016 BX-69A ANTIGEN (FORMALDEHYDE INACTIVATED) 0.5 ML: 15; 15; 15; 15 INJECTION, SUSPENSION INTRAMUSCULAR at 10:56

## 2018-09-28 RX ADMIN — POTASSIUM CHLORIDE 20 MEQ: 1500 TABLET, EXTENDED RELEASE ORAL at 12:51

## 2018-09-28 RX ADMIN — INSULIN LISPRO 10 UNITS: 100 INJECTION, SOLUTION INTRAVENOUS; SUBCUTANEOUS at 14:24

## 2018-09-28 RX ADMIN — HEPARIN SODIUM 5000 UNITS: 5000 INJECTION, SOLUTION INTRAVENOUS; SUBCUTANEOUS at 21:05

## 2018-09-28 RX ADMIN — GABAPENTIN 900 MG: 300 CAPSULE ORAL at 18:08

## 2018-09-28 RX ADMIN — POTASSIUM CHLORIDE 20 MEQ: 1500 TABLET, EXTENDED RELEASE ORAL at 18:08

## 2018-09-28 RX ADMIN — INSULIN LISPRO 3 UNITS: 100 INJECTION, SOLUTION INTRAVENOUS; SUBCUTANEOUS at 18:47

## 2018-09-28 RX ADMIN — POTASSIUM CHLORIDE 20 MEQ: 1500 TABLET, EXTENDED RELEASE ORAL at 08:34

## 2018-09-28 RX ADMIN — MORPHINE SULFATE 2 MG: 4 INJECTION INTRAVENOUS at 08:56

## 2018-09-28 ASSESSMENT — PAIN SCALES - GENERAL
PAINLEVEL_OUTOF10: 8
PAINLEVEL_OUTOF10: 0
PAINLEVEL_OUTOF10: 3
PAINLEVEL_OUTOF10: 0

## 2018-09-28 ASSESSMENT — ENCOUNTER SYMPTOMS
POLYPHAGIA: 0
POLYDIPSIA: 0
HEADACHES: 0
COUGH: 0
FEVER: 0
SORE THROAT: 0
ARTHRALGIAS: 0
SHORTNESS OF BREATH: 0
PALPITATIONS: 0
DIARRHEA: 0
SPEECH DIFFICULTY: 0
NAUSEA: 1
ABDOMINAL PAIN: 1
CHILLS: 0
FLANK PAIN: 0
DIAPHORESIS: 0
NAUSEA: 0
VOMITING: 0
NUMBNESS: 0
ABDOMINAL PAIN: 0
MYALGIAS: 0
WEAKNESS: 0

## 2018-09-28 ASSESSMENT — PATIENT HEALTH QUESTIONNAIRE - PHQ9
1. LITTLE INTEREST OR PLEASURE IN DOING THINGS: NOT AT ALL
2. FEELING DOWN, DEPRESSED, IRRITABLE, OR HOPELESS: NOT AT ALL
SUM OF ALL RESPONSES TO PHQ9 QUESTIONS 1 AND 2: 0

## 2018-09-28 ASSESSMENT — LIFESTYLE VARIABLES: SUBSTANCE_ABUSE: 0

## 2018-09-28 NOTE — ASSESSMENT & PLAN NOTE
Status post I&D of left thumb paronychia on 9/28.  Pt on doxycycline.  Gram stain shows rare gram-positive cocci.  Last dose this evening 10/2/2018

## 2018-09-28 NOTE — PROCEDURES
Critical care procedure note    Pleural installation of alteplase 10 mg and DNase 5 mg  Diagnosis: Left chest empyema    Indications: 58-year-old woman who is quite debilitated and not the best candidate for thoracotomy with chest tube drainage ongoing for left-sided empyema growing strep viridans.  Micro study suggesting anaerobic infection as well.  Significant peel noted on chest x-ray and CT.  Consulted surgery and we both agree to attempt clear fluid without surgery using intrapleural therapy.    Consent: Risks benefits and alternatives to this procedure including bleeding were outlined with the patient at length.    Anesthesia: None    Procedure:  The patient was placed upright a 30 degree angle in bed.  Left-sided chest tube was untaped and sterilely prepped with some chlorhexidine and alcohol swabs.  Sterile field was placed under this sterilely prepped chest tube connection site to the Pleur-evac hose.  Chest tube and Pleur-evac hose were both clamped.  Using sterile technique the toes and chest tube are disconnected.  A sterile 60 cc syringe was employed and the chest tube evacuated of any further pleural fluid and the tube was reclamped.  Then again using sterile technique 10 mg of alteplase and 5 mg of DNase each and 30 cc of fluid were lavaged in a sterile fashion into the left chest.  Residual fluid from these 2 compounds was then flushed into the chest out of the chest tube with a small 30 cc of air.  Patient tolerated the procedure and had not no symptoms whatsoever from this pleural installation.  The chest tube was then sterilely re-taped after being reconnected to the Pleur-evac hose.  Chest tube hose and connector were all prepped with chlorhexidine and alcohol swabs prior to reconnecting.  The chest tube will remain clamped for approximately 2 hours.  Proximally every 30 minutes the patient will be shifted in position to try to mobilize as much pleural fluid as possible.
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
## 2018-09-28 NOTE — DISCHARGE PLANNING
Care Transition Team Assessment    Information for this assessment was compiled from an interview with the pt at bedside as well as chart information. Pt reports living with her SO in a 1 story single family home. Pt reports needing a walker or wheelchair to ambulate ambulate. Pt states she has never received a prescription for either one. Pt report being diagnosed with depression and anxiety about 3 years ago. Pt states she was originally prescribed medications but has not taken any medications in the last year. Pt reports not having a PCP. Pt will need a PCP upon dc. Pt denies any issues with drugs or alcohol.     Information Source  Orientation : Oriented x 4  Information Given By: Patient    Readmission Evaluation  Is this a readmission?: No    Elopement Risk  Legal Hold: No  Ambulatory or Self Mobile in Wheelchair: Yes  Disoriented: No  Psychiatric Symptoms: None  History of Wandering: No  Elopement this Admit: No  Vocalizing Wanting to Leave: No  Displays Behaviors, Body Language Wanting to Leave: No-Not at Risk for Elopement  Elopement Risk: Not at Risk for Elopement  Wanderguard On: Yes  Personal Belongings: Hospital Clothing Only         Discharge Preparedness  What is your plan after discharge?: Uncertain - pending medical team collaboration  What are your discharge supports?: Partner  Prior Functional Level: Uses Walker, Uses Wheelchair  Difficulity with ADLs: Walking  Difficulty with ADLs Comment: Pt sometimes uses a walker or wheelchair to ambulate.  Difficulity with IADLs: None    Functional Assesment  Prior Functional Level: Uses Walker, Uses Wheelchair    Finances  Financial Barriers to Discharge: No  Prescription Coverage: Yes    Vision / Hearing Impairment  Right Eye Vision: Impaired, Wears Glasses  Left Eye Vision: Impaired, Wears Glasses         Advance Directive  Advance Directive?: None  Advance Directive offered?: AD Booklet given    Domestic Abuse  Have you ever been the victim of abuse or  violence?: No    Psychological Assessment  History of Substance Abuse: None  History of Psychiatric Problems: Yes (Depression and anxiety. )  Non-compliant with Treatment: Yes (Reported leaving [facility] about 3 years ago)  Newly Diagnosed Illness: No    Discharge Risks or Barriers  Discharge risks or barriers?: No PCP, Mental health, Post-acute placement / services  Patient risk factors: No PCP, Noncompliance, Mental health    Anticipated Discharge Information  Anticipated discharge disposition: Discharge needs currently unknown

## 2018-09-28 NOTE — CARE PLAN
Problem: Communication  Goal: The ability to communicate needs accurately and effectively will improve    Intervention: [facility] patient and significant other/support system to call light to alert staff of needs  Pt very well oriented to use of call light, AEB using it q15 minutes. Pt kindly reminded to write down needs in-between nursing visits. Education continuously provided on polite/correct use of the call light.       Problem: Skin Integrity  Goal: Risk for impaired skin integrity will decrease    Intervention: Assess risk factors for impaired skin integrity and/or pressure ulcers  Pt urine incontinent. Barrier wipes in place. Pt encouraged to turn in the bed to decrease risk for bed sores. Pt self-turning at this time.

## 2018-09-28 NOTE — ASSESSMENT & PLAN NOTE
Transitioned to oral pain meds  May not d/c with narcotics without compelling reason given hx of opioid dependence and drug-seeking behavior
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
## 2018-09-28 NOTE — CARE PLAN
Problem: Mobility  Goal: Risk for activity intolerance will decrease  Outcome: MET Date Met: 09/28/18  Patient up to chair with 1 assist, tolerating well

## 2018-09-28 NOTE — ASSESSMENT & PLAN NOTE
Likely secondary to DKA.  Lipase within normal limits.  No tenderness to palpation today.  Resolved

## 2018-09-28 NOTE — PROGRESS NOTES
Patient A+Ox4, sitting up in bed. C/O pain to back, chronic, morphine with good effect. VSS, on room air. SR on monitor. Voiding on bedpan. Bed alarm on, using call bell approp, whiteboard updated, patient updated on plan of care.

## 2018-09-28 NOTE — CARE PLAN
Problem: Communication  Goal: The ability to communicate needs accurately and effectively will improve    Intervention: Educate patient and significant other/support system about the plan of care, procedures, treatments, medications and allow for questions  POC discussed; all questions and concerns addressed      Problem: Venous Thromboembolism (VTW)/Deep Vein Thrombosis (DVT) Prevention:  Goal: Patient will participate in Venous Thrombosis (VTE)/Deep Vein Thrombosis (DVT)Prevention Measures    Intervention: Ensure patient wears graduated elastic stockings (ORTIZ hose) and/or SCDs, if ordered, when in bed or chair (Remove at least once per shift for skin check)  SCDs removed for skin assessment

## 2018-09-28 NOTE — ASSESSMENT & PLAN NOTE
Secondary to poorly controlled diabetes.  Mild weakness in the bilateral lower extremities.  She was taking MS Contin at home.    Plan:  -Continue home gabapentin 900 mg by mouth 3 times daily.  -PT/OT evaluation completed and pt can be discharged home  -outpatient pain management consultation.

## 2018-09-29 PROBLEM — F39 MOOD DISORDER (HCC): Status: ACTIVE | Noted: 2018-09-29

## 2018-09-29 LAB
ANION GAP SERPL CALC-SCNC: 6 MMOL/L (ref 0–11.9)
BUN SERPL-MCNC: 18 MG/DL (ref 8–22)
CALCIUM SERPL-MCNC: 8.4 MG/DL (ref 8.5–10.5)
CHLORIDE SERPL-SCNC: 105 MMOL/L (ref 96–112)
CO2 SERPL-SCNC: 23 MMOL/L (ref 20–33)
CREAT SERPL-MCNC: 0.91 MG/DL (ref 0.5–1.4)
GLUCOSE BLD-MCNC: 176 MG/DL (ref 65–99)
GLUCOSE BLD-MCNC: 245 MG/DL (ref 65–99)
GLUCOSE BLD-MCNC: 269 MG/DL (ref 65–99)
GLUCOSE BLD-MCNC: 297 MG/DL (ref 65–99)
GLUCOSE BLD-MCNC: 321 MG/DL (ref 65–99)
GLUCOSE SERPL-MCNC: 327 MG/DL (ref 65–99)
GRAM STN SPEC: ABNORMAL
POTASSIUM SERPL-SCNC: 3.6 MMOL/L (ref 3.6–5.5)
SIGNIFICANT IND 70042: ABNORMAL
SITE SITE: ABNORMAL
SODIUM SERPL-SCNC: 134 MMOL/L (ref 135–145)
SOURCE SOURCE: ABNORMAL

## 2018-09-29 PROCEDURE — 87077 CULTURE AEROBIC IDENTIFY: CPT

## 2018-09-29 PROCEDURE — 82962 GLUCOSE BLOOD TEST: CPT | Mod: 91

## 2018-09-29 PROCEDURE — 10060 I&D ABSCESS SIMPLE/SINGLE: CPT | Performed by: INTERNAL MEDICINE

## 2018-09-29 PROCEDURE — 87205 SMEAR GRAM STAIN: CPT

## 2018-09-29 PROCEDURE — 700102 HCHG RX REV CODE 250 W/ 637 OVERRIDE(OP): Performed by: INTERNAL MEDICINE

## 2018-09-29 PROCEDURE — 87070 CULTURE OTHR SPECIMN AEROBIC: CPT

## 2018-09-29 PROCEDURE — 87186 SC STD MICRODIL/AGAR DIL: CPT

## 2018-09-29 PROCEDURE — 700111 HCHG RX REV CODE 636 W/ 250 OVERRIDE (IP): Performed by: STUDENT IN AN ORGANIZED HEALTH CARE EDUCATION/TRAINING PROGRAM

## 2018-09-29 PROCEDURE — 700102 HCHG RX REV CODE 250 W/ 637 OVERRIDE(OP): Performed by: STUDENT IN AN ORGANIZED HEALTH CARE EDUCATION/TRAINING PROGRAM

## 2018-09-29 PROCEDURE — 770001 HCHG ROOM/CARE - MED/SURG/GYN PRIV*

## 2018-09-29 PROCEDURE — 0H9QXZZ DRAINAGE OF FINGER NAIL, EXTERNAL APPROACH: ICD-10-PCS | Performed by: INTERNAL MEDICINE

## 2018-09-29 PROCEDURE — 80048 BASIC METABOLIC PNL TOTAL CA: CPT

## 2018-09-29 PROCEDURE — 700101 HCHG RX REV CODE 250: Performed by: STUDENT IN AN ORGANIZED HEALTH CARE EDUCATION/TRAINING PROGRAM

## 2018-09-29 PROCEDURE — A9270 NON-COVERED ITEM OR SERVICE: HCPCS | Performed by: STUDENT IN AN ORGANIZED HEALTH CARE EDUCATION/TRAINING PROGRAM

## 2018-09-29 PROCEDURE — 99233 SBSQ HOSP IP/OBS HIGH 50: CPT | Mod: 25 | Performed by: INTERNAL MEDICINE

## 2018-09-29 PROCEDURE — A9270 NON-COVERED ITEM OR SERVICE: HCPCS | Performed by: INTERNAL MEDICINE

## 2018-09-29 RX ORDER — DEXTROSE MONOHYDRATE 25 G/50ML
25 INJECTION, SOLUTION INTRAVENOUS
Status: DISCONTINUED | OUTPATIENT
Start: 2018-09-29 | End: 2018-09-29

## 2018-09-29 RX ORDER — INSULIN GLARGINE 100 [IU]/ML
15 INJECTION, SOLUTION SUBCUTANEOUS ONCE
Status: DISCONTINUED | OUTPATIENT
Start: 2018-09-29 | End: 2018-09-29

## 2018-09-29 RX ORDER — FLUOXETINE HYDROCHLORIDE 20 MG/1
20 CAPSULE ORAL DAILY
Status: DISCONTINUED | OUTPATIENT
Start: 2018-09-29 | End: 2018-10-02 | Stop reason: HOSPADM

## 2018-09-29 RX ORDER — POTASSIUM CHLORIDE 20 MEQ/1
40 TABLET, EXTENDED RELEASE ORAL ONCE
Status: COMPLETED | OUTPATIENT
Start: 2018-09-29 | End: 2018-09-29

## 2018-09-29 RX ORDER — INSULIN GLARGINE 100 [IU]/ML
33 INJECTION, SOLUTION SUBCUTANEOUS EVERY MORNING
Status: DISCONTINUED | OUTPATIENT
Start: 2018-09-30 | End: 2018-09-29

## 2018-09-29 RX ORDER — INSULIN GLARGINE 100 [IU]/ML
33 INJECTION, SOLUTION SUBCUTANEOUS
Status: DISCONTINUED | OUTPATIENT
Start: 2018-09-30 | End: 2018-09-29

## 2018-09-29 RX ORDER — INSULIN GLARGINE 100 [IU]/ML
30 INJECTION, SOLUTION SUBCUTANEOUS
Status: DISCONTINUED | OUTPATIENT
Start: 2018-09-30 | End: 2018-09-29

## 2018-09-29 RX ORDER — DEXTROSE MONOHYDRATE 25 G/50ML
25 INJECTION, SOLUTION INTRAVENOUS
Status: DISCONTINUED | OUTPATIENT
Start: 2018-09-29 | End: 2018-09-30

## 2018-09-29 RX ORDER — LIDOCAINE HYDROCHLORIDE 10 MG/ML
2 INJECTION, SOLUTION INFILTRATION; PERINEURAL ONCE
Status: COMPLETED | OUTPATIENT
Start: 2018-09-29 | End: 2018-09-29

## 2018-09-29 RX ORDER — INSULIN GLARGINE 100 [IU]/ML
15 INJECTION, SOLUTION SUBCUTANEOUS ONCE
Status: COMPLETED | OUTPATIENT
Start: 2018-09-29 | End: 2018-09-29

## 2018-09-29 RX ORDER — INSULIN GLARGINE 100 [IU]/ML
33 INJECTION, SOLUTION SUBCUTANEOUS
Status: DISCONTINUED | OUTPATIENT
Start: 2018-09-30 | End: 2018-09-30

## 2018-09-29 RX ADMIN — TRAZODONE HYDROCHLORIDE 400 MG: 50 TABLET ORAL at 21:22

## 2018-09-29 RX ADMIN — HEPARIN SODIUM 5000 UNITS: 5000 INJECTION, SOLUTION INTRAVENOUS; SUBCUTANEOUS at 21:22

## 2018-09-29 RX ADMIN — GABAPENTIN 900 MG: 300 CAPSULE ORAL at 17:47

## 2018-09-29 RX ADMIN — PRAVASTATIN SODIUM 20 MG: 20 TABLET ORAL at 17:48

## 2018-09-29 RX ADMIN — GABAPENTIN 900 MG: 300 CAPSULE ORAL at 09:00

## 2018-09-29 RX ADMIN — INSULIN LISPRO 7 UNITS: 100 INJECTION, SOLUTION INTRAVENOUS; SUBCUTANEOUS at 08:28

## 2018-09-29 RX ADMIN — TRAZODONE HYDROCHLORIDE 100 MG: 50 TABLET ORAL at 05:54

## 2018-09-29 RX ADMIN — GABAPENTIN 900 MG: 300 CAPSULE ORAL at 00:53

## 2018-09-29 RX ADMIN — DOXYCYCLINE 100 MG: 100 TABLET ORAL at 17:48

## 2018-09-29 RX ADMIN — HEPARIN SODIUM 5000 UNITS: 5000 INJECTION, SOLUTION INTRAVENOUS; SUBCUTANEOUS at 13:04

## 2018-09-29 RX ADMIN — DOXYCYCLINE 100 MG: 100 TABLET ORAL at 05:54

## 2018-09-29 RX ADMIN — POTASSIUM CHLORIDE 40 MEQ: 1500 TABLET, EXTENDED RELEASE ORAL at 08:24

## 2018-09-29 RX ADMIN — FLUOXETINE HYDROCHLORIDE 20 MG: 20 CAPSULE ORAL at 11:36

## 2018-09-29 RX ADMIN — TRAZODONE HYDROCHLORIDE 400 MG: 50 TABLET ORAL at 00:54

## 2018-09-29 RX ADMIN — HEPARIN SODIUM 5000 UNITS: 5000 INJECTION, SOLUTION INTRAVENOUS; SUBCUTANEOUS at 05:54

## 2018-09-29 RX ADMIN — LIDOCAINE HYDROCHLORIDE 2 ML: 10 INJECTION, SOLUTION EPIDURAL; INFILTRATION; INTRACAUDAL; PERINEURAL at 12:20

## 2018-09-29 RX ADMIN — INSULIN GLARGINE 18 UNITS: 100 INJECTION, SOLUTION SUBCUTANEOUS at 06:04

## 2018-09-29 RX ADMIN — INSULIN GLARGINE 15 UNITS: 100 INJECTION, SOLUTION SUBCUTANEOUS at 11:36

## 2018-09-29 ASSESSMENT — PATIENT HEALTH QUESTIONNAIRE - PHQ9
3. TROUBLE FALLING OR STAYING ASLEEP OR SLEEPING TOO MUCH: NOT AT ALL
SUM OF ALL RESPONSES TO PHQ9 QUESTIONS 1 AND 2: 6
5. POOR APPETITE OR OVEREATING: SEVERAL DAYS
4. FEELING TIRED OR HAVING LITTLE ENERGY: SEVERAL DAYS
7. TROUBLE CONCENTRATING ON THINGS, SUCH AS READING THE NEWSPAPER OR WATCHING TELEVISION: SEVERAL DAYS
8. MOVING OR SPEAKING SO SLOWLY THAT OTHER PEOPLE COULD HAVE NOTICED. OR THE OPPOSITE, BEING SO FIGETY OR RESTLESS THAT YOU HAVE BEEN MOVING AROUND A LOT MORE THAN USUAL: NOT AT ALL
6. FEELING BAD ABOUT YOURSELF - OR THAT YOU ARE A FAILURE OR HAVE LET YOURSELF OR YOUR FAMILY DOWN: NEARLY EVERY DAY
1. LITTLE INTEREST OR PLEASURE IN DOING THINGS: NEARLY EVERY DAY
SUM OF ALL RESPONSES TO PHQ QUESTIONS 1-9: 13
9. THOUGHTS THAT YOU WOULD BE BETTER OFF DEAD, OR OF HURTING YOURSELF: SEVERAL DAYS
2. FEELING DOWN, DEPRESSED, IRRITABLE, OR HOPELESS: NEARLY EVERY DAY

## 2018-09-29 ASSESSMENT — ENCOUNTER SYMPTOMS
HEADACHES: 0
SORE THROAT: 0
SPEECH DIFFICULTY: 0
COUGH: 0
WEAKNESS: 0
NAUSEA: 0
DIAPHORESIS: 0
POLYDIPSIA: 0
SHORTNESS OF BREATH: 0
SLEEP DISTURBANCE: 1
MYALGIAS: 0
NERVOUS/ANXIOUS: 0
FEVER: 0
ARTHRALGIAS: 0
DYSPHORIC MOOD: 1
NUMBNESS: 0
ABDOMINAL PAIN: 1
POLYPHAGIA: 0
CHILLS: 0
PALPITATIONS: 0
VOMITING: 0
ABDOMINAL PAIN: 0
FLANK PAIN: 0
NAUSEA: 1
AGITATION: 0
DIARRHEA: 0

## 2018-09-29 ASSESSMENT — PAIN SCALES - GENERAL
PAINLEVEL_OUTOF10: 7

## 2018-09-29 ASSESSMENT — LIFESTYLE VARIABLES: SUBSTANCE_ABUSE: 0

## 2018-09-29 NOTE — ASSESSMENT & PLAN NOTE
Active episode MDD without SI.   Continue fluoxetine 20 mg my daily.  Recommend follow up with PCP in 4-6 weeks for dose adjustment of SSRI.

## 2018-09-29 NOTE — ASSESSMENT & PLAN NOTE
Depression?  Start oral antidepressant -trazodone  Outpatient follow-up with primary MD and/or psychiatry/psychology  Secondary to her multiple medical issues

## 2018-09-29 NOTE — PROGRESS NOTES
PHQ9 score 13. Patient denies suicidal thoughts. Dr. Oakley [facility] resident notified. Prozac 20 mg started today. Patient educated on medication. Will continue to monitor.

## 2018-09-29 NOTE — PROGRESS NOTES
"                                                   [facility] ICU Transfer Note                                                                             Attending: Dr Lopez  Resident: Dr. Candida Oakley  Date of admission: 9/26/2018  Date of transferring out from ICU:  09/28/18      Primary Diagnosis:   - Diabetic ketoacidosis    Secondary diagnoses:  -uncontrolled Type 2 diabetes mellitus  -altered mental status due to diabetic ketoacidosis  -high anion gap metabolic acidosis  -generalized abdominal pain  -open wound right heel   -left thumb paronychia  -non-intractable nausea with vomiting  -cardiac murmur  -major depressive disorder    Chronic problems:  -severe polyneuropathy secondary to uncontrolled type 2 diabetes mellitus  -gastroparesis secondary to type 2 diabetes mellitus  -obesity  -hyperlipidemia  -essential hypertension  -drug-seeing behavior      HPI and ICU Course Summary (Brief Narrative):  Julisa Carrion is 58 y.o. with a past medical history of uncontrolled type 2 diabetes with secondary gastroparesis and polyneuropathy and more recent history of non-compliance with all diabetes medications which has been going on for a \"long while\" per her boyfriend, who presented to ED on 9/26/18 with altered mental status + finding of blood glucose in 550s. She was admitted for diabetic ketoacidosis with secondary altered mental status, alert but oriented x0 on first presentation. No obvious focal neurological deficits were present, but a thorough neurological exam was limited by patient's confusion and inability to answer questions or follow directions. She was placed on DKA regimen of fluids, electrolyte repletion, and insulin drip. With improvement of her anion gap and bicarbonate as well as her blood glucose levels, she was then transitioned to calculated pre-meal + correctional + basal insulin. We have adjusted her pre-meal and glargine based on ISS needs and will continue to do so until finding a " suitable home regimen: currently she is on 40U glargine Qam with 11U pre-meal scheduled lispro. At this time altered mental status is also now resolved.     Other problems noted include:   -paronychia of the left thumb, for which I&D (without trupti purulence, unroofing the paronychia) was performed, culture was sent, and she was begun on doxycycline.    -weakness and sensation loss/discoordination due to severe polyneuropathy for which she currently takes high dose gabapentin. She needs PT/OT assessment prior to discharge.   -need for diabetic education: if she remains here on Monday 10/1 (DM education only available on weekdays), she will require diabetic education prior to discharge.  -major depressive disorder, active. Has not previously been on anti-depressant or been to therapy. Begun on fluoxetine 20 mg Qd.    Important Events in the ICU:   - Central Line: (Dates of insertion and removal, if applicable): N/A  - Intubation: (Date of intubation and extubation): N/A  - Pressors: (Type and Duration): N/A  - Umanzor catheter (Date of insertion and removal, if applicable): N/A  - Tube feeding (Start and stop dates): N/A  - Antibiotics (Type, start and stop dates): doxycycline  - Other procedures: N/A    Labs and imaging studies to be continued with their indications:  - CMP or BMP: (Indication): follow K, CO2, AG, correct elyte disturbances  - Magnesium: (Indication): follow Mag, correct elyte disturbances  - Phosphorus: (Indication): follow Phos, correct elyte disturbances  - QID accuchecks    Things to follow:   -follow up PT/OT recommendations  -follow up diabetes education   -discharge with follow up with PCP, endocrine --consider insulin pump--and behavioral health.    --In addition to regular post-hospitalization appointment with PCP she will also need appointment 4-6 weeks from time of discharge to adjust her SSRI dosage as needed.

## 2018-09-29 NOTE — PROCEDURES
PRE-OP DIAGNOSIS: Paronychia left thumb  POST-OP DIAGNOSIS: Same   PROCEDURE: incision and drainage of paronychia  Performing Physician: Dr. Oakley, PGY-2  Supervising Physician (if applicable): Dr. Lopez     PROCEDURE:   Procedure, risks, and benefits explained to the patient with her consent. The area was prepared and draped in the usual, sterile manner. The site was anesthetized with 1% lidocaine with epinephrine. A linear incision along the local skin lines was made and the paronychia was unroofed, with the area beneath swabbed and sent for culture; however, no purulent material was visualized or expressed.There was no bleeding. There was no need for packing. A bandage was placed over the area following the procedure. Patient tolerated it well without pain or complications.      Followup: The patient tolerated the procedure well without complications.  Standard post-procedure care is explained and return precautions are given.

## 2018-09-29 NOTE — ASSESSMENT & PLAN NOTE
Continue home gabapentin, adjust dose as needed long-term  Avoid narcotics if possible  Antidepressant  Topical capsaicin?  PT OT eval and treat  Safety eval, patient may need skilled nursing unit or outpatient rehab  Pain management consultation after discharge?
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
## 2018-09-29 NOTE — CARE PLAN
Problem: Safety  Goal: Will remain free from injury  Bed is in the lowest locked position, bed alarm on. Patient educated to use the call light and calls appropriately     Problem: Urinary Elimination:  Goal: Ability to reestablish a normal urinary elimination pattern will improve  Patient is maintaining  adequate urine output, up to commode.
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
## 2018-09-29 NOTE — ASSESSMENT & PLAN NOTE
Improved  I&D left thumb lesion 9/29  Doxycycline times 7 days, stop 10/3  Wound care ongoing  Monitor

## 2018-09-30 LAB
ANION GAP SERPL CALC-SCNC: 7 MMOL/L (ref 0–11.9)
BUN SERPL-MCNC: 14 MG/DL (ref 8–22)
CALCIUM SERPL-MCNC: 8.8 MG/DL (ref 8.5–10.5)
CHLORIDE SERPL-SCNC: 107 MMOL/L (ref 96–112)
CO2 SERPL-SCNC: 24 MMOL/L (ref 20–33)
CREAT SERPL-MCNC: 0.83 MG/DL (ref 0.5–1.4)
GLUCOSE BLD-MCNC: 130 MG/DL (ref 65–99)
GLUCOSE BLD-MCNC: 135 MG/DL (ref 65–99)
GLUCOSE BLD-MCNC: 226 MG/DL (ref 65–99)
GLUCOSE BLD-MCNC: 309 MG/DL (ref 65–99)
GLUCOSE SERPL-MCNC: 317 MG/DL (ref 65–99)
POTASSIUM SERPL-SCNC: 3.8 MMOL/L (ref 3.6–5.5)
SODIUM SERPL-SCNC: 138 MMOL/L (ref 135–145)

## 2018-09-30 PROCEDURE — 700102 HCHG RX REV CODE 250 W/ 637 OVERRIDE(OP): Performed by: STUDENT IN AN ORGANIZED HEALTH CARE EDUCATION/TRAINING PROGRAM

## 2018-09-30 PROCEDURE — 80048 BASIC METABOLIC PNL TOTAL CA: CPT

## 2018-09-30 PROCEDURE — 82962 GLUCOSE BLOOD TEST: CPT

## 2018-09-30 PROCEDURE — 700102 HCHG RX REV CODE 250 W/ 637 OVERRIDE(OP): Performed by: INTERNAL MEDICINE

## 2018-09-30 PROCEDURE — A9270 NON-COVERED ITEM OR SERVICE: HCPCS | Performed by: STUDENT IN AN ORGANIZED HEALTH CARE EDUCATION/TRAINING PROGRAM

## 2018-09-30 PROCEDURE — A9270 NON-COVERED ITEM OR SERVICE: HCPCS | Performed by: INTERNAL MEDICINE

## 2018-09-30 PROCEDURE — 99232 SBSQ HOSP IP/OBS MODERATE 35: CPT | Performed by: INTERNAL MEDICINE

## 2018-09-30 PROCEDURE — 700111 HCHG RX REV CODE 636 W/ 250 OVERRIDE (IP): Performed by: STUDENT IN AN ORGANIZED HEALTH CARE EDUCATION/TRAINING PROGRAM

## 2018-09-30 PROCEDURE — 770006 HCHG ROOM/CARE - MED/SURG/GYN SEMI*

## 2018-09-30 RX ORDER — INSULIN GLARGINE 100 [IU]/ML
40 INJECTION, SOLUTION SUBCUTANEOUS
Status: DISCONTINUED | OUTPATIENT
Start: 2018-10-01 | End: 2018-09-30

## 2018-09-30 RX ORDER — LISINOPRIL 5 MG/1
5 TABLET ORAL EVERY MORNING
Status: DISCONTINUED | OUTPATIENT
Start: 2018-09-30 | End: 2018-10-02 | Stop reason: HOSPADM

## 2018-09-30 RX ORDER — DEXTROSE MONOHYDRATE 25 G/50ML
25 INJECTION, SOLUTION INTRAVENOUS
Status: DISCONTINUED | OUTPATIENT
Start: 2018-09-30 | End: 2018-09-30

## 2018-09-30 RX ORDER — DEXTROSE MONOHYDRATE 25 G/50ML
25 INJECTION, SOLUTION INTRAVENOUS
Status: DISCONTINUED | OUTPATIENT
Start: 2018-09-30 | End: 2018-10-02 | Stop reason: HOSPADM

## 2018-09-30 RX ORDER — INSULIN GLARGINE 100 [IU]/ML
40 INJECTION, SOLUTION SUBCUTANEOUS
Status: DISCONTINUED | OUTPATIENT
Start: 2018-10-01 | End: 2018-10-02 | Stop reason: HOSPADM

## 2018-09-30 RX ORDER — INSULIN GLARGINE 100 [IU]/ML
7 INJECTION, SOLUTION SUBCUTANEOUS ONCE
Status: COMPLETED | OUTPATIENT
Start: 2018-09-30 | End: 2018-09-30

## 2018-09-30 RX ORDER — ACETAMINOPHEN 500 MG
1000 TABLET ORAL 3 TIMES DAILY
Status: DISCONTINUED | OUTPATIENT
Start: 2018-09-30 | End: 2018-10-02 | Stop reason: HOSPADM

## 2018-09-30 RX ADMIN — HEPARIN SODIUM 5000 UNITS: 5000 INJECTION, SOLUTION INTRAVENOUS; SUBCUTANEOUS at 20:55

## 2018-09-30 RX ADMIN — TRAZODONE HYDROCHLORIDE 100 MG: 50 TABLET ORAL at 09:16

## 2018-09-30 RX ADMIN — DOXYCYCLINE 100 MG: 100 TABLET ORAL at 17:39

## 2018-09-30 RX ADMIN — HEPARIN SODIUM 5000 UNITS: 5000 INJECTION, SOLUTION INTRAVENOUS; SUBCUTANEOUS at 05:20

## 2018-09-30 RX ADMIN — HEPARIN SODIUM 5000 UNITS: 5000 INJECTION, SOLUTION INTRAVENOUS; SUBCUTANEOUS at 15:22

## 2018-09-30 RX ADMIN — GABAPENTIN 900 MG: 300 CAPSULE ORAL at 17:40

## 2018-09-30 RX ADMIN — GABAPENTIN 900 MG: 300 CAPSULE ORAL at 09:14

## 2018-09-30 RX ADMIN — ACETAMINOPHEN 1000 MG: 500 TABLET, FILM COATED ORAL at 20:55

## 2018-09-30 RX ADMIN — PRAVASTATIN SODIUM 20 MG: 20 TABLET ORAL at 17:40

## 2018-09-30 RX ADMIN — TRAZODONE HYDROCHLORIDE 400 MG: 50 TABLET ORAL at 20:54

## 2018-09-30 RX ADMIN — INSULIN GLARGINE 7 UNITS: 100 INJECTION, SOLUTION SUBCUTANEOUS at 14:01

## 2018-09-30 RX ADMIN — GABAPENTIN 900 MG: 300 CAPSULE ORAL at 02:00

## 2018-09-30 RX ADMIN — FLUOXETINE HYDROCHLORIDE 20 MG: 20 CAPSULE ORAL at 05:19

## 2018-09-30 RX ADMIN — DOXYCYCLINE 100 MG: 100 TABLET ORAL at 05:19

## 2018-09-30 ASSESSMENT — ENCOUNTER SYMPTOMS
DEPRESSION: 1
COUGH: 0
NUMBNESS: 0
NERVOUS/ANXIOUS: 0
NAUSEA: 0
FEVER: 1
FOCAL WEAKNESS: 0
AGITATION: 0
WEAKNESS: 0
BRUISES/BLEEDS EASILY: 0
MEMORY LOSS: 0
CONSTIPATION: 0
SHORTNESS OF BREATH: 0
FLANK PAIN: 0
HEADACHES: 0
BLURRED VISION: 0
ABDOMINAL PAIN: 0
DIZZINESS: 0
SPEECH DIFFICULTY: 0
CHILLS: 0
SLEEP DISTURBANCE: 1
PALPITATIONS: 0
VOMITING: 0
DOUBLE VISION: 0
POLYPHAGIA: 0
SORE THROAT: 0
BLOOD IN STOOL: 0
DYSPHORIC MOOD: 1
FALLS: 0
MYALGIAS: 0
DIAPHORESIS: 0
DIARRHEA: 0
ARTHRALGIAS: 0
HEADACHES: 1
FEVER: 0
POLYDIPSIA: 0

## 2018-09-30 ASSESSMENT — COGNITIVE AND FUNCTIONAL STATUS - GENERAL
DAILY ACTIVITIY SCORE: 17
SUGGESTED CMS G CODE MODIFIER DAILY ACTIVITY: CK
STANDING UP FROM CHAIR USING ARMS: A LOT
MOVING TO AND FROM BED TO CHAIR: A LOT
MOVING FROM LYING ON BACK TO SITTING ON SIDE OF FLAT BED: A LITTLE
TOILETING: A LOT
CLIMB 3 TO 5 STEPS WITH RAILING: A LOT
PERSONAL GROOMING: A LITTLE
DRESSING REGULAR UPPER BODY CLOTHING: A LITTLE
HELP NEEDED FOR BATHING: A LITTLE
MOBILITY SCORE: 15
SUGGESTED CMS G CODE MODIFIER MOBILITY: CK
TURNING FROM BACK TO SIDE WHILE IN FLAT BAD: A LITTLE
WALKING IN HOSPITAL ROOM: A LITTLE
DRESSING REGULAR LOWER BODY CLOTHING: A LOT

## 2018-09-30 ASSESSMENT — PATIENT HEALTH QUESTIONNAIRE - PHQ9
2. FEELING DOWN, DEPRESSED, IRRITABLE, OR HOPELESS: NEARLY EVERY DAY
SUM OF ALL RESPONSES TO PHQ9 QUESTIONS 1 AND 2: 6
SUM OF ALL RESPONSES TO PHQ QUESTIONS 1-9: 13
5. POOR APPETITE OR OVEREATING: SEVERAL DAYS
8. MOVING OR SPEAKING SO SLOWLY THAT OTHER PEOPLE COULD HAVE NOTICED. OR THE OPPOSITE, BEING SO FIGETY OR RESTLESS THAT YOU HAVE BEEN MOVING AROUND A LOT MORE THAN USUAL: NOT AT ALL
4. FEELING TIRED OR HAVING LITTLE ENERGY: SEVERAL DAYS
6. FEELING BAD ABOUT YOURSELF - OR THAT YOU ARE A FAILURE OR HAVE LET YOURSELF OR YOUR FAMILY DOWN: NEARLY EVERY DAY
7. TROUBLE CONCENTRATING ON THINGS, SUCH AS READING THE NEWSPAPER OR WATCHING TELEVISION: SEVERAL DAYS
3. TROUBLE FALLING OR STAYING ASLEEP OR SLEEPING TOO MUCH: NOT AT ALL
1. LITTLE INTEREST OR PLEASURE IN DOING THINGS: NEARLY EVERY DAY
9. THOUGHTS THAT YOU WOULD BE BETTER OFF DEAD, OR OF HURTING YOURSELF: SEVERAL DAYS

## 2018-09-30 ASSESSMENT — PAIN SCALES - GENERAL
PAINLEVEL_OUTOF10: 9
PAINLEVEL_OUTOF10: 0
PAINLEVEL_OUTOF10: 6
PAINLEVEL_OUTOF10: 7
PAINLEVEL_OUTOF10: 5
PAINLEVEL_OUTOF10: 6

## 2018-09-30 ASSESSMENT — LIFESTYLE VARIABLES: EVER_SMOKED: UNABLE TO EVALUATE AT THIS TIME - NEEDS ASSESSMENT PRIOR TO DISCHARGE

## 2018-09-30 NOTE — CARE PLAN
Problem: Infection  Goal: Will remain free from infection  Outcome: PROGRESSING AS EXPECTED  No elevated temps. Continued on Doxycycline PO antibiotic for right foot wound and left thumb     Problem: Bowel/Gastric:  Goal: Normal bowel function is maintained or improved  Outcome: PROGRESSING AS EXPECTED  Patient had large, brown, soft BM today

## 2018-09-30 NOTE — PROGRESS NOTES
0645 Bedside report received, patient resting with eyes open, denies complaints at this time.     0800 Patient ambulatory to restroom using walker, unsteady at times, states normal for her.     0900 Patient ambulatory in room and up to chair with assistance, tolerated well.     1100 Patient back to bed after ambulating in room.     1230 Patient ambulatory in room and up to chair, tolerated well.     1415 Patient ambulatory in room and back to bed, denies complaints or needs at this time.     1600 Per chet charge rn, patient to transfer. Receiving nurse to call for report.     1635 Report to receiving nurse, aware of patient condition and orders. To administer lisinopril per order when arrives to floor. Patient continues to deny complaints at this time.     1650 Patient left with transport via wheelchair in nad.

## 2018-09-30 NOTE — CARE PLAN
Problem: Safety  Goal: Will remain free from falls  Outcome: PROGRESSING AS EXPECTED  Remains free from injury. Ongoing education regarding use of call light, bed and chair alarms on as needed, bed close to and visible from nurses station.

## 2018-09-30 NOTE — ASSESSMENT & PLAN NOTE
Echocardiogram shows mild concentric left ventricular hypertrophy, EF 60% with trace mitral regurgitation.  Follow up with cardiologist as outpatient

## 2018-09-30 NOTE — CARE PLAN
Problem: Knowledge Deficit  Goal: Knowledge of disease process/condition, treatment plan, diagnostic tests, and medications will improve  Outcome: PROGRESSING AS EXPECTED  Patient updated on current plan of care, medications prescribed, and interventions in place. All questions answered at this time.     Problem: Discharge Barriers/Planning  Goal: Patient's continuum of care needs will be met  Outcome: PROGRESSING AS EXPECTED  Patient continually updated with plan of care, and orders to transfer out of ICU. Awaiting open bed.
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
## 2018-10-01 ENCOUNTER — PATIENT OUTREACH (OUTPATIENT)
Dept: HEALTH INFORMATION MANAGEMENT | Facility: OTHER | Age: 58
End: 2018-10-01

## 2018-10-01 LAB
ANION GAP SERPL CALC-SCNC: 7 MMOL/L (ref 0–11.9)
BACTERIA BLD CULT: NORMAL
BACTERIA BLD CULT: NORMAL
BACTERIA WND AEROBE CULT: ABNORMAL
BUN SERPL-MCNC: 13 MG/DL (ref 8–22)
CALCIUM SERPL-MCNC: 8.7 MG/DL (ref 8.5–10.5)
CHLORIDE SERPL-SCNC: 104 MMOL/L (ref 96–112)
CO2 SERPL-SCNC: 24 MMOL/L (ref 20–33)
CREAT SERPL-MCNC: 0.71 MG/DL (ref 0.5–1.4)
GLUCOSE BLD-MCNC: 182 MG/DL (ref 65–99)
GLUCOSE BLD-MCNC: 190 MG/DL (ref 65–99)
GLUCOSE BLD-MCNC: 201 MG/DL (ref 65–99)
GLUCOSE BLD-MCNC: 211 MG/DL (ref 65–99)
GLUCOSE SERPL-MCNC: 247 MG/DL (ref 65–99)
GRAM STN SPEC: ABNORMAL
POTASSIUM SERPL-SCNC: 3.7 MMOL/L (ref 3.6–5.5)
SIGNIFICANT IND 70042: ABNORMAL
SIGNIFICANT IND 70042: NORMAL
SIGNIFICANT IND 70042: NORMAL
SITE SITE: ABNORMAL
SITE SITE: NORMAL
SITE SITE: NORMAL
SODIUM SERPL-SCNC: 135 MMOL/L (ref 135–145)
SOURCE SOURCE: ABNORMAL
SOURCE SOURCE: NORMAL
SOURCE SOURCE: NORMAL

## 2018-10-01 PROCEDURE — 700111 HCHG RX REV CODE 636 W/ 250 OVERRIDE (IP): Performed by: STUDENT IN AN ORGANIZED HEALTH CARE EDUCATION/TRAINING PROGRAM

## 2018-10-01 PROCEDURE — A9270 NON-COVERED ITEM OR SERVICE: HCPCS | Performed by: INTERNAL MEDICINE

## 2018-10-01 PROCEDURE — 36415 COLL VENOUS BLD VENIPUNCTURE: CPT

## 2018-10-01 PROCEDURE — G8980 MOBILITY D/C STATUS: HCPCS | Mod: CI

## 2018-10-01 PROCEDURE — 700102 HCHG RX REV CODE 250 W/ 637 OVERRIDE(OP): Performed by: STUDENT IN AN ORGANIZED HEALTH CARE EDUCATION/TRAINING PROGRAM

## 2018-10-01 PROCEDURE — 82962 GLUCOSE BLOOD TEST: CPT | Mod: 91

## 2018-10-01 PROCEDURE — A9270 NON-COVERED ITEM OR SERVICE: HCPCS | Performed by: STUDENT IN AN ORGANIZED HEALTH CARE EDUCATION/TRAINING PROGRAM

## 2018-10-01 PROCEDURE — 97165 OT EVAL LOW COMPLEX 30 MIN: CPT

## 2018-10-01 PROCEDURE — G8989 SELF CARE D/C STATUS: HCPCS | Mod: CI

## 2018-10-01 PROCEDURE — 97161 PT EVAL LOW COMPLEX 20 MIN: CPT

## 2018-10-01 PROCEDURE — G8979 MOBILITY GOAL STATUS: HCPCS | Mod: CI

## 2018-10-01 PROCEDURE — 700102 HCHG RX REV CODE 250 W/ 637 OVERRIDE(OP): Performed by: INTERNAL MEDICINE

## 2018-10-01 PROCEDURE — 770006 HCHG ROOM/CARE - MED/SURG/GYN SEMI*

## 2018-10-01 PROCEDURE — G8978 MOBILITY CURRENT STATUS: HCPCS | Mod: CI

## 2018-10-01 PROCEDURE — G8988 SELF CARE GOAL STATUS: HCPCS | Mod: CI

## 2018-10-01 PROCEDURE — 80048 BASIC METABOLIC PNL TOTAL CA: CPT

## 2018-10-01 PROCEDURE — G8987 SELF CARE CURRENT STATUS: HCPCS | Mod: CI

## 2018-10-01 PROCEDURE — 99232 SBSQ HOSP IP/OBS MODERATE 35: CPT | Mod: GC | Performed by: STUDENT IN AN ORGANIZED HEALTH CARE EDUCATION/TRAINING PROGRAM

## 2018-10-01 RX ADMIN — TRAZODONE HYDROCHLORIDE 100 MG: 50 TABLET ORAL at 05:07

## 2018-10-01 RX ADMIN — HEPARIN SODIUM 5000 UNITS: 5000 INJECTION, SOLUTION INTRAVENOUS; SUBCUTANEOUS at 12:39

## 2018-10-01 RX ADMIN — HEPARIN SODIUM 5000 UNITS: 5000 INJECTION, SOLUTION INTRAVENOUS; SUBCUTANEOUS at 21:40

## 2018-10-01 RX ADMIN — GABAPENTIN 900 MG: 300 CAPSULE ORAL at 08:38

## 2018-10-01 RX ADMIN — FLUOXETINE HYDROCHLORIDE 20 MG: 20 CAPSULE ORAL at 05:07

## 2018-10-01 RX ADMIN — LISINOPRIL 5 MG: 5 TABLET ORAL at 05:07

## 2018-10-01 RX ADMIN — DOXYCYCLINE 100 MG: 100 TABLET ORAL at 05:08

## 2018-10-01 RX ADMIN — GABAPENTIN 900 MG: 300 CAPSULE ORAL at 02:49

## 2018-10-01 RX ADMIN — GABAPENTIN 900 MG: 300 CAPSULE ORAL at 17:19

## 2018-10-01 RX ADMIN — INSULIN GLARGINE 40 UNITS: 100 INJECTION, SOLUTION SUBCUTANEOUS at 08:41

## 2018-10-01 RX ADMIN — ACETAMINOPHEN 1000 MG: 500 TABLET, FILM COATED ORAL at 05:08

## 2018-10-01 RX ADMIN — DOXYCYCLINE 100 MG: 100 TABLET ORAL at 17:19

## 2018-10-01 RX ADMIN — HEPARIN SODIUM 5000 UNITS: 5000 INJECTION, SOLUTION INTRAVENOUS; SUBCUTANEOUS at 05:08

## 2018-10-01 RX ADMIN — PRAVASTATIN SODIUM 20 MG: 20 TABLET ORAL at 17:19

## 2018-10-01 RX ADMIN — TRAZODONE HYDROCHLORIDE 400 MG: 50 TABLET ORAL at 23:13

## 2018-10-01 ASSESSMENT — ENCOUNTER SYMPTOMS
INSOMNIA: 1
EYE PAIN: 0
FEVER: 0
ORTHOPNEA: 0
BACK PAIN: 1
PHOTOPHOBIA: 0
DEPRESSION: 0
ABDOMINAL PAIN: 0
SORE THROAT: 0
CHILLS: 0
NERVOUS/ANXIOUS: 0
NAUSEA: 0
BLURRED VISION: 0
DIZZINESS: 0
COUGH: 0
SHORTNESS OF BREATH: 0
HEADACHES: 1
NECK PAIN: 0
VOMITING: 0

## 2018-10-01 ASSESSMENT — COGNITIVE AND FUNCTIONAL STATUS - GENERAL
HELP NEEDED FOR BATHING: A LITTLE
SUGGESTED CMS G CODE MODIFIER DAILY ACTIVITY: CI
CLIMB 3 TO 5 STEPS WITH RAILING: A LITTLE
DAILY ACTIVITIY SCORE: 23
MOBILITY SCORE: 23
SUGGESTED CMS G CODE MODIFIER MOBILITY: CI

## 2018-10-01 ASSESSMENT — ACTIVITIES OF DAILY LIVING (ADL): TOILETING: INDEPENDENT

## 2018-10-01 ASSESSMENT — GAIT ASSESSMENTS
ASSISTIVE DEVICE: FRONT WHEEL WALKER
DEVIATION: BRADYKINETIC
GAIT LEVEL OF ASSIST: SUPERVISED
DISTANCE (FEET): 125

## 2018-10-01 ASSESSMENT — PAIN SCALES - GENERAL
PAINLEVEL_OUTOF10: 0
PAINLEVEL_OUTOF10: ASSUMED PAIN PRESENT

## 2018-10-01 NOTE — CARE PLAN
Problem: Safety  Goal: Will remain free from falls    Intervention: Implement fall precautions  Call light and personal belongings within reach. Pt instructed to call for assistance, pt verbalizes understanding. Non skid socks on. Bed in lowest position.       Problem: Pain Management  Goal: Pain level will decrease to patient's comfort goal    Intervention: Follow pain managment plan developed in collaboration with patient and Interdisciplinary Team  Patient c/o pain. Medications given with good results. Pre and post pain assessment documented.

## 2018-10-01 NOTE — PROGRESS NOTES
Assumed care of pt. Pt is A&O x4. Pt reports diabetic nerve pain in her legs. Pt up and walking with physical therapy. Pt is on room air. Bed is locked and in the lowest position. Hourly rounding in place.

## 2018-10-01 NOTE — CARE PLAN
Problem: Communication  Goal: The ability to communicate needs accurately and effectively will improve  Outcome: PROGRESSING AS EXPECTED  Pt encouraged to voice feelings and needs. Pt verbalizes understanding.     Problem: Safety  Goal: Will remain free from injury  Outcome: PROGRESSING AS EXPECTED  Pt educated to call for assistance when she needs to get up, pt demonstrates understanding.
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
## 2018-10-01 NOTE — THERAPY
"Occupational Therapy Evaluation completed.   Functional Status:  Pt up EOB talking w/roomate, spv w/LB dressing don/doffed socks acknowledged poor foot/skin check maintenance. Walking in room w/fww, stood at sink for grooming w/svp. No LOB no overt c/o pain did have c/o fatigue. Txf to chair post session. RN aware. Pt appears to be/near baseline recommend continued daily OOB activity w/nsg   Plan of Care: Patient with no further skilled OT needs in the acute care setting at this time  Discharge Recommendations:  Equipment: No Equipment Needed. Post-acute therapy Currently anticipate no further skilled occupational therapy needs once patient is discharged from the inpatient setting.    See \"Rehab Therapy-Acute\" Patient Summary Report for complete documentation.      58 yr old female admitted for diabetic ketoacidosis, pt presented w/AMS, uncontrolled DM2, pain, wound on R heel, w/hx of depression, obesity, hyperlipidemia, HTN, and drug seeking bx. Pt appears to be at/near her functional baseline. Pt demonstrated ability to complete basic ADL's and txfs w/o A. Pt did not appear to be in pain or over fatigue, but did report increased pain post activity. Pt was encouraged to continue daily mobility and ADL participation. No further acute OT needs at this time.   "

## 2018-10-01 NOTE — THERAPY
"Pt is a 57 y/o female admitted with diabetic ketoacidosis. Presents to physical therapy at self reported baseline PLOF. Pt demonstrated steady ambulation with FWW, reports she only ambulates in home and outdoor terrain is too rugged (uses or has assist with WC). Pt expressed no concern with steps to enter home, declined need to practice as she knows how to safely approach and will have help as needed. Pt with no further acute PT needs, please continue to ambulate with nursing staff.     Physical Therapy Evaluation completed.   Bed Mobility:  Supine to Sit:  (up EOB )  Transfers: Sit to Stand: Supervised  Gait: Level Of Assist: Supervised with Front-Wheel Walker       Plan of Care: Patient with no further skilled PT needs in the acute care setting at this time and Patient demonstrates safety with mobility in this environment at this time.   Discharge Recommendations: Equipment: No Equipment Needed.       See \"Rehab Therapy-Acute\" Patient Summary Report for complete documentation.     "

## 2018-10-02 ENCOUNTER — PATIENT OUTREACH (OUTPATIENT)
Dept: HEALTH INFORMATION MANAGEMENT | Facility: OTHER | Age: 58
End: 2018-10-02

## 2018-10-02 VITALS
WEIGHT: 199.96 LBS | OXYGEN SATURATION: 97 % | TEMPERATURE: 96.8 F | SYSTOLIC BLOOD PRESSURE: 134 MMHG | HEIGHT: 67 IN | BODY MASS INDEX: 31.38 KG/M2 | HEART RATE: 80 BPM | RESPIRATION RATE: 20 BRPM | DIASTOLIC BLOOD PRESSURE: 65 MMHG

## 2018-10-02 LAB
ALBUMIN SERPL BCP-MCNC: 2.9 G/DL (ref 3.2–4.9)
ALBUMIN/GLOB SERPL: 0.8 G/DL
ALP SERPL-CCNC: 82 U/L (ref 30–99)
ALT SERPL-CCNC: 9 U/L (ref 2–50)
ANION GAP SERPL CALC-SCNC: 7 MMOL/L (ref 0–11.9)
AST SERPL-CCNC: 12 U/L (ref 12–45)
BASOPHILS # BLD AUTO: 0.6 % (ref 0–1.8)
BASOPHILS # BLD: 0.04 K/UL (ref 0–0.12)
BILIRUB SERPL-MCNC: 0.2 MG/DL (ref 0.1–1.5)
BUN SERPL-MCNC: 14 MG/DL (ref 8–22)
CALCIUM SERPL-MCNC: 9 MG/DL (ref 8.5–10.5)
CHLORIDE SERPL-SCNC: 104 MMOL/L (ref 96–112)
CO2 SERPL-SCNC: 26 MMOL/L (ref 20–33)
CREAT SERPL-MCNC: 0.73 MG/DL (ref 0.5–1.4)
EKG IMPRESSION: NORMAL
EOSINOPHIL # BLD AUTO: 0.23 K/UL (ref 0–0.51)
EOSINOPHIL NFR BLD: 3.7 % (ref 0–6.9)
ERYTHROCYTE [DISTWIDTH] IN BLOOD BY AUTOMATED COUNT: 49.5 FL (ref 35.9–50)
GLOBULIN SER CALC-MCNC: 3.5 G/DL (ref 1.9–3.5)
GLUCOSE BLD-MCNC: 184 MG/DL (ref 65–99)
GLUCOSE BLD-MCNC: 190 MG/DL (ref 65–99)
GLUCOSE BLD-MCNC: 226 MG/DL (ref 65–99)
GLUCOSE SERPL-MCNC: 261 MG/DL (ref 65–99)
HCT VFR BLD AUTO: 33.7 % (ref 37–47)
HGB BLD-MCNC: 10.8 G/DL (ref 12–16)
IMM GRANULOCYTES # BLD AUTO: 0.02 K/UL (ref 0–0.11)
IMM GRANULOCYTES NFR BLD AUTO: 0.3 % (ref 0–0.9)
LYMPHOCYTES # BLD AUTO: 2.76 K/UL (ref 1–4.8)
LYMPHOCYTES NFR BLD: 44.7 % (ref 22–41)
MCH RBC QN AUTO: 28 PG (ref 27–33)
MCHC RBC AUTO-ENTMCNC: 32 G/DL (ref 33.6–35)
MCV RBC AUTO: 87.3 FL (ref 81.4–97.8)
MONOCYTES # BLD AUTO: 0.61 K/UL (ref 0–0.85)
MONOCYTES NFR BLD AUTO: 9.9 % (ref 0–13.4)
NEUTROPHILS # BLD AUTO: 2.51 K/UL (ref 2–7.15)
NEUTROPHILS NFR BLD: 40.8 % (ref 44–72)
NRBC # BLD AUTO: 0 K/UL
NRBC BLD-RTO: 0 /100 WBC
PLATELET # BLD AUTO: 321 K/UL (ref 164–446)
PMV BLD AUTO: 9.7 FL (ref 9–12.9)
POTASSIUM SERPL-SCNC: 3.6 MMOL/L (ref 3.6–5.5)
PROT SERPL-MCNC: 6.4 G/DL (ref 6–8.2)
RBC # BLD AUTO: 3.86 M/UL (ref 4.2–5.4)
SODIUM SERPL-SCNC: 137 MMOL/L (ref 135–145)
WBC # BLD AUTO: 6.2 K/UL (ref 4.8–10.8)

## 2018-10-02 PROCEDURE — A9270 NON-COVERED ITEM OR SERVICE: HCPCS | Performed by: STUDENT IN AN ORGANIZED HEALTH CARE EDUCATION/TRAINING PROGRAM

## 2018-10-02 PROCEDURE — 36415 COLL VENOUS BLD VENIPUNCTURE: CPT

## 2018-10-02 PROCEDURE — 85025 COMPLETE CBC W/AUTO DIFF WBC: CPT

## 2018-10-02 PROCEDURE — 82962 GLUCOSE BLOOD TEST: CPT

## 2018-10-02 PROCEDURE — 80053 COMPREHEN METABOLIC PANEL: CPT

## 2018-10-02 PROCEDURE — 99239 HOSP IP/OBS DSCHRG MGMT >30: CPT | Mod: GC | Performed by: STUDENT IN AN ORGANIZED HEALTH CARE EDUCATION/TRAINING PROGRAM

## 2018-10-02 PROCEDURE — 93005 ELECTROCARDIOGRAM TRACING: CPT | Performed by: STUDENT IN AN ORGANIZED HEALTH CARE EDUCATION/TRAINING PROGRAM

## 2018-10-02 PROCEDURE — 700102 HCHG RX REV CODE 250 W/ 637 OVERRIDE(OP): Performed by: STUDENT IN AN ORGANIZED HEALTH CARE EDUCATION/TRAINING PROGRAM

## 2018-10-02 PROCEDURE — 700111 HCHG RX REV CODE 636 W/ 250 OVERRIDE (IP): Performed by: STUDENT IN AN ORGANIZED HEALTH CARE EDUCATION/TRAINING PROGRAM

## 2018-10-02 PROCEDURE — 700102 HCHG RX REV CODE 250 W/ 637 OVERRIDE(OP): Performed by: INTERNAL MEDICINE

## 2018-10-02 PROCEDURE — 93010 ELECTROCARDIOGRAM REPORT: CPT | Performed by: INTERNAL MEDICINE

## 2018-10-02 PROCEDURE — A9270 NON-COVERED ITEM OR SERVICE: HCPCS | Performed by: INTERNAL MEDICINE

## 2018-10-02 RX ORDER — POTASSIUM CHLORIDE 20 MEQ/1
40 TABLET, EXTENDED RELEASE ORAL ONCE
Status: COMPLETED | OUTPATIENT
Start: 2018-10-02 | End: 2018-10-02

## 2018-10-02 RX ORDER — FLUOXETINE HYDROCHLORIDE 20 MG/1
20 CAPSULE ORAL DAILY
Qty: 30 CAP | Refills: 0 | Status: SHIPPED | OUTPATIENT
Start: 2018-10-03 | End: 2018-12-20

## 2018-10-02 RX ORDER — DOXYCYCLINE 100 MG/1
100 TABLET ORAL EVERY 12 HOURS
Qty: 1 TAB | Refills: 0 | Status: SHIPPED | OUTPATIENT
Start: 2018-10-02 | End: 2018-10-03

## 2018-10-02 RX ORDER — INSULIN GLARGINE 100 [IU]/ML
44 INJECTION, SOLUTION SUBCUTANEOUS EVERY MORNING
Qty: 10 ML | Refills: 1 | Status: SHIPPED | OUTPATIENT
Start: 2018-10-03 | End: 2018-12-20

## 2018-10-02 RX ADMIN — TRAZODONE HYDROCHLORIDE 100 MG: 50 TABLET ORAL at 05:05

## 2018-10-02 RX ADMIN — ACETAMINOPHEN 1000 MG: 500 TABLET, FILM COATED ORAL at 05:06

## 2018-10-02 RX ADMIN — GABAPENTIN 900 MG: 300 CAPSULE ORAL at 02:15

## 2018-10-02 RX ADMIN — POTASSIUM CHLORIDE 40 MEQ: 1500 TABLET, EXTENDED RELEASE ORAL at 12:37

## 2018-10-02 RX ADMIN — PRAVASTATIN SODIUM 20 MG: 20 TABLET ORAL at 18:06

## 2018-10-02 RX ADMIN — HEPARIN SODIUM 5000 UNITS: 5000 INJECTION, SOLUTION INTRAVENOUS; SUBCUTANEOUS at 05:06

## 2018-10-02 RX ADMIN — INSULIN GLARGINE 40 UNITS: 100 INJECTION, SOLUTION SUBCUTANEOUS at 08:26

## 2018-10-02 RX ADMIN — DOXYCYCLINE 100 MG: 100 TABLET ORAL at 18:06

## 2018-10-02 RX ADMIN — LISINOPRIL 5 MG: 5 TABLET ORAL at 05:06

## 2018-10-02 RX ADMIN — FLUOXETINE HYDROCHLORIDE 20 MG: 20 CAPSULE ORAL at 05:06

## 2018-10-02 RX ADMIN — GABAPENTIN 900 MG: 300 CAPSULE ORAL at 18:06

## 2018-10-02 RX ADMIN — GABAPENTIN 900 MG: 300 CAPSULE ORAL at 08:19

## 2018-10-02 RX ADMIN — DOXYCYCLINE 100 MG: 100 TABLET ORAL at 05:07

## 2018-10-02 ASSESSMENT — ENCOUNTER SYMPTOMS
COUGH: 0
DEPRESSION: 0
NECK PAIN: 0
BACK PAIN: 1
SORE THROAT: 0
SHORTNESS OF BREATH: 0
PHOTOPHOBIA: 0
EYE PAIN: 0
HEADACHES: 1
VOMITING: 0
CHILLS: 0
FEVER: 0
NAUSEA: 0
ABDOMINAL PAIN: 0
ORTHOPNEA: 0
DIZZINESS: 0
INSOMNIA: 1
NERVOUS/ANXIOUS: 0
BLURRED VISION: 0

## 2018-10-02 ASSESSMENT — PAIN SCALES - GENERAL
PAINLEVEL_OUTOF10: 0
PAINLEVEL_OUTOF10: 0

## 2018-10-02 NOTE — PROGRESS NOTES
Assumed care of patient, report received at bedside. Patient sitting up in bed, A&Ox4, on room air and no signs of distress. Pt declines pain or interventions. Plan of care discussed, no additional needs at this time. Pt educated on call light, and call light within reach. Bed in lowest position and locked. Will continue to monitor.

## 2018-10-02 NOTE — CARE PLAN
Problem: Knowledge Deficit  Goal: Knowledge of disease process/condition, treatment plan, diagnostic tests, and medications will improve  Outcome: PROGRESSING AS EXPECTED  Pt reoriented to diet and medication regimen throughout day, pt verbalizes understanding.     Problem: Discharge Barriers/Planning  Goal: Patient's continuum of care needs will be met  Outcome: PROGRESSING AS EXPECTED  Pt awaiting DM educator, RN called and left message for them to see patient today.

## 2018-10-02 NOTE — PROGRESS NOTES
Internal Medicine Interval Note  Note Author: Tisha Pendleton M.D.     Name Julisa Carrion     1960   Age/Sex 58 y.o. female   MRN [id number]   Code Status Full     After 5PM or if no immediate response to page, please call for cross-coverage  Attending/Team: Dr. Gordon See Patient List for primary contact information  Call [phone] to page    1st Call - Day Intern (R1):   Dr. Pendleton 2nd Call - Day Sr. Resident (R2/R3):   Dr. Dodd         Reason for interval visit  (Principal Problem)   DKA  Uncontrolled type 2 DM  Paronychia      Interval Problem Daily Status Update  (24 hours, problem oriented, brief subjective history, new lab/imaging data pertinent to that problem)   Ms. Carrion, a pleasant 58-year-old female transferred from ICU due to DKA, now resolved, pending diabetic education and PT/OT evaluation.    Patient complains of low back pain shoulder pain and toothache which is causing mild left-sided headache.  Patient denies any fever, nausea, vomiting, or any other symptoms    Review of Systems   Constitutional: Negative for chills and fever.   HENT: Negative for congestion and sore throat.         Tooth ache    Eyes: Negative for blurred vision, photophobia and pain.   Respiratory: Negative for cough and shortness of breath.    Cardiovascular: Negative for chest pain and orthopnea.   Gastrointestinal: Negative for abdominal pain, nausea and vomiting.   Genitourinary: Negative for dysuria and frequency.   Musculoskeletal: Positive for back pain (low back pain) and joint pain (shoulder pain). Negative for neck pain.   Neurological: Positive for headaches (mild). Negative for dizziness.   Psychiatric/Behavioral: Negative for depression. The patient has insomnia. The patient is not nervous/anxious.        Disposition/Barriers to discharge:   Remain inpatient for diabetic education and PT/OT evaluation    Consultants/Specialty  PCP: Tre Jason M.D.      Quality Measures  Quality-Core  Measures   Reviewed items::  Labs reviewed and Medications reviewed  Umanzor catheter::  No Umanzor  DVT prophylaxis pharmacological::  Heparin  DVT prophylaxis - mechanical:  SCDs      Physical Exam       Vitals:    10/01/18 0300 10/01/18 0700 10/01/18 1500 10/01/18 1925   BP: 112/60 109/55 135/71 133/61   Pulse: 76 66 88 87   Resp: 18 16 18 18   Temp: 36.4 °C (97.5 °F) 36.2 °C (97.2 °F) 37 °C (98.6 °F) 36.3 °C (97.3 °F)   SpO2: 95% 95% 96% 96%   Weight:       Height:         Body mass index is 31.32 kg/m².    Oxygen Therapy:  Pulse Oximetry: 96 %, O2 (LPM): 0, O2 Delivery: None (Room Air)    Physical Exam   Constitutional: She is oriented to person, place, and time and well-developed, well-nourished, and in no distress.   HENT:   Head: Normocephalic.   Eyes: Pupils are equal, round, and reactive to light. EOM are normal.   Neck: Normal range of motion. Neck supple. No thyromegaly present.   Cardiovascular: Normal rate and regular rhythm.    Murmur ((Grade 3/6 systolic ejection murmur best heard at the left lower sternal border) ) heard.  Pulmonary/Chest: Effort normal and breath sounds normal. No respiratory distress.   Abdominal: Soft. Bowel sounds are normal. She exhibits no distension. There is no tenderness.   Musculoskeletal: Normal range of motion. She exhibits no edema.   Neurological: She is alert and oriented to person, place, and time.   Skin: Skin is warm and dry. No rash noted. She is not diaphoretic.   Psychiatric: Mood and affect normal.             Assessment/Plan     * DKA (diabetic ketoacidoses) (HCC)- (present on admission)   Assessment & Plan    -presented with bicarb 10, pH (on VBG) 7.25,  in setting of chronic DM med noncompliance  -daily labs, replete PRN  -continue serial accus  -AG has now closed, CO2 >17. Transitioned off DKA protocol 9/27  -etiology likely non-compliance, TTE wnl and all cx NGTD  -serum osm equivocal (near ULN)--may be either HHS or DKA  -DKA resolved. See  T2DM  -Patient needs outpatient [facility] follow up arranged prior to discharge.        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  -Hemoglobin A1C 14.3.  Blood glucose 176-321.  Received 33 units of glargine and 49 units of lispro insulin yesterday.   Lispro insulin 11 units with meals.    Increased glargine insulin to 40 units in the mornings.  Fasting glucose in the morning was 317.  -Recommend referral to [facility] at discharge for consideration of insulin pump given chronic non-compliance and severely elevated BGs with (now) an episode of DKA requiring hospitalization.        Generalized abdominal pain- (present on admission)   Assessment & Plan    Likely secondary to DKA.  Lipase within normal limits.  No tenderness to palpation today.        Toxic metabolic encephalopathy- (present on admission)   Assessment & Plan    Secondary to DKA.  Resolved.        Major depressive disorder- (present on admission)   Assessment & Plan    Active episode MDD without SI.   Continue fluoxetine 20 mg my daily.  Referral to psychiatry at discharge.  Recommend follow up with PCP in 4-6 weeks for dose adjustment of SSRI.        Polyneuropathy- (present on admission)   Assessment & Plan    Secondary to poorly controlled diabetes.  Mild weakness in the bilateral lower extremities.  She was taking MS Contin at home.    Plan:  -Continue home gabapentin 900 mg by mouth 3 times daily.  -PT/OT evaluation pending.  -Avoid oral narcotics  -Consider topical capsaicin.  -Consider outpatient pain management consultation.        Paronychia   Assessment & Plan    Status post I&D of left thumb paronychia on 9/28.  Started on doxycycline.  Gram stain shows rare gram-positive cocci.  Plan:  -Follow-up final cultures.  -Continue doxycycline.        Murmur, cardiac- (present on admission)   Assessment & Plan     Echocardiogram shows mild concentric left ventricular hypertrophy, EF 60% with trace mitral regurgitation.    Plan:  -Continue to monitor.        Open wound of right heel- (present on admission)   Assessment & Plan    -wound care consult        High anion gap metabolic acidosis- (present on admission)   Assessment & Plan    - Resolved  -likely due to lactic acidosis + diabetic ketoacidosis (vs HHS)  -aggressive IVF resuscitation on DKA protocol,   -AG remains closed, CO2 >17.   -Transitioned 9/27 from insulin drip to pre-meal + ISS + basal insulin and LR discontinued          Gastroparesis due to DM (HCC)- (present on admission)   Assessment & Plan    Continue home Gabapentin.  Continue home bentyl.          Chronic pain syndrome with narcotic dependence- (present on admission)   Assessment & Plan    Patient has a history of taking MS Contin at home.    Plan:  -Avoid narcotics given history of opioid dependence and drug-seeking behavior.        Drug-seeking behavior- (present on admission)   Assessment & Plan    Transitioned to oral pain meds  May not d/c with narcotics without compelling reason given hx of opioid dependence and drug-seeking behavior        Obesity (BMI 30-39.9)- (present on admission)   Assessment & Plan    Counseling on lifestyle changes.        Non-intractable vomiting with nausea- (present on admission)   Assessment & Plan    Secondary to DKA, resolved.        Hyperlipidemia- (present on admission)   Assessment & Plan    Continue home pravastatin.        Essential hypertension- (present on admission)   Assessment & Plan    Continue lisinopril 5 mg my daily.

## 2018-10-02 NOTE — PROGRESS NOTES
Internal Medicine Interval Note  Note Author: Tisha Pendleton M.D.     Name Julisa Carrion     1960   Age/Sex 58 y.o. female   MRN [id number]   Code Status Full     After 5PM or if no immediate response to page, please call for cross-coverage  Attending/Team: Dr. Gordon See Patient List for primary contact information  Call [phone] to page    1st Call - Day Intern (R1):   Dr. Pendleton 2nd Call - Day Sr. Resident (R2/R3):   Dr. Dodd         Reason for interval visit  (Principal Problem)   DKA  Uncontrolled type 2 DM  Paronychia      Interval Problem Daily Status Update  (24 hours, problem oriented, brief subjective history, new lab/imaging data pertinent to that problem)   Ms. Carrion, a pleasant 58-year-old female transferred from ICU due to DKA, now resolved, pending diabetic education and PT/OT evaluation.    Patient complains of mild toothache which is causing mild left-sided headache.  Patient denies any fever, nausea, vomiting, or any other symptoms. PT/OT evaluated the pt and cleared her to be discharged home without any skilled needs. Pt is medically cleared to be discharged home    Review of Systems   Constitutional: Negative for chills and fever.   HENT: Negative for congestion and sore throat.         Tooth ache    Eyes: Negative for blurred vision, photophobia and pain.   Respiratory: Negative for cough and shortness of breath.    Cardiovascular: Negative for chest pain and orthopnea.   Gastrointestinal: Negative for abdominal pain, nausea and vomiting.   Genitourinary: Negative for dysuria and frequency.   Musculoskeletal: Positive for back pain (low back pain). Negative for neck pain.   Neurological: Positive for headaches (mild). Negative for dizziness.   Psychiatric/Behavioral: Negative for depression. The patient has insomnia. The patient is not nervous/anxious.        Disposition/Barriers to discharge:   Pt is medically cleared for discharge to  home    Consultants/Specialty  PCP: Tre Jason M.D.      Quality Measures  Quality-Core Measures   Reviewed items::  Labs reviewed and Medications reviewed  Umanzor catheter::  No Umanzor  DVT prophylaxis pharmacological::  Heparin  DVT prophylaxis - mechanical:  SCDs      Physical Exam       Vitals:    10/01/18 1925 10/02/18 0335 10/02/18 0720 10/02/18 1609   BP: 133/61 134/72 120/55 134/65   Pulse: 87 84 71 80   Resp: 18 18 18 20   Temp: 36.3 °C (97.3 °F) 36.6 °C (97.8 °F) 36.6 °C (97.8 °F) 36 °C (96.8 °F)   SpO2: 96% 93% 95% 97%   Weight:       Height:         Body mass index is 31.32 kg/m².    Oxygen Therapy:  Pulse Oximetry: 97 %, O2 (LPM): 0, O2 Delivery: None (Room Air)    Physical Exam   Constitutional: She is oriented to person, place, and time and well-developed, well-nourished, and in no distress.   HENT:   Head: Normocephalic.   Eyes: Pupils are equal, round, and reactive to light. EOM are normal.   Neck: Normal range of motion. Neck supple. No thyromegaly present.   Cardiovascular: Normal rate and regular rhythm.    Murmur ((Grade 3/6 systolic ejection murmur best heard at the left lower sternal border) ) heard.  Pulmonary/Chest: Effort normal and breath sounds normal. No respiratory distress.   Abdominal: Soft. Bowel sounds are normal. She exhibits no distension. There is no tenderness.   Musculoskeletal: Normal range of motion. She exhibits no edema.   Neurological: She is alert and oriented to person, place, and time.   Skin: Skin is warm and dry. No rash noted. She is not diaphoretic.   Psychiatric: Mood and affect normal.       Assessment/Plan     * DKA (diabetic ketoacidoses) (HCC)- (present on admission)   Assessment & Plan    -presented with bicarb 10, pH (on VBG) 7.25,  in setting of chronic DM med noncompliance  -DKA resolved. 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  -Hemoglobin A1C 14.3.  -Blood glucose 176-321.  Received 33 units of glargine and 49 units of lispro insulin yesterday.   -Lispro insulin 11 units with meals.    -Increased glargine insulin to 44 units in the mornings.  Fasting glucose in the morning was 261  -Follow up with pcp/endocrinology as out patient        Generalized abdominal pain- (present on admission)   Assessment & Plan    Likely secondary to DKA.  Lipase within normal limits.  No tenderness to palpation today.  Resolved        Toxic metabolic encephalopathy- (present on admission)   Assessment & Plan    Secondary to DKA.  Resolved.        Major depressive disorder- (present on admission)   Assessment & Plan    Active episode MDD without SI.   Continue fluoxetine 20 mg my daily.  Recommend follow up with PCP in 4-6 weeks for dose adjustment of SSRI.        Polyneuropathy- (present on admission)   Assessment & Plan    Secondary to poorly controlled diabetes.  Mild weakness in the bilateral lower extremities.  She was taking MS Contin at home.    Plan:  -Continue home gabapentin 900 mg by mouth 3 times daily.  -PT/OT evaluation completed and pt can be discharged home  -outpatient pain management consultation.        Paronychia   Assessment & Plan    Status post I&D of left thumb paronychia on 9/28.  Pt on doxycycline.  Gram stain shows rare gram-positive cocci.  Last dose this evening 10/2/2018            Murmur, cardiac- (present on admission)   Assessment & Plan    Echocardiogram shows mild concentric left ventricular hypertrophy, EF 60% with trace mitral regurgitation.  Follow up with cardiologist as outpatient          Open wound of right heel- (present on admission)   Assessment & Plan    - wound care provided  - need outpatient yearly foot exam        High anion gap metabolic acidosis- (present on  admission)   Assessment & Plan    - Resolved  -likely due to lactic acidosis + diabetic ketoacidosis (vs HHS)  -aggressive IVF resuscitation on DKA protocol,   -AG remains closed, CO2 >17.   -Transitioned 9/27 from insulin drip to pre-meal + ISS + basal insulin and LR discontinued          Gastroparesis due to DM (HCC)- (present on admission)   Assessment & Plan    Continue home Gabapentin.  Continue home bentyl.          Chronic pain syndrome with narcotic dependence- (present on admission)   Assessment & Plan    Patient has a history of taking MS Contin at home.  Follow up with pain management        Drug-seeking behavior- (present on admission)   Assessment & Plan    Transitioned to oral pain meds  May not d/c with narcotics without compelling reason given hx of opioid dependence and drug-seeking behavior        Obesity (BMI 30-39.9)- (present on admission)   Assessment & Plan    Counseling on lifestyle changes.        Non-intractable vomiting with nausea- (present on admission)   Assessment & Plan    Secondary to DKA, resolved.        Hyperlipidemia- (present on admission)   Assessment & Plan    Continue home pravastatin.        Essential hypertension- (present on admission)   Assessment & Plan    Continue lisinopril 5 mg my daily.

## 2018-10-02 NOTE — CARE PLAN
Problem: Safety  Goal: Will remain free from falls    Intervention: Implement fall precautions  Call light and personal belongings within reach. Pt instructed to call for assistance, pt verbalizes understanding. Non skid socks on, bed in lowest position.

## 2018-10-03 NOTE — DISCHARGE SUMMARY
Internal Medicine Discharge Summary  Note Author: Joselito Dodd M.D.       Admit Date:  9/26/2018       Discharge Date:   10/2/2018     Service:   [facility] Internal Medicine White Team  Attending Physician(s):   Dr. Gordon       Senior Resident(s):   Dr. Dodd  César Resident(s):   Dr. Pendleton  PCP: Tre Jason M.D.      Primary Diagnosis:   Diabetic ketoacidosis    Secondary Diagnoses:                Principal Problem:    DKA (diabetic ketoacidoses) (HCC) POA: Yes  Active Problems:    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) POA: Yes    Major depressive disorder POA: Yes    Toxic metabolic encephalopathy POA: Yes    Generalized abdominal pain POA: Yes    Essential hypertension POA: Yes    Hyperlipidemia (Chronic) POA: Yes    Non-intractable vomiting with nausea POA: Yes    Obesity (BMI 30-39.9) (Chronic) POA: Yes    Drug-seeking behavior (Chronic) POA: Yes    Chronic pain syndrome with narcotic dependence (Chronic) POA: Yes    Gastroparesis due to DM (HCC) (Chronic) POA: Yes    High anion gap metabolic acidosis POA: Yes    Open wound of right heel POA: Yes    Murmur, cardiac POA: Yes    Paronychia POA: No    Polyneuropathy POA: Yes  Resolved Problems:    * No resolved hospital problems. *      Hospital Summary (Brief Narrative):       Ms. Donnelly is a 58-year-old female with a past medical history of uncontrolled type 2 diabetes with secondary gastroparesis and polyneuropathy who presented to the ED for altered mental status.  She was found to have a blood glucose of 535 and was admitted to the ICU for diabetic ketoacidosis.  She remained in the ICU for 3 days, during which, she received a diabetic ketoacidosis regimen of fluids, electrolyte repletion, and insulin drip.  Her anion gap and bicarb improved as well as blood glucose levels.  At this time she was transitioned to pre-meal plus corrected insulin along with basal insulin.  Concurrently she had paronychia of the left thumb.   Incision and drainage was performed with cultures positive for pansensitive Staphylococcus aureus.  For this she received doxycycline.    With improvement of symptoms she was transferred to the floor.  Insulin regimen was titrated and her weakness and gait disturbance were assessed by physical therapy and occupational the.  They recommended patient discharged with no equipment and no further skilled therapy needed.  Diabetic education visited the patient and provided guidance regarding insulin and glucose control.    She was discharged with 1 additional day of doxycycline, Lantus 44 units every morning with 12 units lispro before every meal, fluoxetine for depression.  She will require close outpatient follow-up for management of diabetes, resolution of left thumb paronychia, depression.  The patient also describes toothache leading to left-sided headache.  This will require outpatient dentistry.    Patient /Hospital Summary (Details -- Problem Oriented) :          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)   Assessment & Plan    -Completed diabetic education and PT/OT evaluation.  -Hemoglobin A1C 14.3.  -Blood glucose 176-321.      Plan:  -Discharged with glargine 44 units in the morning with 12 units lispro before meals   -Follow up with pcp/endocrinology as out patient        * DKA (diabetic ketoacidoses) (HCC)   Assessment & Plan    -presented with bicarb 10, pH (on VBG) 7.25,  in setting of chronic -DM med noncompliance  -DKA resolved. See T2DM  -Received diabetic education    Plan:  - Follow up with PCP for endocrinology referral        Generalized abdominal pain   Assessment & Plan    Likely secondary to DKA.  Lipase within normal limits.  No tenderness to palpation today.  Resolved        Toxic metabolic encephalopathy   Assessment & Plan    Secondary to DKA.  Resolved.        Major depressive disorder   Assessment & Plan    Active episode MDD without SI.     Plan:  -Discharged  with fluoxetine 20 mg  -She will be requiring follow-up with primary care in 4-6 weeks for dose adjustment.        Polyneuropathy   Assessment & Plan    Secondary to poorly controlled diabetes.  Mild weakness in the bilateral lower extremities.  She was taking MS Contin at home.  The patient was evaluated by physical therapy and Occupational Therapy and cleared to discharge home without equipment or outpatient skilled therapy    Plan:  -Continue home gabapentin 900 mg by mouth 3 times daily.  -The patient will require outpatient follow-up with primary care        Paronychia   Assessment & Plan    Status post I&D of left thumb paronychia on 9/28.  Pt on doxycycline.  Gram stain shows rare gram-positive cocci.  Last dose this evening 10/2/2018    Plan:  -Discharged with 1 additional day of doxycycline.      Murmur, cardiac   Assessment & Plan    Echocardiogram shows mild concentric left ventricular hypertrophy, EF 60% with trace mitral regurgitation.    Plan  -Patient will require follow-up with cardiologist as outpatient.        Open wound of right heel   Assessment & Plan    - wound care provided  - need outpatient yearly foot exam        High anion gap metabolic acidosis   Assessment & Plan    - Resolved  -likely due to lactic acidosis + diabetic ketoacidosis (vs HHS)  -aggressive IVF resuscitation on DKA protocol,   -AG remains closed, CO2 >17.   -Transitioned 9/27 from insulin drip to pre-meal + ISS + basal insulin and LR discontinued          Gastroparesis due to DM (HCC)   Assessment & Plan    Continue home Gabapentin.  Continue home bentyl.          Chronic pain syndrome with narcotic dependence   Assessment & Plan    Patient has a history of taking MS Contin at home.  Follow up with pain management        Drug-seeking behavior   Assessment & Plan    Transitioned to oral pain meds  May not d/c with narcotics without compelling reason given hx of opioid dependence and drug-seeking behavior        Obesity (BMI  30-39.9)   Assessment & Plan    Counseling on lifestyle changes.        Non-intractable vomiting with nausea   Assessment & Plan    Secondary to DKA, resolved.        Hyperlipidemia   Assessment & Plan    Continue home pravastatin.        Essential hypertension   Assessment & Plan    Continue lisinopril 5 mg my daily.            Consultants:     Diabetic education, pulmonology,    Procedures:        Incision and drainage of left thumb paronychia    Imaging/ Testing:      -Echocardiogram showed ejection fraction of 60%, calcified aortic valve leaflets, mild mitral annular calcification    Discharge Medications:         Medication Reconciliation: Completed       Medication List      START taking these medications      Instructions   doxycycline monohydrate 100 MG tablet  Commonly known as:  ADOXA   Doctor's comments:  Take in the evening of discharge  Take 1 Tab by mouth every 12 hours for 1 dose.  Dose:  100 mg     FLUoxetine 20 MG Caps  Commonly known as:  PROZAC   Take 1 Cap by mouth every day.  Dose:  20 mg     insulin glargine 100 UNIT/ML Soln  Commonly known as:  LANTUS   Inject 44 Units as instructed every morning.  Dose:  44 Units     insulin lispro 100 UNIT/ML Soln  Commonly known as:  HUMALOG   Inject 12 Units as instructed 3 times a day before meals.  Dose:  12 Units        CONTINUE taking these medications      Instructions   aspirin EC 81 MG Tbec  Commonly known as:  ECOTRIN   Take 81 mg by mouth every day.  Dose:  81 mg     dicyclomine 20 MG Tabs  Commonly known as:  BENTYL   Take 20 mg by mouth every 6 hours as needed.  Dose:  20 mg     gabapentin 300 MG Caps  Commonly known as:  NEURONTIN   Take 900 mg by mouth 3 times a day.  Dose:  900 mg     lisinopril 5 MG Tabs  Commonly known as:  PRINIVIL   Take 5 mg by mouth every morning.  Dose:  5 mg     ondansetron 4 MG Tbdp  Commonly known as:  ZOFRAN ODT   Take 4 mg by mouth every 6 hours as needed for Nausea.  Dose:  4 mg     pravastatin 20 MG  Tabs  Commonly known as:  PRAVACHOL   Take 20 mg by mouth every evening.  Dose:  20 mg     traZODone 100 MG Tabs  Commonly known as:  DESYREL   Take 100-400 mg by mouth 2 times a day. 100 mg  mg PM  Dose:  100-400 mg        STOP taking these medications    ALPRAZolam 0.5 MG Tabs  Commonly known as:  XANAX     morphine ER 60 MG Tbcr tablet  Commonly known as:  MS CONTIN     oxyCODONE-acetaminophen  MG Tabs  Commonly known as:  PERCOCET-10            Can use .DISCHARGEMEDSLIST if going to another facility         Disposition:   Discharged home with close outpatient follow-up    Diet:   Diabetic diet    Activity:   As tolerated with a goal of weight loss and increase insulin sensitivity.    Instructions:        The patient was instructed to return to the ER in the event of worsening symptoms. I have counseled the patient on the importance of compliance and the patient has agreed to proceed with all medical recommendations and follow up plan indicated above.   The patient understands that all medications come with benefits and risks. Risks may include permanent injury or death and these risks can be minimized with close reassessment and monitoring.        Primary Care Provider:    KARENA Jason  Discharge summary faxed to primary care provider:  Deferred  Copy of discharge summary given to the patient: Deferred      Follow up appointment details :      Patient will require follow-up with primary care and    Pending Studies:        None    Time spent on discharge day patient visit, preparing discharge paperwork and arranging for patient follow up.    Summary of follow up issues:   Diabetes management, left thumb paronychia, murmur, depression, fractured tooth.    Discharge Time (Minutes) :    35mins  Hospital Course Type:  Inpatient Stay >2 midnights      Condition on Discharge    ______________________________________________________________________    Interval history/exam for day of discharge:    Patient  complains of mild toothache which is causing mild left-sided headache.  Patient denies any fever, nausea, vomiting, or any other symptoms. PT/OT evaluated the pt and cleared her to be discharged home without any skilled needs. Pt is medically cleared to be discharged home    Vitals:    10/01/18 1925 10/02/18 0335 10/02/18 0720 10/02/18 1609   BP: 133/61 134/72 120/55 134/65   Pulse: 87 84 71 80   Resp: 18 18 18 20   Temp: 36.3 °C (97.3 °F) 36.6 °C (97.8 °F) 36.6 °C (97.8 °F) 36 °C (96.8 °F)   SpO2: 96% 93% 95% 97%   Weight:       Height:         Weight/BMI: Body mass index is 31.32 kg/m².  Pulse Oximetry: 97 %, O2 (LPM): 0, O2 Delivery: None (Room Air)    General: Well-appearing  CVS: Normal rate and rhythm, murmur, grade 3 systolic ejection murmur best heard at the left lower sternal border.  PULM: Bilaterally clear to auscultation    Most Recent Labs:    Lab Results   Component Value Date/Time    WBC 6.2 10/02/2018 06:11 AM    RBC 3.86 (L) 10/02/2018 06:11 AM    HEMOGLOBIN 10.8 (L) 10/02/2018 06:11 AM    HEMATOCRIT 33.7 (L) 10/02/2018 06:11 AM    MCV 87.3 10/02/2018 06:11 AM    MCH 28.0 10/02/2018 06:11 AM    MCHC 32.0 (L) 10/02/2018 06:11 AM    MPV 9.7 10/02/2018 06:11 AM    NEUTSPOLYS 40.80 (L) 10/02/2018 06:11 AM    LYMPHOCYTES 44.70 (H) 10/02/2018 06:11 AM    MONOCYTES 9.90 10/02/2018 06:11 AM    EOSINOPHILS 3.70 10/02/2018 06:11 AM    EOSINOPHILS 3 05/08/2017 03:00 PM    BASOPHILS 0.60 10/02/2018 06:11 AM    HYPOCHROMIA 1 05/20/2014 09:05 PM    ANISOCYTOSIS 1+ 07/07/2017 03:15 PM      Lab Results   Component Value Date/Time    SODIUM 137 10/02/2018 06:11 AM    POTASSIUM 3.6 10/02/2018 06:11 AM    CHLORIDE 104 10/02/2018 06:11 AM    CO2 26 10/02/2018 06:11 AM    GLUCOSE 261 (H) 10/02/2018 06:11 AM    BUN 14 10/02/2018 06:11 AM    CREATININE 0.73 10/02/2018 06:11 AM      Lab Results   Component Value Date/Time    ALTSGPT 9 10/02/2018 06:11 AM    ASTSGOT 12 10/02/2018 06:11 AM    ALKPHOSPHAT 82 10/02/2018 06:11  AM    TBILIRUBIN 0.2 10/02/2018 06:11 AM    LIPASE 17 09/27/2018 05:40 PM    ALBUMIN 2.9 (L) 10/02/2018 06:11 AM    GLOBULIN 3.5 10/02/2018 06:11 AM    INR 1.05 05/08/2017 03:53 PM     Lab Results   Component Value Date/Time    PROTHROMBTM 14.0 05/08/2017 03:53 PM    INR 1.05 05/08/2017 03:53 PM

## 2018-10-03 NOTE — PROGRESS NOTES
Discharge paperwork discussed with pt. Signed paperwork placed in chart. RN obtained home medication from [facility] pharmacy. Medication being held at nurses station until pt discharges.

## 2018-10-03 NOTE — DISCHARGE INSTRUCTIONS
Discharge Instructions    Discharged to home by car with relative. Discharged via wheelchair, hospital escort: Yes.  Special equipment needed: Not Applicable    Be sure to schedule a follow-up appointment with your primary care doctor or any specialists as instructed.     Discharge Plan:   Diet Plan: Discussed (Diabetic Diet )  Activity Level: Discussed (increase as tolerated )  Confirmed Follow up Appointment: Patient to Call and Schedule Appointment  Confirmed Symptoms Management: Discussed  Medication Reconciliation Updated: Yes  Influenza Vaccine Indication: Indicated: 9 to 64 years of age  Influenza Vaccine Given - only chart on this line when given: Influenza Vaccine Given (See MAR)    I understand that a diet low in cholesterol, fat, and sodium is recommended for good health. Unless I have been given specific instructions below for another diet, I accept this instruction as my diet prescription.   Other diet: Diabetic Diet     Special Instructions:  Diabetic Ketoacidosis  Diabetic ketoacidosis (DKA) is a serious problem that can happen in people with diabetes. DKA should be treated as a medical emergency. This is because it can lead to coma or death. If you have the symptoms of DKA, get medical help right away.  DKA happens more often in people with type 1 diabetes. But it can happen in people with type 2 diabetes. It can also happen in women with diabetes during pregnancy. This is often known as gestational diabetes.  DKA happens when insulin levels are too low. Without enough insulin, sugar (glucose) can’t get to the cells of your body. The glucose stays in the blood. This causes high blood glucose (hyperglycemia). Without glucose, your body breaks down stored fat for energy. When this happens, acids called ketones are released into the blood. This is known as ketosis. High levels of ketones (ketoacidosis) can be harmful to you. Hyperglycemia and ketoacidosis can also cause serious problems in the blood and  your body, such as:  · Low levels of potassium (hypokalemia)  · Swelling inside the brain (cerebral edema)  · Fluid in the lungs (pulmonary edema)  · Damage to kidneys or other organs  What causes diabetic ketoacidosis?  In people with diabetes, DKA is most often caused by too little insulin in the body. It is also caused by:  · Poor management of diabetes  · Infections like a urinary tract infection or pneumonia  · Serious health problems, such as a heart attack  · Reactions to certain prescribed medicines and illicit drugs  Symptoms of diabetic ketoacidosis  DKA most often happens slowly over time. But it can worsen in a few hours if you are vomiting. The first symptoms are:  · Thirst and dry mouth  · Urinating a lot  · Belly pain  · Nausea or vomiting  · Breath that smells fruity (from the ketones)  Over time, these symptoms may happen:  · Dry or flushed skin  · Nausea and vomiting  · Loss of appetite  · Weight loss  · Belly pain  · Trouble breathing  · Trouble thinking or confusion  · Feeling very tired or weak. This can lead to coma.  How is diabetic ketoacidosis diagnosed?  Your healthcare provider will ask about your medical history. He or she will give you a physical exam. You may also have these tests:  · Blood tests to check your glucose levels  · Blood tests to check your electrolytes, such as potassium and sodium  · Urine test to check for ketones  These tests are done to check for DKA, and monitor it over time.  How is diabetic ketoacidosis treated?  DKA needs treatment right away in the hospital. Treatment includes:  · Insulin. This is the main type of treatment. Insulin allows the cells to use the glucose in the blood. This lowers the levels of both blood glucose and ketones.  · Fluids and electrolytes. These are given through a vein (IV). Fluids are replaced and abnormal electrolyte levels are corrected.  · Other medicines. These may be given to treat an illness that caused DKA. For example,  antibiotics may be given to treat a urinary tract infection that caused DKA.  Preventing diabetic ketoacidosis  To help prevent DKA, make sure you:  · Take all of your medicines for diabetes exactly as prescribed. This includes insulin.  · Check your blood glucose levels exactly as instructed.  · Be especially careful when you are sick with an illness or an infection. Take extra care to follow diabetes care instructions. Check your blood glucose more often.  · Do not exercise when your blood sugar is high and you have ketones in your urine.   · Check your urine ketone levels if told to do so. This is done with a urine test strip. Ask your healthcare provider how often to check your urine.     When to call your healthcare provider  Call your healthcare provider right away if you:  · Have symptoms of DKA  · Have very high blood glucose levels  · Are getting sick with another illness       [id number]  Diabetes: Keeping Feet Healthy      Have your feet checked every time you see your healthcare provider, and at least once a year.    Diabetes can damage nerves in your feet and cause neuropathy. This condition makes it hard for you to feel injuries or sore spots. Diabetes can also change blood flow, making it harder for small problems, like a blister, to heal properly. In fact, minor injuries can quickly become serious infections that send you to the hospital. Practice self-care to protect your feet and keep them healthy.   Take special care  Here are tips for taking special care of your feet:  · Inspect your feet daily for problems such as redness, blisters, cracks, dry skin, or numbness. Use a mirror to see the bottoms of your feet. Or, ask for help.  · Manage your diabetes. Monitor and control your blood sugar. Take all your medicines as prescribed.  · Avoid walking barefoot, even indoors. Always wear socks inside your shoes.   · Wash your feet with warm water and mild soap. Dry well, especially between toes.  · Don’t treat  corns or calluses yourself. Talk to your healthcare provider or podiatrist (a healthcare provider who specializes in foot care) if you need assistance trimming your toenails.  · Use moisturizing cream or lotion if you have dry skin, but don’t use it between toes.  · Don’t use heating pads on your feet. If you have neuropathy, you could get a burn and not feel it.  · Stop smoking. Smoking restricts blood flow and can make it harder for wounds to heal.  · Don't use sharp blades to trim your nails. Use a nail clipper and file instead.   Have regular checkups  Foot problems can develop quickly. So be sure to follow your healthcare team’s schedule for regular checkups. During office visits, take off your shoes and socks as soon as you get in the exam room. Ask your healthcare provider to examine your feet for problems. This will make it easier to find and treat small skin irritations before they get worse. Regular checkups can also help keep track of the blood flow and feeling in your feet. If you have neuropathy, you may need to have checkups more often.  Wear proper footwear  Wearing proper footwear is very important. If areas of your feet have been damaged by too much pressure, your healthcare provider may recommend changing your footwear. In some cases, avoiding high heels or tight work boots may be all that’s needed. Or, your healthcare provider may recommend special shoes or custom inserts. These help protect your feet and keep existing irritations from getting worse. If you need special footwear, ask your healthcare provider if you qualify for Medicare's custom-molded and extra-depth diabetic shoe and insert program.   Make sure shoes and socks fit  Any pair of shoes--new or old--should feel comfortable as soon as you put them on. There shouldn’t be any rubbing when you walk. Wear the right shoe for any activity. For instance, a running shoe is designed to keep your feet injury-free while jogging. Buy shoes at the  end of the day, when your feet are larger. Make sure they provide support without feeling too loose. Make sure your socks fit, too. Wear soft, seamless, well-padded socks for activity. Cotton or microfiber socks are best to help to absorb sweat. To protect your feet, avoid shoes that are open-toed or open-heeled. If you have questions about what kinds of shoes and socks are best, talk to your healthcare team.  Get regular exercise  Regular exercise improves blood flow in your feet. It also increases foot strength and flexibility. Gentle exercises, like walking or riding a stationary bicycle, are best. You can also do special foot exercises. Just be sure to talk with your healthcare provider before starting any exercise program. Also mention if any exercise causes pain, redness, or other signs of foot problems.     Note: If you have any kind of break in the skin of your foot or ankle, keep the area clean. Then call your healthcare provider--especially if the area doesn’t appear to be healing.           · Is patient discharged on Warfarin / Coumadin?   No     Depression / Suicide Risk    As you are discharged from this [facility] facility, it is important to learn how to keep safe from harming yourself.    Recognize the warning signs:  · Abrupt changes in personality, positive or negative- including increase in energy   · Giving away possessions  · Change in eating patterns- significant weight changes-  positive or negative  · Change in sleeping patterns- unable to sleep or sleeping all the time   · Unwillingness or inability to communicate  · Depression  · Unusual sadness, discouragement and loneliness  · Talk of wanting to die  · Neglect of personal appearance   · Rebelliousness- reckless behavior  · Withdrawal from people/activities they love  · Confusion- inability to concentrate     If you or a loved one observes any of these behaviors or has concerns about self-harm, here's what you can do:  · Talk about it-  your feelings and reasons for harming yourself  · Remove any means that you might use to hurt yourself (examples: pills, rope, extension cords, firearm)  · Get professional help from the community (Mental Health, Substance Abuse, psychological counseling)  · Do not be alone:Call your Safe Contact- someone whom you trust who will be there for you.  · Call your local CRISIS HOTLINE 908-3935 or 890-132-9975  · Call your local Children's Mobile Crisis Response Team [place] (964) 407-1071 or www.[employer].com  · Call the toll free National Suicide Prevention Hotlines   · National Suicide Prevention Lifeline 983-142-AKUG (9392)  · National Hope Line Network 800-SUICIDE (068-2152)

## 2018-12-20 ENCOUNTER — APPOINTMENT (OUTPATIENT)
Dept: RADIOLOGY | Facility: MEDICAL CENTER | Age: 58
DRG: 247 | End: 2018-12-20
Attending: EMERGENCY MEDICINE
Payer: COMMERCIAL

## 2018-12-20 ENCOUNTER — HOSPITAL ENCOUNTER (OUTPATIENT)
Dept: RADIOLOGY | Facility: MEDICAL CENTER | Age: 58
End: 2018-12-20

## 2018-12-20 ENCOUNTER — HOSPITAL ENCOUNTER (INPATIENT)
Facility: MEDICAL CENTER | Age: 58
LOS: 2 days | DRG: 247 | End: 2018-12-23
Attending: EMERGENCY MEDICINE | Admitting: HOSPITALIST
Payer: COMMERCIAL

## 2018-12-20 DIAGNOSIS — R10.9 ABDOMINAL PAIN, UNSPECIFIED ABDOMINAL LOCATION: ICD-10-CM

## 2018-12-20 PROBLEM — E11.9 T2DM (TYPE 2 DIABETES MELLITUS) (HCC): Status: ACTIVE | Noted: 2018-12-20

## 2018-12-20 PROBLEM — E78.5 HLD (HYPERLIPIDEMIA): Status: ACTIVE | Noted: 2018-12-20

## 2018-12-20 PROBLEM — R74.01 TRANSAMINITIS: Status: ACTIVE | Noted: 2018-12-20

## 2018-12-20 PROBLEM — R79.89 ELEVATED TROPONIN: Status: ACTIVE | Noted: 2018-12-20

## 2018-12-20 LAB
ALBUMIN SERPL BCP-MCNC: 3.5 G/DL (ref 3.2–4.9)
ALBUMIN/GLOB SERPL: 1 G/DL
ALP SERPL-CCNC: 205 U/L (ref 30–99)
ALT SERPL-CCNC: 118 U/L (ref 2–50)
ANION GAP SERPL CALC-SCNC: 8 MMOL/L (ref 0–11.9)
APTT PPP: 32.4 SEC (ref 24.7–36)
AST SERPL-CCNC: 264 U/L (ref 12–45)
BASOPHILS # BLD AUTO: 0.6 % (ref 0–1.8)
BASOPHILS # BLD: 0.05 K/UL (ref 0–0.12)
BILIRUB SERPL-MCNC: 0.7 MG/DL (ref 0.1–1.5)
BNP SERPL-MCNC: 98 PG/ML (ref 0–100)
BUN SERPL-MCNC: 21 MG/DL (ref 8–22)
CALCIUM SERPL-MCNC: 8.6 MG/DL (ref 8.5–10.5)
CHLORIDE SERPL-SCNC: 107 MMOL/L (ref 96–112)
CO2 SERPL-SCNC: 23 MMOL/L (ref 20–33)
CREAT SERPL-MCNC: 0.81 MG/DL (ref 0.5–1.4)
EKG IMPRESSION: NORMAL
EOSINOPHIL # BLD AUTO: 0.15 K/UL (ref 0–0.51)
EOSINOPHIL NFR BLD: 1.7 % (ref 0–6.9)
ERYTHROCYTE [DISTWIDTH] IN BLOOD BY AUTOMATED COUNT: 45.6 FL (ref 35.9–50)
GLOBULIN SER CALC-MCNC: 3.4 G/DL (ref 1.9–3.5)
GLUCOSE BLD-MCNC: 142 MG/DL (ref 65–99)
GLUCOSE SERPL-MCNC: 196 MG/DL (ref 65–99)
HCT VFR BLD AUTO: 39.1 % (ref 37–47)
HGB BLD-MCNC: 12.3 G/DL (ref 12–16)
IMM GRANULOCYTES # BLD AUTO: 0.03 K/UL (ref 0–0.11)
IMM GRANULOCYTES NFR BLD AUTO: 0.3 % (ref 0–0.9)
INR PPP: 1.02 (ref 0.87–1.13)
LIPASE SERPL-CCNC: 69 U/L (ref 11–82)
LYMPHOCYTES # BLD AUTO: 2.91 K/UL (ref 1–4.8)
LYMPHOCYTES NFR BLD: 32.1 % (ref 22–41)
MCH RBC QN AUTO: 27.5 PG (ref 27–33)
MCHC RBC AUTO-ENTMCNC: 31.5 G/DL (ref 33.6–35)
MCV RBC AUTO: 87.3 FL (ref 81.4–97.8)
MONOCYTES # BLD AUTO: 0.71 K/UL (ref 0–0.85)
MONOCYTES NFR BLD AUTO: 7.8 % (ref 0–13.4)
NEUTROPHILS # BLD AUTO: 5.22 K/UL (ref 2–7.15)
NEUTROPHILS NFR BLD: 57.5 % (ref 44–72)
NRBC # BLD AUTO: 0 K/UL
NRBC BLD-RTO: 0 /100 WBC
PLATELET # BLD AUTO: 305 K/UL (ref 164–446)
PMV BLD AUTO: 9.3 FL (ref 9–12.9)
POTASSIUM SERPL-SCNC: 3.9 MMOL/L (ref 3.6–5.5)
PROT SERPL-MCNC: 6.9 G/DL (ref 6–8.2)
PROTHROMBIN TIME: 13.6 SEC (ref 12–14.6)
RBC # BLD AUTO: 4.48 M/UL (ref 4.2–5.4)
SODIUM SERPL-SCNC: 138 MMOL/L (ref 135–145)
TROPONIN I SERPL-MCNC: 0.16 NG/ML (ref 0–0.04)
TROPONIN I SERPL-MCNC: 0.3 NG/ML (ref 0–0.04)
TSH SERPL DL<=0.005 MIU/L-ACNC: 2.33 UIU/ML (ref 0.38–5.33)
WBC # BLD AUTO: 9.1 K/UL (ref 4.8–10.8)

## 2018-12-20 PROCEDURE — 71045 X-RAY EXAM CHEST 1 VIEW: CPT

## 2018-12-20 PROCEDURE — G0378 HOSPITAL OBSERVATION PER HR: HCPCS

## 2018-12-20 PROCEDURE — 83880 ASSAY OF NATRIURETIC PEPTIDE: CPT

## 2018-12-20 PROCEDURE — 99285 EMERGENCY DEPT VISIT HI MDM: CPT

## 2018-12-20 PROCEDURE — 36415 COLL VENOUS BLD VENIPUNCTURE: CPT

## 2018-12-20 PROCEDURE — A9270 NON-COVERED ITEM OR SERVICE: HCPCS | Performed by: HOSPITALIST

## 2018-12-20 PROCEDURE — 80061 LIPID PANEL: CPT

## 2018-12-20 PROCEDURE — 84443 ASSAY THYROID STIM HORMONE: CPT

## 2018-12-20 PROCEDURE — 85610 PROTHROMBIN TIME: CPT

## 2018-12-20 PROCEDURE — 700105 HCHG RX REV CODE 258: Performed by: EMERGENCY MEDICINE

## 2018-12-20 PROCEDURE — 96374 THER/PROPH/DIAG INJ IV PUSH: CPT

## 2018-12-20 PROCEDURE — 96375 TX/PRO/DX INJ NEW DRUG ADDON: CPT

## 2018-12-20 PROCEDURE — 71275 CT ANGIOGRAPHY CHEST: CPT

## 2018-12-20 PROCEDURE — 85025 COMPLETE CBC W/AUTO DIFF WBC: CPT

## 2018-12-20 PROCEDURE — 80074 ACUTE HEPATITIS PANEL: CPT

## 2018-12-20 PROCEDURE — 93005 ELECTROCARDIOGRAM TRACING: CPT

## 2018-12-20 PROCEDURE — 700102 HCHG RX REV CODE 250 W/ 637 OVERRIDE(OP): Performed by: HOSPITALIST

## 2018-12-20 PROCEDURE — 85730 THROMBOPLASTIN TIME PARTIAL: CPT

## 2018-12-20 PROCEDURE — 83690 ASSAY OF LIPASE: CPT

## 2018-12-20 PROCEDURE — 700117 HCHG RX CONTRAST REV CODE 255: Performed by: EMERGENCY MEDICINE

## 2018-12-20 PROCEDURE — 99358 PROLONG SERVICE W/O CONTACT: CPT | Performed by: HOSPITALIST

## 2018-12-20 PROCEDURE — 700111 HCHG RX REV CODE 636 W/ 250 OVERRIDE (IP): Performed by: HOSPITALIST

## 2018-12-20 PROCEDURE — 99245 OFF/OP CONSLTJ NEW/EST HI 55: CPT | Performed by: INTERNAL MEDICINE

## 2018-12-20 PROCEDURE — 84484 ASSAY OF TROPONIN QUANT: CPT | Mod: 91

## 2018-12-20 PROCEDURE — 93005 ELECTROCARDIOGRAM TRACING: CPT | Performed by: EMERGENCY MEDICINE

## 2018-12-20 PROCEDURE — 93005 ELECTROCARDIOGRAM TRACING: CPT | Performed by: HOSPITALIST

## 2018-12-20 PROCEDURE — 82962 GLUCOSE BLOOD TEST: CPT

## 2018-12-20 PROCEDURE — 700102 HCHG RX REV CODE 250 W/ 637 OVERRIDE(OP): Performed by: EMERGENCY MEDICINE

## 2018-12-20 PROCEDURE — 83036 HEMOGLOBIN GLYCOSYLATED A1C: CPT

## 2018-12-20 PROCEDURE — A9270 NON-COVERED ITEM OR SERVICE: HCPCS | Performed by: EMERGENCY MEDICINE

## 2018-12-20 PROCEDURE — 99219 PR INITIAL OBSERVATION CARE,LEVL II: CPT | Performed by: HOSPITALIST

## 2018-12-20 PROCEDURE — 80053 COMPREHEN METABOLIC PANEL: CPT

## 2018-12-20 PROCEDURE — 304561 HCHG STAT O2

## 2018-12-20 PROCEDURE — 700105 HCHG RX REV CODE 258: Performed by: HOSPITALIST

## 2018-12-20 RX ORDER — AMOXICILLIN 250 MG
2 CAPSULE ORAL 2 TIMES DAILY
Status: DISCONTINUED | OUTPATIENT
Start: 2018-12-20 | End: 2018-12-23 | Stop reason: HOSPADM

## 2018-12-20 RX ORDER — BISACODYL 10 MG
10 SUPPOSITORY, RECTAL RECTAL
Status: DISCONTINUED | OUTPATIENT
Start: 2018-12-20 | End: 2018-12-23 | Stop reason: HOSPADM

## 2018-12-20 RX ORDER — KETOROLAC TROMETHAMINE 30 MG/ML
30 INJECTION, SOLUTION INTRAMUSCULAR; INTRAVENOUS EVERY 6 HOURS PRN
Status: DISCONTINUED | OUTPATIENT
Start: 2018-12-20 | End: 2018-12-23 | Stop reason: HOSPADM

## 2018-12-20 RX ORDER — GABAPENTIN 300 MG/1
900 CAPSULE ORAL 3 TIMES DAILY
Status: DISCONTINUED | OUTPATIENT
Start: 2018-12-20 | End: 2018-12-20

## 2018-12-20 RX ORDER — SODIUM CHLORIDE 9 MG/ML
1000 INJECTION, SOLUTION INTRAVENOUS ONCE
Status: COMPLETED | OUTPATIENT
Start: 2018-12-20 | End: 2018-12-21

## 2018-12-20 RX ORDER — NITROGLYCERIN 0.4 MG/1
0.4 TABLET SUBLINGUAL
Status: DISCONTINUED | OUTPATIENT
Start: 2018-12-20 | End: 2018-12-23 | Stop reason: HOSPADM

## 2018-12-20 RX ORDER — POLYETHYLENE GLYCOL 3350 17 G/17G
1 POWDER, FOR SOLUTION ORAL
Status: DISCONTINUED | OUTPATIENT
Start: 2018-12-20 | End: 2018-12-23 | Stop reason: HOSPADM

## 2018-12-20 RX ORDER — GABAPENTIN 400 MG/1
800 CAPSULE ORAL 3 TIMES DAILY
Status: DISCONTINUED | OUTPATIENT
Start: 2018-12-20 | End: 2018-12-23 | Stop reason: HOSPADM

## 2018-12-20 RX ORDER — INSULIN GLARGINE 100 [IU]/ML
30 INJECTION, SOLUTION SUBCUTANEOUS 2 TIMES DAILY
Status: ON HOLD | COMMUNITY
End: 2023-04-04

## 2018-12-20 RX ORDER — DEXTROSE MONOHYDRATE 25 G/50ML
25 INJECTION, SOLUTION INTRAVENOUS
Status: DISCONTINUED | OUTPATIENT
Start: 2018-12-20 | End: 2018-12-23 | Stop reason: HOSPADM

## 2018-12-20 RX ORDER — ROSUVASTATIN CALCIUM 20 MG/1
20 TABLET, COATED ORAL EVERY EVENING
Status: DISCONTINUED | OUTPATIENT
Start: 2018-12-20 | End: 2018-12-23 | Stop reason: HOSPADM

## 2018-12-20 RX ORDER — MORPHINE SULFATE 10 MG/ML
2-4 INJECTION, SOLUTION INTRAMUSCULAR; INTRAVENOUS
Status: DISCONTINUED | OUTPATIENT
Start: 2018-12-20 | End: 2018-12-23 | Stop reason: HOSPADM

## 2018-12-20 RX ORDER — HEPARIN SODIUM 1000 [USP'U]/ML
3200 INJECTION, SOLUTION INTRAVENOUS; SUBCUTANEOUS PRN
Status: DISCONTINUED | OUTPATIENT
Start: 2018-12-20 | End: 2018-12-20

## 2018-12-20 RX ORDER — DIPHENHYDRAMINE HCL 25 MG
25 TABLET ORAL ONCE
Status: COMPLETED | OUTPATIENT
Start: 2018-12-21 | End: 2018-12-21

## 2018-12-20 RX ADMIN — MORPHINE SULFATE 4 MG: 10 INJECTION INTRAVENOUS at 21:09

## 2018-12-20 RX ADMIN — ROSUVASTATIN CALCIUM 20 MG: 20 TABLET, FILM COATED ORAL at 21:10

## 2018-12-20 RX ADMIN — NITROGLYCERIN 0.5 INCH: 20 OINTMENT TOPICAL at 18:58

## 2018-12-20 RX ADMIN — SODIUM CHLORIDE 1000 ML: 9 INJECTION, SOLUTION INTRAVENOUS at 21:10

## 2018-12-20 RX ADMIN — IOHEXOL 75 ML: 350 INJECTION, SOLUTION INTRAVENOUS at 19:24

## 2018-12-20 RX ADMIN — SODIUM CHLORIDE 1000 ML: 9 INJECTION, SOLUTION INTRAVENOUS at 19:47

## 2018-12-20 RX ADMIN — KETOROLAC TROMETHAMINE 30 MG: 30 INJECTION, SOLUTION INTRAMUSCULAR at 19:47

## 2018-12-20 RX ADMIN — GABAPENTIN 800 MG: 400 CAPSULE ORAL at 21:09

## 2018-12-20 ASSESSMENT — ENCOUNTER SYMPTOMS
NECK PAIN: 0
CHILLS: 0
COUGH: 0
DIZZINESS: 0
ABDOMINAL PAIN: 0
MYALGIAS: 0
FOCAL WEAKNESS: 0
SORE THROAT: 0
VOMITING: 1
NAUSEA: 0
CONSTIPATION: 0
SHORTNESS OF BREATH: 0
PHOTOPHOBIA: 0
WHEEZING: 0
DIARRHEA: 0
NAUSEA: 1
PALPITATIONS: 0
FEVER: 0
HEADACHES: 0
VOMITING: 0
BACK PAIN: 0
DEPRESSION: 0
TINGLING: 0

## 2018-12-20 ASSESSMENT — COGNITIVE AND FUNCTIONAL STATUS - GENERAL
SUGGESTED CMS G CODE MODIFIER MOBILITY: CH
MOBILITY SCORE: 24
SUGGESTED CMS G CODE MODIFIER DAILY ACTIVITY: CH
DAILY ACTIVITIY SCORE: 24

## 2018-12-20 ASSESSMENT — LIFESTYLE VARIABLES
ALCOHOL_USE: NO
EVER_SMOKED: YES

## 2018-12-20 ASSESSMENT — PATIENT HEALTH QUESTIONNAIRE - PHQ9
3. TROUBLE FALLING OR STAYING ASLEEP OR SLEEPING TOO MUCH: SEVERAL DAYS
6. FEELING BAD ABOUT YOURSELF - OR THAT YOU ARE A FAILURE OR HAVE LET YOURSELF OR YOUR FAMILY DOWN: NOT AL ALL
9. THOUGHTS THAT YOU WOULD BE BETTER OFF DEAD, OR OF HURTING YOURSELF: NOT AT ALL
7. TROUBLE CONCENTRATING ON THINGS, SUCH AS READING THE NEWSPAPER OR WATCHING TELEVISION: NOT AT ALL
SUM OF ALL RESPONSES TO PHQ QUESTIONS 1-9: 4
2. FEELING DOWN, DEPRESSED, IRRITABLE, OR HOPELESS: SEVERAL DAYS
4. FEELING TIRED OR HAVING LITTLE ENERGY: SEVERAL DAYS
1. LITTLE INTEREST OR PLEASURE IN DOING THINGS: SEVERAL DAYS
8. MOVING OR SPEAKING SO SLOWLY THAT OTHER PEOPLE COULD HAVE NOTICED. OR THE OPPOSITE, BEING SO FIGETY OR RESTLESS THAT YOU HAVE BEEN MOVING AROUND A LOT MORE THAN USUAL: NOT AT ALL
SUM OF ALL RESPONSES TO PHQ9 QUESTIONS 1 AND 2: 2
5. POOR APPETITE OR OVEREATING: NOT AT ALL

## 2018-12-20 ASSESSMENT — PAIN SCALES - GENERAL
PAINLEVEL_OUTOF10: 8
PAINLEVEL_OUTOF10: 8

## 2018-12-21 ENCOUNTER — APPOINTMENT (OUTPATIENT)
Dept: RADIOLOGY | Facility: MEDICAL CENTER | Age: 58
DRG: 247 | End: 2018-12-21
Attending: HOSPITALIST
Payer: COMMERCIAL

## 2018-12-21 ENCOUNTER — APPOINTMENT (OUTPATIENT)
Dept: CARDIOLOGY | Facility: MEDICAL CENTER | Age: 58
DRG: 247 | End: 2018-12-21
Attending: HOSPITALIST
Payer: COMMERCIAL

## 2018-12-21 LAB
CHOLEST SERPL-MCNC: 176 MG/DL (ref 100–199)
EKG IMPRESSION: NORMAL
EST. AVERAGE GLUCOSE BLD GHB EST-MCNC: 240 MG/DL
GLUCOSE BLD-MCNC: 211 MG/DL (ref 65–99)
GLUCOSE BLD-MCNC: 218 MG/DL (ref 65–99)
GLUCOSE BLD-MCNC: 220 MG/DL (ref 65–99)
GLUCOSE BLD-MCNC: 258 MG/DL (ref 65–99)
HAV IGM SERPL QL IA: NEGATIVE
HBA1C MFR BLD: 10 % (ref 0–5.6)
HBV CORE IGM SER QL: NEGATIVE
HBV SURFACE AG SER QL: NEGATIVE
HCV AB SER QL: NEGATIVE
HDLC SERPL-MCNC: 45 MG/DL
LDLC SERPL CALC-MCNC: 99 MG/DL
LV EJECT FRACT  99904: 55
LV EJECT FRACT MOD 2C 99903: 52.15
LV EJECT FRACT MOD 4C 99902: 58.02
LV EJECT FRACT MOD BP 99901: 55.97
TRIGL SERPL-MCNC: 159 MG/DL (ref 0–149)
TROPONIN I SERPL-MCNC: 0.23 NG/ML (ref 0–0.04)
TROPONIN I SERPL-MCNC: 0.28 NG/ML (ref 0–0.04)

## 2018-12-21 PROCEDURE — 84484 ASSAY OF TROPONIN QUANT: CPT

## 2018-12-21 PROCEDURE — 770020 HCHG ROOM/CARE - TELE (206)

## 2018-12-21 PROCEDURE — 700102 HCHG RX REV CODE 250 W/ 637 OVERRIDE(OP): Performed by: HOSPITALIST

## 2018-12-21 PROCEDURE — 99232 SBSQ HOSP IP/OBS MODERATE 35: CPT | Performed by: FAMILY MEDICINE

## 2018-12-21 PROCEDURE — A9502 TC99M TETROFOSMIN: HCPCS

## 2018-12-21 PROCEDURE — 700111 HCHG RX REV CODE 636 W/ 250 OVERRIDE (IP)

## 2018-12-21 PROCEDURE — 93306 TTE W/DOPPLER COMPLETE: CPT

## 2018-12-21 PROCEDURE — 76705 ECHO EXAM OF ABDOMEN: CPT

## 2018-12-21 PROCEDURE — 700102 HCHG RX REV CODE 250 W/ 637 OVERRIDE(OP): Performed by: STUDENT IN AN ORGANIZED HEALTH CARE EDUCATION/TRAINING PROGRAM

## 2018-12-21 PROCEDURE — A9270 NON-COVERED ITEM OR SERVICE: HCPCS | Performed by: HOSPITALIST

## 2018-12-21 PROCEDURE — 93010 ELECTROCARDIOGRAM REPORT: CPT | Performed by: INTERNAL MEDICINE

## 2018-12-21 PROCEDURE — 93306 TTE W/DOPPLER COMPLETE: CPT | Mod: 26 | Performed by: INTERNAL MEDICINE

## 2018-12-21 PROCEDURE — 700111 HCHG RX REV CODE 636 W/ 250 OVERRIDE (IP): Performed by: HOSPITALIST

## 2018-12-21 PROCEDURE — 96372 THER/PROPH/DIAG INJ SC/IM: CPT

## 2018-12-21 PROCEDURE — 700111 HCHG RX REV CODE 636 W/ 250 OVERRIDE (IP): Performed by: FAMILY MEDICINE

## 2018-12-21 PROCEDURE — A9270 NON-COVERED ITEM OR SERVICE: HCPCS | Performed by: STUDENT IN AN ORGANIZED HEALTH CARE EDUCATION/TRAINING PROGRAM

## 2018-12-21 PROCEDURE — 96376 TX/PRO/DX INJ SAME DRUG ADON: CPT

## 2018-12-21 PROCEDURE — 700102 HCHG RX REV CODE 250 W/ 637 OVERRIDE(OP): Performed by: FAMILY MEDICINE

## 2018-12-21 PROCEDURE — 99233 SBSQ HOSP IP/OBS HIGH 50: CPT | Mod: GC | Performed by: INTERNAL MEDICINE

## 2018-12-21 PROCEDURE — 82962 GLUCOSE BLOOD TEST: CPT | Mod: 91

## 2018-12-21 PROCEDURE — 36415 COLL VENOUS BLD VENIPUNCTURE: CPT

## 2018-12-21 PROCEDURE — 700105 HCHG RX REV CODE 258: Performed by: STUDENT IN AN ORGANIZED HEALTH CARE EDUCATION/TRAINING PROGRAM

## 2018-12-21 RX ORDER — REGADENOSON 0.08 MG/ML
INJECTION, SOLUTION INTRAVENOUS
Status: COMPLETED
Start: 2018-12-21 | End: 2018-12-21

## 2018-12-21 RX ORDER — SODIUM CHLORIDE 9 MG/ML
INJECTION, SOLUTION INTRAVENOUS CONTINUOUS
Status: DISCONTINUED | OUTPATIENT
Start: 2018-12-21 | End: 2018-12-23 | Stop reason: HOSPADM

## 2018-12-21 RX ORDER — INSULIN GLARGINE 100 [IU]/ML
5 INJECTION, SOLUTION SUBCUTANEOUS EVERY EVENING
Status: DISCONTINUED | OUTPATIENT
Start: 2018-12-21 | End: 2018-12-22

## 2018-12-21 RX ORDER — TRAZODONE HYDROCHLORIDE 100 MG/1
100 TABLET ORAL
Status: DISCONTINUED | OUTPATIENT
Start: 2018-12-21 | End: 2018-12-23 | Stop reason: HOSPADM

## 2018-12-21 RX ADMIN — MORPHINE SULFATE 4 MG: 10 INJECTION INTRAVENOUS at 16:19

## 2018-12-21 RX ADMIN — INSULIN HUMAN 3 UNITS: 100 INJECTION, SOLUTION PARENTERAL at 00:15

## 2018-12-21 RX ADMIN — METOPROLOL TARTRATE 25 MG: 25 TABLET, FILM COATED ORAL at 15:40

## 2018-12-21 RX ADMIN — MORPHINE SULFATE 4 MG: 10 INJECTION INTRAVENOUS at 13:02

## 2018-12-21 RX ADMIN — INSULIN HUMAN 3 UNITS: 100 INJECTION, SOLUTION PARENTERAL at 05:05

## 2018-12-21 RX ADMIN — ENOXAPARIN SODIUM 40 MG: 100 INJECTION SUBCUTANEOUS at 12:55

## 2018-12-21 RX ADMIN — MORPHINE SULFATE 4 MG: 10 INJECTION INTRAVENOUS at 21:25

## 2018-12-21 RX ADMIN — GABAPENTIN 800 MG: 400 CAPSULE ORAL at 05:00

## 2018-12-21 RX ADMIN — DIPHENHYDRAMINE HCL 25 MG: 25 TABLET ORAL at 00:02

## 2018-12-21 RX ADMIN — INSULIN HUMAN 3 UNITS: 100 INJECTION, SOLUTION PARENTERAL at 12:56

## 2018-12-21 RX ADMIN — INSULIN GLARGINE 5 UNITS: 100 INJECTION, SOLUTION SUBCUTANEOUS at 17:06

## 2018-12-21 RX ADMIN — MORPHINE SULFATE 4 MG: 10 INJECTION INTRAVENOUS at 00:20

## 2018-12-21 RX ADMIN — REGADENOSON 0.4 MG: 0.08 INJECTION, SOLUTION INTRAVENOUS at 11:59

## 2018-12-21 RX ADMIN — ROSUVASTATIN CALCIUM 20 MG: 20 TABLET, FILM COATED ORAL at 17:06

## 2018-12-21 RX ADMIN — NITROGLYCERIN 0.4 MG: 0.4 TABLET SUBLINGUAL at 00:05

## 2018-12-21 RX ADMIN — TRAZODONE HYDROCHLORIDE 100 MG: 100 TABLET ORAL at 23:00

## 2018-12-21 RX ADMIN — SODIUM CHLORIDE: 9 INJECTION, SOLUTION INTRAVENOUS at 21:24

## 2018-12-21 RX ADMIN — INSULIN HUMAN 3 UNITS: 100 INJECTION, SOLUTION PARENTERAL at 23:04

## 2018-12-21 RX ADMIN — GABAPENTIN 800 MG: 400 CAPSULE ORAL at 17:05

## 2018-12-21 RX ADMIN — MORPHINE SULFATE 4 MG: 10 INJECTION INTRAVENOUS at 08:24

## 2018-12-21 RX ADMIN — INSULIN HUMAN 3 UNITS: 100 INJECTION, SOLUTION PARENTERAL at 17:06

## 2018-12-21 RX ADMIN — GABAPENTIN 800 MG: 400 CAPSULE ORAL at 12:55

## 2018-12-21 RX ADMIN — ASPIRIN 81 MG: 81 TABLET, DELAYED RELEASE ORAL at 15:40

## 2018-12-21 ASSESSMENT — ENCOUNTER SYMPTOMS
HEARTBURN: 0
PHOTOPHOBIA: 0
PND: 1
NAUSEA: 0
MYALGIAS: 0
SHORTNESS OF BREATH: 1
ABDOMINAL PAIN: 1
IRREGULAR HEARTBEAT: 0
FEVER: 0
NAUSEA: 1
VOMITING: 0
LIGHT-HEADEDNESS: 0
DIZZINESS: 0
BACK PAIN: 1
HEMOPTYSIS: 0
PALPITATIONS: 0
CHILLS: 0
BLURRED VISION: 0
DYSPNEA ON EXERTION: 1
DOUBLE VISION: 0
SEIZURES: 0
NECK PAIN: 0
ORTHOPNEA: 1
FOCAL WEAKNESS: 0
SYNCOPE: 0
COUGH: 1
HEADACHES: 0
CLAUDICATION: 0
TINGLING: 0
SPUTUM PRODUCTION: 0
DIAPHORESIS: 0
BACK PAIN: 0
DIARRHEA: 0
VOMITING: 1

## 2018-12-21 ASSESSMENT — PAIN SCALES - GENERAL
PAINLEVEL_OUTOF10: 4
PAINLEVEL_OUTOF10: 4
PAINLEVEL_OUTOF10: 8
PAINLEVEL_OUTOF10: 4
PAINLEVEL_OUTOF10: 8

## 2018-12-21 NOTE — PROGRESS NOTES
[facility] Medicine Daily Progress Note    Date of Service  12/21/2018    Chief Complaint  58 y.o. female admitted 12/20/2018 with atypical chest pain    Hospital Course    none      Interval Problem Update  Cardiac stress test    Consultants/Specialty  cardiology    Code Status      Disposition  pending    Review of Systems  Review of Systems   Constitutional: Negative for chills, diaphoresis and fever.   HENT: Negative for ear pain, hearing loss and tinnitus.    Eyes: Negative for blurred vision, double vision and photophobia.   Respiratory: Negative for hemoptysis and sputum production.    Cardiovascular: Positive for chest pain. Negative for palpitations and claudication.   Gastrointestinal: Negative for heartburn, nausea and vomiting.   Genitourinary: Negative for dysuria, frequency and urgency.   Musculoskeletal: Negative for back pain, myalgias and neck pain.   Skin: Negative for itching and rash.   Neurological: Negative for dizziness, tingling and headaches.        Physical Exam  Temp:  [36.1 °C (96.9 °F)-36.7 °C (98 °F)] 36.7 °C (98 °F)  Pulse:  [73-94] 82  Resp:  [16-20] 16  BP: (109-121)/(57-70) 111/70    Physical Exam   Constitutional: She appears well-developed and well-nourished.   HENT:   Head: Normocephalic and atraumatic.   Eyes: Pupils are equal, round, and reactive to light. Conjunctivae are normal.   Neck: Normal range of motion. Neck supple.   Cardiovascular: Normal rate and regular rhythm.    Pulmonary/Chest: Effort normal and breath sounds normal.   Abdominal: Soft. Bowel sounds are normal.   Musculoskeletal: She exhibits no edema or tenderness.   Neurological: She is alert.   Skin: Skin is warm and dry.       Fluids  No intake or output data in the 24 hours ending 12/21/18 0957    Laboratory  Recent Labs      12/20/18   1747   WBC  9.1   RBC  4.48   HEMOGLOBIN  12.3   HEMATOCRIT  39.1   MCV  87.3   MCH  27.5   MCHC  31.5*   RDW  45.6   PLATELETCT  305   MPV  9.3     Recent Labs      12/20/18    1747   SODIUM  138   POTASSIUM  3.9   CHLORIDE  107   CO2  23   GLUCOSE  196*   BUN  21   CREATININE  0.81   CALCIUM  8.6     Recent Labs      12/20/18   1747   APTT  32.4   INR  1.02     Recent Labs      12/20/18   1747   BNPBTYPENAT  98     Recent Labs      12/20/18   2356   TRIGLYCERIDE  159*   HDL  45   LDL  99       Imaging       Assessment/Plan  * Elevated troponin   Assessment & Plan    Chest pain with taking deep breath  Atypical   Probable pericarditis  Cardiology input is noted  Cardiac stress test     Transaminitis   Assessment & Plan    Will repeat cmp  Hep panel is negative  ruq ultrasound showed:Hepatomegaly and steatosis. No free fluid.  Surgical absence gallbladder. Mild dilatation common duct measuring 7 mm. The distal duct is not delineated.     HLD (hyperlipidemia)   Assessment & Plan    Workup as noted above.     T2DM (type 2 diabetes mellitus) (HCC)   Assessment & Plan    S.s.insulin  Low dose lantus since she is npo  accu checks are noted  hema1c          VTE prophylaxis: lovenox

## 2018-12-21 NOTE — PROGRESS NOTES
2 RN skin assessment completed with OJ Gallegos. Pt has generalized scarring from previous wounds including abdomen, back, and bilateral shins. Bilateral ears, elbows and heels are red but blanching. Well healing wound to right heel. Per pt, old diabetic ulcer, wound picture uploaded. All other skin intact.

## 2018-12-21 NOTE — ASSESSMENT & PLAN NOTE
Have trended down  Hep panel is negative  ruq ultrasound showed:Hepatomegaly and steatosis. No free fluid.  Surgical absence gallbladder. Mild dilatation common duct measuring 7 mm. The distal duct is not delineated.

## 2018-12-21 NOTE — PROGRESS NOTES
Assumed care of patient at bedside report from NOC RN. Updated on POC. Patient currently A & O x 4; 2L O2 nasal cannula; up standby assist, steady on feet with steady gait; with complaints of epigastric pain. Call light within reach. Fall precautions in place. Bed locked and in lowest position. All questions answered. No other needs indicated at this time.

## 2018-12-21 NOTE — CONSULTS
DATE OF SERVICE:  2018    CARDIOLOGY CONSULTATION    REFERRING PHYSICIAN:  Dorian Arteaga MD    CHIEF COMPLAINT:  1.  Chest pain.  2.  Equivocal troponin level.  3.  Abnormal EKG.    HISTORY OF PRESENT ILLNESS:  The patient is a 58-year-old female with   past medical history significant for hypertension, hyperlipidemia, diabetes, and   previous tobacco abuse history.  She lives in [place] and was well   until last night when she began to develop a cough.  Today, she began to also   experience nausea, vomiting, and epigastric pain.  She then developed mid   chest, sharp pain, worse on inspiration and positioning.  She denies   associated shortness of breath or diaphoresis.  She was transferred from   [facility] to [facility] for medical care.    ALLERGIES:  No known drug allergies.    MEDICATIONS:  Lantus insulin, Neurontin 300 mg per day, trazodone 100 mg   b.i.d., and aspirin 81 mg daily.  Inpatient hospital medications include   Crestor 20 mg at bedtime.    PAST MEDICAL ILLNESS:  As above plus staph skin infection and migraine   headaches.    PAST SURGICAL HISTORY:  Right wrist surgery, cholecystectomy, ,   abdominal surgery with lower intestine mass removal.    SOCIAL HISTORY:  She is single and lives with her daughter.  Previous tobacco   abuse.    FAMILY HISTORY:  Her father and sister both underwent unspecified valvular   replacements.    REVIEW OF SYSTEMS:  Positive for diabetes, cough, chest pain, nausea, and   Vomiting.  GENERAL: The patient denies fatigue, fever, chills or weight changes.   HEENT: The patient denies headache, visual or hearing changes.   CENTRAL NERVOUS SYSTEM: The patient denies lightheadedness,   syncope or seizures.   ENDOCRINE: The patient denies thyroid disorder or diabetes.   RESPIRATORY: The patient denies productive asthma or emphysema.  CARDIOVASCULAR: As above.   GASTROINTESTINAL: The patient denies bowel changes.   GENITOURINARY: The patient  denies urinary changes.   PSYCHIATRIC: The patient denies depression or anxiety.   EXTREMITIES: The patient denies arthritis.    PHYSICAL EXAMINATION:  VITAL SIGNS:  Pulse 85, respirations 20, /57, and temperature 36.4.  GENERAL:  No acute distress.  HEENT:  Eyes are equal, oral moist.  NECK:  Supple.  RESPIRATORY:  Clear to auscultation bilaterally.  Good effort.  CARDIOVASCULAR:  S1, S2 regular rate and rhythm without S3, S4 or murmur.  ABDOMEN:  Soft, nondistended, and nontender.  No hepatosplenomegaly noted.  EXTREMITIES:  Upper extremities:  No clubbing or cyanosis.  Lower extremities:    No edema.  NEUROLOGIC:  Alert and oriented x3.  PSYCHIATRIC:  No anxiety or depression.  SKIN:  Warm and dry.    CARDIAC STUDIES/PROCEDURES:    ECHOCARDIOGRAM CONCLUSIONS (09/27/18)  Prior Echo - 5/9/17, no significant change.  Left ventricular ejection fraction is visually estimated to be 60%.  Mild concentric left ventricular hypertrophy.  Calcified aortic valve leaflets, noAS, no AI.  Mild MAC.  Unable to estimate pulmonary artery pressure due to an inadequate   tricuspid regurgitant jet.  Normal inferior vena cava size and inspiratory collapse.  (study result reviewed)    EKG performed on (12/20/18) was reviewed: EKG showssinus rhythm with inferior Q-waves, poor R-wave progression.    Laboratory results of (12/20/18) were reviewed.Troponin levels of 0.30 ng/ml noted.    MYOCARDIAL PERFUSION STUDY CONCLUSIONS (09/01/17)  No evidence of significant jeopardized viable myocardium or prior myocardial infarction.  Normal left ventricular size, ejection fraction, and wall motion.  (study result reviewed)    IMPRESSION:  1.  Atypical chest pain, pericarditis:  The patient is a 58-year-old female   who presents with very atypical chest pain.  She does have abnormal EKG   showing nonspecific findings as well as intermediate troponin, which does not   appear to be consistent with acute coronary syndrome.  The patient will be    admitted to [facility], ruled out myocardial infarction.  We will   repeat echocardiogram and nuclear stress test.  We will follow her clinically.  2.  Hypertension:  Her blood pressure is well controlled.  3.  Hyperlipidemia:  She has been started on statin therapy.  4.  Diabetes, on insulin.    Thank you for this consultation.       ____________________________________     MD SHEREE MORENO / RODRIGO    DD:  12/20/2018 19:49:09  DT:  12/21/2018 00:36:05    D#:  [id number]  Job#:  [id number]
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
## 2018-12-21 NOTE — ED PROVIDER NOTES
ED Provider Note    CHIEF COMPLAINT  Chief Complaint   Patient presents with   • Chest Pain       HPI  Julisa Carrion is a 58 y.o. female who presents ,  transferred from outside hospital for evaluation of chest pain with associated elevation in troponin.  Concern for an STEMI.  Patient reports that this morning she woke up with mild epigastric pain nausea and vomiting.  She had some chest pain following this that is substernal.  She saw her primary care physician who wanted her sent to the emergency department for evaluation with a CT abdomen pelvis for possible intra-abdominal pathology.  There her EKG showed new Q waves and she had an elevated troponin.  Her CT was unremarkable for any abdominal pathology.  Patient reports her chest pain is ongoing, she reports pain is pleuritic.  She reports radiations into her left arm and back.  She denies any associated shortness of breath at this point.  She reports mild nausea.  Patient denies any fevers.  Patient's basic labs were notable for a normal creatinine and lipase.  Patient denies any weakness or numbness.    REVIEW OF SYSTEMS  Review of Systems   Respiratory: Negative for cough.    Cardiovascular: Positive for chest pain.   Gastrointestinal: Positive for nausea and vomiting. Negative for abdominal pain, constipation and diarrhea.   Genitourinary: Negative for dysuria, frequency and urgency.   Musculoskeletal: Negative for back pain, myalgias and neck pain.   Skin: Negative for rash.   Neurological: Negative for dizziness.   Psychiatric/Behavioral: Negative for depression.       See HPI for further details. All other systems are negative.     PAST MEDICAL HISTORY   has a past medical history of Diabetes; High cholesterol (5/15/2011); Hypertension; Intestinal mass (2015); Migraine; and Staph skin infection.    SOCIAL HISTORY  Social History     Social History Main Topics   • Smoking status: Former Smoker     Packs/day: 1.00     Years: 20.00     Types:  Cigarettes     Quit date: 1/1/1996   • Smokeless tobacco: Former User     Quit date: 6/5/1995   • Alcohol use No   • Drug use: No      Comment: just prescribed meds   • Sexual activity: Not on file       SURGICAL HISTORY   has a past surgical history that includes other abdominal surgery; gyn surgery; other orthopedic surgery (6-2014); abdominal exploration; gastroscopy (9/3/2017); and colonoscopy (9/3/2017).    CURRENT MEDICATIONS  Home Medications     Reviewed by Isabelle Lopez R.N. (Registered Nurse) on 12/20/18 at 1756  Med List Status: Complete   Medication Last Dose Status   aspirin EC (ECOTRIN) 81 MG Tablet Delayed Response 12/20/2018 Active   gabapentin (NEURONTIN) 300 MG Cap 12/19/2018 Active   insulin glargine (LANTUS) 100 UNIT/ML Solution 12/19/2018 Active   traZODone (DESYREL) 100 MG Tab  Active                ALLERGIES  No Known Allergies    PHYSICAL EXAM  Physical Exam   Constitutional: She is oriented to person, place, and time. She appears well-developed and well-nourished.   HENT:   Head: Normocephalic and atraumatic.   Eyes: Conjunctivae are normal.   Neck: Normal range of motion. Neck supple.   Cardiovascular: Normal rate and regular rhythm.    Pulmonary/Chest: Effort normal and breath sounds normal.   Abdominal: Soft. Bowel sounds are normal. She exhibits no distension. There is no tenderness. There is no rebound.   Musculoskeletal:   Equal pulses in bilateral upper extremities.   Neurological: She is alert and oriented to person, place, and time.   Skin: Skin is warm and dry. No rash noted.   Psychiatric: She has a normal mood and affect. Her behavior is normal.         DIAGNOSTIC STUDIES / PROCEDURES    EKG  EKG is normal sinus rhythm, T wave flattening with mild depression in  inferior  lateral leads.  V1 with ST elevation, no ST elevation otherwise in any other  contiguous leads.    LABS  Results for orders placed or performed during the hospital encounter of 12/20/18   Troponin   Result  Value Ref Range    Troponin I 0.30 (H) 0.00 - 0.04 ng/mL   Btype Natriuretic Peptide   Result Value Ref Range    B Natriuretic Peptide 98 0 - 100 pg/mL   CBC with Differential   Result Value Ref Range    WBC 9.1 4.8 - 10.8 K/uL    RBC 4.48 4.20 - 5.40 M/uL    Hemoglobin 12.3 12.0 - 16.0 g/dL    Hematocrit 39.1 37.0 - 47.0 %    MCV 87.3 81.4 - 97.8 fL    MCH 27.5 27.0 - 33.0 pg    MCHC 31.5 (L) 33.6 - 35.0 g/dL    RDW 45.6 35.9 - 50.0 fL    Platelet Count 305 164 - 446 K/uL    MPV 9.3 9.0 - 12.9 fL    Neutrophils-Polys 57.50 44.00 - 72.00 %    Lymphocytes 32.10 22.00 - 41.00 %    Monocytes 7.80 0.00 - 13.40 %    Eosinophils 1.70 0.00 - 6.90 %    Basophils 0.60 0.00 - 1.80 %    Immature Granulocytes 0.30 0.00 - 0.90 %    Nucleated RBC 0.00 /100 WBC    Neutrophils (Absolute) 5.22 2.00 - 7.15 K/uL    Lymphs (Absolute) 2.91 1.00 - 4.80 K/uL    Monos (Absolute) 0.71 0.00 - 0.85 K/uL    Eos (Absolute) 0.15 0.00 - 0.51 K/uL    Baso (Absolute) 0.05 0.00 - 0.12 K/uL    Immature Granulocytes (abs) 0.03 0.00 - 0.11 K/uL    NRBC (Absolute) 0.00 K/uL   Complete Metabolic Panel (CMP)   Result Value Ref Range    Sodium 138 135 - 145 mmol/L    Potassium 3.9 3.6 - 5.5 mmol/L    Chloride 107 96 - 112 mmol/L    Co2 23 20 - 33 mmol/L    Anion Gap 8.0 0.0 - 11.9    Glucose 196 (H) 65 - 99 mg/dL    Bun 21 8 - 22 mg/dL    Creatinine 0.81 0.50 - 1.40 mg/dL    Calcium 8.6 8.5 - 10.5 mg/dL    AST(SGOT) 264 (H) 12 - 45 U/L    ALT(SGPT) 118 (H) 2 - 50 U/L    Alkaline Phosphatase 205 (H) 30 - 99 U/L    Total Bilirubin 0.7 0.1 - 1.5 mg/dL    Albumin 3.5 3.2 - 4.9 g/dL    Total Protein 6.9 6.0 - 8.2 g/dL    Globulin 3.4 1.9 - 3.5 g/dL    A-G Ratio 1.0 g/dL   Prothrombin Time   Result Value Ref Range    PT 13.6 12.0 - 14.6 sec    INR 1.02 0.87 - 1.13   APTT   Result Value Ref Range    APTT 32.4 24.7 - 36.0 sec   Lipase   Result Value Ref Range    Lipase 69 11 - 82 U/L   ESTIMATED GFR   Result Value Ref Range    GFR If  >60 >60  mL/min/1.73 m 2    GFR If Non African American >60 >60 mL/min/1.73 m 2   TSH (Thyroid Stimulating Hormone)   Result Value Ref Range    TSH 2.330 0.380 - 5.330 uIU/mL   Troponin   Result Value Ref Range    Troponin I 0.16 (H) 0.00 - 0.04 ng/mL   ACCU-CHEK GLUCOSE   Result Value Ref Range    Glucose - Accu-Ck 142 (H) 65 - 99 mg/dL   EKG (NOW)   Result Value Ref Range    Report       [facility] Emergency Dept.    Test Date:  2018  Pt Name:    ALIDA HUTCHINSON                 Department: ER  MRN:        [id number]                      Room:        11  Gender:     Female                       Technician: [id number]  :        1960                   Requested By:ER TRIAGE PROTOCOL  Order #:    [id number]                    Reading MD: Chandler Alarcon MD    Measurements  Intervals                                Axis  Rate:       91                           P:          56  MA:         176                          QRS:        20  QRSD:       84                           T:          117  QT:         404  QTc:        498    Interpretive Statements  EKG is normal sinus rhythm, T wave flattening with mild depression in  inferior  lateral leads.  V1 with ST elevation, no ST elevation otherwise in any other  contiguous leads.    Electronically Signed On 2018 18:23:10 PST by Chandler Alarcon MD           RADIOLOGY  CT-CTA CHEST PULMONARY ARTERY W/ RECONS   Final Result      1.  No CT evidence of pulmonary embolism.      2.  Calcific atherosclerosis.            DX-CHEST-LIMITED (1 VIEW)   Final Result         No acute cardiac or pulmonary abnormality is identified.      OUTSIDE IMAGES-CT ABDOMEN /PELVIS   Final Result      OUTSIDE IMAGES-DX CHEST   Final Result      EC-ECHOCARDIOGRAM COMPLETE W/O CONT    (Results Pending)   NM-CARDIAC STRESS TEST    (Results Pending)   US-RUQ    (Results Pending)           COURSE & MEDICAL DECISION MAKING  Pertinent Labs & Imaging studies reviewed. (See chart for  details)  Patient here with chest pain concerning for possible MI.  Patient has been given heparin bolus but did not receive a heparin drip.  Patient with questionably widened mediastinum.  I do not feel comfortable continuing of the heparin drip and will scan patient's to ensure that there is no associated dissection.  Given that this is an emergent diagnosis and is absolutely necessary I do believe the second bolus of contrast is indicated.  We will give fluids following this to help mitigate contrast nephropathy.  6:58 PM  I discussed the case with cardiology, they do not believe that patient's symptoms are likely consistent with an STEMI or STEMI.  They do not want the heparin drip continued.  They will see patient but believe this is likely pleuritis or an alternative etiology to type I MI.  Patient CT is unremarkable for any evidence of PE or dissection.  We will hold heparin per cardiology's request.  Patient will have a stress test tomorrow.  Aspirin to be given by hospitalist.  The patient will return for worsening symptoms and is stable at the time of discharge. The patient verbalizes understanding and will comply.    FINAL IMPRESSION  1.  Acute chest pain, elevated troponin    Electronically signed by: Dorian Arteaga, 12/20/2018 6:36 PM

## 2018-12-21 NOTE — PROGRESS NOTES
[facility] Cardiology Follow-up Consult Note    Date of note:    12/21/2018      Consulting Physician: ARTURO Clifton*      Chief Complaint   Patient presents with   • Chest Pain       Interim Events:  Admitted overnight for atypical chest pain.  Underwent nuclear stress test showing reversible ischemia in anterior wall near septum.  Echo reveals LV EF 55%, inferior/inferoseptal wall motion abnormality, no pericardial effusion.  Plan for cardiac catheterization tomorrow AM, keep NPO at midnight.      ROS  No NV, No Bleeding, No dizziness   All other review of systems reviewed and negative.    Past medical, surgical, social, and family history reviewed and unchanged from admission except as noted in assessment and plan.    Medications: Reviewed in MAR  Current Facility-Administered Medications   Medication Dose Frequency Provider Last Rate Last Dose   • insulin glargine (LANTUS) injection 5 Units  5 Units Q EVENING Nataly Brewster M.D.       • enoxaparin (LOVENOX) inj 40 mg  40 mg DAILY Nataly Brewster M.D.   40 mg at 12/21/18 1255   • aspirin EC (ECOTRIN) tablet 81 mg  81 mg DAILY Obi Vicente M.D.       • ketorolac (TORADOL) injection 30 mg  30 mg Q6HRS PRN Joyce Pak M.D.   30 mg at 12/20/18 1947   • senna-docusate (PERICOLACE or SENOKOT S) 8.6-50 MG per tablet 2 Tab  2 Tab BID Joyce Pak M.D.        And   • polyethylene glycol/lytes (MIRALAX) PACKET 1 Packet  1 Packet QDAY PRN Joyce Pak M.D.        And   • magnesium hydroxide (MILK OF MAGNESIA) suspension 30 mL  30 mL QDAY PRN Joyce Pak M.D.        And   • bisacodyl (DULCOLAX) suppository 10 mg  10 mg QDAY PRN Joyce Pak M.D.       • nitroglycerin (NITROSTAT) tablet 0.4 mg  0.4 mg Q5 MIN PRN Joyce Pak M.D.   0.4 mg at 12/21/18 0005   • morphine (pf) 10 mg/mL injection 2-4 mg  2-4 mg Q5 MIN PRN Joyce Pak M.D.   4 mg at 12/21/18 1302   • rosuvastatin (CRESTOR) tablet 20 mg  20 mg Q EVENING Joyce Pak,  "M.D.   20 mg at 12/20/18 2110   • insulin regular (HUMULIN R) injection 2-9 Units  2-9 Units Q6HRS Joyce Pak M.D.   3 Units at 12/21/18 1256    And   • glucose 4 g chewable tablet 16 g  16 g Q15 MIN PRN Joyce Pak M.D.        And   • dextrose 50% (D50W) injection 25 mL  25 mL Q15 MIN PRN Joyce Pak M.D.       • gabapentin (NEURONTIN) capsule 800 mg  800 mg TID Joyce Pak M.D.   800 mg at 12/21/18 1255     Last reviewed on 12/20/2018  9:22 PM by India Lemus R.N.  No Known Allergies    Physical Exam  Body mass index is 38.7 kg/m². Blood pressure 159/94, pulse 100, temperature 36.7 °C (98 °F), temperature source Temporal, resp. rate 15, height 1.651 m (5' 5\"), weight 105.5 kg (232 lb 9.4 oz), last menstrual period 02/05/2009, SpO2 95 %, not currently breastfeeding. Vitals:    12/20/18 2350 12/21/18 0359 12/21/18 0857 12/21/18 1319   BP: 110/62 109/60 111/70 159/94   Pulse: 80 73 82 100   Resp: 18 18 16 15   Temp: 36.1 °C (96.9 °F) 36.2 °C (97.1 °F) 36.7 °C (98 °F) 36.7 °C (98 °F)   TempSrc: Temporal Temporal Temporal Temporal   SpO2: 99% 100% 99% 95%   Weight:       Height:        Oxygen Therapy:  Pulse Oximetry: 95 %, O2 (LPM): 2, O2 Delivery: Nasal Cannula    General: no apparent distress  HEENT: NC, AT, conjunctiva normal, no JVD   Lungs: normal respiratory effort, CTAB  Heart: RRR, systolic murmur, no rubs or gallops,   EXT: No edema. + pedal pulses. no cyanosis  Abdomen: soft, non tender, non distended  Neurological: A/O x 3. No focal sensory deficits  Skin: Warm extremities    Labs (personally reviewed and notable for):   Recent Results (from the past 24 hour(s))   Troponin    Collection Time: 12/20/18  5:47 PM   Result Value Ref Range    Troponin I 0.30 (H) 0.00 - 0.04 ng/mL   Btype Natriuretic Peptide    Collection Time: 12/20/18  5:47 PM   Result Value Ref Range    B Natriuretic Peptide 98 0 - 100 pg/mL   CBC with Differential    Collection Time: 12/20/18  5:47 PM   Result Value Ref " Range    WBC 9.1 4.8 - 10.8 K/uL    RBC 4.48 4.20 - 5.40 M/uL    Hemoglobin 12.3 12.0 - 16.0 g/dL    Hematocrit 39.1 37.0 - 47.0 %    MCV 87.3 81.4 - 97.8 fL    MCH 27.5 27.0 - 33.0 pg    MCHC 31.5 (L) 33.6 - 35.0 g/dL    RDW 45.6 35.9 - 50.0 fL    Platelet Count 305 164 - 446 K/uL    MPV 9.3 9.0 - 12.9 fL    Neutrophils-Polys 57.50 44.00 - 72.00 %    Lymphocytes 32.10 22.00 - 41.00 %    Monocytes 7.80 0.00 - 13.40 %    Eosinophils 1.70 0.00 - 6.90 %    Basophils 0.60 0.00 - 1.80 %    Immature Granulocytes 0.30 0.00 - 0.90 %    Nucleated RBC 0.00 /100 WBC    Neutrophils (Absolute) 5.22 2.00 - 7.15 K/uL    Lymphs (Absolute) 2.91 1.00 - 4.80 K/uL    Monos (Absolute) 0.71 0.00 - 0.85 K/uL    Eos (Absolute) 0.15 0.00 - 0.51 K/uL    Baso (Absolute) 0.05 0.00 - 0.12 K/uL    Immature Granulocytes (abs) 0.03 0.00 - 0.11 K/uL    NRBC (Absolute) 0.00 K/uL   Complete Metabolic Panel (CMP)    Collection Time: 12/20/18  5:47 PM   Result Value Ref Range    Sodium 138 135 - 145 mmol/L    Potassium 3.9 3.6 - 5.5 mmol/L    Chloride 107 96 - 112 mmol/L    Co2 23 20 - 33 mmol/L    Anion Gap 8.0 0.0 - 11.9    Glucose 196 (H) 65 - 99 mg/dL    Bun 21 8 - 22 mg/dL    Creatinine 0.81 0.50 - 1.40 mg/dL    Calcium 8.6 8.5 - 10.5 mg/dL    AST(SGOT) 264 (H) 12 - 45 U/L    ALT(SGPT) 118 (H) 2 - 50 U/L    Alkaline Phosphatase 205 (H) 30 - 99 U/L    Total Bilirubin 0.7 0.1 - 1.5 mg/dL    Albumin 3.5 3.2 - 4.9 g/dL    Total Protein 6.9 6.0 - 8.2 g/dL    Globulin 3.4 1.9 - 3.5 g/dL    A-G Ratio 1.0 g/dL   Prothrombin Time    Collection Time: 12/20/18  5:47 PM   Result Value Ref Range    PT 13.6 12.0 - 14.6 sec    INR 1.02 0.87 - 1.13   APTT    Collection Time: 12/20/18  5:47 PM   Result Value Ref Range    APTT 32.4 24.7 - 36.0 sec   Lipase    Collection Time: 12/20/18  5:47 PM   Result Value Ref Range    Lipase 69 11 - 82 U/L   ESTIMATED GFR    Collection Time: 12/20/18  5:47 PM   Result Value Ref Range    GFR If  >60 >60  mL/min/1.73 m 2    GFR If Non African American >60 >60 mL/min/1.73 m 2   TSH (Thyroid Stimulating Hormone)    Collection Time: 18  5:47 PM   Result Value Ref Range    TSH 2.330 0.380 - 5.330 uIU/mL   EKG (NOW)    Collection Time: 18  5:48 PM   Result Value Ref Range    Report       [facility] of [facility] Emergency Dept.    Test Date:  2018  Pt Name:    ALIDA HUTCHINSON                 Department: ER  MRN:        [id number]                      Room:        11  Gender:     Female                       Technician: [id number]  :        1960                   Requested By:ER TRIAGE PROTOCOL  Order #:    [id number]                    Reading MD: Chandler Alarcon MD    Measurements  Intervals                                Axis  Rate:       91                           P:          56  MN:         176                          QRS:        20  QRSD:       84                           T:          117  QT:         404  QTc:        498    Interpretive Statements  EKG is normal sinus rhythm, T wave flattening with mild depression in  inferior  lateral leads.  V1 with ST elevation, no ST elevation otherwise in any other  contiguous leads.    Electronically Signed On 2018 18:23:10 PST by Chandler Alarcon MD     ACCU-CHEK GLUCOSE    Collection Time: 18  8:14 PM   Result Value Ref Range    Glucose - Accu-Ck 142 (H) 65 - 99 mg/dL   Troponin    Collection Time: 18  8:35 PM   Result Value Ref Range    Troponin I 0.16 (H) 0.00 - 0.04 ng/mL   HEPATITIS PANEL ACUTE(4 COMPONENTS)    Collection Time: 18 11:56 PM   Result Value Ref Range    Hepatitis B Surface Antigen Negative Negative    Hepatitis C Antibody Negative Negative    Hepatitis B Cors Ab,IgM Negative Negative    Hepatitis A Virus Ab, IgM Negative Negative   Lipid Profile (Lipid Panel) Fasting    Collection Time: 18 11:56 PM   Result Value Ref Range    Cholesterol,Tot 176 100 - 199 mg/dL    Triglycerides 159 (H) 0 - 149 mg/dL     HDL 45 >=40 mg/dL    LDL 99 <100 mg/dL   Troponin in four (4) hours    Collection Time: 18 11:57 PM   Result Value Ref Range    Troponin I 0.28 (H) 0.00 - 0.04 ng/mL   EKG in four (4) hours    Collection Time: 18 11:59 PM   Result Value Ref Range    Report       [facility]    Test Date:  2018  Pt Name:    ALIDA HUTCHINSON                 Department: ER  MRN:        [id number]                      Room:       T824  Gender:     Female                       Technician: SANNA  :        1960                   Requested By:SHOBHA MIRANDA  Order #:    [id number]                    Reading MD: Jeramie Meade MD    Measurements  Intervals                                Axis  Rate:       79                           P:          50  GA:         176                          QRS:        13  QRSD:       90                           T:          52  QT:         440  QTc:        505    Interpretive Statements  SINUS RHYTHM  BORDERLINE PROLONGED QT INTERVAL  BASELINE WANDER IN LEAD(S) V3,V4,V5  Compared to ECG 2018 17:48:22  No significant changes    Electronically Signed On 2018 5:49:11 PST by Jeramie Meade MD     ACCU-CHEK GLUCOSE    Collection Time: 18 12:08 AM   Result Value Ref Range    Glucose - Accu-Ck 258 (H) 65 - 99 mg/dL   TROPONIN    Collection Time: 18  4:15 AM   Result Value Ref Range    Troponin I 0.23 (H) 0.00 - 0.04 ng/mL   ACCU-CHEK GLUCOSE    Collection Time: 18  5:03 AM   Result Value Ref Range    Glucose - Accu-Ck 211 (H) 65 - 99 mg/dL   EC-ECHOCARDIOGRAM COMPLETE W/O CONT    Collection Time: 18 10:55 AM   Result Value Ref Range    Eject.Frac. MOD BP 55.97     Eject.Frac. MOD 4C 58.02     Eject.Frac. MOD 2C 52.15     Left Ventrical Ejection Fraction 55    ACCU-CHEK GLUCOSE    Collection Time: 18 12:54 PM   Result Value Ref Range    Glucose - Accu-Ck 220 (H) 65 - 99 mg/dL       Cardiac Imaging and Procedures Review:    EKG and telemetry tracings  personally reviewed      ASSESSMENT & PLAN    # Chest pain  # NSTEMI  # HTN  # HLD  # IDDM    Patient with improved chest pain, but present at low level.  Troponin elevated with downtrend, EKG with non specific changes.  Echocardiogram shows inferior and inferoseptal wall motion abnormality, normal EF, no pericardial effusion.  Nuclear stress testing shows large area of infarction at apex at rest, small area of reversibility in anterior wall with stress.  Results discussed with patient, plan for cardiac catheterization tomorrow AM, NPO at midnight.  Start metoprolol 25 mg BID.  Continue ASA, statin, diabetes control.      It is my pleasure to participate in the care of Ms. Carrion.  Please do not hesitate to contact me with questions or concerns.

## 2018-12-21 NOTE — PROGRESS NOTES
Pt transported to unit via gurney on zoll monitor. All belongings transferred with pt including purse and chart. Pt c/o of 8/10 chest pain during transport, unchanged from arrival as confirmed with pt and OJ Maldonado. POC discussed with pt. Pt verbalizes understanding. Telemetry monitor placed and monitor room notified of pt arrival. Confirmed heart rhythm SR rate 70's.

## 2018-12-21 NOTE — ED NOTES
Med rec complete per pt at bedside  Allergies have been verified  No oral ABX within the last 30 days

## 2018-12-21 NOTE — H&P
Hospital Medicine History & Physical Note    Date of Service  12/20/2018    Primary Care Physician  Tre Jason M.D.    Consultants  Dr. Meade, cardiology    Code Status  Full    Chief Complaint  Chief Complaint   Patient presents with   • Chest Pain       History of Presenting Illness  58 y.o. female who presented on 12/20/2018 in transfer from outside facility concerning for potential NSTEMI.  The patient presented the outside hospital with complaints of epigastric burning pain which began yesterday.  She had eaten at a new restaurant but she did not believe that her symptoms were related to the food as no other person was feeling poorly.  Her pain is described as nonradiating, burning, and constant.  She has had associated shortness of breath and an episode of nausea with vomiting as well as weakness but no dizziness, diaphoresis, or palpitations.  Yesterday, she did have an episode of anterior chest pain which she is reports was brief and resolved spontaneously.    At the outside facility, the patient was reportedly noted to have 1 Q wave on an anterior lead.  Additional workup including CT of the abdomen showed a normal abdomen and pelvis with a stable L2 L4 fracture deformity.  Chest x-ray was negative.  WBC 7.9 hemoglobin 13.4 hematocrit 40 platelets 367 sodium 139 potassium 3.7 chloride 103 CO2 27 BUN 20 creatinine 0.79 and glucose 239.  Troponin 0 0.24.  The patient was transferred to our facility for higher level of care and specialist consultation.      Review of Systems  Review of Systems   Constitutional: Negative for chills and fever.   HENT: Negative for congestion and sore throat.    Eyes: Negative for photophobia.   Respiratory: Negative for cough, shortness of breath and wheezing.         Pain with deep breathing along the chest   Cardiovascular: Negative for chest pain and palpitations.   Gastrointestinal: Negative for abdominal pain, diarrhea, nausea and vomiting.        Epigastric burning  pain.   Genitourinary: Negative for dysuria.   Musculoskeletal: Negative for myalgias.   Skin: Negative.    Neurological: Negative for dizziness, tingling, focal weakness and headaches.   Psychiatric/Behavioral: Negative for depression and suicidal ideas.       Past Medical History  Past Medical History:   Diagnosis Date   • Intestinal mass    • High cholesterol 5/15/2011   • Diabetes    • Hypertension    • Migraine    • Staph skin infection        Surgical History  Past Surgical History:   Procedure Laterality Date   • GASTROSCOPY  9/3/2017    Procedure: GASTROSCOPY WITH DILATION;  Surgeon: Kerri Carter M.D.;  Location: SURGERY [facility];  Service: Gastroenterology   • COLONOSCOPY  9/3/2017    Procedure: COLONOSCOPY;  Surgeon: Kerri Carter M.D.;  Location: SURGERY [facility];  Service: Gastroenterology   • OTHER ORTHOPEDIC SURGERY      right wrist surgery   • ABDOMINAL EXPLORATION      Lower intestine d/t mass - benign   • GYN SURGERY       x 3,tubal ligation   • OTHER ABDOMINAL SURGERY      cholecystectomy       Family History  Family History   Problem Relation Age of Onset   • Cancer Maternal Aunt         Lung cancer d/t smoking       Social History  Social History   Substance Use Topics   • Smoking status: Former Smoker     Packs/day: 1.00     Years: 20.00     Types: Cigarettes     Quit date: 1996   • Smokeless tobacco: Former User     Quit date: 1995   • Alcohol use No       Allergies  No Known Allergies    Medications  No current facility-administered medications on file prior to encounter.      Current Outpatient Prescriptions on File Prior to Encounter   Medication Sig Dispense Refill   • insulin glargine (LANTUS) 100 UNIT/ML Solution Inject 44 Units as instructed every morning. (Patient taking differently: Inject 30 Units as instructed 2 times a day.) 10 mL 1   • gabapentin (NEURONTIN) 300 MG Cap Take 900 mg by mouth 3 times a day.     • traZODone  (DESYREL) 100 MG Tab Take 200 mg by mouth 2 times a day. 100 mg AM  200 mg PM     • aspirin EC (ECOTRIN) 81 MG Tablet Delayed Response Take 81 mg by mouth every day.         Physical Exam  Hemodynamics  Temp (24hrs), Av.4 °C (97.5 °F), Min:36.4 °C (97.5 °F), Max:36.4 °C (97.5 °F)   Temperature: 36.4 °C (97.5 °F)  Pulse  Av.3  Min: 85  Max: 94 Heart Rate (Monitored): 87  Blood Pressure: 109/57, NIBP: 112/61      Respiratory      Respiration: 20, Pulse Oximetry: 100 %             Physical Exam   Constitutional: She is oriented to person, place, and time. No distress.   Chronically ill-appearing, appears older than stated age   HENT:   Head: Normocephalic and atraumatic.   Right Ear: External ear normal.   Left Ear: External ear normal.   Eyes: EOM are normal. Right eye exhibits no discharge. Left eye exhibits no discharge.   Neck: Neck supple. No JVD present.   Cardiovascular: Normal rate, regular rhythm and normal heart sounds.    Pulmonary/Chest: Effort normal and breath sounds normal. No respiratory distress. She exhibits no tenderness.   Abdominal: Soft. Bowel sounds are normal. She exhibits no distension. There is no tenderness.   Musculoskeletal: Normal range of motion. She exhibits no edema.   Neurological: She is alert and oriented to person, place, and time. No cranial nerve deficit.   Skin: Skin is warm and dry. She is not diaphoretic. No erythema.   Psychiatric: She has a normal mood and affect. Her behavior is normal.   Nursing note and vitals reviewed.    Capillary refill less than 3 seconds, distal pulses intact    Laboratory:  Recent Labs      18   1747   WBC  9.1   RBC  4.48   HEMOGLOBIN  12.3   HEMATOCRIT  39.1   MCV  87.3   MCH  27.5   MCHC  31.5*   RDW  45.6   PLATELETCT  305   MPV  9.3     Recent Labs      18   1747   SODIUM  138   POTASSIUM  3.9   CHLORIDE  107   CO2  23   GLUCOSE  196*   BUN  21   CREATININE  0.81   CALCIUM  8.6     Recent Labs      18   1747   ALTSGPT   118*   ASTSGOT  264*   ALKPHOSPHAT  205*   TBILIRUBIN  0.7   LIPASE  69   GLUCOSE  196*     Recent Labs      12/20/18   1747   APTT  32.4   INR  1.02     Recent Labs      12/20/18   1747   BNPBTYPENAT  98         Lab Results   Component Value Date    TROPONINI 0.30 (H) 12/20/2018       Imaging  Dx-chest-limited (1 View)    Result Date: 12/20/2018 12/20/2018 6:27 PM HISTORY/REASON FOR EXAM:  Chest Pain TECHNIQUE/EXAM DESCRIPTION AND NUMBER OF VIEWS: Single portable view of the chest. COMPARISON: Same day 1:28 PM FINDINGS: Heart size is within normal limits. No focal infiltrates or consolidations are identified in the lungs. No pleural fluid collections are identified. No pneumothorax is appreciated.     No acute cardiac or pulmonary abnormality is identified.    Ct-cta Chest Pulmonary Artery W/ Recons    Result Date: 12/20/2018 12/20/2018 7:04 PM HISTORY/REASON FOR EXAM:  Left chest pain shortness of breath TECHNIQUE/EXAM DESCRIPTION: CT angiogram scan for pulmonary embolism with contrast, with reconstructions. 1.25 mm helical sections were obtained from the lung apices through the lung bases following the rapid bolus administration of 75 mL of Omnipaque 350 nonionic contrast. Thin-section overlapping reconstruction interval was utilized. Coronal reconstructions were generated from the axial data. MIP post processing was performed and utilized for the interpretation. Low dose optimization technique was utilized for this CT exam including automated exposure control and adjustment of the mA and/or kV according to patient size. COMPARISON: None FINDINGS: Pulmonary Embolism: No. Main Pulmonary Arteries: No. Segmental branches: No. Subsegmental branches: No. Additional Comments: None. Lungs: No suspicious nodules or air space process. Pleura: No pleural effusion. Nodes: No enlarged lymph nodes. Additional findings: Calcific atherosclerosis is noted..     1.  No CT evidence of pulmonary embolism. 2.  Calcific  atherosclerosis.         Assessment/Plan:  Anticipate that patient will need less than 2 midnights for management of the discussed medical issues.    * Elevated troponin   Assessment & Plan    Patient has an elevated troponin level however she has no chest pain per se, she primarily complains of an epigastric pain and she carries a known history of esophageal stricture and spasm.  Additionally, her discomfort is worse with deep breathing and is more pleuritic in nature.  Given her recent URI, pleurisy would be of concern.  Nevertheless, we will monitor her closely on telemetry, trend troponin levels and obtain serial EKGs, check a TSH, lipid panel, and start her on morphine, oxygen and nitroglycerin as needed.  She was previously on a statin and an aspirin but was told by her primary care provider to stop this.  Pending the results of her lipid panel, we may discontinue these medications as well.  I have ordered an echocardiogram, and we will further risk stratify this patient with a nuclear medicine stress test in the morning.  Dr. Meade of cardiology was consulted by the emergency room physician and I appreciate his recommendations.     Transaminitis   Assessment & Plan    Etiology unclear, check ultrasound of the liver and biliary tree as alk phos is also elevated to rule out obstruction.  Check hepatitis panel.     HLD (hyperlipidemia)   Assessment & Plan    Workup as noted above.     T2DM (type 2 diabetes mellitus) (HCC)   Assessment & Plan    Holding long-acting hypoglycemics while the patient is n.p.o. for procedure in the morning, monitor with Accu-Cheks and cover with insulin sliding scale.  Continue home gabapentin.         Prophylaxis: Sequential compression devices for DVT prophylaxis, no PPI indicated, bowel protocol as needed    I spent a total of 35 minutes of non face to face time performing additional research, reviewing medical records from transferring facility, discussing plan of care with other  healthcare providers. Start time: 7:05PM. End time: 7:40PM.

## 2018-12-21 NOTE — CARE PLAN
Problem: Communication  Goal: The ability to communicate needs accurately and effectively will improve    Intervention: Educate patient and significant other/support system about the plan of care, procedures, treatments, medications and allow for questions  White board updated with POC and care team information upon arrival to unit.      Problem: Pain Management  Goal: Pain level will decrease to patient's comfort goal  Pt assessed for pain regularly and medicated PRN per MAR.

## 2018-12-21 NOTE — CARE PLAN
Problem: Communication  Goal: The ability to communicate needs accurately and effectively will improve  Outcome: PROGRESSING AS EXPECTED    Intervention: [place] patient and significant other/support system to call light to alert staff of needs  Pt oriented to unit and to call light system. Pt calls appropriately.  Intervention: Educate patient and significant other/support system about the plan of care, procedures, treatments, medications and allow for questions  Discussed daily POC with Pt. Updated whiteboard. Updated pt to times of upcoming procedure. Verbalized understanding.

## 2018-12-21 NOTE — ED TRIAGE NOTES
BIB EMS to rm 11, pt was transferred from [facility], c/o of CP that started last night while at rest, constant L sided CP sharp in nature with associated SOB.  Pt vomited last night, none today.    Pt was given 234mg aspirin, 5000 Unit heparin bolus, not placed on heparin gtt, and was on a nitro gtt at 3mcg/h.  Pt was also given a total on 200mcg fentanyl en route and is still having 8/10 CP.  Blood sent to lab, CP protocol ordered, chart up for ERP.

## 2018-12-22 LAB
ACT BLD: 224 SEC (ref 74–137)
ALBUMIN SERPL BCP-MCNC: 3.2 G/DL (ref 3.2–4.9)
ALBUMIN/GLOB SERPL: 1.1 G/DL
ALP SERPL-CCNC: 174 U/L (ref 30–99)
ALT SERPL-CCNC: 101 U/L (ref 2–50)
ANION GAP SERPL CALC-SCNC: 4 MMOL/L (ref 0–11.9)
APTT PPP: 29.7 SEC (ref 24.7–36)
AST SERPL-CCNC: 42 U/L (ref 12–45)
BASOPHILS # BLD AUTO: 0.8 % (ref 0–1.8)
BASOPHILS # BLD: 0.06 K/UL (ref 0–0.12)
BILIRUB SERPL-MCNC: 0.2 MG/DL (ref 0.1–1.5)
BUN SERPL-MCNC: 20 MG/DL (ref 8–22)
CALCIUM SERPL-MCNC: 8.8 MG/DL (ref 8.5–10.5)
CHLORIDE SERPL-SCNC: 109 MMOL/L (ref 96–112)
CO2 SERPL-SCNC: 25 MMOL/L (ref 20–33)
CREAT SERPL-MCNC: 0.85 MG/DL (ref 0.5–1.4)
EKG IMPRESSION: NORMAL
EOSINOPHIL # BLD AUTO: 0.27 K/UL (ref 0–0.51)
EOSINOPHIL NFR BLD: 3.7 % (ref 0–6.9)
ERYTHROCYTE [DISTWIDTH] IN BLOOD BY AUTOMATED COUNT: 47 FL (ref 35.9–50)
GLOBULIN SER CALC-MCNC: 3 G/DL (ref 1.9–3.5)
GLUCOSE BLD-MCNC: 176 MG/DL (ref 65–99)
GLUCOSE BLD-MCNC: 209 MG/DL (ref 65–99)
GLUCOSE BLD-MCNC: 229 MG/DL (ref 65–99)
GLUCOSE BLD-MCNC: 235 MG/DL (ref 65–99)
GLUCOSE SERPL-MCNC: 321 MG/DL (ref 65–99)
HCT VFR BLD AUTO: 35.1 % (ref 37–47)
HGB BLD-MCNC: 10.8 G/DL (ref 12–16)
IMM GRANULOCYTES # BLD AUTO: 0.02 K/UL (ref 0–0.11)
IMM GRANULOCYTES NFR BLD AUTO: 0.3 % (ref 0–0.9)
INR PPP: 1.01 (ref 0.87–1.13)
LYMPHOCYTES # BLD AUTO: 2.77 K/UL (ref 1–4.8)
LYMPHOCYTES NFR BLD: 38.4 % (ref 22–41)
MCH RBC QN AUTO: 27.5 PG (ref 27–33)
MCHC RBC AUTO-ENTMCNC: 30.8 G/DL (ref 33.6–35)
MCV RBC AUTO: 89.3 FL (ref 81.4–97.8)
MONOCYTES # BLD AUTO: 0.47 K/UL (ref 0–0.85)
MONOCYTES NFR BLD AUTO: 6.5 % (ref 0–13.4)
NEUTROPHILS # BLD AUTO: 3.63 K/UL (ref 2–7.15)
NEUTROPHILS NFR BLD: 50.3 % (ref 44–72)
NRBC # BLD AUTO: 0 K/UL
NRBC BLD-RTO: 0 /100 WBC
PLATELET # BLD AUTO: 271 K/UL (ref 164–446)
PMV BLD AUTO: 9.2 FL (ref 9–12.9)
POTASSIUM SERPL-SCNC: 4.3 MMOL/L (ref 3.6–5.5)
PROT SERPL-MCNC: 6.2 G/DL (ref 6–8.2)
PROTHROMBIN TIME: 13.4 SEC (ref 12–14.6)
RBC # BLD AUTO: 3.93 M/UL (ref 4.2–5.4)
SODIUM SERPL-SCNC: 138 MMOL/L (ref 135–145)
WBC # BLD AUTO: 7.2 K/UL (ref 4.8–10.8)

## 2018-12-22 PROCEDURE — 700111 HCHG RX REV CODE 636 W/ 250 OVERRIDE (IP)

## 2018-12-22 PROCEDURE — 700101 HCHG RX REV CODE 250

## 2018-12-22 PROCEDURE — 93005 ELECTROCARDIOGRAM TRACING: CPT | Performed by: INTERNAL MEDICINE

## 2018-12-22 PROCEDURE — C9600 PERC DRUG-EL COR STENT SING: HCPCS | Mod: LD

## 2018-12-22 PROCEDURE — 99232 SBSQ HOSP IP/OBS MODERATE 35: CPT | Mod: GC | Performed by: INTERNAL MEDICINE

## 2018-12-22 PROCEDURE — 027135Z DILATION OF CORONARY ARTERY, TWO ARTERIES WITH TWO DRUG-ELUTING INTRALUMINAL DEVICES, PERCUTANEOUS APPROACH: ICD-10-PCS | Performed by: INTERNAL MEDICINE

## 2018-12-22 PROCEDURE — C1874 STENT, COATED/COV W/DEL SYS: HCPCS

## 2018-12-22 PROCEDURE — C1887 CATHETER, GUIDING: HCPCS

## 2018-12-22 PROCEDURE — 360979 HCHG DIAGNOSTIC CATH

## 2018-12-22 PROCEDURE — 304952 HCHG R 2 PADS

## 2018-12-22 PROCEDURE — 93454 CORONARY ARTERY ANGIO S&I: CPT | Mod: 26,59 | Performed by: INTERNAL MEDICINE

## 2018-12-22 PROCEDURE — 700105 HCHG RX REV CODE 258: Performed by: INTERNAL MEDICINE

## 2018-12-22 PROCEDURE — A9270 NON-COVERED ITEM OR SERVICE: HCPCS

## 2018-12-22 PROCEDURE — 99153 MOD SED SAME PHYS/QHP EA: CPT

## 2018-12-22 PROCEDURE — 307093 HCHG TR BAND RADIAL

## 2018-12-22 PROCEDURE — 93010 ELECTROCARDIOGRAM REPORT: CPT | Performed by: INTERNAL MEDICINE

## 2018-12-22 PROCEDURE — A9270 NON-COVERED ITEM OR SERVICE: HCPCS | Performed by: STUDENT IN AN ORGANIZED HEALTH CARE EDUCATION/TRAINING PROGRAM

## 2018-12-22 PROCEDURE — C1894 INTRO/SHEATH, NON-LASER: HCPCS

## 2018-12-22 PROCEDURE — 85730 THROMBOPLASTIN TIME PARTIAL: CPT

## 2018-12-22 PROCEDURE — 700105 HCHG RX REV CODE 258: Performed by: STUDENT IN AN ORGANIZED HEALTH CARE EDUCATION/TRAINING PROGRAM

## 2018-12-22 PROCEDURE — 99152 MOD SED SAME PHYS/QHP 5/>YRS: CPT

## 2018-12-22 PROCEDURE — C1769 GUIDE WIRE: HCPCS

## 2018-12-22 PROCEDURE — 700102 HCHG RX REV CODE 250 W/ 637 OVERRIDE(OP): Performed by: FAMILY MEDICINE

## 2018-12-22 PROCEDURE — 99231 SBSQ HOSP IP/OBS SF/LOW 25: CPT | Performed by: FAMILY MEDICINE

## 2018-12-22 PROCEDURE — 770020 HCHG ROOM/CARE - TELE (206)

## 2018-12-22 PROCEDURE — 82962 GLUCOSE BLOOD TEST: CPT | Mod: 91

## 2018-12-22 PROCEDURE — 85347 COAGULATION TIME ACTIVATED: CPT

## 2018-12-22 PROCEDURE — 700102 HCHG RX REV CODE 250 W/ 637 OVERRIDE(OP)

## 2018-12-22 PROCEDURE — 700111 HCHG RX REV CODE 636 W/ 250 OVERRIDE (IP): Performed by: HOSPITALIST

## 2018-12-22 PROCEDURE — A9270 NON-COVERED ITEM OR SERVICE: HCPCS | Performed by: HOSPITALIST

## 2018-12-22 PROCEDURE — 93454 CORONARY ARTERY ANGIO S&I: CPT

## 2018-12-22 PROCEDURE — 700102 HCHG RX REV CODE 250 W/ 637 OVERRIDE(OP): Performed by: HOSPITALIST

## 2018-12-22 PROCEDURE — 85610 PROTHROMBIN TIME: CPT

## 2018-12-22 PROCEDURE — 80053 COMPREHEN METABOLIC PANEL: CPT

## 2018-12-22 PROCEDURE — 93571 IV DOP VEL&/PRESS C FLO 1ST: CPT | Mod: 52

## 2018-12-22 PROCEDURE — 700117 HCHG RX CONTRAST REV CODE 255: Performed by: INTERNAL MEDICINE

## 2018-12-22 PROCEDURE — 92928 PRQ TCAT PLMT NTRAC ST 1 LES: CPT | Mod: LD | Performed by: INTERNAL MEDICINE

## 2018-12-22 PROCEDURE — 85025 COMPLETE CBC W/AUTO DIFF WBC: CPT

## 2018-12-22 PROCEDURE — 700102 HCHG RX REV CODE 250 W/ 637 OVERRIDE(OP): Performed by: STUDENT IN AN ORGANIZED HEALTH CARE EDUCATION/TRAINING PROGRAM

## 2018-12-22 PROCEDURE — A9270 NON-COVERED ITEM OR SERVICE: HCPCS | Performed by: FAMILY MEDICINE

## 2018-12-22 PROCEDURE — 36415 COLL VENOUS BLD VENIPUNCTURE: CPT

## 2018-12-22 PROCEDURE — 93571 IV DOP VEL&/PRESS C FLO 1ST: CPT | Mod: 26,52,LD | Performed by: INTERNAL MEDICINE

## 2018-12-22 RX ORDER — INSULIN GLARGINE 100 [IU]/ML
10 INJECTION, SOLUTION SUBCUTANEOUS EVERY EVENING
Status: DISCONTINUED | OUTPATIENT
Start: 2018-12-22 | End: 2018-12-23

## 2018-12-22 RX ORDER — CLOPIDOGREL BISULFATE 75 MG/1
600 TABLET ORAL ONCE
Status: ACTIVE | OUTPATIENT
Start: 2018-12-22 | End: 2018-12-23

## 2018-12-22 RX ORDER — SODIUM CHLORIDE 9 MG/ML
INJECTION, SOLUTION INTRAVENOUS CONTINUOUS
Status: DISPENSED | OUTPATIENT
Start: 2018-12-22 | End: 2018-12-22

## 2018-12-22 RX ORDER — HEPARIN SODIUM,PORCINE 1000/ML
VIAL (ML) INJECTION
Status: COMPLETED
Start: 2018-12-22 | End: 2018-12-22

## 2018-12-22 RX ORDER — CLOPIDOGREL BISULFATE 75 MG/1
75 TABLET ORAL DAILY
Status: DISCONTINUED | OUTPATIENT
Start: 2018-12-23 | End: 2018-12-23 | Stop reason: HOSPADM

## 2018-12-22 RX ORDER — LISINOPRIL 5 MG/1
5 TABLET ORAL
Status: DISCONTINUED | OUTPATIENT
Start: 2018-12-22 | End: 2018-12-23 | Stop reason: HOSPADM

## 2018-12-22 RX ORDER — VERAPAMIL HYDROCHLORIDE 2.5 MG/ML
INJECTION, SOLUTION INTRAVENOUS
Status: COMPLETED
Start: 2018-12-22 | End: 2018-12-22

## 2018-12-22 RX ORDER — CLOPIDOGREL 300 MG/1
TABLET, FILM COATED ORAL
Status: COMPLETED
Start: 2018-12-22 | End: 2018-12-22

## 2018-12-22 RX ORDER — LIDOCAINE HYDROCHLORIDE 20 MG/ML
INJECTION, SOLUTION INFILTRATION; PERINEURAL
Status: COMPLETED
Start: 2018-12-22 | End: 2018-12-22

## 2018-12-22 RX ORDER — MIDAZOLAM HYDROCHLORIDE 1 MG/ML
INJECTION INTRAMUSCULAR; INTRAVENOUS
Status: COMPLETED
Start: 2018-12-22 | End: 2018-12-22

## 2018-12-22 RX ADMIN — MORPHINE SULFATE 4 MG: 10 INJECTION INTRAVENOUS at 11:41

## 2018-12-22 RX ADMIN — INSULIN HUMAN 3 UNITS: 100 INJECTION, SOLUTION PARENTERAL at 18:08

## 2018-12-22 RX ADMIN — MIDAZOLAM HYDROCHLORIDE 2 MG: 1 INJECTION, SOLUTION INTRAMUSCULAR; INTRAVENOUS at 09:43

## 2018-12-22 RX ADMIN — MORPHINE SULFATE 4 MG: 10 INJECTION INTRAVENOUS at 20:40

## 2018-12-22 RX ADMIN — HEPARIN SODIUM: 200 INJECTION, SOLUTION INTRAVENOUS at 08:45

## 2018-12-22 RX ADMIN — IOHEXOL 149 ML: 350 INJECTION, SOLUTION INTRAVENOUS at 10:21

## 2018-12-22 RX ADMIN — INSULIN HUMAN 3 UNITS: 100 INJECTION, SOLUTION PARENTERAL at 05:40

## 2018-12-22 RX ADMIN — GABAPENTIN 800 MG: 400 CAPSULE ORAL at 20:37

## 2018-12-22 RX ADMIN — GABAPENTIN 800 MG: 400 CAPSULE ORAL at 14:16

## 2018-12-22 RX ADMIN — LISINOPRIL 5 MG: 5 TABLET ORAL at 11:41

## 2018-12-22 RX ADMIN — CLOPIDOGREL BISULFATE 300 MG: 300 TABLET, FILM COATED ORAL at 10:23

## 2018-12-22 RX ADMIN — METOPROLOL TARTRATE 25 MG: 25 TABLET, FILM COATED ORAL at 11:42

## 2018-12-22 RX ADMIN — ASPIRIN 81 MG: 81 TABLET, DELAYED RELEASE ORAL at 11:41

## 2018-12-22 RX ADMIN — HEPARIN SODIUM: 1000 INJECTION, SOLUTION INTRAVENOUS; SUBCUTANEOUS at 09:43

## 2018-12-22 RX ADMIN — MORPHINE SULFATE 4 MG: 10 INJECTION INTRAVENOUS at 23:47

## 2018-12-22 RX ADMIN — VERAPAMIL HYDROCHLORIDE 2.5 MG: 2.5 INJECTION, SOLUTION INTRAVENOUS at 09:43

## 2018-12-22 RX ADMIN — MORPHINE SULFATE 4 MG: 10 INJECTION INTRAVENOUS at 03:34

## 2018-12-22 RX ADMIN — MORPHINE SULFATE 4 MG: 10 INJECTION INTRAVENOUS at 18:12

## 2018-12-22 RX ADMIN — MORPHINE SULFATE 4 MG: 10 INJECTION INTRAVENOUS at 14:16

## 2018-12-22 RX ADMIN — LIDOCAINE HYDROCHLORIDE: 20 INJECTION, SOLUTION INFILTRATION; PERINEURAL at 09:42

## 2018-12-22 RX ADMIN — NITROGLYCERIN 10 ML: 20 INJECTION INTRAVENOUS at 09:42

## 2018-12-22 RX ADMIN — INSULIN GLARGINE 10 UNITS: 100 INJECTION, SOLUTION SUBCUTANEOUS at 18:08

## 2018-12-22 RX ADMIN — NITROGLYCERIN 0.4 MG: 0.4 TABLET SUBLINGUAL at 09:10

## 2018-12-22 RX ADMIN — STANDARDIZED SENNA CONCENTRATE AND DOCUSATE SODIUM 2 TABLET: 8.6; 5 TABLET, FILM COATED ORAL at 18:11

## 2018-12-22 RX ADMIN — SODIUM CHLORIDE: 9 INJECTION, SOLUTION INTRAVENOUS at 20:33

## 2018-12-22 RX ADMIN — FENTANYL CITRATE 100 MCG: 50 INJECTION, SOLUTION INTRAMUSCULAR; INTRAVENOUS at 09:43

## 2018-12-22 RX ADMIN — SODIUM CHLORIDE: 9 INJECTION, SOLUTION INTRAVENOUS at 11:42

## 2018-12-22 RX ADMIN — INSULIN HUMAN 2 UNITS: 100 INJECTION, SOLUTION PARENTERAL at 23:47

## 2018-12-22 RX ADMIN — TRAZODONE HYDROCHLORIDE 100 MG: 100 TABLET ORAL at 23:50

## 2018-12-22 RX ADMIN — HEPARIN SODIUM: 1000 INJECTION, SOLUTION INTRAVENOUS; SUBCUTANEOUS at 09:57

## 2018-12-22 RX ADMIN — INSULIN HUMAN 3 UNITS: 100 INJECTION, SOLUTION PARENTERAL at 12:00

## 2018-12-22 RX ADMIN — ROSUVASTATIN CALCIUM 20 MG: 20 TABLET, FILM COATED ORAL at 18:00

## 2018-12-22 RX ADMIN — METOPROLOL TARTRATE 25 MG: 25 TABLET, FILM COATED ORAL at 18:11

## 2018-12-22 ASSESSMENT — ENCOUNTER SYMPTOMS
TINGLING: 0
EYE DISCHARGE: 0
PHOTOPHOBIA: 0
VOMITING: 0
DIARRHEA: 0
EYE PAIN: 0
ABDOMINAL PAIN: 0
DIAPHORESIS: 0
PALPITATIONS: 0
BACK PAIN: 0
HEMOPTYSIS: 0
CHILLS: 0
WEIGHT LOSS: 0
TREMORS: 0
HEADACHES: 0
NECK PAIN: 0
CLAUDICATION: 0
SPUTUM PRODUCTION: 0
WHEEZING: 0

## 2018-12-22 ASSESSMENT — PATIENT HEALTH QUESTIONNAIRE - PHQ9
1. LITTLE INTEREST OR PLEASURE IN DOING THINGS: NOT AT ALL
SUM OF ALL RESPONSES TO PHQ9 QUESTIONS 1 AND 2: 0
2. FEELING DOWN, DEPRESSED, IRRITABLE, OR HOPELESS: NOT AT ALL

## 2018-12-22 ASSESSMENT — PAIN SCALES - GENERAL
PAINLEVEL_OUTOF10: 8
PAINLEVEL_OUTOF10: 7
PAINLEVEL_OUTOF10: 5
PAINLEVEL_OUTOF10: 6
PAINLEVEL_OUTOF10: 7

## 2018-12-22 NOTE — PROGRESS NOTES
[facility] Cardiology Follow-up Consult Note    Date of note:    12/21/2018      Consulting Physician: ARTURO Clifton*      Chief Complaint   Patient presents with   • Chest Pain       Interim Events:  Admitted for atypical chest pain.  Underwent nuclear stress test showing reversible ischemia in anterior wall near septum.  Echo reveals LV EF 55%, inferior/inferoseptal wall motion abnormality, no pericardial effusion.  Cath today- no note yet from Interventional cardiology but per RN, had 2 stents to LAD  Patient stable, mildly hypertensive, to give am BP meds now    ROS  No NV, No Bleeding, No dizziness   All other review of systems reviewed and negative.    Past medical, surgical, social, and family history reviewed and unchanged from admission except as noted in assessment and plan.    Medications: Reviewed in MAR  Current Facility-Administered Medications   Medication Dose Frequency Provider Last Rate Last Dose   • lisinopril (PRINIVIL) tablet 5 mg  5 mg Q DAY Nataly Brewster M.D.       • insulin glargine (LANTUS) injection 10 Units  10 Units Q EVENING Nataly Brewster M.D.       • enoxaparin (LOVENOX) inj 40 mg  40 mg DAILY Nataly Brewster M.D.   Stopped at 12/22/18 0900   • lidocaine (XYLOCAINE) 1 % injection 0.5 mL  0.5 mL Once PRN Obi Vicente M.D.       • NS infusion   Continuous Obi Vicente M.D. 50 mL/hr at 12/21/18 2124     • aspirin EC (ECOTRIN) tablet 81 mg  81 mg DAILY Obi Vicente M.D.   81 mg at 12/21/18 1540   • metoprolol (LOPRESSOR) tablet 25 mg  25 mg TWICE DAILY Obi Vicente M.D.   25 mg at 12/21/18 1540   • traZODone (DESYREL) tablet 100 mg  100 mg QHS Chacha Fox M.D.   100 mg at 12/21/18 2300   • ketorolac (TORADOL) injection 30 mg  30 mg Q6HRS PRN Joyce Pak M.D.   30 mg at 12/20/18 1947   • senna-docusate (PERICOLACE or SENOKOT S) 8.6-50 MG per tablet 2 Tab  2 Tab BID Joyce T. Pak, M.D.        And   • polyethylene glycol/lytes (MIRALAX) PACKET 1  "Packet  1 Packet QDAY PRN Joyce Pak M.D.        And   • magnesium hydroxide (MILK OF MAGNESIA) suspension 30 mL  30 mL QDAY PRN Joyce Pak M.D.        And   • bisacodyl (DULCOLAX) suppository 10 mg  10 mg QDAY PRN Joyce Pak M.D.       • nitroglycerin (NITROSTAT) tablet 0.4 mg  0.4 mg Q5 MIN PRN Joyce Pak M.D.   0.4 mg at 12/22/18 0910   • morphine (pf) 10 mg/mL injection 2-4 mg  2-4 mg Q5 MIN PRN Joyce Pak M.D.   4 mg at 12/22/18 0334   • rosuvastatin (CRESTOR) tablet 20 mg  20 mg Q EVENING Joyce Pak M.D.   20 mg at 12/21/18 1706   • insulin regular (HUMULIN R) injection 2-9 Units  2-9 Units Q6HRS Joyce Pak M.D.   3 Units at 12/22/18 0540    And   • glucose 4 g chewable tablet 16 g  16 g Q15 MIN PRN Joyce Pak M.D.        And   • dextrose 50% (D50W) injection 25 mL  25 mL Q15 MIN PRN Joyce Pak M.D.       • gabapentin (NEURONTIN) capsule 800 mg  800 mg TID Joyce Pak M.D.   Stopped at 12/22/18 0900     Last reviewed on 12/20/2018  9:22 PM by India Lemus R.N.  No Known Allergies    Physical Exam  Body mass index is 39.66 kg/m². Blood pressure 153/74, pulse 96, temperature 36.3 °C (97.4 °F), temperature source Temporal, resp. rate 18, height 1.651 m (5' 5\"), weight 108.1 kg (238 lb 5.1 oz), last menstrual period 02/05/2009, SpO2 98 %, not currently breastfeeding.   Vitals:    12/22/18 0837 12/22/18 0910 12/22/18 1055 12/22/18 1110   BP: 155/88 155/88 (!) 181/95 153/74   Pulse: 88 88 (!) 102 96   Resp: 18  18 18   Temp: 36.6 °C (97.8 °F)  36.2 °C (97.2 °F) 36.3 °C (97.4 °F)   TempSrc: Temporal  Temporal Temporal   SpO2: 92%  98% 98%   Weight:       Height:        Oxygen Therapy:  Pulse Oximetry: 98 %, O2 (LPM): 0, O2 Delivery: Silicone Nasal Cannula    General: no apparent distress  HEENT: NC, AT, conjunctiva normal, no JVD   Lungs: normal respiratory effort, CTAB  Heart: RRR, systolic murmur, no rubs or gallops,   EXT: No edema. + pedal pulses. no " cyanosis  Abdomen: soft, non tender, non distended  Neurological: A/O x 3. No focal sensory deficits  Skin: Warm extremities    Labs (personally reviewed and notable for):   Recent Results (from the past 24 hour(s))   ACCU-CHEK GLUCOSE    Collection Time: 12/21/18 12:54 PM   Result Value Ref Range    Glucose - Accu-Ck 220 (H) 65 - 99 mg/dL   ACCU-CHEK GLUCOSE    Collection Time: 12/21/18  5:04 PM   Result Value Ref Range    Glucose - Accu-Ck 218 (H) 65 - 99 mg/dL   CBC WITH DIFFERENTIAL    Collection Time: 12/22/18  2:17 AM   Result Value Ref Range    WBC 7.2 4.8 - 10.8 K/uL    RBC 3.93 (L) 4.20 - 5.40 M/uL    Hemoglobin 10.8 (L) 12.0 - 16.0 g/dL    Hematocrit 35.1 (L) 37.0 - 47.0 %    MCV 89.3 81.4 - 97.8 fL    MCH 27.5 27.0 - 33.0 pg    MCHC 30.8 (L) 33.6 - 35.0 g/dL    RDW 47.0 35.9 - 50.0 fL    Platelet Count 271 164 - 446 K/uL    MPV 9.2 9.0 - 12.9 fL    Neutrophils-Polys 50.30 44.00 - 72.00 %    Lymphocytes 38.40 22.00 - 41.00 %    Monocytes 6.50 0.00 - 13.40 %    Eosinophils 3.70 0.00 - 6.90 %    Basophils 0.80 0.00 - 1.80 %    Immature Granulocytes 0.30 0.00 - 0.90 %    Nucleated RBC 0.00 /100 WBC    Neutrophils (Absolute) 3.63 2.00 - 7.15 K/uL    Lymphs (Absolute) 2.77 1.00 - 4.80 K/uL    Monos (Absolute) 0.47 0.00 - 0.85 K/uL    Eos (Absolute) 0.27 0.00 - 0.51 K/uL    Baso (Absolute) 0.06 0.00 - 0.12 K/uL    Immature Granulocytes (abs) 0.02 0.00 - 0.11 K/uL    NRBC (Absolute) 0.00 K/uL   COMP METABOLIC PANEL    Collection Time: 12/22/18  2:17 AM   Result Value Ref Range    Sodium 138 135 - 145 mmol/L    Potassium 4.3 3.6 - 5.5 mmol/L    Chloride 109 96 - 112 mmol/L    Co2 25 20 - 33 mmol/L    Anion Gap 4.0 0.0 - 11.9    Glucose 321 (H) 65 - 99 mg/dL    Bun 20 8 - 22 mg/dL    Creatinine 0.85 0.50 - 1.40 mg/dL    Calcium 8.8 8.5 - 10.5 mg/dL    AST(SGOT) 42 12 - 45 U/L    ALT(SGPT) 101 (H) 2 - 50 U/L    Alkaline Phosphatase 174 (H) 30 - 99 U/L    Total Bilirubin 0.2 0.1 - 1.5 mg/dL    Albumin 3.2 3.2 - 4.9  g/dL    Total Protein 6.2 6.0 - 8.2 g/dL    Globulin 3.0 1.9 - 3.5 g/dL    A-G Ratio 1.1 g/dL   APTT    Collection Time: 12/22/18  2:17 AM   Result Value Ref Range    APTT 29.7 24.7 - 36.0 sec   INR (Prothrombin/ INR)    Collection Time: 12/22/18  2:17 AM   Result Value Ref Range    PT 13.4 12.0 - 14.6 sec    INR 1.01 0.87 - 1.13   ESTIMATED GFR    Collection Time: 12/22/18  2:17 AM   Result Value Ref Range    GFR If African American >60 >60 mL/min/1.73 m 2    GFR If Non African American >60 >60 mL/min/1.73 m 2   ACCU-CHEK GLUCOSE    Collection Time: 12/22/18  5:38 AM   Result Value Ref Range    Glucose - Accu-Ck 229 (H) 65 - 99 mg/dL   POC ACT (resulted by nursing)    Collection Time: 12/22/18 10:07 AM   Result Value Ref Range    Istat Activated Clotting Time 224 (H) 74 - 137 sec       Cardiac Imaging and Procedures Review:    EKG and telemetry tracings personally reviewed      ASSESSMENT & PLAN    # Chest pain  # NSTEMI  # HTN  # HLD  # IDDM    Troponin elevated with downtrend, EKG with non specific changes.  Echocardiogram shows inferior and inferoseptal wall motion abnormality, normal EF, no pericardial effusion.  Nuclear stress testing shows large area of infarction at apex at rest, small area of reversibility in anterior wall with stress.  Cath today- no note yet from Interventional cardiology but per RN, had 2 stents to LAD, got loading dose of plavix  Patient stable, mildly hypertensive, to give am BP meds now.  Continue ASA, plavix, statin, metoprolol and lisinopril  We will continue to follow    It is my pleasure to participate in the care of Ms. Carrion.  Please do not hesitate to contact me with questions or concerns.

## 2018-12-22 NOTE — PROGRESS NOTES
Pt back on floor from cath lab. Right radial site has TR band in place with 14 mL air per cath lab RN. No bleeding at this time. Pt placed back on monitors and toileted. Pt is hypertensive - this RN notified cardiology. RN to give scheduled AM metoprolol. RN will admin and continue to monitor.

## 2018-12-22 NOTE — CARE PLAN
Problem: Infection  Goal: Will remain free from infection  Outcome: PROGRESSING AS EXPECTED  No s/s of infection at this time.     Problem: Venous Thromboembolism (VTW)/Deep Vein Thrombosis (DVT) Prevention:  Goal: Patient will participate in Venous Thrombosis (VTE)/Deep Vein Thrombosis (DVT)Prevention Measures  Outcome: PROGRESSING AS EXPECTED  Pt had cath done today - meds given in cath lab. Cardiology MD aware.

## 2018-12-22 NOTE — PROCEDURES
Cardiac Catheterization and Percutaneous Intervention Procedure Report    12/22/2018    Primary Care Provider: Tre Jason M.D.    Indication for procedure:   ACS >24 hours  High risk stress test.    Procedure:  · Insertion of 6FR sheath in the right radial artery  · right and left coronary arteriograms  · IFR of LAD  · Angioplasty and placement of a 3.5 by 12 mm Xience Lilo drug-eluting stent in proximal  left anterior descending coronary artery.  · Angioplasty and placement of a 3.0 by 38 mm Xience Lilo drug-eluting stent in mid  left anterior descending coronary artery.      Julisa Carrion was brought to the cardiac catheterization lab where the right wrist was prepped and draped in the usual manner for cardiac catheterization.  The area was anesthetized with lidocaine and a 5/6 FR sheath was inserted into the right radial artery without difficulty. A #3.5 left Bobby catheter was advanced to the ostia of the Left coronary artery and arteriograms were recorded.   A #4 right Bobby catheter was advanced to the ostia of the right coronary artery and arteriograms were recorded.  Patient underwent percutaneous coronary revascularization as outlined below.  At the completion of the case the sheath was removed and hemostasis achieved utilizing a radial compression band .  Patient was pain-free and hemodynamically stable at the completion of the case.  There were no apparent complications.      Coronary arteriograms:  Left main: normal  Left anterior descending: Proximal LAD has 80% lesion with ulcerated plaque, mid to distal LAD has long segment diffuse 70% disease.  First diagonal is moderate size vessel with nonobstructive disease, other diagonals are small vessels.  Left circumflex: luminal irregularities.  Marginal branches are small vessels  Ramus intermedius: Large vessel, midportion has 50% stenosis.  Right coronary: Mild nonobstructive disease, dominant      Interventional Procedure LAD:     Given  "the patient's clinical presentation and coronary anatomy, PCI was indicated and we proceeded with the intervention as detailed below.    Indication for PCI:  NSTE- ACD    Proximal LAD  Pre: 80 %, 10 mm length, ZAIN 3 flow  Post: 0%, ZAIN 3 flow    Lesion complexity  Non-High  Severe calcification No  Bifurcation  No    Mid LAD  Pre: 70%, 30 mm length, ZAIN 3 flow  Post: 0%, ZAIN 3 flow    Lesion complexity  Non-High  Severe calcification No  Bifurcation  No    Guide catheter: EBU 3.0 was advanced to the ostia of the left coronary artery.    Guide wire: A 0.014\" mm  Runthrough was advanced into the artery and crossed the lesion.    Stent: A 3.5 by 12 mm Xience Lilo drug-eluting stent was deployed in proximal  left anterior descending coronary artery at 12 VI.  After the PCI of proximal LAD, repeat injections were performed after giving intracoronary nitroglycerin to evaluate mid LAD stenosis.  Mid LAD had diffuse 70% stenosis, long segment.  I tried to avoid stenting if possible, IFR was performed in a standard manner, IFR across mid LAD was 0.78 with gradual step up.  Then I placed another stent 3.0X 38 mm Xience Lilo drug-eluting stent in mid LAD overlapping with proximal LAD stent.  After the stent placement flow into the distal LAD was better, distal to the second stent there was about another 30 mm length of LAD has 50-60% diffuse disease, appears to be nonobstructive, will be treated medically.  Apical LAD has minimal disease.    Anticoagulant: Heparin  Antiplatelet: Clopidogrl  EBL <25 cc  Complications: none  Specimens: none  Contrast: see cath lab flowsheet  Fluorotime : see cath lab flowsheet    Final diagnosis:   · Successful percutaneous intervention of left anterior descending coronary artery/arteries as described above.    Sedation: I supervised moderate sedation over a trained independent observer.    Sedation start time:   End time:     Recommendations:   Guideline directed medical " therapy.    Electronically signed by   TRINITY Gutierrez  Interventional cardiologist  12/22/2018  1:52 PM

## 2018-12-22 NOTE — PROGRESS NOTES
Bedside report received from NOC RN Jarret pt is awake and alert with NS running @ 50 mL/hr. Bed is locked in lowest position with call light, belongings within reach, white board updated, and POC discussed. All needs met at this time.     12 hour chart check complete

## 2018-12-22 NOTE — PROGRESS NOTES
[facility] Medicine Daily Progress Note    Date of Service  12/22/2018    Chief Complaint  58 y.o. female admitted 12/20/2018 with atypical chest pain    Hospital Course  Cardiac cath today    Interval Problem Update      Consultants/Specialty  cardiology    Code Status      Disposition  pending    Review of Systems  Review of Systems   Constitutional: Negative for chills, diaphoresis and weight loss.   HENT: Negative for congestion, ear discharge and nosebleeds.    Eyes: Negative for photophobia, pain and discharge.   Respiratory: Negative for hemoptysis, sputum production and wheezing.    Cardiovascular: Positive for chest pain. Negative for palpitations and claudication.   Gastrointestinal: Negative for abdominal pain, diarrhea and vomiting.   Genitourinary: Negative for frequency, hematuria and urgency.   Musculoskeletal: Negative for back pain, joint pain and neck pain.   Skin: Negative for itching and rash.   Neurological: Negative for tingling, tremors and headaches.        Physical Exam  Temp:  [36.4 °C (97.5 °F)-36.7 °C (98.1 °F)] 36.6 °C (97.8 °F)  Pulse:  [] 88  Resp:  [15-18] 18  BP: (101-164)/(63-94) 155/88    Physical Exam   Constitutional: No distress.   HENT:   Right Ear: External ear normal.   Left Ear: External ear normal.   Eyes: Right eye exhibits no discharge. Left eye exhibits no discharge.   Neck: No JVD present.   Cardiovascular: Normal heart sounds.    Pulmonary/Chest: No stridor. She has no wheezes. She has no rales.   Abdominal: There is no tenderness. There is no rebound and no guarding.   Musculoskeletal: She exhibits no edema or tenderness.   Neurological: She is alert.   Skin: Skin is warm and dry. She is not diaphoretic.       Fluids  No intake or output data in the 24 hours ending 12/22/18 1032    Laboratory  Recent Labs      12/20/18   1747  12/22/18   0217   WBC  9.1  7.2   RBC  4.48  3.93*   HEMOGLOBIN  12.3  10.8*   HEMATOCRIT  39.1  35.1*   MCV  87.3  89.3   MCH  27.5  27.5    MCHC  31.5*  30.8*   RDW  45.6  47.0   PLATELETCT  305  271   MPV  9.3  9.2     Recent Labs      12/20/18   1747  12/22/18   0217   SODIUM  138  138   POTASSIUM  3.9  4.3   CHLORIDE  107  109   CO2  23  25   GLUCOSE  196*  321*   BUN  21  20   CREATININE  0.81  0.85   CALCIUM  8.6  8.8     Recent Labs      12/20/18   1747  12/22/18   0217   APTT  32.4  29.7   INR  1.02  1.01     Recent Labs      12/20/18   1747   BNPBTYPENAT  98     Recent Labs      12/20/18   2356   TRIGLYCERIDE  159*   HDL  45   LDL  99       Imaging       Assessment/Plan  * Elevated troponin   Assessment & Plan    Asa  crestor  Cardiology input is noted  Cardiac cath     Transaminitis   Assessment & Plan    Have trended down  Hep panel is negative  ruq ultrasound showed:Hepatomegaly and steatosis. No free fluid.  Surgical absence gallbladder. Mild dilatation common duct measuring 7 mm. The distal duct is not delineated.     HLD (hyperlipidemia)   Assessment & Plan    Workup as noted above.     T2DM (type 2 diabetes mellitus) (HCC)   Assessment & Plan    S.s.insulin  Not well controlled   lantus   accu checks are noted  hema1c is 10          VTE prophylaxis: lovenox

## 2018-12-23 ENCOUNTER — PATIENT OUTREACH (OUTPATIENT)
Dept: HEALTH INFORMATION MANAGEMENT | Facility: OTHER | Age: 58
End: 2018-12-23

## 2018-12-23 VITALS
HEIGHT: 65 IN | TEMPERATURE: 97.6 F | OXYGEN SATURATION: 96 % | DIASTOLIC BLOOD PRESSURE: 80 MMHG | SYSTOLIC BLOOD PRESSURE: 152 MMHG | RESPIRATION RATE: 16 BRPM | WEIGHT: 246.69 LBS | HEART RATE: 80 BPM | BODY MASS INDEX: 41.1 KG/M2

## 2018-12-23 LAB
ALBUMIN SERPL BCP-MCNC: 3.2 G/DL (ref 3.2–4.9)
ALBUMIN/GLOB SERPL: 1.1 G/DL
ALP SERPL-CCNC: 161 U/L (ref 30–99)
ALT SERPL-CCNC: 70 U/L (ref 2–50)
ANION GAP SERPL CALC-SCNC: 6 MMOL/L (ref 0–11.9)
AST SERPL-CCNC: 20 U/L (ref 12–45)
BASOPHILS # BLD AUTO: 0.5 % (ref 0–1.8)
BASOPHILS # BLD: 0.04 K/UL (ref 0–0.12)
BILIRUB SERPL-MCNC: 0.2 MG/DL (ref 0.1–1.5)
BUN SERPL-MCNC: 18 MG/DL (ref 8–22)
CALCIUM SERPL-MCNC: 8.7 MG/DL (ref 8.5–10.5)
CHLORIDE SERPL-SCNC: 106 MMOL/L (ref 96–112)
CO2 SERPL-SCNC: 23 MMOL/L (ref 20–33)
CREAT SERPL-MCNC: 0.83 MG/DL (ref 0.5–1.4)
EOSINOPHIL # BLD AUTO: 0.24 K/UL (ref 0–0.51)
EOSINOPHIL NFR BLD: 3.1 % (ref 0–6.9)
ERYTHROCYTE [DISTWIDTH] IN BLOOD BY AUTOMATED COUNT: 45.6 FL (ref 35.9–50)
GLOBULIN SER CALC-MCNC: 3 G/DL (ref 1.9–3.5)
GLUCOSE BLD-MCNC: 211 MG/DL (ref 65–99)
GLUCOSE BLD-MCNC: 233 MG/DL (ref 65–99)
GLUCOSE BLD-MCNC: 267 MG/DL (ref 65–99)
GLUCOSE SERPL-MCNC: 326 MG/DL (ref 65–99)
HCT VFR BLD AUTO: 33.4 % (ref 37–47)
HGB BLD-MCNC: 10.4 G/DL (ref 12–16)
IMM GRANULOCYTES # BLD AUTO: 0.03 K/UL (ref 0–0.11)
IMM GRANULOCYTES NFR BLD AUTO: 0.4 % (ref 0–0.9)
LYMPHOCYTES # BLD AUTO: 2.45 K/UL (ref 1–4.8)
LYMPHOCYTES NFR BLD: 31.9 % (ref 22–41)
MCH RBC QN AUTO: 27.7 PG (ref 27–33)
MCHC RBC AUTO-ENTMCNC: 31.1 G/DL (ref 33.6–35)
MCV RBC AUTO: 88.8 FL (ref 81.4–97.8)
MONOCYTES # BLD AUTO: 0.46 K/UL (ref 0–0.85)
MONOCYTES NFR BLD AUTO: 6 % (ref 0–13.4)
NEUTROPHILS # BLD AUTO: 4.47 K/UL (ref 2–7.15)
NEUTROPHILS NFR BLD: 58.1 % (ref 44–72)
NRBC # BLD AUTO: 0 K/UL
NRBC BLD-RTO: 0 /100 WBC
PLATELET # BLD AUTO: 270 K/UL (ref 164–446)
PMV BLD AUTO: 9.4 FL (ref 9–12.9)
POTASSIUM SERPL-SCNC: 3.9 MMOL/L (ref 3.6–5.5)
PROT SERPL-MCNC: 6.2 G/DL (ref 6–8.2)
RBC # BLD AUTO: 3.76 M/UL (ref 4.2–5.4)
SODIUM SERPL-SCNC: 135 MMOL/L (ref 135–145)
WBC # BLD AUTO: 7.7 K/UL (ref 4.8–10.8)

## 2018-12-23 PROCEDURE — 700102 HCHG RX REV CODE 250 W/ 637 OVERRIDE(OP): Performed by: INTERNAL MEDICINE

## 2018-12-23 PROCEDURE — 700111 HCHG RX REV CODE 636 W/ 250 OVERRIDE (IP): Performed by: FAMILY MEDICINE

## 2018-12-23 PROCEDURE — 82962 GLUCOSE BLOOD TEST: CPT

## 2018-12-23 PROCEDURE — G8978 MOBILITY CURRENT STATUS: HCPCS | Mod: CI

## 2018-12-23 PROCEDURE — 700111 HCHG RX REV CODE 636 W/ 250 OVERRIDE (IP): Performed by: HOSPITALIST

## 2018-12-23 PROCEDURE — 700102 HCHG RX REV CODE 250 W/ 637 OVERRIDE(OP): Performed by: FAMILY MEDICINE

## 2018-12-23 PROCEDURE — A9270 NON-COVERED ITEM OR SERVICE: HCPCS | Performed by: STUDENT IN AN ORGANIZED HEALTH CARE EDUCATION/TRAINING PROGRAM

## 2018-12-23 PROCEDURE — 36415 COLL VENOUS BLD VENIPUNCTURE: CPT

## 2018-12-23 PROCEDURE — 700102 HCHG RX REV CODE 250 W/ 637 OVERRIDE(OP): Performed by: HOSPITALIST

## 2018-12-23 PROCEDURE — A9270 NON-COVERED ITEM OR SERVICE: HCPCS | Performed by: INTERNAL MEDICINE

## 2018-12-23 PROCEDURE — 99232 SBSQ HOSP IP/OBS MODERATE 35: CPT | Mod: GC | Performed by: INTERNAL MEDICINE

## 2018-12-23 PROCEDURE — 99239 HOSP IP/OBS DSCHRG MGMT >30: CPT | Performed by: FAMILY MEDICINE

## 2018-12-23 PROCEDURE — G8979 MOBILITY GOAL STATUS: HCPCS | Mod: CI

## 2018-12-23 PROCEDURE — A9270 NON-COVERED ITEM OR SERVICE: HCPCS | Performed by: HOSPITALIST

## 2018-12-23 PROCEDURE — A9270 NON-COVERED ITEM OR SERVICE: HCPCS | Performed by: FAMILY MEDICINE

## 2018-12-23 PROCEDURE — 85025 COMPLETE CBC W/AUTO DIFF WBC: CPT

## 2018-12-23 PROCEDURE — 97162 PT EVAL MOD COMPLEX 30 MIN: CPT

## 2018-12-23 PROCEDURE — G8980 MOBILITY D/C STATUS: HCPCS | Mod: CI

## 2018-12-23 PROCEDURE — 700102 HCHG RX REV CODE 250 W/ 637 OVERRIDE(OP): Performed by: STUDENT IN AN ORGANIZED HEALTH CARE EDUCATION/TRAINING PROGRAM

## 2018-12-23 PROCEDURE — 80053 COMPREHEN METABOLIC PANEL: CPT

## 2018-12-23 RX ORDER — CLOPIDOGREL BISULFATE 75 MG/1
75 TABLET ORAL DAILY
Qty: 30 TAB | Refills: 0 | Status: ON HOLD | OUTPATIENT
Start: 2018-12-24 | End: 2023-04-27

## 2018-12-23 RX ORDER — INSULIN GLARGINE 100 [IU]/ML
20 INJECTION, SOLUTION SUBCUTANEOUS ONCE
Status: COMPLETED | OUTPATIENT
Start: 2018-12-23 | End: 2018-12-23

## 2018-12-23 RX ORDER — ASPIRIN 81 MG/1
81 TABLET ORAL DAILY
Qty: 30 TAB | Refills: 0 | Status: ON HOLD | OUTPATIENT
Start: 2018-12-24 | End: 2023-04-24

## 2018-12-23 RX ORDER — INSULIN GLARGINE 100 [IU]/ML
10 INJECTION, SOLUTION SUBCUTANEOUS EVERY EVENING
Status: DISCONTINUED | OUTPATIENT
Start: 2018-12-24 | End: 2018-12-23 | Stop reason: HOSPADM

## 2018-12-23 RX ORDER — ROSUVASTATIN CALCIUM 20 MG/1
20 TABLET, COATED ORAL EVERY EVENING
Qty: 30 TAB | Refills: 0 | Status: SHIPPED | OUTPATIENT
Start: 2018-12-23 | End: 2019-01-17

## 2018-12-23 RX ORDER — LISINOPRIL 5 MG/1
5 TABLET ORAL DAILY
Qty: 30 TAB | Refills: 0 | Status: ON HOLD | OUTPATIENT
Start: 2018-12-24 | End: 2018-12-26

## 2018-12-23 RX ADMIN — GABAPENTIN 800 MG: 400 CAPSULE ORAL at 08:49

## 2018-12-23 RX ADMIN — CLOPIDOGREL 75 MG: 75 TABLET, FILM COATED ORAL at 06:04

## 2018-12-23 RX ADMIN — ASPIRIN 81 MG: 81 TABLET, DELAYED RELEASE ORAL at 06:03

## 2018-12-23 RX ADMIN — METOPROLOL TARTRATE 25 MG: 25 TABLET, FILM COATED ORAL at 06:03

## 2018-12-23 RX ADMIN — LISINOPRIL 5 MG: 5 TABLET ORAL at 06:03

## 2018-12-23 RX ADMIN — ENOXAPARIN SODIUM 40 MG: 100 INJECTION SUBCUTANEOUS at 06:03

## 2018-12-23 RX ADMIN — INSULIN HUMAN 3 UNITS: 100 INJECTION, SOLUTION PARENTERAL at 06:12

## 2018-12-23 RX ADMIN — GABAPENTIN 800 MG: 400 CAPSULE ORAL at 12:47

## 2018-12-23 RX ADMIN — MORPHINE SULFATE 4 MG: 10 INJECTION INTRAVENOUS at 03:22

## 2018-12-23 RX ADMIN — INSULIN GLARGINE 20 UNITS: 100 INJECTION, SOLUTION SUBCUTANEOUS at 12:43

## 2018-12-23 RX ADMIN — MORPHINE SULFATE 4 MG: 10 INJECTION INTRAVENOUS at 08:55

## 2018-12-23 RX ADMIN — MORPHINE SULFATE 4 MG: 10 INJECTION INTRAVENOUS at 06:02

## 2018-12-23 RX ADMIN — INSULIN HUMAN 5 UNITS: 100 INJECTION, SOLUTION PARENTERAL at 12:44

## 2018-12-23 ASSESSMENT — COGNITIVE AND FUNCTIONAL STATUS - GENERAL
SUGGESTED CMS G CODE MODIFIER MOBILITY: CH
MOBILITY SCORE: 24

## 2018-12-23 ASSESSMENT — PAIN SCALES - GENERAL
PAINLEVEL_OUTOF10: 7
PAINLEVEL_OUTOF10: 6
PAINLEVEL_OUTOF10: 5
PAINLEVEL_OUTOF10: 7

## 2018-12-23 ASSESSMENT — GAIT ASSESSMENTS
GAIT LEVEL OF ASSIST: SUPERVISED
DISTANCE (FEET): 300
ASSISTIVE DEVICE: FRONT WHEEL WALKER
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
## 2018-12-23 NOTE — CARE PLAN
Problem: Safety  Goal: Will remain free from injury  Outcome: MET Date Met: 12/23/18      Problem: Knowledge Deficit  Goal: Knowledge of disease process/condition, treatment plan, diagnostic tests, and medications will improve  Outcome: PROGRESSING AS EXPECTED

## 2018-12-23 NOTE — PROGRESS NOTES
TR band removed from patient in 3mL of air intervals. No bleeding, no hematoma, no pain per patient. Wrist precautions explained, pt verbalized understanding. Gauze and transparent dressing placed on right radial site. VSS. RN will continue to monitor.

## 2018-12-23 NOTE — CARE PLAN
Problem: Safety  Goal: Will remain free from falls  Outcome: PROGRESSING AS EXPECTED  Standard fall precautions in place; pt calls for assistance when needed. Pt is steady when ambulating.     Problem: Bowel/Gastric:  Goal: Normal bowel function is maintained or improved  Outcome: PROGRESSING AS EXPECTED  Pt taking bowel regimen; pt states she has normal bowel movements.

## 2018-12-23 NOTE — DISCHARGE SUMMARY
Discharge Summary    CHIEF COMPLAINT ON ADMISSION  Chief Complaint   Patient presents with   • Chest Pain       Reason for Admission  EMS     Admission Date  12/20/2018    CODE STATUS  Full Code    HPI & HOSPITAL COURSE  This is a 58 y.o. female here with   potential NSTEMI.  The patient presented the outside hospital with complaints of epigastric burning pain which began yesterday.  She had eaten at a new restaurant but she did not believe that her symptoms were related to the food as no other person was feeling poorly.  Her pain is described as nonradiating, burning, and constant.  She has had associated shortness of breath and an episode of nausea with vomiting as well as weakness but no dizziness, diaphoresis, or palpitations.  Yesterday, she did have an episode of anterior chest pain which she is reports was brief and resolved spontaneously.     At the outside facility, the patient was reportedly noted to have 1 Q wave on an anterior lead.  Additional workup including CT of the abdomen showed a normal abdomen and pelvis with a stable L2 L4 fracture deformity.  Chest x-ray was negative.  WBC 7.9 hemoglobin 13.4 hematocrit 40 platelets 367 sodium 139 potassium 3.7 chloride 103 CO2 27 BUN 20 creatinine 0.79 and glucose 239.  Troponin 0 0.24.  The patient was transferred to our facility for higher level of care and specialist consultation.  She was admitted with NSTEMI.  · She was seen by cardiology and was taken to the cath lab and  Had Insertion of 6FR sheath in the right radial artery  · right and left coronary arteriograms  · IFR of LAD  · Angioplasty and placement of a 3.5 by 12 mm Xience Lilo drug-eluting stent in proximal  left anterior descending coronary artery.  Angioplasty and placement of a 3.0 by 38 mm Xience Lilo drug-eluting stent in mid  left anterior descending coronary artery.  She is also noted to have hepatitis with negative hep panel without any abd pain and the LFTS have trended down. Her  abd ultrasound showed:Hepatomegaly and steatosis. No free fluid.  Surgical absence gallbladder. Mild dilatation common duct measuring 7 mm. The distal duct is not delineated.   she feels better and is cleared by cardiology for discharge.    Therefore, she is discharged in good and stable condition to home with close outpatient follow-up.    The patient met 2-midnight criteria for an inpatient stay at the time of discharge.    Discharge Date  12/23/2018    FOLLOW UP ITEMS POST DISCHARGE  pcp next week  Cardiology clinic in 1 week    DISCHARGE DIAGNOSES  Principal Problem:    Elevated troponin POA: Unknown  Active Problems:    T2DM (type 2 diabetes mellitus) (HCC) POA: Unknown    HLD (hyperlipidemia) POA: Unknown    Transaminitis POA: Unknown  Resolved Problems:    * No resolved hospital problems. *      FOLLOW UP  No future appointments.  No follow-up provider specified.    MEDICATIONS ON DISCHARGE     Medication List      START taking these medications      Instructions   aspirin 81 MG EC tablet  Start taking on:  12/24/2018   Take 1 Tab by mouth every day.  Dose:  81 mg     clopidogrel 75 MG Tabs  Start taking on:  12/24/2018  Commonly known as:  PLAVIX   Take 1 Tab by mouth every day.  Dose:  75 mg     lisinopril 5 MG Tabs  Start taking on:  12/24/2018  Commonly known as:  PRINIVIL   Take 1 Tab by mouth every day.  Dose:  5 mg     metoprolol 25 MG Tabs  Commonly known as:  LOPRESSOR   Take 1 Tab by mouth 2 Times a Day.  Dose:  25 mg     rosuvastatin 20 MG Tabs  Commonly known as:  CRESTOR   Take 1 Tab by mouth every evening.  Dose:  20 mg        CONTINUE taking these medications      Instructions   gabapentin 800 MG tablet  Commonly known as:  NEURONTIN   Take 800 mg by mouth 3 times a day.  Dose:  800 mg     insulin glargine 100 UNIT/ML Soln  Commonly known as:  LANTUS   Inject 30 Units as instructed 2 Times a Day.  Dose:  30 Units     traZODone 100 MG Tabs  Commonly known as:  DESYREL   Take 200 mg by mouth 2  times a day. 100 mg  mg PM  Dose:  200 mg            Allergies  No Known Allergies    DIET  Orders Placed This Encounter   Procedures   • Diet Order Cardiac     Standing Status:   Standing     Number of Occurrences:   1     Order Specific Question:   Diet:     Answer:   Cardiac [6]       ACTIVITY  As tolerated.  Weight bearing as tolerated    CONSULTATIONS  cardiology    PROCEDURES  · Insertion of 6FR sheath in the right radial artery  · right and left coronary arteriograms  · IFR of LAD  · Angioplasty and placement of a 3.5 by 12 mm Xience Lilo drug-eluting stent in proximal  left anterior descending coronary artery.  · Angioplasty and placement of a 3.0 by 38 mm Xience Lilo drug-eluting stent in mid  left anterior descending coronary artery.       LABORATORY  Lab Results   Component Value Date    SODIUM 135 12/23/2018    POTASSIUM 3.9 12/23/2018    CHLORIDE 106 12/23/2018    CO2 23 12/23/2018    GLUCOSE 326 (H) 12/23/2018    BUN 18 12/23/2018    CREATININE 0.83 12/23/2018        Lab Results   Component Value Date    WBC 7.7 12/23/2018    HEMOGLOBIN 10.4 (L) 12/23/2018    HEMATOCRIT 33.4 (L) 12/23/2018    PLATELETCT 270 12/23/2018        Total time of the discharge process exceeds 35 minutes.

## 2018-12-23 NOTE — WOUND TEAM
Pt seen by this RN, pts right heel assessed. Pt states she had a diabetic foot ulcer and goes to a wound clinic in [place] and sees a PT named yobany who cares for her foot. Pt states she has one more appointment as it is nearly healed. This RN assessed small wound visible appears stable and was left open to air. Pt will be DC'ing home today at 1430.

## 2018-12-23 NOTE — DIETARY
"Nutrition services: Day 2 of admit.  Julisa Carrion is a 58 y.o. female with admitting DX of Elevated troponin. Per MD notes, pt with NSTEMI with hx of HTN, HLD, IDDM.     Consult received for Cardiac Rehab education. Pt receptive to education and requested information on Diabetic diet as well. Pt states she has Type II DM and takes long acting insulin in the morning and at night. Provided written and verbal information on heart healthy and DM diet with resources for follow up. Pt appeared motivated to learn about nutrition and verbalized understanding.        Assessment:  Height: 165.1 cm (5' 5\")  Weight: 111.9 kg (246 lb 11.1 oz)  Body mass index is 41.05 kg/m².  Diet: Cardiac. Per ADL, pt ate % of last two documented meals.     Evaluation:   1. Pertinent Labs: am Glu 326, FSBS: 176-267 (12/22-12/23). HbA1c= 10.0.   2. Pertinent Meds: Lantus, SSI.   3. Skin: Diabetic foot ulcer to right heel. Wound care RN consult pending.   4. Pt with BMI >40. Weight loss counseling not appropriate in acute care setting.       Recommendations/Plan:  1. Change to Cardiac, Diabetic diet for tight glucose control (discussed with RN).  2. Referral to outpatient nutrition services for weight management after D/C.   3. Nutrition rep will continue to see patient for ongoing meal and snack preferences.     RD available PRN.             "

## 2018-12-23 NOTE — PROGRESS NOTES
Discharge information and instructions reviewed with patient - all questions answered. Pt verbalized understanding of all teaching. Paper prescriptions, AVS, and stent information card given to patient to take home. Tele box and IV removed from patient. Pt is dressed and toileted, she is escorted downstairs along with all belongings by CNA to be driven home via uber per social work.

## 2018-12-23 NOTE — THERAPY
"Physical Therapy Evaluation completed.   Bed Mobility:  Supine to Sit: Supervised  Transfers: Sit to Stand: Supervised  Gait: Level Of Assist: Supervised with Front-Wheel Walker       Plan of Care: Patient with no further skilled PT needs in the acute care setting at this time  Discharge Recommendations: Equipment: No Equipment Needed. Has a 4ww from [facility] chest. Post-acute therapy Currently anticipate no further skilled therapy needs once patient is discharged from the inpatient setting.    See \"Rehab Therapy-Acute\" Patient Summary Report for complete documentation.     "

## 2018-12-23 NOTE — PROGRESS NOTES
Bedside report received from NOC RN Nahed, pt is sleeping comfortably with NS running at 50 mL/hr. Bed is locked in lowest position with call light, belongings within reach, white board updated, and POC discussed. All needs met at this time.

## 2018-12-23 NOTE — PROGRESS NOTES
[facility] Cardiology Follow-up Consult Note    Date of note:    12/21/2018      Consulting Physician: ARTURO Clifton*      Chief Complaint   Patient presents with   • Chest Pain       Interim Events:  Cardiac cath yesterday revealed proximal LAD stenosis with ulcerated plaque, as well as mid LAD stenosis 70%; two stents placed in proximal and mid LAD.  Continue ASA, plavix, metoprolol, lisinopril, rosuvastatin.  Patient stable for discharge from cardiac perspective.  Follow up with us in cardiology clinic in the next week.      ROS  No NV, No Bleeding, No dizziness   All other review of systems reviewed and negative.    Past medical, surgical, social, and family history reviewed and unchanged from admission except as noted in assessment and plan.    Medications: Reviewed in MAR  Current Facility-Administered Medications   Medication Dose Frequency Provider Last Rate Last Dose   • clopidogrel (PLAVIX) tablet 600 mg  600 mg Once Clark Lau M.D.       • clopidogrel (PLAVIX) tablet 75 mg  75 mg DAILY Clark Lau M.D.   75 mg at 12/23/18 0604   • lisinopril (PRINIVIL) tablet 5 mg  5 mg Q DAY Nataly Brewster M.D.   5 mg at 12/23/18 0603   • insulin glargine (LANTUS) injection 10 Units  10 Units Q EVENING Nataly Brewster M.D.   10 Units at 12/22/18 1808   • enoxaparin (LOVENOX) inj 40 mg  40 mg DAILY Nataly Brewster M.D.   40 mg at 12/23/18 0603   • NS infusion   Continuous Obi Vicente M.D. 50 mL/hr at 12/22/18 2033     • aspirin EC (ECOTRIN) tablet 81 mg  81 mg DAILY Obi Vicente M.D.   81 mg at 12/23/18 0603   • metoprolol (LOPRESSOR) tablet 25 mg  25 mg TWICE DAILY Obi Vicente M.D.   25 mg at 12/23/18 0603   • traZODone (DESYREL) tablet 100 mg  100 mg QHS Chacha Fox M.D.   100 mg at 12/22/18 2350   • ketorolac (TORADOL) injection 30 mg  30 mg Q6HRS PRN Joyce Pak M.D.   30 mg at 12/20/18 1947   • senna-docusate (PERICOLACE or SENOKOT S) 8.6-50 MG per  "tablet 2 Tab  2 Tab BID Joyce Pak M.D.   2 Tab at 12/22/18 1811    And   • polyethylene glycol/lytes (MIRALAX) PACKET 1 Packet  1 Packet QDAY PRN Joyce Pak M.D.        And   • magnesium hydroxide (MILK OF MAGNESIA) suspension 30 mL  30 mL QDAY PRN Joyce Pak M.D.        And   • bisacodyl (DULCOLAX) suppository 10 mg  10 mg QDAY PRN Joyce Pak M.D.       • nitroglycerin (NITROSTAT) tablet 0.4 mg  0.4 mg Q5 MIN PRN Joyce Pak M.D.   0.4 mg at 12/22/18 0910   • morphine (pf) 10 mg/mL injection 2-4 mg  2-4 mg Q5 MIN PRN Joyce Pak M.D.   4 mg at 12/23/18 0855   • rosuvastatin (CRESTOR) tablet 20 mg  20 mg Q EVENING Joyce Pak M.D.   20 mg at 12/22/18 1800   • insulin regular (HUMULIN R) injection 2-9 Units  2-9 Units Q6HRS Joyce Pak M.D.   3 Units at 12/23/18 0612    And   • glucose 4 g chewable tablet 16 g  16 g Q15 MIN PRN Joyce Pak M.D.        And   • dextrose 50% (D50W) injection 25 mL  25 mL Q15 MIN PRN Joyce Pak M.D.       • gabapentin (NEURONTIN) capsule 800 mg  800 mg TID Joyce Pak M.D.   800 mg at 12/23/18 0849     Last reviewed on 12/20/2018  9:22 PM by India Lemus R.N.  No Known Allergies    Physical Exam  Body mass index is 41.05 kg/m². Blood pressure 103/50, pulse 78, temperature 36.4 °C (97.5 °F), temperature source Temporal, resp. rate 16, height 1.651 m (5' 5\"), weight 111.9 kg (246 lb 11.1 oz), last menstrual period 02/05/2009, SpO2 91 %, not currently breastfeeding.   Vitals:    12/22/18 2040 12/23/18 0056 12/23/18 0441 12/23/18 0752   BP: 113/68 138/77 120/65 103/50   Pulse: 77 (!) 102 76 78   Resp: 16 17 17 16   Temp: 36.6 °C (97.8 °F) 36.4 °C (97.6 °F) 36.1 °C (97 °F) 36.4 °C (97.5 °F)   TempSrc: Temporal Temporal Temporal Temporal   SpO2: 91% 94% 93% 91%   Weight: 111.9 kg (246 lb 11.1 oz)      Height:        Oxygen Therapy:  Pulse Oximetry: 91 %, O2 (LPM): 0, O2 Delivery: None (Room Air)    General: no apparent distress  HEENT: NC, " AT, conjunctiva normal, no JVD   Lungs: normal respiratory effort, CTAB  Heart: RRR, systolic murmur, no rubs or gallops,   EXT: No edema. + pedal pulses. no cyanosis  Abdomen: soft, non tender, non distended  Neurological: A/O x 3. No focal sensory deficits  Skin: Warm extremities    Labs (personally reviewed and notable for):   Recent Results (from the past 24 hour(s))   EKG STAT    Collection Time: 18 11:41 AM   Result Value Ref Range    Report       [facility]    Test Date:  2018  Pt Name:    ALIDA HUTCHINSON                 Department: 183  MRN:        [id number]                      Room:       T824  Gender:     Female                       Technician: MELBA  :        1960                   Requested By:KENNY LAU  Order #:    [id number]                    Reading MD: Kenny Lau MD    Measurements  Intervals                                Axis  Rate:       101                          P:          60  MS:         180                          QRS:        -35  QRSD:       88                           T:          79  QT:         372  QTc:        483    Interpretive Statements  SINUS TACHYCARDIA  LEFT AXIS DEVIATION  Nonspecific ST changes  Compared to ECG 2018 23:59:18  Left-axis deviation now present  Sinus rhythm no longer present    Electronically Signed On 2018 14:21:09 PST by Kenny Lau MD     ACCU-CHEK GLUCOSE    Collection Time: 18 11:48 AM   Result Value Ref Range    Glucose - Accu-Ck 209 (H) 65 - 99 mg/dL   ACCU-CHEK GLUCOSE    Collection Time: 18  6:05 PM   Result Value Ref Range    Glucose - Accu-Ck 235 (H) 65 - 99 mg/dL   ACCU-CHEK GLUCOSE    Collection Time: 18  8:54 PM   Result Value Ref Range    Glucose - Accu-Ck 176 (H) 65 - 99 mg/dL   CBC WITH DIFFERENTIAL    Collection Time: 18  2:22 AM   Result Value Ref Range    WBC 7.7 4.8 - 10.8 K/uL    RBC 3.76 (L) 4.20 - 5.40 M/uL    Hemoglobin 10.4 (L) 12.0 - 16.0 g/dL     Hematocrit 33.4 (L) 37.0 - 47.0 %    MCV 88.8 81.4 - 97.8 fL    MCH 27.7 27.0 - 33.0 pg    MCHC 31.1 (L) 33.6 - 35.0 g/dL    RDW 45.6 35.9 - 50.0 fL    Platelet Count 270 164 - 446 K/uL    MPV 9.4 9.0 - 12.9 fL    Neutrophils-Polys 58.10 44.00 - 72.00 %    Lymphocytes 31.90 22.00 - 41.00 %    Monocytes 6.00 0.00 - 13.40 %    Eosinophils 3.10 0.00 - 6.90 %    Basophils 0.50 0.00 - 1.80 %    Immature Granulocytes 0.40 0.00 - 0.90 %    Nucleated RBC 0.00 /100 WBC    Neutrophils (Absolute) 4.47 2.00 - 7.15 K/uL    Lymphs (Absolute) 2.45 1.00 - 4.80 K/uL    Monos (Absolute) 0.46 0.00 - 0.85 K/uL    Eos (Absolute) 0.24 0.00 - 0.51 K/uL    Baso (Absolute) 0.04 0.00 - 0.12 K/uL    Immature Granulocytes (abs) 0.03 0.00 - 0.11 K/uL    NRBC (Absolute) 0.00 K/uL   COMP METABOLIC PANEL    Collection Time: 12/23/18  2:23 AM   Result Value Ref Range    Sodium 135 135 - 145 mmol/L    Potassium 3.9 3.6 - 5.5 mmol/L    Chloride 106 96 - 112 mmol/L    Co2 23 20 - 33 mmol/L    Glucose 326 (H) 65 - 99 mg/dL    Bun 18 8 - 22 mg/dL    Creatinine 0.83 0.50 - 1.40 mg/dL    Calcium 8.7 8.5 - 10.5 mg/dL    Anion Gap 6.0 0.0 - 11.9    AST(SGOT) 20 12 - 45 U/L    ALT(SGPT) 70 (H) 2 - 50 U/L    Alkaline Phosphatase 161 (H) 30 - 99 U/L    Total Bilirubin 0.2 0.1 - 1.5 mg/dL    Albumin 3.2 3.2 - 4.9 g/dL    Total Protein 6.2 6.0 - 8.2 g/dL    Globulin 3.0 1.9 - 3.5 g/dL    A-G Ratio 1.1 g/dL   ESTIMATED GFR    Collection Time: 12/23/18  2:23 AM   Result Value Ref Range    GFR If African American >60 >60 mL/min/1.73 m 2    GFR If Non African American >60 >60 mL/min/1.73 m 2   ACCU-CHEK GLUCOSE    Collection Time: 12/23/18  5:52 AM   Result Value Ref Range    Glucose - Accu-Ck 233 (H) 65 - 99 mg/dL       Cardiac Imaging and Procedures Review:    EKG and telemetry tracings personally reviewed      ASSESSMENT & PLAN    # Chest pain  # NSTEMI  # CAD s/p proximal and mid LAD stenting  # HTN  # HLD  # IDDM    Cardiac cath shows proximal LAD 50% stenosis  with ulcerated plaque, mid-distal LAD with 70% stenosis; had two stents placed in proximal and mid LAD.  Continue ASA, plavix, statin, metoprolol and lisinopril.  Patient is stable, okay for discharge home from cardiac perspective.  Follow up with us in cardiology clinic in 1 week.    Cardiology signing off at this time.    It is my pleasure to participate in the care of Ms. Carrion.  Please do not hesitate to contact me with questions or concerns.

## 2018-12-23 NOTE — DISCHARGE PLANNING
Medical Social Work    Ride scheduled for 2:30pm with [employer].  will be at [facility].

## 2018-12-23 NOTE — DISCHARGE INSTRUCTIONS
Discharge Instructions    Discharged to home by car with relative. Discharged via wheelchair, hospital escort: Yes.  Special equipment needed: Walker    Be sure to schedule a follow-up appointment with your primary care doctor or any specialists as instructed.     Discharge Plan:   Influenza Vaccine Indication: Not indicated: Previously immunized this influenza season and > 8 years of age    I understand that a diet low in cholesterol, fat, and sodium is recommended for good health. Unless I have been given specific instructions below for another diet, I accept this instruction as my diet prescription.   Other diet: Cardiac/diabetic     Special Instructions: Dc home  Follow up with us in cardiology clinic in 1 week.     F/u with pcp next week    · Is patient discharged on Warfarin / Coumadin?   No   Angiogram, Angioplasty, or Stent Placement  Care After  One of the following procedures was done today.  ANGIOGRAM:  A catheter was placed through the blood vessel in your groin, contrast was injected into the vessels, and pictures were taken.  ANGIOPLASTY:  A catheter was placed through the blood vessel in your groin and directed to an area of blocked blood flow. A balloon, and possibly a metal stent were used to open the blockage. If no other blockages are present below this area, your symptoms should improve. If blockages are present below this area, surgery may still be necessary.  STENT:  A catheter was placed in your groin through which a metal mesh tube was placed in a narrowed part of the artery to facilitate blood flow.  You were given intravenous sedation. These medications are rapidly cleared from your bloodstream. You may feel some discomfort at the insertion site after the local anesthetic wears off. This discomfort should gradually improve over the next several days.  · Only take over-the-counter or prescription medicines for pain, discomfort, or fever as directed by your caregiver.  · Complications are  very uncommon after this procedure. Go to the nearest emergency department if you develop any of the following symptoms:  · Worsening pain.  · Bleeding.  · Swelling at the puncture site.  · Lightheadedness.  · Dizziness or fainting.  · Fever or chills.  · If oozing, bleeding, or a lump appears at the puncture site, apply firm pressure directly to the site steadily for 15 minutes and go to the emergency department.  · Keep the skin around the insertion site dry. You may take showers after 24 hours. If the area does get wet, dry the skin completely. Avoid baths until the skin puncture site heals, usually 5 to 7 days.  · Development of redness, increased soreness, or swelling may be signs of a skin infection. Contact your physician.  · Rest for the remainder of the day and avoid any heavy lifting (more than 10 pounds or 4.5 kg). Do not operate heavy machinery, drive, or make legal decisions for the first 24 hours after the procedure. Have a responsible person drive you home.  · You may resume your usual diet after the procedure. Avoid alcoholic beverages for 24 hours after the procedure.  Document Released: 12/18/2006 Document Revised: 03/11/2013 Document Reviewed: 10/17/2007  [employer]® Patient Information ©2014 [employer].    Depression / Suicide Risk    As you are discharged from this [facility] facility, it is important to learn how to keep safe from harming yourself.    Recognize the warning signs:  · Abrupt changes in personality, positive or negative- including increase in energy   · Giving away possessions  · Change in eating patterns- significant weight changes-  positive or negative  · Change in sleeping patterns- unable to sleep or sleeping all the time   · Unwillingness or inability to communicate  · Depression  · Unusual sadness, discouragement and loneliness  · Talk of wanting to die  · Neglect of personal appearance   · Rebelliousness- reckless behavior  · Withdrawal from people/activities they  love  · Confusion- inability to concentrate     If you or a loved one observes any of these behaviors or has concerns about self-harm, here's what you can do:  · Talk about it- your feelings and reasons for harming yourself  · Remove any means that you might use to hurt yourself (examples: pills, rope, extension cords, firearm)  · Get professional help from the community (Mental Health, Substance Abuse, psychological counseling)  · Do not be alone:Call your Safe Contact- someone whom you trust who will be there for you.  · Call your local CRISIS HOTLINE 849-7173 or 912-828-2770  · Call your local Children's Mobile Crisis Response Team [place] (125) 434-0371 or www.INAPPIN  · Call the toll free National Suicide Prevention Hotlines   · National Suicide Prevention Lifeline 922-512-HTXC (8019)  · National Hope Line Network 800-SUICIDE (668-7408)

## 2018-12-23 NOTE — DISCHARGE PLANNING
Medical Social Work    LSW received approval from  leadership for [facility] to [facility] LSW working on scheduling ride.

## 2018-12-24 ENCOUNTER — HOSPITAL ENCOUNTER (OUTPATIENT)
Facility: MEDICAL CENTER | Age: 58
End: 2018-12-26
Attending: EMERGENCY MEDICINE | Admitting: INTERNAL MEDICINE
Payer: COMMERCIAL

## 2018-12-24 DIAGNOSIS — R79.89 ELEVATED TROPONIN: ICD-10-CM

## 2018-12-24 DIAGNOSIS — R07.9 ACUTE CHEST PAIN: ICD-10-CM

## 2018-12-24 DIAGNOSIS — R11.2 INTRACTABLE VOMITING WITH NAUSEA, UNSPECIFIED VOMITING TYPE: ICD-10-CM

## 2018-12-24 LAB — EKG IMPRESSION: NORMAL

## 2018-12-24 PROCEDURE — 85025 COMPLETE CBC W/AUTO DIFF WBC: CPT

## 2018-12-24 PROCEDURE — 36415 COLL VENOUS BLD VENIPUNCTURE: CPT

## 2018-12-24 PROCEDURE — 80053 COMPREHEN METABOLIC PANEL: CPT

## 2018-12-24 PROCEDURE — 84484 ASSAY OF TROPONIN QUANT: CPT

## 2018-12-24 PROCEDURE — 93005 ELECTROCARDIOGRAM TRACING: CPT | Performed by: EMERGENCY MEDICINE

## 2018-12-24 PROCEDURE — 304561 HCHG STAT O2

## 2018-12-24 PROCEDURE — 99285 EMERGENCY DEPT VISIT HI MDM: CPT

## 2018-12-24 PROCEDURE — 700111 HCHG RX REV CODE 636 W/ 250 OVERRIDE (IP): Performed by: EMERGENCY MEDICINE

## 2018-12-24 PROCEDURE — 96374 THER/PROPH/DIAG INJ IV PUSH: CPT

## 2018-12-24 PROCEDURE — 83690 ASSAY OF LIPASE: CPT

## 2018-12-24 RX ADMIN — FENTANYL CITRATE 50 MCG: 50 INJECTION, SOLUTION INTRAMUSCULAR; INTRAVENOUS at 23:51

## 2018-12-24 ASSESSMENT — PAIN SCALES - GENERAL: PAINLEVEL_OUTOF10: 9

## 2018-12-25 ENCOUNTER — APPOINTMENT (OUTPATIENT)
Dept: RADIOLOGY | Facility: MEDICAL CENTER | Age: 58
End: 2018-12-25
Attending: INTERNAL MEDICINE
Payer: COMMERCIAL

## 2018-12-25 PROBLEM — R60.0 LEG EDEMA, LEFT: Status: ACTIVE | Noted: 2018-12-25

## 2018-12-25 LAB
ALBUMIN SERPL BCP-MCNC: 3.6 G/DL (ref 3.2–4.9)
ALBUMIN/GLOB SERPL: 1 G/DL
ALP SERPL-CCNC: 195 U/L (ref 30–99)
ALT SERPL-CCNC: 52 U/L (ref 2–50)
ANION GAP SERPL CALC-SCNC: 8 MMOL/L (ref 0–11.9)
AST SERPL-CCNC: 34 U/L (ref 12–45)
BASOPHILS # BLD AUTO: 1 % (ref 0–1.8)
BASOPHILS # BLD: 0.07 K/UL (ref 0–0.12)
BILIRUB SERPL-MCNC: 0.3 MG/DL (ref 0.1–1.5)
BUN SERPL-MCNC: 13 MG/DL (ref 8–22)
CALCIUM SERPL-MCNC: 9 MG/DL (ref 8.5–10.5)
CHLORIDE SERPL-SCNC: 106 MMOL/L (ref 96–112)
CO2 SERPL-SCNC: 27 MMOL/L (ref 20–33)
CREAT SERPL-MCNC: 0.74 MG/DL (ref 0.5–1.4)
D DIMER PPP IA.FEU-MCNC: 0.48 UG/ML (FEU) (ref 0–0.5)
EKG IMPRESSION: NORMAL
EOSINOPHIL # BLD AUTO: 0.2 K/UL (ref 0–0.51)
EOSINOPHIL NFR BLD: 2.8 % (ref 0–6.9)
ERYTHROCYTE [DISTWIDTH] IN BLOOD BY AUTOMATED COUNT: 43.9 FL (ref 35.9–50)
GLOBULIN SER CALC-MCNC: 3.6 G/DL (ref 1.9–3.5)
GLUCOSE BLD-MCNC: 184 MG/DL (ref 65–99)
GLUCOSE BLD-MCNC: 260 MG/DL (ref 65–99)
GLUCOSE BLD-MCNC: 262 MG/DL (ref 65–99)
GLUCOSE BLD-MCNC: 269 MG/DL (ref 65–99)
GLUCOSE SERPL-MCNC: 172 MG/DL (ref 65–99)
HCT VFR BLD AUTO: 38.7 % (ref 37–47)
HGB BLD-MCNC: 12.2 G/DL (ref 12–16)
IMM GRANULOCYTES # BLD AUTO: 0.01 K/UL (ref 0–0.11)
IMM GRANULOCYTES NFR BLD AUTO: 0.1 % (ref 0–0.9)
LIPASE SERPL-CCNC: 15 U/L (ref 11–82)
LYMPHOCYTES # BLD AUTO: 3.09 K/UL (ref 1–4.8)
LYMPHOCYTES NFR BLD: 43.5 % (ref 22–41)
MCH RBC QN AUTO: 27.4 PG (ref 27–33)
MCHC RBC AUTO-ENTMCNC: 31.5 G/DL (ref 33.6–35)
MCV RBC AUTO: 87 FL (ref 81.4–97.8)
MONOCYTES # BLD AUTO: 0.54 K/UL (ref 0–0.85)
MONOCYTES NFR BLD AUTO: 7.6 % (ref 0–13.4)
NEUTROPHILS # BLD AUTO: 3.19 K/UL (ref 2–7.15)
NEUTROPHILS NFR BLD: 45 % (ref 44–72)
NRBC # BLD AUTO: 0 K/UL
NRBC BLD-RTO: 0 /100 WBC
PLATELET # BLD AUTO: 316 K/UL (ref 164–446)
PMV BLD AUTO: 9.5 FL (ref 9–12.9)
POTASSIUM SERPL-SCNC: 3.4 MMOL/L (ref 3.6–5.5)
PROT SERPL-MCNC: 7.2 G/DL (ref 6–8.2)
RBC # BLD AUTO: 4.45 M/UL (ref 4.2–5.4)
SODIUM SERPL-SCNC: 141 MMOL/L (ref 135–145)
TROPONIN I SERPL-MCNC: 0.09 NG/ML (ref 0–0.04)
TROPONIN I SERPL-MCNC: 0.13 NG/ML (ref 0–0.04)
TROPONIN I SERPL-MCNC: 0.13 NG/ML (ref 0–0.04)
WBC # BLD AUTO: 7.1 K/UL (ref 4.8–10.8)

## 2018-12-25 PROCEDURE — 99225 PR SUBSEQUENT OBSERVATION CARE,LEVEL II: CPT | Performed by: HOSPITALIST

## 2018-12-25 PROCEDURE — 93971 EXTREMITY STUDY: CPT | Mod: 26 | Performed by: SURGERY

## 2018-12-25 PROCEDURE — G0378 HOSPITAL OBSERVATION PER HR: HCPCS

## 2018-12-25 PROCEDURE — 85379 FIBRIN DEGRADATION QUANT: CPT

## 2018-12-25 PROCEDURE — 99244 OFF/OP CNSLTJ NEW/EST MOD 40: CPT | Performed by: INTERNAL MEDICINE

## 2018-12-25 PROCEDURE — 82962 GLUCOSE BLOOD TEST: CPT | Mod: 91

## 2018-12-25 PROCEDURE — 93005 ELECTROCARDIOGRAM TRACING: CPT | Performed by: INTERNAL MEDICINE

## 2018-12-25 PROCEDURE — A9270 NON-COVERED ITEM OR SERVICE: HCPCS | Performed by: HOSPITALIST

## 2018-12-25 PROCEDURE — 93010 ELECTROCARDIOGRAM REPORT: CPT | Performed by: INTERNAL MEDICINE

## 2018-12-25 PROCEDURE — 700105 HCHG RX REV CODE 258: Performed by: INTERNAL MEDICINE

## 2018-12-25 PROCEDURE — 99220 PR INITIAL OBSERVATION CARE,LEVL III: CPT | Performed by: INTERNAL MEDICINE

## 2018-12-25 PROCEDURE — 84484 ASSAY OF TROPONIN QUANT: CPT | Mod: 91

## 2018-12-25 PROCEDURE — 700111 HCHG RX REV CODE 636 W/ 250 OVERRIDE (IP)

## 2018-12-25 PROCEDURE — A9270 NON-COVERED ITEM OR SERVICE: HCPCS | Performed by: INTERNAL MEDICINE

## 2018-12-25 PROCEDURE — 700102 HCHG RX REV CODE 250 W/ 637 OVERRIDE(OP): Performed by: HOSPITALIST

## 2018-12-25 PROCEDURE — 36415 COLL VENOUS BLD VENIPUNCTURE: CPT

## 2018-12-25 PROCEDURE — 700102 HCHG RX REV CODE 250 W/ 637 OVERRIDE(OP): Performed by: INTERNAL MEDICINE

## 2018-12-25 PROCEDURE — 96375 TX/PRO/DX INJ NEW DRUG ADDON: CPT

## 2018-12-25 PROCEDURE — 93971 EXTREMITY STUDY: CPT | Mod: LT

## 2018-12-25 RX ORDER — PROMETHAZINE HYDROCHLORIDE 25 MG/1
12.5-25 TABLET ORAL EVERY 4 HOURS PRN
Status: DISCONTINUED | OUTPATIENT
Start: 2018-12-25 | End: 2018-12-26 | Stop reason: HOSPADM

## 2018-12-25 RX ORDER — AMOXICILLIN 250 MG
2 CAPSULE ORAL 2 TIMES DAILY
Status: DISCONTINUED | OUTPATIENT
Start: 2018-12-25 | End: 2018-12-26 | Stop reason: HOSPADM

## 2018-12-25 RX ORDER — POLYETHYLENE GLYCOL 3350 17 G/17G
1 POWDER, FOR SOLUTION ORAL
Status: DISCONTINUED | OUTPATIENT
Start: 2018-12-25 | End: 2018-12-26 | Stop reason: HOSPADM

## 2018-12-25 RX ORDER — ONDANSETRON 2 MG/ML
4 INJECTION INTRAMUSCULAR; INTRAVENOUS ONCE
Status: COMPLETED | OUTPATIENT
Start: 2018-12-25 | End: 2018-12-25

## 2018-12-25 RX ORDER — OMEPRAZOLE 20 MG/1
20 CAPSULE, DELAYED RELEASE ORAL DAILY
Status: DISCONTINUED | OUTPATIENT
Start: 2018-12-25 | End: 2018-12-26 | Stop reason: HOSPADM

## 2018-12-25 RX ORDER — TRAZODONE HYDROCHLORIDE 100 MG/1
200 TABLET ORAL 2 TIMES DAILY
Status: DISCONTINUED | OUTPATIENT
Start: 2018-12-25 | End: 2018-12-26 | Stop reason: HOSPADM

## 2018-12-25 RX ORDER — CLOPIDOGREL BISULFATE 75 MG/1
75 TABLET ORAL DAILY
Status: DISCONTINUED | OUTPATIENT
Start: 2018-12-25 | End: 2018-12-26 | Stop reason: HOSPADM

## 2018-12-25 RX ORDER — DEXTROSE MONOHYDRATE 25 G/50ML
25 INJECTION, SOLUTION INTRAVENOUS
Status: DISCONTINUED | OUTPATIENT
Start: 2018-12-25 | End: 2018-12-26 | Stop reason: HOSPADM

## 2018-12-25 RX ORDER — INSULIN GLARGINE 100 [IU]/ML
30 INJECTION, SOLUTION SUBCUTANEOUS 2 TIMES DAILY
Status: DISCONTINUED | OUTPATIENT
Start: 2018-12-25 | End: 2018-12-26 | Stop reason: HOSPADM

## 2018-12-25 RX ORDER — ROSUVASTATIN CALCIUM 20 MG/1
20 TABLET, COATED ORAL EVERY EVENING
Status: DISCONTINUED | OUTPATIENT
Start: 2018-12-25 | End: 2018-12-26 | Stop reason: HOSPADM

## 2018-12-25 RX ORDER — SODIUM CHLORIDE 9 MG/ML
INJECTION, SOLUTION INTRAVENOUS CONTINUOUS
Status: DISCONTINUED | OUTPATIENT
Start: 2018-12-25 | End: 2018-12-25

## 2018-12-25 RX ORDER — PROMETHAZINE HYDROCHLORIDE 25 MG/1
12.5-25 SUPPOSITORY RECTAL EVERY 4 HOURS PRN
Status: DISCONTINUED | OUTPATIENT
Start: 2018-12-25 | End: 2018-12-26 | Stop reason: HOSPADM

## 2018-12-25 RX ORDER — BISACODYL 10 MG
10 SUPPOSITORY, RECTAL RECTAL
Status: DISCONTINUED | OUTPATIENT
Start: 2018-12-25 | End: 2018-12-26 | Stop reason: HOSPADM

## 2018-12-25 RX ORDER — MORPHINE SULFATE 10 MG/ML
4 INJECTION, SOLUTION INTRAMUSCULAR; INTRAVENOUS ONCE
Status: COMPLETED | OUTPATIENT
Start: 2018-12-25 | End: 2018-12-25

## 2018-12-25 RX ORDER — POTASSIUM CHLORIDE 20 MEQ/1
40 TABLET, EXTENDED RELEASE ORAL DAILY
Status: DISCONTINUED | OUTPATIENT
Start: 2018-12-25 | End: 2018-12-26 | Stop reason: HOSPADM

## 2018-12-25 RX ORDER — ACETAMINOPHEN 325 MG/1
650 TABLET ORAL EVERY 6 HOURS PRN
Status: DISCONTINUED | OUTPATIENT
Start: 2018-12-25 | End: 2018-12-26 | Stop reason: HOSPADM

## 2018-12-25 RX ORDER — ONDANSETRON 2 MG/ML
4 INJECTION INTRAMUSCULAR; INTRAVENOUS EVERY 4 HOURS PRN
Status: DISCONTINUED | OUTPATIENT
Start: 2018-12-25 | End: 2018-12-26 | Stop reason: HOSPADM

## 2018-12-25 RX ORDER — GABAPENTIN 400 MG/1
800 CAPSULE ORAL 3 TIMES DAILY
Status: DISCONTINUED | OUTPATIENT
Start: 2018-12-25 | End: 2018-12-26 | Stop reason: HOSPADM

## 2018-12-25 RX ORDER — ONDANSETRON 4 MG/1
4 TABLET, ORALLY DISINTEGRATING ORAL EVERY 4 HOURS PRN
Status: DISCONTINUED | OUTPATIENT
Start: 2018-12-25 | End: 2018-12-26 | Stop reason: HOSPADM

## 2018-12-25 RX ORDER — NITROGLYCERIN 0.4 MG/1
0.4 TABLET SUBLINGUAL
Status: DISCONTINUED | OUTPATIENT
Start: 2018-12-25 | End: 2018-12-26 | Stop reason: HOSPADM

## 2018-12-25 RX ORDER — OXYCODONE HYDROCHLORIDE 5 MG/1
5-10 TABLET ORAL EVERY 4 HOURS PRN
Status: DISCONTINUED | OUTPATIENT
Start: 2018-12-25 | End: 2018-12-26 | Stop reason: HOSPADM

## 2018-12-25 RX ORDER — LISINOPRIL 5 MG/1
5 TABLET ORAL DAILY
Status: DISCONTINUED | OUTPATIENT
Start: 2018-12-25 | End: 2018-12-26

## 2018-12-25 RX ADMIN — ROSUVASTATIN CALCIUM 20 MG: 20 TABLET, FILM COATED ORAL at 17:10

## 2018-12-25 RX ADMIN — INSULIN HUMAN 3 UNITS: 100 INJECTION, SOLUTION PARENTERAL at 20:19

## 2018-12-25 RX ADMIN — GABAPENTIN 800 MG: 400 CAPSULE ORAL at 17:10

## 2018-12-25 RX ADMIN — ASPIRIN 81 MG: 81 TABLET, DELAYED RELEASE ORAL at 05:30

## 2018-12-25 RX ADMIN — METOPROLOL TARTRATE 25 MG: 25 TABLET, FILM COATED ORAL at 17:10

## 2018-12-25 RX ADMIN — METOPROLOL TARTRATE 25 MG: 25 TABLET, FILM COATED ORAL at 05:30

## 2018-12-25 RX ADMIN — GABAPENTIN 800 MG: 400 CAPSULE ORAL at 12:35

## 2018-12-25 RX ADMIN — TRAZODONE HYDROCHLORIDE 200 MG: 100 TABLET ORAL at 05:30

## 2018-12-25 RX ADMIN — OMEPRAZOLE 20 MG: 20 CAPSULE, DELAYED RELEASE ORAL at 02:40

## 2018-12-25 RX ADMIN — POTASSIUM CHLORIDE 40 MEQ: 1500 TABLET, EXTENDED RELEASE ORAL at 09:05

## 2018-12-25 RX ADMIN — INSULIN GLARGINE 30 UNITS: 100 INJECTION, SOLUTION SUBCUTANEOUS at 17:11

## 2018-12-25 RX ADMIN — LIDOCAINE HYDROCHLORIDE 30 ML: 20 SOLUTION OROPHARYNGEAL at 05:30

## 2018-12-25 RX ADMIN — LISINOPRIL 5 MG: 5 TABLET ORAL at 05:30

## 2018-12-25 RX ADMIN — TRAZODONE HYDROCHLORIDE 200 MG: 100 TABLET ORAL at 17:10

## 2018-12-25 RX ADMIN — MORPHINE SULFATE 4 MG: 10 INJECTION INTRAVENOUS at 01:35

## 2018-12-25 RX ADMIN — GABAPENTIN 800 MG: 400 CAPSULE ORAL at 05:30

## 2018-12-25 RX ADMIN — OXYCODONE HYDROCHLORIDE 10 MG: 5 TABLET ORAL at 10:33

## 2018-12-25 RX ADMIN — OXYCODONE HYDROCHLORIDE 10 MG: 5 TABLET ORAL at 05:30

## 2018-12-25 RX ADMIN — INSULIN HUMAN 1 UNITS: 100 INJECTION, SOLUTION PARENTERAL at 05:31

## 2018-12-25 RX ADMIN — SODIUM CHLORIDE: 9 INJECTION, SOLUTION INTRAVENOUS at 03:15

## 2018-12-25 RX ADMIN — INSULIN GLARGINE 30 UNITS: 100 INJECTION, SOLUTION SUBCUTANEOUS at 06:33

## 2018-12-25 RX ADMIN — INSULIN HUMAN 3 UNITS: 100 INJECTION, SOLUTION PARENTERAL at 17:11

## 2018-12-25 RX ADMIN — CLOPIDOGREL 75 MG: 75 TABLET, FILM COATED ORAL at 05:30

## 2018-12-25 RX ADMIN — INSULIN HUMAN 3 UNITS: 100 INJECTION, SOLUTION PARENTERAL at 12:33

## 2018-12-25 RX ADMIN — ONDANSETRON 4 MG: 2 INJECTION INTRAMUSCULAR; INTRAVENOUS at 01:35

## 2018-12-25 RX ADMIN — NITROGLYCERIN 0.4 MG: 0.4 TABLET SUBLINGUAL at 02:40

## 2018-12-25 ASSESSMENT — ENCOUNTER SYMPTOMS
SHORTNESS OF BREATH: 0
DIAPHORESIS: 0
DEPRESSION: 0
MYALGIAS: 0
FOCAL WEAKNESS: 0
CHILLS: 0
SENSORY CHANGE: 0
WHEEZING: 0
PALPITATIONS: 0
NAUSEA: 1
HEMOPTYSIS: 0
DIZZINESS: 0
LOSS OF CONSCIOUSNESS: 0
SEIZURES: 0
EYE PAIN: 0
CLAUDICATION: 0
BLOOD IN STOOL: 0
DIARRHEA: 0
PALPITATIONS: 1
HEADACHES: 0
NECK PAIN: 0
ABDOMINAL PAIN: 0
SHORTNESS OF BREATH: 1
NAUSEA: 0
EYE DISCHARGE: 0
COUGH: 0
BRUISES/BLEEDS EASILY: 0
VOMITING: 1
BACK PAIN: 0
FLANK PAIN: 0
BLURRED VISION: 0
SPEECH CHANGE: 0
SPUTUM PRODUCTION: 0
VOMITING: 0
SORE THROAT: 0
CONSTIPATION: 0
WEAKNESS: 0
FEVER: 0

## 2018-12-25 ASSESSMENT — COGNITIVE AND FUNCTIONAL STATUS - GENERAL
SUGGESTED CMS G CODE MODIFIER MOBILITY: CH
SUGGESTED CMS G CODE MODIFIER DAILY ACTIVITY: CH
DAILY ACTIVITIY SCORE: 24
MOBILITY SCORE: 24

## 2018-12-25 ASSESSMENT — PAIN SCALES - GENERAL
PAINLEVEL_OUTOF10: 0
PAINLEVEL_OUTOF10: 0
PAINLEVEL_OUTOF10: 7
PAINLEVEL_OUTOF10: 0

## 2018-12-25 ASSESSMENT — LIFESTYLE VARIABLES
EVER_SMOKED: YES
ALCOHOL_USE: NO
SUBSTANCE_ABUSE: 0

## 2018-12-25 ASSESSMENT — COPD QUESTIONNAIRES
DO YOU EVER COUGH UP ANY MUCUS OR PHLEGM?: NO/ONLY WITH OCCASIONAL COLDS OR INFECTIONS
COPD SCREENING SCORE: 5
HAVE YOU SMOKED AT LEAST 100 CIGARETTES IN YOUR ENTIRE LIFE: YES
DURING THE PAST 4 WEEKS HOW MUCH DID YOU FEEL SHORT OF BREATH: SOME OF THE TIME

## 2018-12-25 NOTE — CARE PLAN
Problem: Safety  Goal: Will remain free from falls  Outcome: PROGRESSING AS EXPECTED  Bed locked in lowest position and call light is within reach; patient calls appropriately.      Problem: Infection  Goal: Will remain free from infection  Outcome: PROGRESSING AS EXPECTED

## 2018-12-25 NOTE — ED PROVIDER NOTES
ED Provider Note    HPI: Patient is a 58-year-old female who presented to the emergency department by ambulance transfer December 24, 2018 at 11:13 PM with a chief complaint of chest pain.    Patient had cardiac stents placed here and was discharged 2 days ago.  She developed chest pain nausea and vomiting earlier today.  She also notes shortness of breath.  She has had intractable vomiting and dry heaves today.  No fever no chills no cough no change in bladder or bowel habits.  No leg pain no leg swelling.  No other somatic complaints.  Evaluation at the other facility showed difficult to control nausea and vomiting chest pain.  Additionally she had 2 elevated troponin studies and was sent here for further evaluation as they did not have the capability to care for this patient.  On arrival here the patient is still complaining of some chest pain.  No other somatic compl    Review of Systems: Positive for chest pain shortness breath nausea vomiting negative for fever chills cough leg pain leg swelling.  Review of systems reviewed with patient, all other systems negative    Past medical/surgical history: Diabetes recent cardiac stent placement diabetic neuropathy disc disease anxiety gastroenteritis foot ulcer coronary artery disease renal failure    Medications: Aspirin Plavix lisinopril Lopressor Crestor insulin Neurontin trazodone    Allergies: None    Social History: Previous history of smoking no alcohol or drug use      Physical exam: Constitutional: Pleasant female awake alert appears tired  Vital signs: Temperature 97.7 blood pressure 144/63 pulse 84 respirations 14 pulse oximetry 92%  EYES: PERRL, EOMI, Conjunctivae and sclera normal, eyelids normal bilaterally.  Neck: Trachea midline. No cervical masses seen or palpated. Normal range of motion, supple. No meningeal signs elicited.  Cardiac: Regular rate and rhythm. S1-S2 present. No S3 or S4 present. No murmurs, rubs, or gallops heard. No edema or  varicosities were seen.   Lungs: Clear to auscultation with good aeration throughout. No wheezes, rales, or rhonchi heard. Patient's chest wall moved symmetrically with each respiratory effort. Patient was not making use of accessory muscles of respiration in breathing.  Abdomen: Soft nontender to palpation. No rebound or guarding elicited. No organomegaly identified. No pulsatile abdominal masses identified.   Musculoskeletal:  no  pain with palpitation or movement of muscle, bone or joint , no obvious musculoskeletal deformities identified.  Neurologic: alert and awake answers questions appropriately. Moves all four extremities independently, no gross focal abnormalities identified. Normal strength and motor.  Skin: no rash or lesion seen, no palpable dermatologic lesions identified.  Psychiatric: Patient appears to be somewhat anxious.  She was not delusional or hallucinating    Medical decision making: EKG obtained on arrival (interpretation) twelve-lead EKG sinus rhythm rate 80.  Morphology P waves QRS complexes T waves unremarkable although the patient does appear to have left ventricular hypertrophy by voltage.  No evidence of ST elevation or depression.  R wave progression normal.  Interpreted as an abnormal EKG due to the LVH but unremarkable in terms of acute ischemia or dysrhythmia    Patient given fentanyl with some improvement    I reviewed the studies from the other facility.  These were significant for 2 very similar EKGs to the one obtained here.  Chest x-ray reported to be essentially unremarkable.  Her laboratory studies were significant for hyperglycemia blood sugar 224 as well as 2 elevated troponin test of 0.09.  BNP moderately elevated 529.    Laboratory studies obtained (please see lab sheet for all results) significant findings included unremarkable CBC patient has an elevation of alkaline phosphatase 195 uncertain significance.  This is essentially unchanged from December 22 and 23.  Lipase  is normal at 15.  Troponin slightly elevated at .13    Patient admitted by hospitalist service.  Further care and hospital course per attending physician summary    Impression 1) intractable nausea and vomiting  2) elevated troponin  3) chest pain

## 2018-12-25 NOTE — H&P
Hospital Medicine History & Physical Note    Date of Service  12/25/2018    Primary Care Physician  No primary care provider on file.    Consultants  Cardiology    Code Status  Full code    Chief Complaint  Chest pain    History of Presenting Illness  58 y.o. female with a past medical history of CAD status post stent placement on 12/22/18, type 2 diabetes mellitus, hyperlipidemia, hypertension, obesity who presented 12/24/2018 with chest pain that started yesterday evening.  Patient reports substernal chest pressure with radiation to her right arm associated with shortness of breath.  The patient also endorses nausea and vomiting.  She denies any fevers or chills.  She denies any diaphoresis or lightheadedness.  Patient states that she has been compliant with all her medications.  She denies smoking or drinking alcohol.  She denies any previous history of PE or DVT.  Patient feels like her left foot has been more swollen than usual.  [facility] found to have a troponin of 0.09 x 2.  The patient was given a full dose of aspirin, Nitropaste and subcutaneous Lovenox and transferred to [facility] for further care.    EKG interpreted by me sinus rhythm with no ST elevation, ST depression or T wave inversions  [facility] no acute process    Review of Systems  Review of Systems   Constitutional: Negative for chills, diaphoresis and fever.   HENT: Negative for hearing loss and sore throat.    Eyes: Negative for blurred vision.   Respiratory: Positive for shortness of breath. Negative for cough, sputum production and wheezing.    Cardiovascular: Positive for chest pain and palpitations. Negative for leg swelling.   Gastrointestinal: Positive for nausea and vomiting. Negative for abdominal pain, blood in stool and diarrhea.   Genitourinary: Negative for dysuria, flank pain and urgency.   Musculoskeletal: Negative for back pain, joint pain, myalgias and  neck pain.   Skin: Negative for rash.   Neurological: Negative for dizziness, focal weakness, seizures and headaches.   Endo/Heme/Allergies: Does not bruise/bleed easily.   Psychiatric/Behavioral: Negative for suicidal ideas.   All other systems reviewed and are negative.      Past Medical History   has a past medical history of Diabetes; High cholesterol (5/15/2011); Hypertension; Intestinal mass (2015); Migraine; and Staph skin infection. She also has no past medical history of Breast cancer (HCC).    Surgical History   has a past surgical history that includes other abdominal surgery; gyn surgery; other orthopedic surgery (6-2014); abdominal exploration; gastroscopy (9/3/2017); and colonoscopy (9/3/2017).     Family History  family history includes Cancer in her maternal aunt.     Social History   reports that she quit smoking about 22 years ago. Her smoking use included Cigarettes. She has a 20.00 pack-year smoking history. She quit smokeless tobacco use about 23 years ago. She reports that she does not drink alcohol or use drugs.    Allergies  No Known Allergies    Medications  Prior to Admission Medications   Prescriptions Last Dose Informant Patient Reported? Taking?   aspirin EC 81 MG EC tablet   No No   Sig: Take 1 Tab by mouth every day.   clopidogrel (PLAVIX) 75 MG Tab   No No   Sig: Take 1 Tab by mouth every day.   gabapentin (NEURONTIN) 800 MG tablet  Patient Yes No   Sig: Take 800 mg by mouth 3 times a day.   insulin glargine (LANTUS) 100 UNIT/ML Solution  Patient Yes No   Sig: Inject 30 Units as instructed 2 Times a Day.   lisinopril (PRINIVIL) 5 MG Tab   No No   Sig: Take 1 Tab by mouth every day.   metoprolol (LOPRESSOR) 25 MG Tab   No No   Sig: Take 1 Tab by mouth 2 Times a Day.   rosuvastatin (CRESTOR) 20 MG Tab   No No   Sig: Take 1 Tab by mouth every evening.   traZODone (DESYREL) 100 MG Tab  Patient Yes No   Sig: Take 200 mg by mouth 2 times a day. 100 mg AM  200 mg PM       Facility-Administered Medications: None       Physical Exam  Temp:  [36.5 °C (97.7 °F)] 36.5 °C (97.7 °F)  Pulse:  [78-95] 78  Resp:  [14-16] 16  BP: (144)/(63) 144/63    Physical Exam   Constitutional: She is oriented to person, place, and time. She appears well-developed and well-nourished. No distress.   Obese   HENT:   Head: Normocephalic and atraumatic.   Mouth/Throat: Oropharynx is clear and moist.   Eyes: Pupils are equal, round, and reactive to light. Conjunctivae are normal.   Neck: Neck supple. No thyromegaly present.   Cardiovascular: Normal rate, regular rhythm and normal heart sounds.    Pulmonary/Chest: Effort normal and breath sounds normal. No respiratory distress. She has no wheezes. She has no rales. She exhibits tenderness.   Abdominal: Soft. Bowel sounds are normal. She exhibits no distension. There is no tenderness. There is no rebound.   Musculoskeletal: Normal range of motion. She exhibits edema (Trace pedal edema). She exhibits no tenderness.   Neurological: She is alert and oriented to person, place, and time. No cranial nerve deficit. Coordination normal.   Skin: Skin is warm and dry.   Psychiatric: She has a normal mood and affect. Her behavior is normal.   Nursing note and vitals reviewed.      Laboratory:  Recent Labs      12/22/18 0217 12/23/18 0222 12/24/18   2345   WBC  7.2  7.7  7.1   RBC  3.93*  3.76*  4.45   HEMOGLOBIN  10.8*  10.4*  12.2   HEMATOCRIT  35.1*  33.4*  38.7   MCV  89.3  88.8  87.0   MCH  27.5  27.7  27.4   MCHC  30.8*  31.1*  31.5*   RDW  47.0  45.6  43.9   PLATELETCT  271  270  316   MPV  9.2  9.4  9.5     Recent Labs      12/22/18 0217 12/23/18 0223 12/24/18   2345   SODIUM  138  135  141   POTASSIUM  4.3  3.9  3.4*   CHLORIDE  109  106  106   CO2  25  23  27   GLUCOSE  321*  326*  172*   BUN  20  18  13   CREATININE  0.85  0.83  0.74   CALCIUM  8.8  8.7  9.0     Recent Labs      12/22/18   0217  12/23/18   0223  12/24/18   2345   ALTSGPT  101*  70*   52*   ASTSGOT  42  20  34   ALKPHOSPHAT  174*  161*  195*   TBILIRUBIN  0.2  0.2  0.3   LIPASE   --    --   15   GLUCOSE  321*  326*  172*     Recent Labs      12/22/18   0217   APTT  29.7   INR  1.01             Recent Labs      12/24/18   2345   TROPONINI  0.13*       Urinalysis:    No results found     Imaging:  US-EXTREMITY VENOUS LOWER UNILAT LEFT    (Results Pending)         Assessment/Plan:  I anticipate this patient is appropriate for observation status at this time.    Chest pain- (present on admission)   Assessment & Plan    Possibly secondary to esophageal spasm versus gastritis versus gastroparesis versus costochondritis, rule out ACS  Troponin elevated at 0.09> 0.1  Continuous cardiac monitoring with serial EKG and troponin  Cardiology has been consulted by the ER physician  Patient has been given full dose of aspirin  Continue aspirin, Plavix and atorvastatin  Nitro and morphine when necessary for chest pain  I have started the patient on omeprazole.  GI cocktail as needed       Elevated troponin- (present on admission)   Assessment & Plan    Trend troponin  Continuous cardiac monitoring     T2DM (type 2 diabetes mellitus) (HCC)- (present on admission)   Assessment & Plan    Uncontrolled with hyperglycemia  Start on insulin sliding scale with serial Accu-Checks  Check hemoglobin A1c  Hypoglycemic protocol in place         Obesity (BMI 30-39.9)- (present on admission)   Assessment & Plan    Body mass index is 38.27 kg/m².       Non-intractable vomiting with nausea- (present on admission)   Assessment & Plan    IV Zofran as needed  IV fluid hydration with normal saline     Neuropathy in diabetes (HCC)- (present on admission)   Assessment & Plan    Continue gabapentin     Hyperlipidemia- (present on admission)   Assessment & Plan    Continue Crestor     Essential hypertension- (present on admission)   Assessment & Plan    Continue Lopressor and lisinopril         VTE prophylaxis: Lovenox

## 2018-12-25 NOTE — PROGRESS NOTES
Patient transported to room T835-1 via gurney with this ACLS on Zoll monitor. Patient ambulated to hospital bed. Placed patient on monitor and verified with monitor room. POC discussed with patient; no questions or needs at this time. Bed locked in lowest position and call light is within reach; patient calls appropriately.

## 2018-12-25 NOTE — ED TRIAGE NOTES
"Chief Complaint   Patient presents with   • Chest Pain     Chest pain. Hx stents placed two days ago.     /63   Pulse 84   Temp 36.5 °C (97.7 °F) (Temporal)   Resp 14   Ht 1.651 m (5' 5\")   Wt 104.3 kg (230 lb)   LMP 02/05/2009   SpO2 92%   BMI 38.27 kg/m²     Pt BIB care flight from [facility]. Pt has 9/10 L chest pain. Hx of cardiac stents placed two days ago.  "

## 2018-12-25 NOTE — CONSULTS
"Cardiology consultation note    Requesting physician; Dr. Miguel    Reason for consultation: Chest pain with recent coronary stenting    History of present illness    Patient is 58 years old female history of diabetes mellitus hypertension dyslipidemia who was admitted to the hospital 5 days ago with epigastric discomfort and left upper chest pain with nausea vomiting.  Her paternal \"troponin was mildly elevated.  Subsequent myocardial perfusion imaging show mild anterior wall ischemia.  She underwent cardiac catheterization which review stenosis in the left anterior descending artery where she received two drug eluting stents 3 days ago.  She was discharged from the hospital the day before yesterday on metoprolol 25 mg twice a day, lisinopril 5 mg daily, aspirin and clopidogrel.  She is stated that shortly after she returned home she started to experience nausea and dry heave followed by upper chest discomfort radiated to right side of her neck and right arm.  The pain has been fairly constant since then although it is aggravated by inspiration.  It is somewhat different from her prior pain mainly in terms of the location.  She denies noncompliant with medication.  She reports some nonproductive cough but no fever or chills.  The pain was rather severe and prompted her to seek medical attention at the local hospital where she lives in [place].  Troponin there was mildly elevated at 0.09, she was then transferred here for further evaluation.    ALLERGIES:  No known drug allergies.    PAST MEDICAL ILLNESS:  As above plus staph skin infection and migraine   headaches.     MEDICATIONS:  Lantus insulin, Neurontin 300 mg per day, trazodone 100 mg   b.i.d., and aspirin 81 mg daily. Clopidogrel 75 mg daily  Crestor 20 mg at bedtime     PAST SURGICAL HISTORY:  Right wrist surgery, cholecystectomy, ,   abdominal surgery with lower intestine mass removal.     SOCIAL HISTORY:  She is single and lives with her " daughter.  Previous tobacco   Abuse, used to smoke 2-3 packs a day but stopped smoking over 20 years ago.     FAMILY HISTORY:  Her father and sister both underwent unspecified valvular   replacements.GIES:  No known drug allergies.    Review of Systems  Review of Systems   Constitutional: Negative for chills, diaphoresis and fever.   HENT: Negative for hearing loss and sore throat.    Eyes: Negative for blurred vision.   Respiratory: Positive for shortness of breath. Negative for cough, sputum production and wheezing.    Cardiovascular: Positive for chest pain and palpitations. Negative for leg swelling.   Gastrointestinal: Positive for nausea and vomiting. Negative for abdominal pain, blood in stool and diarrhea.   Genitourinary: Negative for dysuria, flank pain and urgency.   Musculoskeletal: Negative for back pain, joint pain, myalgias and neck pain.   Skin: Negative for rash.   Neurological: Negative for dizziness, focal weakness, seizures and headaches.   Endo/Heme/Allergies: Does not bruise/bleed easily.   Psychiatric/Behavioral: Negative for suicidal ideas.   All other systems reviewed and are negative.    Physical Exam  Temp:  [36.5 °C (97.7 °F)] 36.5 °C (97.7 °F)  Pulse:  [78-95] 78  Resp:  [14-16] 16  BP: (144)/(63) 144/63     Physical Exam   Constitutional: She is oriented to person, place, and time. She appears well-developed and well-nourished. No distress.   Obese   HENT:   Head: Normocephalic and atraumatic.   Mouth/Throat: Oropharynx is clear and moist.   Eyes: Pupils are equal, round, and reactive to light. Conjunctivae are normal.   Neck: Neck supple. No thyromegaly present.   Cardiovascular: Normal rate, regular rhythm 2/6 systolic murmur RUSB and LUSB, distant heart sound, no gallop or rubs.    Pulmonary/Chest: Effort normal and breath sounds normal. No respiratory distress. She has no wheezes. She has no rales. She exhibits tenderness.   Abdominal: Soft. Bowel sounds are normal. She exhibits no  distension. There is no tenderness. There is no rebound.   Musculoskeletal: Normal range of motion. She exhibits edema (Trace pedal edema). She exhibits no tenderness.   Neurological: She is alert and oriented to person, place, and time. No cranial nerve deficit. Coordination normal.   Skin: Skin is warm and dry.   Psychiatric: She has a normal mood and affect. Her behavior is normal.     EKG from [place] earlier this afternoon and 1 at 11:28 PM in the emergency room here tonight by my review both show sinus mild nonspecific T wave flattening in the lateral leads poor R wave progression lead V1 to V3 compared to electrocardiogram from December 22 T wave flattening and poor R wave progression are new.    CMP normal except potassium 3.4 ALT 52 alkaline phosphatase 195.  Troponin 0.13  CBC WBC 7.1 hemoglobin 12.2 platelets 316    Assessment and plans:    1.  Coronary artery disease with recent stenting of the left anterior descending artery with chest pain   Her chest pain is atypical and slightly different from her previously reported chest pain.  Electrocardiogram is nonspecific.  T wave flattening is new but could be due to hypokalemia since potassium is slightly low.  Her troponin is in the indeterminate range although it was slightly elevated during her last admission and could be on the way down from her last cardiac event.  I do not believe that she is experiencing recurrent acute coronary event.  We will continue her current cardiac medications.  Will monitor her closely and continue serial troponin and serial EKG. if troponin continues to decline, we will treat her medically.    2.  Essential hypertension  Agree with continue metoprolol and lisinopril.  Blood pressure slightly elevated here in the emergency room.  Need to continue monitor.  May need to adjust the dose of her medication.    3.  Dyslipidemia  LDL cholesterol was 99 the last admission.    4.  Hypokalemia  Need potassium supplement and  repeat potassium    We will follow the patient along with you.  Thank you for allowing us to participate in the care of this patient.

## 2018-12-25 NOTE — PROGRESS NOTES
2 RN admission skin assessment completed with OJ Diehl:    Patient noted to have generalized scarring.  Recent right radial cath site; DAVID  Healing ulcer to right heel; was assessed and addressed by wound team during recent admission and is in healing stage.  Bilateral ears, heels, and elbows are pink and blanching.  Sacrum is pink and blanching.

## 2018-12-25 NOTE — ASSESSMENT & PLAN NOTE
Possibly secondary to esophageal spasm versus gastritis versus gastroparesis versus costochondritis, rule out ACS  Troponin elevated at 0.09.  Continuous cardiac monitoring with serial EKG and troponin  Cardiology has been consulted by the ER physician, recommend medical management.  Patient has been given full dose of aspirin  Continue aspirin, Plavix and atorvastatin  Nitro and morphine when necessary for chest pain  I have started the patient on omeprazole.  GI cocktail as needed  12/25:  Chest pain reproducible left sternal border c/w costochondritis.

## 2018-12-25 NOTE — CARE PLAN
Problem: Safety  Goal: Will remain free from injury  Outcome: PROGRESSING AS EXPECTED  Pt rings for assistance; nonskid socks and steady gait. Instructed on fall prevention protocol.    Problem: Venous Thromboembolism (VTW)/Deep Vein Thrombosis (DVT) Prevention:  Goal: Patient will participate in Venous Thrombosis (VTE)/Deep Vein Thrombosis (DVT)Prevention Measures  Outcome: PROGRESSING AS EXPECTED  Pt ambulating independently; venous doppler performed today; VTE prophylaxis in place.

## 2018-12-26 ENCOUNTER — PATIENT OUTREACH (OUTPATIENT)
Dept: HEALTH INFORMATION MANAGEMENT | Facility: OTHER | Age: 58
End: 2018-12-26

## 2018-12-26 VITALS
BODY MASS INDEX: 40.04 KG/M2 | HEIGHT: 65 IN | RESPIRATION RATE: 16 BRPM | DIASTOLIC BLOOD PRESSURE: 47 MMHG | SYSTOLIC BLOOD PRESSURE: 117 MMHG | WEIGHT: 240.3 LBS | TEMPERATURE: 97.6 F | OXYGEN SATURATION: 93 % | HEART RATE: 73 BPM

## 2018-12-26 PROBLEM — R60.0 LEG EDEMA, LEFT: Status: RESOLVED | Noted: 2018-12-25 | Resolved: 2018-12-26

## 2018-12-26 PROBLEM — Z95.5 S/P INSERTION OF NON-DRUG ELUTING CORONARY ARTERY STENT: Status: ACTIVE | Noted: 2018-12-26

## 2018-12-26 PROBLEM — R79.89 ELEVATED TROPONIN: Status: RESOLVED | Noted: 2018-12-20 | Resolved: 2018-12-26

## 2018-12-26 PROBLEM — R07.9 CHEST PAIN: Status: RESOLVED | Noted: 2017-08-31 | Resolved: 2018-12-26

## 2018-12-26 PROBLEM — M94.0 ACUTE COSTOCHONDRITIS: Status: ACTIVE | Noted: 2018-12-26

## 2018-12-26 LAB
ANION GAP SERPL CALC-SCNC: 6 MMOL/L (ref 0–11.9)
BASOPHILS # BLD AUTO: 0.7 % (ref 0–1.8)
BASOPHILS # BLD: 0.05 K/UL (ref 0–0.12)
BUN SERPL-MCNC: 27 MG/DL (ref 8–22)
CALCIUM SERPL-MCNC: 8.7 MG/DL (ref 8.5–10.5)
CHLORIDE SERPL-SCNC: 106 MMOL/L (ref 96–112)
CO2 SERPL-SCNC: 24 MMOL/L (ref 20–33)
CREAT SERPL-MCNC: 0.98 MG/DL (ref 0.5–1.4)
EOSINOPHIL # BLD AUTO: 0.35 K/UL (ref 0–0.51)
EOSINOPHIL NFR BLD: 5 % (ref 0–6.9)
ERYTHROCYTE [DISTWIDTH] IN BLOOD BY AUTOMATED COUNT: 45.6 FL (ref 35.9–50)
GLUCOSE BLD-MCNC: 189 MG/DL (ref 65–99)
GLUCOSE BLD-MCNC: 221 MG/DL (ref 65–99)
GLUCOSE SERPL-MCNC: 206 MG/DL (ref 65–99)
HCT VFR BLD AUTO: 32.3 % (ref 37–47)
HGB BLD-MCNC: 10.3 G/DL (ref 12–16)
IMM GRANULOCYTES # BLD AUTO: 0.02 K/UL (ref 0–0.11)
IMM GRANULOCYTES NFR BLD AUTO: 0.3 % (ref 0–0.9)
IRON SATN MFR SERPL: 9 % (ref 15–55)
IRON SERPL-MCNC: 37 UG/DL (ref 40–170)
LYMPHOCYTES # BLD AUTO: 2.47 K/UL (ref 1–4.8)
LYMPHOCYTES NFR BLD: 35 % (ref 22–41)
MCH RBC QN AUTO: 28.2 PG (ref 27–33)
MCHC RBC AUTO-ENTMCNC: 31.9 G/DL (ref 33.6–35)
MCV RBC AUTO: 88.5 FL (ref 81.4–97.8)
MONOCYTES # BLD AUTO: 0.66 K/UL (ref 0–0.85)
MONOCYTES NFR BLD AUTO: 9.3 % (ref 0–13.4)
NEUTROPHILS # BLD AUTO: 3.51 K/UL (ref 2–7.15)
NEUTROPHILS NFR BLD: 49.7 % (ref 44–72)
NRBC # BLD AUTO: 0 K/UL
NRBC BLD-RTO: 0 /100 WBC
PLATELET # BLD AUTO: 263 K/UL (ref 164–446)
PMV BLD AUTO: 9.5 FL (ref 9–12.9)
POTASSIUM SERPL-SCNC: 4.5 MMOL/L (ref 3.6–5.5)
RBC # BLD AUTO: 3.65 M/UL (ref 4.2–5.4)
SODIUM SERPL-SCNC: 136 MMOL/L (ref 135–145)
TIBC SERPL-MCNC: 434 UG/DL (ref 250–450)
WBC # BLD AUTO: 7.1 K/UL (ref 4.8–10.8)

## 2018-12-26 PROCEDURE — G0378 HOSPITAL OBSERVATION PER HR: HCPCS

## 2018-12-26 PROCEDURE — 700102 HCHG RX REV CODE 250 W/ 637 OVERRIDE(OP): Performed by: HOSPITALIST

## 2018-12-26 PROCEDURE — 99214 OFFICE O/P EST MOD 30 MIN: CPT | Performed by: INTERNAL MEDICINE

## 2018-12-26 PROCEDURE — A9270 NON-COVERED ITEM OR SERVICE: HCPCS | Performed by: HOSPITALIST

## 2018-12-26 PROCEDURE — 85025 COMPLETE CBC W/AUTO DIFF WBC: CPT

## 2018-12-26 PROCEDURE — 83550 IRON BINDING TEST: CPT

## 2018-12-26 PROCEDURE — 99217 PR OBSERVATION CARE DISCHARGE: CPT | Performed by: HOSPITALIST

## 2018-12-26 PROCEDURE — 700102 HCHG RX REV CODE 250 W/ 637 OVERRIDE(OP): Performed by: INTERNAL MEDICINE

## 2018-12-26 PROCEDURE — 80048 BASIC METABOLIC PNL TOTAL CA: CPT

## 2018-12-26 PROCEDURE — 36415 COLL VENOUS BLD VENIPUNCTURE: CPT

## 2018-12-26 PROCEDURE — 700111 HCHG RX REV CODE 636 W/ 250 OVERRIDE (IP): Performed by: HOSPITALIST

## 2018-12-26 PROCEDURE — 82962 GLUCOSE BLOOD TEST: CPT | Mod: 91

## 2018-12-26 PROCEDURE — 83540 ASSAY OF IRON: CPT

## 2018-12-26 PROCEDURE — A9270 NON-COVERED ITEM OR SERVICE: HCPCS | Performed by: INTERNAL MEDICINE

## 2018-12-26 RX ORDER — ASCORBIC ACID 500 MG
500 TABLET ORAL DAILY
Qty: 30 TAB | Refills: 3 | Status: ON HOLD | OUTPATIENT
Start: 2018-12-26 | End: 2023-04-24

## 2018-12-26 RX ORDER — ASCORBIC ACID 500 MG
500 TABLET ORAL DAILY
Qty: 30 TAB | Refills: 3 | Status: SHIPPED | OUTPATIENT
Start: 2018-12-26 | End: 2018-12-26

## 2018-12-26 RX ORDER — LANOLIN ALCOHOL/MO/W.PET/CERES
325 CREAM (GRAM) TOPICAL
Qty: 30 TAB | Refills: 3 | Status: SHIPPED | OUTPATIENT
Start: 2018-12-26 | End: 2018-12-26

## 2018-12-26 RX ORDER — LANOLIN ALCOHOL/MO/W.PET/CERES
325 CREAM (GRAM) TOPICAL
Qty: 30 TAB | Refills: 3 | Status: SHIPPED | OUTPATIENT
Start: 2018-12-26 | End: 2019-01-17

## 2018-12-26 RX ADMIN — GABAPENTIN 800 MG: 400 CAPSULE ORAL at 11:43

## 2018-12-26 RX ADMIN — TRAZODONE HYDROCHLORIDE 200 MG: 100 TABLET ORAL at 05:22

## 2018-12-26 RX ADMIN — METOPROLOL TARTRATE 25 MG: 25 TABLET, FILM COATED ORAL at 05:22

## 2018-12-26 RX ADMIN — ENOXAPARIN SODIUM 40 MG: 100 INJECTION SUBCUTANEOUS at 05:22

## 2018-12-26 RX ADMIN — POTASSIUM CHLORIDE 40 MEQ: 1500 TABLET, EXTENDED RELEASE ORAL at 05:22

## 2018-12-26 RX ADMIN — ASPIRIN 81 MG: 81 TABLET, DELAYED RELEASE ORAL at 05:22

## 2018-12-26 RX ADMIN — INSULIN GLARGINE 30 UNITS: 100 INJECTION, SOLUTION SUBCUTANEOUS at 05:23

## 2018-12-26 RX ADMIN — LISINOPRIL 5 MG: 5 TABLET ORAL at 05:22

## 2018-12-26 RX ADMIN — OMEPRAZOLE 20 MG: 20 CAPSULE, DELAYED RELEASE ORAL at 05:22

## 2018-12-26 RX ADMIN — CLOPIDOGREL 75 MG: 75 TABLET, FILM COATED ORAL at 05:22

## 2018-12-26 RX ADMIN — INSULIN HUMAN 1 UNITS: 100 INJECTION, SOLUTION PARENTERAL at 05:23

## 2018-12-26 RX ADMIN — GABAPENTIN 800 MG: 400 CAPSULE ORAL at 05:22

## 2018-12-26 RX ADMIN — OXYCODONE HYDROCHLORIDE 10 MG: 5 TABLET ORAL at 02:10

## 2018-12-26 RX ADMIN — INSULIN HUMAN 2 UNITS: 100 INJECTION, SOLUTION PARENTERAL at 11:47

## 2018-12-26 RX ADMIN — OXYCODONE HYDROCHLORIDE 10 MG: 5 TABLET ORAL at 11:58

## 2018-12-26 ASSESSMENT — PAIN SCALES - GENERAL
PAINLEVEL_OUTOF10: 6
PAINLEVEL_OUTOF10: 3
PAINLEVEL_OUTOF10: 0

## 2018-12-26 ASSESSMENT — ENCOUNTER SYMPTOMS
PALPITATIONS: 0
CHEST TIGHTNESS: 0
SHORTNESS OF BREATH: 0

## 2018-12-26 NOTE — DISCHARGE PLANNING
Agency/Facility Name: [facility]  Spoke To: Matthew  Outcome: Obtained quote for patient to return to [place]. Quoted $[phone]    HOPEW Ju Notified.

## 2018-12-26 NOTE — PROGRESS NOTES
"Cardiology Follow Up Progress Note    Date of Service  12/26/2018    Attending Physician  Maite Santiago M.D.    Chief Complaint   Chest pain.    HPI  Patient is 58 years old female history of diabetes mellitus hypertension dyslipidemia who was admitted to the hospital 5 days ago with epigastric discomfort and left upper chest pain with nausea vomiting.  Her paternal \"troponin was mildly elevated.  Subsequent myocardial perfusion imaging show mild anterior wall ischemia.  She underwent cardiac catheterization which review stenosis in the left anterior descending artery where she received two drug eluting stents 3 days ago.  She was discharged from the hospital the day before yesterday on metoprolol 25 mg twice a day, lisinopril 5 mg daily, aspirin and clopidogrel.  She is stated that shortly after she returned home she started to experience nausea and dry heave followed by upper chest discomfort radiated to right side of her neck and right arm.  The pain has been fairly constant since then although it is aggravated by inspiration.  It is somewhat different from her prior pain mainly in terms of the location.  She denies noncompliant with medication.  She reports some nonproductive cough but no fever or chills.  The pain was rather severe and prompted her to seek medical attention at the local hospital where she lives in [place].  Troponin there was mildly elevated at 0.09, she was then transferred here for further evaluation.    Interim Events  12/26: No chest pain.  Tolerating medications.  States that she has been compliant with her medical therapy with the assistance of her daughter who manages her medications.    Review of Systems  Review of Systems   Respiratory: Negative for chest tightness and shortness of breath.    Cardiovascular: Negative for chest pain and palpitations.       Vital signs in last 24 hours  Temp:  [36.2 °C (97.1 °F)-36.6 °C (97.8 °F)] 36.6 °C (97.8 °F)  Pulse:  [60-82] 67  Resp:  " [16-17] 16  BP: ()/(48-71) 100/52    Physical Exam  Physical Exam   Constitutional: She is oriented to person, place, and time. She appears well-nourished. No distress.   HENT:   Head: Normocephalic and atraumatic.   Eyes: EOM are normal. No scleral icterus.   Neck: No JVD present.       Cardiovascular: Normal rate, regular rhythm, S1 normal, S2 normal, normal heart sounds and intact distal pulses.  Exam reveals no gallop and no friction rub.    No murmur heard.  Pulmonary/Chest: Effort normal and breath sounds normal. She has no wheezes. She has no rhonchi. She has no rales.   Musculoskeletal: She exhibits no edema.   Neurological: She is alert and oriented to person, place, and time.   Skin: Skin is warm and dry.   Psychiatric: She has a normal mood and affect. Her behavior is normal.       Lab Review  Lab Results   Component Value Date/Time    WBC 7.1 12/26/2018 04:05 AM    RBC 3.65 (L) 12/26/2018 04:05 AM    HEMOGLOBIN 10.3 (L) 12/26/2018 04:05 AM    HEMATOCRIT 32.3 (L) 12/26/2018 04:05 AM    MCV 88.5 12/26/2018 04:05 AM    MCH 28.2 12/26/2018 04:05 AM    MCHC 31.9 (L) 12/26/2018 04:05 AM    MPV 9.5 12/26/2018 04:05 AM      Lab Results   Component Value Date/Time    SODIUM 136 12/26/2018 04:05 AM    POTASSIUM 4.5 12/26/2018 04:05 AM    CHLORIDE 106 12/26/2018 04:05 AM    CO2 24 12/26/2018 04:05 AM    GLUCOSE 206 (H) 12/26/2018 04:05 AM    BUN 27 (H) 12/26/2018 04:05 AM    CREATININE 0.98 12/26/2018 04:05 AM      Lab Results   Component Value Date/Time    ASTSGOT 34 12/24/2018 11:45 PM    ALTSGPT 52 (H) 12/24/2018 11:45 PM     Lab Results   Component Value Date/Time    CHOLSTRLTOT 176 12/20/2018 11:56 PM    LDL 99 12/20/2018 11:56 PM    HDL 45 12/20/2018 11:56 PM    TRIGLYCERIDE 159 (H) 12/20/2018 11:56 PM    TROPONINI 0.09 (H) 12/25/2018 09:43 AM             Cardiac Imaging and Procedures Review  Rhythm 12/26/2018 normal sinus rhythm, rate 81.    Imaging  Normal left ventricular chamber size. Mild  concentric left ventricular   hypertrophy. Normal left ventricular systolic function. Left   ventricular ejection fraction is visually estimated to be 55%.   Indeterminate diastolic function. Probable inferior and inferoseptal   wall motion abnormality. Consider contrast study for better evaluation   if indicated.  Compared to the report of the study done 9/2018- the wall motion   abnormality is new.     12/22/2018  Indication for procedure:   ACS >24 hours  High risk stress test.  Procedure:  · Insertion of 6FR sheath in the right radial artery  · right and left coronary arteriograms  · IFR of LAD  · Angioplasty and placement of a 3.5 by 12 mm Xience Lilo drug-eluting stent in proximal  left anterior descending coronary artery.  · Angioplasty and placement of a 3.0 by 38 mm Xience Lilo drug-eluting stent in mid  left anterior descending coronary artery.    Assessment/Plan  Chest pain.  Recent LAD stent.  Diabetes mellitus.  Low blood pressure.  History of hypertension.  Azotemia, worsening.  Anemia with decreasing hemoglobin since admission.    Recommendation Discussion  1.  The patient is stable from a cardiac standpoint.  2.  I do not feel additional cardiac evaluation is necessary at this time.  3.  The patient was instructed to maintain compliance with her cardiac medications in particular DAPT therapy.  4.  May want to follow-up hemoglobin.  5.  May wish to discontinue lisinopril due to low blood pressure.  6.  Continue metoprolol as tolerated aspirin, atorvastatin and Plavix.  7.  Maintain adequate daily hydration.  8.  Will sign off.    Discussed with patient and reviewed with bedside RN.    Please contact me with any questions.    Obi Lewis M.D.   Cardiologist, [facility]  [phone]

## 2018-12-26 NOTE — ASSESSMENT & PLAN NOTE
Unclear if component of low pain tolerance.  No home narcotic use, on gabapentin for chronic pain.

## 2018-12-26 NOTE — DISCHARGE PLANNING
Patient has [facility] benefits. LSW scheduled transport back home via [facility]. Ride time has not been set. Per [facility] representative, transport has been escalated and they will call back with a confirmation of time.     Update:  Per [facility], patient will be set up with a taxi ride from [place] to train station. Patient will be provided with a train ticket to [facility]. Once in [place] patient will provided with additional taxi ride home to [place]. Patient has been updated. LSW is waiting on itinerary from [facility].

## 2018-12-26 NOTE — PROGRESS NOTES
Hospital Medicine Daily Progress Note    Date of Service  12/25/2018    Chief Complaint  58 y.o. female admitted 12/24/2018 with chest pain    Hospital Course    12/25:  This 59yo female with h/o cardiac stent placement 12/22, DM2, HLD, HTN, presented with chest pain.  Denies prior DVT or PE.  States left foot more swollen than usual.  She was transferred from [facility] for elevated troponin .09 x2. EKG w/o ischemic changes, NSR, CXR negative. *        Consultants/Specialty  Cardiology Dr. White    Code Status  full    Disposition  Lives in [facility].     Review of Systems  Review of Systems   Constitutional: Negative for chills, diaphoresis, fever and malaise/fatigue.   HENT: Negative for congestion and sore throat.    Eyes: Negative for pain and discharge.   Respiratory: Negative for cough, hemoptysis, sputum production, shortness of breath and wheezing.    Cardiovascular: Positive for chest pain and leg swelling. Negative for palpitations and claudication.   Gastrointestinal: Negative for abdominal pain, constipation, diarrhea, melena, nausea and vomiting.   Genitourinary: Negative for dysuria, frequency and urgency.   Musculoskeletal: Negative for back pain, joint pain, myalgias and neck pain.   Skin: Negative for itching and rash.   Neurological: Negative for dizziness, sensory change, speech change, focal weakness, loss of consciousness, weakness and headaches.   Endo/Heme/Allergies: Does not bruise/bleed easily.   Psychiatric/Behavioral: Negative for depression, substance abuse and suicidal ideas.        Physical Exam  Temp:  [36.2 °C (97.2 °F)-36.6 °C (97.9 °F)] 36.2 °C (97.2 °F)  Pulse:  [60-97] 60  Resp:  [14-17] 17  BP: ()/(48-76) 99/65    Physical Exam   Constitutional: She is oriented to person, place, and time. She appears well-developed and well-nourished. No distress.   HENT:   Head: Normocephalic and atraumatic.   Nose: Nose normal.   Mouth/Throat: Oropharynx is clear  and moist. No oropharyngeal exudate.   Eyes: Pupils are equal, round, and reactive to light. Conjunctivae and EOM are normal. Right eye exhibits no discharge. Left eye exhibits no discharge. No scleral icterus.   Neck: Normal range of motion. Neck supple. No JVD present. No tracheal deviation present. No thyromegaly present.   Cardiovascular: Normal rate, regular rhythm, normal heart sounds and intact distal pulses.  Exam reveals no gallop and no friction rub.    No murmur heard.  Pulmonary/Chest: Effort normal and breath sounds normal. No stridor. No respiratory distress. She has no wheezes. She has no rales. She exhibits tenderness (reproducible left anterior chest wall pain along left sternal border on exam.).   Abdominal: Soft. Bowel sounds are normal. She exhibits no distension and no mass. There is no tenderness. There is no rebound and no guarding.   Musculoskeletal: Normal range of motion. She exhibits edema (left leg > right leg edema). She exhibits no tenderness.   Lymphadenopathy:     She has no cervical adenopathy.   Neurological: She is alert and oriented to person, place, and time. No cranial nerve deficit. She exhibits normal muscle tone. Coordination normal.   Skin: Skin is warm and dry. No rash noted. She is not diaphoretic. No erythema.   Psychiatric: She has a normal mood and affect. Her behavior is normal. Judgment and thought content normal.   Nursing note and vitals reviewed.      Fluids    Intake/Output Summary (Last 24 hours) at 12/25/18 2228  Last data filed at 12/25/18 1500   Gross per 24 hour   Intake            587.5 ml   Output                0 ml   Net            587.5 ml       Laboratory  Recent Labs      12/23/18 0222 12/24/18   2345   WBC  7.7  7.1   RBC  3.76*  4.45   HEMOGLOBIN  10.4*  12.2   HEMATOCRIT  33.4*  38.7   MCV  88.8  87.0   MCH  27.7  27.4   MCHC  31.1*  31.5*   RDW  45.6  43.9   PLATELETCT  270  316   MPV  9.4  9.5     Recent Labs      12/23/18 0223 12/24/18    2345   SODIUM  135  141   POTASSIUM  3.9  3.4*   CHLORIDE  106  106   CO2  23  27   GLUCOSE  326*  172*   BUN  18  13   CREATININE  0.83  0.74   CALCIUM  8.7  9.0                   Imaging  US-EXTREMITY VENOUS LOWER UNILAT LEFT   Final Result           Assessment/Plan  Chest pain- (present on admission)   Assessment & Plan    Possibly secondary to esophageal spasm versus gastritis versus gastroparesis versus costochondritis, rule out ACS  Troponin elevated at 0.09.  Continuous cardiac monitoring with serial EKG and troponin  Cardiology has been consulted by the ER physician, recommend medical management.  Patient has been given full dose of aspirin  Continue aspirin, Plavix and atorvastatin  Nitro and morphine when necessary for chest pain  I have started the patient on omeprazole.  GI cocktail as needed  12/25:  Chest pain reproducible left sternal border c/w costochondritis.         Elevated troponin- (present on admission)   Assessment & Plan    Trend troponin  Continuous cardiac monitoring     Hypokalemia- (present on admission)   Assessment & Plan    Started K replacement for low level 3.4 on repeat.     Leg edema, left   Assessment & Plan    12/25:  Ordered u/s LLE.  Check d-dimer.     T2DM (type 2 diabetes mellitus) (HCC)- (present on admission)   Assessment & Plan    Uncontrolled with hyperglycemia  Start on insulin sliding scale with serial Accu-Checks  Check hemoglobin A1c  Hypoglycemic protocol in place         Chronic pain syndrome with narcotic dependence- (present on admission)   Assessment & Plan    Unclear if component of low pain tolerance.  No home narcotic use, on gabapentin for chronic pain.     Obesity (BMI 30-39.9)- (present on admission)   Assessment & Plan    Body mass index is 38.27 kg/m².       Non-intractable vomiting with nausea- (present on admission)   Assessment & Plan    IV Zofran as needed  IV fluid hydration with normal saline     Neuropathy in diabetes (HCC)- (present on admission)    Assessment & Plan    Continue gabapentin     Hyperlipidemia- (present on admission)   Assessment & Plan    Continue Crestor     Essential hypertension- (present on admission)   Assessment & Plan    Continue Lopressor and lisinopril          VTE prophylaxis: lovenox
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
## 2018-12-26 NOTE — DISCHARGE INSTRUCTIONS
Discharge Instructions    Discharged to home by taxi with self. Discharged via wheelchair, hospital escort: Refused.  Special equipment needed: Not Applicable    Be sure to schedule a follow-up appointment with your primary care doctor or any specialists as instructed.     Discharge Plan:   Diet Plan: Discussed  Activity Level: Discussed  Confirmed Follow up Appointment: Appointment Scheduled  Confirmed Symptoms Management: Discussed  Medication Reconciliation Updated: Yes  Pneumococcal Vaccine Administered/Refused: Not given - Patient refused pneumococcal vaccine  Influenza Vaccine Indication: Not indicated: Previously immunized this influenza season and > 8 years of age    I understand that a diet low in cholesterol, fat, and sodium is recommended for good health. Unless I have been given specific instructions below for another diet, I accept this instruction as my diet prescription.   Other diet: diabetic cardiac, low sugar, low salt, low fat    Special Instructions: None    · Is patient discharged on Warfarin / Coumadin?   No     Depression / Suicide Risk    As you are discharged from this [facility] facility, it is important to learn how to keep safe from harming yourself.    Recognize the warning signs:  · Abrupt changes in personality, positive or negative- including increase in energy   · Giving away possessions  · Change in eating patterns- significant weight changes-  positive or negative  · Change in sleeping patterns- unable to sleep or sleeping all the time   · Unwillingness or inability to communicate  · Depression  · Unusual sadness, discouragement and loneliness  · Talk of wanting to die  · Neglect of personal appearance   · Rebelliousness- reckless behavior  · Withdrawal from people/activities they love  · Confusion- inability to concentrate     If you or a loved one observes any of these behaviors or has concerns about self-harm, here's what you can do:  · Talk about it- your feelings and  reasons for harming yourself  · Remove any means that you might use to hurt yourself (examples: pills, rope, extension cords, firearm)  · Get professional help from the community (Mental Health, Substance Abuse, psychological counseling)  · Do not be alone:Call your Safe Contact- someone whom you trust who will be there for you.  · Call your local CRISIS HOTLINE 124-8963 or 257-313-7905  · Call your local Children's Mobile Crisis Response Team [place] (335) 435-3041 or wwwjiffstore  · Call the toll free National Suicide Prevention Hotlines   · National Suicide Prevention Lifeline 722-910-QZFM (6314)  · [place] Line Network 800-SUICIDE (163-5637)        Costochondritis  Costochondritis is swelling and irritation (inflammation) of the tissue (cartilage) that connects your ribs to your breastbone (sternum). This causes pain in the front of your chest. Usually, the pain:  · Starts gradually.  · Is in more than one rib.  This condition usually goes away on its own over time.  Follow these instructions at home:  · Do not do anything that makes your pain worse.  · If directed, put ice on the painful area:  ¨ Put ice in a plastic bag.  ¨ Place a towel between your skin and the bag.  ¨ Leave the ice on for 20 minutes, 2-3 times a day.  · If directed, put heat on the affected area as often as told by your doctor. Use the heat source that your doctor tells you to use, such as a moist heat pack or a heating pad.  ¨ Place a towel between your skin and the heat source.  ¨ Leave the heat on for 20-30 minutes.  ¨ Take off the heat if your skin turns bright red. This is very important if you cannot feel pain, heat, or cold. You may have a greater risk of getting burned.  · Take over-the-counter and prescription medicines only as told by your doctor.  · Return to your normal activities as told by your doctor. Ask your doctor what activities are safe for you.  · Keep all follow-up visits as told by your doctor.  This is important.  Contact a doctor if:  · You have chills or a fever.  · Your pain does not go away or it gets worse.  · You have a cough that does not go away.  Get help right away if:  · You are short of breath.  This information is not intended to replace advice given to you by your health care provider. Make sure you discuss any questions you have with your health care provider.  Document Released: 06/05/2009 Document Revised: 07/07/2017 Document Reviewed: 04/12/2017  Elsevier Interactive Patient Education © 2017 [employer]

## 2019-01-02 LAB
EKG IMPRESSION: NORMAL

## 2019-01-08 ENCOUNTER — TELEPHONE (OUTPATIENT)
Dept: CARDIOLOGY | Facility: MEDICAL CENTER | Age: 59
End: 2019-01-08

## 2019-01-08 NOTE — TELEPHONE ENCOUNTER
I spoke to the patient regarding prior cardio. She has not seen a cardiologist. She asked that the appt be canceled, [place] is too far for her.

## 2019-01-16 ENCOUNTER — HOSPITAL ENCOUNTER (OUTPATIENT)
Facility: MEDICAL CENTER | Age: 59
End: 2019-01-17
Attending: EMERGENCY MEDICINE | Admitting: HOSPITALIST
Payer: MEDICAID

## 2019-01-16 DIAGNOSIS — R07.9 CHEST PAIN, UNSPECIFIED TYPE: Primary | ICD-10-CM

## 2019-01-16 DIAGNOSIS — R07.9 CHEST PAIN WITH MODERATE RISK FOR CARDIAC ETIOLOGY: ICD-10-CM

## 2019-01-16 DIAGNOSIS — R07.89 OTHER CHEST PAIN: ICD-10-CM

## 2019-01-16 PROBLEM — N17.9 AKI (ACUTE KIDNEY INJURY) (HCC): Status: ACTIVE | Noted: 2019-01-16

## 2019-01-16 PROBLEM — M86.9 OSTEOMYELITIS (HCC): Status: ACTIVE | Noted: 2019-01-16

## 2019-01-16 LAB
EKG IMPRESSION: NORMAL
TROPONIN I SERPL-MCNC: <0.01 NG/ML (ref 0–0.04)

## 2019-01-16 PROCEDURE — 99219 PR INITIAL OBSERVATION CARE,LEVL II: CPT | Mod: 25 | Performed by: HOSPITALIST

## 2019-01-16 PROCEDURE — G0378 HOSPITAL OBSERVATION PER HR: HCPCS

## 2019-01-16 PROCEDURE — 83036 HEMOGLOBIN GLYCOSYLATED A1C: CPT

## 2019-01-16 PROCEDURE — 99358 PROLONG SERVICE W/O CONTACT: CPT | Performed by: HOSPITALIST

## 2019-01-16 PROCEDURE — 700105 HCHG RX REV CODE 258: Performed by: HOSPITALIST

## 2019-01-16 PROCEDURE — 304561 HCHG STAT O2

## 2019-01-16 PROCEDURE — 84484 ASSAY OF TROPONIN QUANT: CPT

## 2019-01-16 PROCEDURE — 84443 ASSAY THYROID STIM HORMONE: CPT

## 2019-01-16 PROCEDURE — 99285 EMERGENCY DEPT VISIT HI MDM: CPT

## 2019-01-16 PROCEDURE — 93005 ELECTROCARDIOGRAM TRACING: CPT | Performed by: EMERGENCY MEDICINE

## 2019-01-16 PROCEDURE — 93000 ELECTROCARDIOGRAM COMPLETE: CPT | Performed by: INTERNAL MEDICINE

## 2019-01-16 PROCEDURE — 700111 HCHG RX REV CODE 636 W/ 250 OVERRIDE (IP): Performed by: EMERGENCY MEDICINE

## 2019-01-16 PROCEDURE — 96375 TX/PRO/DX INJ NEW DRUG ADDON: CPT

## 2019-01-16 RX ORDER — BISACODYL 10 MG
10 SUPPOSITORY, RECTAL RECTAL
Status: DISCONTINUED | OUTPATIENT
Start: 2019-01-16 | End: 2019-01-17 | Stop reason: HOSPADM

## 2019-01-16 RX ORDER — HYDROMORPHONE HYDROCHLORIDE 1 MG/ML
1 INJECTION, SOLUTION INTRAMUSCULAR; INTRAVENOUS; SUBCUTANEOUS ONCE
Status: COMPLETED | OUTPATIENT
Start: 2019-01-16 | End: 2019-01-16

## 2019-01-16 RX ORDER — ONDANSETRON 2 MG/ML
4 INJECTION INTRAMUSCULAR; INTRAVENOUS EVERY 4 HOURS PRN
Status: DISCONTINUED | OUTPATIENT
Start: 2019-01-16 | End: 2019-01-17 | Stop reason: HOSPADM

## 2019-01-16 RX ORDER — FERROUS SULFATE 325(65) MG
325 TABLET ORAL
Status: DISCONTINUED | OUTPATIENT
Start: 2019-01-17 | End: 2019-01-17 | Stop reason: HOSPADM

## 2019-01-16 RX ORDER — DEXTROSE MONOHYDRATE 25 G/50ML
25 INJECTION, SOLUTION INTRAVENOUS
Status: DISCONTINUED | OUTPATIENT
Start: 2019-01-16 | End: 2019-01-17

## 2019-01-16 RX ORDER — AMOXICILLIN 250 MG
2 CAPSULE ORAL 2 TIMES DAILY
Status: DISCONTINUED | OUTPATIENT
Start: 2019-01-17 | End: 2019-01-17 | Stop reason: HOSPADM

## 2019-01-16 RX ORDER — ASPIRIN 81 MG/1
324 TABLET, CHEWABLE ORAL DAILY
Status: DISCONTINUED | OUTPATIENT
Start: 2019-01-17 | End: 2019-01-16

## 2019-01-16 RX ORDER — TRAZODONE HYDROCHLORIDE 50 MG/1
200 TABLET ORAL
Status: DISCONTINUED | OUTPATIENT
Start: 2019-01-17 | End: 2019-01-17 | Stop reason: HOSPADM

## 2019-01-16 RX ORDER — ASPIRIN 300 MG/1
300 SUPPOSITORY RECTAL DAILY
Status: DISCONTINUED | OUTPATIENT
Start: 2019-01-17 | End: 2019-01-16

## 2019-01-16 RX ORDER — CLOPIDOGREL BISULFATE 75 MG/1
75 TABLET ORAL DAILY
Status: DISCONTINUED | OUTPATIENT
Start: 2019-01-17 | End: 2019-01-17 | Stop reason: HOSPADM

## 2019-01-16 RX ORDER — GABAPENTIN 400 MG/1
800 CAPSULE ORAL 3 TIMES DAILY
Status: DISCONTINUED | OUTPATIENT
Start: 2019-01-17 | End: 2019-01-17 | Stop reason: HOSPADM

## 2019-01-16 RX ORDER — OXYCODONE HYDROCHLORIDE 5 MG/1
5 TABLET ORAL
Status: DISCONTINUED | OUTPATIENT
Start: 2019-01-16 | End: 2019-01-17 | Stop reason: HOSPADM

## 2019-01-16 RX ORDER — HYDROMORPHONE HYDROCHLORIDE 1 MG/ML
0.5 INJECTION, SOLUTION INTRAMUSCULAR; INTRAVENOUS; SUBCUTANEOUS ONCE
Status: DISCONTINUED | OUTPATIENT
Start: 2019-01-16 | End: 2019-01-16

## 2019-01-16 RX ORDER — POLYETHYLENE GLYCOL 3350 17 G/17G
1 POWDER, FOR SOLUTION ORAL
Status: DISCONTINUED | OUTPATIENT
Start: 2019-01-16 | End: 2019-01-17 | Stop reason: HOSPADM

## 2019-01-16 RX ORDER — ROSUVASTATIN CALCIUM 20 MG/1
20 TABLET, COATED ORAL EVERY EVENING
Status: DISCONTINUED | OUTPATIENT
Start: 2019-01-17 | End: 2019-01-17 | Stop reason: HOSPADM

## 2019-01-16 RX ORDER — INSULIN GLARGINE 100 [IU]/ML
30 INJECTION, SOLUTION SUBCUTANEOUS 2 TIMES DAILY
Status: DISCONTINUED | OUTPATIENT
Start: 2019-01-17 | End: 2019-01-17 | Stop reason: HOSPADM

## 2019-01-16 RX ORDER — PROMETHAZINE HYDROCHLORIDE 25 MG/1
12.5-25 TABLET ORAL EVERY 4 HOURS PRN
Status: DISCONTINUED | OUTPATIENT
Start: 2019-01-16 | End: 2019-01-17 | Stop reason: HOSPADM

## 2019-01-16 RX ORDER — HEPARIN SODIUM 5000 [USP'U]/ML
5000 INJECTION, SOLUTION INTRAVENOUS; SUBCUTANEOUS EVERY 8 HOURS
Status: DISCONTINUED | OUTPATIENT
Start: 2019-01-17 | End: 2019-01-17 | Stop reason: HOSPADM

## 2019-01-16 RX ORDER — PROMETHAZINE HYDROCHLORIDE 25 MG/1
12.5-25 SUPPOSITORY RECTAL EVERY 4 HOURS PRN
Status: DISCONTINUED | OUTPATIENT
Start: 2019-01-16 | End: 2019-01-17 | Stop reason: HOSPADM

## 2019-01-16 RX ORDER — OXYCODONE HYDROCHLORIDE 10 MG/1
10 TABLET ORAL
Status: DISCONTINUED | OUTPATIENT
Start: 2019-01-16 | End: 2019-01-17 | Stop reason: HOSPADM

## 2019-01-16 RX ORDER — SODIUM CHLORIDE 9 MG/ML
INJECTION, SOLUTION INTRAVENOUS CONTINUOUS
Status: DISCONTINUED | OUTPATIENT
Start: 2019-01-17 | End: 2019-01-17 | Stop reason: HOSPADM

## 2019-01-16 RX ORDER — HYDROMORPHONE HYDROCHLORIDE 1 MG/ML
0.5 INJECTION, SOLUTION INTRAMUSCULAR; INTRAVENOUS; SUBCUTANEOUS
Status: DISCONTINUED | OUTPATIENT
Start: 2019-01-16 | End: 2019-01-17

## 2019-01-16 RX ORDER — ONDANSETRON 4 MG/1
4 TABLET, ORALLY DISINTEGRATING ORAL EVERY 4 HOURS PRN
Status: DISCONTINUED | OUTPATIENT
Start: 2019-01-16 | End: 2019-01-17 | Stop reason: HOSPADM

## 2019-01-16 RX ORDER — ASPIRIN 325 MG
325 TABLET ORAL DAILY
Status: DISCONTINUED | OUTPATIENT
Start: 2019-01-17 | End: 2019-01-16

## 2019-01-16 RX ADMIN — HYDROMORPHONE HYDROCHLORIDE 1 MG: 1 INJECTION, SOLUTION INTRAMUSCULAR; INTRAVENOUS; SUBCUTANEOUS at 23:15

## 2019-01-16 RX ADMIN — SODIUM CHLORIDE: 9 INJECTION, SOLUTION INTRAVENOUS at 23:54

## 2019-01-16 ASSESSMENT — ENCOUNTER SYMPTOMS
BLURRED VISION: 0
HEARTBURN: 0
FLANK PAIN: 0
BRUISES/BLEEDS EASILY: 0
HEMOPTYSIS: 0
PALPITATIONS: 0
VOMITING: 0
WEAKNESS: 0
DIZZINESS: 0
SENSORY CHANGE: 0
SHORTNESS OF BREATH: 1
HALLUCINATIONS: 0
FOCAL WEAKNESS: 0
DOUBLE VISION: 0
MYALGIAS: 0
SPEECH CHANGE: 0
CHILLS: 0
EYE DISCHARGE: 0
DEPRESSION: 0
FEVER: 0
ABDOMINAL PAIN: 0
NAUSEA: 1
COUGH: 0

## 2019-01-16 ASSESSMENT — PAIN SCALES - GENERAL
PAINLEVEL_OUTOF10: 9

## 2019-01-16 ASSESSMENT — LIFESTYLE VARIABLES: SUBSTANCE_ABUSE: 0

## 2019-01-17 ENCOUNTER — APPOINTMENT (OUTPATIENT)
Dept: RADIOLOGY | Facility: MEDICAL CENTER | Age: 59
End: 2019-01-17
Attending: NURSE PRACTITIONER
Payer: MEDICAID

## 2019-01-17 ENCOUNTER — PATIENT OUTREACH (OUTPATIENT)
Dept: HEALTH INFORMATION MANAGEMENT | Facility: OTHER | Age: 59
End: 2019-01-17

## 2019-01-17 VITALS
SYSTOLIC BLOOD PRESSURE: 153 MMHG | WEIGHT: 229.28 LBS | DIASTOLIC BLOOD PRESSURE: 66 MMHG | HEIGHT: 65 IN | TEMPERATURE: 98.6 F | OXYGEN SATURATION: 99 % | RESPIRATION RATE: 20 BRPM | BODY MASS INDEX: 38.2 KG/M2 | HEART RATE: 84 BPM

## 2019-01-17 PROBLEM — D50.9 IRON DEFICIENCY ANEMIA: Chronic | Status: RESOLVED | Noted: 2017-06-01 | Resolved: 2019-01-17

## 2019-01-17 PROBLEM — N17.9 AKI (ACUTE KIDNEY INJURY) (HCC): Status: RESOLVED | Noted: 2019-01-16 | Resolved: 2019-01-17

## 2019-01-17 LAB
ALBUMIN SERPL BCP-MCNC: 3.4 G/DL (ref 3.2–4.9)
ALBUMIN SERPL BCP-MCNC: 3.9 G/DL (ref 3.2–4.9)
ALBUMIN/GLOB SERPL: 1 G/DL
ALBUMIN/GLOB SERPL: 1 G/DL
ALP SERPL-CCNC: 247 U/L (ref 30–99)
ALP SERPL-CCNC: 283 U/L (ref 30–99)
ALT SERPL-CCNC: 173 U/L (ref 2–50)
ALT SERPL-CCNC: 202 U/L (ref 2–50)
ANION GAP SERPL CALC-SCNC: 14 MMOL/L (ref 0–11.9)
ANION GAP SERPL CALC-SCNC: 8 MMOL/L (ref 0–11.9)
APAP SERPL-MCNC: <10 UG/ML (ref 10–30)
AST SERPL-CCNC: 184 U/L (ref 12–45)
AST SERPL-CCNC: 308 U/L (ref 12–45)
BASOPHILS # BLD AUTO: 0.4 % (ref 0–1.8)
BASOPHILS # BLD: 0.04 K/UL (ref 0–0.12)
BILIRUB SERPL-MCNC: 0.3 MG/DL (ref 0.1–1.5)
BILIRUB SERPL-MCNC: 0.3 MG/DL (ref 0.1–1.5)
BUN SERPL-MCNC: 30 MG/DL (ref 8–22)
BUN SERPL-MCNC: 31 MG/DL (ref 8–22)
CALCIUM SERPL-MCNC: 8.6 MG/DL (ref 8.5–10.5)
CALCIUM SERPL-MCNC: 9.4 MG/DL (ref 8.5–10.5)
CHLORIDE SERPL-SCNC: 98 MMOL/L (ref 96–112)
CHLORIDE SERPL-SCNC: 98 MMOL/L (ref 96–112)
CHOLEST SERPL-MCNC: 235 MG/DL (ref 100–199)
CO2 SERPL-SCNC: 20 MMOL/L (ref 20–33)
CO2 SERPL-SCNC: 24 MMOL/L (ref 20–33)
CREAT SERPL-MCNC: 1.23 MG/DL (ref 0.5–1.4)
CREAT SERPL-MCNC: 1.55 MG/DL (ref 0.5–1.4)
EKG IMPRESSION: NORMAL
EKG IMPRESSION: NORMAL
EOSINOPHIL # BLD AUTO: 0.17 K/UL (ref 0–0.51)
EOSINOPHIL NFR BLD: 1.9 % (ref 0–6.9)
ERYTHROCYTE [DISTWIDTH] IN BLOOD BY AUTOMATED COUNT: 42.5 FL (ref 35.9–50)
EST. AVERAGE GLUCOSE BLD GHB EST-MCNC: 255 MG/DL
GLOBULIN SER CALC-MCNC: 3.4 G/DL (ref 1.9–3.5)
GLOBULIN SER CALC-MCNC: 3.8 G/DL (ref 1.9–3.5)
GLUCOSE BLD-MCNC: 302 MG/DL (ref 65–99)
GLUCOSE BLD-MCNC: 342 MG/DL (ref 65–99)
GLUCOSE SERPL-MCNC: 282 MG/DL (ref 65–99)
GLUCOSE SERPL-MCNC: 319 MG/DL (ref 65–99)
HAV IGM SERPL QL IA: NEGATIVE
HBA1C MFR BLD: 10.5 % (ref 0–5.6)
HBV CORE IGM SER QL: NEGATIVE
HBV SURFACE AG SER QL: NEGATIVE
HCT VFR BLD AUTO: 40.9 % (ref 37–47)
HCV AB SER QL: NEGATIVE
HDLC SERPL-MCNC: 51 MG/DL
HGB BLD-MCNC: 13.1 G/DL (ref 12–16)
IMM GRANULOCYTES # BLD AUTO: 0.03 K/UL (ref 0–0.11)
IMM GRANULOCYTES NFR BLD AUTO: 0.3 % (ref 0–0.9)
LDLC SERPL CALC-MCNC: 155 MG/DL
LIPASE SERPL-CCNC: 110 U/L (ref 11–82)
LYMPHOCYTES # BLD AUTO: 2.58 K/UL (ref 1–4.8)
LYMPHOCYTES NFR BLD: 28.2 % (ref 22–41)
MCH RBC QN AUTO: 27.9 PG (ref 27–33)
MCHC RBC AUTO-ENTMCNC: 32 G/DL (ref 33.6–35)
MCV RBC AUTO: 87 FL (ref 81.4–97.8)
MONOCYTES # BLD AUTO: 0.68 K/UL (ref 0–0.85)
MONOCYTES NFR BLD AUTO: 7.4 % (ref 0–13.4)
NEUTROPHILS # BLD AUTO: 5.65 K/UL (ref 2–7.15)
NEUTROPHILS NFR BLD: 61.8 % (ref 44–72)
NRBC # BLD AUTO: 0 K/UL
NRBC BLD-RTO: 0 /100 WBC
PLATELET # BLD AUTO: 304 K/UL (ref 164–446)
PMV BLD AUTO: 9.1 FL (ref 9–12.9)
POTASSIUM SERPL-SCNC: 3.9 MMOL/L (ref 3.6–5.5)
POTASSIUM SERPL-SCNC: 4 MMOL/L (ref 3.6–5.5)
PROT SERPL-MCNC: 6.8 G/DL (ref 6–8.2)
PROT SERPL-MCNC: 7.7 G/DL (ref 6–8.2)
RBC # BLD AUTO: 4.7 M/UL (ref 4.2–5.4)
SODIUM SERPL-SCNC: 130 MMOL/L (ref 135–145)
SODIUM SERPL-SCNC: 132 MMOL/L (ref 135–145)
TRIGL SERPL-MCNC: 146 MG/DL (ref 0–149)
TROPONIN I SERPL-MCNC: <0.01 NG/ML (ref 0–0.04)
TROPONIN I SERPL-MCNC: <0.01 NG/ML (ref 0–0.04)
TSH SERPL DL<=0.005 MIU/L-ACNC: 5.28 UIU/ML (ref 0.38–5.33)
VANCOMYCIN SERPL-MCNC: 4.7 UG/ML
WBC # BLD AUTO: 9.2 K/UL (ref 4.8–10.8)

## 2019-01-17 PROCEDURE — 84484 ASSAY OF TROPONIN QUANT: CPT

## 2019-01-17 PROCEDURE — 96375 TX/PRO/DX INJ NEW DRUG ADDON: CPT

## 2019-01-17 PROCEDURE — 80307 DRUG TEST PRSMV CHEM ANLYZR: CPT

## 2019-01-17 PROCEDURE — 85025 COMPLETE CBC W/AUTO DIFF WBC: CPT

## 2019-01-17 PROCEDURE — A9270 NON-COVERED ITEM OR SERVICE: HCPCS | Performed by: HOSPITALIST

## 2019-01-17 PROCEDURE — 80053 COMPREHEN METABOLIC PANEL: CPT

## 2019-01-17 PROCEDURE — 700102 HCHG RX REV CODE 250 W/ 637 OVERRIDE(OP): Performed by: HOSPITALIST

## 2019-01-17 PROCEDURE — 82962 GLUCOSE BLOOD TEST: CPT

## 2019-01-17 PROCEDURE — 83690 ASSAY OF LIPASE: CPT

## 2019-01-17 PROCEDURE — 700111 HCHG RX REV CODE 636 W/ 250 OVERRIDE (IP): Performed by: HOSPITALIST

## 2019-01-17 PROCEDURE — 80074 ACUTE HEPATITIS PANEL: CPT

## 2019-01-17 PROCEDURE — G0378 HOSPITAL OBSERVATION PER HR: HCPCS

## 2019-01-17 PROCEDURE — 93005 ELECTROCARDIOGRAM TRACING: CPT | Performed by: HOSPITALIST

## 2019-01-17 PROCEDURE — 93010 ELECTROCARDIOGRAM REPORT: CPT | Performed by: INTERNAL MEDICINE

## 2019-01-17 PROCEDURE — 96372 THER/PROPH/DIAG INJ SC/IM: CPT

## 2019-01-17 PROCEDURE — 96366 THER/PROPH/DIAG IV INF ADDON: CPT

## 2019-01-17 PROCEDURE — 96365 THER/PROPH/DIAG IV INF INIT: CPT

## 2019-01-17 PROCEDURE — 80061 LIPID PANEL: CPT

## 2019-01-17 PROCEDURE — A9270 NON-COVERED ITEM OR SERVICE: HCPCS | Performed by: NURSE PRACTITIONER

## 2019-01-17 PROCEDURE — 700102 HCHG RX REV CODE 250 W/ 637 OVERRIDE(OP): Performed by: NURSE PRACTITIONER

## 2019-01-17 PROCEDURE — 80202 ASSAY OF VANCOMYCIN: CPT

## 2019-01-17 PROCEDURE — 700105 HCHG RX REV CODE 258: Performed by: HOSPITALIST

## 2019-01-17 PROCEDURE — 99217 PR OBSERVATION CARE DISCHARGE: CPT | Performed by: HOSPITALIST

## 2019-01-17 RX ORDER — LORAZEPAM 2 MG/ML
1-2 INJECTION INTRAMUSCULAR EVERY 4 HOURS PRN
Status: DISCONTINUED | OUTPATIENT
Start: 2019-01-17 | End: 2019-01-17

## 2019-01-17 RX ORDER — NITROGLYCERIN 0.4 MG/1
0.4 TABLET SUBLINGUAL
Status: DISCONTINUED | OUTPATIENT
Start: 2019-01-17 | End: 2019-01-17 | Stop reason: HOSPADM

## 2019-01-17 RX ORDER — HYDROMORPHONE HYDROCHLORIDE 2 MG/ML
0.5 INJECTION, SOLUTION INTRAMUSCULAR; INTRAVENOUS; SUBCUTANEOUS
Status: DISCONTINUED | OUTPATIENT
Start: 2019-01-17 | End: 2019-01-17

## 2019-01-17 RX ORDER — ISOSORBIDE MONONITRATE 30 MG/1
30 TABLET, EXTENDED RELEASE ORAL DAILY
Qty: 30 TAB | Refills: 1 | Status: SHIPPED | OUTPATIENT
Start: 2019-01-18 | End: 2019-01-17

## 2019-01-17 RX ORDER — FERROUS SULFATE 325(65) MG
325 TABLET ORAL
Qty: 30 TAB | Refills: 0 | Status: ON HOLD | OUTPATIENT
Start: 2019-01-18 | End: 2023-04-24

## 2019-01-17 RX ORDER — FERROUS SULFATE 325(65) MG
325 TABLET ORAL
Qty: 30 TAB | Refills: 0 | Status: SHIPPED | OUTPATIENT
Start: 2019-01-18 | End: 2019-01-17

## 2019-01-17 RX ORDER — MORPHINE SULFATE 15 MG/1
7.5 TABLET ORAL EVERY 6 HOURS PRN
Status: DISCONTINUED | OUTPATIENT
Start: 2019-01-17 | End: 2019-01-17 | Stop reason: HOSPADM

## 2019-01-17 RX ORDER — ISOSORBIDE MONONITRATE 30 MG/1
30 TABLET, EXTENDED RELEASE ORAL DAILY
Qty: 30 TAB | Refills: 1 | Status: ON HOLD | OUTPATIENT
Start: 2019-01-18 | End: 2023-04-24

## 2019-01-17 RX ORDER — DEXTROSE MONOHYDRATE 25 G/50ML
25 INJECTION, SOLUTION INTRAVENOUS
Status: DISCONTINUED | OUTPATIENT
Start: 2019-01-17 | End: 2019-01-17 | Stop reason: HOSPADM

## 2019-01-17 RX ORDER — ISOSORBIDE MONONITRATE 30 MG/1
30 TABLET, EXTENDED RELEASE ORAL
Status: DISCONTINUED | OUTPATIENT
Start: 2019-01-17 | End: 2019-01-17 | Stop reason: HOSPADM

## 2019-01-17 RX ADMIN — GABAPENTIN 800 MG: 400 CAPSULE ORAL at 13:09

## 2019-01-17 RX ADMIN — TRAZODONE HYDROCHLORIDE 50 MG: 50 TABLET ORAL at 02:41

## 2019-01-17 RX ADMIN — METOPROLOL TARTRATE 25 MG: 25 TABLET, FILM COATED ORAL at 05:19

## 2019-01-17 RX ADMIN — HEPARIN SODIUM 5000 UNITS: 5000 INJECTION, SOLUTION INTRAVENOUS; SUBCUTANEOUS at 05:19

## 2019-01-17 RX ADMIN — ISOSORBIDE MONONITRATE 30 MG: 30 TABLET ORAL at 14:03

## 2019-01-17 RX ADMIN — HYDROMORPHONE HYDROCHLORIDE 0.5 MG: 2 INJECTION, SOLUTION INTRAMUSCULAR; INTRAVENOUS; SUBCUTANEOUS at 03:54

## 2019-01-17 RX ADMIN — ASPIRIN 81 MG: 81 TABLET, COATED ORAL at 05:19

## 2019-01-17 RX ADMIN — INSULIN GLARGINE 30 UNITS: 100 INJECTION, SOLUTION SUBCUTANEOUS at 13:01

## 2019-01-17 RX ADMIN — VANCOMYCIN HYDROCHLORIDE 2600 MG: 100 INJECTION, POWDER, LYOPHILIZED, FOR SOLUTION INTRAVENOUS at 04:44

## 2019-01-17 RX ADMIN — FERROUS SULFATE TAB 325 MG (65 MG ELEMENTAL FE) 325 MG: 325 (65 FE) TAB at 13:09

## 2019-01-17 RX ADMIN — NITROGLYCERIN 0.4 MG: 0.4 TABLET SUBLINGUAL at 08:50

## 2019-01-17 RX ADMIN — INSULIN HUMAN 4 UNITS: 100 INJECTION, SOLUTION PARENTERAL at 05:24

## 2019-01-17 RX ADMIN — CLOPIDOGREL BISULFATE 75 MG: 75 TABLET ORAL at 05:18

## 2019-01-17 RX ADMIN — HYDROMORPHONE HYDROCHLORIDE 0.5 MG: 2 INJECTION, SOLUTION INTRAMUSCULAR; INTRAVENOUS; SUBCUTANEOUS at 07:51

## 2019-01-17 RX ADMIN — GABAPENTIN 800 MG: 400 CAPSULE ORAL at 05:19

## 2019-01-17 ASSESSMENT — PATIENT HEALTH QUESTIONNAIRE - PHQ9
1. LITTLE INTEREST OR PLEASURE IN DOING THINGS: NOT AT ALL
1. LITTLE INTEREST OR PLEASURE IN DOING THINGS: NOT AT ALL
2. FEELING DOWN, DEPRESSED, IRRITABLE, OR HOPELESS: NOT AT ALL
SUM OF ALL RESPONSES TO PHQ9 QUESTIONS 1 AND 2: 0
2. FEELING DOWN, DEPRESSED, IRRITABLE, OR HOPELESS: NOT AT ALL
SUM OF ALL RESPONSES TO PHQ9 QUESTIONS 1 AND 2: 0

## 2019-01-17 ASSESSMENT — LIFESTYLE VARIABLES: EVER_SMOKED: YES

## 2019-01-17 ASSESSMENT — PAIN SCALES - GENERAL
PAINLEVEL_OUTOF10: 5
PAINLEVEL_OUTOF10: 9

## 2019-01-17 NOTE — ED NOTES
Pt BIBA as transfer, pt tearful, denies any relief from prior meds. Pt has iv estb at prior hospital. NS infusing well. Tbs. ekg completed. Lab at bs. Call light inreach. Will ctm.

## 2019-01-17 NOTE — PROGRESS NOTES
"Pharmacy Kinetics 58 y.o. female on vancomycin day # 2 (continued on transfer to [facility])  2019    Previously on Vancomycin 1000 mg iv q24hr (1700)     Indication for Treatment:   Osteomyelitis of great toe     Pertinent history per medical record:   Admitted on 2019 as a transfer for chest pain. Patient was seen at [facility] and had two ANA placed to the LAD on . She presents tonight with recurrent chest pain, initial troponin level <0.01. Patient was reportedly started on a course of IV vancomycin at [facility] for osteomyelitis. She has a history of DM. Vancomycin is to continue during stay at [facility].     Other antibiotics:   None    Allergies:  Patient has no known allergies.     List concerns for renal function:   Acutely elevated renal indices, BMI 38 transaminitis, hx DM    Pertinent cultures to date:   None    Recent Labs      19   0241   WBC  9.2   NEUTSPOLYS  61.80     Recent Labs      19   0241   BUN  30*   CREATININE  1.55*   ALBUMIN  3.9     Recent Labs      19   0241   VANCORANDOM  4.7   No intake or output data in the 24 hours ending 19 0406   Blood pressure 116/96, pulse 97, temperature 36.6 °C (97.8 °F), temperature source Temporal, resp. rate 19, height 1.651 m (5' 5\"), weight 104 kg (229 lb 4.5 oz), last menstrual period 2009, SpO2 97 %, not currently breastfeeding. Temp (24hrs), Av.6 °C (97.8 °F), Min:36.5 °C (97.7 °F), Max:36.6 °C (97.8 °F)      A/P   1. Vancomycin dose change: reload patient with 25 mg/kg x 1 now, then 1500 mg q24h (0400)  2. Next vancomycin level:  @ 0300 (not yet ordered)  3. Goal trough: 12-16  4. Comments: Random level obtained on admission is subtherapeutic, as would be expected since patient missed 1/16 dose of vancomycin. Renal indices are acutely elevated compared to values from previous admit just weeks ago, and transaminitis is worse. Will re-load patient with 25 mg/kg, then start cautious q24h regimen with " plan to obtain trough level prior to the third dose (administered at [facility]). Pharmacy will continue to monitor and adjust dosing or recommend de-escalation as appropriate.       Perez Dobbins, PharmD

## 2019-01-17 NOTE — DISCHARGE INSTRUCTIONS
Discharge Instructions    Discharged to home by taxi with self. Discharged via wheelchair, hospital escort: Yes.  Special equipment needed: Not Applicable    Be sure to schedule a follow-up appointment with your primary care doctor or any specialists as instructed.     Discharge Plan:   Diet Plan: Discussed  Activity Level: Discussed  Smoking Cessation Offered: Patient Refused (educated)  Confirmed Follow up Appointment: Patient to Call and Schedule Appointment  Confirmed Symptoms Management: Discussed  Medication Reconciliation Updated: Yes  Pneumococcal Vaccine Administered/Refused: Not given - Patient refused pneumococcal vaccine  Influenza Vaccine Indication: Not indicated: Previously immunized this influenza season and > 8 years of age    I understand that a diet low in cholesterol, fat, and sodium is recommended for good health. Unless I have been given specific instructions below for another diet, I accept this instruction as my diet prescription.   Other diet: low fat    Special Instructions: None    · Is patient discharged on Warfarin / Coumadin?   No     Depression / Suicide Risk    As you are discharged from this [facility] facility, it is important to learn how to keep safe from harming yourself.    Recognize the warning signs:  · Abrupt changes in personality, positive or negative- including increase in energy   · Giving away possessions  · Change in eating patterns- significant weight changes-  positive or negative  · Change in sleeping patterns- unable to sleep or sleeping all the time   · Unwillingness or inability to communicate  · Depression  · Unusual sadness, discouragement and loneliness  · Talk of wanting to die  · Neglect of personal appearance   · Rebelliousness- reckless behavior  · Withdrawal from people/activities they love  · Confusion- inability to concentrate     If you or a loved one observes any of these behaviors or has concerns about self-harm, here's what you can do:  · Talk  about it- your feelings and reasons for harming yourself  · Remove any means that you might use to hurt yourself (examples: pills, rope, extension cords, firearm)  · Get professional help from the community (Mental Health, Substance Abuse, psychological counseling)  · Do not be alone:Call your Safe Contact- someone whom you trust who will be there for you.  · Call your local CRISIS HOTLINE 246-4891 or 035-707-6468  · Call your local Children's Mobile Crisis Response Team [place] (185) 974-0817 or wwwBass Manager  · Call the toll free National Suicide Prevention Hotlines   · National Suicide Prevention Lifeline 691-111-BATC (9412)  · National Hope Line Network 800-SUICIDE (468-0682)

## 2019-01-17 NOTE — ED NOTES
Pt medicated per mar. Pt states when she takes a deep breath in or tries to stretch the pain gets worse. Will monitor for effect. Call light inreach. Lights turned down. Will ctm.

## 2019-01-17 NOTE — ED NOTES
Med rec updated and complete and allergies reviewed.  Pt is currently on iv Vancomycin 1 gm.  Start date 01/16/19- 01/23/19

## 2019-01-17 NOTE — DISCHARGE SUMMARY
"Discharge Summary    CHIEF COMPLAINT ON ADMISSION  Chief Complaint   Patient presents with   • Chest Pain     \"cramping\" cp onset 1500 today while at rest. (2) stents placed 3 weeks ago at [facility].        Reason for Admission  EMS     Admission Date  1/16/2019    CODE STATUS  Full Code    HPI & HOSPITAL COURSE  This is a 58 y.o. female here with chest pain.  Please see the dictated history of present illness 1/16/2019 for complete details.  In short, the patient carries a history of coronary artery disease and presented 1 month ago where she underwent PCI with ANA x2 to the LAD.  She is also been on IV vancomycin as an outpatient for great toe infection secondary to diabetes.  Initial EKG was interpreted as non-acute.  Initial troponin was negative.  Patient was therefore admitted to the observation unit for further cardiac enzymes and EKG monitoring.    Serial EKGs remain nonacute.  Serial troponins remain negative.  Chemistry panel on admission showed significant transaminitis with AST at 308 and ALT at 202, and alk phosphatase at 283.  Total bilirubin was normal at 0.3.  Lipase was mildly elevated at 110.  Patient has history of cholecystectomy in the past.  She has also has history of elevated liver enzymes and underwent a diagnostic ultrasound of the right upper quadrant approximately 3-4 weeks ago.  This showed hepatic steatosis and absence of the gallbladder as well as postoperative dilatation of the common duct measuring 7 mm.  Patient adamantly denies drinking any alcohol, recreational drug use, or IV drug use.    Echocardiogram was also complete normal systolic function with preserved EF of 55%.  Patient was given opioid pain therapy with symptom relief.  She was also given a dose of Imdur here with some degree of improvement.  Her Crestor was held due to her transaminitis and a decision to restart this medication will be at the discretion of her PCP.  However given her coronary artery disease be highly " desirable if tolerable.  Acetaminophen level was drawn and was normal.  Viral hepatitis panel was ordered and is currently pending.  The lab was unable to process this test on its existing blood supply it may need to be reordered as an outpatient.    Therefore, she is discharged in good and stable condition to home with close outpatient follow-up.    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  Tre Jason M.D.  [address] [facility] [address]  [phone]      [facility]  LEFT A MESSAGE WITH YOUR PHYSICIAN REGARDING NEED TO ARRANGE A HOSPITAL FOLLOW UP. PLEASE CALL THEIR OFFICE DIRECTLY IF YOU DO NOT HEAR FROM THEM WITH IN 2 DAYS. THANK YOU      MEDICATIONS ON DISCHARGE     Medication List      START taking these medications      Instructions   ferrous sulfate 325 (65 Fe) MG tablet  Start taking on:  1/18/2019  Replaces:  ferrous sulfate 325 (65 Fe) MG EC tablet   Take 1 Tab by mouth every day with lunch.  Dose:  325 mg     isosorbide mononitrate SR 30 MG Tb24  Start taking on:  1/18/2019  Commonly known as:  IMDUR   Take 1 Tab by mouth every day.  Dose:  30 mg        CONTINUE taking these medications      Instructions   ascorbic acid 500 MG tablet  Commonly known as:  VITAMIN C   Take 1 Tab by mouth every day.  Dose:  500 mg     aspirin 81 MG EC tablet   Take 1 Tab by mouth every day.  Dose:  81 mg     clopidogrel 75 MG Tabs  Commonly known as:  PLAVIX   Take 1 Tab by  mouth every day.  Dose:  75 mg     gabapentin 800 MG tablet  Commonly known as:  NEURONTIN   Take 800 mg by mouth 3 times a day.  Dose:  800 mg     insulin glargine 100 UNIT/ML Soln  Commonly known as:  LANTUS   Inject 30 Units as instructed 2 Times a Day.  Dose:  30 Units     metoprolol 25 MG Tabs  Commonly known as:  LOPRESSOR   Take 1 Tab by mouth 2 Times a Day.  Dose:  25 mg     traZODone 100 MG Tabs  Commonly known as:  DESYREL   Take 50 mg by mouth every bedtime. 100 mg  mg PM  Dose:  50 mg     vancomycin 1000 MG Solr  Commonly known as:  VANCOCIN   1,000 mg by Intravenous route every day.  Dose:  1000 mg        STOP taking these medications    ferrous sulfate 325 (65 Fe) MG EC tablet  Replaced by:  ferrous sulfate 325 (65 Fe) MG tablet            Allergies  No Known Allergies    DIET  Orders Placed This Encounter   Procedures   • Diet Order Cardiac     Standing Status:   Standing     Number of Occurrences:   1     Order Specific Question:   Diet:     Answer:   Cardiac [6]       ACTIVITY  As tolerated.  Weight bearing as tolerated    CONSULTATIONS  None    PROCEDURES  None    LABORATORY  Lab Results   Component Value Date    SODIUM 130 (L) 01/17/2019    POTASSIUM 3.9 01/17/2019    CHLORIDE 98 01/17/2019    CO2 24 01/17/2019    GLUCOSE 319 (H) 01/17/2019    BUN 31 (H) 01/17/2019    CREATININE 1.23 01/17/2019        Lab Results   Component Value Date    WBC 9.2 01/17/2019    HEMOGLOBIN 13.1 01/17/2019    HEMATOCRIT 40.9 01/17/2019    PLATELETCT 304 01/17/2019        Total time of the discharge process exceeds 36 minutes.

## 2019-01-17 NOTE — ED PROVIDER NOTES
ED Provider Note      HPI: Patient is a 58-year-old female who presented to the emergency department by ambulance transfer January 16, 2019 at 10:06 PM with a chief complaint of chest pain.    Patient had acute onset of crampy chest pain that started about 3 PM today while at rest.  The patient had 2 cardiac stents placed 3 weeks ago at this facility.  She reports that right after stent placement she had similar symptoms which resolved.  She has had no fever no chills no cough no nausea no vomiting.  The pain is not positional in nature.  No shortness of breath.  No other somatic complaints at the other facility the patient was given nitroglycerin and morphine which appeared to have a brief effect but she otherwise states the pain is been fairly unremitting.  The other facility did not believe they have the capability to care for this problem and patient was transferred here.  Troponin testing at the other facility was normal.    Review of Systems: Positive for substernal chest pain described as cramping negative for fever chills cough nausea vomiting shortness of breath.  Review of systems reviewed with patient, all other systems negative    Past medical/surgical history: Coronary artery disease stent placement x2 3 weeks ago hypertension diabetes migraine headache intestinal mass cholecystectomy elevated BMI    Medications: Trazodone Neurontin insulin Crestor Lopressor Plavix aspirin iron    Allergies: None    Social History: 20-pack-year history of smoking quit in 1995 no alcohol use      Physical exam: Constitutional: Pleasant female awake alert appears somewhat uncomfortable  Vital signs: Temp 97.7 pulse 96 respirations 20 blood pressure 93/53 repeat 134/63 pulse oximetry 95% on 2 L  EYES: PERRL, EOMI, Conjunctivae and sclera normal, eyelids normal bilaterally.  Neck: Trachea midline. No cervical masses seen or palpated. Normal range of motion, supple. No meningeal signs elicited.  Cardiac: Regular rate and  rhythm. S1-S2 present. No S3 or S4 present. No murmurs, rubs, or gallops heard. No edema or varicosities were seen.   Lungs: Clear to auscultation with good aeration throughout. No wheezes, rales, or rhonchi heard. Patient's chest wall moved symmetrically with each respiratory effort. Patient was not making use of accessory muscles of respiration in breathing.  Abdomen: Soft nontender to palpation. No rebound or guarding elicited. No organomegaly identified. No pulsatile abdominal masses identified.   Musculoskeletal:  no  pain with palpitation or movement of muscle, bone or joint , no obvious musculoskeletal deformities identified.  Neurologic: alert and awake answers questions appropriately. Moves all four extremities independently, no gross focal abnormalities identified. Normal strength and motor.  Skin: no rash or lesion seen, no palpable dermatologic lesions identified.  Psychiatric: Patient appear to be somewhat anxious.  She was not delusional or hallucinating    Medical decision making: EKG obtained on arrival (interpretation) twelve-lead EKG sinus rhythm rate 96.  Morphology of P waves QRS complexes T waves unremarkable.  No evidence of ST elevation or depression.  R wave progression normal.  Interpreted as unremarkable EKG for acute findings    Troponin test obtained; normal    I reviewed the studies from the other facility; these were significant for an unremarkable CBC.  Creatinine was minimally elevated at 1.07.  Sodium low at 135.  Troponin was normal.  Her chest x-ray was felt to be essentially unremarkable although slight volume overload cannot be excluded    Patient received hydromorphone and Zofran; patient reported some improvement    I spoke with Dr. Vasques for cardiology.  She will see the patient in house.  Patient admitted by hospitalist service.  At this time because the patient's pain is not clear.  Her signs and symptoms would not seem to go along with pulmonary embolism.  She is  reviewed and negative troponins which would seem to make acute cardiac injury less likely.  Chest x-ray was unremarkable.  Ultrasound may be helpful, this will be determined by cardiology.  Further care and hospital course per attending physician summary.  Given the patient's known history of coronary artery disease I would characterize his pain is having at least a moderate likelihood of cardiac etiology    Further care and hospital course per attending physician summary    Impression chest pain moderate likelihood of cardiac etiology

## 2019-01-17 NOTE — DISCHARGE PLANNING
Called [facility]TM @ [phone] and requested transport home for patient to [facility]. Do to short notice has to go to short notice team to be expedited. Awaiting call back.

## 2019-01-17 NOTE — H&P
Hospital Medicine History & Physical Note    Date of Service  1/16/2019    Primary Care Physician  Tre Jason M.D.    Consultants  Cardiology    Code Status  full    Chief Complaint  Chest pain     History of Presenting Illness  58 y.o. female who presented 1/16/2019 with chest pain.  Medical history of uncontrolled diabetes high cholesterol hypertension who presents with acute onset chest pain radiating to the left arm.  On and off for about 3 weeks.  Is more severe today and sustained.  She states that she has had stents placed about 3 weeks ago and been taking her aspirin and Plavix.  She is also been on IV vancomycin as an outpatient for a great toe infection secondary to diabetes.  Her symptoms are worse with deep inspiration and palpation.  She otherwise has no known alleviating or exacerbating factors.    Upon personal review of outside hospital labs WBC count 7.85 hemoglobin 13.31 platelet count 312 glucose 270 BUN 21 creatinine 1.07 BUN to creatinine ratio 19.63 sodium 135 potassium 4.2 chloride 99 CO2 28 calcium 9.4 total protein 8.2 albumin 3.1 out globulin 5.1 LFTs unremarkable.  Mild vascular prominence without overt pulmonary edema which could represent volume overload noted on chest x-ray    Review of Systems  Review of Systems   Constitutional: Negative for chills and fever.   HENT: Negative for congestion, hearing loss and tinnitus.    Eyes: Negative for blurred vision, double vision and discharge.   Respiratory: Positive for shortness of breath. Negative for cough and hemoptysis.    Cardiovascular: Positive for chest pain. Negative for palpitations and leg swelling.   Gastrointestinal: Positive for nausea. Negative for abdominal pain, heartburn and vomiting.   Genitourinary: Negative for dysuria and flank pain.   Musculoskeletal: Negative for joint pain and myalgias.   Skin: Negative for rash.   Neurological: Negative for dizziness, sensory change, speech change, focal weakness and weakness.    Endo/Heme/Allergies: Negative for environmental allergies. Does not bruise/bleed easily.   Psychiatric/Behavioral: Negative for depression, hallucinations and substance abuse.       Past Medical History   has a past medical history of Diabetes; High cholesterol (5/15/2011); Hypertension; Intestinal mass (2015); Migraine; and Staph skin infection. She also has no past medical history of Breast cancer (HCC).    Surgical History   has a past surgical history that includes other abdominal surgery; gyn surgery; other orthopedic surgery (6-2014); abdominal exploration; gastroscopy (9/3/2017); colonoscopy (9/3/2017); and stent placement (12/20/2018).     Family History  Reviewed and not pertinent     Social History   reports that she quit smoking about 23 years ago. Her smoking use included Cigarettes. She has a 20.00 pack-year smoking history. She quit smokeless tobacco use about 23 years ago. She reports that she does not drink alcohol or use drugs.    Allergies  No Known Allergies    Medications  Prior to Admission Medications   Prescriptions Last Dose Informant Patient Reported? Taking?   ascorbic acid (VITAMIN C) 500 MG tablet 1/16/2019 at Unknown time  No No   Sig: Take 1 Tab by mouth every day.   aspirin EC 81 MG EC tablet 1/16/2019 at Unknown time  No No   Sig: Take 1 Tab by mouth every day.   clopidogrel (PLAVIX) 75 MG Tab 1/16/2019 at Unknown time  No No   Sig: Take 1 Tab by mouth every day.   ferrous sulfate 325 (65 Fe) MG EC tablet not taking  No No   Sig: Take 1 Tab by mouth every day with lunch.   gabapentin (NEURONTIN) 800 MG tablet 1/16/2019 at Unknown time Patient Yes No   Sig: Take 800 mg by mouth 3 times a day.   insulin glargine (LANTUS) 100 UNIT/ML Solution 1/16/2019 at Unknown time Patient Yes No   Sig: Inject 30 Units as instructed 2 Times a Day.   metoprolol (LOPRESSOR) 25 MG Tab 1/16/2019 at Unknown time  No No   Sig: Take 1 Tab by mouth 2 Times a Day.   rosuvastatin (CRESTOR) 20 MG Tab not  taking  No No   Sig: Take 1 Tab by mouth every evening.   traZODone (DESYREL) 100 MG Tab 1/16/2019 at Unknown time Patient Yes No   Sig: Take 200 mg by mouth 2 times a day. 100 mg AM  200 mg PM      Facility-Administered Medications: None       Physical Exam  Temp:  [36.5 °C (97.7 °F)] 36.5 °C (97.7 °F)  Pulse:  [93-96] 93  Resp:  [18-20] 18  BP: (93)/(53) 93/53  SpO2:  [99 %-100 %] 100 %    Physical Exam   Constitutional: She is oriented to person, place, and time. She appears well-developed and well-nourished. She appears distressed.   HENT:   Head: Normocephalic and atraumatic.   Eyes: Pupils are equal, round, and reactive to light. Conjunctivae and EOM are normal.   Neck: Normal range of motion. Neck supple. No JVD present.   Cardiovascular: Normal rate, regular rhythm, normal heart sounds and intact distal pulses.    No murmur heard.  Pulmonary/Chest: Effort normal and breath sounds normal. No respiratory distress. She has no wheezes. She exhibits tenderness.   Abdominal: Soft. Bowel sounds are normal. She exhibits no distension. There is no tenderness.   Musculoskeletal: Normal range of motion. She exhibits no edema.   Neurological: She is alert and oriented to person, place, and time. She exhibits normal muscle tone.   Skin: Skin is warm and dry.   Psychiatric: She has a normal mood and affect. Her behavior is normal. Judgment and thought content normal.   Nursing note and vitals reviewed.      Laboratory:     WBC count 7.85 hemoglobin 13.31 platelet count 312 glucose 270 BUN 21 creatinine 1.07 BUN to creatinine ratio 19.63 sodium 135 potassium 4.2 chloride 99 CO2 28 calcium 9.4 total protein 8.2 albumin 3.1 out globulin 5.1 LFTs unremarkable.      No results for input(s): ALTSGPT, ASTSGOT, ALKPHOSPHAT, TBILIRUBIN, DBILIRUBIN, GAMMAGT, AMYLASE, LIPASE, ALB, PREALBUMIN, GLUCOSE in the last 72 hours.              Recent Labs      01/16/19   2233   TROPONINI  <0.01       Urinalysis:    No results found      Imaging:  No orders to display         Assessment/Plan:  I anticipate this patient will require at least two midnights for appropriate medical management, necessitating inpatient admission.    * Pain in the chest   Assessment & Plan    Recent stents placed 3 weeks ago, on and off pain since   Initial troponin and EKG and unremarkable   Cardiology Dr. 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)- (present on admission)   Assessment & Plan    Continue Lantus BID   SSI ordered     Iron deficiency anemia- (present on admission)   Assessment & Plan    Resume home iron replacement     OJ (acute kidney injury) (HCC)   Assessment & Plan    Mild oj   Avoid nephrotoxics  Continue with IVF and repeat labs in am     Osteomyelitis (HCC)   Assessment & Plan    On outpatient vancomycin, will resume while inpatient   Stop date per patient reports 1/20/2019     Obesity (BMI 30-39.9)- (present on admission)   Assessment & Plan    Encouraged weight loss     Hyperlipidemia- (present on admission)   Assessment & Plan    Resume crestor, recheck lipid panel   Consider increasing dose     Essential hypertension- (present on admission)   Assessment & Plan    Resume home BB          VTE prophylaxis: heparin    I spent a total of 31 minutes of non face to face time performing additional research, reviewing old medical records, provided on going management by following up on labs, placing orders, discussing plan of care with other healthcare providers and nursing staff. Start time: 11:40 pm. End time: 12:11 am

## 2019-01-17 NOTE — DISCHARGE PLANNING
Care Transition Team Assessment    Spoke with patient at bedside. Anticipate no needs @ D/C. Has MTM benefit for ride @ D/C.    Information Source  Orientation : Oriented x 4  Information Given By: Patient    Interdisciplinary Discharge Planning  Does Admitting Nurse Feel This Could be a Complex Discharge?: No  Primary Care Physician: [facility] in [facility]  Lives with - Patient's Self Care Capacity: Adult Children  Patient or legal guardian wants to designate a caregiver (see row info): No  Support Systems: Children  Do You Take your Prescribed Medications Regularly: Yes  Able to Return to Previous ADL's: Yes  Mobility Issues: Yes  Prior Services: None  Patient Expects to be Discharged to:: Home  Assistance Needed: No  Durable Medical Equipment: Walker    Discharge Preparedness  What are your discharge supports?: Child  Prior Functional Level: Ambulatory  Difficulity with ADLs: None    Functional Assesment  Prior Functional Level: Ambulatory    Finances  Prescription Coverage: Yes    Anticipated Discharge Information  Anticipated discharge disposition: Home  Discharge Address: [address] [place] [address] [address] [facility]  Discharge Contact Phone Number: [phone]

## 2019-01-17 NOTE — PROGRESS NOTES
A/o, respirations are even and unlabored on  2L of o2 via nasal cannula ,assessment completed, vital signs stable, SR on the monitor hr-95, pt complaining of chest pain, offered oral pain meds, refusing, wants the IV dilaudid instead, informed pt not due yet, IV fluids running per orders, updated communication board,  poc discussed and understood, verbalized understanding, given, all questions answered at this time , fall precautions in place, call button within reach, will continue to monitor

## 2019-01-17 NOTE — ED TRIAGE NOTES
"Chief Complaint   Patient presents with   • Chest Pain     \"cramping\" cp onset 1500 today while at rest. (2) stents placed 3 weeks ago at [facility].    transfer pt. Given morphine ntg paste & ativan, pt denies any relief.     "

## 2019-01-17 NOTE — ASSESSMENT & PLAN NOTE
Recent stents placed 3 weeks ago, on and off pain since   Initial troponin and EKG and unremarkable   Cardiology Dr. Vasques consulted and case discussed  Resume home anti platelets, statin and BB  Admit to telemetry   Cardiac monitoring   Serial troponin and ekg   Defer further stresstest/echo to cardiology recs

## 2019-01-21 NOTE — DOCUMENTATION QUERY
DOCUMENTATION QUERY    PROVIDERS: Please select “Cosign w/ note”to reply to query.    To better represent the severity of illness of your patient, please review the following information and exercise your independent professional judgment in responding to this query.     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 Uncontrolled diabetes is coded based on the presence of hypoglycemia or hyperglycemia. Based upon the clinical findings, risk factors, treatment, and your professional judgement,  can this diagnosis be further specified?     ·         Uncontrolled diabetes meaning with hypoglycemia  ·         Uncontrolled diabetes meaning with hyperglycemia  ·         Findings of no clinical significance  ·         Unable to determine  ·         Other explanation of clinical findings (please document)        The medical record reflects the following:   Clinical Findings  Uncontrolled DM2   Treatment  SSI ordered   Risk Factors     Location within medical record  History and Physical     Thank you,       NASIM Gray  [employer]

## 2019-02-06 DIAGNOSIS — R07.9 CHEST PAIN, UNSPECIFIED TYPE: Primary | ICD-10-CM

## 2019-02-06 LAB — EKG IMPRESSION: NORMAL

## 2019-02-06 PROCEDURE — 93010 ELECTROCARDIOGRAM REPORT: CPT | Performed by: INTERNAL MEDICINE

## 2019-02-07 DIAGNOSIS — R07.9 CHEST PAIN, UNSPECIFIED TYPE: Primary | ICD-10-CM

## 2019-02-07 PROCEDURE — 93010 ELECTROCARDIOGRAM REPORT: CPT | Performed by: INTERNAL MEDICINE

## 2019-02-11 LAB — EKG IMPRESSION: NORMAL

## 2019-02-20 DIAGNOSIS — R07.89 OTHER CHEST PAIN: ICD-10-CM

## 2019-02-20 PROCEDURE — 99999 EKG: CPT | Performed by: INTERNAL MEDICINE

## 2019-03-12 DIAGNOSIS — R07.89 OTHER CHEST PAIN: ICD-10-CM

## 2019-03-12 LAB
EKG IMPRESSION: NORMAL
EKG IMPRESSION: NORMAL

## 2019-03-12 PROCEDURE — 99999 EKG: CPT | Performed by: INTERNAL MEDICINE

## 2019-03-21 LAB — EKG IMPRESSION: NORMAL

## 2019-06-14 DIAGNOSIS — R07.89 OTHER CHEST PAIN: Primary | ICD-10-CM

## 2019-06-14 LAB — EKG IMPRESSION: NORMAL

## 2019-06-14 PROCEDURE — 93010 ELECTROCARDIOGRAM REPORT: CPT | Performed by: INTERNAL MEDICINE

## 2019-08-22 DIAGNOSIS — R07.89 OTHER CHEST PAIN: Primary | ICD-10-CM

## 2019-08-22 LAB — EKG IMPRESSION: NORMAL

## 2019-08-22 PROCEDURE — 93010 ELECTROCARDIOGRAM REPORT: CPT | Performed by: INTERNAL MEDICINE

## 2019-10-31 NOTE — ED NOTES
Bedside report to Violet díaz RN, pt agrees with report, all questions answered, belongings with pt, chart with receiving RN. Pt transported off unit monitored.    Mastoid Interpolation Flap Text: A decision was made to reconstruct the defect utilizing an interpolation axial flap and a staged reconstruction.  A telfa template was made of the defect.  This telfa template was then used to outline the mastoid interpolation flap.  The donor area for the pedicle flap was then injected with anesthesia.  The flap was excised through the skin and subcutaneous tissue down to the layer of the underlying musculature.  The pedicle flap was carefully excised within this deep plane to maintain its blood supply.  The edges of the donor site were undermined.   The donor site was closed in a primary fashion.  The pedicle was then rotated into position and sutured.  Once the tube was sutured into place, adequate blood supply was confirmed with blanching and refill.  The pedicle was then wrapped with xeroform gauze and dressed appropriately with a telfa and gauze bandage to ensure continued blood supply and protect the attached pedicle.

## 2020-05-01 DIAGNOSIS — R07.89 OTHER CHEST PAIN: Primary | ICD-10-CM

## 2020-05-01 LAB
EKG IMPRESSION: NORMAL
EKG IMPRESSION: NORMAL

## 2020-05-22 DIAGNOSIS — R07.89 OTHER CHEST PAIN: Primary | ICD-10-CM

## 2020-05-23 LAB — EKG IMPRESSION: NORMAL

## 2020-09-01 DIAGNOSIS — R07.89 OTHER CHEST PAIN: Primary | ICD-10-CM

## 2020-09-02 LAB
EKG IMPRESSION: NORMAL
EKG IMPRESSION: NORMAL

## 2020-09-10 DIAGNOSIS — R10.9 ABDOMINAL PAIN, UNSPECIFIED ABDOMINAL LOCATION: Primary | ICD-10-CM

## 2020-09-10 LAB — EKG IMPRESSION: NORMAL

## 2020-09-25 DIAGNOSIS — R11.2 NAUSEA AND VOMITING, INTRACTABILITY OF VOMITING NOT SPECIFIED, UNSPECIFIED VOMITING TYPE: Primary | ICD-10-CM

## 2020-09-25 LAB — EKG IMPRESSION: NORMAL

## 2020-09-29 DIAGNOSIS — R07.89 OTHER CHEST PAIN: Primary | ICD-10-CM

## 2020-10-01 LAB
EKG IMPRESSION: NORMAL
EKG IMPRESSION: NORMAL

## 2020-10-09 DIAGNOSIS — R11.2 NAUSEA AND VOMITING, INTRACTABILITY OF VOMITING NOT SPECIFIED, UNSPECIFIED VOMITING TYPE: Primary | ICD-10-CM

## 2020-10-09 LAB — EKG IMPRESSION: NORMAL

## 2020-10-20 DIAGNOSIS — R11.2 NAUSEA AND VOMITING, INTRACTABILITY OF VOMITING NOT SPECIFIED, UNSPECIFIED VOMITING TYPE: Primary | ICD-10-CM

## 2020-10-23 LAB — EKG IMPRESSION: NORMAL

## 2020-11-09 DIAGNOSIS — R10.9 ABDOMINAL PAIN, UNSPECIFIED ABDOMINAL LOCATION: Primary | ICD-10-CM

## 2020-11-09 LAB — EKG IMPRESSION: NORMAL

## 2020-11-12 ENCOUNTER — HOSPITAL ENCOUNTER (INPATIENT)
Dept: HOSPITAL 8 - ED | Age: 60
LOS: 6 days | Discharge: HOME | DRG: 243 | End: 2020-11-18
Attending: FAMILY MEDICINE | Admitting: HOSPITALIST
Payer: MEDICAID

## 2020-11-12 VITALS — WEIGHT: 193.79 LBS | BODY MASS INDEX: 32.29 KG/M2 | HEIGHT: 65 IN

## 2020-11-12 DIAGNOSIS — K22.2: Primary | ICD-10-CM

## 2020-11-12 DIAGNOSIS — Z79.4: ICD-10-CM

## 2020-11-12 DIAGNOSIS — Z95.5: ICD-10-CM

## 2020-11-12 DIAGNOSIS — G89.29: ICD-10-CM

## 2020-11-12 DIAGNOSIS — Z98.51: ICD-10-CM

## 2020-11-12 DIAGNOSIS — E11.40: ICD-10-CM

## 2020-11-12 DIAGNOSIS — Z79.899: ICD-10-CM

## 2020-11-12 DIAGNOSIS — I50.9: ICD-10-CM

## 2020-11-12 DIAGNOSIS — K85.00: ICD-10-CM

## 2020-11-12 DIAGNOSIS — Z90.49: ICD-10-CM

## 2020-11-12 DIAGNOSIS — I11.0: ICD-10-CM

## 2020-11-12 DIAGNOSIS — E78.5: ICD-10-CM

## 2020-11-12 DIAGNOSIS — E78.00: ICD-10-CM

## 2020-11-12 DIAGNOSIS — I25.10: ICD-10-CM

## 2020-11-12 DIAGNOSIS — R13.14: ICD-10-CM

## 2020-11-12 DIAGNOSIS — Z87.891: ICD-10-CM

## 2020-11-12 DIAGNOSIS — Z20.828: ICD-10-CM

## 2020-11-12 DIAGNOSIS — K31.9: ICD-10-CM

## 2020-11-12 PROCEDURE — 74181 MRI ABDOMEN W/O CONTRAST: CPT

## 2020-11-12 PROCEDURE — G0378 HOSPITAL OBSERVATION PER HR: HCPCS

## 2020-11-12 PROCEDURE — 88342 IMHCHEM/IMCYTCHM 1ST ANTB: CPT

## 2020-11-12 PROCEDURE — 82947 ASSAY GLUCOSE BLOOD QUANT: CPT

## 2020-11-12 PROCEDURE — C9898 INPNT STAY RADIOLABELED ITEM: HCPCS

## 2020-11-12 PROCEDURE — 82962 GLUCOSE BLOOD TEST: CPT

## 2020-11-12 PROCEDURE — 74220 X-RAY XM ESOPHAGUS 1CNTRST: CPT

## 2020-11-12 PROCEDURE — 83690 ASSAY OF LIPASE: CPT

## 2020-11-12 PROCEDURE — 87635 SARS-COV-2 COVID-19 AMP PRB: CPT

## 2020-11-12 PROCEDURE — 85025 COMPLETE CBC W/AUTO DIFF WBC: CPT

## 2020-11-12 PROCEDURE — 83735 ASSAY OF MAGNESIUM: CPT

## 2020-11-12 PROCEDURE — 36415 COLL VENOUS BLD VENIPUNCTURE: CPT

## 2020-11-12 PROCEDURE — 80053 COMPREHEN METABOLIC PANEL: CPT

## 2020-11-12 PROCEDURE — 84100 ASSAY OF PHOSPHORUS: CPT

## 2020-11-12 PROCEDURE — A9541 TC99M SULFUR COLLOID: HCPCS

## 2020-11-12 PROCEDURE — 88305 TISSUE EXAM BY PATHOLOGIST: CPT

## 2020-11-12 PROCEDURE — 78264 GASTRIC EMPTYING IMG STUDY: CPT

## 2020-11-12 RX ADMIN — MORPHINE SULFATE PRN MG: 4 INJECTION INTRAVENOUS at 21:56

## 2020-11-12 RX ADMIN — MORPHINE SULFATE PRN MG: 4 INJECTION INTRAVENOUS at 23:38

## 2020-11-12 NOTE — NUR
PT BIB [facility] FROM [facility]. PT WENT IN FOR ABDOMINAL PAIN, DX WITH 
H.PYLORI AND PANCREATITIS THERE. PT STATES ABDOMINAL PAIN IS 8/10, LLQ/LUQ. PT 
RESTING ON GURNEY, CHANGED INTO GOWN, STILL PASSING BOWELS, LAST ONE YESTERDAY 
PM. 



PTA PT  MCG FENTANYL, 4 DILAUDID, 4 ZOFRAN, 25 PHENERGAN PTA.

## 2020-11-12 NOTE — NUR
PT AMBULATED TO RESTROOM WITH A SMOOTH AND STEADY GAIT. NAD, STATES PAIN IS 
INCREASING AGAIN, MEDICATED PER MAR FOR PAIN, DENIES ADDITIONAL NEEDS, WATCHING 
TV, WCTM. WAITING FOR ADMIT BED

## 2020-11-12 NOTE — NUR
PT PROVIDED MOUTH SWABS PER REQUEST, PT STATES "AM I STAYING DOWN HERE ALL 
NIGHT?" RN EXPLAINED WE HAVE TO WAIT FOR ERP EVAL BUT HOPEFULLY NOT, PT STATES 
"WELL A  HASNT EVEN SEEN ME YET!" RN EXPLAINED THAT PT WILL HOPEFULLY BE SEEN 
SOON. PT RESTING ON GURNEY, BED IN LOWEST, CALL LIGHT ON LAP, NAD, WCTM. 
WAITING FOR ERP EVAL.

## 2020-11-12 NOTE — NUR
PT REPORTS 10/10 PAIN AT THIS TIME. PT NOTED TO BE DIAPHORETIC AND GROANING. PT 
NAD, RESTING ON GURNEY, WATCHING TV, WCTM. ERP NOTIFIED.

## 2020-11-13 VITALS — DIASTOLIC BLOOD PRESSURE: 79 MMHG | SYSTOLIC BLOOD PRESSURE: 127 MMHG

## 2020-11-13 VITALS — DIASTOLIC BLOOD PRESSURE: 77 MMHG | SYSTOLIC BLOOD PRESSURE: 125 MMHG

## 2020-11-13 VITALS — SYSTOLIC BLOOD PRESSURE: 119 MMHG | DIASTOLIC BLOOD PRESSURE: 66 MMHG

## 2020-11-13 VITALS — SYSTOLIC BLOOD PRESSURE: 151 MMHG | DIASTOLIC BLOOD PRESSURE: 83 MMHG

## 2020-11-13 RX ADMIN — INSULIN LISPRO SCH UNITS: 100 INJECTION, SOLUTION INTRAVENOUS; SUBCUTANEOUS at 18:25

## 2020-11-13 RX ADMIN — GABAPENTIN SCH MG: 400 CAPSULE ORAL at 06:00

## 2020-11-13 RX ADMIN — GABAPENTIN SCH MG: 400 CAPSULE ORAL at 12:15

## 2020-11-13 RX ADMIN — TRAZODONE HYDROCHLORIDE SCH MG: 150 TABLET ORAL at 02:45

## 2020-11-13 RX ADMIN — MORPHINE SULFATE PRN MG: 10 INJECTION INTRAVENOUS at 17:15

## 2020-11-13 RX ADMIN — GABAPENTIN SCH MG: 400 CAPSULE ORAL at 17:39

## 2020-11-13 RX ADMIN — MORPHINE SULFATE PRN MG: 10 INJECTION INTRAVENOUS at 02:45

## 2020-11-13 RX ADMIN — ONDANSETRON PRN MG: 2 INJECTION, SOLUTION INTRAMUSCULAR; INTRAVENOUS at 15:44

## 2020-11-13 RX ADMIN — INSULIN LISPRO SCH UNITS: 100 INJECTION, SOLUTION INTRAVENOUS; SUBCUTANEOUS at 21:00

## 2020-11-13 RX ADMIN — OMEPRAZOLE SCH MG: 20 CAPSULE, DELAYED RELEASE ORAL at 12:15

## 2020-11-13 RX ADMIN — LISINOPRIL SCH MG: 20 TABLET ORAL at 14:16

## 2020-11-13 RX ADMIN — OMEPRAZOLE SCH MG: 20 CAPSULE, DELAYED RELEASE ORAL at 21:43

## 2020-11-13 RX ADMIN — CLARITHROMYCIN SCH MG: 500 TABLET ORAL at 12:14

## 2020-11-13 RX ADMIN — TORSEMIDE SCH MG: 20 TABLET ORAL at 14:16

## 2020-11-13 RX ADMIN — MORPHINE SULFATE PRN MG: 10 INJECTION INTRAVENOUS at 09:32

## 2020-11-13 RX ADMIN — MORPHINE SULFATE PRN MG: 10 INJECTION INTRAVENOUS at 20:12

## 2020-11-13 RX ADMIN — MORPHINE SULFATE PRN MG: 10 INJECTION INTRAVENOUS at 06:42

## 2020-11-13 RX ADMIN — CLARITHROMYCIN SCH MG: 500 TABLET ORAL at 21:53

## 2020-11-13 RX ADMIN — GABAPENTIN SCH MG: 400 CAPSULE ORAL at 21:43

## 2020-11-13 RX ADMIN — AMOXICILLIN SCH MG: 500 CAPSULE ORAL at 21:52

## 2020-11-13 RX ADMIN — ATORVASTATIN CALCIUM SCH MG: 10 TABLET, FILM COATED ORAL at 21:43

## 2020-11-13 RX ADMIN — INSULIN LISPRO SCH UNITS: 100 INJECTION, SOLUTION INTRAVENOUS; SUBCUTANEOUS at 14:24

## 2020-11-13 RX ADMIN — TRAZODONE HYDROCHLORIDE SCH MG: 150 TABLET ORAL at 21:43

## 2020-11-13 RX ADMIN — INSULIN LISPRO SCH UNITS: 100 INJECTION, SOLUTION INTRAVENOUS; SUBCUTANEOUS at 09:35

## 2020-11-13 RX ADMIN — ASPIRIN SCH MG: 81 TABLET, COATED ORAL at 14:16

## 2020-11-13 RX ADMIN — AMOXICILLIN SCH MG: 500 CAPSULE ORAL at 12:15

## 2020-11-13 RX ADMIN — MORPHINE SULFATE PRN MG: 10 INJECTION INTRAVENOUS at 13:49

## 2020-11-13 NOTE — NUR
PT MOVED TO HOSPITAL BED FOR COMFORT, NAD, DENIES ADDITIONAL NEEDS, PROVIDED 
PILLOW, APPEARS COMFORTABLE, BED IN LOWEST, CALL LIGHT ON LAP, WCTM. WAITING 
FOR ADMIT BED.

## 2020-11-14 VITALS — SYSTOLIC BLOOD PRESSURE: 141 MMHG | DIASTOLIC BLOOD PRESSURE: 70 MMHG

## 2020-11-14 VITALS — SYSTOLIC BLOOD PRESSURE: 134 MMHG | DIASTOLIC BLOOD PRESSURE: 79 MMHG

## 2020-11-14 VITALS — DIASTOLIC BLOOD PRESSURE: 87 MMHG | SYSTOLIC BLOOD PRESSURE: 134 MMHG

## 2020-11-14 VITALS — DIASTOLIC BLOOD PRESSURE: 85 MMHG | SYSTOLIC BLOOD PRESSURE: 153 MMHG

## 2020-11-14 VITALS — SYSTOLIC BLOOD PRESSURE: 120 MMHG | DIASTOLIC BLOOD PRESSURE: 81 MMHG

## 2020-11-14 LAB
ALBUMIN SERPL-MCNC: 2.9 G/DL (ref 3.4–5)
ALP SERPL-CCNC: 213 U/L (ref 45–117)
ALT SERPL-CCNC: 40 U/L (ref 12–78)
ANION GAP SERPL CALC-SCNC: 7 MMOL/L (ref 5–15)
BASOPHILS # BLD AUTO: 0.1 X10^3/UL (ref 0–0.1)
BASOPHILS NFR BLD AUTO: 1 % (ref 0–1)
BILIRUB SERPL-MCNC: 0.4 MG/DL (ref 0.2–1)
CALCIUM SERPL-MCNC: 8.7 MG/DL (ref 8.5–10.1)
CHLORIDE SERPL-SCNC: 107 MMOL/L (ref 98–107)
CREAT SERPL-MCNC: 1.13 MG/DL (ref 0.55–1.02)
EOSINOPHIL # BLD AUTO: 0.2 X10^3/UL (ref 0–0.4)
EOSINOPHIL NFR BLD AUTO: 4 % (ref 1–7)
ERYTHROCYTE [DISTWIDTH] IN BLOOD BY AUTOMATED COUNT: 13.3 % (ref 9.6–15.2)
LYMPHOCYTES # BLD AUTO: 2.2 X10^3/UL (ref 1–3.4)
LYMPHOCYTES NFR BLD AUTO: 38 % (ref 22–44)
MCH RBC QN AUTO: 28.9 PG (ref 27–34.8)
MCHC RBC AUTO-ENTMCNC: 32.9 G/DL (ref 32.4–35.8)
MD: NO
MONOCYTES # BLD AUTO: 0.5 X10^3/UL (ref 0.2–0.8)
MONOCYTES NFR BLD AUTO: 9 % (ref 2–9)
NEUTROPHILS # BLD AUTO: 2.8 X10^3/UL (ref 1.8–6.8)
NEUTROPHILS NFR BLD AUTO: 49 % (ref 42–75)
PLATELET # BLD AUTO: 298 X10^3/UL (ref 130–400)
PMV BLD AUTO: 7.5 FL (ref 7.4–10.4)
PROT SERPL-MCNC: 6.9 G/DL (ref 6.4–8.2)
RBC # BLD AUTO: 3.86 X10^6/UL (ref 3.82–5.3)

## 2020-11-14 RX ADMIN — MORPHINE SULFATE PRN MG: 10 INJECTION INTRAVENOUS at 15:53

## 2020-11-14 RX ADMIN — ASPIRIN SCH MG: 81 TABLET, COATED ORAL at 09:50

## 2020-11-14 RX ADMIN — MORPHINE SULFATE PRN MG: 10 INJECTION INTRAVENOUS at 23:59

## 2020-11-14 RX ADMIN — MORPHINE SULFATE PRN MG: 10 INJECTION INTRAVENOUS at 09:50

## 2020-11-14 RX ADMIN — GABAPENTIN SCH MG: 400 CAPSULE ORAL at 11:00

## 2020-11-14 RX ADMIN — AMOXICILLIN SCH MG: 500 CAPSULE ORAL at 21:38

## 2020-11-14 RX ADMIN — GABAPENTIN SCH MG: 400 CAPSULE ORAL at 03:55

## 2020-11-14 RX ADMIN — AMOXICILLIN SCH MG: 500 CAPSULE ORAL at 09:51

## 2020-11-14 RX ADMIN — TORSEMIDE SCH MG: 20 TABLET ORAL at 09:51

## 2020-11-14 RX ADMIN — CLARITHROMYCIN SCH MG: 500 TABLET ORAL at 21:38

## 2020-11-14 RX ADMIN — INSULIN LISPRO SCH UNITS: 100 INJECTION, SOLUTION INTRAVENOUS; SUBCUTANEOUS at 21:39

## 2020-11-14 RX ADMIN — GABAPENTIN SCH MG: 400 CAPSULE ORAL at 21:38

## 2020-11-14 RX ADMIN — LISINOPRIL SCH MG: 20 TABLET ORAL at 09:50

## 2020-11-14 RX ADMIN — TRAZODONE HYDROCHLORIDE SCH MG: 150 TABLET ORAL at 21:38

## 2020-11-14 RX ADMIN — ATORVASTATIN CALCIUM SCH MG: 10 TABLET, FILM COATED ORAL at 21:39

## 2020-11-14 RX ADMIN — MORPHINE SULFATE PRN MG: 10 INJECTION INTRAVENOUS at 12:38

## 2020-11-14 RX ADMIN — OMEPRAZOLE SCH MG: 20 CAPSULE, DELAYED RELEASE ORAL at 09:50

## 2020-11-14 RX ADMIN — INSULIN LISPRO SCH UNITS: 100 INJECTION, SOLUTION INTRAVENOUS; SUBCUTANEOUS at 11:00

## 2020-11-14 RX ADMIN — MORPHINE SULFATE PRN MG: 10 INJECTION INTRAVENOUS at 19:42

## 2020-11-14 RX ADMIN — CLARITHROMYCIN SCH MG: 500 TABLET ORAL at 09:51

## 2020-11-14 RX ADMIN — OMEPRAZOLE SCH MG: 20 CAPSULE, DELAYED RELEASE ORAL at 21:38

## 2020-11-14 RX ADMIN — INSULIN LISPRO SCH UNITS: 100 INJECTION, SOLUTION INTRAVENOUS; SUBCUTANEOUS at 07:00

## 2020-11-14 RX ADMIN — GABAPENTIN SCH MG: 400 CAPSULE ORAL at 15:53

## 2020-11-14 RX ADMIN — INSULIN LISPRO SCH UNITS: 100 INJECTION, SOLUTION INTRAVENOUS; SUBCUTANEOUS at 16:00

## 2020-11-15 VITALS — DIASTOLIC BLOOD PRESSURE: 74 MMHG | SYSTOLIC BLOOD PRESSURE: 129 MMHG

## 2020-11-15 VITALS — DIASTOLIC BLOOD PRESSURE: 66 MMHG | SYSTOLIC BLOOD PRESSURE: 100 MMHG

## 2020-11-15 VITALS — SYSTOLIC BLOOD PRESSURE: 120 MMHG | DIASTOLIC BLOOD PRESSURE: 78 MMHG

## 2020-11-15 VITALS — SYSTOLIC BLOOD PRESSURE: 110 MMHG | DIASTOLIC BLOOD PRESSURE: 71 MMHG

## 2020-11-15 VITALS — DIASTOLIC BLOOD PRESSURE: 72 MMHG | SYSTOLIC BLOOD PRESSURE: 123 MMHG

## 2020-11-15 VITALS — DIASTOLIC BLOOD PRESSURE: 66 MMHG | SYSTOLIC BLOOD PRESSURE: 108 MMHG

## 2020-11-15 RX ADMIN — CLARITHROMYCIN SCH MG: 500 TABLET ORAL at 21:51

## 2020-11-15 RX ADMIN — HYDROCODONE BITARTRATE AND ACETAMINOPHEN PRN TAB: 5; 325 TABLET ORAL at 02:56

## 2020-11-15 RX ADMIN — OMEPRAZOLE SCH MG: 20 CAPSULE, DELAYED RELEASE ORAL at 09:04

## 2020-11-15 RX ADMIN — GABAPENTIN SCH MG: 400 CAPSULE ORAL at 16:04

## 2020-11-15 RX ADMIN — MORPHINE SULFATE PRN MG: 10 INJECTION INTRAVENOUS at 15:11

## 2020-11-15 RX ADMIN — INSULIN LISPRO SCH UNITS: 100 INJECTION, SOLUTION INTRAVENOUS; SUBCUTANEOUS at 22:07

## 2020-11-15 RX ADMIN — GABAPENTIN SCH MG: 400 CAPSULE ORAL at 06:11

## 2020-11-15 RX ADMIN — INSULIN LISPRO SCH UNITS: 100 INJECTION, SOLUTION INTRAVENOUS; SUBCUTANEOUS at 09:05

## 2020-11-15 RX ADMIN — LISINOPRIL SCH MG: 20 TABLET ORAL at 09:09

## 2020-11-15 RX ADMIN — INSULIN LISPRO SCH UNITS: 100 INJECTION, SOLUTION INTRAVENOUS; SUBCUTANEOUS at 16:09

## 2020-11-15 RX ADMIN — MORPHINE SULFATE PRN MG: 10 INJECTION INTRAVENOUS at 18:08

## 2020-11-15 RX ADMIN — GABAPENTIN SCH MG: 400 CAPSULE ORAL at 21:52

## 2020-11-15 RX ADMIN — TORSEMIDE SCH MG: 20 TABLET ORAL at 09:10

## 2020-11-15 RX ADMIN — POTASSIUM CHLORIDE SCH MLS/HR: 2 INJECTION, SOLUTION, CONCENTRATE INTRAVENOUS at 14:42

## 2020-11-15 RX ADMIN — MORPHINE SULFATE PRN MG: 10 INJECTION INTRAVENOUS at 04:26

## 2020-11-15 RX ADMIN — TRAZODONE HYDROCHLORIDE SCH MG: 150 TABLET ORAL at 21:52

## 2020-11-15 RX ADMIN — GABAPENTIN SCH MG: 400 CAPSULE ORAL at 11:00

## 2020-11-15 RX ADMIN — POTASSIUM CHLORIDE SCH MLS/HR: 2 INJECTION, SOLUTION, CONCENTRATE INTRAVENOUS at 23:47

## 2020-11-15 RX ADMIN — ASPIRIN SCH MG: 81 TABLET, COATED ORAL at 09:04

## 2020-11-15 RX ADMIN — CLARITHROMYCIN SCH MG: 500 TABLET ORAL at 09:03

## 2020-11-15 RX ADMIN — OMEPRAZOLE SCH MG: 20 CAPSULE, DELAYED RELEASE ORAL at 21:52

## 2020-11-15 RX ADMIN — AMOXICILLIN SCH MG: 500 CAPSULE ORAL at 21:52

## 2020-11-15 RX ADMIN — MORPHINE SULFATE PRN MG: 10 INJECTION INTRAVENOUS at 21:51

## 2020-11-15 RX ADMIN — INSULIN LISPRO SCH UNITS: 100 INJECTION, SOLUTION INTRAVENOUS; SUBCUTANEOUS at 12:11

## 2020-11-15 RX ADMIN — ATORVASTATIN CALCIUM SCH MG: 10 TABLET, FILM COATED ORAL at 22:06

## 2020-11-15 RX ADMIN — AMOXICILLIN SCH MG: 500 CAPSULE ORAL at 09:04

## 2020-11-15 RX ADMIN — MORPHINE SULFATE PRN MG: 10 INJECTION INTRAVENOUS at 12:11

## 2020-11-15 RX ADMIN — MORPHINE SULFATE PRN MG: 10 INJECTION INTRAVENOUS at 09:03

## 2020-11-16 VITALS — SYSTOLIC BLOOD PRESSURE: 122 MMHG | DIASTOLIC BLOOD PRESSURE: 73 MMHG

## 2020-11-16 VITALS — SYSTOLIC BLOOD PRESSURE: 120 MMHG | DIASTOLIC BLOOD PRESSURE: 77 MMHG

## 2020-11-16 VITALS — SYSTOLIC BLOOD PRESSURE: 132 MMHG | DIASTOLIC BLOOD PRESSURE: 85 MMHG

## 2020-11-16 VITALS — SYSTOLIC BLOOD PRESSURE: 138 MMHG | DIASTOLIC BLOOD PRESSURE: 85 MMHG

## 2020-11-16 PROCEDURE — 0DB68ZX EXCISION OF STOMACH, VIA NATURAL OR ARTIFICIAL OPENING ENDOSCOPIC, DIAGNOSTIC: ICD-10-PCS | Performed by: INTERNAL MEDICINE

## 2020-11-16 PROCEDURE — 0D758ZZ DILATION OF ESOPHAGUS, VIA NATURAL OR ARTIFICIAL OPENING ENDOSCOPIC: ICD-10-PCS | Performed by: INTERNAL MEDICINE

## 2020-11-16 RX ADMIN — GABAPENTIN SCH MG: 400 CAPSULE ORAL at 20:45

## 2020-11-16 RX ADMIN — MORPHINE SULFATE PRN MG: 10 INJECTION INTRAVENOUS at 23:13

## 2020-11-16 RX ADMIN — GABAPENTIN SCH MG: 400 CAPSULE ORAL at 06:32

## 2020-11-16 RX ADMIN — INSULIN LISPRO SCH UNITS: 100 INJECTION, SOLUTION INTRAVENOUS; SUBCUTANEOUS at 12:58

## 2020-11-16 RX ADMIN — POTASSIUM CHLORIDE SCH MLS/HR: 2 INJECTION, SOLUTION, CONCENTRATE INTRAVENOUS at 20:45

## 2020-11-16 RX ADMIN — LISINOPRIL SCH MG: 20 TABLET ORAL at 12:52

## 2020-11-16 RX ADMIN — TRAZODONE HYDROCHLORIDE SCH MG: 150 TABLET ORAL at 20:45

## 2020-11-16 RX ADMIN — MORPHINE SULFATE PRN MG: 10 INJECTION INTRAVENOUS at 19:59

## 2020-11-16 RX ADMIN — TORSEMIDE SCH MG: 20 TABLET ORAL at 12:52

## 2020-11-16 RX ADMIN — ATORVASTATIN CALCIUM SCH MG: 10 TABLET, FILM COATED ORAL at 20:46

## 2020-11-16 RX ADMIN — ONDANSETRON PRN MG: 2 INJECTION, SOLUTION INTRAMUSCULAR; INTRAVENOUS at 06:27

## 2020-11-16 RX ADMIN — MORPHINE SULFATE PRN MG: 10 INJECTION INTRAVENOUS at 02:36

## 2020-11-16 RX ADMIN — OMEPRAZOLE SCH MG: 20 CAPSULE, DELAYED RELEASE ORAL at 20:46

## 2020-11-16 RX ADMIN — MORPHINE SULFATE PRN MG: 10 INJECTION INTRAVENOUS at 06:27

## 2020-11-16 RX ADMIN — MORPHINE SULFATE PRN MG: 10 INJECTION INTRAVENOUS at 09:36

## 2020-11-16 RX ADMIN — INSULIN LISPRO SCH UNITS: 100 INJECTION, SOLUTION INTRAVENOUS; SUBCUTANEOUS at 20:47

## 2020-11-16 RX ADMIN — INSULIN LISPRO SCH UNITS: 100 INJECTION, SOLUTION INTRAVENOUS; SUBCUTANEOUS at 08:24

## 2020-11-16 RX ADMIN — AMOXICILLIN SCH MG: 500 CAPSULE ORAL at 09:18

## 2020-11-16 RX ADMIN — ASPIRIN SCH MG: 81 TABLET, COATED ORAL at 07:51

## 2020-11-16 RX ADMIN — GABAPENTIN SCH MG: 400 CAPSULE ORAL at 12:52

## 2020-11-16 RX ADMIN — OMEPRAZOLE SCH MG: 20 CAPSULE, DELAYED RELEASE ORAL at 07:53

## 2020-11-16 RX ADMIN — MORPHINE SULFATE PRN MG: 10 INJECTION INTRAVENOUS at 12:59

## 2020-11-16 RX ADMIN — AMOXICILLIN SCH MG: 500 CAPSULE ORAL at 20:45

## 2020-11-16 RX ADMIN — POTASSIUM CHLORIDE SCH MLS/HR: 2 INJECTION, SOLUTION, CONCENTRATE INTRAVENOUS at 09:35

## 2020-11-16 RX ADMIN — CLARITHROMYCIN SCH MG: 500 TABLET ORAL at 09:18

## 2020-11-16 RX ADMIN — GABAPENTIN SCH MG: 400 CAPSULE ORAL at 15:32

## 2020-11-16 RX ADMIN — MORPHINE SULFATE PRN MG: 10 INJECTION INTRAVENOUS at 16:47

## 2020-11-16 RX ADMIN — INSULIN LISPRO SCH UNITS: 100 INJECTION, SOLUTION INTRAVENOUS; SUBCUTANEOUS at 15:38

## 2020-11-16 RX ADMIN — CLARITHROMYCIN SCH MG: 500 TABLET ORAL at 20:45

## 2020-11-17 VITALS — DIASTOLIC BLOOD PRESSURE: 82 MMHG | SYSTOLIC BLOOD PRESSURE: 146 MMHG

## 2020-11-17 VITALS — SYSTOLIC BLOOD PRESSURE: 147 MMHG | DIASTOLIC BLOOD PRESSURE: 84 MMHG

## 2020-11-17 VITALS — SYSTOLIC BLOOD PRESSURE: 123 MMHG | DIASTOLIC BLOOD PRESSURE: 87 MMHG

## 2020-11-17 VITALS — SYSTOLIC BLOOD PRESSURE: 116 MMHG | DIASTOLIC BLOOD PRESSURE: 68 MMHG

## 2020-11-17 VITALS — DIASTOLIC BLOOD PRESSURE: 92 MMHG | SYSTOLIC BLOOD PRESSURE: 158 MMHG

## 2020-11-17 RX ADMIN — INSULIN LISPRO SCH UNITS: 100 INJECTION, SOLUTION INTRAVENOUS; SUBCUTANEOUS at 08:37

## 2020-11-17 RX ADMIN — TORSEMIDE SCH MG: 20 TABLET ORAL at 08:38

## 2020-11-17 RX ADMIN — INSULIN LISPRO SCH UNITS: 100 INJECTION, SOLUTION INTRAVENOUS; SUBCUTANEOUS at 21:05

## 2020-11-17 RX ADMIN — OMEPRAZOLE SCH MG: 20 CAPSULE, DELAYED RELEASE ORAL at 08:38

## 2020-11-17 RX ADMIN — INSULIN LISPRO SCH UNITS: 100 INJECTION, SOLUTION INTRAVENOUS; SUBCUTANEOUS at 17:30

## 2020-11-17 RX ADMIN — GABAPENTIN SCH MG: 400 CAPSULE ORAL at 20:53

## 2020-11-17 RX ADMIN — AMOXICILLIN SCH MG: 500 CAPSULE ORAL at 20:52

## 2020-11-17 RX ADMIN — CLARITHROMYCIN SCH MG: 500 TABLET ORAL at 08:37

## 2020-11-17 RX ADMIN — GABAPENTIN SCH MG: 400 CAPSULE ORAL at 16:22

## 2020-11-17 RX ADMIN — ASPIRIN SCH MG: 81 TABLET, COATED ORAL at 08:38

## 2020-11-17 RX ADMIN — GABAPENTIN SCH MG: 400 CAPSULE ORAL at 12:12

## 2020-11-17 RX ADMIN — INSULIN LISPRO SCH UNITS: 100 INJECTION, SOLUTION INTRAVENOUS; SUBCUTANEOUS at 12:53

## 2020-11-17 RX ADMIN — GABAPENTIN SCH MG: 400 CAPSULE ORAL at 05:28

## 2020-11-17 RX ADMIN — TRAZODONE HYDROCHLORIDE SCH MG: 150 TABLET ORAL at 20:53

## 2020-11-17 RX ADMIN — POTASSIUM CHLORIDE SCH MLS/HR: 2 INJECTION, SOLUTION, CONCENTRATE INTRAVENOUS at 05:53

## 2020-11-17 RX ADMIN — AMOXICILLIN SCH MG: 500 CAPSULE ORAL at 08:37

## 2020-11-17 RX ADMIN — MORPHINE SULFATE PRN MG: 10 INJECTION INTRAVENOUS at 21:06

## 2020-11-17 RX ADMIN — CLARITHROMYCIN SCH MG: 500 TABLET ORAL at 20:53

## 2020-11-17 RX ADMIN — MORPHINE SULFATE PRN MG: 10 INJECTION INTRAVENOUS at 05:28

## 2020-11-17 RX ADMIN — ATORVASTATIN CALCIUM SCH MG: 10 TABLET, FILM COATED ORAL at 20:53

## 2020-11-17 RX ADMIN — POTASSIUM CHLORIDE SCH MLS/HR: 2 INJECTION, SOLUTION, CONCENTRATE INTRAVENOUS at 18:35

## 2020-11-17 RX ADMIN — MORPHINE SULFATE PRN MG: 10 INJECTION INTRAVENOUS at 16:22

## 2020-11-17 RX ADMIN — INSULIN LISPRO SCH UNITS: 100 INJECTION, SOLUTION INTRAVENOUS; SUBCUTANEOUS at 17:07

## 2020-11-17 RX ADMIN — MORPHINE SULFATE PRN MG: 10 INJECTION INTRAVENOUS at 09:33

## 2020-11-17 RX ADMIN — OMEPRAZOLE SCH MG: 20 CAPSULE, DELAYED RELEASE ORAL at 20:53

## 2020-11-17 RX ADMIN — LISINOPRIL SCH MG: 20 TABLET ORAL at 08:37

## 2020-11-17 RX ADMIN — MORPHINE SULFATE PRN MG: 10 INJECTION INTRAVENOUS at 02:16

## 2020-11-18 VITALS — DIASTOLIC BLOOD PRESSURE: 77 MMHG | SYSTOLIC BLOOD PRESSURE: 135 MMHG

## 2020-11-18 RX ADMIN — POTASSIUM CHLORIDE SCH MLS/HR: 2 INJECTION, SOLUTION, CONCENTRATE INTRAVENOUS at 03:40

## 2020-11-18 RX ADMIN — TORSEMIDE SCH MG: 20 TABLET ORAL at 08:46

## 2020-11-18 RX ADMIN — INSULIN LISPRO SCH UNITS: 100 INJECTION, SOLUTION INTRAVENOUS; SUBCUTANEOUS at 08:48

## 2020-11-18 RX ADMIN — AMOXICILLIN SCH MG: 500 CAPSULE ORAL at 08:45

## 2020-11-18 RX ADMIN — GABAPENTIN SCH MG: 400 CAPSULE ORAL at 06:17

## 2020-11-18 RX ADMIN — OMEPRAZOLE SCH MG: 20 CAPSULE, DELAYED RELEASE ORAL at 08:46

## 2020-11-18 RX ADMIN — CLARITHROMYCIN SCH MG: 500 TABLET ORAL at 08:46

## 2020-11-18 RX ADMIN — LISINOPRIL SCH MG: 20 TABLET ORAL at 08:47

## 2020-11-18 RX ADMIN — MORPHINE SULFATE PRN MG: 10 INJECTION INTRAVENOUS at 06:25

## 2020-11-18 RX ADMIN — ASPIRIN SCH MG: 81 TABLET, COATED ORAL at 08:45

## 2020-11-18 RX ADMIN — HYDROCODONE BITARTRATE AND ACETAMINOPHEN PRN TAB: 5; 325 TABLET ORAL at 09:39

## 2020-11-18 RX ADMIN — MORPHINE SULFATE PRN MG: 10 INJECTION INTRAVENOUS at 02:43

## 2020-11-19 ENCOUNTER — HOSPITAL ENCOUNTER (EMERGENCY)
Dept: HOSPITAL 8 - ED | Age: 60
Discharge: HOME | End: 2020-11-19
Payer: MEDICAID

## 2020-11-19 VITALS — BODY MASS INDEX: 33.43 KG/M2 | HEIGHT: 65 IN | WEIGHT: 200.62 LBS

## 2020-11-19 VITALS — DIASTOLIC BLOOD PRESSURE: 73 MMHG | SYSTOLIC BLOOD PRESSURE: 123 MMHG

## 2020-11-19 DIAGNOSIS — G40.909: ICD-10-CM

## 2020-11-19 DIAGNOSIS — E11.9: ICD-10-CM

## 2020-11-19 DIAGNOSIS — Z87.891: ICD-10-CM

## 2020-11-19 DIAGNOSIS — Z98.51: ICD-10-CM

## 2020-11-19 DIAGNOSIS — R10.84: ICD-10-CM

## 2020-11-19 DIAGNOSIS — I10: ICD-10-CM

## 2020-11-19 DIAGNOSIS — R11.2: ICD-10-CM

## 2020-11-19 DIAGNOSIS — Z90.49: ICD-10-CM

## 2020-11-19 DIAGNOSIS — E78.00: ICD-10-CM

## 2020-11-19 DIAGNOSIS — E78.5: ICD-10-CM

## 2020-11-19 DIAGNOSIS — G89.29: Primary | ICD-10-CM

## 2020-11-19 LAB
ALBUMIN SERPL-MCNC: 3.8 G/DL (ref 3.4–5)
ALP SERPL-CCNC: 183 U/L (ref 45–117)
ALT SERPL-CCNC: 20 U/L (ref 12–78)
ANION GAP SERPL CALC-SCNC: 14 MMOL/L (ref 5–15)
BASOPHILS # BLD AUTO: 0.1 X10^3/UL (ref 0–0.1)
BASOPHILS NFR BLD AUTO: 1 % (ref 0–1)
BILIRUB SERPL-MCNC: 0.3 MG/DL (ref 0.2–1)
CALCIUM SERPL-MCNC: 9.9 MG/DL (ref 8.5–10.1)
CHLORIDE SERPL-SCNC: 99 MMOL/L (ref 98–107)
CREAT SERPL-MCNC: 1.1 MG/DL (ref 0.55–1.02)
EOSINOPHIL # BLD AUTO: 0.1 X10^3/UL (ref 0–0.4)
EOSINOPHIL NFR BLD AUTO: 1 % (ref 1–7)
ERYTHROCYTE [DISTWIDTH] IN BLOOD BY AUTOMATED COUNT: 13.1 % (ref 9.6–15.2)
LYMPHOCYTES # BLD AUTO: 1.4 X10^3/UL (ref 1–3.4)
LYMPHOCYTES NFR BLD AUTO: 17 % (ref 22–44)
MCH RBC QN AUTO: 29.5 PG (ref 27–34.8)
MCHC RBC AUTO-ENTMCNC: 33.6 G/DL (ref 32.4–35.8)
MD: NO
MONOCYTES # BLD AUTO: 0.5 X10^3/UL (ref 0.2–0.8)
MONOCYTES NFR BLD AUTO: 6 % (ref 2–9)
NEUTROPHILS # BLD AUTO: 6 X10^3/UL (ref 1.8–6.8)
NEUTROPHILS NFR BLD AUTO: 75 % (ref 42–75)
PLATELET # BLD AUTO: 390 X10^3/UL (ref 130–400)
PMV BLD AUTO: 8.3 FL (ref 7.4–10.4)
PROT SERPL-MCNC: 8.6 G/DL (ref 6.4–8.2)
RBC # BLD AUTO: 4.96 X10^6/UL (ref 3.82–5.3)

## 2020-11-19 PROCEDURE — 85025 COMPLETE CBC W/AUTO DIFF WBC: CPT

## 2020-11-19 PROCEDURE — 80053 COMPREHEN METABOLIC PANEL: CPT

## 2020-11-19 PROCEDURE — 96374 THER/PROPH/DIAG INJ IV PUSH: CPT

## 2020-11-19 PROCEDURE — 83690 ASSAY OF LIPASE: CPT

## 2020-11-19 PROCEDURE — 99284 EMERGENCY DEPT VISIT MOD MDM: CPT

## 2020-11-19 PROCEDURE — 96376 TX/PRO/DX INJ SAME DRUG ADON: CPT

## 2020-11-19 PROCEDURE — 96375 TX/PRO/DX INJ NEW DRUG ADDON: CPT

## 2020-11-19 PROCEDURE — 36415 COLL VENOUS BLD VENIPUNCTURE: CPT

## 2020-11-19 RX ADMIN — MORPHINE SULFATE PRN MG: 4 INJECTION INTRAVENOUS at 13:53

## 2020-11-19 RX ADMIN — MORPHINE SULFATE PRN MG: 4 INJECTION INTRAVENOUS at 13:04

## 2020-11-29 DIAGNOSIS — R10.9 ABDOMINAL PAIN, UNSPECIFIED ABDOMINAL LOCATION: Primary | ICD-10-CM

## 2020-11-30 LAB
EKG IMPRESSION: NORMAL
EKG IMPRESSION: NORMAL

## 2021-02-03 DIAGNOSIS — R07.89 OTHER CHEST PAIN: Primary | ICD-10-CM

## 2021-02-05 LAB
EKG IMPRESSION: NORMAL

## 2021-02-10 DIAGNOSIS — R10.9 ABDOMINAL PAIN, UNSPECIFIED ABDOMINAL LOCATION: Primary | ICD-10-CM

## 2021-02-10 DIAGNOSIS — R55 SYNCOPE, UNSPECIFIED SYNCOPE TYPE: Primary | ICD-10-CM

## 2021-02-10 LAB
EKG IMPRESSION: NORMAL
EKG IMPRESSION: NORMAL

## 2021-02-21 NOTE — CARE PLAN
Problem: Pain Management  Goal: Pain level will decrease to patient’s comfort goal  Intervention: Follow pain managment plan developed in collaboration with patient and Interdisciplinary Team  Patient suffers from chronic pain.  Patient medicated with scheduled MS Contin.  Patient comfortable.          
Problem: Pain Management  Goal: Pain level will decrease to patient’s comfort goal  Outcome: PROGRESSING AS EXPECTED    Problem: Safety  Goal: Will remain free from injury  Outcome: PROGRESSING AS EXPECTED        
Problem: Pain Management  Goal: Pain level will decrease to patient’s comfort goal  Outcome: PROGRESSING SLOWER THAN EXPECTED    Problem: Safety  Goal: Will remain free from falls  Outcome: PROGRESSING AS EXPECTED        
Problem: Pain Management  Goal: Pain level will decrease to patient’s comfort goal  Patient continues to have 8 to 10 out of 10 abdominal pain.  Pain medications administered per MAR and MD order.      Problem: Urinary Elimination:  Goal: Ability to reestablish a normal urinary elimination pattern will improve  Urinary elimination within normal limits.    Problem: Skin Integrity  Goal: Risk for impaired skin integrity will decrease  Pt on low air loss bed.  Wound care following.    Problem: Communication  Goal: The ability to communicate needs accurately and effectively will improve  Pt able to communicate verbally in English.    Problem: Safety  Goal: Will remain free from injury  Fall precautions in place.  Bed alarm on.  Treaded socks in place.  Personal belongings within reach.  Intervention: Provide assistance with mobility  Pt requires standby assistance only due to IV.  Does not require assistance when not connected to IV.      Problem: Venous Thromboembolism (VTW)/Deep Vein Thrombosis (DVT) Prevention:  Goal: Patient will participate in Venous Thrombosis (VTE)/Deep Vein Thrombosis (DVT)Prevention Measures  Heparin subq ordered and administered per MAR and MD order.        
Problem: Psychosocial Needs:  Goal: Level of anxiety will decrease  Intervention: Identify and develop with patient strategies to cope with anxiety triggers  Patient worried about not being able to  prescriptions.  Assured patient that  would be speaking to her before discharge.          
Problem: Skin Integrity  Goal: Risk for impaired skin integrity will decrease  Outcome: PROGRESSING AS EXPECTED        
[facility]

## 2021-02-23 DIAGNOSIS — R07.89 OTHER CHEST PAIN: Primary | ICD-10-CM

## 2021-02-23 DIAGNOSIS — R42 DIZZINESS: Primary | ICD-10-CM

## 2021-02-23 LAB
EKG IMPRESSION: NORMAL
EKG IMPRESSION: NORMAL

## 2021-03-10 DIAGNOSIS — R55 SYNCOPE AND COLLAPSE: Primary | ICD-10-CM

## 2021-03-10 LAB — EKG IMPRESSION: NORMAL

## 2021-03-29 DIAGNOSIS — R07.89 OTHER CHEST PAIN: Primary | ICD-10-CM

## 2021-03-29 LAB — EKG IMPRESSION: NORMAL

## 2021-04-26 DIAGNOSIS — R07.89 OTHER CHEST PAIN: Primary | ICD-10-CM

## 2021-04-26 LAB — EKG IMPRESSION: NORMAL

## 2021-04-26 NOTE — ED PROVIDER NOTES
ED Provider Note    Scribed for Little Carrillo M.D. by Alvaro Coffey. 8/19/2017, 12:38 AM.    Primary care provider: Tre Jason M.D.  Means of arrival: Walk-In  History obtained from: Patient  History limited by: None    CHIEF COMPLAINT  Chief Complaint   Patient presents with   • N/V     onset 2 days ago   • Abdominal Pain     onset 2 days ago   • Open Wound     open sores to BLE and back     HPI  Julisa Carrion is a 57 y.o. female with a history of diabetes and hypertension who presents to the Emergency Department complaining of progressively worsening nausea and vomiting onset 2 days ago. She has been unable to eat food the past day due to the nausea. With these symptoms comes associated abdominal pain. The patient also complains of multiple open wounds to bilateral legs and chest. She denies scratching the wounds. Denies difficulty breathing, cough, fever.  Denies drug use. Patient has a history of a cholecystectomy, 3 s-sections, and an intestinal surgery.     REVIEW OF SYSTEMS  See HPI for further details. All other systems are negative.    C.    PAST MEDICAL HISTORY   has a past medical history of Hypertension; Diabetes; High cholesterol (5/15/2011); Staph skin infection; Migraine; and Intestinal mass (2015).    SURGICAL HISTORY   has past surgical history that includes other abdominal surgery; gyn surgery; other orthopedic surgery (6-2014); and abdominal exploration.    SOCIAL HISTORY  Social History   Substance Use Topics   • Smoking status: Former Smoker -- 1.00 packs/day for 20 years     Types: Cigarettes     Quit date: 01/01/1996   • Smokeless tobacco: Former User     Quit date: 06/05/1995   • Alcohol Use: No      History   Drug Use No     Comment: just prescribed meds     FAMILY HISTORY  Family History   Problem Relation Age of Onset   • Cancer Maternal Aunt      Lung cancer d/t smoking     CURRENT MEDICATIONS  Reviewed. See Encounter Summary.     ALLERGIES  No Known Allergies    PHYSICAL  Medication: CHANTIX  Sig:take 1 tablet bid  Qty: 56  Dosage: 1  Last Refill:   [facility] next door  Patient last seen:  Next appointment to be seen:     "EXAM  VITAL SIGNS: /118 mmHg  Pulse 98  Temp(Src) 36.6 °C (97.9 °F) (Temporal)  Resp 14  Ht 1.651 m (5' 5\")  Wt 77.3 kg (170 lb 6.7 oz)  BMI 28.36 kg/m2  SpO2 99%  LMP 02/05/2009    Pulse ox interpretation: I interpret this pulse ox as normal.  Constitutional: Alert in mild apparent distress.  HENT: No signs of trauma, Bilateral external ears normal, Nose normal.   Eyes: Pupils are equal and reactive, Conjunctiva normal, Non-icteric.   Neck: Normal range of motion, No tenderness, Supple, No stridor.   Lymphatic: No lymphadenopathy noted.   Cardiovascular: Regular rate and rhythm, no murmurs.   Thorax & Lungs: Normal breath sounds, No respiratory distress, No wheezing, No chest tenderness.   Abdomen: Bowel sounds normal, Soft, Epigastric abdominal tenderness without rebound or guarding, No masses, No pulsatile masses. No peritoneal signs.  Skin: Warm, Dry, Multiple superficial ulcerations over the whole body including the extremities, face and chest. The chest ulceration is about 7cm in length, looks mildly irritated at the edges, is erythematous and tender, but has no discharge or fluctuance. Bilateral legs are scabbed without evidence of infection.  Back: No bony tenderness, No CVA tenderness.   Extremities: Intact distal pulses, No edema, No cyanosis,  Negative Kwasi's sign.   Musculoskeletal: Good range of motion in all major joints. No major deformities noted.   Neurologic: Alert , Normal motor function, Normal sensory function, No focal deficits noted.   Psychiatric: Affect normal, Judgment normal, Mood normal.     DIFFERENTIAL DIAGNOSIS AND WORK UP PLAN    12:38 AM Patient seen and examined at bedside. The patient presents with nausea and vomiting and the differential diagnosis includes but is not limited to Gastritis, Pancreatitis, Peptic ulcer disease, Viral URI, Cellulitis, Dehydration, Electrolyte abnormality. Ordered for Urinalysis, CBC, CMP, Lipase, Troponin to evaluate. Patient will be " "treated with GI Cocktail, 4mg Morphine, 4mg Zofran for her symptoms.     DIAGNOSTIC STUDIES / PROCEDURES     LABS   CBC with chronic anemia otherwise within normal limits, CMP showing no signs of dehydration and lipase within normal limits urinalysis also without signs of dehydration no ketones and a negative troponin and no other signs of infection  All labs were reviewed by me.    EKG  12 Lead EKG interpreted by me to show:  Normal sinus rhythm with a rate of 96  LVH  Unchanged from prior EKG    COURSE & MEDICAL DECISION MAKING  Pertinent Labs & Imaging studies reviewed. (See chart for details)    2:09 AM - Ordered Urine Microscopic.    2:39 AM - Patient seen at bedside. I discussed the labs noted above which are unremarkable. Patient was given return precautions and she understands and verbalized agreement. The patient is still uncomfortable and will be discharged on anti nausea medication.    This is a 57 y.o. year old female who presents with multiple complaints has a history of gastroparesis shows no signs of infection or dehydration at this time in her abdomen low concern for ACS in the setting of a negative troponin and unchanged EKG. The patient will be sent home with Zofran and Bentyl and Keflex for her superficial cellulitis in her chronic wounds. She'll return to the emergency department for any new or worsening abdominal pain and vomiting concern for dehydration.    /67 mmHg  Pulse 88  Temp(Src) 36.6 °C (97.9 °F) (Temporal)  Resp 13  Ht 1.651 m (5' 5\")  Wt 77.3 kg (170 lb 6.7 oz)  BMI 28.36 kg/m2  SpO2 100%  LMP 02/05/2009      I reviewed prescription monitoring program for patient's narcotic use before prescribing a scheduled drug.The patient will not drink alcohol nor drive with prescribed medications. The patient will return for new or worsening symptoms and is stable at the time of discharge.    The patient is referred to a primary physician for blood pressure management, diabetic " screening, and for all other preventative health concerns.     DISPOSITION:  Patient will be discharged home in stable condition.    FOLLOW UP:  Tre Jason M.D.  [address]  [facility] [address]  [phone]    Schedule an appointment as soon as possible for a visit      [facility], Emergency Dept  [address] [facility] [address] [phone]  In 1 day  If symptoms worsen    OUTPATIENT MEDICATIONS:  New Prescriptions    CEPHALEXIN (KEFLEX) 250 MG CAP    Take 1 Cap by mouth 4 times a day for 7 days.    DICYCLOMINE (BENTYL) 10 MG CAP    Take 1 Cap by mouth 4 Times a Day,Before Meals and at Bedtime for 3 days.    ONDANSETRON (ZOFRAN ODT) 4 MG TABLET DISPERSIBLE    Take 1 Tab by mouth every 8 hours as needed for Nausea/Vomiting.     FINAL IMPRESSION  1. Epigastric pain    2. Non-intractable vomiting with nausea, unspecified vomiting type    3. Cellulitis of chest wall       IAlvaro (Scribe), am scribing for, and in the presence of, Little Carrillo M.D..    Electronically signed by: Alvaro Coffey (Scribe), 8/19/2017    ILittle M.D. personally performed the services described in this documentation, as scribed by Alvaro Coffey in my presence, and it is both accurate and complete.    The note accurately reflects work and decisions made by me.  Little Carrillo  8/19/2017  4:43 AM    This dictation has been created using voice recognition software and/or scribes. The accuracy of the dictation is limited by the abilities of the software and the expertise of the scribes. I expect there may be some errors of grammar and possibly content. I made every attempt to manually correct the errors within my dictation. However, errors related to voice recognition software and/or scribes may still exist and should be interpreted within the appropriate context.

## 2021-05-04 DIAGNOSIS — R07.89 OTHER CHEST PAIN: Primary | ICD-10-CM

## 2021-05-04 LAB — EKG IMPRESSION: NORMAL

## 2021-05-14 DIAGNOSIS — R07.89 OTHER CHEST PAIN: Primary | ICD-10-CM

## 2021-05-15 LAB — EKG IMPRESSION: NORMAL

## 2021-05-21 DIAGNOSIS — R07.89 OTHER CHEST PAIN: Primary | ICD-10-CM

## 2021-05-22 LAB
EKG IMPRESSION: NORMAL
EKG IMPRESSION: NORMAL

## 2021-05-28 DIAGNOSIS — R07.89 OTHER CHEST PAIN: Primary | ICD-10-CM

## 2021-06-01 LAB — EKG IMPRESSION: NORMAL

## 2021-07-07 DIAGNOSIS — R10.9 ABDOMINAL PAIN, UNSPECIFIED ABDOMINAL LOCATION: Primary | ICD-10-CM

## 2021-07-07 LAB — EKG IMPRESSION: NORMAL

## 2021-07-08 DIAGNOSIS — R11.2 NAUSEA AND VOMITING, INTRACTABILITY OF VOMITING NOT SPECIFIED, UNSPECIFIED VOMITING TYPE: Primary | ICD-10-CM

## 2021-07-08 DIAGNOSIS — R07.89 OTHER CHEST PAIN: Primary | ICD-10-CM

## 2021-07-10 LAB
EKG IMPRESSION: NORMAL
EKG IMPRESSION: NORMAL

## 2021-07-16 DIAGNOSIS — R07.89 OTHER CHEST PAIN: Primary | ICD-10-CM

## 2021-07-16 LAB — EKG IMPRESSION: NORMAL

## 2021-07-19 DIAGNOSIS — R07.89 OTHER CHEST PAIN: Primary | ICD-10-CM

## 2021-07-19 LAB — EKG IMPRESSION: NORMAL

## 2021-07-20 DIAGNOSIS — R07.89 OTHER CHEST PAIN: Primary | ICD-10-CM

## 2021-07-20 LAB — EKG IMPRESSION: NORMAL

## 2021-08-16 DIAGNOSIS — R10.9 ABDOMINAL PAIN, UNSPECIFIED ABDOMINAL LOCATION: Primary | ICD-10-CM

## 2021-08-16 DIAGNOSIS — R07.89 OTHER CHEST PAIN: Primary | ICD-10-CM

## 2021-08-16 LAB
EKG IMPRESSION: NORMAL

## 2021-09-01 DIAGNOSIS — R00.2 PALPITATIONS: Primary | ICD-10-CM

## 2021-09-02 LAB — EKG IMPRESSION: NORMAL

## 2021-09-29 DIAGNOSIS — R07.89 OTHER CHEST PAIN: Primary | ICD-10-CM

## 2021-09-29 LAB — EKG IMPRESSION: NORMAL

## 2021-10-15 DIAGNOSIS — R42 DIZZINESS: Primary | ICD-10-CM

## 2021-10-16 LAB — EKG IMPRESSION: NORMAL

## 2021-10-21 LAB — EKG IMPRESSION: NORMAL

## 2021-10-25 DIAGNOSIS — R07.89 OTHER CHEST PAIN: Primary | ICD-10-CM

## 2021-11-11 DIAGNOSIS — R07.89 OTHER CHEST PAIN: Primary | ICD-10-CM

## 2021-11-12 LAB — EKG IMPRESSION: NORMAL

## 2022-01-01 NOTE — ED NOTES
Pt ambulated up to bathroom with steady gait. Provided urine sample; Specimen to lab. Awaiting results. Pt back to bed. Call light within reach.   
Pt bib EMS from home.  Chief Complaint   Patient presents with   • Abdominal Pain   • Nausea     Pt states she has not been able to take her meds. Reports Zofran is not helping.  PTA .  Pt in a gown, connected to monitor, chart up for ERP.  
Pt medicated per ERP's orders.   
Pt medicated prior to discharge. Pt given discharge instructions; verbalized understanding. RN to answer any questions pt and family had. Pt instructed not to drive after receiving narcotics. VSS. Pt ambulated out to front [facility].     
Pt to and from CT. ERP at bedside now.   
CONSTITUTIONAL:  No fever or chills, no bodyaches  HEENT:  No sore throat or headache   PULM:  No cough or shortness of breath  GI:  No diarrhea, no vomiting

## 2022-01-25 DIAGNOSIS — R07.89 OTHER CHEST PAIN: Primary | ICD-10-CM

## 2022-01-27 LAB — EKG IMPRESSION: NORMAL

## 2022-02-17 DIAGNOSIS — R07.89 OTHER CHEST PAIN: Primary | ICD-10-CM

## 2022-02-17 LAB — EKG IMPRESSION: NORMAL

## 2022-03-31 NOTE — DISCHARGE PLANNING
Called [facility] @ [phone] to F/U on ride. Per Paulette the transport team is in case and should be calling back with in 10-15 minutes.   7, 8, or 9

## 2022-05-13 NOTE — DISCHARGE PLANNING
Received call from Silvana @ [facility] and [facility] transport will be here in Approximately 10-15 minutes. Peter MCWILLIAMS informed.    used

## 2022-05-26 NOTE — ED NOTES
Pt to Xray   Mauc Instructions: By selecting yes to the question below the MAUC number will be added into the note.  This will be calculated automatically based on the diagnosis chosen, the size entered, the body zone selected (H,M,L) and the specific indications you chose. You will also have the option to override the Mohs AUC if you disagree with the automatically calculated number and this option is found in the Case Summary tab.

## 2022-07-29 DIAGNOSIS — R07.9 CHEST PAIN, UNSPECIFIED TYPE: Primary | ICD-10-CM

## 2022-07-30 LAB — EKG IMPRESSION: NORMAL

## 2022-10-07 DIAGNOSIS — R07.89 OTHER CHEST PAIN: Primary | ICD-10-CM

## 2022-10-08 LAB — EKG IMPRESSION: NORMAL

## 2022-11-09 DIAGNOSIS — R07.89 OTHER CHEST PAIN: Primary | ICD-10-CM

## 2022-11-10 LAB — EKG IMPRESSION: NORMAL

## 2022-11-28 ENCOUNTER — APPOINTMENT (OUTPATIENT)
Dept: RADIOLOGY | Facility: MEDICAL CENTER | Age: 62
DRG: 070 | End: 2022-11-28
Attending: EMERGENCY MEDICINE
Payer: MEDICAID

## 2022-11-28 ENCOUNTER — HOSPITAL ENCOUNTER (INPATIENT)
Facility: MEDICAL CENTER | Age: 62
LOS: 10 days | DRG: 070 | End: 2022-12-08
Attending: EMERGENCY MEDICINE | Admitting: HOSPITALIST
Payer: MEDICAID

## 2022-11-28 DIAGNOSIS — Z79.4 TYPE 2 DIABETES MELLITUS WITH DIABETIC NEUROPATHY, WITH LONG-TERM CURRENT USE OF INSULIN (HCC): ICD-10-CM

## 2022-11-28 DIAGNOSIS — R73.9 HYPERGLYCEMIA: ICD-10-CM

## 2022-11-28 DIAGNOSIS — E87.0 HYPERNATREMIA: ICD-10-CM

## 2022-11-28 DIAGNOSIS — U07.1 COVID: ICD-10-CM

## 2022-11-28 DIAGNOSIS — R79.89 ELEVATED TROPONIN: ICD-10-CM

## 2022-11-28 DIAGNOSIS — R41.82 ALTERED MENTAL STATUS, UNSPECIFIED ALTERED MENTAL STATUS TYPE: ICD-10-CM

## 2022-11-28 DIAGNOSIS — R41.89 COGNITIVE DEFICITS: ICD-10-CM

## 2022-11-28 DIAGNOSIS — E11.40 TYPE 2 DIABETES MELLITUS WITH DIABETIC NEUROPATHY, WITH LONG-TERM CURRENT USE OF INSULIN (HCC): ICD-10-CM

## 2022-11-28 PROBLEM — G93.40 ACUTE ENCEPHALOPATHY: Status: ACTIVE | Noted: 2022-11-28

## 2022-11-28 PROBLEM — E11.49 TYPE 2 DIABETES MELLITUS WITH NEUROLOGIC COMPLICATION, WITH LONG-TERM CURRENT USE OF INSULIN (HCC): Status: ACTIVE | Noted: 2022-11-28

## 2022-11-28 LAB
ALBUMIN SERPL BCP-MCNC: 4 G/DL (ref 3.2–4.9)
ALBUMIN/GLOB SERPL: 1.1 G/DL
ALP SERPL-CCNC: 96 U/L (ref 30–99)
ALT SERPL-CCNC: 12 U/L (ref 2–50)
AMMONIA PLAS-SCNC: 16 UMOL/L (ref 11–45)
ANION GAP SERPL CALC-SCNC: 21 MMOL/L (ref 7–16)
APPEARANCE UR: CLEAR
APTT PPP: 20.7 SEC (ref 24.7–36)
AST SERPL-CCNC: 17 U/L (ref 12–45)
BACTERIA #/AREA URNS HPF: NEGATIVE /HPF
BASOPHILS # BLD AUTO: 0.2 % (ref 0–1.8)
BASOPHILS # BLD: 0.02 K/UL (ref 0–0.12)
BILIRUB SERPL-MCNC: 0.4 MG/DL (ref 0.1–1.5)
BILIRUB UR QL STRIP.AUTO: NEGATIVE
BUN SERPL-MCNC: 31 MG/DL (ref 8–22)
CALCIUM SERPL-MCNC: 9.7 MG/DL (ref 8.4–10.2)
CHLORIDE SERPL-SCNC: 112 MMOL/L (ref 96–112)
CO2 SERPL-SCNC: 19 MMOL/L (ref 20–33)
COLOR UR: YELLOW
CREAT SERPL-MCNC: 0.94 MG/DL (ref 0.5–1.4)
EKG IMPRESSION: NORMAL
EOSINOPHIL # BLD AUTO: 0 K/UL (ref 0–0.51)
EOSINOPHIL NFR BLD: 0 % (ref 0–6.9)
EPI CELLS #/AREA URNS HPF: NORMAL /HPF
ERYTHROCYTE [DISTWIDTH] IN BLOOD BY AUTOMATED COUNT: 48.4 FL (ref 35.9–50)
EST. AVERAGE GLUCOSE BLD GHB EST-MCNC: 312 MG/DL
FLUAV RNA SPEC QL NAA+PROBE: NEGATIVE
FLUBV RNA SPEC QL NAA+PROBE: NEGATIVE
GFR SERPLBLD CREATININE-BSD FMLA CKD-EPI: 69 ML/MIN/1.73 M 2
GLOBULIN SER CALC-MCNC: 3.7 G/DL (ref 1.9–3.5)
GLUCOSE BLD STRIP.AUTO-MCNC: 292 MG/DL (ref 65–99)
GLUCOSE BLD STRIP.AUTO-MCNC: 313 MG/DL (ref 65–99)
GLUCOSE SERPL-MCNC: 384 MG/DL (ref 65–99)
GLUCOSE UR STRIP.AUTO-MCNC: 500 MG/DL
HBA1C MFR BLD: 12.5 % (ref 4–5.6)
HCT VFR BLD AUTO: 44.2 % (ref 37–47)
HGB BLD-MCNC: 14.7 G/DL (ref 12–16)
IMM GRANULOCYTES # BLD AUTO: 0.07 K/UL (ref 0–0.11)
IMM GRANULOCYTES NFR BLD AUTO: 0.6 % (ref 0–0.9)
INR PPP: 1.01 (ref 0.87–1.13)
INR PPP: 1.04 (ref 0.87–1.13)
KETONES UR STRIP.AUTO-MCNC: >=80 MG/DL
LEUKOCYTE ESTERASE UR QL STRIP.AUTO: NEGATIVE
LYMPHOCYTES # BLD AUTO: 0.94 K/UL (ref 1–4.8)
LYMPHOCYTES NFR BLD: 8.3 % (ref 22–41)
MCH RBC QN AUTO: 29.3 PG (ref 27–33)
MCHC RBC AUTO-ENTMCNC: 33.3 G/DL (ref 33.6–35)
MCV RBC AUTO: 88.2 FL (ref 81.4–97.8)
MICRO URNS: ABNORMAL
MONOCYTES # BLD AUTO: 0.72 K/UL (ref 0–0.85)
MONOCYTES NFR BLD AUTO: 6.4 % (ref 0–13.4)
NEUTROPHILS # BLD AUTO: 9.54 K/UL (ref 2–7.15)
NEUTROPHILS NFR BLD: 84.5 % (ref 44–72)
NITRITE UR QL STRIP.AUTO: NEGATIVE
NRBC # BLD AUTO: 0 K/UL
NRBC BLD-RTO: 0 /100 WBC
PH UR STRIP.AUTO: 6 [PH] (ref 5–8)
PLATELET # BLD AUTO: 252 K/UL (ref 164–446)
PMV BLD AUTO: 9.7 FL (ref 9–12.9)
POTASSIUM SERPL-SCNC: 4.9 MMOL/L (ref 3.6–5.5)
PROT SERPL-MCNC: 7.7 G/DL (ref 6–8.2)
PROT UR QL STRIP: 100 MG/DL
PROTHROMBIN TIME: 12.9 SEC (ref 12–14.6)
PROTHROMBIN TIME: 13.2 SEC (ref 12–14.6)
RBC # BLD AUTO: 5.01 M/UL (ref 4.2–5.4)
RBC # URNS HPF: NORMAL /HPF
RBC UR QL AUTO: ABNORMAL
RSV RNA SPEC QL NAA+PROBE: NEGATIVE
SARS-COV-2 RNA RESP QL NAA+PROBE: DETECTED
SODIUM SERPL-SCNC: 152 MMOL/L (ref 135–145)
SP GR UR STRIP.AUTO: 1.02
SPECIMEN SOURCE: ABNORMAL
TROPONIN T SERPL-MCNC: 115 NG/L (ref 6–19)
WBC # BLD AUTO: 11.3 K/UL (ref 4.8–10.8)
WBC #/AREA URNS HPF: NORMAL /HPF

## 2022-11-28 PROCEDURE — 700105 HCHG RX REV CODE 258: Performed by: EMERGENCY MEDICINE

## 2022-11-28 PROCEDURE — 80053 COMPREHEN METABOLIC PANEL: CPT

## 2022-11-28 PROCEDURE — 82010 KETONE BODYS QUAN: CPT

## 2022-11-28 PROCEDURE — 70551 MRI BRAIN STEM W/O DYE: CPT

## 2022-11-28 PROCEDURE — 70496 CT ANGIOGRAPHY HEAD: CPT

## 2022-11-28 PROCEDURE — 99223 1ST HOSP IP/OBS HIGH 75: CPT | Performed by: HOSPITALIST

## 2022-11-28 PROCEDURE — 70547 MR ANGIOGRAPHY NECK W/O DYE: CPT

## 2022-11-28 PROCEDURE — 70498 CT ANGIOGRAPHY NECK: CPT

## 2022-11-28 PROCEDURE — 700102 HCHG RX REV CODE 250 W/ 637 OVERRIDE(OP)

## 2022-11-28 PROCEDURE — 36415 COLL VENOUS BLD VENIPUNCTURE: CPT

## 2022-11-28 PROCEDURE — 80048 BASIC METABOLIC PNL TOTAL CA: CPT

## 2022-11-28 PROCEDURE — 70544 MR ANGIOGRAPHY HEAD W/O DYE: CPT

## 2022-11-28 PROCEDURE — 82140 ASSAY OF AMMONIA: CPT

## 2022-11-28 PROCEDURE — 85025 COMPLETE CBC W/AUTO DIFF WBC: CPT

## 2022-11-28 PROCEDURE — 0241U HCHG SARS-COV-2 COVID-19 NFCT DS RESP RNA 4 TRGT MIC: CPT

## 2022-11-28 PROCEDURE — 86140 C-REACTIVE PROTEIN: CPT

## 2022-11-28 PROCEDURE — 74018 RADEX ABDOMEN 1 VIEW: CPT

## 2022-11-28 PROCEDURE — 96374 THER/PROPH/DIAG INJ IV PUSH: CPT

## 2022-11-28 PROCEDURE — 8E0ZXY6 ISOLATION: ICD-10-PCS | Performed by: HOSPITALIST

## 2022-11-28 PROCEDURE — 84134 ASSAY OF PREALBUMIN: CPT

## 2022-11-28 PROCEDURE — 84484 ASSAY OF TROPONIN QUANT: CPT

## 2022-11-28 PROCEDURE — 83036 HEMOGLOBIN GLYCOSYLATED A1C: CPT

## 2022-11-28 PROCEDURE — 70450 CT HEAD/BRAIN W/O DYE: CPT

## 2022-11-28 PROCEDURE — 81001 URINALYSIS AUTO W/SCOPE: CPT

## 2022-11-28 PROCEDURE — 71045 X-RAY EXAM CHEST 1 VIEW: CPT

## 2022-11-28 PROCEDURE — 700111 HCHG RX REV CODE 636 W/ 250 OVERRIDE (IP): Performed by: EMERGENCY MEDICINE

## 2022-11-28 PROCEDURE — 94760 N-INVAS EAR/PLS OXIMETRY 1: CPT

## 2022-11-28 PROCEDURE — 96372 THER/PROPH/DIAG INJ SC/IM: CPT

## 2022-11-28 PROCEDURE — 93005 ELECTROCARDIOGRAM TRACING: CPT | Performed by: EMERGENCY MEDICINE

## 2022-11-28 PROCEDURE — 85730 THROMBOPLASTIN TIME PARTIAL: CPT

## 2022-11-28 PROCEDURE — 770020 HCHG ROOM/CARE - TELE (206)

## 2022-11-28 PROCEDURE — 99285 EMERGENCY DEPT VISIT HI MDM: CPT

## 2022-11-28 PROCEDURE — C9803 HOPD COVID-19 SPEC COLLECT: HCPCS | Performed by: EMERGENCY MEDICINE

## 2022-11-28 PROCEDURE — 700117 HCHG RX CONTRAST REV CODE 255: Performed by: EMERGENCY MEDICINE

## 2022-11-28 PROCEDURE — 85610 PROTHROMBIN TIME: CPT

## 2022-11-28 PROCEDURE — 82962 GLUCOSE BLOOD TEST: CPT

## 2022-11-28 RX ORDER — SODIUM CHLORIDE 9 MG/ML
INJECTION, SOLUTION INTRAVENOUS CONTINUOUS
Status: DISCONTINUED | OUTPATIENT
Start: 2022-11-28 | End: 2022-11-28

## 2022-11-28 RX ORDER — CLOPIDOGREL BISULFATE 75 MG/1
75 TABLET ORAL DAILY
Status: DISCONTINUED | OUTPATIENT
Start: 2022-11-29 | End: 2022-12-08 | Stop reason: HOSPADM

## 2022-11-28 RX ORDER — TRAZODONE HYDROCHLORIDE 50 MG/1
150 TABLET ORAL NIGHTLY
Status: DISCONTINUED | OUTPATIENT
Start: 2022-11-28 | End: 2022-11-28

## 2022-11-28 RX ORDER — INSULIN LISPRO 100 [IU]/ML
INJECTION, SOLUTION INTRAVENOUS; SUBCUTANEOUS
Status: COMPLETED
Start: 2022-11-28 | End: 2022-11-28

## 2022-11-28 RX ORDER — AMOXICILLIN 250 MG
2 CAPSULE ORAL 2 TIMES DAILY
Status: DISCONTINUED | OUTPATIENT
Start: 2022-11-28 | End: 2022-12-08 | Stop reason: HOSPADM

## 2022-11-28 RX ORDER — PREGABALIN 75 MG/1
75 CAPSULE ORAL 2 TIMES DAILY
Status: DISCONTINUED | OUTPATIENT
Start: 2022-11-29 | End: 2022-12-08 | Stop reason: HOSPADM

## 2022-11-28 RX ORDER — TRAMADOL HYDROCHLORIDE 50 MG/1
50 TABLET ORAL EVERY 12 HOURS PRN
Status: DISCONTINUED | OUTPATIENT
Start: 2022-11-28 | End: 2022-11-29

## 2022-11-28 RX ORDER — PREGABALIN 100 MG/1
100 CAPSULE ORAL 3 TIMES DAILY
Status: ON HOLD | COMMUNITY
End: 2022-12-08

## 2022-11-28 RX ORDER — SODIUM CHLORIDE 9 MG/ML
INJECTION, SOLUTION INTRAVENOUS CONTINUOUS
Status: DISCONTINUED | OUTPATIENT
Start: 2022-11-28 | End: 2022-11-30

## 2022-11-28 RX ORDER — TRAMADOL HYDROCHLORIDE 50 MG/1
50 TABLET ORAL EVERY 12 HOURS PRN
Status: ON HOLD | COMMUNITY
End: 2022-12-08

## 2022-11-28 RX ORDER — ATORVASTATIN CALCIUM 40 MG/1
40 TABLET, FILM COATED ORAL NIGHTLY
Status: DISCONTINUED | OUTPATIENT
Start: 2022-11-28 | End: 2022-11-29

## 2022-11-28 RX ORDER — BISACODYL 10 MG
10 SUPPOSITORY, RECTAL RECTAL
Status: DISCONTINUED | OUTPATIENT
Start: 2022-11-28 | End: 2022-12-08 | Stop reason: HOSPADM

## 2022-11-28 RX ORDER — POLYETHYLENE GLYCOL 3350 17 G/17G
1 POWDER, FOR SOLUTION ORAL
Status: DISCONTINUED | OUTPATIENT
Start: 2022-11-28 | End: 2022-12-08 | Stop reason: HOSPADM

## 2022-11-28 RX ORDER — ATORVASTATIN CALCIUM 10 MG/1
TABLET, FILM COATED ORAL NIGHTLY
Status: ON HOLD | COMMUNITY
End: 2023-04-04

## 2022-11-28 RX ORDER — ATORVASTATIN CALCIUM 10 MG/1
10 TABLET, FILM COATED ORAL NIGHTLY
Status: DISCONTINUED | OUTPATIENT
Start: 2022-11-28 | End: 2022-11-28

## 2022-11-28 RX ORDER — ENOXAPARIN SODIUM 100 MG/ML
40 INJECTION SUBCUTANEOUS DAILY
Status: DISCONTINUED | OUTPATIENT
Start: 2022-11-29 | End: 2022-12-08 | Stop reason: HOSPADM

## 2022-11-28 RX ORDER — PREGABALIN 100 MG/1
100 CAPSULE ORAL 3 TIMES DAILY
Status: DISCONTINUED | OUTPATIENT
Start: 2022-11-28 | End: 2022-11-28

## 2022-11-28 RX ORDER — INSULIN LISPRO 100 [IU]/ML
2-9 INJECTION, SOLUTION INTRAVENOUS; SUBCUTANEOUS
Status: DISCONTINUED | OUTPATIENT
Start: 2022-11-28 | End: 2022-11-29

## 2022-11-28 RX ORDER — MIDAZOLAM HYDROCHLORIDE 1 MG/ML
5 INJECTION INTRAMUSCULAR; INTRAVENOUS ONCE
Status: COMPLETED | OUTPATIENT
Start: 2022-11-28 | End: 2022-11-28

## 2022-11-28 RX ORDER — TRAZODONE HYDROCHLORIDE 150 MG/1
150 TABLET ORAL NIGHTLY
Status: ON HOLD | COMMUNITY
End: 2022-12-08 | Stop reason: SDUPTHER

## 2022-11-28 RX ADMIN — SODIUM CHLORIDE: 9 INJECTION, SOLUTION INTRAVENOUS at 14:15

## 2022-11-28 RX ADMIN — MIDAZOLAM HYDROCHLORIDE 5 MG: 1 INJECTION, SOLUTION INTRAMUSCULAR; INTRAVENOUS at 19:00

## 2022-11-28 RX ADMIN — IOHEXOL 100 ML: 350 INJECTION, SOLUTION INTRAVENOUS at 20:22

## 2022-11-28 RX ADMIN — INSULIN LISPRO 6 UNITS: 100 INJECTION, SOLUTION INTRAVENOUS; SUBCUTANEOUS at 21:15

## 2022-11-28 NOTE — ED PROVIDER NOTES
ED Provider Note    CHIEF COMPLAINT  Chief Complaint   Patient presents with    ALOC    High Blood Sugar     [facility] from [facility].  Per report, last seen well during thanksgiving.  Dtr checked on her and pt non verbal, taken to hospital and found to have glucose levels >1000.  Pt treated x2 days, Non-contrast CT reported no ICH.  Transferred to [facility] for a higher level of care.  Upon admit to ED, , insulin at 1unit/hr, D51/2 NS at 250ml/hr.  RA.  Pt responds to name and awake, but non verbal and does not follow command.        HPI  Julisa Josue is a 62 y.o. female who presents for evaluation of altered mental status and hyperglycemia.  Patient was transferred here from [facility].  The patient was initially found by her daughter yesterday and was noted to be unconscious.  The patient was nonverbal and nonresponsive.  She was covered in feces and vomitus.  The daughter indicates that she had been doing well on Thanksgiving day.  The patient was admitted to the hospital and underwent treatment for hyperglycemia.  She has blood sugar over 1000 but this was most consistent with hyperglycemic hyperosmolar condition and not diabetic ketoacidosis.  Her drug screen was negative.  CT scan of the head was unremarkable    REVIEW OF SYSTEMS  See HPI for further details.  Unobtainable from the patient due to her altered mental status    PAST MEDICAL HISTORY  1.  Diabetes mellitus  2.  Dyslipidemia  3.  Insomnia  4.  Neuropathy  5.  Hypertension  6.  Coronary artery disease status post stent placement  7.  Congestive heart failure  8.  GERD  9.  Diverticulosis  10.  Chronic back pain  11.  Recent myocardial infarction 11/4/2019    FAMILY HISTORY  No family history on file.    SOCIAL HISTORY  Resides in [place]; alcohol intake is unknown    SURGICAL HISTORY  No past surgical history on file.    CURRENT MEDICATIONS  1.  Lyrica  2.  Metformin  3.  Reglan  4.  Metoprolol  5.   "Nitro-Bid 2%  6.  Omeprazole  7.  Oxycodone  8.  Paxil over the       ALLERGIES  Not on File    PHYSICAL EXAM  VITAL SIGNS: BP (!) 158/93   Pulse (!) 127   Temp 36.7 °C (98 °F) (Temporal)   Resp 18   Ht 1.651 m (5' 5\")   Wt 97 kg (213 lb 13.5 oz)   SpO2 91%   BMI 35.59 kg/m²    Constitutional: 62-year-old female, awake, nonverbal, minimally follows commands  HENT: ,Atraumatic, Bilateral external ears normal, tympanic membranes clear, Oropharynx mildly dry, No oral exudates, Nose normal.   Eyes: PERRL, EOMI, Conjunctiva normal, No discharge.   Neck: Normal range of motion, No tenderness, Supple, No stridor.   Lymphatic: No lymphadenopathy noted.   Cardiovascular: Tachycardic heart rate, Normal rhythm, No murmurs, No rubs, No gallops.   Thorax & Lungs: Normal Equal breath sounds, No respiratory distress, No wheezing, no stridor, no rales. No chest tenderness.   Abdomen: Soft, nontender, nondistended, no organomegaly, positive bowel sounds normal in quality. No guarding or rebound.  Skin: Decreased skin turgor, pink, warm, dry. No rashes, petechiae, purpura. Normal capillary refill.   Back: No tenderness, No CVA tenderness.   Extremities: Intact distal pulses, mild bilateral lower extremity edema, No tenderness, No cyanosis, No clubbing. Vascular: Pulses are 1+, symmetric in the upper and lower extremities.  Musculoskeletal: Good range of motion in all major joints. No tenderness to palpation or major deformities noted.   Neurologic: Awake, nonverbal, will attempt to follow commands    EKG  I have interpreted: Rate 120, rhythm sinus tachycardia, left axis deviation, Q-wave in lead III and aVF, poor R wave progression, mild ST depression in the 2-V6, twelve-lead EKG, no STEMI, no old tracing for comparison;     RADIOLOGY/PROCEDURES  CT-HEAD W/O   Final Result      No evidence of acute intracranial process.         [id number] VIEW   Final Result      No supine evidence of acute abdomen/pelvis abnormality.    "   DX-CHEST-PORTABLE (1 VIEW)   Final Result      Mild cardiac enlargement poststernotomy      No evidence of edema      MR-BRAIN-W/O    (Results Pending)   MR-MRA HEAD-W/O    (Results Pending)   MR-MRA NECK-W/O    (Results Pending)         COURSE & MEDICAL DECISION MAKING  Pertinent Labs & Imaging studies reviewed. (See chart for details)  1.  Monitor  2.  IV normal saline    Laboratory studies: CBC shows white count 11.3, 84% neutrophils, 8% lymphocytes, 6% monocytes, hemoglobin 14.7, crit 44.2; CMP shows sodium 152, CO2 19, anion gap 21, glucose 384, BUN 31, otherwise within normal; troponin elevated 115; INR 1.01; influenza and RSV negative; COVID-positive; ammonia 16;    Discussion/consultation: This time, the patient presents for evaluation of altered mental status.  The patient was initially seen at the [facility] and reviewing laboratory studies showed findings most consistent with hyperglycemic hyperosmolar condition.  Today the patient still has hyperglycemia at 384 with an increased anion gap so the 152 and clinical signs of dehydration.  Patient also tested positive for COVID and has elevated troponin.  Patient has altered mental status.  CT scanning at the [facility] was unremarkable.  It was repeated here which was unremarkable.  Patient will require MRI testing to evaluate the altered mental status.  I did speak with the intensivist on-call.  I spoke with the hospitalist on-call.  I spent 30 minutes in bedside attendance evaluating the patient, reassessing the patient, reviewing laboratory and imaging studies, reviewing transfer information, implementing treatment and reviewing response to treatment, speaking with consultants.  The patient will be admitted to the telemetry unit for further monitoring, treatment, and care.      FINAL IMPRESSION  1. Altered mental status, unspecified altered mental status type    2. Hypernatremia    3. Hyperglycemia    4. Elevated troponin    5. COVID    6.       Critical care time, 30 minutes       PLAN  1.  The patient will be admitted for further monitoring, treatment, and care.    Electronically signed by: Guy G Gansert, M.D., 11/28/2022 1:35 PM

## 2022-11-28 NOTE — ED NOTES
Unable to answer MRI questionnaire d/t pt being nonverbal and not responding to questions.  OSH only reports CT scan no findings.  Fazal in MRI requesting CXR and Abdominal XRA to clear for MRI.  Dr. Gansert updated.

## 2022-11-28 NOTE — ED TRIAGE NOTES
"Chief Complaint   Patient presents with    ALOC    High Blood Sugar     [facility] from [facility].  Per report, last seen well during thanksgiving.  Dtr checked on her and pt non verbal, taken to hospital and found to have glucose levels >1000.  Pt treated x2 days, Non-contrast CT reported no ICH.  Transferred to [facility] for a higher level of care.  Upon admit to ED, , insulin at 1unit/hr, D51/2 NS at 250ml/hr.  RA.  Pt responds to name and awake, but non verbal and does not follow command.      BP (!) 158/93   Pulse (!) 127   Temp 36.7 °C (98 °F) (Temporal)   Resp 18   Ht 1.651 m (5' 5\")   Wt 97 kg (213 lb 13.5 oz)   SpO2 91%   BMI 35.59 kg/m²     "

## 2022-11-29 ENCOUNTER — APPOINTMENT (OUTPATIENT)
Dept: CARDIOLOGY | Facility: MEDICAL CENTER | Age: 62
DRG: 070 | End: 2022-11-29
Attending: HOSPITALIST
Payer: MEDICAID

## 2022-11-29 PROBLEM — E78.5 DYSLIPIDEMIA: Status: ACTIVE | Noted: 2022-11-29

## 2022-11-29 PROBLEM — I25.10 CAD (CORONARY ARTERY DISEASE): Status: ACTIVE | Noted: 2022-11-29

## 2022-11-29 PROBLEM — R79.89 ELEVATED TROPONIN: Status: ACTIVE | Noted: 2022-11-29

## 2022-11-29 LAB
ALBUMIN SERPL BCP-MCNC: 4.1 G/DL (ref 3.2–4.9)
ALBUMIN/GLOB SERPL: 1.1 G/DL
ALP SERPL-CCNC: 97 U/L (ref 30–99)
ALT SERPL-CCNC: 11 U/L (ref 2–50)
ANION GAP SERPL CALC-SCNC: 16 MMOL/L (ref 7–16)
ANION GAP SERPL CALC-SCNC: 17 MMOL/L (ref 7–16)
ANION GAP SERPL CALC-SCNC: 17 MMOL/L (ref 7–16)
AST SERPL-CCNC: 16 U/L (ref 12–45)
B-OH-BUTYR SERPL-MCNC: 5.03 MMOL/L (ref 0.02–0.27)
BILIRUB SERPL-MCNC: 0.5 MG/DL (ref 0.1–1.5)
BUN SERPL-MCNC: 20 MG/DL (ref 8–22)
BUN SERPL-MCNC: 27 MG/DL (ref 8–22)
BUN SERPL-MCNC: 29 MG/DL (ref 8–22)
CALCIUM SERPL-MCNC: 8.9 MG/DL (ref 8.4–10.2)
CALCIUM SERPL-MCNC: 9.4 MG/DL (ref 8.4–10.2)
CALCIUM SERPL-MCNC: 9.6 MG/DL (ref 8.4–10.2)
CHLORIDE SERPL-SCNC: 103 MMOL/L (ref 96–112)
CHLORIDE SERPL-SCNC: 104 MMOL/L (ref 96–112)
CHLORIDE SERPL-SCNC: 105 MMOL/L (ref 96–112)
CHOLEST SERPL-MCNC: 352 MG/DL (ref 100–199)
CO2 SERPL-SCNC: 20 MMOL/L (ref 20–33)
CO2 SERPL-SCNC: 21 MMOL/L (ref 20–33)
CO2 SERPL-SCNC: 22 MMOL/L (ref 20–33)
CREAT SERPL-MCNC: 0.72 MG/DL (ref 0.5–1.4)
CREAT SERPL-MCNC: 0.83 MG/DL (ref 0.5–1.4)
CREAT SERPL-MCNC: 0.91 MG/DL (ref 0.5–1.4)
CRP SERPL HS-MCNC: 1.54 MG/DL (ref 0–0.75)
CRP SERPL HS-MCNC: 1.6 MG/DL (ref 0–0.75)
ERYTHROCYTE [DISTWIDTH] IN BLOOD BY AUTOMATED COUNT: 48.7 FL (ref 35.9–50)
GFR SERPLBLD CREATININE-BSD FMLA CKD-EPI: 72 ML/MIN/1.73 M 2
GFR SERPLBLD CREATININE-BSD FMLA CKD-EPI: 80 ML/MIN/1.73 M 2
GFR SERPLBLD CREATININE-BSD FMLA CKD-EPI: 95 ML/MIN/1.73 M 2
GLOBULIN SER CALC-MCNC: 3.9 G/DL (ref 1.9–3.5)
GLUCOSE BLD STRIP.AUTO-MCNC: 165 MG/DL (ref 65–99)
GLUCOSE BLD STRIP.AUTO-MCNC: 180 MG/DL (ref 65–99)
GLUCOSE BLD STRIP.AUTO-MCNC: 187 MG/DL (ref 65–99)
GLUCOSE BLD STRIP.AUTO-MCNC: 214 MG/DL (ref 65–99)
GLUCOSE BLD STRIP.AUTO-MCNC: 252 MG/DL (ref 65–99)
GLUCOSE SERPL-MCNC: 184 MG/DL (ref 65–99)
GLUCOSE SERPL-MCNC: 269 MG/DL (ref 65–99)
GLUCOSE SERPL-MCNC: 295 MG/DL (ref 65–99)
HCT VFR BLD AUTO: 45.1 % (ref 37–47)
HDLC SERPL-MCNC: 51 MG/DL
HGB BLD-MCNC: 14.6 G/DL (ref 12–16)
LDLC SERPL CALC-MCNC: 263 MG/DL
LV EJECT FRACT MOD 2C 99903: 55.41
LV EJECT FRACT MOD 4C 99902: 31.21
LV EJECT FRACT MOD BP 99901: 44.68
MAGNESIUM SERPL-MCNC: 2.2 MG/DL (ref 1.5–2.5)
MCH RBC QN AUTO: 28.6 PG (ref 27–33)
MCHC RBC AUTO-ENTMCNC: 32.4 G/DL (ref 33.6–35)
MCV RBC AUTO: 88.4 FL (ref 81.4–97.8)
PLATELET # BLD AUTO: 277 K/UL (ref 164–446)
PMV BLD AUTO: 9.4 FL (ref 9–12.9)
POTASSIUM SERPL-SCNC: 3.2 MMOL/L (ref 3.6–5.5)
POTASSIUM SERPL-SCNC: 3.7 MMOL/L (ref 3.6–5.5)
POTASSIUM SERPL-SCNC: 3.7 MMOL/L (ref 3.6–5.5)
PREALB SERPL-MCNC: 18 MG/DL (ref 18–38)
PREALB SERPL-MCNC: 19.4 MG/DL (ref 18–38)
PROT SERPL-MCNC: 8 G/DL (ref 6–8.2)
RBC # BLD AUTO: 5.1 M/UL (ref 4.2–5.4)
SODIUM SERPL-SCNC: 141 MMOL/L (ref 135–145)
SODIUM SERPL-SCNC: 142 MMOL/L (ref 135–145)
SODIUM SERPL-SCNC: 142 MMOL/L (ref 135–145)
TRIGL SERPL-MCNC: 191 MG/DL (ref 0–149)
TROPONIN T SERPL-MCNC: 135 NG/L (ref 6–19)
TROPONIN T SERPL-MCNC: 96 NG/L (ref 6–19)
WBC # BLD AUTO: 11.3 K/UL (ref 4.8–10.8)

## 2022-11-29 PROCEDURE — 83735 ASSAY OF MAGNESIUM: CPT

## 2022-11-29 PROCEDURE — 86140 C-REACTIVE PROTEIN: CPT

## 2022-11-29 PROCEDURE — 36415 COLL VENOUS BLD VENIPUNCTURE: CPT

## 2022-11-29 PROCEDURE — 770020 HCHG ROOM/CARE - TELE (206)

## 2022-11-29 PROCEDURE — 90686 IIV4 VACC NO PRSV 0.5 ML IM: CPT | Performed by: HOSPITALIST

## 2022-11-29 PROCEDURE — 93306 TTE W/DOPPLER COMPLETE: CPT

## 2022-11-29 PROCEDURE — A9270 NON-COVERED ITEM OR SERVICE: HCPCS | Performed by: HOSPITALIST

## 2022-11-29 PROCEDURE — 82962 GLUCOSE BLOOD TEST: CPT | Mod: 91

## 2022-11-29 PROCEDURE — 80053 COMPREHEN METABOLIC PANEL: CPT

## 2022-11-29 PROCEDURE — 99233 SBSQ HOSP IP/OBS HIGH 50: CPT | Performed by: HOSPITALIST

## 2022-11-29 PROCEDURE — 700111 HCHG RX REV CODE 636 W/ 250 OVERRIDE (IP): Performed by: HOSPITALIST

## 2022-11-29 PROCEDURE — 85027 COMPLETE CBC AUTOMATED: CPT

## 2022-11-29 PROCEDURE — 90471 IMMUNIZATION ADMIN: CPT

## 2022-11-29 PROCEDURE — 700102 HCHG RX REV CODE 250 W/ 637 OVERRIDE(OP): Performed by: HOSPITALIST

## 2022-11-29 PROCEDURE — 84134 ASSAY OF PREALBUMIN: CPT

## 2022-11-29 PROCEDURE — 80048 BASIC METABOLIC PNL TOTAL CA: CPT

## 2022-11-29 PROCEDURE — 700111 HCHG RX REV CODE 636 W/ 250 OVERRIDE (IP): Performed by: INTERNAL MEDICINE

## 2022-11-29 PROCEDURE — 96372 THER/PROPH/DIAG INJ SC/IM: CPT

## 2022-11-29 PROCEDURE — 84484 ASSAY OF TROPONIN QUANT: CPT | Mod: 91

## 2022-11-29 PROCEDURE — 94760 N-INVAS EAR/PLS OXIMETRY 1: CPT

## 2022-11-29 PROCEDURE — 93306 TTE W/DOPPLER COMPLETE: CPT | Mod: 26 | Performed by: INTERNAL MEDICINE

## 2022-11-29 PROCEDURE — 700105 HCHG RX REV CODE 258: Performed by: HOSPITALIST

## 2022-11-29 PROCEDURE — 3E02340 INTRODUCTION OF INFLUENZA VACCINE INTO MUSCLE, PERCUTANEOUS APPROACH: ICD-10-PCS | Performed by: HOSPITALIST

## 2022-11-29 PROCEDURE — 80061 LIPID PANEL: CPT

## 2022-11-29 RX ORDER — OXYCODONE HYDROCHLORIDE 5 MG/1
5 TABLET ORAL EVERY 4 HOURS PRN
Status: DISCONTINUED | OUTPATIENT
Start: 2022-11-29 | End: 2022-12-08 | Stop reason: HOSPADM

## 2022-11-29 RX ORDER — ONDANSETRON 2 MG/ML
4 INJECTION INTRAMUSCULAR; INTRAVENOUS EVERY 4 HOURS PRN
Status: DISCONTINUED | OUTPATIENT
Start: 2022-11-29 | End: 2022-12-08 | Stop reason: HOSPADM

## 2022-11-29 RX ORDER — ATORVASTATIN CALCIUM 40 MG/1
80 TABLET, FILM COATED ORAL NIGHTLY
Status: DISCONTINUED | OUTPATIENT
Start: 2022-11-29 | End: 2022-12-08 | Stop reason: HOSPADM

## 2022-11-29 RX ORDER — INSULIN LISPRO 100 [IU]/ML
5 INJECTION, SOLUTION INTRAVENOUS; SUBCUTANEOUS ONCE
Status: COMPLETED | OUTPATIENT
Start: 2022-11-29 | End: 2022-11-29

## 2022-11-29 RX ORDER — TRAZODONE HYDROCHLORIDE 50 MG/1
150 TABLET ORAL NIGHTLY
Status: DISCONTINUED | OUTPATIENT
Start: 2022-11-29 | End: 2022-12-08 | Stop reason: HOSPADM

## 2022-11-29 RX ADMIN — ONDANSETRON 4 MG: 2 INJECTION INTRAMUSCULAR; INTRAVENOUS at 18:23

## 2022-11-29 RX ADMIN — INSULIN HUMAN 3 UNITS: 100 INJECTION, SOLUTION PARENTERAL at 20:15

## 2022-11-29 RX ADMIN — ATORVASTATIN CALCIUM 80 MG: 40 TABLET, FILM COATED ORAL at 20:15

## 2022-11-29 RX ADMIN — INSULIN HUMAN 3 UNITS: 100 INJECTION, SOLUTION PARENTERAL at 17:20

## 2022-11-29 RX ADMIN — SODIUM CHLORIDE: 9 INJECTION, SOLUTION INTRAVENOUS at 09:26

## 2022-11-29 RX ADMIN — ENOXAPARIN SODIUM 40 MG: 40 INJECTION SUBCUTANEOUS at 17:22

## 2022-11-29 RX ADMIN — TRAMADOL HYDROCHLORIDE 50 MG: 50 TABLET, COATED ORAL at 11:43

## 2022-11-29 RX ADMIN — OXYCODONE 5 MG: 5 TABLET ORAL at 21:44

## 2022-11-29 RX ADMIN — SENNOSIDES AND DOCUSATE SODIUM 2 TABLET: 50; 8.6 TABLET ORAL at 17:22

## 2022-11-29 RX ADMIN — INSULIN LISPRO 5 UNITS: 100 INJECTION, SOLUTION INTRAVENOUS; SUBCUTANEOUS at 04:41

## 2022-11-29 RX ADMIN — INSULIN LISPRO 2 UNITS: 100 INJECTION, SOLUTION INTRAVENOUS; SUBCUTANEOUS at 12:24

## 2022-11-29 RX ADMIN — INFLUENZA A VIRUS A/VICTORIA/2570/2019 IVR-215 (H1N1) ANTIGEN (FORMALDEHYDE INACTIVATED), INFLUENZA A VIRUS A/DARWIN/9/2021 SAN-010 (H3N2) ANTIGEN (FORMALDEHYDE INACTIVATED), INFLUENZA B VIRUS B/PHUKET/3073/2013 ANTIGEN (FORMALDEHYDE INACTIVATED), AND INFLUENZA B VIRUS B/MICHIGAN/01/2021 ANTIGEN (FORMALDEHYDE INACTIVATED) 0.5 ML: 15; 15; 15; 15 INJECTION, SUSPENSION INTRAMUSCULAR at 17:22

## 2022-11-29 RX ADMIN — OXYCODONE 5 MG: 5 TABLET ORAL at 17:22

## 2022-11-29 RX ADMIN — TRAZODONE HYDROCHLORIDE 150 MG: 50 TABLET ORAL at 20:15

## 2022-11-29 RX ADMIN — PREGABALIN 75 MG: 75 CAPSULE ORAL at 17:22

## 2022-11-29 RX ADMIN — INSULIN LISPRO 3 UNITS: 100 INJECTION, SOLUTION INTRAVENOUS; SUBCUTANEOUS at 09:21

## 2022-11-29 RX ADMIN — SODIUM CHLORIDE: 9 INJECTION, SOLUTION INTRAVENOUS at 20:39

## 2022-11-29 ASSESSMENT — LIFESTYLE VARIABLES
TOTAL SCORE: 0
HAVE YOU EVER FELT YOU SHOULD CUT DOWN ON YOUR DRINKING: NO
TOTAL SCORE: 0
CONSUMPTION TOTAL: NEGATIVE
EVER FELT BAD OR GUILTY ABOUT YOUR DRINKING: NO
EVER HAD A DRINK FIRST THING IN THE MORNING TO STEADY YOUR NERVES TO GET RID OF A HANGOVER: NO
ALCOHOL_USE: NO
TOTAL SCORE: 0
HAVE PEOPLE ANNOYED YOU BY CRITICIZING YOUR DRINKING: NO
ON A TYPICAL DAY WHEN YOU DRINK ALCOHOL HOW MANY DRINKS DO YOU HAVE: 0
AVERAGE NUMBER OF DAYS PER WEEK YOU HAVE A DRINK CONTAINING ALCOHOL: 0
HOW MANY TIMES IN THE PAST YEAR HAVE YOU HAD 5 OR MORE DRINKS IN A DAY: 0

## 2022-11-29 ASSESSMENT — COGNITIVE AND FUNCTIONAL STATUS - GENERAL
MOVING FROM LYING ON BACK TO SITTING ON SIDE OF FLAT BED: A LITTLE
SUGGESTED CMS G CODE MODIFIER MOBILITY: CK
MOVING TO AND FROM BED TO CHAIR: A LITTLE
WALKING IN HOSPITAL ROOM: A LITTLE
SUGGESTED CMS G CODE MODIFIER DAILY ACTIVITY: CI
STANDING UP FROM CHAIR USING ARMS: A LITTLE
CLIMB 3 TO 5 STEPS WITH RAILING: TOTAL
MOBILITY SCORE: 17
HELP NEEDED FOR BATHING: A LITTLE
DAILY ACTIVITIY SCORE: 23

## 2022-11-29 ASSESSMENT — PAIN DESCRIPTION - PAIN TYPE
TYPE: ACUTE PAIN

## 2022-11-29 ASSESSMENT — ENCOUNTER SYMPTOMS
ABDOMINAL PAIN: 0
CHILLS: 0
VOMITING: 0
INSOMNIA: 1
NAUSEA: 0
FEVER: 0

## 2022-11-29 ASSESSMENT — PATIENT HEALTH QUESTIONNAIRE - PHQ9
2. FEELING DOWN, DEPRESSED, IRRITABLE, OR HOPELESS: NOT AT ALL
1. LITTLE INTEREST OR PLEASURE IN DOING THINGS: NOT AT ALL
SUM OF ALL RESPONSES TO PHQ9 QUESTIONS 1 AND 2: 0

## 2022-11-29 ASSESSMENT — FIBROSIS 4 INDEX: FIB4 SCORE: 1.04

## 2022-11-29 NOTE — PROGRESS NOTES
[facility] Medicine Daily Progress Note    Date of Service  11/29/2022    Chief Complaint  Julisa Josue is a 62 y.o. female admitted 11/28/2022 with altered mental status    Hospital Course  Julisa Josue is a 62 y.o. female with a past medical history of diabetes who presented 11/28/2022 with altered mental status.  Most of the history is obtained from emergency department physician, and patient chart, as the patient was encephalopathic during my bedside evaluation.  Apparently the patient was at [place] emergency room just after Thanksgiving and has been treated for hyper osmolar hyperglycemic coma reportedly with a glucose level of over thousand.  Apparently the patient was alert/oriented at baseline but became unresponsive since yesterday.  Reportedly the patient was found by doctor yesterday unconscious and covered in vomitus and feces.   She was transferred to our facility for higher level of care is started on IV fluids and subcutaneous insulin    Interval Problem Update    Patient is alert oriented x4 at time of presentation  She is diabetic on Victoza and multiple oral agents but has been noncompliant with medical therapy  She has diabetic neuropathy and is on Lyrica she denies nausea vomiting headache or abdominal pain      I have discussed this patient's plan of care and discharge plan at IDT rounds today with Case Management, Nursing, Nursing leadership, and other members of the IDT team.    Consultants/Specialty       Code Status  Full Code    Disposition  Patient is not medically cleared for discharge.   Anticipate discharge to  TBD .  I have placed the appropriate orders for post-discharge needs.    Review of Systems  Review of Systems   Constitutional:  Positive for malaise/fatigue. Negative for chills and fever.   Gastrointestinal:  Negative for abdominal pain, nausea and vomiting.   Musculoskeletal:  Positive for joint pain.   Psychiatric/Behavioral:  The patient has insomnia.    All other  systems reviewed and are negative.     Physical Exam  Temp:  [36.6 °C (97.8 °F)-36.7 °C (98 °F)] 36.6 °C (97.8 °F)  Pulse:  [] 106  Resp:  [16-19] 16  BP: (116-172)/(66-91) 145/77  SpO2:  [92 %-100 %] 92 %    Physical Exam  Vitals and nursing note reviewed.   Constitutional:       General: She is not in acute distress.     Appearance: She is obese.   HENT:      Head: Normocephalic and atraumatic.      Nose: Nose normal. No rhinorrhea.      Mouth/Throat:      Pharynx: No oropharyngeal exudate or posterior oropharyngeal erythema.   Eyes:      General: No scleral icterus.        Right eye: No discharge.         Left eye: No discharge.   Cardiovascular:      Rate and Rhythm: Normal rate and regular rhythm.      Heart sounds: Normal heart sounds. No murmur heard.    No friction rub. No gallop.   Pulmonary:      Effort: Pulmonary effort is normal. No respiratory distress.      Breath sounds: Normal breath sounds. No stridor. No wheezing, rhonchi or rales.   Chest:      Chest wall: No tenderness.   Abdominal:      General: Bowel sounds are normal. There is no distension.      Palpations: Abdomen is soft. There is no mass.      Tenderness: There is no abdominal tenderness. There is no rebound.   Musculoskeletal:         General: No swelling or tenderness.      Cervical back: Neck supple. No rigidity.   Skin:     General: Skin is warm and dry.      Coloration: Skin is not cyanotic or jaundiced.      Nails: There is no clubbing.   Neurological:      General: No focal deficit present.      Mental Status: She is alert and oriented to person, place, and time.      Cranial Nerves: No cranial nerve deficit.      Motor: No weakness.   Psychiatric:         Mood and Affect: Mood normal.         Behavior: Behavior normal.       Fluids    Intake/Output Summary (Last 24 hours) at 11/29/2022 1331  Last data filed at 11/28/2022 1830  Gross per 24 hour   Intake 567.5 ml   Output --   Net 567.5 ml       Laboratory  Recent Labs      11/28/22  1409 11/29/22  0334   WBC 11.3* 11.3*   RBC 5.01 5.10   HEMOGLOBIN 14.7 14.6   HEMATOCRIT 44.2 45.1   MCV 88.2 88.4   MCH 29.3 28.6   MCHC 33.3* 32.4*   RDW 48.4 48.7   PLATELETCT 252 277   MPV 9.7 9.4     Recent Labs     11/28/22  1409 11/28/22  2351 11/29/22  0334   SODIUM 152* 142 142   POTASSIUM 4.9 3.7 3.7   CHLORIDE 112 105 103   CO2 19* 21 22   GLUCOSE 384* 295* 269*   BUN 31* 29* 27*   CREATININE 0.94 0.83 0.91   CALCIUM 9.7 9.4 9.6     Recent Labs     11/28/22  1409 11/28/22  1538   APTT  --  20.7*   INR 1.01 1.04         Recent Labs     11/29/22  0334   TRIGLYCERIDE 191*   HDL 51   *       Imaging  CT-CTA HEAD WITH & W/O-POST PROCESS   Final Result      CT angiogram of the [place] within normal limits.      Occlusion of left vertebral artery as noted on MRA.      CT-CTA NECK WITH & W/O-POST PROCESSING   Final Result         1.  Severely hypoplastic left vertebral artery, the proximal and mid left vertebral artery are not visualized and likely occluded.      These findings were discussed with the patient's clinician, Dr. Stokes, on 11/28/2022 8:48 PM.         MR-MRA NECK-W/O   Final Result      1.  Nonvisualization of the left vertebral artery likely representing age indeterminate occlusion. There is dominant right vertebral artery continues as a basilar artery.   2.  The remaining cervical blood vessels are unremarkable.      MR-BRAIN-W/O   Final Result      1.  No acute abnormality.   2.  Chronic lacunar infarcts in the right cerebellum.   3.  Minimal chronic microvascular ischemic disease.   4.  Flow void of the left vertebral artery is not seen. This may represent age indeterminate occlusion.      MR-MRA HEAD-W/O   Final Result      1.  No flow identified within the visualized portions of the left vertebral artery      2.  This could indicate a dissection.      3.  Further assessment with CTA neck is recommended      4.  Anatomic variant carotid origin of the right posterior  cerebral artery      CT-HEAD W/O   Final Result      No evidence of acute intracranial process.         [id number] VIEW   Final Result      No supine evidence of acute abdomen/pelvis abnormality.      DX-CHEST-PORTABLE (1 VIEW)   Final Result      Mild cardiac enlargement poststernotomy      No evidence of edema      EC-ECHOCARDIOGRAM COMPLETE W/O CONT    (Results Pending)        Assessment/Plan  * Acute encephalopathy- (present on admission)  Assessment & Plan  Metabolic encephalopathy secondary to dehydration and hyperglycemia    Clinically resolved  Brain imaging negative for acute pathology    Elevated troponin  Assessment & Plan  Likely demand ischemia  Continue Plavix statin check echo  Trend trop        CAD (coronary artery disease)  Assessment & Plan  Continue medical therapy with atorvastatin Plavix    Dyslipidemia  Assessment & Plan  Increase atorvastatin she will need outpatient follow-up labs    Hypernatremia- (present on admission)  Assessment & Plan  Secondary to dehydration  IV and oral hydration        COVID-19 virus infection- (present on admission)  Assessment & Plan  Patient is currently asymptomatic  Reports she was diagnosed with COVID about 1 month ago      Type 2 diabetes mellitus with neurologic complication, with long-term current use of insulin ([facility])- (present on admission)  Assessment & Plan  Hyper osmolar hyperglycemic coma at [place]  Hemoglobin A1c 12.5    Secondary to noncompliance with medical therapy  Continue IV hydration  Continue Lantus will increase to 10 units twice daily  Continue sliding-scale insulin will increase to high scale  Reviewed with patient risks of uncontrolled diabetes and reinforced compliance with medical therapy and close outpatient follow-up  Diabetic teaching       VTE prophylaxis: enoxaparin ppx    I have performed a physical exam and reviewed and updated ROS and Plan today (11/29/2022). In review of yesterday's note (11/28/2022), there are no  changes except as documented above.

## 2022-11-29 NOTE — ED NOTES
Pt sitting up in hospital bed AOx3 (disoriented to situation). Pt voices no complaints or concerns at this time. VSS. POC reviewed with patient.   Safety precautions in place including gurney locked in lowest position, both side rails up, call light within reach. Pt educated to use call light if requiring any assistance with getting oob.

## 2022-11-29 NOTE — ED NOTES
Pt requesting to attempt bedside swallow again. Repeated bedside swallow. Pt passed. No cough with water.
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
## 2022-11-29 NOTE — THERAPY
Missed Therapy     Patient Name: Julisa Josue  Age:  62 y.o., Sex:  female  Medical Record #: [id number]  Today's Date: 11/29/2022 11/29/22 0942   Treatment Variance   Reason For Missed Therapy Non-Medical - Other (Please Comment)   Interdisciplinary Plan of Care Collaboration   IDT Collaboration with  Physician   Collaboration Comments Order received for a clinical swallow. Per MD, MRI negative and per RN notes, pt passed RN dysphagia screen. Okay to cancel swallow and cognitive evaluations per MD. Per MD, will re-consult with any issues.

## 2022-11-29 NOTE — H&P
Hospital Medicine History & Physical Note    Date of Service  11/28/2022    Primary Care Physician  No primary care provider on file.    Consultants  D/W intensivist on-call, Dr Albarran     Code Status  Full Code    Chief Complaint  Chief Complaint   Patient presents with    ALOC    High Blood Sugar     [facility] from [facility].  Per report, last seen well during thanksgiving.  Dtr checked on her and pt non verbal, taken to hospital and found to have glucose levels >1000.  Pt treated x2 days, Non-contrast CT reported no ICH.  Transferred to [facility] for a higher level of care.  Upon admit to ED, , insulin at 1unit/hr, D51/2 NS at 250ml/hr.  RA.  Pt responds to name and awake, but non verbal and does not follow command.      History of Presenting Illness  Julisa Josue is a 62 y.o. female with a past medical history of diabetes who presented 11/28/2022 with altered mental status.  Most of the history is obtained from emergency department physician, and patient chart, as the patient was encephalopathic during my bedside evaluation.  Apparently the patient was at [place] emergency room just after Thanksgiving and has been treated for hyper osmolar hyperglycemic coma reportedly with a glucose level of over thousand.  Apparently the patient was alert/oriented at baseline but became unresponsive since yesterday.  Reportedly the patient was found by doctor yesterday unconscious and covered in vomitus and feces.     I discussed the plan of care with emergency department physician.    Review of Systems  Review of Systems   Unable to perform ROS: Mental status change     Past Medical History   has a past medical history of CAD (coronary artery disease), Congestive heart failure (HCC), Diabetes (HCC), Diverticulosis, GERD (gastroesophageal reflux disease), and Hypertension.    Surgical History   has a past surgical history that includes other cardiac surgery.     Family History  Unable to obtain, patient  is encephalopathic    Social History  Unable to obtain, patient is encephalopathic    Allergies  Unable to obtain, patient is encephalopathic    Medications  Prior to Admission Medications   Prescriptions Last Dose Informant Patient Reported? Taking?   Liraglutide (VICTOZA SC)   Yes Yes   Sig: Inject 0.6 Units under the skin every day.   SITagliptin (JANUVIA) 50 MG Tab UNK at [facility] Patient's Home Pharmacy Yes Yes   Sig: Take 50 mg by mouth every day.   atorvastatin (LIPITOR) 10 MG Tab   Yes Yes   Sig: Take  by mouth every evening. Unsure of dose   clopidogrel or PLACEBO (STUDY DRUG) 75 mg Tab   Yes Yes   Sig: Take 75 mg by mouth every day.   metFORMIN (GLUCOPHAGE) 500 MG Tab UNK at [facility] Patient's Home Pharmacy Yes Yes   Sig: Take 500 mg by mouth 2 times a day with meals.   pregabalin (LYRICA) 100 MG Cap UNK at [facility] Patient's Home Pharmacy Yes Yes   Sig: Take 100 mg by mouth in the morning, at noon, and at bedtime.   traMADol (ULTRAM) 50 MG Tab UNK at [facility] Patient's Home Pharmacy Yes Yes   Sig: Take 50 mg by mouth every 12 hours as needed.   traZODone (DESYREL) 150 MG Tab UNK at [facility] Patient's Home Pharmacy Yes Yes   Sig: Take 150 mg by mouth every evening.      Facility-Administered Medications: None     Physical Exam  Temp:  [36.7 °C (98 °F)] 36.7 °C (98 °F)  Pulse:  [116-127] 116  Resp:  [18-19] 18  BP: (123-168)/(73-93) 144/73  SpO2:  [91 %-100 %] 98 %  Blood Pressure: (!) 168/80   Temperature: 36.7 °C (98 °F)   Pulse: (!) 125   Respiration: 19   Pulse Oximetry: 100 %     Physical Exam  Constitutional:       General: She is not in acute distress.     Comments: Sleepy, but easily applicable   HENT:      Head: Normocephalic and atraumatic.      Right Ear: External ear normal.      Left Ear: External ear normal.      Nose: No congestion or rhinorrhea.      Mouth/Throat:      Mouth: Mucous membranes are dry.      Pharynx: No oropharyngeal exudate or posterior oropharyngeal erythema.   Eyes:      General: No scleral  icterus.        Right eye: No discharge.         Left eye: No discharge.      Conjunctiva/sclera: Conjunctivae normal.      Pupils: Pupils are equal, round, and reactive to light.   Cardiovascular:      Rate and Rhythm: Regular rhythm. Tachycardia present.      Heart sounds:     No friction rub. No gallop.   Pulmonary:      Effort: Pulmonary effort is normal.      Comments: Saturating well on room air  Abdominal:      General: Abdomen is flat. There is no distension.      Tenderness: There is no guarding.   Musculoskeletal:         General: No swelling.      Cervical back: Neck supple. No rigidity. No muscular tenderness.      Right lower leg: No edema.      Left lower leg: No edema.   Skin:     General: Skin is dry.      Capillary Refill: Capillary refill takes 2 to 3 seconds.      Coloration: Skin is not jaundiced or pale.      Findings: No bruising or erythema.   Neurological:      Mental Status: She is alert. She is disoriented.      Comments: Not answering questions.  Not following commands   Psychiatric:      Comments: Impaired judgment     Laboratory:  Recent Labs     11/28/22  1409   WBC 11.3*   RBC 5.01   HEMOGLOBIN 14.7   HEMATOCRIT 44.2   MCV 88.2   MCH 29.3   MCHC 33.3*   RDW 48.4   PLATELETCT 252   MPV 9.7     Recent Labs     11/28/22  1409   SODIUM 152*   POTASSIUM 4.9   CHLORIDE 112   CO2 19*   GLUCOSE 384*   BUN 31*   CREATININE 0.94   CALCIUM 9.7     Recent Labs     11/28/22  1409   ALTSGPT 12   ASTSGOT 17   ALKPHOSPHAT 96   TBILIRUBIN 0.4   GLUCOSE 384*     Recent Labs     11/28/22  1409 11/28/22  1538   APTT  --  20.7*   INR 1.01 1.04     No results for input(s): NTPROBNP in the last 72 hours.      Recent Labs     11/28/22  1409   TROPONINT 115*     Imaging:  CT-CTA HEAD WITH & W/O-POST PROCESS   Final Result      CT angiogram of the [place] within normal limits.      Occlusion of left vertebral artery as noted on MRA.      CT-CTA NECK WITH & W/O-POST PROCESSING   Final Result         1.   Severely hypoplastic left vertebral artery, the proximal and mid left vertebral artery are not visualized and likely occluded.      These findings were discussed with the patient's clinician, Dr. Stokes, on 11/28/2022 8:48 PM.         MR-MRA NECK-W/O   Final Result      1.  Nonvisualization of the left vertebral artery likely representing age indeterminate occlusion. There is dominant right vertebral artery continues as a basilar artery.   2.  The remaining cervical blood vessels are unremarkable.      MR-BRAIN-W/O   Final Result      1.  No acute abnormality.   2.  Chronic lacunar infarcts in the right cerebellum.   3.  Minimal chronic microvascular ischemic disease.   4.  Flow void of the left vertebral artery is not seen. This may represent age indeterminate occlusion.      MR-MRA HEAD-W/O   Final Result      1.  No flow identified within the visualized portions of the left vertebral artery      2.  This could indicate a dissection.      3.  Further assessment with CTA neck is recommended      4.  Anatomic variant carotid origin of the right posterior cerebral artery      CT-HEAD W/O   Final Result      No evidence of acute intracranial process.         [id number] VIEW   Final Result      No supine evidence of acute abdomen/pelvis abnormality.      DX-CHEST-PORTABLE (1 VIEW)   Final Result      Mild cardiac enlargement poststernotomy      No evidence of edema        Assessment/Plan:  Justification for Admission Status  I anticipate this patient will require at least two midnights for appropriate medical management, necessitating inpatient admission because patient has multiple acute medical problems including acute encephalopathy hypernatremia, hyperglycemia will require intravenous fluids, insulin, monitoring of electrolytes, rule out stroke/pontine melanosis.     * Acute encephalopathy- (present on admission)  Assessment & Plan  CT scan of the head did not show evidence for acute infarction or  hemorrhage  Likely metabolic likely secondary to hyperglycemia, hypernatremia  Other potential causes include acute strokes versus pontine melanosis given her blood sugars in excess of 1000 at [place]  MRI imaging ordered, results pending    Hypernatremia- (present on admission)  Assessment & Plan  Hypovolemic hypernatremia  Free water flushes & intravenous fluids  BMP every 4 hours     Type 2 diabetes mellitus with neurologic complication, with long-term current use of insulin ([facility])- (present on admission)  Assessment & Plan  Hyper osmolar hyperglycemic coma at [place]  Currently having mild acidosis, blood sugar in the 380s, calculated osmolality is 335  Case discussed with intensivist on-call to consider insulin drip, recommended sliding scale insulin, glargine   If labs do not improve consider transferring to intensive care unit for sliding scale insulin    COVID-19 virus infection- (present on admission)  Assessment & Plan  Not currently having hypoxemic respiratory failure.  Consider starting Decadron if patient develops hypoxemic respiratory failure    VTE prophylaxis: SCDs/TEDs and enoxaparin ppx

## 2022-11-29 NOTE — ED NOTES
Patient pressed call light and verbally asked for some water. She was NPO so no water was given but she seemed to understand and responded with okay.

## 2022-11-29 NOTE — ED NOTES
Notified by MRI that pt moving and unable to complete full order.  MD ordered 5mg of versed.  This RN gave 5mg of versed, pt placed on MRI monitor and 5L O2 via NC.

## 2022-11-29 NOTE — PROGRESS NOTES
4 Eyes Skin Assessment Completed by OJ Wheat and OJ Orellana.    Head WDL  Ears WDL  Nose WDL  Mouth WDL  Neck WDL  Breast/Chest Scar  Shoulder Blades WDL  Spine WDL  (R) Arm/Elbow/Hand WDL  (L) Arm/Elbow/Hand WDL  Abdomen Scar  Groin WDL  Scrotum/Coccyx/Buttocks Redness, Blanching, Non-Blanching, Discoloration, and Scar  (R) Leg Redness, Scab, and Abrasion  (L) Leg Redness, Scab, and Abrasion  (R) Heel/Foot/Toe Scab, cracking/peeling, blister between 4th and 5th toes  (L) Heel/Foot/Toe Scab, cracking/peeling      Devices In Places Tele Box, PIV x 2      Interventions In Place Sacral Mepilex, Waffle Overlay, and Pillows    Possible Skin Injury Yes    Pictures Uploaded Into Epic Yes  Wound Consult Placed Yes  RN Wound Prevention Protocol Ordered Yes

## 2022-11-29 NOTE — HOSPITAL COURSE
This is 62-year-old female with a past medical significant for type 2 diabetes mellitus with hemoglobin A1c of 12.5 presented to ER on 11/20/2020 with altered mental status.    Upon presentation in ER, patient was encephalopathic; she was recently seen at [facility] for hyperosmolar coma with a glucose level >1000.    During the stay in the hospital, she was aggressively hydrated, her hemoglobin A1c is noted to be 12.5, she was started on insulin sliding scale, hypoglycemic to goal, Accu-Cheks ACH S.  Nursing to provide diabetic education.    Her encephalopathy continue to need to improve, currently patient is alert and oriented; MRI brain did not show any acute abnormality, chronic lacunar infarct in the right cerebellum    CTA showed severely hypoplastic left vertebral artery; the proximal and mid left vertebral artery likely occluded; CTA of the head unremarkable.  Echocardiogram showed EF of 40 to 45%, LVH    Patient is found to be COVID-19 positive on 11/20, currently saturating well on room air    During the  stay in the hospital, PT/OT has evaluate the patient, recommended home health.  It is noted that patient has poor insight considering her hemoglobin A1c of 12.5 and admission to [facility] for hyperosmolar, she is not safe for the patient to be discharged home without supervision.  Considering patient insurance being Medicaid fee-for-service, unable to provide home health.  Discussed with case management, plan to discharge patient to skilled nursing facility.    Daughter Elina (daughter) lost her son 2 months ago and has been difficult.  She is unable to care for her mother and her two other children. Patient will return to her apartment at [facility], after [facility] in [place].

## 2022-11-29 NOTE — ASSESSMENT & PLAN NOTE
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  Nursing staff to provide education

## 2022-11-29 NOTE — ED NOTES
Pt transferred from [facility] to hospital bed. Pt connected to monitor at bedside.   Pt henriquez bag emptied at bedside with 1450 ml out.

## 2022-11-29 NOTE — ASSESSMENT & PLAN NOTE
Hyper osmolar hyperglycemic coma on presentation  Hemoglobin A1c 12.5 milligrams  --Increase Lantus   - continue sliding scale insulin monitor CBGs  --Diabetic education    Discussed extensively with the nursing staff in regards to providing education, provide education and-diet, how to inject  insulin, CHECK fingerstick.

## 2022-11-29 NOTE — ED NOTES
Dtr called earlier and informed dia Rangel RN that pt had Lyrica re-filled on 10/17 a 90 day supply  and now only has 42 pills left.  MD notified.

## 2022-11-29 NOTE — ASSESSMENT & PLAN NOTE
Patient is currently asymptomatic  Reports she was diagnosed with COVID about 1 month ago   -- repeat covid on 11/28: +ve    No medication indicated, supportive treatment

## 2022-11-29 NOTE — DIETARY
Nutrition Services: Consult for tube feed placed on 11/28/22. Per SLP note today, pt passed RN dysphagia screen. SLP is not following. RD spoke with pt's nurse, he said that pt came in encephalopathic and this may have prompted TF consult. She is now alert and oriented and swallowing fine. Nurse is going to reach out to MD for diet order. RD will D/C TF orders and follow PRN.

## 2022-11-29 NOTE — ED NOTES
Spoke with Dr. Ernst regarding this patient. Per Dr. Ernst no stroke, pt does have an occlusion in her  L vertebral artery that is tiny per radiologist Dr. Espinoza. There is retroperfusion. No indication for TPA or procedure, no need for neurology consult since the patient is out of the window. No need to place NGT at this time. Reviewed labs from earlier, new orders given.   Per Dr. Ernst to cover the CBG of 252 to prevent DKA.

## 2022-11-29 NOTE — PROGRESS NOTES
ICU review note    D/w with ER MD at [facility] and also Dr. Gansert here as well as DR. Liu  Pt last seen normal at [facility]  Admitted with hyperosmolar hyperglycemia with glucose in 1000 range and rx with insulin drip one unit  Still has an osmolar gap but does  have improved labs when admitted initially at [facility]  Insulin drip was stopped at [facility] this am and glucose here is 384  No long acting insulin given    COVID pending on admission here  RA and vitals wnl  MRI pending as pt has been altered past few days  HCO3 is 19 AG 21 but needs insulin long acting   At this time would Rx with fluids first and insulin glargine with sliding and reassess labs in 4 hrs if worsening then ofcourse transfer to ICU for insulin drip but at this time no need for ICU drip/

## 2022-11-29 NOTE — ED NOTES
Pt is attempting to climb out of bed. Pt is confused, only able to state her name. Pt is not able to tell me her birth date, month, year, president. She is not able to follow commands. Blood sugar checked. Page out to Dr. Ernst.  Bed alarm obtained and placed.

## 2022-11-29 NOTE — PROGRESS NOTES
"Pt admitted to unit on hospital bed. Pt is AOx4, no neurological symptoms present on assessment, swallow eval passed, Dr Gladys Tellez contacted for diet order. Pt reports 10/10 pain in her lower back which she describes as \"aching\" and chronic in nature, medicated per MAR. 2RN skin assessment completed, wound pictures uploaded into Epic. No further needs at this time.   "

## 2022-11-29 NOTE — ED NOTES
0315  Pt is more clearer, she states she doesn't feel as fuzzy. Labs drawn. Pt is able to follow commands, able to give age, name, birth date, president, state. Pt reports she remembers Thanksgiving day, but nothing else. Pt was surprised that it is Tuesday 5 days after Thanksgiving. She states she doesn't know what happened.   0330 Pt failed bedside swallow, pt 90 degrees, able to cough on commands, say ahh. Sip of water, pt started coughing with sip. Dr. Ernst notified. Continue with strict NPO.   0400  IV's are leaking bilaterally. Attempted to salvage IV's. Attempted to start IV x 1 and Charge x 1. Pt reports she is a hard stick. Asked Dr. Baires to assist with IV placement. US given to provider.   Dr. Ernst notified of troponin level, no new orders.

## 2022-11-29 NOTE — CARE PLAN
The patient is Stable - Low risk of patient condition declining or worsening    Shift Goals  Clinical Goals: monitor neuro status, remove henriquez  Patient Goals: go home    Progress made toward(s) clinical / shift goals:  Neuro checks and NIH performed, pt appears to be at baseline with minor deficits. Henriquez catheter removed per policy.    Patient is not progressing towards the following goals: n/a

## 2022-11-29 NOTE — ASSESSMENT & PLAN NOTE
Likely demand ischemia  Continue Plavix statin     Echo poor quality reordered with contrast  Troponin trended down

## 2022-11-29 NOTE — PROGRESS NOTES
Spiritual Care Note      Patient's Name: Julisa Josue   MRN: [id number]    YOB: 1960   Age and Gender: 62 y.o. female   Service Area: [facility]   Room (and Bed): [facility]   Ethnicity or Nationality:     Primary Language: English   Islam/Spiritual Preference: Latter day   Place of Residence: [place], [place]   Medical Diagnoses/Procedures: acute encephalopathy 2/2 dehydration & hyperglycemia  elevated troponin  CAD   dyslipidemia  hypernatremia 2/2 dehydration  COVID-19 infection  type 2 diabetes mellitus   --  w/ neurologic complication  --  w/ long-term current use of insulin    Code Status: Full Code    Date of Admission: 11/28/2022   Length of Stay: 1 day        Spiritual Screen   Was spiritual care education provided to the patient?   Yes      Nature of the Visit:   Initial, On shift    Crisis Visit:   Critical care    Referral from/Origin of Request:   [facility] nursing    Referral to:   Community clergy ()    Interventions:   Spoke with Fr. Destin Peacock who will visit today.      Spiritual Care Provider   charles Gold  [phone]   [email or web address]

## 2022-11-30 DIAGNOSIS — R41.82 ALTERED MENTAL STATUS, UNSPECIFIED ALTERED MENTAL STATUS TYPE: Primary | ICD-10-CM

## 2022-11-30 PROBLEM — E87.6 HYPOKALEMIA: Status: ACTIVE | Noted: 2022-11-30

## 2022-11-30 LAB
ANION GAP SERPL CALC-SCNC: 14 MMOL/L (ref 7–16)
BASOPHILS # BLD AUTO: 0.4 % (ref 0–1.8)
BASOPHILS # BLD: 0.03 K/UL (ref 0–0.12)
BUN SERPL-MCNC: 10 MG/DL (ref 8–22)
CALCIUM SERPL-MCNC: 8.5 MG/DL (ref 8.4–10.2)
CHLORIDE SERPL-SCNC: 103 MMOL/L (ref 96–112)
CO2 SERPL-SCNC: 22 MMOL/L (ref 20–33)
CREAT SERPL-MCNC: 0.56 MG/DL (ref 0.5–1.4)
EKG IMPRESSION: NORMAL
EOSINOPHIL # BLD AUTO: 0.07 K/UL (ref 0–0.51)
EOSINOPHIL NFR BLD: 1 % (ref 0–6.9)
ERYTHROCYTE [DISTWIDTH] IN BLOOD BY AUTOMATED COUNT: 44.6 FL (ref 35.9–50)
GFR SERPLBLD CREATININE-BSD FMLA CKD-EPI: 104 ML/MIN/1.73 M 2
GLUCOSE BLD STRIP.AUTO-MCNC: 165 MG/DL (ref 65–99)
GLUCOSE BLD STRIP.AUTO-MCNC: 204 MG/DL (ref 65–99)
GLUCOSE BLD STRIP.AUTO-MCNC: 292 MG/DL (ref 65–99)
GLUCOSE BLD STRIP.AUTO-MCNC: 298 MG/DL (ref 65–99)
GLUCOSE SERPL-MCNC: 185 MG/DL (ref 65–99)
HCT VFR BLD AUTO: 38.4 % (ref 37–47)
HGB BLD-MCNC: 12.8 G/DL (ref 12–16)
IMM GRANULOCYTES # BLD AUTO: 0.04 K/UL (ref 0–0.11)
IMM GRANULOCYTES NFR BLD AUTO: 0.6 % (ref 0–0.9)
LYMPHOCYTES # BLD AUTO: 2.14 K/UL (ref 1–4.8)
LYMPHOCYTES NFR BLD: 30.1 % (ref 22–41)
MAGNESIUM SERPL-MCNC: 1.9 MG/DL (ref 1.5–2.5)
MCH RBC QN AUTO: 28.8 PG (ref 27–33)
MCHC RBC AUTO-ENTMCNC: 33.3 G/DL (ref 33.6–35)
MCV RBC AUTO: 86.5 FL (ref 81.4–97.8)
MONOCYTES # BLD AUTO: 0.58 K/UL (ref 0–0.85)
MONOCYTES NFR BLD AUTO: 8.2 % (ref 0–13.4)
NEUTROPHILS # BLD AUTO: 4.25 K/UL (ref 2–7.15)
NEUTROPHILS NFR BLD: 59.7 % (ref 44–72)
NRBC # BLD AUTO: 0 K/UL
NRBC BLD-RTO: 0 /100 WBC
PHOSPHATE SERPL-MCNC: 1.9 MG/DL (ref 2.5–4.5)
PLATELET # BLD AUTO: 202 K/UL (ref 164–446)
PMV BLD AUTO: 9.3 FL (ref 9–12.9)
POTASSIUM SERPL-SCNC: 3 MMOL/L (ref 3.6–5.5)
RBC # BLD AUTO: 4.44 M/UL (ref 4.2–5.4)
SODIUM SERPL-SCNC: 139 MMOL/L (ref 135–145)
WBC # BLD AUTO: 7.1 K/UL (ref 4.8–10.8)

## 2022-11-30 PROCEDURE — A9270 NON-COVERED ITEM OR SERVICE: HCPCS | Performed by: HOSPITALIST

## 2022-11-30 PROCEDURE — 99232 SBSQ HOSP IP/OBS MODERATE 35: CPT | Performed by: HOSPITALIST

## 2022-11-30 PROCEDURE — 700111 HCHG RX REV CODE 636 W/ 250 OVERRIDE (IP): Performed by: HOSPITALIST

## 2022-11-30 PROCEDURE — 700102 HCHG RX REV CODE 250 W/ 637 OVERRIDE(OP): Performed by: HOSPITALIST

## 2022-11-30 PROCEDURE — 85025 COMPLETE CBC W/AUTO DIFF WBC: CPT

## 2022-11-30 PROCEDURE — 700105 HCHG RX REV CODE 258: Performed by: HOSPITALIST

## 2022-11-30 PROCEDURE — 83735 ASSAY OF MAGNESIUM: CPT

## 2022-11-30 PROCEDURE — 700101 HCHG RX REV CODE 250: Performed by: HOSPITALIST

## 2022-11-30 PROCEDURE — 94760 N-INVAS EAR/PLS OXIMETRY 1: CPT

## 2022-11-30 PROCEDURE — 97165 OT EVAL LOW COMPLEX 30 MIN: CPT

## 2022-11-30 PROCEDURE — 36415 COLL VENOUS BLD VENIPUNCTURE: CPT

## 2022-11-30 PROCEDURE — 770020 HCHG ROOM/CARE - TELE (206)

## 2022-11-30 PROCEDURE — 84100 ASSAY OF PHOSPHORUS: CPT

## 2022-11-30 PROCEDURE — 80048 BASIC METABOLIC PNL TOTAL CA: CPT

## 2022-11-30 PROCEDURE — 82962 GLUCOSE BLOOD TEST: CPT

## 2022-11-30 PROCEDURE — 700111 HCHG RX REV CODE 636 W/ 250 OVERRIDE (IP): Performed by: INTERNAL MEDICINE

## 2022-11-30 RX ORDER — MAGNESIUM SULFATE HEPTAHYDRATE 40 MG/ML
2 INJECTION, SOLUTION INTRAVENOUS ONCE
Status: COMPLETED | OUTPATIENT
Start: 2022-11-30 | End: 2022-11-30

## 2022-11-30 RX ORDER — POTASSIUM CHLORIDE 20 MEQ/1
40 TABLET, EXTENDED RELEASE ORAL ONCE
Status: COMPLETED | OUTPATIENT
Start: 2022-11-30 | End: 2022-11-30

## 2022-11-30 RX ADMIN — ENOXAPARIN SODIUM 40 MG: 40 INJECTION SUBCUTANEOUS at 17:04

## 2022-11-30 RX ADMIN — INSULIN HUMAN 3 UNITS: 100 INJECTION, SOLUTION PARENTERAL at 08:27

## 2022-11-30 RX ADMIN — POTASSIUM CHLORIDE 40 MEQ: 1500 TABLET, EXTENDED RELEASE ORAL at 08:23

## 2022-11-30 RX ADMIN — INSULIN HUMAN 7 UNITS: 100 INJECTION, SOLUTION PARENTERAL at 20:26

## 2022-11-30 RX ADMIN — INSULIN HUMAN 7 UNITS: 100 INJECTION, SOLUTION PARENTERAL at 17:01

## 2022-11-30 RX ADMIN — CLOPIDOGREL BISULFATE 75 MG: 75 TABLET ORAL at 06:30

## 2022-11-30 RX ADMIN — MAGNESIUM SULFATE HEPTAHYDRATE 2 G: 40 INJECTION, SOLUTION INTRAVENOUS at 08:24

## 2022-11-30 RX ADMIN — PREGABALIN 75 MG: 75 CAPSULE ORAL at 17:05

## 2022-11-30 RX ADMIN — PREGABALIN 75 MG: 75 CAPSULE ORAL at 06:30

## 2022-11-30 RX ADMIN — TRAZODONE HYDROCHLORIDE 150 MG: 50 TABLET ORAL at 20:23

## 2022-11-30 RX ADMIN — ONDANSETRON 4 MG: 2 INJECTION INTRAMUSCULAR; INTRAVENOUS at 10:34

## 2022-11-30 RX ADMIN — OXYCODONE 5 MG: 5 TABLET ORAL at 02:06

## 2022-11-30 RX ADMIN — INSULIN HUMAN 4 UNITS: 100 INJECTION, SOLUTION PARENTERAL at 11:51

## 2022-11-30 RX ADMIN — OXYCODONE 5 MG: 5 TABLET ORAL at 17:08

## 2022-11-30 RX ADMIN — SODIUM CHLORIDE: 9 INJECTION, SOLUTION INTRAVENOUS at 06:42

## 2022-11-30 RX ADMIN — ATORVASTATIN CALCIUM 80 MG: 40 TABLET, FILM COATED ORAL at 20:23

## 2022-11-30 RX ADMIN — POTASSIUM PHOSPHATE, MONOBASIC AND POTASSIUM PHOSPHATE, DIBASIC 30 MMOL: 224; 236 INJECTION, SOLUTION, CONCENTRATE INTRAVENOUS at 10:34

## 2022-11-30 ASSESSMENT — PAIN DESCRIPTION - PAIN TYPE: TYPE: ACUTE PAIN

## 2022-11-30 ASSESSMENT — ACTIVITIES OF DAILY LIVING (ADL): TOILETING: INDEPENDENT

## 2022-11-30 ASSESSMENT — COGNITIVE AND FUNCTIONAL STATUS - GENERAL
HELP NEEDED FOR BATHING: A LITTLE
DRESSING REGULAR UPPER BODY CLOTHING: A LITTLE
DRESSING REGULAR LOWER BODY CLOTHING: A LITTLE
TOILETING: A LITTLE
DAILY ACTIVITIY SCORE: 20
SUGGESTED CMS G CODE MODIFIER DAILY ACTIVITY: CJ

## 2022-11-30 ASSESSMENT — ENCOUNTER SYMPTOMS
VOMITING: 0
SHORTNESS OF BREATH: 0
ABDOMINAL PAIN: 0
NAUSEA: 0

## 2022-11-30 ASSESSMENT — FIBROSIS 4 INDEX: FIB4 SCORE: 1.43

## 2022-11-30 NOTE — PROGRESS NOTES
Umanzor catheter discontinued per protocol, pt tolerated well. Pt educated on need to void in the next 2 hours, verbalized understanding.

## 2022-11-30 NOTE — PROGRESS NOTES
Hospital Medicine Daily Progress Note    Date of Service  11/30/2022    Chief Complaint  Julisa Josue is a 62 y.o. female admitted 11/28/2022 with altered mental status    Hospital Course  Julisa Josue is a 62 y.o. female with a past medical history of diabetes who presented 11/28/2022 with altered mental status.  Most of the history is obtained from emergency department physician, and patient chart, as the patient was encephalopathic during my bedside evaluation.  Apparently the patient was at [facility] emergency room just after Thanksgiving and has been treated for hyper osmolar hyperglycemic coma reportedly with a glucose level of over thousand.  Apparently the patient was alert/oriented at baseline but became unresponsive since yesterday.  Reportedly the patient was found by doctor yesterday unconscious and covered in vomitus and feces.   She was transferred to our facility for higher level of care is started on IV fluids and subcutaneous insulin    Interval Problem Update    Patient is alert oriented x4  Daughter at bedside with questions  Denies chest pain or dyspnea        I have discussed this patient's plan of care and discharge plan at IDT rounds today with Case Management, Nursing, Nursing leadership, and other members of the IDT team.    Consultants/Specialty     80 dysphagia  Code Status  Full Code    Disposition  Patient is not medically cleared for discharge.   Anticipate discharge to  SNF .  I have placed the appropriate orders for post-discharge needs.    Review of Systems  Review of Systems   Constitutional:  Positive for malaise/fatigue.   Respiratory:  Negative for shortness of breath.    Cardiovascular:  Negative for chest pain.   Gastrointestinal:  Negative for abdominal pain, nausea and vomiting.   Musculoskeletal:  Positive for joint pain.   All other systems reviewed and are negative.     Physical Exam  Temp:  [36.7 °C (98.1 °F)-37 °C (98.6 °F)] 36.7 °C (98.1 °F)  Pulse:  []  104  Resp:  [16-19] 19  BP: (140-165)/(68-93) 165/92  SpO2:  [92 %-96 %] 94 %    Physical Exam  Vitals and nursing note reviewed.   Constitutional:       General: She is not in acute distress.  HENT:      Head: Normocephalic and atraumatic.      Nose: Nose normal. No rhinorrhea.      Mouth/Throat:      Pharynx: No oropharyngeal exudate or posterior oropharyngeal erythema.   Eyes:      General: No scleral icterus.        Right eye: No discharge.         Left eye: No discharge.   Cardiovascular:      Rate and Rhythm: Normal rate and regular rhythm.      Heart sounds: Normal heart sounds.     No friction rub.   Pulmonary:      Effort: Pulmonary effort is normal. No respiratory distress.      Breath sounds: Normal breath sounds. No stridor. No rales.   Chest:      Chest wall: No tenderness.   Abdominal:      General: Bowel sounds are normal. There is no distension.      Palpations: Abdomen is soft. There is no mass.      Tenderness: There is no abdominal tenderness. There is no rebound.   Musculoskeletal:         General: No swelling or tenderness.      Cervical back: Neck supple. No rigidity.   Skin:     General: Skin is warm and dry.      Coloration: Skin is not cyanotic or jaundiced.      Nails: There is no clubbing.   Neurological:      General: No focal deficit present.      Mental Status: She is alert and oriented to person, place, and time.      Cranial Nerves: No cranial nerve deficit.      Motor: No weakness.   Psychiatric:         Mood and Affect: Mood normal.         Behavior: Behavior normal.       Fluids    Intake/Output Summary (Last 24 hours) at 11/30/2022 1553  Last data filed at 11/29/2022 2200  Gross per 24 hour   Intake --   Output 1050 ml   Net -1050 ml         Laboratory  Recent Labs     11/28/22  1409 11/29/22  0334 11/30/22  0453   WBC 11.3* 11.3* 7.1   RBC 5.01 5.10 4.44   HEMOGLOBIN 14.7 14.6 12.8   HEMATOCRIT 44.2 45.1 38.4   MCV 88.2 88.4 86.5   MCH 29.3 28.6 28.8   MCHC 33.3* 32.4* 33.3*    RDW 48.4 48.7 44.6   PLATELETCT 252 277 202   MPV 9.7 9.4 9.3       Recent Labs     11/29/22  0334 11/29/22  1254 11/30/22  0453   SODIUM 142 141 139   POTASSIUM 3.7 3.2* 3.0*   CHLORIDE 103 104 103   CO2 22 20 22   GLUCOSE 269* 184* 185*   BUN 27* 20 10   CREATININE 0.91 0.72 0.56   CALCIUM 9.6 8.9 8.5       Recent Labs     11/28/22  1409 11/28/22  1538   APTT  --  20.7*   INR 1.01 1.04           Recent Labs     11/29/22  0334   TRIGLYCERIDE 191*   HDL 51   *         Imaging  EC-ECHOCARDIOGRAM COMPLETE W/O CONT   Final Result      CT-CTA HEAD WITH & W/O-POST PROCESS   Final Result      CT angiogram of the [place] within normal limits.      Occlusion of left vertebral artery as noted on MRA.      CT-CTA NECK WITH & W/O-POST PROCESSING   Final Result         1.  Severely hypoplastic left vertebral artery, the proximal and mid left vertebral artery are not visualized and likely occluded.      These findings were discussed with the patient's clinician, Dr. Stokes, on 11/28/2022 8:48 PM.         MR-MRA NECK-W/O   Final Result      1.  Nonvisualization of the left vertebral artery likely representing age indeterminate occlusion. There is dominant right vertebral artery continues as a basilar artery.   2.  The remaining cervical blood vessels are unremarkable.      MR-BRAIN-W/O   Final Result      1.  No acute abnormality.   2.  Chronic lacunar infarcts in the right cerebellum.   3.  Minimal chronic microvascular ischemic disease.   4.  Flow void of the left vertebral artery is not seen. This may represent age indeterminate occlusion.      MR-MRA HEAD-W/O   Final Result      1.  No flow identified within the visualized portions of the left vertebral artery      2.  This could indicate a dissection.      3.  Further assessment with CTA neck is recommended      4.  Anatomic variant carotid origin of the right posterior cerebral artery      CT-HEAD W/O   Final Result      No evidence of acute intracranial  process.         [id number] VIEW   Final Result      No supine evidence of acute abdomen/pelvis abnormality.      DX-CHEST-PORTABLE (1 VIEW)   Final Result      Mild cardiac enlargement poststernotomy      No evidence of edema      EC-ECHOCARDIOGRAM COMPLETE W/ CONT    (Results Pending)          Assessment/Plan  * Acute encephalopathy- (present on admission)  Assessment & Plan  Metabolic encephalopathy secondary to dehydration and hyperglycemia    Clinically resolved  Brain imaging negative for acute pathology  Reviewed test results with patient and daughter    Hypokalemia  Assessment & Plan  Hypophosphatemia  Hypomagnesemia      Replete with K-Phos and replete with mag sulfate and monitor    Elevated troponin  Assessment & Plan  Likely demand ischemia  Continue Plavix statin     Echo poor quality reordered with contrast  Troponin trended down      CAD (coronary artery disease)  Assessment & Plan  Continue medical therapy with atorvastatin Plavix    Dyslipidemia  Assessment & Plan  Continue atorvastatin    Hypernatremia- (present on admission)  Assessment & Plan  Resolved DC IV fluids and encourage p.o. hydration      COVID-19 virus infection- (present on admission)  Assessment & Plan  Patient is currently asymptomatic  Reports she was diagnosed with COVID about 1 month ago      Type 2 diabetes mellitus with neurologic complication, with long-term current use of insulin ([facility])- (present on admission)  Assessment & Plan  Hyper osmolar hyperglycemic coma on presentation  Hemoglobin A1c 12.5 milligrams    Secondary to noncompliance with medical therapy  Increase Lantus continue sliding scale insulin monitor CBGs  Reinforced compliance with medical therapy after discharge and risk of uncontrolled diabetes  Diabetic education         VTE prophylaxis: enoxaparin ppx    I have performed a physical exam and reviewed and updated ROS and Plan today (11/30/2022). In review of yesterday's note (11/29/2022), there are no  changes except as documented above.

## 2022-11-30 NOTE — DIETARY
Nutrition Services: Diabetes Diet Education Consult   Day 2 of admit.  Juilsa Josue is a 62 y.o. female with admitting DX of Acute encephalopathy [G93.40]    Consult received for diabetes diet education. RD not entering enhanced droplet isolation per department guidelines. RD attempted to contact pt via room phone and pt's listed cell phone number in EMR. No answer to either. RD left printed diet education materials w/ RN to provide to pt when next entering room: [facility] DM booklet and handouts for CHO-counting for Diabetes and Heart Healthy Consistent Carbohydrate diet education. RD will continue to attempt follow up teaching with pt via phone during admit.     RD following.

## 2022-11-30 NOTE — CARE PLAN
The patient is Stable - Low risk of patient condition declining or worsening    Shift Goals  Clinical Goals: q4 neuro checks, low NIH score  Patient Goals: Sleep    Progress made toward(s) clinical / shift goals: Pt alert and oriented, q4 neuro checks in place. Pt scored low NIH score. Pt presents with minor deficits, however pt seems to be at baseline. Pt updated on POC, pt verbalizes understanding.     Problem: Neuro Status  Goal: Neuro status will remain stable or improve  Outcome: Progressing       Patient is not progressing towards the following goals:

## 2022-11-30 NOTE — PROGRESS NOTES
Tele strip printed at 1215     Rhythm: SR   HR: 96  Measurements: 0.16/0.10/0.36        Telemetry Shift Summary     Rhythm: SR/ST  HR Range: 90's-100's  Ectopy: Frequent PVC/Couplets, Rare Triplets     Telemetry monitoring strips placed in pt's chart.

## 2022-11-30 NOTE — CARE PLAN
The patient is Watcher - Medium risk of patient condition declining or worsening    Shift Goals  Clinical Goals: q4 neuro . PT/OT. blood sugar control  Patient Goals: feel better  Family Goals: home    Progress made toward(s) clinical / shift goals:    Problem: Knowledge Deficit - Stroke Education  Goal: Patient's knowledge of stroke and risk factors will improve  Outcome: Progressing     Problem: Neuro Status  Goal: Neuro status will remain stable or improve  Outcome: Progressing     Problem: Respiratory - Stroke Patient  Goal: Patient will achieve/maintain optimum respiratory rate/effort  Outcome: Progressing       Patient is not progressing towards the following goals:

## 2022-11-30 NOTE — PROGRESS NOTES
Telemetry Shift Summary    Rhythm SR/ST  HR Range   Ectopy fPVC, rCoup/VEB/Coup/Big  Measurements 0.16/0.10/0.36        Normal Values  Rhythm SR  HR Range    Measurements 0.12-0.20 / 0.06-0.10  / 0.30-0.52

## 2022-12-01 ENCOUNTER — APPOINTMENT (OUTPATIENT)
Dept: CARDIOLOGY | Facility: MEDICAL CENTER | Age: 62
DRG: 070 | End: 2022-12-01
Attending: HOSPITALIST
Payer: MEDICAID

## 2022-12-01 LAB
ANION GAP SERPL CALC-SCNC: 14 MMOL/L (ref 7–16)
BUN SERPL-MCNC: 16 MG/DL (ref 8–22)
CALCIUM SERPL-MCNC: 8.4 MG/DL (ref 8.4–10.2)
CHLORIDE SERPL-SCNC: 107 MMOL/L (ref 96–112)
CO2 SERPL-SCNC: 18 MMOL/L (ref 20–33)
CREAT SERPL-MCNC: 1.1 MG/DL (ref 0.5–1.4)
GFR SERPLBLD CREATININE-BSD FMLA CKD-EPI: 57 ML/MIN/1.73 M 2
GLUCOSE BLD STRIP.AUTO-MCNC: 105 MG/DL (ref 65–99)
GLUCOSE BLD STRIP.AUTO-MCNC: 133 MG/DL (ref 65–99)
GLUCOSE BLD STRIP.AUTO-MCNC: 219 MG/DL (ref 65–99)
GLUCOSE BLD STRIP.AUTO-MCNC: 265 MG/DL (ref 65–99)
GLUCOSE SERPL-MCNC: 118 MG/DL (ref 65–99)
LV EJECT FRACT  99904: 45
LV EJECT FRACT MOD 4C 99902: 54.63
MAGNESIUM SERPL-MCNC: 2.3 MG/DL (ref 1.5–2.5)
PHOSPHATE SERPL-MCNC: 3.6 MG/DL (ref 2.5–4.5)
POTASSIUM SERPL-SCNC: 3.6 MMOL/L (ref 3.6–5.5)
SODIUM SERPL-SCNC: 139 MMOL/L (ref 135–145)

## 2022-12-01 PROCEDURE — 36415 COLL VENOUS BLD VENIPUNCTURE: CPT

## 2022-12-01 PROCEDURE — 700102 HCHG RX REV CODE 250 W/ 637 OVERRIDE(OP): Performed by: HOSPITALIST

## 2022-12-01 PROCEDURE — A9270 NON-COVERED ITEM OR SERVICE: HCPCS | Performed by: HOSPITALIST

## 2022-12-01 PROCEDURE — 82962 GLUCOSE BLOOD TEST: CPT

## 2022-12-01 PROCEDURE — 97535 SELF CARE MNGMENT TRAINING: CPT

## 2022-12-01 PROCEDURE — 93308 TTE F-UP OR LMTD: CPT | Mod: 26 | Performed by: INTERNAL MEDICINE

## 2022-12-01 PROCEDURE — 94760 N-INVAS EAR/PLS OXIMETRY 1: CPT

## 2022-12-01 PROCEDURE — 700111 HCHG RX REV CODE 636 W/ 250 OVERRIDE (IP): Performed by: HOSPITALIST

## 2022-12-01 PROCEDURE — 99233 SBSQ HOSP IP/OBS HIGH 50: CPT | Performed by: HOSPITALIST

## 2022-12-01 PROCEDURE — 93308 TTE F-UP OR LMTD: CPT

## 2022-12-01 PROCEDURE — 97112 NEUROMUSCULAR REEDUCATION: CPT

## 2022-12-01 PROCEDURE — 84100 ASSAY OF PHOSPHORUS: CPT

## 2022-12-01 PROCEDURE — 83735 ASSAY OF MAGNESIUM: CPT

## 2022-12-01 PROCEDURE — 770020 HCHG ROOM/CARE - TELE (206)

## 2022-12-01 PROCEDURE — 93321 DOPPLER ECHO F-UP/LMTD STD: CPT | Mod: 26 | Performed by: INTERNAL MEDICINE

## 2022-12-01 PROCEDURE — 700117 HCHG RX CONTRAST REV CODE 255: Performed by: HOSPITALIST

## 2022-12-01 PROCEDURE — 93325 DOPPLER ECHO COLOR FLOW MAPG: CPT | Mod: 26 | Performed by: INTERNAL MEDICINE

## 2022-12-01 PROCEDURE — 80048 BASIC METABOLIC PNL TOTAL CA: CPT

## 2022-12-01 RX ADMIN — CLOPIDOGREL BISULFATE 75 MG: 75 TABLET ORAL at 05:57

## 2022-12-01 RX ADMIN — OXYCODONE 5 MG: 5 TABLET ORAL at 06:05

## 2022-12-01 RX ADMIN — PREGABALIN 75 MG: 75 CAPSULE ORAL at 05:56

## 2022-12-01 RX ADMIN — INSULIN HUMAN 7 UNITS: 100 INJECTION, SOLUTION PARENTERAL at 20:30

## 2022-12-01 RX ADMIN — TRAZODONE HYDROCHLORIDE 150 MG: 50 TABLET ORAL at 20:29

## 2022-12-01 RX ADMIN — HUMAN ALBUMIN MICROSPHERES AND PERFLUTREN 3 ML: 10; .22 INJECTION, SOLUTION INTRAVENOUS at 08:58

## 2022-12-01 RX ADMIN — PREGABALIN 75 MG: 75 CAPSULE ORAL at 17:52

## 2022-12-01 RX ADMIN — OXYCODONE 5 MG: 5 TABLET ORAL at 20:53

## 2022-12-01 RX ADMIN — OXYCODONE 5 MG: 5 TABLET ORAL at 16:39

## 2022-12-01 RX ADMIN — ENOXAPARIN SODIUM 40 MG: 40 INJECTION SUBCUTANEOUS at 17:51

## 2022-12-01 RX ADMIN — ATORVASTATIN CALCIUM 80 MG: 40 TABLET, FILM COATED ORAL at 20:29

## 2022-12-01 RX ADMIN — INSULIN HUMAN 4 UNITS: 100 INJECTION, SOLUTION PARENTERAL at 17:52

## 2022-12-01 ASSESSMENT — PAIN DESCRIPTION - PAIN TYPE
TYPE: CHRONIC PAIN

## 2022-12-01 ASSESSMENT — ENCOUNTER SYMPTOMS
MYALGIAS: 1
NERVOUS/ANXIOUS: 1
DOUBLE VISION: 0
BLURRED VISION: 0
SHORTNESS OF BREATH: 0
NAUSEA: 0
VOMITING: 0
ABDOMINAL PAIN: 0
COUGH: 0
FEVER: 0
HEADACHES: 0
SORE THROAT: 0
PALPITATIONS: 0

## 2022-12-01 NOTE — PROGRESS NOTES
Rn asked pt if pt has seen PT today. Pt states that PT entered her room when she was on the phone and that they will return to work with her today.

## 2022-12-01 NOTE — CARE PLAN
The patient is Stable - Low risk of patient condition declining or worsening    Shift Goals  Clinical Goals: Q4H Neuro, NIHSS  Patient Goals: Rest  Family Goals: home    Progress made toward(s) clinical / shift goals:      Problem: Neuro Status  Goal: Neuro status will remain stable or improve  Outcome: Progressing  Note: V0Mmkob checks being done, NIHSS being done per shift     Problem: Knowledge Deficit - Standard  Goal: Patient and family/care givers will demonstrate understanding of plan of care, disease process/condition, diagnostic tests and medications  Outcome: Progressing  Note: POC discussed with patient, patient denies any other concerns at this time       Patient is not progressing towards the following goals:

## 2022-12-01 NOTE — DISCHARGE PLANNING
Case Management Discharge Planning    Admission Date: 11/28/2022  GMLOS: 4.6  ALOS: 3    6-Clicks ADL Score: 20  6-Clicks Mobility Score: 17  PT and/or OT Eval ordered: Yes  Post-acute Referrals Ordered: Yes  Post-acute Choice Obtained: Yes  Has referral(s) been sent to post-acute provider:  Yes      Anticipated Discharge Dispo: Discharge Disposition: D/T to SNF with Medicare cert in anticipation of skilled care (03)    DME Needed: No    Action(s) Taken: Spoke to pt at bedside. Pt agreeable to SNF placement. Pt gave verbal approval to send blanket SNF referral. Pt aware that SNFs options are very limited with Medicaid and that if a SNF accepts, they will not accept her until she is COVID cleared on 12/07.    Pt stated she lives alone in [facility] alone. One of her granddaughter lives nearby and her daughter lives down the street. Pt stated that if SNF placement is not accomplished, she can likely stay with Elina until safe enough to return to her home.    Escalations Completed: None    Medically Clear: No    Next Steps: f/u with DPA regarding SNF acceptance    Barriers to Discharge: Pending Placement, COVID+, Medicaid FFS

## 2022-12-01 NOTE — PROGRESS NOTES
Telemetry Shift Summary     Rhythm: SR/ST  Rate: 70s-100s  Measurements: 0.16/0.08/0.40  Ectopy (reported by Monitor Tech): rCouplets, rare to occasional Trigeminy, oPVC, rBigeminy     Normal Values  Rhythm: Sinus  HR:   Measurements: 0.12-0.20/0.06-0.10/0.30-0.52

## 2022-12-01 NOTE — THERAPY
Occupational Therapy  Daily Treatment     Patient Name: Julisa Josue  Age:  62 y.o., Sex:  female  Medical Record #: [id number]  Today's Date: 12/1/2022     Precautions  Precautions: Fall Risk    Assessment  Pt seen for ADL training- LB dressing, grooming and functional mobility during ADL's. Pt unsteady without fww; improved balance in room using fww. Generalized weakness noted; impaired dynamic balance. Tolerates UIC post session; dtr into visit. Pt is a fall risk; unsafe to dc home alone. OT will continue to see while in house.        Plan  Continue current treatment plan.    DC Equipment Recommendations: Tub Transfer Bench, Grab Bar(s) by Toilet  Discharge Recommendations: Other - (SNF vs HH vs family support)    Subjective  Pleasant and cooperative      Objective     12/01/22 1233   Cognition    Cognition / Consciousness   (Ox3)   Level of Consciousness Alert   Comments poor insight to safety   Balance   Sitting Balance (Static) Good   Sitting Balance (Dynamic) Good   Standing Balance (Static) Fair   Standing Balance (Dynamic) Fair -   Weight Shift Sitting Good   Weight Shift Standing Fair   Skilled Intervention Verbal Cuing   Comments fww   Bed Mobility    Supine to Sit Contact Guard Assist   Activities of Daily Living   Eating Independent   Grooming Standing;Contact Guard Assist   Lower Body Dressing Contact Guard Assist   Skilled Intervention Verbal Cuing;Compensatory Strategies   Functional Mobility   Sit to Stand Standby Assist   Bed, Chair, Wheelchair Transfer Contact Guard Assist   Toilet Transfers Minimal Assist   Transfer Method Stand Step   Mobility fww   Skilled Intervention Verbal Cuing   Activity Tolerance   Sitting in Chair >1hr   Sitting Edge of Bed 22   Standing 8   Short Term Goals   Goal Outcome # 1 Progressing as expected   Goal Outcome # 2 Progressing as expected   Goal Outcome # 3 Progressing as expected   Education Group   Education Provided Activities of Daily Living   Role of Occupational  Therapist Patient Response Patient;Family;Acceptance;Explanation;Verbal Demonstration   ADL Patient Response Patient;Acceptance;Explanation;Verbal Demonstration;Action Demonstration   Anticipated Discharge Equipment and Recommendations   DC Equipment Recommendations Tub Transfer Bench;Grab Bar(s) by Toilet   Discharge Recommendations Other -  (SNF vs HH vs family support)   Interdisciplinary Plan of Care Collaboration   IDT Collaboration with  Nursing;Family / Caregiver;   Patient Position at End of Therapy Seated;Family / Friend in Room;Tray Table within Reach;Phone within Reach;Call Light within Reach   Collaboration Comments Results. Pt is currently not safe for dc home alone. Spoke with ERIKA montelongo.

## 2022-12-01 NOTE — CARE PLAN
The patient is Stable - Low risk of patient condition declining or worsening    Shift Goals  Clinical Goals: Monitor for neuro changes with q4 neuro exam and NIHSS, monitor blood sugar and treat appropriately per order  Patient Goals: Rest  Family Goals: N/A    Progress made toward(s) clinical / shift goals:  Met    Patient is not progressing towards the following goals:

## 2022-12-01 NOTE — THERAPY
Occupational Therapy   Initial Evaluation     Patient Name: Julisa Josue  Age:  62 y.o., Sex:  female  Medical Record #: [id number]  Today's Date: 11/30/2022     Precautions  Precautions: (P) Fall Risk    Assessment  Patient is 62 y.o. female with a diagnosis of hyperglycemia, acute encephalopathy. Transferred from [place]. A&Ox3, agreeable to activity. Reports L humerus fx ~1 yr ago following fall; states has not had Ortho f/u. LUE impaired strength, AROM, FMC. Shows CGA with most functional mobility. Walks to br (CGA, no AD); toileting with Doreen. LB dressing with Doreen. Grooming with CGA. Lives alone in [place]; dtr states she is able to stay with pt upon dc. OT to follow while in house.        Plan  Recommend Occupational Therapy 3 times per week until therapy goals are met for the following treatments:  Neuro Re-Education / Balance, Self Care/Activities of Daily Living, and Therapeutic Activities.    DC Equipment Recommendations: Tub Transfer Bench  Discharge Recommendations: Recommend home health for continued occupational therapy services vs snf, depending upon progress.     Subjective  Pleasant and cooperative      Objective     11/30/22 0926   Prior Living Situation   Prior Services Intermittent Physical Support for ADL Per Family   Housing / Facility 1 Story House   Steps Into Home 1   Steps In Home 0   Bathroom Set up Bathtub / Shower Combination;Grab Bars   Equipment Owned Grab Bar(s) In Tub / Shower   Lives with - Patient's Self Care Capacity Alone and Able to Care For Self   Prior Level of ADL Function   Self Feeding Independent   Grooming / Hygiene Independent   Bathing Independent   Dressing Independent   Toileting Independent   Prior Level of IADL Function   Medication Management Independent   Laundry Independent   Kitchen Mobility Independent   Finances Unable To Determine At This Time   Home Management Unable To Determine At This Time   Shopping Unable To Determine At This Time   Prior  Level Of Mobility Independent Without Device in Home   Driving / Transportation Unable To Determine At This Time   Occupation (Pre-Hospital Vocational) Retired Due To Age   Leisure Interests Unable To Determine At This Time   History of Falls   History of Falls Yes   Precautions   Precautions Fall Risk   Vitals   O2 Delivery Device None - Room Air   Cognition    Cognition / Consciousness   (Ox3)   Level of Consciousness Alert   Passive ROM Upper Body   Passive ROM Upper Body WDL   Active ROM Upper Body   Active ROM Upper Body  X  (LUE- shldr flex to 30)   Dominant Hand Right   Comments LUE- pt reports fall,dx of L humerus fx dx'd but no Ortho f/u   Strength Upper Body   Upper Body Strength  WDL  (RUE)   Upper Body Muscle Tone   Upper Body Muscle Tone  WDL  (RUE)   Coordination Upper Body   Coordination X   Balance Assessment   Sitting Balance (Static) Good   Sitting Balance (Dynamic) Fair +   Standing Balance (Static) Fair +   Standing Balance (Dynamic) Fair   Weight Shift Sitting Good   Weight Shift Standing Fair   Bed Mobility    Supine to Sit Contact Guard Assist   Sit to Supine Contact Guard Assist   ADL Assessment   Grooming Standing;Minimal Assist   Upper Body Dressing Minimal Assist   How much help from another person does the patient currently need...   Putting on and taking off regular lower body clothing? 3   Bathing (including washing, rinsing, and drying)? 3   Toileting, which includes using a toilet, bedpan, or urinal? 3   Putting on and taking off regular upper body clothing? 3   Taking care of personal grooming such as brushing teeth? 4   Eating meals? 4   6 Clicks Daily Activity Score 20   Functional Mobility   Sit to Stand Standby Assist   Bed, Chair, Wheelchair Transfer Contact Guard Assist   Toilet Transfers Contact Guard Assist   Transfer Method Stand Step   Activity Tolerance   Sitting in Chair 7   Sitting Edge of Bed 12   Standing 7   Short Term Goals   Short Term Goal # 1 FB dressing with  Sup/SBA within 5 days   Short Term Goal # 2 Grooming at sink with SBA within 5 days   Short Term Goal # 3 Tolerate UIC 2/3 meals each day   Education Group   Role of Occupational Therapist Patient Response Patient;Acceptance;Verbal Demonstration   Problem List   Problem List Decreased Active Daily Living Skills;Decreased Activity Tolerance;Decreased Functional Mobility   Anticipated Discharge Equipment and Recommendations   DC Equipment Recommendations Tub Transfer Bench   Discharge Recommendations Recommend home health for continued occupational therapy services

## 2022-12-02 PROBLEM — R53.81 DEBILITY: Status: ACTIVE | Noted: 2022-12-02

## 2022-12-02 PROBLEM — R41.89 COGNITIVE DEFICITS: Status: ACTIVE | Noted: 2022-12-02

## 2022-12-02 LAB
GLUCOSE BLD STRIP.AUTO-MCNC: 233 MG/DL (ref 65–99)
GLUCOSE BLD STRIP.AUTO-MCNC: 246 MG/DL (ref 65–99)
GLUCOSE BLD STRIP.AUTO-MCNC: 261 MG/DL (ref 65–99)
GLUCOSE BLD STRIP.AUTO-MCNC: 297 MG/DL (ref 65–99)

## 2022-12-02 PROCEDURE — 700102 HCHG RX REV CODE 250 W/ 637 OVERRIDE(OP): Performed by: HOSPITALIST

## 2022-12-02 PROCEDURE — A9270 NON-COVERED ITEM OR SERVICE: HCPCS | Performed by: HOSPITALIST

## 2022-12-02 PROCEDURE — 99233 SBSQ HOSP IP/OBS HIGH 50: CPT | Performed by: HOSPITALIST

## 2022-12-02 PROCEDURE — 770020 HCHG ROOM/CARE - TELE (206)

## 2022-12-02 PROCEDURE — 700111 HCHG RX REV CODE 636 W/ 250 OVERRIDE (IP): Performed by: HOSPITALIST

## 2022-12-02 PROCEDURE — 94760 N-INVAS EAR/PLS OXIMETRY 1: CPT

## 2022-12-02 PROCEDURE — 97162 PT EVAL MOD COMPLEX 30 MIN: CPT

## 2022-12-02 PROCEDURE — 82962 GLUCOSE BLOOD TEST: CPT | Mod: 91

## 2022-12-02 RX ADMIN — INSULIN HUMAN 4 UNITS: 100 INJECTION, SOLUTION PARENTERAL at 06:00

## 2022-12-02 RX ADMIN — ENOXAPARIN SODIUM 40 MG: 40 INJECTION SUBCUTANEOUS at 17:29

## 2022-12-02 RX ADMIN — CLOPIDOGREL BISULFATE 75 MG: 75 TABLET ORAL at 05:59

## 2022-12-02 RX ADMIN — INSULIN HUMAN 4 UNITS: 100 INJECTION, SOLUTION PARENTERAL at 11:19

## 2022-12-02 RX ADMIN — OXYCODONE 5 MG: 5 TABLET ORAL at 13:31

## 2022-12-02 RX ADMIN — INSULIN HUMAN 7 UNITS: 100 INJECTION, SOLUTION PARENTERAL at 21:31

## 2022-12-02 RX ADMIN — OXYCODONE 5 MG: 5 TABLET ORAL at 17:38

## 2022-12-02 RX ADMIN — ATORVASTATIN CALCIUM 80 MG: 40 TABLET, FILM COATED ORAL at 21:31

## 2022-12-02 RX ADMIN — OXYCODONE 5 MG: 5 TABLET ORAL at 04:41

## 2022-12-02 RX ADMIN — INSULIN HUMAN 7 UNITS: 100 INJECTION, SOLUTION PARENTERAL at 17:32

## 2022-12-02 RX ADMIN — TRAZODONE HYDROCHLORIDE 150 MG: 50 TABLET ORAL at 21:31

## 2022-12-02 RX ADMIN — PREGABALIN 75 MG: 75 CAPSULE ORAL at 05:59

## 2022-12-02 RX ADMIN — OXYCODONE 5 MG: 5 TABLET ORAL at 21:36

## 2022-12-02 RX ADMIN — PREGABALIN 75 MG: 75 CAPSULE ORAL at 17:29

## 2022-12-02 ASSESSMENT — GAIT ASSESSMENTS
ASSISTIVE DEVICE: FRONT WHEEL WALKER
DISTANCE (FEET): 100
DEVIATION: STEP TO
GAIT LEVEL OF ASSIST: SUPERVISED

## 2022-12-02 ASSESSMENT — ENCOUNTER SYMPTOMS
MYALGIAS: 1
NAUSEA: 0
BLURRED VISION: 0
ABDOMINAL PAIN: 0
SHORTNESS OF BREATH: 0
SORE THROAT: 0
PALPITATIONS: 0
HEADACHES: 0
COUGH: 0
NERVOUS/ANXIOUS: 1
FEVER: 0
DOUBLE VISION: 0
VOMITING: 0

## 2022-12-02 ASSESSMENT — COGNITIVE AND FUNCTIONAL STATUS - GENERAL
MOBILITY SCORE: 24
SUGGESTED CMS G CODE MODIFIER MOBILITY: CH

## 2022-12-02 ASSESSMENT — PAIN DESCRIPTION - PAIN TYPE
TYPE: CHRONIC PAIN

## 2022-12-02 NOTE — PROGRESS NOTES
Hospital Medicine Daily Progress Note    Date of Service  12/2/2022    Chief Complaint  Julisa Josue is a 62 y.o. female admitted 11/28/2022 with altered mental status    Hospital Course  This is 62-year-old female with a past medical significant for type 2 diabetes mellitus with hemoglobin A1c of 12.5 presented to ER on 11/20/2020 with altered mental status.    Upon presentation in ER, patient was encephalopathic; she was recently seen at [facility] [place] for hyperosmolar coma with a glucose level >1000.    During the stay in the hospital, she was aggressively hydrated, her hemoglobin A1c is noted to be 12.5, she was started on insulin sliding scale, hypoglycemic to goal, Accu-Cheks ACH S.  Nursing to provide diabetic education.    Her encephalopathy continue to need to improve, currently patient is alert and oriented; MRI brain did not show any acute abnormality, chronic lacunar infarct in the right cerebellum    CTA showed severely hypoplastic left vertebral artery; the proximal and mid left vertebral artery likely occluded; CTA of the head unremarkable.  Echocardiogram showed EF of 40 to 45%, LVH .Patient is found to be COVID-19 positive on 11/20, currently saturating well on room air.    Interval noted:  12/02:  -- Negative intermittent, vital signs been stable, heart rate 87-92, blood pressure elevated at 143/82, sats are 95% on room air.  Patient is alert, awake, answering questions appropriately.  She did have difficulty understanding/poor insight of her type 2 diabetes mellitus.  --Her hemoglobin A1c is noted to be 12.5, I discussed with the nursing staff to provide education in regards to diabetes  --Of note patient EF is noted to be 40 to 45%, not on acute decompensation, continue cardiac diet, I/os, daily weight, restriction to 1.5 L.    PT/OT has evaluate the patient, recommended home health.  It is noted that patient has poor insight considering her hemoglobin A1c of 12.5 and admission to [facility]  for hyperosmolar,, she is not safe for the patient to be discharged home without supervision.  Considering patient insurance being Medicaid fee-for-service, unable to provide home health.      Discussed with case management, plan to discharge patient to skilled nursing facility.     Consultants/Specialty  Nephrology     Code Status  Full Code    Disposition  Patient is not medically cleared for discharge.   Anticipate discharge to  SNF .  I have placed the appropriate orders for post-discharge needs.    Review of Systems  Review of Systems   Constitutional:  Positive for malaise/fatigue. Negative for fever.   HENT:  Negative for congestion and sore throat.    Eyes:  Negative for blurred vision and double vision.   Respiratory:  Negative for cough and shortness of breath.    Cardiovascular:  Negative for chest pain and palpitations.   Gastrointestinal:  Negative for abdominal pain, nausea and vomiting.   Genitourinary:  Negative for dysuria and urgency.   Musculoskeletal:  Positive for joint pain and myalgias.   Neurological:  Negative for headaches.   Psychiatric/Behavioral:  The patient is nervous/anxious.    All other systems reviewed and are negative.     Physical Exam  Temp:  [36.6 °C (97.8 °F)-37.2 °C (98.9 °F)] 36.9 °C (98.5 °F)  Pulse:  [87-99] 88  Resp:  [16-18] 18  BP: (119-153)/(57-82) 153/82  SpO2:  [92 %-96 %] 95 %    Physical Exam  Vitals and nursing note reviewed.   Constitutional:       Appearance: Normal appearance.   HENT:      Head: Normocephalic and atraumatic.   Eyes:      Extraocular Movements: Extraocular movements intact.      Pupils: Pupils are equal, round, and reactive to light.   Cardiovascular:      Rate and Rhythm: Normal rate and regular rhythm.      Heart sounds: Normal heart sounds.     No friction rub.   Pulmonary:      Effort: Pulmonary effort is normal.      Breath sounds: Normal breath sounds.   Abdominal:      General: Bowel sounds are normal.      Palpations: Abdomen is soft.       Tenderness: There is no rebound.   Musculoskeletal:         General: No swelling or tenderness.      Cervical back: Neck supple.   Skin:     General: Skin is warm.      Coloration: Skin is not cyanotic.      Nails: There is no clubbing.   Neurological:      Mental Status: She is alert and oriented to person, place, and time.      Cranial Nerves: No cranial nerve deficit.      Motor: No weakness.   Psychiatric:         Mood and Affect: Mood normal.         Behavior: Behavior normal.       Fluids    Intake/Output Summary (Last 24 hours) at 12/2/2022 1233  Last data filed at 12/2/2022 0917  Gross per 24 hour   Intake 120 ml   Output 900 ml   Net -780 ml         Laboratory  Recent Labs     11/30/22  0453   WBC 7.1   RBC 4.44   HEMOGLOBIN 12.8   HEMATOCRIT 38.4   MCV 86.5   MCH 28.8   MCHC 33.3*   RDW 44.6   PLATELETCT 202   MPV 9.3       Recent Labs     11/29/22  1254 11/30/22  0453 12/01/22  0212   SODIUM 141 139 139   POTASSIUM 3.2* 3.0* 3.6   CHLORIDE 104 103 107   CO2 20 22 18*   GLUCOSE 184* 185* 118*   BUN 20 10 16   CREATININE 0.72 0.56 1.10   CALCIUM 8.9 8.5 8.4                         Imaging  EC-ECHOCARDIOGRAM LTD W/ CONT   Final Result      EC-ECHOCARDIOGRAM COMPLETE W/O CONT   Final Result      CT-CTA HEAD WITH & W/O-POST PROCESS   Final Result      CT angiogram of the [place] within normal limits.      Occlusion of left vertebral artery as noted on MRA.      CT-CTA NECK WITH & W/O-POST PROCESSING   Final Result         1.  Severely hypoplastic left vertebral artery, the proximal and mid left vertebral artery are not visualized and likely occluded.      These findings were discussed with the patient's clinician, Dr. Stokes, on 11/28/2022 8:48 PM.         MR-MRA NECK-W/O   Final Result      1.  Nonvisualization of the left vertebral artery likely representing age indeterminate occlusion. There is dominant right vertebral artery continues as a basilar artery.   2.  The remaining cervical blood  vessels are unremarkable.      MR-BRAIN-W/O   Final Result      1.  No acute abnormality.   2.  Chronic lacunar infarcts in the right cerebellum.   3.  Minimal chronic microvascular ischemic disease.   4.  Flow void of the left vertebral artery is not seen. This may represent age indeterminate occlusion.      MR-MRA HEAD-W/O   Final Result      1.  No flow identified within the visualized portions of the left vertebral artery      2.  This could indicate a dissection.      3.  Further assessment with CTA neck is recommended      4.  Anatomic variant carotid origin of the right posterior cerebral artery      CT-HEAD W/O   Final Result      No evidence of acute intracranial process.         [id number] VIEW   Final Result      No supine evidence of acute abdomen/pelvis abnormality.      DX-CHEST-PORTABLE (1 VIEW)   Final Result      Mild cardiac enlargement poststernotomy      No evidence of edema             Assessment/Plan  * Acute encephalopathy- (present on admission)  Assessment & Plan  Metabolic encephalopathy secondary to dehydration and hyperglycemia  CT of the head and neck showed occluded left vertebral artery, MRI of the brain showed old infarct   -- Currently patient is at her baseline, does have some cognitive deficit and has difficulty understanding how to manage insulin.  Nursing staff to provide education    COVID-19 virus infection- (present on admission)  Assessment & Plan  Patient is currently asymptomatic  Reports she was diagnosed with COVID about 1 month ago   -- repeat covid on 11/28: +ve    No medication indicated, supportive treatment    Debility  Assessment & Plan  Pt and ot following     Cognitive deficits  Assessment & Plan  Mild    Type 2 diabetes mellitus with neurologic complication, with long-term current use of insulin (HCC)- (present on admission)  Assessment & Plan  Hyper osmolar hyperglycemic coma on presentation  Hemoglobin A1c 12.5 milligrams  --Increase Lantus continue sliding  scale insulin monitor CBGs  --Diabetic education    Discussed extensively with the nursing staff in regards to providing education, provide education and-diet, how to inject  insulin, CHECK fingerstick.    Hypokalemia  Assessment & Plan  Hypophosphatemia  Hypomagnesemia   -- replete and monitor    Elevated troponin  Assessment & Plan  Likely demand ischemia  Continue Plavix statin     Echo poor quality reordered with contrast  Troponin trended down      CAD (coronary artery disease)  Assessment & Plan  Continue medical therapy with atorvastatin Plavix  Denies any chest pain    Dyslipidemia  Assessment & Plan  Continue atorvastatin    Hypernatremia- (present on admission)  Assessment & Plan  Resolved  Na at 139       VTE prophylaxis: enoxaparin ppx    I have performed a physical exam and reviewed and updated ROS and Plan today (12/2/2022). In review of yesterday's note (12/1/2022), there are no changes except as documented above.

## 2022-12-02 NOTE — DISCHARGE PLANNING
Case Management Discharge Planning    Admission Date: 11/28/2022  GMLOS: 4.6  ALOS: 4    6-Clicks ADL Score: 20  6-Clicks Mobility Score: 24      Anticipated Discharge Dispo: Discharge Disposition: D/T to SNF with Medicare cert in anticipation of skilled care (03)    DME Needed: No    Action(s) Taken: Choice obtained, Referral(s) sent, and Acceptance Received    Escalations Completed: None    Medically Clear: Yes (COVID +)    Next Steps: Call received from [facility] in [place] regarding acceptance of patient. Patient is currently COVID + and will be cleared 12/7. [facility] is willing to accept patient on 12/8. RNCM will arrange transportation with [facility] as patient has benefits when its closer to the DC date.     Barriers to Discharge: Transportation    Is the patient up for discharge tomorrow: No      0054 RNCM met with patient at bedside to obtain social security number due to needing a PASRR/LOC for SNF placement. # .

## 2022-12-02 NOTE — CARE PLAN
The patient is Stable - Low risk of patient condition declining or worsening    Shift Goals  Clinical Goals: Monitor for neuro changes with q4 neuro exam and NIHSS, monitor blood sugar and treat appropriately per order  Patient Goals: Rest  Family Goals: N/A    Progress made toward(s) clinical / shift goals:    Problem: Neuro Status  Goal: Neuro status will remain stable or improve  Outcome: Progressing  Note: [id number] checks in place, Qshift NIHSS in place, patient is alert and oriented x4, no other complaints at this time     Problem: Knowledge Deficit - Standard  Goal: Patient and family/care givers will demonstrate understanding of plan of care, disease process/condition, diagnostic tests and medications  Outcome: Progressing  Note: POC discussed with patient for the night, denies any other concerns at this time       Patient is not progressing towards the following goals:

## 2022-12-02 NOTE — PROGRESS NOTES
Hospital Medicine Daily Progress Note    Date of Service  12/1/2022    Chief Complaint  Julisa Josue is a 62 y.o. female admitted 11/28/2022 with altered mental status    Hospital Course  Julisa Josue is a 62 y.o. female with a past medical history of diabetes who presented 11/28/2022 with altered mental status.  Most of the history is obtained from emergency department physician, and patient chart, as the patient was encephalopathic during my bedside evaluation.  Apparently the patient was at [facility] emergency room just after Thanksgiving and has been treated for hyper osmolar hyperglycemic coma reportedly with a glucose level of over thousand.  Apparently the patient was alert/oriented at baseline but became unresponsive since yesterday.  Reportedly the patient was found by doctor yesterday unconscious and covered in vomitus and feces.   She was transferred to our facility for higher level of care is started on IV fluids and subcutaneous insulin    Interval Problem Update  No acute events overnight,  laying in a bed  any complain   --Vital sign has been stable, heart rate 87-90, blood pressure 141/69, saturating 95% on room air.  Patient is alert, awake, answering questions appropriately.  She is diagnosed with COVID-19, PT/OT has evaluate the patient medical and home health, medication ordered.      I have discussed this patient's plan of care and discharge plan at IDT rounds today with Case Management, Nursing, Nursing leadership, and other members of the IDT team.    Consultants/Specialty  Nephrology     Code Status  Full Code    Disposition  Patient is not medically cleared for discharge.   Anticipate discharge to  SNF .  I have placed the appropriate orders for post-discharge needs.    Review of Systems  Review of Systems   Constitutional:  Positive for malaise/fatigue. Negative for fever.   HENT:  Negative for congestion and sore throat.    Eyes:  Negative for blurred vision and double vision.    Respiratory:  Negative for cough and shortness of breath.    Cardiovascular:  Negative for chest pain and palpitations.   Gastrointestinal:  Negative for abdominal pain, nausea and vomiting.   Genitourinary:  Negative for dysuria and urgency.   Musculoskeletal:  Positive for joint pain and myalgias.   Neurological:  Negative for headaches.   Psychiatric/Behavioral:  The patient is nervous/anxious.    All other systems reviewed and are negative.     Physical Exam  Temp:  [36.7 °C (98 °F)-37.2 °C (98.9 °F)] 37.2 °C (98.9 °F)  Pulse:  [84-97] 97  Resp:  [16-20] 16  BP: (107-151)/(58-70) 151/69  SpO2:  [92 %-95 %] 95 %    Physical Exam  Vitals and nursing note reviewed.   Constitutional:       Appearance: Normal appearance.   HENT:      Head: Normocephalic and atraumatic.   Eyes:      Extraocular Movements: Extraocular movements intact.      Pupils: Pupils are equal, round, and reactive to light.   Cardiovascular:      Rate and Rhythm: Normal rate and regular rhythm.      Heart sounds: Normal heart sounds.     No friction rub.   Pulmonary:      Effort: Pulmonary effort is normal.      Breath sounds: Normal breath sounds.   Abdominal:      General: Bowel sounds are normal.      Palpations: Abdomen is soft.      Tenderness: There is no rebound.   Musculoskeletal:         General: No swelling or tenderness.      Cervical back: Neck supple.   Skin:     General: Skin is warm.      Coloration: Skin is not cyanotic.      Nails: There is no clubbing.   Neurological:      Mental Status: She is alert and oriented to person, place, and time.      Cranial Nerves: No cranial nerve deficit.      Motor: No weakness.   Psychiatric:         Mood and Affect: Mood normal.         Behavior: Behavior normal.       Fluids    Intake/Output Summary (Last 24 hours) at 12/1/2022 0748  Last data filed at 12/1/2022 0603  Gross per 24 hour   Intake --   Output 2500 ml   Net -2500 ml         Laboratory  Recent Labs     11/29/22  6962  11/30/22  0453   WBC 11.3* 7.1   RBC 5.10 4.44   HEMOGLOBIN 14.6 12.8   HEMATOCRIT 45.1 38.4   MCV 88.4 86.5   MCH 28.6 28.8   MCHC 32.4* 33.3*   RDW 48.7 44.6   PLATELETCT 277 202   MPV 9.4 9.3       Recent Labs     11/29/22  1254 11/30/22  0453 12/01/22  0212   SODIUM 141 139 139   POTASSIUM 3.2* 3.0* 3.6   CHLORIDE 104 103 107   CO2 20 22 18*   GLUCOSE 184* 185* 118*   BUN 20 10 16   CREATININE 0.72 0.56 1.10   CALCIUM 8.9 8.5 8.4                 Recent Labs     11/29/22  0334   TRIGLYCERIDE 191*   HDL 51   *         Imaging  EC-ECHOCARDIOGRAM LTD W/ CONT   Final Result      EC-ECHOCARDIOGRAM COMPLETE W/O CONT   Final Result      CT-CTA HEAD WITH & W/O-POST PROCESS   Final Result      CT angiogram of the [place] within normal limits.      Occlusion of left vertebral artery as noted on MRA.      CT-CTA NECK WITH & W/O-POST PROCESSING   Final Result         1.  Severely hypoplastic left vertebral artery, the proximal and mid left vertebral artery are not visualized and likely occluded.      These findings were discussed with the patient's clinician, Dr. Stokes, on 11/28/2022 8:48 PM.         MR-MRA NECK-W/O   Final Result      1.  Nonvisualization of the left vertebral artery likely representing age indeterminate occlusion. There is dominant right vertebral artery continues as a basilar artery.   2.  The remaining cervical blood vessels are unremarkable.      MR-BRAIN-W/O   Final Result      1.  No acute abnormality.   2.  Chronic lacunar infarcts in the right cerebellum.   3.  Minimal chronic microvascular ischemic disease.   4.  Flow void of the left vertebral artery is not seen. This may represent age indeterminate occlusion.      MR-MRA HEAD-W/O   Final Result      1.  No flow identified within the visualized portions of the left vertebral artery      2.  This could indicate a dissection.      3.  Further assessment with CTA neck is recommended      4.  Anatomic variant carotid origin of the  right posterior cerebral artery      CT-HEAD W/O   Final Result      No evidence of acute intracranial process.         [id number] VIEW   Final Result      No supine evidence of acute abdomen/pelvis abnormality.      DX-CHEST-PORTABLE (1 VIEW)   Final Result      Mild cardiac enlargement poststernotomy      No evidence of edema             Assessment/Plan  * Acute encephalopathy- (present on admission)  Assessment & Plan  Metabolic encephalopathy secondary to dehydration and hyperglycemia    Clinically resolved  Brain imaging negative for acute pathology  Reviewed test results with patient and daughter    Hypokalemia  Assessment & Plan  Hypophosphatemia  Hypomagnesemia   -- replete and monitor    Elevated troponin  Assessment & Plan  Likely demand ischemia  Continue Plavix statin     Echo poor quality reordered with contrast  Troponin trended down      CAD (coronary artery disease)  Assessment & Plan  Continue medical therapy with atorvastatin Plavix  Denies any chest pain    Dyslipidemia  Assessment & Plan  Continue atorvastatin    Hypernatremia- (present on admission)  Assessment & Plan  Resolved  Na at 139    COVID-19 virus infection- (present on admission)  Assessment & Plan  Patient is currently asymptomatic  Reports she was diagnosed with COVID about 1 month ago   -- repeat covid on 11/28: +ve      Type 2 diabetes mellitus with neurologic complication, with long-term current use of insulin ([facility])- (present on admission)  Assessment & Plan  Hyper osmolar hyperglycemic coma on presentation  Hemoglobin A1c 12.5 milligrams  --Increase Lantus continue sliding scale insulin monitor CBGs  --Diabetic education         VTE prophylaxis: enoxaparin ppx

## 2022-12-02 NOTE — CARE PLAN
The patient is Stable - Low risk of patient condition declining or worsening    Shift Goals  Clinical Goals: Diabetes education and discharge placement.  Patient Goals: Go home  Family Goals: N/A    Progress made toward(s) clinical / shift goals:    Problem: Psychosocial - Patient Condition  Goal: Patient's ability to verbalize feelings about condition will improve  Outcome: Progressing     Problem: Knowledge Deficit - Standard  Goal: Patient and family/care givers will demonstrate understanding of plan of care, disease process/condition, diagnostic tests and medications  Outcome: Progressing  Note: Patient is reviewing DM education book and will report back with questions.      Problem: Skin Integrity  Goal: Skin integrity is maintained or improved  Outcome: Progressing     Problem: Optimal Care of the Stroke Patient  Goal: Optimal emergency care for the stroke patient  Outcome: Met  Goal: Optimal acute care for the stroke patient  Outcome: Met     Problem: Knowledge Deficit - Stroke Education  Goal: Patient's knowledge of stroke and risk factors will improve  Outcome: Met     Problem: Neuro Status  Goal: Neuro status will remain stable or improve  Outcome: Met  Flowsheets (Taken 12/2/2022 9922)  Level of Consciousness: Alert  Note: No neuro deficits.       Patient is not progressing towards the following goals:

## 2022-12-02 NOTE — DISCHARGE PLANNING
Received Choice form at 0812  Agency/Facility Name: [facility]  Referral sent per Choice form @ 0899     Agency/Facility [facility]: [facility]  Spoke To: Melissa  Outcome: Per Melissa, they can take the pt on 12/8 at 1300. Per Melissa, she requested a PASRR be completed for the pt. Per Melissa, she also requested confirmation of the completed PASRR be manually faxed along with the dc summary and MAR list. DPA to manually fax requested items closer to dc date.  RN CM notified

## 2022-12-02 NOTE — PROGRESS NOTES
Telemetry Shift Summary     Rhythm: SR/ST  Rate: 60s-100s  Measurements: 0.16/0.08/0.36  Ectopy (reported by Monitor Tech): rCouplet, rTrigeminy, rBigeminy, oPVC     Normal Values  Rhythm: Sinus  HR:   Measurements: 0.12-0.20/0.06-0.10/0.30-0.52

## 2022-12-02 NOTE — ASSESSMENT & PLAN NOTE
Pt and ot following   -rec placement, pending transfer to SNF 12/8    Called patient's Daughter Elina. Daughter Elina lost her son 2 months ago and has been difficult.  She is unable to care for her mother and her two other children. Patient will return to her apartment at [place], [facility] in [place].

## 2022-12-02 NOTE — DIETARY
Nutrition Services: Update   Day 4 of admit.  Julisa Josue is a 62 y.o. female with admitting DX of Acute encephalopathy [G93.40]    Pt remains on enhanced droplet/contact isolation. Per RD note 11/30, RD provided diabetes education materials for nursing team to deliver to pt when next in room. RD attempted to call pt again today to review diabetes diet education. No answer to calls placed to pt's room phone and pt's cell phone listed in EMR.

## 2022-12-02 NOTE — THERAPY
Physical Therapy   Initial Evaluation     Patient Name: Julisa Josue  Age:  62 y.o., Sex:  female  Medical Record #: [id number]  Today's Date: 12/2/2022     Precautions  Precautions: (P) Fall Risk  Comments: (P) mild    Assessment  Patient is 62 y.o. female with a diagnosis of covid Pt lives at home alone and is mod active.Pt is safe with bed mob,transfers and ambulation.She has no equipment needs.Pt appears safe for home     Plan    Recommend Physical Therapy for Evaluation only     DC Equipment Recommendations: (P) None  Discharge Recommendations: (P) Anticipate that the patient will have no further physical therapy needs after discharge from the hospital      Objective       12/02/22 1000   Charge Group   PT Evaluation PT Evaluation Mod   Total Time Spent   PT Total Time Yes   PT Evaluation Time Spent (Mins) 30   Initial Contact Note    Initial Contact Note Order Received and Verified, Physical Therapy Evaluation in Progress with Full Report to Follow.   Precautions   Precautions Fall Risk   Comments mild   Prior Living Situation   Prior Services Intermittent Physical Support for ADL Per Family   Housing / Facility 1 Story House   Steps Into Home 1   Steps In Home 0   Equipment Owned Front-Wheel Walker   Lives with - Patient's Self Care Capacity Alone and Able to Care For Self   Prior Level of Functional Mobility   Bed Mobility Independent   Transfer Status Independent   Ambulation Independent   Distance Ambulation (Feet)   (limited community)   Assistive Devices Used None   Cognition    Cognition / Consciousness WDL   Passive ROM Lower Body   Passive ROM Lower Body WDL   Active ROM Lower Body    Active ROM Lower Body  WDL   Strength Lower Body   Comments general weakness   Coordination Lower Body    Coordination Lower Body  WDL   Balance Assessment   Sitting Balance (Static) Good   Sitting Balance (Dynamic) Good   Standing Balance (Static) Fair +   Standing Balance (Dynamic) Fair   Weight Shift Sitting Good   Weight  Shift Standing Good   Gait Analysis   Gait Level Of Assist Supervised   Assistive Device Front Wheel Walker   Distance (Feet) 100   # of Times Distance was Traveled 1   Deviation Step To   Weight Bearing Status full   Bed Mobility    Supine to Sit Modified Independent   Sit to Supine Modified Independent   Scooting Supervised   Functional Mobility   Sit to Stand Supervised   Bed, Chair, Wheelchair Transfer Supervised   Transfer Method Stand Step   How much difficulty does the patient currently have...   Turning over in bed (including adjusting bedclothes, sheets and blankets)? 4   Sitting down on and standing up from a chair with arms (e.g., wheelchair, bedside commode, etc.) 4   Moving from lying on back to sitting on the side of the bed? 4   How much help from another person does the patient currently need...   Moving to and from a bed to a chair (including a wheelchair)? 4   Need to walk in a hospital room? 4   Climbing 3-5 steps with a railing? 4   6 clicks Mobility Score 24   Activity Tolerance   Sitting Edge of Bed 10   Standing 10   Patient / Family Goals    Patient / Family Goal #1 Home   Anticipated Discharge Equipment and Recommendations   DC Equipment Recommendations None   Discharge Recommendations Anticipate that the patient will have no further physical therapy needs after discharge from the hospital   Interdisciplinary Plan of Care Collaboration   IDT Collaboration with  Nursing   Session Information   Date / Session Number  12/2   Priority 0

## 2022-12-02 NOTE — PROGRESS NOTES
Telemetry Shift Summary    Rhythm SR-ST  HR Range   Ectopy fPVCs, fTrigeminy of PVCs, r Couplet of PVCs  Measurements .16/.08/.34        Normal Values  Rhythm SR  HR Range    Measurements 0.12-0.20 / 0.06-0.10  / 0.30-0.52

## 2022-12-03 LAB
EKG IMPRESSION: NORMAL
GLUCOSE BLD STRIP.AUTO-MCNC: 208 MG/DL (ref 65–99)
GLUCOSE BLD STRIP.AUTO-MCNC: 239 MG/DL (ref 65–99)
GLUCOSE BLD STRIP.AUTO-MCNC: 245 MG/DL (ref 65–99)
GLUCOSE BLD STRIP.AUTO-MCNC: 259 MG/DL (ref 65–99)

## 2022-12-03 PROCEDURE — 770020 HCHG ROOM/CARE - TELE (206)

## 2022-12-03 PROCEDURE — A9270 NON-COVERED ITEM OR SERVICE: HCPCS | Performed by: HOSPITALIST

## 2022-12-03 PROCEDURE — 94760 N-INVAS EAR/PLS OXIMETRY 1: CPT

## 2022-12-03 PROCEDURE — 700102 HCHG RX REV CODE 250 W/ 637 OVERRIDE(OP): Performed by: HOSPITALIST

## 2022-12-03 PROCEDURE — 93005 ELECTROCARDIOGRAM TRACING: CPT | Performed by: HOSPITALIST

## 2022-12-03 PROCEDURE — 700111 HCHG RX REV CODE 636 W/ 250 OVERRIDE (IP): Performed by: HOSPITALIST

## 2022-12-03 PROCEDURE — 99232 SBSQ HOSP IP/OBS MODERATE 35: CPT | Performed by: INTERNAL MEDICINE

## 2022-12-03 PROCEDURE — 82962 GLUCOSE BLOOD TEST: CPT

## 2022-12-03 PROCEDURE — 93010 ELECTROCARDIOGRAM REPORT: CPT | Performed by: INTERNAL MEDICINE

## 2022-12-03 RX ADMIN — OXYCODONE 5 MG: 5 TABLET ORAL at 13:58

## 2022-12-03 RX ADMIN — OXYCODONE 5 MG: 5 TABLET ORAL at 20:19

## 2022-12-03 RX ADMIN — TRAZODONE HYDROCHLORIDE 150 MG: 50 TABLET ORAL at 20:19

## 2022-12-03 RX ADMIN — OXYCODONE 5 MG: 5 TABLET ORAL at 06:01

## 2022-12-03 RX ADMIN — INSULIN HUMAN 4 UNITS: 100 INJECTION, SOLUTION PARENTERAL at 11:23

## 2022-12-03 RX ADMIN — INSULIN HUMAN 4 UNITS: 100 INJECTION, SOLUTION PARENTERAL at 05:53

## 2022-12-03 RX ADMIN — CLOPIDOGREL BISULFATE 75 MG: 75 TABLET ORAL at 05:53

## 2022-12-03 RX ADMIN — ATORVASTATIN CALCIUM 80 MG: 40 TABLET, FILM COATED ORAL at 20:19

## 2022-12-03 RX ADMIN — PREGABALIN 75 MG: 75 CAPSULE ORAL at 05:53

## 2022-12-03 RX ADMIN — PREGABALIN 75 MG: 75 CAPSULE ORAL at 16:57

## 2022-12-03 RX ADMIN — INSULIN HUMAN 7 UNITS: 100 INJECTION, SOLUTION PARENTERAL at 20:24

## 2022-12-03 RX ADMIN — INSULIN HUMAN 4 UNITS: 100 INJECTION, SOLUTION PARENTERAL at 16:57

## 2022-12-03 RX ADMIN — ENOXAPARIN SODIUM 40 MG: 40 INJECTION SUBCUTANEOUS at 16:56

## 2022-12-03 ASSESSMENT — PAIN DESCRIPTION - PAIN TYPE
TYPE: CHRONIC PAIN

## 2022-12-03 ASSESSMENT — ENCOUNTER SYMPTOMS
NERVOUS/ANXIOUS: 0
SORE THROAT: 0
BLURRED VISION: 0
ABDOMINAL PAIN: 0
PALPITATIONS: 0
NAUSEA: 0
SHORTNESS OF BREATH: 0
DOUBLE VISION: 0
HEADACHES: 0
FEVER: 0
MYALGIAS: 1
VOMITING: 0
COUGH: 0

## 2022-12-03 ASSESSMENT — FIBROSIS 4 INDEX: FIB4 SCORE: 1.43

## 2022-12-03 NOTE — PROGRESS NOTES
Telemetry Shift Summary     Rhythm: SR/ST  Rate: 80s-100s  Measurements: 0.16/0.10/0.34  Ectopy (reported by Monitor Tech): occasional to frequent PVCs, rCouplets    New ST depression on Tele strip, provider notified, STAT ECG ordered.     Normal Values  Rhythm: Sinus  HR:   Measurements: 0.12-0.20/0.06-0.10/0.30-0.52

## 2022-12-03 NOTE — PROGRESS NOTES
Hospital Medicine Daily Progress Note    Date of Service  12/3/2022    Chief Complaint  Julisa Josue is a 62 y.o. female admitted 11/28/2022 with altered mental status    Hospital Course  This is 62-year-old female with a past medical significant for type 2 diabetes mellitus with hemoglobin A1c of 12.5 presented to ER on 11/20/2020 with altered mental status.    Upon presentation in ER, patient was encephalopathic; she was recently seen at [place] for hyperosmolar coma with a glucose level >1000.    During the stay in the hospital, she was aggressively hydrated, her hemoglobin A1c is noted to be 12.5, she was started on insulin sliding scale, hypoglycemic to goal, Accu-Cheks ACH S.  Nursing to provide diabetic education.    Her encephalopathy continue to need to improve, currently patient is alert and oriented; MRI brain did not show any acute abnormality, chronic lacunar infarct in the right cerebellum    CTA showed severely hypoplastic left vertebral artery; the proximal and mid left vertebral artery likely occluded; CTA of the head unremarkable.  Echocardiogram showed EF of 40 to 45%, LVH .Patient is found to be COVID-19 positive on 11/20, currently saturating well on room air.    Interval noted:  12/02:  -- Negative intermittent, vital signs been stable, heart rate 87-92, blood pressure elevated at 143/82, sats are 95% on room air.  Patient is alert, awake, answering questions appropriately.  She did have difficulty understanding/poor insight of her type 2 diabetes mellitus.  --Her hemoglobin A1c is noted to be 12.5, I discussed with the nursing staff to provide education in regards to diabetes  --Of note patient EF is noted to be 40 to 45%, not on acute decompensation, continue cardiac diet, I/os, daily weight, restriction to 1.5 L.    PT/OT has evaluate the patient, recommended home health.  It is noted that patient has poor insight considering her hemoglobin A1c of 12.5 and admission to [facility]  for hyperosmolar,, she is not safe for the patient to be discharged home without supervision.  Considering patient insurance being Medicaid fee-for-service, unable to provide home health.      12/3 Plan for dc to [facility] on 12/8. Glucose continues to be in 200s increase glargine 18 U BID. Patient's main complaint is the food.     Discussed with case management, plan to discharge patient to skilled nursing facility.     Consultants/Specialty  Nephrology     Code Status  Full Code    Disposition  Patient is not medically cleared for discharge.   Anticipate discharge to  SNF .  I have placed the appropriate orders for post-discharge needs.    Review of Systems  Review of Systems   Constitutional:  Positive for malaise/fatigue. Negative for fever.   HENT:  Negative for congestion and sore throat.    Eyes:  Negative for blurred vision and double vision.   Respiratory:  Negative for cough and shortness of breath.    Cardiovascular:  Negative for chest pain and palpitations.   Gastrointestinal:  Negative for abdominal pain, nausea and vomiting.   Genitourinary:  Negative for dysuria and urgency.   Musculoskeletal:  Positive for joint pain and myalgias.   Neurological:  Negative for headaches.   Psychiatric/Behavioral:  The patient is not nervous/anxious.    All other systems reviewed and are negative.     Physical Exam  Temp:  [36.4 °C (97.6 °F)-37.1 °C (98.8 °F)] 36.4 °C (97.6 °F)  Pulse:  [] 92  Resp:  [18-20] 18  BP: (117-141)/(52-87) 120/54  SpO2:  [93 %-96 %] 95 %    Physical Exam  Vitals and nursing note reviewed.   Constitutional:       General: She is not in acute distress.     Appearance: Normal appearance. She is obese. She is not ill-appearing.   HENT:      Head: Normocephalic and atraumatic.      Mouth/Throat:      Pharynx: Oropharynx is clear.   Eyes:      Conjunctiva/sclera: Conjunctivae normal.   Cardiovascular:      Rate and Rhythm: Normal rate and regular rhythm.      Heart sounds: Normal  heart sounds.   Pulmonary:      Effort: Pulmonary effort is normal. No respiratory distress.      Breath sounds: Normal breath sounds.   Abdominal:      General: There is no distension.      Palpations: Abdomen is soft.      Tenderness: There is no abdominal tenderness.   Musculoskeletal:         General: No swelling or tenderness.      Cervical back: Neck supple.   Skin:     General: Skin is warm and dry.      Coloration: Skin is not cyanotic.      Nails: There is no clubbing.   Neurological:      Mental Status: She is alert and oriented to person, place, and time.      Cranial Nerves: No cranial nerve deficit.      Motor: No weakness.   Psychiatric:         Mood and Affect: Mood normal.         Behavior: Behavior normal.       Fluids    Intake/Output Summary (Last 24 hours) at 12/3/2022 1354  Last data filed at 12/3/2022 1000  Gross per 24 hour   Intake 210 ml   Output 900 ml   Net -690 ml       Laboratory        Recent Labs     12/01/22  0212   SODIUM 139   POTASSIUM 3.6   CHLORIDE 107   CO2 18*   GLUCOSE 118*   BUN 16   CREATININE 1.10   CALCIUM 8.4                       Imaging  EC-ECHOCARDIOGRAM LTD W/ CONT   Final Result      EC-ECHOCARDIOGRAM COMPLETE W/O CONT   Final Result      CT-CTA HEAD WITH & W/O-POST PROCESS   Final Result      CT angiogram of the [place] within normal limits.      Occlusion of left vertebral artery as noted on MRA.      CT-CTA NECK WITH & W/O-POST PROCESSING   Final Result         1.  Severely hypoplastic left vertebral artery, the proximal and mid left vertebral artery are not visualized and likely occluded.      These findings were discussed with the patient's clinician, Dr. Stokes, on 11/28/2022 8:48 PM.         MR-MRA NECK-W/O   Final Result      1.  Nonvisualization of the left vertebral artery likely representing age indeterminate occlusion. There is dominant right vertebral artery continues as a basilar artery.   2.  The remaining cervical blood vessels are  unremarkable.      MR-BRAIN-W/O   Final Result      1.  No acute abnormality.   2.  Chronic lacunar infarcts in the right cerebellum.   3.  Minimal chronic microvascular ischemic disease.   4.  Flow void of the left vertebral artery is not seen. This may represent age indeterminate occlusion.      MR-MRA HEAD-W/O   Final Result      1.  No flow identified within the visualized portions of the left vertebral artery      2.  This could indicate a dissection.      3.  Further assessment with CTA neck is recommended      4.  Anatomic variant carotid origin of the right posterior cerebral artery      CT-HEAD W/O   Final Result      No evidence of acute intracranial process.         MA-[id number] VIEW   Final Result      No supine evidence of acute abdomen/pelvis abnormality.      DX-CHEST-PORTABLE (1 VIEW)   Final Result      Mild cardiac enlargement poststernotomy      No evidence of edema             Assessment/Plan  * Acute encephalopathy- (present on admission)  Assessment & Plan  Metabolic encephalopathy secondary to dehydration and hyperglycemia  CT of the head and neck showed occluded left vertebral artery, MRI of the brain showed old infarct   -- Currently patient is at her baseline, does have some cognitive deficit and has difficulty understanding how to manage insulin.  Nursing staff to provide education    Debility  Assessment & Plan  Pt and ot following     Cognitive deficits  Assessment & Plan  Mild    Hypokalemia  Assessment & Plan  Hypophosphatemia  Hypomagnesemia   -- replete and monitor    Elevated troponin  Assessment & Plan  Likely demand ischemia  Continue Plavix statin     Echo poor quality reordered with contrast  Troponin trended down      CAD (coronary artery disease)  Assessment & Plan  Continue medical therapy with atorvastatin Plavix  Denies any chest pain    Dyslipidemia  Assessment & Plan  Continue atorvastatin    Hypernatremia- (present on admission)  Assessment & Plan  Resolved  Na at  139    COVID-19 virus infection- (present on admission)  Assessment & Plan  Patient is currently asymptomatic  Reports she was diagnosed with COVID about 1 month ago   -- repeat covid on 11/28: +ve    No medication indicated, supportive treatment    Type 2 diabetes mellitus with neurologic complication, with long-term current use of insulin (HCC)- (present on admission)  Assessment & Plan  Hyper osmolar hyperglycemic coma on presentation  Hemoglobin A1c 12.5 milligrams  --Increase Lantus   - continue sliding scale insulin monitor CBGs  --Diabetic education    Discussed extensively with the nursing staff in regards to providing education, provide education and-diet, how to inject  insulin, CHECK fingerstick.       VTE prophylaxis: enoxaparin ppx    I have performed a physical exam and reviewed and updated ROS and Plan today (12/3/2022). In review of yesterday's note (12/2/2022), there are no changes except as documented above.

## 2022-12-03 NOTE — CARE PLAN
The patient is Stable - Low risk of patient condition declining or worsening    Shift Goals  Clinical Goals: Diabetes education and SNF placement  Patient Goals: Comfort  Family Goals: N/A    Progress made toward(s) clinical / shift goals:    Problem: Psychosocial - Patient Condition  Goal: Patient's ability to verbalize feelings about condition will improve  Outcome: Progressing  Flowsheets (Taken 12/3/2022 8252)  Condition Will Improve:   Discussed coping with medical condition and its effects   Encouraged patient participation in care   Screened for depression     Problem: Knowledge Deficit - Standard  Goal: Patient and family/care givers will demonstrate understanding of plan of care, disease process/condition, diagnostic tests and medications  Outcome: Progressing  Note: Discussed diabetes management.     Problem: Pain - Standard  Goal: Alleviation of pain or a reduction in pain to the patient’s comfort goal  Outcome: Progressing  Note: Chronic pain has been managed with PRN medications.     Problem: Skin Integrity  Goal: Skin integrity is maintained or improved  Outcome: Progressing       Patient is not progressing towards the following goals:

## 2022-12-03 NOTE — PROGRESS NOTES
OVERNIGHT HOSPITALIST:    Paged by nursing Staff reporting worsening ST depression on cardiac monitor.  EKG was done which is not significantly different as compared to prior.  Patient is not having any chest pain.  We will continue to closely monitor.

## 2022-12-03 NOTE — CARE PLAN
The patient is Stable - Low risk of patient condition declining or worsening    Shift Goals  Clinical Goals: NIHSS, Diabetes education  Patient Goals: Comfort  Family Goals: N/A    Progress made toward(s) clinical / shift goals:    Problem: Pain - Standard  Goal: Alleviation of pain or a reduction in pain to the patient’s comfort goal  Outcome: Progressing     Problem: Fall Risk  Goal: Patient will remain free from falls  Outcome: Progressing       Patient is not progressing towards the following goals:

## 2022-12-03 NOTE — PROGRESS NOTES
Telemetry Shift Summary    Rhythm SR/ST  HR Range   Ectopy O-PVC, R to F-Coup, R-BIG/TRIP  Measurements 0.14/0.08/0.36        Normal Values  Rhythm SR  HR Range    Measurements 0.12-0.20 / 0.06-0.10  / 0.30-0.52

## 2022-12-04 PROBLEM — E87.0 HYPERNATREMIA: Status: RESOLVED | Noted: 2022-11-28 | Resolved: 2022-12-04

## 2022-12-04 PROBLEM — E87.6 HYPOKALEMIA: Status: RESOLVED | Noted: 2022-11-30 | Resolved: 2022-12-04

## 2022-12-04 LAB
GLUCOSE BLD STRIP.AUTO-MCNC: 195 MG/DL (ref 65–99)
GLUCOSE BLD STRIP.AUTO-MCNC: 198 MG/DL (ref 65–99)
GLUCOSE BLD STRIP.AUTO-MCNC: 203 MG/DL (ref 65–99)
GLUCOSE BLD STRIP.AUTO-MCNC: 225 MG/DL (ref 65–99)
GLUCOSE BLD STRIP.AUTO-MCNC: 254 MG/DL (ref 65–99)

## 2022-12-04 PROCEDURE — 99232 SBSQ HOSP IP/OBS MODERATE 35: CPT | Performed by: INTERNAL MEDICINE

## 2022-12-04 PROCEDURE — 82962 GLUCOSE BLOOD TEST: CPT | Mod: 91

## 2022-12-04 PROCEDURE — A9270 NON-COVERED ITEM OR SERVICE: HCPCS | Performed by: HOSPITALIST

## 2022-12-04 PROCEDURE — 700102 HCHG RX REV CODE 250 W/ 637 OVERRIDE(OP): Performed by: HOSPITALIST

## 2022-12-04 PROCEDURE — 770006 HCHG ROOM/CARE - MED/SURG/GYN SEMI*

## 2022-12-04 PROCEDURE — 94760 N-INVAS EAR/PLS OXIMETRY 1: CPT

## 2022-12-04 PROCEDURE — 700111 HCHG RX REV CODE 636 W/ 250 OVERRIDE (IP): Performed by: HOSPITALIST

## 2022-12-04 RX ADMIN — OXYCODONE 5 MG: 5 TABLET ORAL at 20:29

## 2022-12-04 RX ADMIN — OXYCODONE 5 MG: 5 TABLET ORAL at 16:08

## 2022-12-04 RX ADMIN — OXYCODONE 5 MG: 5 TABLET ORAL at 02:41

## 2022-12-04 RX ADMIN — INSULIN HUMAN 7 UNITS: 100 INJECTION, SOLUTION PARENTERAL at 11:56

## 2022-12-04 RX ADMIN — ATORVASTATIN CALCIUM 80 MG: 40 TABLET, FILM COATED ORAL at 20:28

## 2022-12-04 RX ADMIN — INSULIN HUMAN 3 UNITS: 100 INJECTION, SOLUTION PARENTERAL at 17:50

## 2022-12-04 RX ADMIN — ENOXAPARIN SODIUM 40 MG: 40 INJECTION SUBCUTANEOUS at 17:52

## 2022-12-04 RX ADMIN — PREGABALIN 75 MG: 75 CAPSULE ORAL at 05:27

## 2022-12-04 RX ADMIN — INSULIN HUMAN 4 UNITS: 100 INJECTION, SOLUTION PARENTERAL at 06:53

## 2022-12-04 RX ADMIN — CLOPIDOGREL BISULFATE 75 MG: 75 TABLET ORAL at 05:27

## 2022-12-04 RX ADMIN — PREGABALIN 75 MG: 75 CAPSULE ORAL at 17:52

## 2022-12-04 RX ADMIN — TRAZODONE HYDROCHLORIDE 150 MG: 50 TABLET ORAL at 20:28

## 2022-12-04 RX ADMIN — SENNOSIDES AND DOCUSATE SODIUM 2 TABLET: 50; 8.6 TABLET ORAL at 17:52

## 2022-12-04 RX ADMIN — OXYCODONE 5 MG: 5 TABLET ORAL at 11:56

## 2022-12-04 RX ADMIN — INSULIN HUMAN 3 UNITS: 100 INJECTION, SOLUTION PARENTERAL at 20:29

## 2022-12-04 ASSESSMENT — PAIN DESCRIPTION - PAIN TYPE
TYPE: CHRONIC PAIN

## 2022-12-04 ASSESSMENT — ENCOUNTER SYMPTOMS
FEVER: 0
HEADACHES: 0
VOMITING: 0
ABDOMINAL PAIN: 0
NERVOUS/ANXIOUS: 0
MYALGIAS: 1
SHORTNESS OF BREATH: 0
NAUSEA: 0

## 2022-12-04 ASSESSMENT — FIBROSIS 4 INDEX: FIB4 SCORE: 1.43

## 2022-12-04 NOTE — CARE PLAN
The patient is Stable - Low risk of patient condition declining or worsening    Shift Goals  Clinical Goals: Diet education  Patient Goals: reduce pain  Family Goals: N/A    Progress made toward(s) clinical / shift goals:    Problem: Knowledge Deficit - Standard  Goal: Patient and family/care givers will demonstrate understanding of plan of care, disease process/condition, diagnostic tests and medications  Outcome: Progressing     Problem: Pain - Standard  Goal: Alleviation of pain or a reduction in pain to the patient’s comfort goal  Outcome: Progressing       Patient is not progressing towards the following goals:NA

## 2022-12-04 NOTE — PROGRESS NOTES
Telemetry Shift Summary    Rhythm SR, ST  HR Range   Ectopy fPVC, r-oCoup, rPAC, rTrip  Measurements 0.12/0.08/0.40        Normal Values  Rhythm SR  HR Range    Measurements 0.12-0.20 / 0.06-0.10  / 0.30-0.52

## 2022-12-04 NOTE — PROGRESS NOTES
Report received from OJ Najera. Patient care assumed and assessment completed. Patient has no additional needs or questions at this time.

## 2022-12-04 NOTE — PROGRESS NOTES
Tele strip printed at 11:58     Rhythm: SR    HR: 97   Measurements: 0.12/.080/0.36         Telemetry Shift Summary     Rhythm: SR  HR Range: 80-90  Ectopy: PVC -Bigeminy      Telemetry monitoring strips placed in pt's chart.

## 2022-12-04 NOTE — PROGRESS NOTES
Hospital Medicine Daily Progress Note    Date of Service  12/4/2022    Chief Complaint  Julisa Josue is a 62 y.o. female admitted 11/28/2022 with altered mental status    Hospital Course  This is 62-year-old female with a past medical significant for type 2 diabetes mellitus with hemoglobin A1c of 12.5 presented to ER on 11/20/2020 with altered mental status.    Upon presentation in ER, patient was encephalopathic; she was recently seen at [place] for hyperosmolar coma with a glucose level >1000.    During the stay in the hospital, she was aggressively hydrated, her hemoglobin A1c is noted to be 12.5, she was started on insulin sliding scale, hypoglycemic to goal, Accu-Cheks ACH S.  Nursing to provide diabetic education.    Her encephalopathy continue to need to improve, currently patient is alert and oriented; MRI brain did not show any acute abnormality, chronic lacunar infarct in the right cerebellum    CTA showed severely hypoplastic left vertebral artery; the proximal and mid left vertebral artery likely occluded; CTA of the head unremarkable.  Echocardiogram showed EF of 40 to 45%, LVH .Patient is found to be COVID-19 positive on 11/20, currently saturating well on room air.    Interval noted:  12/02:  -- Negative intermittent, vital signs been stable, heart rate 87-92, blood pressure elevated at 143/82, sats are 95% on room air.  Patient is alert, awake, answering questions appropriately.  She did have difficulty understanding/poor insight of her type 2 diabetes mellitus.  --Her hemoglobin A1c is noted to be 12.5, I discussed with the nursing staff to provide education in regards to diabetes  --Of note patient EF is noted to be 40 to 45%, not on acute decompensation, continue cardiac diet, I/os, daily weight, restriction to 1.5 L.    PT/OT has evaluate the patient, recommended home health.  It is noted that patient has poor insight considering her hemoglobin A1c of 12.5 and admission to [facility]  for hyperosmolar,, she is not safe for the patient to be discharged home without supervision.  Considering patient insurance being Medicaid fee-for-service, unable to provide home health.      12/3 Plan for dc to [facility] on 12/8. Glucose continues to be in 200s increase glargine 18 U BID. Patient's main complaint is the food.     12/4 Vitals stable. Glucose 225 this morning, may need another increase in glargine. Patient stable on tele for the last 72 hrs will dc.     Discussed with case management, plan to discharge patient to skilled nursing facility.     Consultants/Specialty  Nephrology     Code Status  Full Code    Disposition  Patient is not medically cleared for discharge.   Anticipate discharge to  SNF , waiting for covid clearance, plan for dc 12/8  I have placed the appropriate orders for post-discharge needs.    Review of Systems  Review of Systems   Constitutional:  Positive for malaise/fatigue. Negative for fever.   Respiratory:  Negative for shortness of breath.    Cardiovascular:  Negative for chest pain.   Gastrointestinal:  Negative for abdominal pain, nausea and vomiting.   Musculoskeletal:  Positive for myalgias.   Neurological:  Negative for headaches.   Psychiatric/Behavioral:  The patient is not nervous/anxious.    All other systems reviewed and are negative.     Physical Exam  Temp:  [36.4 °C (97.6 °F)-37.5 °C (99.5 °F)] 36.8 °C (98.2 °F)  Pulse:  [84-99] 90  Resp:  [17-18] 18  BP: (102-133)/(47-87) 131/68  SpO2:  [93 %-97 %] 93 %    Physical Exam  Vitals and nursing note reviewed.   Constitutional:       General: She is not in acute distress.     Appearance: Normal appearance. She is obese. She is not ill-appearing.   HENT:      Head: Normocephalic and atraumatic.   Eyes:      Conjunctiva/sclera: Conjunctivae normal.   Cardiovascular:      Rate and Rhythm: Normal rate and regular rhythm.      Heart sounds: Normal heart sounds.   Pulmonary:      Effort: Pulmonary effort is normal. No  respiratory distress.      Breath sounds: Normal breath sounds.   Abdominal:      General: There is no distension.      Palpations: Abdomen is soft.   Musculoskeletal:         General: No swelling or tenderness.      Cervical back: Neck supple.   Skin:     General: Skin is warm and dry.      Coloration: Skin is not cyanotic.      Nails: There is no clubbing.   Neurological:      Mental Status: She is alert and oriented to person, place, and time. Mental status is at baseline.      Motor: Weakness present.       Fluids    Intake/Output Summary (Last 24 hours) at 12/4/2022 0941  Last data filed at 12/4/2022 0600  Gross per 24 hour   Intake 710 ml   Output 2600 ml   Net -1890 ml       Laboratory                                Imaging  EC-ECHOCARDIOGRAM LTD W/ CONT   Final Result      EC-ECHOCARDIOGRAM COMPLETE W/O CONT   Final Result      CT-CTA HEAD WITH & W/O-POST PROCESS   Final Result      CT angiogram of the [place] of [place] within normal limits.      Occlusion of left vertebral artery as noted on MRA.      CT-CTA NECK WITH & W/O-POST PROCESSING   Final Result         1.  Severely hypoplastic left vertebral artery, the proximal and mid left vertebral artery are not visualized and likely occluded.      These findings were discussed with the patient's clinician, Dr. Stokes, on 11/28/2022 8:48 PM.         MR-MRA NECK-W/O   Final Result      1.  Nonvisualization of the left vertebral artery likely representing age indeterminate occlusion. There is dominant right vertebral artery continues as a basilar artery.   2.  The remaining cervical blood vessels are unremarkable.      MR-BRAIN-W/O   Final Result      1.  No acute abnormality.   2.  Chronic lacunar infarcts in the right cerebellum.   3.  Minimal chronic microvascular ischemic disease.   4.  Flow void of the left vertebral artery is not seen. This may represent age indeterminate occlusion.      MR-MRA HEAD-W/O   Final Result      1.  No flow identified within the  visualized portions of the left vertebral artery      2.  This could indicate a dissection.      3.  Further assessment with CTA neck is recommended      4.  Anatomic variant carotid origin of the right posterior cerebral artery      CT-HEAD W/O   Final Result      No evidence of acute intracranial process.         [id number] VIEW   Final Result      No supine evidence of acute abdomen/pelvis abnormality.      DX-CHEST-PORTABLE (1 VIEW)   Final Result      Mild cardiac enlargement poststernotomy      No evidence of edema             Assessment/Plan  * Acute encephalopathy- (present on admission)  Assessment & Plan  Metabolic encephalopathy secondary to dehydration and hyperglycemia  CT of the head and neck showed occluded left vertebral artery, MRI of the brain showed old infarct   -- Currently patient is at her baseline, does have some cognitive deficit and has difficulty understanding how to manage insulin.  Nursing staff to provide education    Debility  Assessment & Plan  Pt and ot following   -rec placement, pending transfer to SNF 12/8    Cognitive deficits  Assessment & Plan  Mild    Elevated troponin  Assessment & Plan  Likely demand ischemia  Continue Plavix statin     Echo poor quality reordered with contrast  Troponin trended down      CAD (coronary artery disease)  Assessment & Plan  Continue medical therapy with atorvastatin Plavix  Denies any chest pain    Dyslipidemia  Assessment & Plan  Continue atorvastatin    COVID-19 virus infection- (present on admission)  Assessment & Plan  Patient is currently asymptomatic  Reports she was diagnosed with COVID about 1 month ago   -- repeat covid on 11/28: +ve    No medication indicated, supportive treatment    Type 2 diabetes mellitus with neurologic complication, with long-term current use of insulin (HCC)- (present on admission)  Assessment & Plan  Hyper osmolar hyperglycemic coma on presentation  Hemoglobin A1c 12.5 milligrams  --Increase Lantus   -  continue sliding scale insulin monitor CBGs  --Diabetic education    Discussed extensively with the nursing staff in regards to providing education, provide education and-diet, how to inject  insulin, CHECK fingerstick.       VTE prophylaxis: enoxaparin ppx    I have performed a physical exam and reviewed and updated ROS and Plan today (12/4/2022). In review of yesterday's note (12/3/2022), there are no changes except as documented above.

## 2022-12-04 NOTE — PROGRESS NOTES
Telemetry Shift Summary    Rhythm SR/ST  HR Range   Ectopy F-PVC, O-Coup, R-Big/Trig  Measurements 0.18/0.10/0.36        Normal Values  Rhythm SR  HR Range    Measurements 0.12-0.20 / 0.06-0.10  / 0.30-0.52

## 2022-12-05 LAB
GLUCOSE BLD STRIP.AUTO-MCNC: 228 MG/DL (ref 65–99)
GLUCOSE BLD STRIP.AUTO-MCNC: 235 MG/DL (ref 65–99)
GLUCOSE BLD STRIP.AUTO-MCNC: 244 MG/DL (ref 65–99)
GLUCOSE BLD STRIP.AUTO-MCNC: 247 MG/DL (ref 65–99)

## 2022-12-05 PROCEDURE — 97535 SELF CARE MNGMENT TRAINING: CPT

## 2022-12-05 PROCEDURE — 99232 SBSQ HOSP IP/OBS MODERATE 35: CPT | Performed by: INTERNAL MEDICINE

## 2022-12-05 PROCEDURE — 770006 HCHG ROOM/CARE - MED/SURG/GYN SEMI*

## 2022-12-05 PROCEDURE — 82962 GLUCOSE BLOOD TEST: CPT | Mod: 91

## 2022-12-05 PROCEDURE — 94760 N-INVAS EAR/PLS OXIMETRY 1: CPT

## 2022-12-05 PROCEDURE — 700102 HCHG RX REV CODE 250 W/ 637 OVERRIDE(OP): Performed by: HOSPITALIST

## 2022-12-05 PROCEDURE — A9270 NON-COVERED ITEM OR SERVICE: HCPCS | Performed by: HOSPITALIST

## 2022-12-05 PROCEDURE — 700111 HCHG RX REV CODE 636 W/ 250 OVERRIDE (IP): Performed by: HOSPITALIST

## 2022-12-05 RX ADMIN — OXYCODONE 5 MG: 5 TABLET ORAL at 17:00

## 2022-12-05 RX ADMIN — PREGABALIN 75 MG: 75 CAPSULE ORAL at 06:40

## 2022-12-05 RX ADMIN — OXYCODONE 5 MG: 5 TABLET ORAL at 06:39

## 2022-12-05 RX ADMIN — INSULIN HUMAN 4 UNITS: 100 INJECTION, SOLUTION PARENTERAL at 17:03

## 2022-12-05 RX ADMIN — INSULIN HUMAN 4 UNITS: 100 INJECTION, SOLUTION PARENTERAL at 11:07

## 2022-12-05 RX ADMIN — PREGABALIN 75 MG: 75 CAPSULE ORAL at 17:00

## 2022-12-05 RX ADMIN — INSULIN HUMAN 4 UNITS: 100 INJECTION, SOLUTION PARENTERAL at 06:42

## 2022-12-05 RX ADMIN — OXYCODONE 5 MG: 5 TABLET ORAL at 21:24

## 2022-12-05 RX ADMIN — SENNOSIDES AND DOCUSATE SODIUM 2 TABLET: 50; 8.6 TABLET ORAL at 06:39

## 2022-12-05 RX ADMIN — ENOXAPARIN SODIUM 40 MG: 40 INJECTION SUBCUTANEOUS at 17:00

## 2022-12-05 RX ADMIN — OXYCODONE 5 MG: 5 TABLET ORAL at 11:08

## 2022-12-05 RX ADMIN — TRAZODONE HYDROCHLORIDE 150 MG: 50 TABLET ORAL at 21:24

## 2022-12-05 RX ADMIN — CLOPIDOGREL BISULFATE 75 MG: 75 TABLET ORAL at 06:40

## 2022-12-05 RX ADMIN — ATORVASTATIN CALCIUM 80 MG: 40 TABLET, FILM COATED ORAL at 21:24

## 2022-12-05 RX ADMIN — INSULIN HUMAN 4 UNITS: 100 INJECTION, SOLUTION PARENTERAL at 21:31

## 2022-12-05 ASSESSMENT — ENCOUNTER SYMPTOMS
NAUSEA: 0
CHILLS: 0
SHORTNESS OF BREATH: 0
VOMITING: 0
DIZZINESS: 0
PALPITATIONS: 0
BACK PAIN: 0
COUGH: 0
HEADACHES: 0
DIARRHEA: 0
ABDOMINAL PAIN: 0
FEVER: 0
CONSTIPATION: 0

## 2022-12-05 ASSESSMENT — COGNITIVE AND FUNCTIONAL STATUS - GENERAL
DAILY ACTIVITIY SCORE: 24
SUGGESTED CMS G CODE MODIFIER DAILY ACTIVITY: CH

## 2022-12-05 ASSESSMENT — PAIN DESCRIPTION - PAIN TYPE
TYPE: CHRONIC PAIN

## 2022-12-05 ASSESSMENT — FIBROSIS 4 INDEX: FIB4 SCORE: 1.43

## 2022-12-05 NOTE — PROGRESS NOTES
Hospital Medicine Daily Progress Note    Date of Service  12/5/2022    Chief Complaint  Julisa Josue is a 62 y.o. female admitted 11/28/2022 with   Chief Complaint   Patient presents with    ALOC    High Blood Sugar     [facility].  Per report, last seen well during thanksgiving.  Dtr checked on her and pt non verbal, taken to hospital and found to have glucose levels >1000.  Pt treated x2 days, Non-contrast CT reported no ICH.  Transferred to [facility] for a higher level of care.  Upon admit to ED, , insulin at 1unit/hr, D51/2 NS at 250ml/hr.  RA.  Pt responds to name and awake, but non verbal and does not follow command.      Hospital Course  This is 62-year-old female with a past medical significant for type 2 diabetes mellitus with hemoglobin A1c of 12.5 presented to ER on 11/20/2020 with altered mental status.    Upon presentation in ER, patient was encephalopathic; she was recently seen at [facility] for hyperosmolar coma with a glucose level >1000.    During the stay in the hospital, she was aggressively hydrated, her hemoglobin A1c is noted to be 12.5, she was started on insulin sliding scale, hypoglycemic to goal, Accu-Cheks ACH S.  Nursing to provide diabetic education.    Her encephalopathy continue to need to improve, currently patient is alert and oriented; MRI brain did not show any acute abnormality, chronic lacunar infarct in the right cerebellum    CTA showed severely hypoplastic left vertebral artery; the proximal and mid left vertebral artery likely occluded; CTA of the head unremarkable.  Echocardiogram showed EF of 40 to 45%, LVH    Patient is found to be COVID-19 positive on 11/20, currently saturating well on room air    During the  stay in the hospital, PT/OT has evaluate the patient, recommended home health.  It is noted that patient has poor insight considering her hemoglobin A1c of 12.5 and admission to [facility] for hyperosmolar, she is not safe for  the patient to be discharged home without supervision.  Considering patient insurance being Medicaid fee-for-service, unable to provide home health.  Discussed with case management, plan to discharge patient to skilled nursing facility.      Interval Problem Update  Patient had no acute complaints today  Spoke with patient's mother on phone call at bedside discussed patient's conditions and findings.  She was appreciative of our discussion.    Called patient's Daughter Elina. Daughter Elina lost her son 2 months ago and has been difficult.  She is unable to care for her mother and her two other children. Patient will return to her apartment at [place], after [facility] in [place].    I have discussed this patient's plan of care and discharge plan at IDT rounds today with Case Management, Nursing, Nursing leadership, and other members of the IDT team.    Consultants/Specialty  none    Code Status  Full Code    Disposition  Patient is medically cleared for discharge.   Anticipate discharge to to skilled nursing facility.  Pending COVID clearance on 12/08 for placement to [facility].  I have placed the appropriate orders for post-discharge needs.    Review of Systems  Review of Systems   Constitutional:  Negative for chills, fever and malaise/fatigue.   Respiratory:  Negative for cough and shortness of breath.    Cardiovascular:  Negative for chest pain and palpitations.   Gastrointestinal:  Negative for abdominal pain, constipation, diarrhea, nausea and vomiting.   Musculoskeletal:  Negative for back pain and joint pain.   Neurological:  Negative for dizziness and headaches.   All other systems reviewed and are negative.     Physical Exam  Temp:  [36.8 °C (98.2 °F)-37.2 °C (99 °F)] 36.8 °C (98.2 °F)  Pulse:  [] 106  Resp:  [18] 18  BP: (116-152)/(59-73) 120/59  SpO2:  [93 %-96 %] 93 %    Physical Exam  Vitals and nursing note reviewed.   Constitutional:       General: She is not in acute  distress.     Appearance: Normal appearance. She is not ill-appearing.   HENT:      Head: Normocephalic and atraumatic.   Eyes:      General: No scleral icterus.     Extraocular Movements: Extraocular movements intact.   Cardiovascular:      Rate and Rhythm: Normal rate.      Pulses: Normal pulses.      Heart sounds: Normal heart sounds. No murmur heard.  Pulmonary:      Effort: Pulmonary effort is normal. No respiratory distress.      Breath sounds: Normal breath sounds.   Abdominal:      General: Abdomen is flat. Bowel sounds are normal. There is no distension.      Palpations: Abdomen is soft.   Musculoskeletal:         General: No swelling or tenderness. Normal range of motion.      Cervical back: Normal range of motion and neck supple.   Skin:     General: Skin is warm.      Capillary Refill: Capillary refill takes less than 2 seconds.      Coloration: Skin is not jaundiced.      Findings: No erythema.   Neurological:      General: No focal deficit present.      Mental Status: She is alert and oriented to person, place, and time. Mental status is at baseline.      Motor: No weakness.   Psychiatric:         Mood and Affect: Mood normal.         Behavior: Behavior normal.         Thought Content: Thought content normal.         Judgment: Judgment normal.       Fluids    Intake/Output Summary (Last 24 hours) at 12/5/2022 1450  Last data filed at 12/5/2022 1000  Gross per 24 hour   Intake 360 ml   Output 350 ml   Net 10 ml       Laboratory                        Imaging  EC-ECHOCARDIOGRAM LTD W/ CONT   Final Result      EC-ECHOCARDIOGRAM COMPLETE W/O CONT   Final Result      CT-CTA HEAD WITH & W/O-POST PROCESS   Final Result      CT angiogram of the [place] of [place] within normal limits.      Occlusion of left vertebral artery as noted on MRA.      CT-CTA NECK WITH & W/O-POST PROCESSING   Final Result         1.  Severely hypoplastic left vertebral artery, the proximal and mid left vertebral artery are not  visualized and likely occluded.      These findings were discussed with the patient's clinician, Dr. Stokes, on 11/28/2022 8:48 PM.         MR-MRA NECK-W/O   Final Result      1.  Nonvisualization of the left vertebral artery likely representing age indeterminate occlusion. There is dominant right vertebral artery continues as a basilar artery.   2.  The remaining cervical blood vessels are unremarkable.      MR-BRAIN-W/O   Final Result      1.  No acute abnormality.   2.  Chronic lacunar infarcts in the right cerebellum.   3.  Minimal chronic microvascular ischemic disease.   4.  Flow void of the left vertebral artery is not seen. This may represent age indeterminate occlusion.      MR-MRA HEAD-W/O   Final Result      1.  No flow identified within the visualized portions of the left vertebral artery      2.  This could indicate a dissection.      3.  Further assessment with CTA neck is recommended      4.  Anatomic variant carotid origin of the right posterior cerebral artery      CT-HEAD W/O   Final Result      No evidence of acute intracranial process.         [id number] VIEW   Final Result      No supine evidence of acute abdomen/pelvis abnormality.      DX-CHEST-PORTABLE (1 VIEW)   Final Result      Mild cardiac enlargement poststernotomy      No evidence of edema           Assessment/Plan  * Acute encephalopathy- (present on admission)  Assessment & Plan  Metabolic encephalopathy secondary to dehydration and hyperglycemia  CT of the head and neck showed occluded left vertebral artery, MRI of the brain showed old infarct   -- Currently patient is at her baseline, does have some cognitive deficit and has difficulty understanding how to manage insulin.  Nursing staff to provide education    Debility  Assessment & Plan  Pt and ot following   -rec placement, pending transfer to SNF 12/8    Called patient's Daughter Elina. Daughter Elina lost her son 2 months ago and has been difficult.  She is unable to care for  her mother and her two other children. Patient will return to her apartment at [place], after [facility] in [place].    Cognitive deficits  Assessment & Plan  Mild    Elevated troponin  Assessment & Plan  Likely demand ischemia  Continue Plavix statin     Echo poor quality reordered with contrast  Troponin trended down      CAD (coronary artery disease)  Assessment & Plan  Continue medical therapy with atorvastatin Plavix  Denies any chest pain    Dyslipidemia  Assessment & Plan  Continue atorvastatin    COVID-19 virus infection- (present on admission)  Assessment & Plan  Patient is currently asymptomatic  Reports she was diagnosed with COVID about 1 month ago   -- repeat covid on 11/28: +ve    No medication indicated, supportive treatment    Type 2 diabetes mellitus with neurologic complication, with long-term current use of insulin ([facility])- (present on admission)  Assessment & Plan  Hyper osmolar hyperglycemic coma on presentation  Hemoglobin A1c 12.5 milligrams  --Increase Lantus   - continue sliding scale insulin monitor CBGs  --Diabetic education    Discussed extensively with the nursing staff in regards to providing education, provide education and-diet, how to inject  insulin, CHECK fingerstick.       VTE prophylaxis: enoxaparin ppx using Plavix    I have performed a physical exam and reviewed and updated ROS and Plan today (12/5/2022). In review of yesterday's note (12/4/2022), there are no changes except as documented above.

## 2022-12-05 NOTE — CARE PLAN
The patient is Stable - Low risk of patient condition declining or worsening    Shift Goals  Clinical Goals: Diet education  Patient Goals: reduce pain  Family Goals: N/A    Progress made toward(s) clinical / shift goals:  PT recognized need for dietary changes

## 2022-12-05 NOTE — PROGRESS NOTES
Received report from OJ Garcia. Patient care assumed and assessment completed. Patient received requested dietary items and has no additional needs or questions at this time.

## 2022-12-05 NOTE — THERAPY
Occupational Therapy  Daily Treatment     Patient Name: Julisa Josue  Age:  62 y.o., Sex:  female  Medical Record #: [id number]  Today's Date: 12/5/2022     Precautions: Fall Risk  Comments: mild    Assessment  Pt seen for OT tx session and met all acute OT goals, demonstrating mod I level for simulated bath tub tranfers, LB dressing, toileting, standing ADLs without AD. Pt reports she can call her family for assist as needed. No further acute OT needs. Recommend home health OT for DME recs and home safety evaluation.     Patient will not be actively followed for occupational therapy services at this time, however may be seen if requested by physician for 1 more visit within 30 days to address any discharge or equipment needs.       Plan    DC needs    DC Equipment Recommendations: (P) None  Discharge Recommendations: (P) Recommend home health for continued occupational therapy services     12/05/22 1501   Cognition    Cognition / Consciousness WDL   Comments pleasant and agreeable   Other Treatments   Other Treatments Provided reported dtr and grand daughter live near by, she does not drive, family can assist, otherwise she is independent   Balance   Sitting Balance (Static) Good   Sitting Balance (Dynamic) Good   Standing Balance (Static) Good   Standing Balance (Dynamic) Good   Weight Shift Sitting Good   Weight Shift Standing Good   Comments without AD   Activities of Daily Living   Grooming Modified Independent;Standing   Upper Body Dressing Modified Independent   Lower Body Dressing Modified Independent   How much help from another person does the patient currently need...   6 Clicks Daily Activity Score 24   Functional Mobility   Sit to Stand Independent   Bed, Chair, Wheelchair Transfer Independent   Toilet Transfers Modified Independent   Tub / Shower Transfers Modified Independent   Comments demonstrated bathtub transfer using grab bars for support IND level (pt has 1 horizontal grab bar and 1 vertible grab  bar at home)   Short Term Goals   Short Term Goal # 1 FB dressing with Sup/SBA within 5 days   Goal Outcome # 1 Goal met   Short Term Goal # 2 Grooming at sink with SBA within 5 days   Goal Outcome # 2 Goal met   Short Term Goal # 3 Tolerate UIC 2/3 meals each day   Goal Outcome # 3 Goal met   Education Group   Role of Occupational Therapist Patient Response Patient;Family;Acceptance;Explanation;Verbal Demonstration   Anticipated Discharge Equipment and Recommendations   DC Equipment Recommendations None   Discharge Recommendations Recommend home health for continued occupational therapy services

## 2022-12-05 NOTE — CARE PLAN
The patient is Stable - Low risk of patient condition declining or worsening    Shift Goals  Clinical Goals: Mobilize  Patient Goals: Speak with dietary staff  Family Goals: N/A    Progress made toward(s) clinical / shift goals:    Problem: Self Care  Goal: Patient will have the ability to perform ADLs independently or with assistance (bathe, groom, dress, toilet and feed)  Outcome: Progressing     Problem: Knowledge Deficit - Standard  Goal: Patient and family/care givers will demonstrate understanding of plan of care, disease process/condition, diagnostic tests and medications  Outcome: Progressing  Note: Aware of SNF placement on 12/8.       Patient is not progressing towards the following goals:

## 2022-12-05 NOTE — CARE PLAN
The patient is Stable - Low risk of patient condition declining or worsening    Shift Goals  Clinical Goals: Diet education, IV dressing change  Patient Goals: manage pain  Family Goals: N/A    Progress made toward(s) clinical / shift goals:    Problem: Knowledge Deficit - Standard  Goal: Patient and family/care givers will demonstrate understanding of plan of care, disease process/condition, diagnostic tests and medications  Outcome: Progressing  Note: Patient understands plan of care and has no additional questions.      Problem: Pain - Standard  Goal: Alleviation of pain or a reduction in pain to the patient’s comfort goal  Outcome: Progressing  Note: Medications per MAR alleviate patient's pain. Patient able to verbalize need for pain management.        Patient is not progressing towards the following goals:NA

## 2022-12-06 LAB
GLUCOSE BLD STRIP.AUTO-MCNC: 187 MG/DL (ref 65–99)
GLUCOSE BLD STRIP.AUTO-MCNC: 207 MG/DL (ref 65–99)
GLUCOSE BLD STRIP.AUTO-MCNC: 242 MG/DL (ref 65–99)
GLUCOSE BLD STRIP.AUTO-MCNC: 274 MG/DL (ref 65–99)

## 2022-12-06 PROCEDURE — 99232 SBSQ HOSP IP/OBS MODERATE 35: CPT | Performed by: INTERNAL MEDICINE

## 2022-12-06 PROCEDURE — 700102 HCHG RX REV CODE 250 W/ 637 OVERRIDE(OP): Performed by: HOSPITALIST

## 2022-12-06 PROCEDURE — 82962 GLUCOSE BLOOD TEST: CPT | Mod: 91

## 2022-12-06 PROCEDURE — 94760 N-INVAS EAR/PLS OXIMETRY 1: CPT

## 2022-12-06 PROCEDURE — A9270 NON-COVERED ITEM OR SERVICE: HCPCS | Performed by: HOSPITALIST

## 2022-12-06 PROCEDURE — 770006 HCHG ROOM/CARE - MED/SURG/GYN SEMI*

## 2022-12-06 PROCEDURE — 700111 HCHG RX REV CODE 636 W/ 250 OVERRIDE (IP): Performed by: HOSPITALIST

## 2022-12-06 RX ADMIN — OXYCODONE 5 MG: 5 TABLET ORAL at 06:21

## 2022-12-06 RX ADMIN — INSULIN HUMAN 7 UNITS: 100 INJECTION, SOLUTION PARENTERAL at 11:25

## 2022-12-06 RX ADMIN — OXYCODONE 5 MG: 5 TABLET ORAL at 11:28

## 2022-12-06 RX ADMIN — PREGABALIN 75 MG: 75 CAPSULE ORAL at 16:40

## 2022-12-06 RX ADMIN — ATORVASTATIN CALCIUM 80 MG: 40 TABLET, FILM COATED ORAL at 19:42

## 2022-12-06 RX ADMIN — TRAZODONE HYDROCHLORIDE 150 MG: 50 TABLET ORAL at 19:42

## 2022-12-06 RX ADMIN — ENOXAPARIN SODIUM 40 MG: 40 INJECTION SUBCUTANEOUS at 16:40

## 2022-12-06 RX ADMIN — CLOPIDOGREL BISULFATE 75 MG: 75 TABLET ORAL at 06:21

## 2022-12-06 RX ADMIN — INSULIN HUMAN 4 UNITS: 100 INJECTION, SOLUTION PARENTERAL at 19:38

## 2022-12-06 RX ADMIN — OXYCODONE 5 MG: 5 TABLET ORAL at 16:40

## 2022-12-06 RX ADMIN — INSULIN HUMAN 3 UNITS: 100 INJECTION, SOLUTION PARENTERAL at 06:25

## 2022-12-06 RX ADMIN — SENNOSIDES AND DOCUSATE SODIUM 2 TABLET: 50; 8.6 TABLET ORAL at 06:21

## 2022-12-06 RX ADMIN — INSULIN HUMAN 4 UNITS: 100 INJECTION, SOLUTION PARENTERAL at 16:42

## 2022-12-06 RX ADMIN — OXYCODONE 5 MG: 5 TABLET ORAL at 01:47

## 2022-12-06 RX ADMIN — PREGABALIN 75 MG: 75 CAPSULE ORAL at 06:21

## 2022-12-06 ASSESSMENT — ENCOUNTER SYMPTOMS
FEVER: 0
CONSTIPATION: 0
BACK PAIN: 0
COUGH: 0
DIARRHEA: 0
HEADACHES: 0
SHORTNESS OF BREATH: 0
WEAKNESS: 0

## 2022-12-06 ASSESSMENT — PAIN DESCRIPTION - PAIN TYPE
TYPE: ACUTE PAIN;CHRONIC PAIN
TYPE: ACUTE PAIN
TYPE: ACUTE PAIN;CHRONIC PAIN
TYPE: ACUTE PAIN

## 2022-12-06 NOTE — CARE PLAN
The patient is Stable - Low risk of patient condition declining or worsening    Shift Goals  Clinical Goals: Mobilize, d/c placement  Patient Goals: Watch TV  Family Goals:     Progress made toward(s) clinical / shift goals:    Problem: Hemodynamic Monitoring  Goal: Patient's hemodynamics, fluid balance and neurologic status will be stable or improve  Outcome: Progressing     Problem: Urinary Elimination  Goal: Establish and maintain regular urinary output  Outcome: Progressing     Problem: Bowel Elimination  Goal: Establish and maintain regular bowel function  Outcome: Progressing       Patient is not progressing towards the following goals:

## 2022-12-06 NOTE — PROGRESS NOTES
Hospital Medicine Daily Progress Note    Date of Service  12/6/2022    Chief Complaint  Julisa Josue is a 62 y.o. female admitted 11/28/2022 with   Chief Complaint   Patient presents with    ALOC    High Blood Sugar     [facility] from [facility].  Per report, last seen well during thanksgiving.  Dtr checked on her and pt non verbal, taken to hospital and found to have glucose levels >1000.  Pt treated x2 days, Non-contrast CT reported no ICH.  Transferred to [facility] for a higher level of care.  Upon admit to ED, , insulin at 1unit/hr, D51/2 NS at 250ml/hr.  RA.  Pt responds to name and awake, but non verbal and does not follow command.      Hospital Course  This is 62-year-old female with a past medical significant for type 2 diabetes mellitus with hemoglobin A1c of 12.5 presented to ER on 11/20/2020 with altered mental status.    Upon presentation in ER, patient was encephalopathic; she was recently seen at [facility] for hyperosmolar coma with a glucose level >1000.    During the stay in the hospital, she was aggressively hydrated, her hemoglobin A1c is noted to be 12.5, she was started on insulin sliding scale, hypoglycemic to goal, Accu-Cheks ACH S.  Nursing to provide diabetic education.    Her encephalopathy continue to need to improve, currently patient is alert and oriented; MRI brain did not show any acute abnormality, chronic lacunar infarct in the right cerebellum    CTA showed severely hypoplastic left vertebral artery; the proximal and mid left vertebral artery likely occluded; CTA of the head unremarkable.  Echocardiogram showed EF of 40 to 45%, LVH    Patient is found to be COVID-19 positive on 11/20, currently saturating well on room air    During the  stay in the hospital, PT/OT has evaluate the patient, recommended home health.  It is noted that patient has poor insight considering her hemoglobin A1c of 12.5 and admission to [facility] for hyperosmolar, she is not safe for  the patient to be discharged home without supervision.  Considering patient insurance being Medicaid fee-for-service, unable to provide home health.  Discussed with case management, plan to discharge patient to skilled nursing facility.    Daughter Elina (daughter) lost her son 2 months ago and has been difficult.  She is unable to care for her mother and her two other children. Patient will return to her apartment at [place], after [facility] in [place].      Interval Problem Update  Patient had no acute complaints today    I have discussed this patient's plan of care and discharge plan at IDT rounds today with Case Management, Nursing, Nursing leadership, and other members of the IDT team.    Consultants/Specialty  none    Code Status  Full Code    Disposition  Patient is medically cleared for discharge.   Anticipate discharge to to skilled nursing facility.  Pending COVID clearance on 12/08 for placement to [facility].  I have placed the appropriate orders for post-discharge needs.    Review of Systems  Review of Systems   Constitutional:  Negative for fever and malaise/fatigue.   Respiratory:  Negative for cough and shortness of breath.    Gastrointestinal:  Negative for constipation and diarrhea.   Musculoskeletal:  Negative for back pain and joint pain.   Neurological:  Negative for weakness and headaches.   All other systems reviewed and are negative.     Physical Exam  Temp:  [36.6 °C (97.9 °F)-36.8 °C (98.3 °F)] 36.7 °C (98 °F)  Pulse:  [82-94] 87  Resp:  [18] 18  BP: (108-129)/(47-68) 110/68  SpO2:  [96 %-97 %] 96 %    Physical Exam  Vitals and nursing note reviewed.   Constitutional:       General: She is not in acute distress.     Appearance: Normal appearance. She is not ill-appearing.   Eyes:      General: No scleral icterus.     Extraocular Movements: Extraocular movements intact.   Cardiovascular:      Rate and Rhythm: Normal rate.      Pulses: Normal pulses.      Heart sounds:  Normal heart sounds. No murmur heard.  Pulmonary:      Effort: Pulmonary effort is normal. No respiratory distress.      Breath sounds: Normal breath sounds.   Abdominal:      General: Abdomen is flat. Bowel sounds are normal. There is no distension.      Palpations: Abdomen is soft.   Musculoskeletal:         General: No swelling or tenderness. Normal range of motion.      Cervical back: Normal range of motion and neck supple.   Skin:     General: Skin is warm.      Capillary Refill: Capillary refill takes less than 2 seconds.      Coloration: Skin is not jaundiced.      Findings: No erythema.   Neurological:      Mental Status: She is alert and oriented to person, place, and time. Mental status is at baseline.      Motor: No weakness.   Psychiatric:         Mood and Affect: Mood normal.         Behavior: Behavior normal.         Thought Content: Thought content normal.         Judgment: Judgment normal.       Fluids    Intake/Output Summary (Last 24 hours) at 12/6/2022 1558  Last data filed at 12/6/2022 1400  Gross per 24 hour   Intake 360 ml   Output 400 ml   Net -40 ml         Laboratory                        Imaging  EC-ECHOCARDIOGRAM LTD W/ CONT   Final Result      EC-ECHOCARDIOGRAM COMPLETE W/O CONT   Final Result      CT-CTA HEAD WITH & W/O-POST PROCESS   Final Result      CT angiogram of the [place] within normal limits.      Occlusion of left vertebral artery as noted on MRA.      CT-CTA NECK WITH & W/O-POST PROCESSING   Final Result         1.  Severely hypoplastic left vertebral artery, the proximal and mid left vertebral artery are not visualized and likely occluded.      These findings were discussed with the patient's clinician, Dr. Stokes, on 11/28/2022 8:48 PM.         MR-MRA NECK-W/O   Final Result      1.  Nonvisualization of the left vertebral artery likely representing age indeterminate occlusion. There is dominant right vertebral artery continues as a basilar artery.   2.  The  remaining cervical blood vessels are unremarkable.      MR-BRAIN-W/O   Final Result      1.  No acute abnormality.   2.  Chronic lacunar infarcts in the right cerebellum.   3.  Minimal chronic microvascular ischemic disease.   4.  Flow void of the left vertebral artery is not seen. This may represent age indeterminate occlusion.      MR-MRA HEAD-W/O   Final Result      1.  No flow identified within the visualized portions of the left vertebral artery      2.  This could indicate a dissection.      3.  Further assessment with CTA neck is recommended      4.  Anatomic variant carotid origin of the right posterior cerebral artery      CT-HEAD W/O   Final Result      No evidence of acute intracranial process.         [id number] VIEW   Final Result      No supine evidence of acute abdomen/pelvis abnormality.      DX-CHEST-PORTABLE (1 VIEW)   Final Result      Mild cardiac enlargement poststernotomy      No evidence of edema           Assessment/Plan  * Acute encephalopathy- (present on admission)  Assessment & Plan  Metabolic encephalopathy secondary to dehydration and hyperglycemia  CT of the head and neck showed occluded left vertebral artery, MRI of the brain showed old infarct   -- Currently patient is at her baseline, does have some cognitive deficit and has difficulty understanding how to manage insulin.  Nursing staff to provide education    Debility  Assessment & Plan  Pt and ot following   -rec placement, pending transfer to SNF 12/8    Called patient's Daughter Elina. Daughter Elina lost her son 2 months ago and has been difficult.  She is unable to care for her mother and her two other children. Patient will return to her apartment at [place], after [facility] in [place].    Cognitive deficits  Assessment & Plan  Mild    Elevated troponin  Assessment & Plan  Likely demand ischemia  Continue Plavix statin     Echo poor quality reordered with contrast  Troponin trended down      CAD  (coronary artery disease)  Assessment & Plan  Continue medical therapy with atorvastatin Plavix  Denies any chest pain    Dyslipidemia  Assessment & Plan  Continue atorvastatin    COVID-19 virus infection- (present on admission)  Assessment & Plan  Patient is currently asymptomatic  Reports she was diagnosed with COVID about 1 month ago   -- repeat covid on 11/28: +ve    No medication indicated, supportive treatment    Type 2 diabetes mellitus with neurologic complication, with long-term current use of insulin (HCC)- (present on admission)  Assessment & Plan  Hyper osmolar hyperglycemic coma on presentation  Hemoglobin A1c 12.5 milligrams  --Increase Lantus   - continue sliding scale insulin monitor CBGs  --Diabetic education    Discussed extensively with the nursing staff in regards to providing education, provide education and-diet, how to inject  insulin, CHECK fingerstick.         VTE prophylaxis: enoxaparin ppx using Plavix    I have performed a physical exam and reviewed and updated ROS and Plan today (12/6/2022). In review of yesterday's note (12/5/2022), there are no changes except as documented above.

## 2022-12-06 NOTE — DISCHARGE PLANNING
Case Management Discharge Planning    Admission Date: 11/28/2022  GMLOS: 4.6  ALOS: 8    6-Clicks ADL Score: 24  6-Clicks Mobility Score: 24      Anticipated Discharge Dispo: Discharge Disposition: D/T to SNF with Medicare cert in anticipation of skilled care (03)    DME Needed: No    Action(s) Taken: RNCM completed transport communication form and faxed to [facility] for transportation to [place] on 12/8.     Escalations Completed: None    Medically Clear: Yes    Next Steps: RNCM will follow up with Valdo and Estefanía one transportation is confirmed.     Barriers to Discharge: Transportation    Is the patient up for discharge tomorrow: Patient will discharge on 12/8.       1042 RNCM received notification that [facility] has scheduled transport for 12/8 between 3333-2165. RNCM will follow up with Carlyle blackwood.     1127 RNCM followed up with Carlyle Ferreira regarding transportation time. They are ok with the [id number] time. PASRR and SSN requested. RNCM faxed information to admissions.

## 2022-12-06 NOTE — PROGRESS NOTES
Received report from OJ Pichardo. Patient care assumed and assessment completed. Patient requested help utilizing hospital iPad. Patient has agreed to walk to the bathroom instead of utilizing containment device at night to improve mobility and strength. Fall education provided. Patient has no additional needs or questions at this time.

## 2022-12-06 NOTE — DIETARY
Nutrition Services: DM Diet Education Consult   Day 8 of admit.  Julisa Josue is a 62 y.o. female with admitting DX of Acute encephalopathy [G93.40]    RD able to visit pt on the phone to offer DM diet education. Pt said that she has had DM dx x 20 to 25 years. She has received ed in the past and is familiar with the diet. She admitted that she is sometimes not compliant with the diet. She did not want diet ed.     Pt with complaints about her meals. She is currently on a Level 6-soft and bite sized diet w/ Level 0 - thin liquids. She has been ordering food from [employer]. Current diet order restricts bread and intact meats. It is ordered for pt's w/ dx of dysphagia usually. Per SLP note on 11/29, pt passed RN dysphagia screen on 11/29. Nurse said that nursing ordered current diet because pt is missing many teeth. Nurse agreed to order Level 7-Easy to Chew, consistent CHO (diabetic) diet which is appropriate for pt's with poor dentition.     No other education needs identified at this time.    Please re-consult RD as indicated.

## 2022-12-06 NOTE — DISCHARGE PLANNING
DC Transport Scheduled    Received request at: 12/6/2022 AT [employer]    Transport Company Scheduled:  [employer]  Spoke with CLEMENCIA at [facility] to schedule transport.  [facility] Trip #: [id number]     Scheduled Date: 12/8/2022  Scheduled Time: [id number]    Destination: [place] [facility] [address] [facility]    Notified care team of scheduled transport via [employer].     If there are any changes needed to the DC transportation scheduled, please contact [facility] at ext. [phone] between the hours of 0160-9557 Mon-Fri. If outside those hours, contact the ED Case Manager at ext. [phone].

## 2022-12-06 NOTE — CARE PLAN
The patient is Stable - Low risk of patient condition declining or worsening    Shift Goals  Clinical Goals: No periwick, mobilize to bathroom  Patient Goals: rest  Family Goals: N/A    Progress made toward(s) clinical / shift goals:    Problem: Knowledge Deficit - Standard  Goal: Patient and family/care givers will demonstrate understanding of plan of care, disease process/condition, diagnostic tests and medications  Outcome: Progressing  Note: Increasing mobility discussed with patient.      Problem: Fall Risk  Goal: Patient will remain free from falls  Outcome: Progressing       Patient is not progressing towards the following goals:NA

## 2022-12-07 LAB
GLUCOSE BLD STRIP.AUTO-MCNC: 208 MG/DL (ref 65–99)
GLUCOSE BLD STRIP.AUTO-MCNC: 222 MG/DL (ref 65–99)
GLUCOSE BLD STRIP.AUTO-MCNC: 235 MG/DL (ref 65–99)
GLUCOSE BLD STRIP.AUTO-MCNC: 277 MG/DL (ref 65–99)
GLUCOSE BLD STRIP.AUTO-MCNC: 286 MG/DL (ref 65–99)

## 2022-12-07 PROCEDURE — 94760 N-INVAS EAR/PLS OXIMETRY 1: CPT

## 2022-12-07 PROCEDURE — A9270 NON-COVERED ITEM OR SERVICE: HCPCS | Performed by: HOSPITALIST

## 2022-12-07 PROCEDURE — 700102 HCHG RX REV CODE 250 W/ 637 OVERRIDE(OP): Performed by: HOSPITALIST

## 2022-12-07 PROCEDURE — 770006 HCHG ROOM/CARE - MED/SURG/GYN SEMI*

## 2022-12-07 PROCEDURE — 700111 HCHG RX REV CODE 636 W/ 250 OVERRIDE (IP): Performed by: HOSPITALIST

## 2022-12-07 PROCEDURE — 82962 GLUCOSE BLOOD TEST: CPT | Mod: 91

## 2022-12-07 PROCEDURE — 99231 SBSQ HOSP IP/OBS SF/LOW 25: CPT | Performed by: INTERNAL MEDICINE

## 2022-12-07 RX ADMIN — OXYCODONE 5 MG: 5 TABLET ORAL at 17:50

## 2022-12-07 RX ADMIN — OXYCODONE 5 MG: 5 TABLET ORAL at 09:25

## 2022-12-07 RX ADMIN — PREGABALIN 75 MG: 75 CAPSULE ORAL at 17:51

## 2022-12-07 RX ADMIN — OXYCODONE 5 MG: 5 TABLET ORAL at 23:53

## 2022-12-07 RX ADMIN — CLOPIDOGREL BISULFATE 75 MG: 75 TABLET ORAL at 05:31

## 2022-12-07 RX ADMIN — ATORVASTATIN CALCIUM 80 MG: 40 TABLET, FILM COATED ORAL at 20:48

## 2022-12-07 RX ADMIN — INSULIN HUMAN 4 UNITS: 100 INJECTION, SOLUTION PARENTERAL at 09:21

## 2022-12-07 RX ADMIN — OXYCODONE 5 MG: 5 TABLET ORAL at 00:43

## 2022-12-07 RX ADMIN — TRAZODONE HYDROCHLORIDE 150 MG: 50 TABLET ORAL at 20:48

## 2022-12-07 RX ADMIN — ENOXAPARIN SODIUM 40 MG: 40 INJECTION SUBCUTANEOUS at 17:49

## 2022-12-07 RX ADMIN — INSULIN HUMAN 7 UNITS: 100 INJECTION, SOLUTION PARENTERAL at 20:53

## 2022-12-07 RX ADMIN — PREGABALIN 75 MG: 75 CAPSULE ORAL at 05:31

## 2022-12-07 RX ADMIN — INSULIN HUMAN 4 UNITS: 100 INJECTION, SOLUTION PARENTERAL at 12:23

## 2022-12-07 RX ADMIN — INSULIN HUMAN 4 UNITS: 100 INJECTION, SOLUTION PARENTERAL at 17:46

## 2022-12-07 RX ADMIN — OXYCODONE 5 MG: 5 TABLET ORAL at 05:38

## 2022-12-07 ASSESSMENT — PAIN DESCRIPTION - PAIN TYPE
TYPE: ACUTE PAIN;CHRONIC PAIN

## 2022-12-07 ASSESSMENT — PAIN SCALES - WONG BAKER: WONGBAKER_NUMERICALRESPONSE: DOESN'T HURT AT ALL

## 2022-12-07 ASSESSMENT — ENCOUNTER SYMPTOMS
BACK PAIN: 0
DIZZINESS: 0
FEVER: 0
WEAKNESS: 0
CHILLS: 0

## 2022-12-07 NOTE — CARE PLAN
The patient is Stable - Low risk of patient condition declining or worsening    Shift Goals  Clinical Goals: Mobilize, d/c placement  Patient Goals: Watch TV  Family Goals: N/A    Progress made toward(s) clinical / shift goals:    Problem: Psychosocial - Patient Condition  Goal: Patient's ability to verbalize feelings about condition will improve  Outcome: Progressing     Problem: Respiratory - Stroke Patient  Goal: Patient will achieve/maintain optimum respiratory rate/effort  Outcome: Progressing     Problem: Fall Risk  Goal: Patient will remain free from falls  Outcome: Progressing

## 2022-12-08 VITALS
HEIGHT: 65 IN | SYSTOLIC BLOOD PRESSURE: 105 MMHG | BODY MASS INDEX: 36 KG/M2 | TEMPERATURE: 98 F | DIASTOLIC BLOOD PRESSURE: 42 MMHG | RESPIRATION RATE: 18 BRPM | WEIGHT: 216.05 LBS | OXYGEN SATURATION: 95 % | HEART RATE: 80 BPM

## 2022-12-08 PROBLEM — R53.81 DEBILITY: Status: RESOLVED | Noted: 2022-12-02 | Resolved: 2022-12-08

## 2022-12-08 PROBLEM — G93.40 ACUTE ENCEPHALOPATHY: Status: RESOLVED | Noted: 2022-11-28 | Resolved: 2022-12-08

## 2022-12-08 PROBLEM — U07.1 COVID-19 VIRUS INFECTION: Status: RESOLVED | Noted: 2022-11-28 | Resolved: 2022-12-08

## 2022-12-08 PROBLEM — R79.89 ELEVATED TROPONIN: Status: RESOLVED | Noted: 2022-11-29 | Resolved: 2022-12-08

## 2022-12-08 PROBLEM — Z78.9 IMPAIRED INSTRUMENTAL ACTIVITIES OF DAILY LIVING (IADL): Status: ACTIVE | Noted: 2022-12-08

## 2022-12-08 LAB — GLUCOSE BLD STRIP.AUTO-MCNC: 203 MG/DL (ref 65–99)

## 2022-12-08 PROCEDURE — 99239 HOSP IP/OBS DSCHRG MGMT >30: CPT | Performed by: INTERNAL MEDICINE

## 2022-12-08 PROCEDURE — 82962 GLUCOSE BLOOD TEST: CPT

## 2022-12-08 PROCEDURE — A9270 NON-COVERED ITEM OR SERVICE: HCPCS | Performed by: HOSPITALIST

## 2022-12-08 PROCEDURE — 700102 HCHG RX REV CODE 250 W/ 637 OVERRIDE(OP): Performed by: HOSPITALIST

## 2022-12-08 RX ORDER — PREGABALIN 75 MG/1
75 CAPSULE ORAL 2 TIMES DAILY
Qty: 60 CAPSULE | Refills: 3 | Status: SHIPPED | OUTPATIENT
Start: 2022-12-08 | End: 2022-12-08 | Stop reason: SDUPTHER

## 2022-12-08 RX ORDER — TRAZODONE HYDROCHLORIDE 150 MG/1
150 TABLET ORAL NIGHTLY
Qty: 30 TABLET | Refills: 3 | Status: SHIPPED | OUTPATIENT
Start: 2022-12-08 | End: 2022-12-08 | Stop reason: SDUPTHER

## 2022-12-08 RX ORDER — PREGABALIN 75 MG/1
75 CAPSULE ORAL 2 TIMES DAILY
Qty: 60 CAPSULE | Refills: 3 | Status: SHIPPED | OUTPATIENT
Start: 2022-12-08 | End: 2023-01-07

## 2022-12-08 RX ORDER — OXYCODONE HYDROCHLORIDE 5 MG/1
5 TABLET ORAL EVERY 8 HOURS PRN
Qty: 15 TABLET | Refills: 0 | Status: SHIPPED | OUTPATIENT
Start: 2022-12-08 | End: 2022-12-13

## 2022-12-08 RX ORDER — TRAZODONE HYDROCHLORIDE 150 MG/1
150 TABLET ORAL NIGHTLY
Qty: 30 TABLET | Refills: 3 | Status: ON HOLD | OUTPATIENT
Start: 2022-12-08 | End: 2023-04-04

## 2022-12-08 RX ADMIN — CLOPIDOGREL BISULFATE 75 MG: 75 TABLET ORAL at 05:40

## 2022-12-08 RX ADMIN — OXYCODONE 5 MG: 5 TABLET ORAL at 05:48

## 2022-12-08 RX ADMIN — PREGABALIN 75 MG: 75 CAPSULE ORAL at 05:40

## 2022-12-08 ASSESSMENT — PAIN DESCRIPTION - PAIN TYPE
TYPE: ACUTE PAIN;CHRONIC PAIN
TYPE: ACUTE PAIN
TYPE: ACUTE PAIN;CHRONIC PAIN
TYPE: ACUTE PAIN;CHRONIC PAIN

## 2022-12-08 NOTE — PROGRESS NOTES
"0900 attempted to call report to [facility]. \"All nurses are busy with med pass\".    0950 attempted to call report to [facility]. Phone kept ringing with no voicemail option.     1009 Report given to OJ Hudson at [facility]  "

## 2022-12-08 NOTE — DISCHARGE PLANNING
Case Management Discharge Planning    Admission Date: 11/28/2022  GMLOS: 4.6  ALOS: 10    6-Clicks ADL Score: 24  6-Clicks Mobility Score: 24      Anticipated Discharge Dispo: Discharge Disposition: D/T to SNF with Medicare cert in anticipation of skilled care (03)      Action(s) Taken: RNCM completed COBRA and transfer packed and placed on patients chart. Bedside RN notified.     Escalations Completed: None    Medically Clear: Yes    Next Steps: RNCM to be available for any discharge planning needs.     Barriers to Discharge: None    Is the patient up for discharge tomorrow: Patient is discharging to [facility] today.

## 2022-12-08 NOTE — PROGRESS NOTES
[facility] Medicine Daily Progress Note    Date of Service  12/7/2022    Chief Complaint  Julisa Josue is a 62 y.o. female admitted 11/28/2022 with   Chief Complaint   Patient presents with    ALOC    High Blood Sugar     [facility] from [facility].  Per report, last seen well during thanksgiving.  Dtr checked on her and pt non verbal, taken to hospital and found to have glucose levels >1000.  Pt treated x2 days, Non-contrast CT reported no ICH.  Transferred to [facility] for a higher level of care.  Upon admit to ED, , insulin at 1unit/hr, D51/2 NS at 250ml/hr.  RA.  Pt responds to name and awake, but non verbal and does not follow command.      Hospital Course  This is 62-year-old female with a past medical significant for type 2 diabetes mellitus with hemoglobin A1c of 12.5 presented to ER on 11/20/2020 with altered mental status.    Upon presentation in ER, patient was encephalopathic; she was recently seen at [place] for hyperosmolar coma with a glucose level >1000.    During the stay in the hospital, she was aggressively hydrated, her hemoglobin A1c is noted to be 12.5, she was started on insulin sliding scale, hypoglycemic to goal, Accu-Cheks ACH S.  Nursing to provide diabetic education.    Her encephalopathy continue to need to improve, currently patient is alert and oriented; MRI brain did not show any acute abnormality, chronic lacunar infarct in the right cerebellum    CTA showed severely hypoplastic left vertebral artery; the proximal and mid left vertebral artery likely occluded; CTA of the head unremarkable.  Echocardiogram showed EF of 40 to 45%, LVH    Patient is found to be COVID-19 positive on 11/20, currently saturating well on room air    During the  stay in the hospital, PT/OT has evaluate the patient, recommended home health.  It is noted that patient has poor insight considering her hemoglobin A1c of 12.5 and admission to [facility] for hyperosmolar, she is not safe for  the patient to be discharged home without supervision.  Considering patient insurance being Medicaid fee-for-service, unable to provide home health.  Discussed with case management, plan to discharge patient to skilled nursing facility.    Daughter Elina (daughter) lost her son 2 months ago and has been difficult.  She is unable to care for her mother and her two other children. Patient will return to her apartment at [place], after [facility] in [place].      Interval Problem Update  Patient had no acute complaints today. She stated she is excited for tomorrow.  Transport around 9-10AM on 12/8    I have discussed this patient's plan of care and discharge plan at IDT rounds today with Case Management, Nursing, Nursing leadership, and other members of the IDT team.    Consultants/Specialty  none    Code Status  Full Code    Disposition  Patient is medically cleared for discharge.   Anticipate discharge to to skilled nursing facility.  Pending COVID clearance on 12/08 for placement to [facility].  I have placed the appropriate orders for post-discharge needs.    Review of Systems  Review of Systems   Constitutional:  Negative for chills and fever.   Musculoskeletal:  Negative for back pain and joint pain.   Neurological:  Negative for dizziness and weakness.   All other systems reviewed and are negative.     Physical Exam  Temp:  [36.7 °C (98 °F)-37.1 °C (98.8 °F)] 36.8 °C (98.3 °F)  Pulse:  [80-96] 96  Resp:  [16-18] 18  BP: (112-149)/(47-62) 112/60  SpO2:  [91 %-99 %] 95 %    Physical Exam  Vitals and nursing note reviewed.   Constitutional:       General: She is not in acute distress.     Appearance: Normal appearance. She is not ill-appearing.   Eyes:      General: No scleral icterus.     Extraocular Movements: Extraocular movements intact.   Cardiovascular:      Rate and Rhythm: Normal rate.      Pulses: Normal pulses.      Heart sounds: Normal heart sounds. No murmur heard.  Pulmonary:       Effort: Pulmonary effort is normal. No respiratory distress.      Breath sounds: Normal breath sounds.   Abdominal:      General: Abdomen is flat. Bowel sounds are normal. There is no distension.      Palpations: Abdomen is soft.   Musculoskeletal:         General: No swelling or tenderness. Normal range of motion.      Cervical back: Normal range of motion and neck supple.   Skin:     General: Skin is warm.      Capillary Refill: Capillary refill takes less than 2 seconds.      Coloration: Skin is not jaundiced.      Findings: No erythema.   Neurological:      Mental Status: She is alert and oriented to person, place, and time. Mental status is at baseline.      Motor: No weakness.   Psychiatric:         Mood and Affect: Mood normal.         Behavior: Behavior normal.         Thought Content: Thought content normal.         Judgment: Judgment normal.       Fluids    Intake/Output Summary (Last 24 hours) at 12/7/2022 1651  Last data filed at 12/7/2022 0927  Gross per 24 hour   Intake 480 ml   Output --   Net 480 ml         Laboratory                        Imaging  EC-ECHOCARDIOGRAM LTD W/ CONT   Final Result      EC-ECHOCARDIOGRAM COMPLETE W/O CONT   Final Result      CT-CTA HEAD WITH & W/O-POST PROCESS   Final Result      CT angiogram of the [place] within normal limits.      Occlusion of left vertebral artery as noted on MRA.      CT-CTA NECK WITH & W/O-POST PROCESSING   Final Result         1.  Severely hypoplastic left vertebral artery, the proximal and mid left vertebral artery are not visualized and likely occluded.      These findings were discussed with the patient's clinician, Dr. Stokes, on 11/28/2022 8:48 PM.         MR-MRA NECK-W/O   Final Result      1.  Nonvisualization of the left vertebral artery likely representing age indeterminate occlusion. There is dominant right vertebral artery continues as a basilar artery.   2.  The remaining cervical blood vessels are unremarkable.       MR-BRAIN-W/O   Final Result      1.  No acute abnormality.   2.  Chronic lacunar infarcts in the right cerebellum.   3.  Minimal chronic microvascular ischemic disease.   4.  Flow void of the left vertebral artery is not seen. This may represent age indeterminate occlusion.      MR-MRA HEAD-W/O   Final Result      1.  No flow identified within the visualized portions of the left vertebral artery      2.  This could indicate a dissection.      3.  Further assessment with CTA neck is recommended      4.  Anatomic variant carotid origin of the right posterior cerebral artery      CT-HEAD W/O   Final Result      No evidence of acute intracranial process.         [id number] VIEW   Final Result      No supine evidence of acute abdomen/pelvis abnormality.      DX-CHEST-PORTABLE (1 VIEW)   Final Result      Mild cardiac enlargement poststernotomy      No evidence of edema           Assessment/Plan  * Acute encephalopathy- (present on admission)  Assessment & Plan  Metabolic encephalopathy secondary to dehydration and hyperglycemia  CT of the head and neck showed occluded left vertebral artery, MRI of the brain showed old infarct   -- Currently patient is at her baseline, does have some cognitive deficit and has difficulty understanding how to manage insulin.  Nursing staff to provide education    Debility  Assessment & Plan  Pt and ot following   -rec placement, pending transfer to SNF 12/8    Called patient's Daughter Elina. Daughter Elina lost her son 2 months ago and has been difficult.  She is unable to care for her mother and her two other children. Patient will return to her apartment at [place], after [facility] in [place].    Cognitive deficits  Assessment & Plan  Mild    Elevated troponin  Assessment & Plan  Likely demand ischemia  Continue Plavix statin     Echo poor quality reordered with contrast  Troponin trended down      CAD (coronary artery disease)  Assessment & Plan  Continue medical  therapy with atorvastatin Plavix  Denies any chest pain    Dyslipidemia  Assessment & Plan  Continue atorvastatin    COVID-19 virus infection- (present on admission)  Assessment & Plan  Patient is currently asymptomatic  Reports she was diagnosed with COVID about 1 month ago   -- repeat covid on 11/28: +ve    No medication indicated, supportive treatment    Type 2 diabetes mellitus with neurologic complication, with long-term current use of insulin (HCC)- (present on admission)  Assessment & Plan  Hyper osmolar hyperglycemic coma on presentation  Hemoglobin A1c 12.5 milligrams  --Increase Lantus   - continue sliding scale insulin monitor CBGs  --Diabetic education    Discussed extensively with the nursing staff in regards to providing education, provide education and-diet, how to inject  insulin, CHECK fingerstick.         VTE prophylaxis: enoxaparin ppx using Plavix    I have performed a physical exam and reviewed and updated ROS and Plan today (12/7/2022). In review of yesterday's note (12/6/2022), there are no changes except as documented above.

## 2022-12-08 NOTE — DISCHARGE SUMMARY
Discharge Summary    CHIEF COMPLAINT ON ADMISSION  Chief Complaint   Patient presents with    ALOC    High Blood Sugar     [facility] from [facility].  Per report, last seen well during thanksgiving.  Dtr checked on her and pt non verbal, taken to hospital and found to have glucose levels >1000.  Pt treated x2 days, Non-contrast CT reported no ICH.  Transferred to [facility] for a higher level of care.  Upon admit to ED, , insulin at 1unit/hr, D51/2 NS at 250ml/hr.  RA.  Pt responds to name and awake, but non verbal and does not follow command.        Reason for Admission  EMS    Admission Date  11/28/2022     Discharge Date  12/08/2022    CODE STATUS  Full Code    HPI & HOSPITAL COURSE  This is a This is 62-year-old female with a past medical significant for type 2 diabetes mellitus with hemoglobin A1c of 12.5 presented to ER on 11/20/2020 with altered mental status.    Upon presentation in ER, patient was encephalopathic; she was recently seen at [facility] for hyperosmolar coma with a glucose level >1000.    During the stay in the hospital, she was aggressively hydrated, her hemoglobin A1c is noted to be 12.5, she was started on insulin sliding scale, hypoglycemic to goal, Accu-Cheks ACH S.  Nursing to provide diabetic education.    Her encephalopathy continue to need to improve, currently patient is alert and oriented; MRI brain did not show any acute abnormality, chronic lacunar infarct in the right cerebellum    CTA showed severely hypoplastic left vertebral artery; the proximal and mid left vertebral artery likely occluded; CTA of the head unremarkable.  Echocardiogram showed EF of 40 to 45%, LVH    Patient is found to be COVID-19 positive on 11/20, currently saturating well on room air, was truly asymptomatic and remained asymptomatic. She remained in hospital and officially cleared from COVID-19 prior to discharging.    During the  stay in the hospital, PT/OT has evaluate the patient,  recommended home health.  It is noted that patient has poor insight considering her hemoglobin A1c of 12.5 and admission to [facility] for hyperosmolar, she is not safe for the patient to be discharged home without supervision.  Considering patient insurance being Medicaid fee-for-service, unable to provide home health.  Discussed with case management, plan to discharge patient to skilled nursing facility.    Daughter Elina (daughter) lost her son 2 months ago and has been difficult.  She is unable to care for her mother and her two other children. Patient will return to her apartment at [place], after [facility] in [place].  Her daughter will be able to help her more then.    For patient's diabetes, we have increased her metformin and continued Januvia. Glargine was increased to total 20 units per day. At this time, she will need ongoing teaching to use a Glargine pen, to make compliance easier.  If this is not possible, attempt to trial oral medications only and if possible recheck A1c prior to discharging from [facility].  Would consider adding Actos. Sulfonylureas may be risk hypoglycemia for patient but may help to remain on oral medications only.  Discontinued Victoza as patient has had issues with HHOS and not currently taking. She may qualify for oral SGLT2 inhibitors if she was able to obtain Victoza.    Therefore, she is discharged in good and stable condition to skilled nursing facility.    The patient met 2-midnight criteria for an inpatient stay at the time of discharge.      FOLLOW UP ITEMS POST DISCHARGE  12/08/2022    DISCHARGE DIAGNOSES  Principal Problem (Resolved):    Acute encephalopathy POA: Yes  Active Problems:    Type 2 diabetes mellitus with neurologic complication, with long-term current use of insulin (HCC) POA: Yes    Dyslipidemia POA: Unknown    CAD (coronary artery disease) POA: Unknown    Cognitive deficits POA: Unknown    Impaired instrumental  activities of daily living (IADL) POA: Yes  Resolved Problems:    COVID-19 virus infection POA: Yes    Hypernatremia POA: Yes    Elevated troponin POA: Unknown    Hypokalemia POA: Unknown    Debility POA: Unknown      FOLLOW UP  No future appointments.  [facility]  [address] [id number] [phone]        MEDICATIONS ON DISCHARGE     Medication List        START taking these medications        Instructions   insulin GLARGINE 100 UNIT/ML Soln  Commonly known as: Lantus,Semglee   Inject 20 Units under the skin 2 times a day.  Dose: 20 Units     oxyCODONE immediate-release 5 MG Tabs  Commonly known as: ROXICODONE   Take 1 Tablet by mouth every 8 hours as needed for Severe Pain for up to 5 days.  Dose: 5 mg            CHANGE how you take these medications        Instructions   metformin 1000 MG tablet  What changed:   medication strength  how much to take  Commonly known as: GLUCOPHAGE   Take 1 Tablet by mouth 2 times a day with meals for 30 days.  Dose: 1,000 mg     pregabalin 75 MG Caps  What changed:   medication strength  how much to take  when to take this  Commonly known as: LYRICA   Take 1 Capsule by mouth 2 times a day for 30 days.  Dose: 75 mg            CONTINUE taking these medications        Instructions   atorvastatin 10 MG Tabs  Commonly known as: LIPITOR   Take  by mouth every evening. Unsure of dose     clopidogrel or PLACEBO 75 mg Tabs  Commonly known as: STUDY DRUG   Take 75 mg by mouth every day.  Dose: 75 mg     SITagliptin 50 MG Tabs  Commonly known as: JANUVIA   Take 50 mg by mouth every day.  Dose: 50 mg     traZODone 150 MG Tabs  Commonly known as: DESYREL   Take 1 Tablet by mouth every evening.  Dose: 150 mg            STOP taking these medications      traMADol 50 MG Tabs  Commonly known as: Ultram     VICTOZA SC              Allergies  No Known Allergies    DIET  Orders Placed This Encounter   Procedures    Diet Order Diet: Level 7 - Easy to Chew; Liquid  level: Level 0 - Thin; Second Modifier: (optional): Consistent CHO (Diabetic)     Standing Status:   Standing     Number of Occurrences:   1     Order Specific Question:   Diet:     Answer:   Level 7 - Easy to Chew [22]     Order Specific Question:   Liquid level     Answer:   Level 0 - Thin     Order Specific Question:   Second Modifier: (optional)     Answer:   Consistent CHO (Diabetic) [4]       ACTIVITY  As tolerated.  Weight bearing as tolerated    LINES, DRAINS, AND WOUNDS  This is an automated list. Peripheral IVs will be removed prior to discharge.       Wound 11/29/22 Abrasion Pretibial Anterior Right scabs, healing wound (Active)   Wound Image   11/29/22 1130   Site Assessment Clean;Dry;Pink;Red;Fragile;Healing ridge 12/07/22 2000   Periwound Assessment Clean;Dry;Intact 12/07/22 2000   Margins Defined edges 12/07/22 2000   Closure Open to air 12/06/22 0800   Drainage Amount None 12/06/22 0800   Dressing Status Open to Air 12/07/22 0845       Wound 11/29/22 Abrasion Pretibial Anterior Left scabs, healing wounds (Active)   Wound Image   11/29/22 1130   Site Assessment Dry;Intact;Pink;Red;Healing ridge 12/07/22 2000   Periwound Assessment Clean;Dry;Intact 12/07/22 2000   Margins Defined edges 12/07/22 2000   Closure Open to air 12/05/22 2124   Drainage Amount None 12/05/22 2124   Dressing Status Open to Air 12/07/22 0845       Wound 11/29/22 Pressure Injury Buttocks;Coccyx Posterior;Medial;Mid Bilateral areas of non-blanching redness with purplish areas, intact (Active)   Wound Image   11/29/22 1130   Site Assessment Dry;Intact;Pink;Purple;Red 12/07/22 2000   Periwound Assessment Pink;Red;Dry;Intact;Clean;Hyperpigmented 12/07/22 2000   Margins Undefined edges 12/03/22 0800   Closure Adhesive bandage 12/03/22 0800   Drainage Amount None 12/02/22 0800   Dressing Status Clean;Dry;Intact 12/03/22 0800       Wound 11/29/22 Other (comment) Toe, 4th;Toe, 5th Right small blister, area of redness between 4th and 5th  toes (Active)   Wound Image   11/29/22 1130   Site Assessment [facility] 12/03/22 0800   Periwound Assessment [facility] 12/03/22 0800   Margins Defined edges 12/01/22 0800   Closure Open to air 12/01/22 0800   Drainage Amount None 11/29/22 2000   Dressing Status Open to Air 11/29/22 2000                  MENTAL STATUS ON TRANSFER  Level of Consciousness: Alert, cooperative, has basic understandings, but medical understanding is lacking.          CONSULTATIONS  none    PROCEDURES  none    LABORATORY  Lab Results   Component Value Date    SODIUM 139 12/01/2022    POTASSIUM 3.6 12/01/2022    CHLORIDE 107 12/01/2022    CO2 18 (L) 12/01/2022    GLUCOSE 118 (H) 12/01/2022    BUN 16 12/01/2022    CREATININE 1.10 12/01/2022        Lab Results   Component Value Date    WBC 7.1 11/30/2022    HEMOGLOBIN 12.8 11/30/2022    HEMATOCRIT 38.4 11/30/2022    PLATELETCT 202 11/30/2022        Total time of the discharge process exceeds 32 minutes.  More than 50% of time was spent face to face with patient.  This included but not limited to review of hospital course with patient, treatment goals upon discharge, recommendations to PCP, continued and new medications and their adverse reactions, and nursing instructions for patient.            EC-ECHOCARDIOGRAM LTD W/ CONT   Final Result      EC-ECHOCARDIOGRAM COMPLETE W/O CONT   Final Result      CT-CTA HEAD WITH & W/O-POST PROCESS   Final Result      CT angiogram of the [place] of [place] within normal limits.      Occlusion of left vertebral artery as noted on MRA.      CT-CTA NECK WITH & W/O-POST PROCESSING   Final Result         1.  Severely hypoplastic left vertebral artery, the proximal and mid left vertebral artery are not visualized and likely occluded.      These findings were discussed with the patient's clinician, Dr. Stokes, on 11/28/2022 8:48 PM.         MR-MRA NECK-W/O   Final Result      1.  Nonvisualization of the left vertebral artery likely representing age indeterminate occlusion.  There is dominant right vertebral artery continues as a basilar artery.   2.  The remaining cervical blood vessels are unremarkable.      MR-BRAIN-W/O   Final Result      1.  No acute abnormality.   2.  Chronic lacunar infarcts in the right cerebellum.   3.  Minimal chronic microvascular ischemic disease.   4.  Flow void of the left vertebral artery is not seen. This may represent age indeterminate occlusion.      MR-MRA HEAD-W/O   Final Result      1.  No flow identified within the visualized portions of the left vertebral artery      2.  This could indicate a dissection.      3.  Further assessment with CTA neck is recommended      4.  Anatomic variant carotid origin of the right posterior cerebral artery      CT-HEAD W/O   Final Result      No evidence of acute intracranial process.         [id number] VIEW   Final Result      No supine evidence of acute abdomen/pelvis abnormality.      DX-CHEST-PORTABLE (1 VIEW)   Final Result      Mild cardiac enlargement poststernotomy      No evidence of edema

## 2022-12-08 NOTE — CARE PLAN
The patient is Stable - Low risk of patient condition declining or worsening    Shift Goals  Clinical Goals: Mobilize, pain control  Patient Goals: comfort  Family Goals: [facility]    Progress made toward(s) clinical / shift goals:    Problem: Psychosocial - Patient Condition  Goal: Patient's ability to verbalize feelings about condition will improve  Outcome: Progressing  Goal: Patient's ability to re-evaluate and adapt role responsibilities will improve  Outcome: Progressing     Problem: Self Care  Goal: Patient will have the ability to perform ADLs independently or with assistance (bathe, groom, dress, toilet and feed)  Outcome: Progressing

## 2023-03-28 ENCOUNTER — APPOINTMENT (OUTPATIENT)
Dept: ADMISSIONS | Facility: MEDICAL CENTER | Age: 63
End: 2023-03-28
Attending: STUDENT IN AN ORGANIZED HEALTH CARE EDUCATION/TRAINING PROGRAM
Payer: MEDICAID

## 2023-03-31 ENCOUNTER — HOSPITAL ENCOUNTER (INPATIENT)
Facility: MEDICAL CENTER | Age: 63
LOS: 4 days | DRG: 246 | End: 2023-04-04
Attending: INTERNAL MEDICINE | Admitting: INTERNAL MEDICINE
Payer: MEDICAID

## 2023-03-31 ENCOUNTER — TELEPHONE (OUTPATIENT)
Dept: CARDIOLOGY | Facility: MEDICAL CENTER | Age: 63
End: 2023-03-31
Payer: MEDICAID

## 2023-03-31 DIAGNOSIS — I25.5 ISCHEMIC CARDIOMYOPATHY: ICD-10-CM

## 2023-03-31 DIAGNOSIS — E11.49 OTHER DIABETIC NEUROLOGICAL COMPLICATION ASSOCIATED WITH TYPE 2 DIABETES MELLITUS (HCC): Chronic | ICD-10-CM

## 2023-03-31 DIAGNOSIS — I25.10 CORONARY ARTERY DISEASE INVOLVING NATIVE HEART WITHOUT ANGINA PECTORIS, UNSPECIFIED VESSEL OR LESION TYPE: ICD-10-CM

## 2023-03-31 DIAGNOSIS — I51.89 LEFT VENTRICULAR SYSTOLIC DYSFUNCTION, NYHA CLASS 3: ICD-10-CM

## 2023-03-31 DIAGNOSIS — G62.9 POLYNEUROPATHY: ICD-10-CM

## 2023-03-31 DIAGNOSIS — I50.20 ACC/AHA STAGE C SYSTOLIC HEART FAILURE (HCC): ICD-10-CM

## 2023-03-31 DIAGNOSIS — I21.4 NSTEMI (NON-ST ELEVATED MYOCARDIAL INFARCTION) (HCC): ICD-10-CM

## 2023-03-31 LAB
APTT PPP: 32.8 SEC (ref 24.7–36)
GLUCOSE BLD STRIP.AUTO-MCNC: 222 MG/DL (ref 65–99)
INR PPP: 1.11 (ref 0.87–1.13)
PROTHROMBIN TIME: 14.1 SEC (ref 12–14.6)
TROPONIN T SERPL-MCNC: 166 NG/L (ref 6–19)
UFH PPP CHRO-ACNC: 0.71 IU/ML

## 2023-03-31 PROCEDURE — 700102 HCHG RX REV CODE 250 W/ 637 OVERRIDE(OP): Performed by: STUDENT IN AN ORGANIZED HEALTH CARE EDUCATION/TRAINING PROGRAM

## 2023-03-31 PROCEDURE — 85730 THROMBOPLASTIN TIME PARTIAL: CPT

## 2023-03-31 PROCEDURE — 82962 GLUCOSE BLOOD TEST: CPT

## 2023-03-31 PROCEDURE — 85610 PROTHROMBIN TIME: CPT

## 2023-03-31 PROCEDURE — 84484 ASSAY OF TROPONIN QUANT: CPT

## 2023-03-31 PROCEDURE — 99418 PROLNG IP/OBS E/M EA 15 MIN: CPT | Performed by: STUDENT IN AN ORGANIZED HEALTH CARE EDUCATION/TRAINING PROGRAM

## 2023-03-31 PROCEDURE — 99358 PROLONG SERVICE W/O CONTACT: CPT | Performed by: INTERNAL MEDICINE

## 2023-03-31 PROCEDURE — 770020 HCHG ROOM/CARE - TELE (206)

## 2023-03-31 PROCEDURE — 36415 COLL VENOUS BLD VENIPUNCTURE: CPT

## 2023-03-31 PROCEDURE — 700105 HCHG RX REV CODE 258: Performed by: STUDENT IN AN ORGANIZED HEALTH CARE EDUCATION/TRAINING PROGRAM

## 2023-03-31 PROCEDURE — A9270 NON-COVERED ITEM OR SERVICE: HCPCS | Performed by: STUDENT IN AN ORGANIZED HEALTH CARE EDUCATION/TRAINING PROGRAM

## 2023-03-31 PROCEDURE — 85520 HEPARIN ASSAY: CPT

## 2023-03-31 PROCEDURE — 99223 1ST HOSP IP/OBS HIGH 75: CPT | Performed by: STUDENT IN AN ORGANIZED HEALTH CARE EDUCATION/TRAINING PROGRAM

## 2023-03-31 PROCEDURE — 93005 ELECTROCARDIOGRAM TRACING: CPT | Performed by: STUDENT IN AN ORGANIZED HEALTH CARE EDUCATION/TRAINING PROGRAM

## 2023-03-31 PROCEDURE — 700111 HCHG RX REV CODE 636 W/ 250 OVERRIDE (IP): Performed by: STUDENT IN AN ORGANIZED HEALTH CARE EDUCATION/TRAINING PROGRAM

## 2023-03-31 RX ORDER — MORPHINE SULFATE 4 MG/ML
1 INJECTION INTRAVENOUS EVERY 4 HOURS PRN
Status: DISCONTINUED | OUTPATIENT
Start: 2023-03-31 | End: 2023-04-04 | Stop reason: HOSPADM

## 2023-03-31 RX ORDER — INSULIN LISPRO 100 [IU]/ML
1-6 INJECTION, SOLUTION INTRAVENOUS; SUBCUTANEOUS
Status: DISCONTINUED | OUTPATIENT
Start: 2023-04-01 | End: 2023-04-01

## 2023-03-31 RX ORDER — HEPARIN SODIUM 5000 [USP'U]/100ML
0-30 INJECTION, SOLUTION INTRAVENOUS CONTINUOUS
Status: DISPENSED | OUTPATIENT
Start: 2023-04-01 | End: 2023-04-02

## 2023-03-31 RX ORDER — HEPARIN SODIUM 1000 [USP'U]/ML
2000 INJECTION, SOLUTION INTRAVENOUS; SUBCUTANEOUS PRN
Status: DISCONTINUED | OUTPATIENT
Start: 2023-03-31 | End: 2023-04-02

## 2023-03-31 RX ORDER — HEPARIN SODIUM 1000 [USP'U]/ML
4000 INJECTION, SOLUTION INTRAVENOUS; SUBCUTANEOUS ONCE
Status: COMPLETED | OUTPATIENT
Start: 2023-03-31 | End: 2023-03-31

## 2023-03-31 RX ORDER — SODIUM CHLORIDE 9 MG/ML
INJECTION, SOLUTION INTRAVENOUS CONTINUOUS
Status: ACTIVE | OUTPATIENT
Start: 2023-03-31 | End: 2023-04-01

## 2023-03-31 RX ORDER — ONDANSETRON 2 MG/ML
4 INJECTION INTRAMUSCULAR; INTRAVENOUS EVERY 4 HOURS PRN
Status: DISCONTINUED | OUTPATIENT
Start: 2023-03-31 | End: 2023-04-04 | Stop reason: HOSPADM

## 2023-03-31 RX ORDER — CLOPIDOGREL BISULFATE 75 MG/1
75 TABLET ORAL DAILY
Status: DISCONTINUED | OUTPATIENT
Start: 2023-04-01 | End: 2023-04-02

## 2023-03-31 RX ORDER — ATORVASTATIN CALCIUM 10 MG/1
10 TABLET, FILM COATED ORAL EVERY EVENING
Status: DISCONTINUED | OUTPATIENT
Start: 2023-03-31 | End: 2023-04-01

## 2023-03-31 RX ORDER — INSULIN LISPRO 100 [IU]/ML
0.2 INJECTION, SOLUTION INTRAVENOUS; SUBCUTANEOUS
Status: DISCONTINUED | OUTPATIENT
Start: 2023-04-01 | End: 2023-04-01

## 2023-03-31 RX ORDER — ACETAMINOPHEN 325 MG/1
650 TABLET ORAL EVERY 6 HOURS PRN
Status: DISCONTINUED | OUTPATIENT
Start: 2023-03-31 | End: 2023-04-04 | Stop reason: HOSPADM

## 2023-03-31 RX ADMIN — HEPARIN SODIUM 4000 UNITS: 1000 INJECTION, SOLUTION INTRAVENOUS; SUBCUTANEOUS at 23:03

## 2023-03-31 RX ADMIN — MORPHINE SULFATE 1 MG: 4 INJECTION, SOLUTION INTRAMUSCULAR; INTRAVENOUS at 23:01

## 2023-03-31 RX ADMIN — SODIUM CHLORIDE: 9 INJECTION, SOLUTION INTRAVENOUS at 22:55

## 2023-03-31 RX ADMIN — INSULIN GLARGINE-YFGN 18 UNITS: 100 INJECTION, SOLUTION SUBCUTANEOUS at 23:08

## 2023-03-31 RX ADMIN — TRAZODONE HYDROCHLORIDE 150 MG: 100 TABLET ORAL at 23:02

## 2023-03-31 RX ADMIN — ATORVASTATIN CALCIUM 10 MG: 10 TABLET, FILM COATED ORAL at 23:02

## 2023-03-31 ASSESSMENT — ENCOUNTER SYMPTOMS
EYES NEGATIVE: 1
RESPIRATORY NEGATIVE: 1
GASTROINTESTINAL NEGATIVE: 1
MUSCULOSKELETAL NEGATIVE: 1
PSYCHIATRIC NEGATIVE: 1
NEUROLOGICAL NEGATIVE: 1

## 2023-03-31 ASSESSMENT — FIBROSIS 4 INDEX: FIB4 SCORE: 1.43

## 2023-04-01 ENCOUNTER — APPOINTMENT (OUTPATIENT)
Dept: CARDIOLOGY | Facility: MEDICAL CENTER | Age: 63
DRG: 246 | End: 2023-04-01
Attending: INTERNAL MEDICINE
Payer: MEDICAID

## 2023-04-01 ENCOUNTER — APPOINTMENT (OUTPATIENT)
Dept: RADIOLOGY | Facility: MEDICAL CENTER | Age: 63
DRG: 246 | End: 2023-04-01
Payer: MEDICAID

## 2023-04-01 LAB
ALBUMIN SERPL BCP-MCNC: 3.5 G/DL (ref 3.2–4.9)
ALBUMIN/GLOB SERPL: 1.1 G/DL
ALP SERPL-CCNC: 68 U/L (ref 30–99)
ALT SERPL-CCNC: 11 U/L (ref 2–50)
ANION GAP SERPL CALC-SCNC: 11 MMOL/L (ref 7–16)
APPEARANCE UR: CLEAR
AST SERPL-CCNC: 8 U/L (ref 12–45)
BACTERIA #/AREA URNS HPF: ABNORMAL /HPF
BASOPHILS # BLD AUTO: 0.7 % (ref 0–1.8)
BASOPHILS # BLD: 0.05 K/UL (ref 0–0.12)
BILIRUB SERPL-MCNC: 0.2 MG/DL (ref 0.1–1.5)
BILIRUB UR QL STRIP.AUTO: NEGATIVE
BUN SERPL-MCNC: 19 MG/DL (ref 8–22)
CALCIUM ALBUM COR SERPL-MCNC: 9.4 MG/DL (ref 8.5–10.5)
CALCIUM SERPL-MCNC: 9 MG/DL (ref 8.5–10.5)
CHLORIDE SERPL-SCNC: 106 MMOL/L (ref 96–112)
CO2 SERPL-SCNC: 23 MMOL/L (ref 20–33)
COLOR UR: YELLOW
CREAT SERPL-MCNC: 0.78 MG/DL (ref 0.5–1.4)
EKG IMPRESSION: NORMAL
EKG IMPRESSION: NORMAL
EOSINOPHIL # BLD AUTO: 0.15 K/UL (ref 0–0.51)
EOSINOPHIL NFR BLD: 2.1 % (ref 0–6.9)
EPI CELLS #/AREA URNS HPF: ABNORMAL /HPF
ERYTHROCYTE [DISTWIDTH] IN BLOOD BY AUTOMATED COUNT: 39.5 FL (ref 35.9–50)
GFR SERPLBLD CREATININE-BSD FMLA CKD-EPI: 86 ML/MIN/1.73 M 2
GLOBULIN SER CALC-MCNC: 3.1 G/DL (ref 1.9–3.5)
GLUCOSE BLD STRIP.AUTO-MCNC: 243 MG/DL (ref 65–99)
GLUCOSE BLD STRIP.AUTO-MCNC: 253 MG/DL (ref 65–99)
GLUCOSE BLD STRIP.AUTO-MCNC: 301 MG/DL (ref 65–99)
GLUCOSE SERPL-MCNC: 306 MG/DL (ref 65–99)
GLUCOSE UR STRIP.AUTO-MCNC: >=1000 MG/DL
HCT VFR BLD AUTO: 33.3 % (ref 37–47)
HGB BLD-MCNC: 11.1 G/DL (ref 12–16)
HYALINE CASTS #/AREA URNS LPF: ABNORMAL /LPF
IMM GRANULOCYTES # BLD AUTO: 0.02 K/UL (ref 0–0.11)
IMM GRANULOCYTES NFR BLD AUTO: 0.3 % (ref 0–0.9)
KETONES UR STRIP.AUTO-MCNC: NEGATIVE MG/DL
LACTATE SERPL-SCNC: 1 MMOL/L (ref 0.5–2)
LEUKOCYTE ESTERASE UR QL STRIP.AUTO: NEGATIVE
LIPASE SERPL-CCNC: 14 U/L (ref 11–82)
LYMPHOCYTES # BLD AUTO: 2.54 K/UL (ref 1–4.8)
LYMPHOCYTES NFR BLD: 35.6 % (ref 22–41)
MAGNESIUM SERPL-MCNC: 1.8 MG/DL (ref 1.5–2.5)
MCH RBC QN AUTO: 27.5 PG (ref 27–33)
MCHC RBC AUTO-ENTMCNC: 33.3 G/DL (ref 33.6–35)
MCV RBC AUTO: 82.4 FL (ref 81.4–97.8)
MICRO URNS: ABNORMAL
MONOCYTES # BLD AUTO: 0.66 K/UL (ref 0–0.85)
MONOCYTES NFR BLD AUTO: 9.2 % (ref 0–13.4)
NEUTROPHILS # BLD AUTO: 3.72 K/UL (ref 2–7.15)
NEUTROPHILS NFR BLD: 52.1 % (ref 44–72)
NITRITE UR QL STRIP.AUTO: NEGATIVE
NRBC # BLD AUTO: 0 K/UL
NRBC BLD-RTO: 0 /100 WBC
PH UR STRIP.AUTO: 5.5 [PH] (ref 5–8)
PLATELET # BLD AUTO: 274 K/UL (ref 164–446)
PMV BLD AUTO: 9.3 FL (ref 9–12.9)
POTASSIUM SERPL-SCNC: 3.6 MMOL/L (ref 3.6–5.5)
PROT SERPL-MCNC: 6.6 G/DL (ref 6–8.2)
PROT UR QL STRIP: 30 MG/DL
RBC # BLD AUTO: 4.04 M/UL (ref 4.2–5.4)
RBC # URNS HPF: ABNORMAL /HPF
RBC UR QL AUTO: NEGATIVE
SODIUM SERPL-SCNC: 140 MMOL/L (ref 135–145)
SP GR UR STRIP.AUTO: 1.03
TROPONIN T SERPL-MCNC: 188 NG/L (ref 6–19)
TROPONIN T SERPL-MCNC: 198 NG/L (ref 6–19)
UFH PPP CHRO-ACNC: 0.28 IU/ML
UFH PPP CHRO-ACNC: 0.36 IU/ML
UROBILINOGEN UR STRIP.AUTO-MCNC: 0.2 MG/DL
WBC # BLD AUTO: 7.1 K/UL (ref 4.8–10.8)
WBC #/AREA URNS HPF: ABNORMAL /HPF

## 2023-04-01 PROCEDURE — 83605 ASSAY OF LACTIC ACID: CPT

## 2023-04-01 PROCEDURE — 81001 URINALYSIS AUTO W/SCOPE: CPT

## 2023-04-01 PROCEDURE — A9270 NON-COVERED ITEM OR SERVICE: HCPCS | Performed by: STUDENT IN AN ORGANIZED HEALTH CARE EDUCATION/TRAINING PROGRAM

## 2023-04-01 PROCEDURE — 84484 ASSAY OF TROPONIN QUANT: CPT

## 2023-04-01 PROCEDURE — 99255 IP/OBS CONSLTJ NEW/EST HI 80: CPT | Performed by: INTERNAL MEDICINE

## 2023-04-01 PROCEDURE — 700102 HCHG RX REV CODE 250 W/ 637 OVERRIDE(OP): Performed by: STUDENT IN AN ORGANIZED HEALTH CARE EDUCATION/TRAINING PROGRAM

## 2023-04-01 PROCEDURE — 93005 ELECTROCARDIOGRAM TRACING: CPT | Performed by: HOSPITALIST

## 2023-04-01 PROCEDURE — A9270 NON-COVERED ITEM OR SERVICE: HCPCS | Performed by: HOSPITALIST

## 2023-04-01 PROCEDURE — 82962 GLUCOSE BLOOD TEST: CPT

## 2023-04-01 PROCEDURE — 93010 ELECTROCARDIOGRAM REPORT: CPT | Mod: 76 | Performed by: INTERNAL MEDICINE

## 2023-04-01 PROCEDURE — 700111 HCHG RX REV CODE 636 W/ 250 OVERRIDE (IP): Performed by: HOSPITALIST

## 2023-04-01 PROCEDURE — 85520 HEPARIN ASSAY: CPT

## 2023-04-01 PROCEDURE — 700111 HCHG RX REV CODE 636 W/ 250 OVERRIDE (IP): Performed by: STUDENT IN AN ORGANIZED HEALTH CARE EDUCATION/TRAINING PROGRAM

## 2023-04-01 PROCEDURE — 83690 ASSAY OF LIPASE: CPT

## 2023-04-01 PROCEDURE — 700111 HCHG RX REV CODE 636 W/ 250 OVERRIDE (IP)

## 2023-04-01 PROCEDURE — 99233 SBSQ HOSP IP/OBS HIGH 50: CPT | Performed by: HOSPITALIST

## 2023-04-01 PROCEDURE — 80053 COMPREHEN METABOLIC PANEL: CPT

## 2023-04-01 PROCEDURE — 74176 CT ABD & PELVIS W/O CONTRAST: CPT

## 2023-04-01 PROCEDURE — 93010 ELECTROCARDIOGRAM REPORT: CPT | Performed by: INTERNAL MEDICINE

## 2023-04-01 PROCEDURE — 770020 HCHG ROOM/CARE - TELE (206)

## 2023-04-01 PROCEDURE — 700102 HCHG RX REV CODE 250 W/ 637 OVERRIDE(OP): Performed by: HOSPITALIST

## 2023-04-01 PROCEDURE — 36415 COLL VENOUS BLD VENIPUNCTURE: CPT

## 2023-04-01 PROCEDURE — 83735 ASSAY OF MAGNESIUM: CPT

## 2023-04-01 PROCEDURE — 85025 COMPLETE CBC W/AUTO DIFF WBC: CPT

## 2023-04-01 PROCEDURE — 74018 RADEX ABDOMEN 1 VIEW: CPT

## 2023-04-01 RX ORDER — MAGNESIUM SULFATE HEPTAHYDRATE 40 MG/ML
2 INJECTION, SOLUTION INTRAVENOUS ONCE
Status: COMPLETED | OUTPATIENT
Start: 2023-04-01 | End: 2023-04-01

## 2023-04-01 RX ORDER — ASPIRIN 81 MG/1
81 TABLET, CHEWABLE ORAL DAILY
Status: DISCONTINUED | OUTPATIENT
Start: 2023-04-01 | End: 2023-04-04 | Stop reason: HOSPADM

## 2023-04-01 RX ORDER — NITROGLYCERIN 0.4 MG/1
0.4 TABLET SUBLINGUAL
Status: DISCONTINUED | OUTPATIENT
Start: 2023-04-01 | End: 2023-04-04 | Stop reason: HOSPADM

## 2023-04-01 RX ORDER — OXYCODONE AND ACETAMINOPHEN 10; 325 MG/1; MG/1
1 TABLET ORAL EVERY 6 HOURS PRN
Status: ON HOLD | COMMUNITY
End: 2023-04-24

## 2023-04-01 RX ORDER — INSULIN LISPRO 100 [IU]/ML
1-6 INJECTION, SOLUTION INTRAVENOUS; SUBCUTANEOUS
Status: DISCONTINUED | OUTPATIENT
Start: 2023-04-01 | End: 2023-04-02

## 2023-04-01 RX ORDER — GABAPENTIN 400 MG/1
800 CAPSULE ORAL 3 TIMES DAILY
Status: DISCONTINUED | OUTPATIENT
Start: 2023-04-01 | End: 2023-04-04 | Stop reason: HOSPADM

## 2023-04-01 RX ORDER — OXYCODONE HYDROCHLORIDE 5 MG/1
10 TABLET ORAL EVERY 6 HOURS PRN
Status: ON HOLD | COMMUNITY
End: 2023-04-04

## 2023-04-01 RX ORDER — CLONIDINE HYDROCHLORIDE 0.1 MG/1
0.1 TABLET ORAL PRN
Status: ON HOLD | COMMUNITY
End: 2023-05-01

## 2023-04-01 RX ORDER — MORPHINE SULFATE/0.9% NACL/PF 1 MG/ML
30 SYRINGE (ML) INJECTION 2 TIMES DAILY
Status: ON HOLD | COMMUNITY
End: 2023-04-24

## 2023-04-01 RX ORDER — MORPHINE SULFATE 15 MG/1
30 TABLET, FILM COATED, EXTENDED RELEASE ORAL EVERY 12 HOURS
Status: DISCONTINUED | OUTPATIENT
Start: 2023-04-01 | End: 2023-04-04 | Stop reason: HOSPADM

## 2023-04-01 RX ORDER — POTASSIUM CHLORIDE 20 MEQ/1
20 TABLET, EXTENDED RELEASE ORAL ONCE
Status: COMPLETED | OUTPATIENT
Start: 2023-04-01 | End: 2023-04-01

## 2023-04-01 RX ORDER — ISOSORBIDE MONONITRATE 30 MG/1
30 TABLET, EXTENDED RELEASE ORAL DAILY
Status: DISCONTINUED | OUTPATIENT
Start: 2023-04-01 | End: 2023-04-02

## 2023-04-01 RX ORDER — INSULIN LISPRO 100 [IU]/ML
0.2 INJECTION, SOLUTION INTRAVENOUS; SUBCUTANEOUS
Status: DISCONTINUED | OUTPATIENT
Start: 2023-04-01 | End: 2023-04-02

## 2023-04-01 RX ORDER — OXYCODONE HYDROCHLORIDE 5 MG/1
5 TABLET ORAL EVERY 4 HOURS PRN
Status: DISCONTINUED | OUTPATIENT
Start: 2023-04-01 | End: 2023-04-04 | Stop reason: HOSPADM

## 2023-04-01 RX ORDER — MORPHINE SULFATE 4 MG/ML
1 INJECTION INTRAVENOUS ONCE
Status: COMPLETED | OUTPATIENT
Start: 2023-04-01 | End: 2023-04-01

## 2023-04-01 RX ORDER — ATORVASTATIN CALCIUM 80 MG/1
80 TABLET, FILM COATED ORAL EVERY EVENING
Status: DISCONTINUED | OUTPATIENT
Start: 2023-04-01 | End: 2023-04-04 | Stop reason: HOSPADM

## 2023-04-01 RX ORDER — PREGABALIN 100 MG/1
100 CAPSULE ORAL 2 TIMES DAILY
COMMUNITY

## 2023-04-01 RX ADMIN — INSULIN LISPRO 3 UNITS: 100 INJECTION, SOLUTION INTRAVENOUS; SUBCUTANEOUS at 18:07

## 2023-04-01 RX ADMIN — INSULIN LISPRO 6 UNITS: 100 INJECTION, SOLUTION INTRAVENOUS; SUBCUTANEOUS at 18:07

## 2023-04-01 RX ADMIN — MORPHINE SULFATE 30 MG: 15 TABLET, FILM COATED, EXTENDED RELEASE ORAL at 21:37

## 2023-04-01 RX ADMIN — MORPHINE SULFATE 1 MG: 4 INJECTION, SOLUTION INTRAMUSCULAR; INTRAVENOUS at 03:13

## 2023-04-01 RX ADMIN — ASPIRIN 81 MG 81 MG: 81 TABLET ORAL at 13:44

## 2023-04-01 RX ADMIN — MORPHINE SULFATE 30 MG: 15 TABLET, FILM COATED, EXTENDED RELEASE ORAL at 10:52

## 2023-04-01 RX ADMIN — ATORVASTATIN CALCIUM 80 MG: 80 TABLET, FILM COATED ORAL at 18:06

## 2023-04-01 RX ADMIN — MAGNESIUM SULFATE HEPTAHYDRATE 2 G: 40 INJECTION, SOLUTION INTRAVENOUS at 15:46

## 2023-04-01 RX ADMIN — METOPROLOL TARTRATE 25 MG: 25 TABLET, FILM COATED ORAL at 18:06

## 2023-04-01 RX ADMIN — OXYCODONE HYDROCHLORIDE 5 MG: 5 TABLET ORAL at 18:06

## 2023-04-01 RX ADMIN — POTASSIUM CHLORIDE 20 MEQ: 1500 TABLET, EXTENDED RELEASE ORAL at 15:46

## 2023-04-01 RX ADMIN — GABAPENTIN 800 MG: 400 CAPSULE ORAL at 18:06

## 2023-04-01 RX ADMIN — INSULIN LISPRO 4 UNITS: 100 INJECTION, SOLUTION INTRAVENOUS; SUBCUTANEOUS at 20:53

## 2023-04-01 RX ADMIN — HEPARIN SODIUM 12 UNITS/KG/HR: 5000 INJECTION, SOLUTION INTRAVENOUS at 07:44

## 2023-04-01 RX ADMIN — ISOSORBIDE MONONITRATE 30 MG: 30 TABLET, EXTENDED RELEASE ORAL at 10:52

## 2023-04-01 RX ADMIN — OXYCODONE HYDROCHLORIDE 5 MG: 5 TABLET ORAL at 13:44

## 2023-04-01 RX ADMIN — TRAZODONE HYDROCHLORIDE 150 MG: 100 TABLET ORAL at 21:37

## 2023-04-01 RX ADMIN — HEPARIN SODIUM 2000 UNITS: 1000 INJECTION, SOLUTION INTRAVENOUS; SUBCUTANEOUS at 20:54

## 2023-04-01 RX ADMIN — CLOPIDOGREL BISULFATE 75 MG: 75 TABLET ORAL at 10:19

## 2023-04-01 RX ADMIN — MORPHINE SULFATE 1 MG: 4 INJECTION, SOLUTION INTRAMUSCULAR; INTRAVENOUS at 07:55

## 2023-04-01 RX ADMIN — GABAPENTIN 800 MG: 400 CAPSULE ORAL at 11:47

## 2023-04-01 RX ADMIN — MORPHINE SULFATE 1 MG: 4 INJECTION INTRAVENOUS at 04:52

## 2023-04-01 ASSESSMENT — FIBROSIS 4 INDEX
FIB4 SCORE: 1.43
FIB4 SCORE: 0.53

## 2023-04-01 ASSESSMENT — COGNITIVE AND FUNCTIONAL STATUS - GENERAL
MOBILITY SCORE: 24
HELP NEEDED FOR BATHING: A LITTLE
SUGGESTED CMS G CODE MODIFIER MOBILITY: CH
DAILY ACTIVITIY SCORE: 20
SUGGESTED CMS G CODE MODIFIER DAILY ACTIVITY: CJ
TOILETING: A LITTLE
DRESSING REGULAR LOWER BODY CLOTHING: A LITTLE
DRESSING REGULAR UPPER BODY CLOTHING: A LITTLE

## 2023-04-01 ASSESSMENT — LIFESTYLE VARIABLES
ON A TYPICAL DAY WHEN YOU DRINK ALCOHOL HOW MANY DRINKS DO YOU HAVE: 0
TOTAL SCORE: 0
TOTAL SCORE: 0
AVERAGE NUMBER OF DAYS PER WEEK YOU HAVE A DRINK CONTAINING ALCOHOL: 0
TOTAL SCORE: 0
HAVE YOU EVER FELT YOU SHOULD CUT DOWN ON YOUR DRINKING: NO
CONSUMPTION TOTAL: NEGATIVE
DOES PATIENT WANT TO STOP DRINKING: NO
EVER HAD A DRINK FIRST THING IN THE MORNING TO STEADY YOUR NERVES TO GET RID OF A HANGOVER: NO
HAVE PEOPLE ANNOYED YOU BY CRITICIZING YOUR DRINKING: NO
HOW MANY TIMES IN THE PAST YEAR HAVE YOU HAD 5 OR MORE DRINKS IN A DAY: 0
ALCOHOL_USE: NO
EVER FELT BAD OR GUILTY ABOUT YOUR DRINKING: NO

## 2023-04-01 ASSESSMENT — ENCOUNTER SYMPTOMS
FEVER: 0
SHORTNESS OF BREATH: 1
ABDOMINAL PAIN: 1
NAUSEA: 1

## 2023-04-01 ASSESSMENT — PAIN DESCRIPTION - PAIN TYPE
TYPE: ACUTE PAIN;CHRONIC PAIN

## 2023-04-01 NOTE — PROGRESS NOTES
4 Eyes Skin Assessment Completed by DARREN Morel and OJ Navas.    Head WDL Discoloration  Ears Redness and Blanching  Nose WDL  Mouth WDL  Neck WDL  Breast/Chest Redness and Scar  Shoulder Blades WDL  Spine WDL  (R) Arm/Elbow/Hand Redness, Blanching, Bruising, and Scar  (L) Arm/Elbow/Hand Redness, Blanching, and Scar  Abdomen Redness and Blanching  Groin Redness, Blanching, and Excoriation  Scrotum/Coccyx/Buttocks Redness and Blanching  (R) Leg Scar  (L) Leg Scar  (R) Heel/Foot/Toe Redness and Blanching Dry  (L) Heel/Foot/Toe Redness and Blanching Dry          Devices In Places Tele Box, Blood Pressure Cuff, and Pulse Ox      Interventions In Place Pillows and Pressure Redistribution Mattress    Possible Skin Injury Yes    Pictures Uploaded Into Epic Yes  Wound Consult Placed N/A  RN Wound Prevention Protocol Ordered No

## 2023-04-01 NOTE — PROGRESS NOTES
[facility] HOSPITALIST CROSS COVER    Notified by RN regarding patient complaining of severe 9 out of 10 right upper quadrant abdominal pain not relieved by as needed morphine.  Patient was seen and examined at bedside.  Her lung sounds are clear to auscultation.  She has right upper quadrant/midepigastric tenderness to palpation and describes the pain as sharp, constant.  There are no exacerbating or relieving factors.  She reports the pain is radiating/wrapping around her back and into her right flank.  Right flank pain and tenderness to palpation.  Bowel sounds are decreased.  She denies any urinary symptoms.    She was a transfer overnight from [facility] facility for NSTEMI with elevated troponins.    Vitals:    04/01/23 0400   BP: (!) 140/81   Pulse:    Resp:    Temp:    SpO2: 95%          Plan:  #Right upper quadrant abdominal pain  -Stat lactic/lipase/CMP ordered and pending  - Urinalysis ordered and pending  - Morphine 1 mg IV stat  -CT renal colic ordered and pending to rule out intra-abdominal pathology         -----------------------------------------------------------------------------------------------------------    Electronically signed by:  Whitney Schofield, SUDHIR, APRN, MAZINP-BC  Hospitalist Services

## 2023-04-01 NOTE — PROGRESS NOTES
[facility] HOSPITALIST TRIAGE OFFICER DIRECT ADMISSION REPORT  Transferring facility: [facility]  Transferring physician:    Transferring facility/physician contact number:Shayne Valdovinos MD  Chief complaint: Chest pain and abd pain  Pertinent history & patient course: 62-year-old female with past medical history of CAD, CABG 15 months, diabetes, hypertension presented at outside facility with complaint of upper abdominal pain, 1 episode of vomiting yesterday.  Upon evaluation EKG, some ST depression, troponin is elevated at 772, magnesium 1.3, blood sugar 377 blood pressure is elevated at 170/95.  Lipase is not elevated.  Patient was treated for NSTEMI with Nitropaste, Lovenox 95 mg subcu, 2 g magnesium.  Blood pressure and chest pain improved, 3 out of 10.  Cardiology agreed to consult.  Pertinent imaging & lab results:   Code Status: full per transferring provider, I personally verified with the transferring provider patient's code status and the transferring provider has confirmed this with the patient.  Further work up or recommendations per triage officer prior to transfer:   Consultants called prior to transfer and pertinent input from consultants: cardiology Dr Acuna  Patient accepted for transfer: Yes  Consultants to be called upon arrival: card  Admission status: Inpatient.   Floor requested: telemetry  If ICU transfer, name of intensivist case discussed with and pertinent input from critical care:     Please inform the triage officer upon arrival of the patient to [facility] for assignment of a hospitalist to perform admission.     For any question or concerns regarding the care of this patient, please reach out to the assigned hospitalist.    I spent a total of 32 minutes of non face to face time performing additional research, reviewing old medical records, provided on going management by following up on labs, placing orders, discussing plan of care with other healthcare providers and  nursing staff. Start time: 17:37 pm. End time: 18:09 pm

## 2023-04-01 NOTE — PROGRESS NOTES
Telemetry Report:    Rhythm:  SR     Heart Rate:  81-83    Ectopy:  pvc        VA:  0.16         QRS:     0.08    QT: 0.37      Per Telestrip from Monitor Room

## 2023-04-01 NOTE — ASSESSMENT & PLAN NOTE
With hyperglycemia     Hemoglobin A1c 4 months ago 12.5    Increase Lantus to twice daily (home dose)    Continue sliding scale insulin monitor CBGs

## 2023-04-01 NOTE — CARE PLAN
The patient is Watcher - Medium risk of patient condition declining or worsening         Patient was admitted to the floor.  Patient reported pain that was not controlled with prn pain meds.  Isabelle Schofield notified and she came bedside to see patient.  Additional orders placed.  Will continue to monitor.     Patient is not progressing towards the following goals:      Problem: Pain - Standard  Goal: Alleviation of pain or a reduction in pain to the patient’s comfort goal  Outcome: Not Met  Note: Pain was not controlled with prn meds.       Problem: Knowledge Deficit - Standard  Goal: Patient and family/care givers will demonstrate understanding of plan of care, disease process/condition, diagnostic tests and medications  Outcome: Progressing  Note: Reviewed poc with patient.  Patient verbalized understanding of poc and barriers to discharge.

## 2023-04-01 NOTE — H&P
Hospital Medicine History & Physical Note    Date of Service  3/31/2023    Primary Care Physician  Pcp Pt States None    Consultants  Cardiology     Code Status  Full Code    Chief Complaint  NSTEMI    History of Presenting Illness  Julisa Carrion is a 62 y.o. female who presented 3/31/2023 with NSTEMI.    Patient has a history of CAD status post 2 stents in fall 2021, insulin-dependent diabetes, hyperlipidemia who presented to outside facility ER for flulike symptoms.  She reports congestion, generalized weakness, a burning chest pain sensation.  She was given Lovenox therapeutically at 5:26 PM and loaded with aspirin.  Cardiology at [facility] was consulted, agreed for transfer for further work-up.    Pertinent labs and imaging at outside facility:  KUB: No acute findings.  D-dimer negative.  COVID test negative.  Potassium 3.4.  Magnesium 1.3.  Troponin 772.  Lipase within normal limits.  Sugar 273.    I discussed the plan of care with patient.    Review of Systems  Review of Systems   Constitutional:  Positive for malaise/fatigue.   HENT: Negative.     Eyes: Negative.    Respiratory: Negative.     Cardiovascular:  Positive for chest pain.   Gastrointestinal: Negative.    Genitourinary: Negative.    Musculoskeletal: Negative.    Skin: Negative.    Neurological: Negative.    Endo/Heme/Allergies: Negative.    Psychiatric/Behavioral: Negative.       Past Medical History   has a past medical history of CAD (coronary artery disease), Congestive heart failure (HCC), Diabetes, Diabetes (HCC), Diverticulosis, GERD (gastroesophageal reflux disease), High cholesterol (5/15/2011), Hypertension, Intestinal mass (2015), Migraine, and Staph skin infection.    Surgical History   has a past surgical history that includes other abdominal surgery; gyn surgery; other orthopedic surgery (6-2014); abdominal exploration; gastroscopy (9/3/2017); colonoscopy (9/3/2017); stent placement (12/20/2018); and other cardiac surgery.      Family History  family history includes Cancer in her maternal aunt.   Family history reviewed with patient. There is no family history that is pertinent to the chief complaint.     Social History   reports that she quit smoking about 31 years ago. Her smoking use included cigarettes. She started smoking about 49 years ago. She has a 40.00 pack-year smoking history. She has never used smokeless tobacco. She reports that she does not currently use alcohol. She reports current drug use. Drug: Inhaled.    Allergies  No Known Allergies    Medications  Prior to Admission Medications   Prescriptions Last Dose Informant Patient Reported? Taking?   SITagliptin (JANUVIA) 50 MG Tab  Patient's Home Pharmacy Yes No   Sig: Take 50 mg by mouth every day.   ascorbic acid (VITAMIN C) 500 MG tablet  Patient No No   Sig: Take 1 Tab by mouth every day.   aspirin EC 81 MG EC tablet  Patient No No   Sig: Take 1 Tab by mouth every day.   atorvastatin (LIPITOR) 10 MG Tab   Yes No   Sig: Take  by mouth every evening. Unsure of dose   clopidogrel (PLAVIX) 75 MG Tab  Patient No No   Sig: Take 1 Tab by mouth every day.   clopidogrel or PLACEBO (STUDY DRUG) 75 mg Tab   Yes No   Sig: Take 75 mg by mouth every day.   ferrous sulfate 325 (65 Fe) MG tablet   No No   Sig: Take 1 Tab by mouth every day with lunch.   gabapentin (NEURONTIN) 800 MG tablet  Patient Yes No   Sig: Take 800 mg by mouth 3 times a day.   insulin GLARGINE (LANTUS,SEMGLEE) 100 UNIT/ML Solution   No No   Sig: Inject 20 Units under the skin 2 times a day.   insulin glargine (LANTUS) 100 UNIT/ML Solution  Patient Yes No   Sig: Inject 30 Units as instructed 2 Times a Day.   isosorbide mononitrate SR (IMDUR) 30 MG TABLET SR 24 HR   No No   Sig: Take 1 Tab by mouth every day.   metoprolol (LOPRESSOR) 25 MG Tab   No No   Sig: Take 1 Tab by mouth 2 Times a Day.   traZODone (DESYREL) 100 MG Tab  Patient Yes No   Sig: Take 50 mg by mouth every bedtime. 100 mg AM  200 mg PM    traZODone (DESYREL) 150 MG Tab   No No   Sig: Take 1 Tablet by mouth every evening.      Facility-Administered Medications: None       Physical Exam  Temp:  [36.1 °C (97 °F)] 36.1 °C (97 °F)  Pulse:  [99] 99  Resp:  [18] 18  BP: (124)/(84) 124/84  SpO2:  [92 %] 92 %  Blood Pressure: 124/84   Temperature: 36.1 °C (97 °F)   Pulse: 99   Respiration: 18   Pulse Oximetry: 92 %       Physical Exam  Constitutional:       Appearance: Normal appearance. She is obese.   HENT:      Head: Normocephalic.      Nose: Nose normal.      Mouth/Throat:      Mouth: Mucous membranes are moist.   Eyes:      Pupils: Pupils are equal, round, and reactive to light.   Cardiovascular:      Rate and Rhythm: Normal rate and regular rhythm.      Pulses: Normal pulses.   Pulmonary:      Effort: Pulmonary effort is normal.      Breath sounds: Normal breath sounds.   Abdominal:      General: Abdomen is flat. Bowel sounds are normal.      Palpations: Abdomen is soft.   Musculoskeletal:         General: Normal range of motion.      Cervical back: Neck supple.   Skin:     General: Skin is warm.   Neurological:      General: No focal deficit present.      Mental Status: She is alert and oriented to person, place, and time. Mental status is at baseline.   Psychiatric:         Mood and Affect: Mood normal.         Behavior: Behavior normal.         Thought Content: Thought content normal.         Judgment: Judgment normal.       Laboratory:          No results for input(s): ALTSGPT, ASTSGOT, ALKPHOSPHAT, TBILIRUBIN, DBILIRUBIN, GAMMAGT, AMYLASE, LIPASE, ALB, PREALBUMIN, GLUCOSE in the last 72 hours.      No results for input(s): NTPROBNP in the last 72 hours.      No results for input(s): TROPONINT in the last 72 hours.    Imaging:  No orders to display       EKG:  I have personally reviewed the images and compared with prior images.    Assessment/Plan:  Justification for Admission Status  I anticipate this patient will require at least two midnights  for appropriate medical management, necessitating inpatient admission because patient has elevated troponin and NSTEMI and will need formal cardiology consult in a.m.    Patient will need a Telemetry bed on MEDICAL service .  The need is secondary to NSTEMI.    * NSTEMI (non-ST elevated myocardial infarction) (HCC)- (present on admission)  Assessment & Plan  Chart reviewed from outside facility.  Patient had been given Lovenox at 5:26 PM and loaded with aspirin due to her symptoms.  She has known history of CAD with 2 stents and uncontrolled hyperglycemia.  [facility] cardiologist has been contacted, prior to transfer, and agreed to transfer.  Patient will be monitored overnight for monitoring of her troponins for NSTEMI.  She will have aspirin Plavix heparin drip in the morning ( 5 am as patient currently anticoagulated with lovenox at outside facility) morphine and Zofran as needed.  Monitor closely for drop in hemoglobin, hemorrhagic shock, hypotension and she was administered full dose of Lovenox and will receive heparin drip.    HLD (hyperlipidemia)- (present on admission)  Assessment & Plan  Continue Lipitor    Diabetes (HCC)  Assessment & Plan  Sliding scale  Fluids        VTE prophylaxis: therapeutic anticoagulation with Lovenox    I spent a total of 35 minutes of non face to face time performing additional research, reviewing medical records from transferring facility, discussing plan of care with other healthcare providers. Start time: 9:15. End time: 9:50 pm

## 2023-04-01 NOTE — HOSPITAL COURSE
62-year-old female with history of CAD with prior history of CABG PCI diabetes hypertension chronic pain syndrome presented to outside facility about a week ago with abdominal pain had normal troponin discharged home return with persistent symptoms and noted to have elevated troponin at 772 hyperglycemia.  She was transferred to our facility for cardiology evaluation for NSTEMI

## 2023-04-01 NOTE — ASSESSMENT & PLAN NOTE
Patient with multiple cardiovascular risk factors and known CAD status post PCI and CABG    Status post PCI to ostial circumflex with residual severe PDA stenosis scheduled for staged PCI today  Echo EF 45-50%  Continue metoprolol SR statin Entresto aspirin Plavix

## 2023-04-01 NOTE — CONSULTS
Cardiology Initial Consult Note    DOS: 4/1/2023    Referring physician: Dr Gladys Tellez    Chief complaint/Reason for consult: NSTEMI    HPI: 63 y/o F with h/o CAD s/p LAD PCI 12/2018, CABG reportedly ~2-4 years ago, pt reports stents following CABG as well but this may not be accurate however it appears had stents placed in 11/2019 at [facility] ([facility]). She presented with 1 week of chest pain. Last week had negative troponin reportedly but pain persisted and upon repeat evaluation found to have NSTEMI, transferred to [facility]. Chest pain improved but still present, sub sternal. Reports medication compliance however there appears to be poor medical insight. Per records it appears there was a history of amiodarone and Xarelto use (post-op afib?) neither of which it appears she is currently taking.    ROS (+ highlighted in bold):  Constitutional: Fevers/chills/fatigue/weightloss  HEENT: Blurry vision/eye pain/sore throat/hearing loss  Respiratory: Shortness of breath/cough  Cardiovascular: Chest pain/palpitations/edema/orthopnea/syncope  GI: Nausea/vomitting/diarrhea  MSK: Arthralgias/myagias/muscle weakness  Skin: Rash/sores  Neurological: Numbness/tremors/vertigo  Endocrine: Excessive thirst/polyuria/cold intolerance/heat intolerance  Psych: Depression/anxiety    Past Medical History:   Diagnosis Date    CAD (coronary artery disease)     PER [facility]    Congestive heart failure (HCC)     PER [facility]    Diabetes     Diabetes (HCC)     PER [facility]    Diverticulosis     PER [facility]    GERD (gastroesophageal reflux disease)     PER [facility]    High cholesterol 5/15/2011    Hypertension     Intestinal mass 2015    Migraine     Staph skin infection        Past Surgical History:   Procedure Laterality Date    STENT PLACEMENT  12/20/2018    2 cardiac stents    GASTROSCOPY  9/3/2017    Procedure: GASTROSCOPY WITH DILATION;  Surgeon: Kerri Carter M.D.;   Location: SURGERY [facility];  Service: Gastroenterology    COLONOSCOPY  9/3/2017    Procedure: COLONOSCOPY;  Surgeon: Kerri Carter M.D.;  Location: SURGERY [facility];  Service: Gastroenterology    OTHER ORTHOPEDIC SURGERY      right wrist surgery    ABDOMINAL EXPLORATION      Lower intestine d/t mass - benign    GYN SURGERY       x 3,tubal ligation    OTHER ABDOMINAL SURGERY      cholecystectomy    OTHER CARDIAC SURGERY      PER [facility] 2019 4 STENTS; 2019 BALLOON ANGIOPLASTY R RAMUS INTERMEDIATE BRANCH; STERNOTOMY       Social History     Socioeconomic History    Marital status: Single     Spouse name: Not on file    Number of children: Not on file    Years of education: Not on file    Highest education level: Not on file   Occupational History    Not on file   Tobacco Use    Smoking status: Former     Packs/day: 2.00     Years: 20.00     Pack years: 40.00     Types: Cigarettes     Start date:      Quit date:      Years since quittin.2    Smokeless tobacco: Never   Vaping Use    Vaping Use: Never used   Substance and Sexual Activity    Alcohol use: Not Currently    Drug use: Yes     Types: Inhaled     Comment: just prescribed meds    Sexual activity: Not on file   Other Topics Concern    Primary/coprimary nurse & associates Not Asked    Family contact information Not Asked    OK to release patient information to the following Not Asked    Patient preferred routine/Privacy concerns Not Asked    Patient likes and dislikes Not Asked    Participating In Research Study Not Asked    Miscellaneous Not Asked   Social History Narrative    ** Merged History Encounter **         No known exposure to asbestos, dyes, chemicals, or pesticides.  Patient does not work.  She has 3 children and many grand-children.  Has a significant other for about 20 years. Moved to [place] in .      Social Determinants of Health     Financial Resource Strain: Not on file    Food Insecurity: Not on file   Transportation Needs: Not on file   Physical Activity: Not on file   Stress: Not on file   Social Connections: Not on file   Intimate Partner Violence: Not on file   Housing Stability: Not on file       Family History   Problem Relation Age of Onset    Cancer Maternal Aunt         Lung cancer d/t smoking       No Known Allergies    Current Facility-Administered Medications   Medication Dose Route Frequency Provider Last Rate Last Admin    gabapentin (NEURONTIN) capsule 800 mg  800 mg Oral TID Herminio Tellez M.D.        isosorbide mononitrate SR (IMDUR) tablet 30 mg  30 mg Oral DAILY Herminio Tellez M.D.   30 mg at 04/01/23 1052    nitroglycerin (NITROSTAT) tablet 0.4 mg  0.4 mg Sublingual Q5 MIN PRN Herminio Tellez M.D.        morphine ER (MS CONTIN) tablet 30 mg  30 mg Oral Q12HRS Herminio Tellez M.D.   30 mg at 04/01/23 1052    oxyCODONE immediate-release (ROXICODONE) tablet 5 mg  5 mg Oral Q4HRS PRN Herminio Tellez M.D.        NS infusion   Intravenous Continuous Ayan Nguyen M.D. 83 mL/hr at 03/31/23 2255 New Bag at 03/31/23 2255    acetaminophen (Tylenol) tablet 650 mg  650 mg Oral Q6HRS PRN Ayan Nguyen M.D.        insulin GLARGINE (Lantus,Semglee) injection  0.2 Units/kg/day Subcutaneous Q EVENING Ayan Nguyen M.D.   18 Units at 03/31/23 2308    And    insulin lispro (AdmeLOG,HumaLOG) injection  0.2 Units/kg/day Subcutaneous TID AC Ayan Nguyen M.D.        And    insulin lispro (AdmeLOG,HumaLOG) injection  1-6 Units Subcutaneous 4X/DAY ACHS Ayan Nguyen M.D.        And    dextrose 10 % BOLUS 25 g  25 g Intravenous Q15 MIN PRN Ayan Nguyen M.D.        heparin infusion 25,000 units in 500 mL 0.45% NACL  0-30 Units/kg/hr (Adjusted) Intravenous Continuous Ayan Nguyen M.D. 17.1 mL/hr at 04/01/23 0744 12 Units/kg/hr at 04/01/23 0744    heparin injection 2,000 Units  2,000 Units Intravenous ELYSEN Ayan Nguyen,  M.D.        morphine 4 MG/ML injection 1 mg  1 mg Intravenous Q4HRS PRN Ayan Nguyen M.D.   1 mg at 04/01/23 0755    ondansetron (ZOFRAN) syringe/vial injection 4 mg  4 mg Intravenous Q4HRS PRN Ayan Nguyen M.D.        atorvastatin (LIPITOR) tablet 10 mg  10 mg Oral Q EVENING Ayan Nguyen M.D.   10 mg at 03/31/23 2302    clopidogrel (PLAVIX) tablet 75 mg  75 mg Oral DAILY Ayan Nguyen M.D.   75 mg at 04/01/23 1019    traZODone (DESYREL) tablet 150 mg  150 mg Oral QHS Ayan Nguyen M.D.   150 mg at 03/31/23 2302       Physical Exam:  Vitals:    04/01/23 0312 04/01/23 0400 04/01/23 0743 04/01/23 0948   BP: (!) 140/81 (!) 140/81 (!) 148/89 130/76   Pulse: 90  87 76   Resp: 18  18    Temp: 36.3 °C (97.3 °F)  36.4 °C (97.5 °F)    TempSrc: Temporal  Temporal    SpO2: 94% 95% 96%    Weight:       Height:         General appearance: NAD, conversant   Eyes: anicteric sclerae, moist conjunctivae; no lid-lag; PERRLA  HENT: Atraumatic; oropharynx clear with moist mucous membranes and no mucosal ulcerations; normal hard and soft palate  Neck: Trachea midline; FROM, supple, no thyromegaly or lymphadenopathy  Lungs: CTA, with normal respiratory effort and no intercostal retractions  CV: RRR, no MRGs, no JVD   Abdomen: Soft, non-tender; no masses or HSM  Extremities: No peripheral edema or extremity lymphadenopathy  Skin: Normal temperature, turgor and texture; no rash, ulcers or subcutaneous nodules  Psych: Appropriate affect, alert and oriented to person, place and time    Data:  Lipids:   Lab Results   Component Value Date/Time    CHOLSTRLTOT 352 (H) 11/29/2022 03:34 AM    TRIGLYCERIDE 191 (H) 11/29/2022 03:34 AM    HDL 51 11/29/2022 03:34 AM     (H) 11/29/2022 03:34 AM        BMP:  Lab Results   Component Value Date/Time    SODIUM 140 04/01/2023 0455    POTASSIUM 3.6 04/01/2023 0455    CHLORIDE 106 04/01/2023 0455    CO2 23 04/01/2023 0455    GLUCOSE 306 (H) 04/01/2023 0455    BUN 19  04/01/2023 0455    CREATININE 0.78 04/01/2023 0455    CALCIUM 9.0 04/01/2023 0455    ANION 11.0 04/01/2023 0455        TSH:   Lab Results   Component Value Date/Time    TSHULTRASEN 5.280 01/16/2019 2233        THYROXINE (T4):   No results found for: MARLON     CBC:   Lab Results   Component Value Date/Time    WBC 7.1 04/01/2023 04:55 AM    RBC 4.04 (L) 04/01/2023 04:55 AM    HEMOGLOBIN 11.1 (L) 04/01/2023 04:55 AM    HEMATOCRIT 33.3 (L) 04/01/2023 04:55 AM    MCV 82.4 04/01/2023 04:55 AM    MCH 27.5 04/01/2023 04:55 AM    MCHC 33.3 (L) 04/01/2023 04:55 AM    RDW 39.5 04/01/2023 04:55 AM    PLATELETCT 274 04/01/2023 04:55 AM    MPV 9.3 04/01/2023 04:55 AM    NEUTSPOLYS 52.10 04/01/2023 04:55 AM    LYMPHOCYTES 35.60 04/01/2023 04:55 AM    MONOCYTES 9.20 04/01/2023 04:55 AM    EOSINOPHILS 2.10 04/01/2023 04:55 AM    EOSINOPHILS 3 05/08/2017 03:00 PM    BASOPHILS 0.70 04/01/2023 04:55 AM    IMMGRAN 0.30 04/01/2023 04:55 AM    NRBC 0.00 04/01/2023 04:55 AM    NEUTS 3.72 04/01/2023 04:55 AM    LYMPHS 2.54 04/01/2023 04:55 AM    LYMPHS 44 05/08/2017 03:00 PM    MONOS 0.66 04/01/2023 04:55 AM    EOS 0.15 04/01/2023 04:55 AM    BASO 0.05 04/01/2023 04:55 AM    IMMGRANAB 0.02 04/01/2023 04:55 AM    NRBCAB 0.00 04/01/2023 04:55 AM        CBC w/o DIFF  Lab Results   Component Value Date/Time    WBC 7.1 04/01/2023 04:55 AM    RBC 4.04 (L) 04/01/2023 04:55 AM    HEMOGLOBIN 11.1 (L) 04/01/2023 04:55 AM    MCV 82.4 04/01/2023 04:55 AM    MCH 27.5 04/01/2023 04:55 AM    MCHC 33.3 (L) 04/01/2023 04:55 AM    RDW 39.5 04/01/2023 04:55 AM    MPV 9.3 04/01/2023 04:55 AM       Prior echo/stress results reviewed: EF 40%    Prior cath results reviewed: PCI to LAD    EKG interpreted by me: NSR    Impression/Plan:  NSTEMI  CAD s/p PCI, CABG  DM  HTN  Chronic systolic heart failure  ICM    - Troponin elevated in setting of chest pain, concerning for ACS  - Given uncontrolled DM I am concerned for progressive CAD and potentially graft  failure  - Unfortunately I am not clear on graft anatomy  - Recommend LHC today given chest pain with known CAD and positive troponin, risks and benefits discussed  - On heparin gtt for now, continue DAPT, high intensity statin    Plan LHC today, continue to follow      Ayan Mendiola MD  Cardiac Electrophysiology

## 2023-04-01 NOTE — ASSESSMENT & PLAN NOTE
Patient recently discharged on MS Contin and oxycodone  Reordered home doses  Avoid escalating IV narcotics

## 2023-04-01 NOTE — TELEPHONE ENCOUNTER
[facility] CARDIOLOGY TRIAGE REPORT  Facility: [place], [place]  Physician: Shayne Valdovinos MD, ER physician  Chief complaint: Chest pain and lower abdominal pain  Pertinent history & patient course: 62-year-old female with a history of CAD, PCI proximal/mid LAD (3.5 x 12 mm, 3.0 x 38 mm ANA) 12/22/2018, CABG [facility] 15 months ago (records unavailable), IDDM, HTN, HLD, chronic back pain on chronic opioid therapy for therapeutic purposes was seen approximately a week ago at [facility] for epigastric pain.  Troponin level was 13.  Today presents with 48 hours of recurring lower abdominal pain and chest pain with bilateral arm and neck radiation with nausea and vomiting.  In the ER BP was 175/84.  EKG showed sinus rhythm, rate 94 with subtle lateral ST segment depression.  Troponin level 772.  WBC 8000.  Hgb 13.  Platelets 260,000.  Creatinine 0.91.  CXR unremarkable.  KUB unremarkable.  Given aspirin, topical nitrates, Zofran and full dose Lovenox given 5:15 PM with diminished chest pain and nausea.  Hospitalized [facility] 11/28/2022-12/8/2022 for altered mental status and hyperglycemia and A1c of 12.5% with hyperosmolar coma, hypernatremia and glucose greater than 1000.  Brain MRI showed chronic lacunar infarct right cerebellum but no acute abnormality.  Echocardiogram showed LVEF 40-45%.  Recently discharged from [facility].  Patient accepted for transfer: NSTEMI  Consultants to be called upon arrival: Cardiology    Please inform the cardiologist upon arrival of the patient to [facility].

## 2023-04-01 NOTE — PROGRESS NOTES
Hospital Medicine Daily Progress Note    Date of Service  4/1/2023    Chief Complaint  Julisa Carrion is a 62 y.o. female admitted 3/31/2023 with abdominal and chest pain    Hospital Course  62-year-old female with history of CAD with prior history of CABG PCI diabetes hypertension chronic pain syndrome presented to outside facility about a week ago with abdominal pain had normal troponin discharged home return with persistent symptoms and noted to have elevated troponin at 772 hyperglycemia.  She was transferred to our facility for cardiology evaluation for NSTEMI    Interval Problem Update    She is complaining of chest pain and upper abdominal pain 9/10 associated with nausea and dyspnea  Stat EKG ordered and reviewed with normal sinus rhythm no ST elevation  I ordered sublingual nitroglycerin and reordered her home narcotics  I ordered repeat troponin which is 198  I consulted Dr. Mendiola from cardiology  Reviewed CT abdomen pelvis negative for acute pathology      I have discussed this patient's plan of care and discharge plan at IDT rounds today with Case Management, Nursing, Nursing leadership, and other members of the IDT team.    Consultants/Specialty  cardiology    Code Status  Full Code    Disposition  Patient is not medically cleared for discharge.   Anticipate discharge to to home with close outpatient follow-up.  I have placed the appropriate orders for post-discharge needs.    Review of Systems  Review of Systems   Constitutional:  Positive for malaise/fatigue. Negative for fever.   Respiratory:  Positive for shortness of breath.    Cardiovascular:  Positive for chest pain.   Gastrointestinal:  Positive for abdominal pain and nausea.   All other systems reviewed and are negative.     Physical Exam  Temp:  [36.1 °C (97 °F)-36.5 °C (97.7 °F)] 36.5 °C (97.7 °F)  Pulse:  [76-99] 84  Resp:  [16-18] 16  BP: (105-148)/(54-89) 143/84  SpO2:  [92 %-96 %] 93 %    Physical Exam  Vitals and nursing note  reviewed.   Constitutional:       General: She is not in acute distress.  HENT:      Head: Normocephalic and atraumatic.      Nose: Nose normal. No rhinorrhea.      Mouth/Throat:      Pharynx: No oropharyngeal exudate or posterior oropharyngeal erythema.   Eyes:      General: No scleral icterus.        Right eye: No discharge.         Left eye: No discharge.   Cardiovascular:      Rate and Rhythm: Normal rate and regular rhythm.      Heart sounds: Normal heart sounds. No murmur heard.    No friction rub. No gallop.   Pulmonary:      Effort: Pulmonary effort is normal. No respiratory distress.      Breath sounds: No stridor. Rales present. No rhonchi.   Chest:      Chest wall: Tenderness present.   Abdominal:      General: Bowel sounds are normal. There is no distension.      Palpations: Abdomen is soft. There is no mass.      Tenderness: There is abdominal tenderness (Mild epigastric). There is no rebound.   Musculoskeletal:         General: No swelling or tenderness.      Cervical back: Neck supple. No rigidity.   Skin:     General: Skin is warm and dry.      Coloration: Skin is not cyanotic or jaundiced.      Nails: There is no clubbing.   Neurological:      General: No focal deficit present.      Mental Status: She is alert and oriented to person, place, and time.      Cranial Nerves: No cranial nerve deficit.      Motor: No weakness.   Psychiatric:         Mood and Affect: Mood normal.         Behavior: Behavior normal.       Fluids  No intake or output data in the 24 hours ending 04/01/23 1504    Laboratory  Recent Labs     04/01/23  0455   WBC 7.1   RBC 4.04*   HEMOGLOBIN 11.1*   HEMATOCRIT 33.3*   MCV 82.4   MCH 27.5   MCHC 33.3*   RDW 39.5   PLATELETCT 274   MPV 9.3     Recent Labs     04/01/23  0455   SODIUM 140   POTASSIUM 3.6   CHLORIDE 106   CO2 23   GLUCOSE 306*   BUN 19   CREATININE 0.78   CALCIUM 9.0     Recent Labs     03/31/23  2202   APTT 32.8   INR 1.11               Imaging  CT-RENAL COLIC  EVALUATION(A/P W/O)   Final Result         1.  Mild right renal pelviectasis, possibly parapelvic cyst or dilated extrarenal pelvis.   2.  No obstructive uropathy or urolithiasis is seen.   3.  Atherosclerosis.   4.  Prior cholecystectomy.               [id number] VIEW   Final Result         No specific finding to suggest small bowel obstruction.      EC-ECHOCARDIOGRAM COMPLETE W/O CONT    (Results Pending)   CL-LEFT HEART CATHETERIZATION WITH POSSIBLE INTERVENTION    (Results Pending)        Assessment/Plan  * NSTEMI (non-ST elevated myocardial infarction) (HCC)- (present on admission)  Assessment & Plan  Patient with multiple cardiovascular risk factors and known CAD status post PCI and CABG  Start metoprolol  Continue Plavix  Continue heparin drip  Resume home dose of Imdur  I have consulted cardiology patient will likely need further work-up with cardiac catheterization  Check echocardiogram  Consider ACE inhibitor if blood pressure and renal function stable    HLD (hyperlipidemia)- (present on admission)  Assessment & Plan  Continue atorvastatin increase dose to 80 mg    Polyneuropathy- (present on admission)  Assessment & Plan  Continue gabapentin    Opioid dependence (HCC)- (present on admission)  Assessment & Plan  Patient recently discharged on MS Contin and oxycodone  Reordered home doses  Avoid escalating IV narcotics    Obesity (BMI 30-39.9)- (present on admission)  Assessment & Plan  Body mass index is 35.7 kg/m².     Generalized abdominal pain- (present on admission)  Assessment & Plan  CT negative for acute pathology  Abdominal exam is benign  Monitor clinically    Diabetes (HCC)  Assessment & Plan  With hyperglycemia     Hemoglobin A1c 4 months ago 12.5    Increase Lantus to twice daily patient on 20 units twice daily at home  Continue sliding scale insulin monitor CBGs        Hyperlipidemia- (present on admission)  Assessment & Plan  High-dose atorvastatin         VTE prophylaxis: Heparin  drip    I have performed a physical exam and reviewed and updated ROS and Plan today (4/1/2023). In review of yesterday's note (3/31/2023), there are no changes except as documented above.

## 2023-04-02 ENCOUNTER — TELEPHONE (OUTPATIENT)
Dept: CARDIOLOGY | Facility: MEDICAL CENTER | Age: 63
End: 2023-04-02

## 2023-04-02 ENCOUNTER — APPOINTMENT (OUTPATIENT)
Dept: CARDIOLOGY | Facility: MEDICAL CENTER | Age: 63
DRG: 246 | End: 2023-04-02
Attending: NURSE PRACTITIONER
Payer: MEDICAID

## 2023-04-02 PROBLEM — I51.89 LEFT VENTRICULAR SYSTOLIC DYSFUNCTION, NYHA CLASS 3: Status: ACTIVE | Noted: 2023-04-02

## 2023-04-02 PROBLEM — I50.20 ACC/AHA STAGE C SYSTOLIC HEART FAILURE (HCC): Status: ACTIVE | Noted: 2023-04-02

## 2023-04-02 PROBLEM — I25.5 ISCHEMIC CARDIOMYOPATHY: Status: ACTIVE | Noted: 2023-04-02

## 2023-04-02 LAB
ACT BLD: 221 SEC (ref 74–137)
ACT BLD: 239 SEC (ref 74–137)
ACT BLD: 251 SEC (ref 74–137)
ACT BLD: 257 SEC (ref 74–137)
ACT BLD: 275 SEC (ref 74–137)
ANION GAP SERPL CALC-SCNC: 10 MMOL/L (ref 7–16)
BUN SERPL-MCNC: 22 MG/DL (ref 8–22)
CALCIUM SERPL-MCNC: 8.8 MG/DL (ref 8.5–10.5)
CHLORIDE SERPL-SCNC: 104 MMOL/L (ref 96–112)
CO2 SERPL-SCNC: 23 MMOL/L (ref 20–33)
CREAT SERPL-MCNC: 0.97 MG/DL (ref 0.5–1.4)
EKG IMPRESSION: NORMAL
ERYTHROCYTE [DISTWIDTH] IN BLOOD BY AUTOMATED COUNT: 39.8 FL (ref 35.9–50)
GFR SERPLBLD CREATININE-BSD FMLA CKD-EPI: 67 ML/MIN/1.73 M 2
GLUCOSE BLD STRIP.AUTO-MCNC: 187 MG/DL (ref 65–99)
GLUCOSE BLD STRIP.AUTO-MCNC: 247 MG/DL (ref 65–99)
GLUCOSE BLD STRIP.AUTO-MCNC: 258 MG/DL (ref 65–99)
GLUCOSE BLD STRIP.AUTO-MCNC: 319 MG/DL (ref 65–99)
GLUCOSE BLD STRIP.AUTO-MCNC: 401 MG/DL (ref 65–99)
GLUCOSE SERPL-MCNC: 297 MG/DL (ref 65–99)
HCT VFR BLD AUTO: 31.9 % (ref 37–47)
HGB BLD-MCNC: 10.4 G/DL (ref 12–16)
LV EJECT FRACT  99904: 45
LV EJECT FRACT MOD 2C 99903: 51.81
LV EJECT FRACT MOD 4C 99902: 50.2
LV EJECT FRACT MOD BP 99901: 49.38
MAGNESIUM SERPL-MCNC: 2 MG/DL (ref 1.5–2.5)
MCH RBC QN AUTO: 27.2 PG (ref 27–33)
MCHC RBC AUTO-ENTMCNC: 32.6 G/DL (ref 33.6–35)
MCV RBC AUTO: 83.3 FL (ref 81.4–97.8)
PLATELET # BLD AUTO: 279 K/UL (ref 164–446)
PMV BLD AUTO: 9.4 FL (ref 9–12.9)
POTASSIUM SERPL-SCNC: 4.3 MMOL/L (ref 3.6–5.5)
RBC # BLD AUTO: 3.83 M/UL (ref 4.2–5.4)
SODIUM SERPL-SCNC: 137 MMOL/L (ref 135–145)
UFH PPP CHRO-ACNC: 0.44 IU/ML
WBC # BLD AUTO: 8.4 K/UL (ref 4.8–10.8)

## 2023-04-02 PROCEDURE — 700102 HCHG RX REV CODE 250 W/ 637 OVERRIDE(OP): Performed by: STUDENT IN AN ORGANIZED HEALTH CARE EDUCATION/TRAINING PROGRAM

## 2023-04-02 PROCEDURE — 99232 SBSQ HOSP IP/OBS MODERATE 35: CPT | Performed by: HOSPITALIST

## 2023-04-02 PROCEDURE — 4A023N7 MEASUREMENT OF CARDIAC SAMPLING AND PRESSURE, LEFT HEART, PERCUTANEOUS APPROACH: ICD-10-PCS | Performed by: INTERNAL MEDICINE

## 2023-04-02 PROCEDURE — 92978 ENDOLUMINL IVUS OCT C 1ST: CPT

## 2023-04-02 PROCEDURE — 36415 COLL VENOUS BLD VENIPUNCTURE: CPT

## 2023-04-02 PROCEDURE — B2111ZZ FLUOROSCOPY OF MULTIPLE CORONARY ARTERIES USING LOW OSMOLAR CONTRAST: ICD-10-PCS | Performed by: INTERNAL MEDICINE

## 2023-04-02 PROCEDURE — 99152 MOD SED SAME PHYS/QHP 5/>YRS: CPT | Performed by: INTERNAL MEDICINE

## 2023-04-02 PROCEDURE — 93005 ELECTROCARDIOGRAM TRACING: CPT | Performed by: INTERNAL MEDICINE

## 2023-04-02 PROCEDURE — 02F03ZZ FRAGMENTATION IN CORONARY ARTERY, ONE ARTERY, PERCUTANEOUS APPROACH: ICD-10-PCS | Performed by: INTERNAL MEDICINE

## 2023-04-02 PROCEDURE — 700111 HCHG RX REV CODE 636 W/ 250 OVERRIDE (IP)

## 2023-04-02 PROCEDURE — 93306 TTE W/DOPPLER COMPLETE: CPT | Mod: 26 | Performed by: INTERNAL MEDICINE

## 2023-04-02 PROCEDURE — B2131ZZ FLUOROSCOPY OF MULTIPLE CORONARY ARTERY BYPASS GRAFTS USING LOW OSMOLAR CONTRAST: ICD-10-PCS | Performed by: INTERNAL MEDICINE

## 2023-04-02 PROCEDURE — 700102 HCHG RX REV CODE 250 W/ 637 OVERRIDE(OP)

## 2023-04-02 PROCEDURE — 700102 HCHG RX REV CODE 250 W/ 637 OVERRIDE(OP): Performed by: HOSPITALIST

## 2023-04-02 PROCEDURE — 02703ZZ DILATION OF CORONARY ARTERY, ONE ARTERY, PERCUTANEOUS APPROACH: ICD-10-PCS | Performed by: INTERNAL MEDICINE

## 2023-04-02 PROCEDURE — A9270 NON-COVERED ITEM OR SERVICE: HCPCS | Performed by: HOSPITALIST

## 2023-04-02 PROCEDURE — 93010 ELECTROCARDIOGRAM REPORT: CPT | Mod: 59 | Performed by: INTERNAL MEDICINE

## 2023-04-02 PROCEDURE — 80048 BASIC METABOLIC PNL TOTAL CA: CPT

## 2023-04-02 PROCEDURE — 85520 HEPARIN ASSAY: CPT

## 2023-04-02 PROCEDURE — 700117 HCHG RX CONTRAST REV CODE 255: Performed by: INTERNAL MEDICINE

## 2023-04-02 PROCEDURE — 770020 HCHG ROOM/CARE - TELE (206)

## 2023-04-02 PROCEDURE — A9270 NON-COVERED ITEM OR SERVICE: HCPCS | Performed by: INTERNAL MEDICINE

## 2023-04-02 PROCEDURE — 93459 L HRT ART/GRFT ANGIO: CPT | Mod: 26,59 | Performed by: INTERNAL MEDICINE

## 2023-04-02 PROCEDURE — 0715T PR PERQ TRANSLUMINAL CORONARY LITHOTRIPSY: CPT | Mod: LC | Performed by: INTERNAL MEDICINE

## 2023-04-02 PROCEDURE — 83735 ASSAY OF MAGNESIUM: CPT

## 2023-04-02 PROCEDURE — 92943 PRQ TRLUML REVSC CH OCC ANT: CPT | Mod: LC | Performed by: INTERNAL MEDICINE

## 2023-04-02 PROCEDURE — 99233 SBSQ HOSP IP/OBS HIGH 50: CPT | Performed by: INTERNAL MEDICINE

## 2023-04-02 PROCEDURE — B240ZZ3 ULTRASONOGRAPHY OF SINGLE CORONARY ARTERY, INTRAVASCULAR: ICD-10-PCS | Performed by: INTERNAL MEDICINE

## 2023-04-02 PROCEDURE — 92978 ENDOLUMINL IVUS OCT C 1ST: CPT | Mod: 26,LC | Performed by: INTERNAL MEDICINE

## 2023-04-02 PROCEDURE — 82962 GLUCOSE BLOOD TEST: CPT

## 2023-04-02 PROCEDURE — A9270 NON-COVERED ITEM OR SERVICE: HCPCS

## 2023-04-02 PROCEDURE — 92920 PRQ TRLUML C ANGIOP 1ART&/BR: CPT | Mod: 59,LD | Performed by: INTERNAL MEDICINE

## 2023-04-02 PROCEDURE — A9270 NON-COVERED ITEM OR SERVICE: HCPCS | Performed by: STUDENT IN AN ORGANIZED HEALTH CARE EDUCATION/TRAINING PROGRAM

## 2023-04-02 PROCEDURE — 85027 COMPLETE CBC AUTOMATED: CPT

## 2023-04-02 PROCEDURE — 027034Z DILATION OF CORONARY ARTERY, ONE ARTERY WITH DRUG-ELUTING INTRALUMINAL DEVICE, PERCUTANEOUS APPROACH: ICD-10-PCS | Performed by: INTERNAL MEDICINE

## 2023-04-02 PROCEDURE — 700102 HCHG RX REV CODE 250 W/ 637 OVERRIDE(OP): Performed by: INTERNAL MEDICINE

## 2023-04-02 PROCEDURE — 85347 COAGULATION TIME ACTIVATED: CPT | Mod: 91

## 2023-04-02 PROCEDURE — 700101 HCHG RX REV CODE 250

## 2023-04-02 PROCEDURE — 93306 TTE W/DOPPLER COMPLETE: CPT

## 2023-04-02 RX ORDER — LOPERAMIDE HYDROCHLORIDE 2 MG/1
2 CAPSULE ORAL 4 TIMES DAILY PRN
Status: DISCONTINUED | OUTPATIENT
Start: 2023-04-02 | End: 2023-04-04 | Stop reason: HOSPADM

## 2023-04-02 RX ORDER — SODIUM CHLORIDE 9 MG/ML
INJECTION, SOLUTION INTRAVENOUS CONTINUOUS
Status: DISCONTINUED | OUTPATIENT
Start: 2023-04-03 | End: 2023-04-04 | Stop reason: HOSPADM

## 2023-04-02 RX ORDER — MIDAZOLAM HYDROCHLORIDE 1 MG/ML
INJECTION INTRAMUSCULAR; INTRAVENOUS
Status: COMPLETED
Start: 2023-04-02 | End: 2023-04-02

## 2023-04-02 RX ORDER — CLOPIDOGREL BISULFATE 75 MG/1
75 TABLET ORAL DAILY
Status: DISCONTINUED | OUTPATIENT
Start: 2023-04-03 | End: 2023-04-04 | Stop reason: HOSPADM

## 2023-04-02 RX ORDER — HEPARIN SODIUM 1000 [USP'U]/ML
INJECTION, SOLUTION INTRAVENOUS; SUBCUTANEOUS
Status: COMPLETED
Start: 2023-04-02 | End: 2023-04-02

## 2023-04-02 RX ORDER — VERAPAMIL HYDROCHLORIDE 2.5 MG/ML
INJECTION, SOLUTION INTRAVENOUS
Status: COMPLETED
Start: 2023-04-02 | End: 2023-04-02

## 2023-04-02 RX ORDER — OMEPRAZOLE 20 MG/1
20 CAPSULE, DELAYED RELEASE ORAL DAILY
Status: DISCONTINUED | OUTPATIENT
Start: 2023-04-02 | End: 2023-04-04 | Stop reason: HOSPADM

## 2023-04-02 RX ORDER — DAPAGLIFLOZIN 10 MG/1
10 TABLET, FILM COATED ORAL DAILY
Status: DISCONTINUED | OUTPATIENT
Start: 2023-04-02 | End: 2023-04-04 | Stop reason: HOSPADM

## 2023-04-02 RX ORDER — SPIRONOLACTONE 25 MG/1
25 TABLET ORAL
Status: DISCONTINUED | OUTPATIENT
Start: 2023-04-02 | End: 2023-04-04 | Stop reason: HOSPADM

## 2023-04-02 RX ORDER — CLOPIDOGREL 300 MG/1
TABLET, FILM COATED ORAL
Status: COMPLETED
Start: 2023-04-02 | End: 2023-04-02

## 2023-04-02 RX ORDER — HEPARIN SODIUM 200 [USP'U]/100ML
INJECTION, SOLUTION INTRAVENOUS
Status: COMPLETED
Start: 2023-04-02 | End: 2023-04-02

## 2023-04-02 RX ORDER — INSULIN LISPRO 100 [IU]/ML
1-6 INJECTION, SOLUTION INTRAVENOUS; SUBCUTANEOUS
Status: DISCONTINUED | OUTPATIENT
Start: 2023-04-02 | End: 2023-04-03

## 2023-04-02 RX ORDER — INSULIN LISPRO 100 [IU]/ML
0.2 INJECTION, SOLUTION INTRAVENOUS; SUBCUTANEOUS
Status: DISCONTINUED | OUTPATIENT
Start: 2023-04-02 | End: 2023-04-03

## 2023-04-02 RX ORDER — NITROGLYCERIN 0.4 MG/1
TABLET SUBLINGUAL
Status: COMPLETED
Start: 2023-04-02 | End: 2023-04-02

## 2023-04-02 RX ORDER — METOPROLOL SUCCINATE 50 MG/1
50 TABLET, EXTENDED RELEASE ORAL
Status: DISCONTINUED | OUTPATIENT
Start: 2023-04-02 | End: 2023-04-04 | Stop reason: HOSPADM

## 2023-04-02 RX ORDER — CLOPIDOGREL BISULFATE 75 MG/1
300 TABLET ORAL ONCE
Status: DISCONTINUED | OUTPATIENT
Start: 2023-04-02 | End: 2023-04-02

## 2023-04-02 RX ORDER — LIDOCAINE HYDROCHLORIDE 20 MG/ML
INJECTION, SOLUTION INFILTRATION; PERINEURAL
Status: COMPLETED
Start: 2023-04-02 | End: 2023-04-02

## 2023-04-02 RX ADMIN — CLOPIDOGREL BISULFATE 300 MG: 300 TABLET, FILM COATED ORAL at 10:59

## 2023-04-02 RX ADMIN — GABAPENTIN 800 MG: 400 CAPSULE ORAL at 04:14

## 2023-04-02 RX ADMIN — HEPARIN SODIUM 2000 UNITS: 200 INJECTION, SOLUTION INTRAVENOUS at 09:15

## 2023-04-02 RX ADMIN — OXYCODONE HYDROCHLORIDE 5 MG: 5 TABLET ORAL at 16:26

## 2023-04-02 RX ADMIN — GABAPENTIN 800 MG: 400 CAPSULE ORAL at 11:57

## 2023-04-02 RX ADMIN — METOPROLOL SUCCINATE 50 MG: 50 TABLET, EXTENDED RELEASE ORAL at 17:32

## 2023-04-02 RX ADMIN — LIDOCAINE HYDROCHLORIDE: 20 INJECTION, SOLUTION INFILTRATION; PERINEURAL at 09:13

## 2023-04-02 RX ADMIN — IOHEXOL 79 ML: 350 INJECTION, SOLUTION INTRAVENOUS at 10:56

## 2023-04-02 RX ADMIN — MIDAZOLAM HYDROCHLORIDE 2 MG: 1 INJECTION, SOLUTION INTRAMUSCULAR; INTRAVENOUS at 10:22

## 2023-04-02 RX ADMIN — INSULIN LISPRO 6 UNITS: 100 INJECTION, SOLUTION INTRAVENOUS; SUBCUTANEOUS at 12:00

## 2023-04-02 RX ADMIN — HEPARIN SODIUM: 1000 INJECTION, SOLUTION INTRAVENOUS; SUBCUTANEOUS at 09:16

## 2023-04-02 RX ADMIN — OXYCODONE HYDROCHLORIDE 5 MG: 5 TABLET ORAL at 11:57

## 2023-04-02 RX ADMIN — OXYCODONE HYDROCHLORIDE 5 MG: 5 TABLET ORAL at 20:36

## 2023-04-02 RX ADMIN — OXYCODONE HYDROCHLORIDE 5 MG: 5 TABLET ORAL at 04:15

## 2023-04-02 RX ADMIN — METOPROLOL TARTRATE 25 MG: 25 TABLET, FILM COATED ORAL at 04:15

## 2023-04-02 RX ADMIN — VERAPAMIL HYDROCHLORIDE 2.5 MG: 2.5 INJECTION, SOLUTION INTRAVENOUS at 09:15

## 2023-04-02 RX ADMIN — HEPARIN SODIUM: 1000 INJECTION, SOLUTION INTRAVENOUS; SUBCUTANEOUS at 09:59

## 2023-04-02 RX ADMIN — ATORVASTATIN CALCIUM 80 MG: 80 TABLET, FILM COATED ORAL at 16:26

## 2023-04-02 RX ADMIN — OMEPRAZOLE 20 MG: 20 CAPSULE, DELAYED RELEASE ORAL at 16:26

## 2023-04-02 RX ADMIN — MORPHINE SULFATE 30 MG: 15 TABLET, FILM COATED, EXTENDED RELEASE ORAL at 17:32

## 2023-04-02 RX ADMIN — INSULIN LISPRO 3 UNITS: 100 INJECTION, SOLUTION INTRAVENOUS; SUBCUTANEOUS at 12:02

## 2023-04-02 RX ADMIN — FENTANYL CITRATE 100 MCG: 50 INJECTION, SOLUTION INTRAMUSCULAR; INTRAVENOUS at 09:18

## 2023-04-02 RX ADMIN — OXYCODONE HYDROCHLORIDE 5 MG: 5 TABLET ORAL at 00:13

## 2023-04-02 RX ADMIN — MIDAZOLAM HYDROCHLORIDE 2 MG: 1 INJECTION, SOLUTION INTRAMUSCULAR; INTRAVENOUS at 09:18

## 2023-04-02 RX ADMIN — INSULIN LISPRO 6 UNITS: 100 INJECTION, SOLUTION INTRAVENOUS; SUBCUTANEOUS at 17:45

## 2023-04-02 RX ADMIN — TRAZODONE HYDROCHLORIDE 150 MG: 100 TABLET ORAL at 21:22

## 2023-04-02 RX ADMIN — GABAPENTIN 800 MG: 400 CAPSULE ORAL at 16:26

## 2023-04-02 RX ADMIN — FENTANYL CITRATE 100 MCG: 50 INJECTION, SOLUTION INTRAMUSCULAR; INTRAVENOUS at 10:22

## 2023-04-02 RX ADMIN — INSULIN LISPRO 1 UNITS: 100 INJECTION, SOLUTION INTRAVENOUS; SUBCUTANEOUS at 20:46

## 2023-04-02 RX ADMIN — INSULIN LISPRO 4 UNITS: 100 INJECTION, SOLUTION INTRAVENOUS; SUBCUTANEOUS at 17:45

## 2023-04-02 RX ADMIN — HUMAN ALBUMIN MICROSPHERES AND PERFLUTREN 3 ML: 10; .22 INJECTION, SOLUTION INTRAVENOUS at 08:36

## 2023-04-02 RX ADMIN — FENTANYL CITRATE 50 MCG: 50 INJECTION, SOLUTION INTRAMUSCULAR; INTRAVENOUS at 10:56

## 2023-04-02 RX ADMIN — MORPHINE SULFATE 30 MG: 15 TABLET, FILM COATED, EXTENDED RELEASE ORAL at 06:04

## 2023-04-02 RX ADMIN — SPIRONOLACTONE 25 MG: 25 TABLET ORAL at 12:45

## 2023-04-02 RX ADMIN — NITROGLYCERIN 10 ML: 20 INJECTION INTRAVENOUS at 09:15

## 2023-04-02 RX ADMIN — SACUBITRIL AND VALSARTAN 1 TABLET: 24; 26 TABLET, FILM COATED ORAL at 17:32

## 2023-04-02 RX ADMIN — ASPIRIN 81 MG 81 MG: 81 TABLET ORAL at 04:10

## 2023-04-02 RX ADMIN — ISOSORBIDE MONONITRATE 30 MG: 30 TABLET, EXTENDED RELEASE ORAL at 04:14

## 2023-04-02 RX ADMIN — DAPAGLIFLOZIN 10 MG: 10 TABLET, FILM COATED ORAL at 12:43

## 2023-04-02 RX ADMIN — CLOPIDOGREL BISULFATE 75 MG: 75 TABLET ORAL at 04:14

## 2023-04-02 ASSESSMENT — ENCOUNTER SYMPTOMS
BACK PAIN: 1
ABDOMINAL PAIN: 0
SHORTNESS OF BREATH: 1
DIARRHEA: 1
NAUSEA: 0

## 2023-04-02 ASSESSMENT — FIBROSIS 4 INDEX: FIB4 SCORE: 0.52

## 2023-04-02 NOTE — ASSESSMENT & PLAN NOTE
Clinically euvolemic    Continue Toprol-XL  Patient started on Entresto farxiga and spironolactone  Clinically euvolemic

## 2023-04-02 NOTE — CARE PLAN
The patient is Stable - Low risk of patient condition declining or worsening    Shift Goals  Clinical Goals: cardiac cath, heparin gtt, DM mgmt  Patient Goals: comfort  Family Goals: [facility]    Progress made toward(s) clinical / shift goals:        Problem: Knowledge Deficit - Standard  Goal: Patient and family/care givers will demonstrate understanding of plan of care, disease process/condition, diagnostic tests and medications  Outcome: Progressing     Problem: Psychosocial  Goal: Patient's level of anxiety will decrease  Outcome: Progressing     Problem: Communication  Goal: The ability to communicate needs accurately and effectively will improve  Outcome: Progressing     Problem: Nutrition  Goal: Patient's nutritional and fluid intake will be adequate or improve  Outcome: Progressing     Problem: Self Care  Goal: Patient will have the ability to perform ADLs independently or with assistance (bathe, groom, dress, toilet and feed)  Outcome: Progressing       Patient is not progressing towards the following goals:

## 2023-04-02 NOTE — PROGRESS NOTES
Hospital Medicine Daily Progress Note    Date of Service  4/2/2023    Chief Complaint  Julisa Carrion is a 62 y.o. female admitted 3/31/2023 with abdominal and chest pain    Hospital Course  62-year-old female with history of CAD with prior history of CABG PCI diabetes hypertension chronic pain syndrome presented to outside facility about a week ago with abdominal pain had normal troponin discharged home return with persistent symptoms and noted to have elevated troponin at 772 hyperglycemia.  She was transferred to our facility for cardiology evaluation for NSTEMI    Interval Problem Update    Underwent PCI to ostial proximal circumflex  Discussed with interventional cardiology has residual severe proximal right PDA stenosis with planned staged PCI  Chest pain significantly improved  Denies nausea  CBGs elevated will resume Lantus        I have discussed this patient's plan of care and discharge plan at IDT rounds today with Case Management, Nursing, Nursing leadership, and other members of the IDT team.    Consultants/Specialty  cardiology    Code Status  Full Code    Disposition  Patient is not medically cleared for discharge.   Anticipate discharge to to home with close outpatient follow-up.  I have placed the appropriate orders for post-discharge needs.    Review of Systems  Review of Systems   Constitutional:  Positive for malaise/fatigue.   Respiratory:  Positive for shortness of breath.    Gastrointestinal:  Positive for diarrhea. Negative for abdominal pain and nausea.   Musculoskeletal:  Positive for back pain.   All other systems reviewed and are negative.     Physical Exam  Temp:  [36.2 °C (97.2 °F)-36.5 °C (97.7 °F)] 36.5 °C (97.7 °F)  Pulse:  [63-83] 63  Resp:  [16-18] 16  BP: ()/(51-70) 106/57  SpO2:  [92 %-97 %] 94 %    Physical Exam  Vitals and nursing note reviewed.   Constitutional:       General: She is not in acute distress.  HENT:      Head: Normocephalic and atraumatic.      Nose:  Nose normal. No rhinorrhea.      Mouth/Throat:      Pharynx: No oropharyngeal exudate or posterior oropharyngeal erythema.   Eyes:      General: No scleral icterus.        Right eye: No discharge.         Left eye: No discharge.   Cardiovascular:      Rate and Rhythm: Normal rate and regular rhythm.      Heart sounds: Normal heart sounds. No murmur heard.    No friction rub. No gallop.   Pulmonary:      Effort: Pulmonary effort is normal. No respiratory distress.      Breath sounds: No stridor. Rales present. No wheezing or rhonchi.   Chest:      Chest wall: No tenderness.   Abdominal:      General: There is no distension.      Palpations: Abdomen is soft. There is no mass.      Tenderness: There is no abdominal tenderness. There is no rebound.   Musculoskeletal:         General: No swelling or tenderness.      Cervical back: Neck supple. No rigidity.   Skin:     General: Skin is warm and dry.      Coloration: Skin is not cyanotic or jaundiced.      Nails: There is no clubbing.   Neurological:      General: No focal deficit present.      Mental Status: She is alert and oriented to person, place, and time.      Cranial Nerves: No cranial nerve deficit.      Motor: No weakness.   Psychiatric:         Mood and Affect: Mood normal.         Behavior: Behavior normal.       Fluids    Intake/Output Summary (Last 24 hours) at 4/2/2023 1556  Last data filed at 4/2/2023 0400  Gross per 24 hour   Intake 480 ml   Output 600 ml   Net -120 ml       Laboratory  Recent Labs     04/01/23  0455 04/02/23  0242   WBC 7.1 8.4   RBC 4.04* 3.83*   HEMOGLOBIN 11.1* 10.4*   HEMATOCRIT 33.3* 31.9*   MCV 82.4 83.3   MCH 27.5 27.2   MCHC 33.3* 32.6*   RDW 39.5 39.8   PLATELETCT 274 279   MPV 9.3 9.4       Recent Labs     04/01/23  0455 04/02/23  0242   SODIUM 140 137   POTASSIUM 3.6 4.3   CHLORIDE 106 104   CO2 23 23   GLUCOSE 306* 297*   BUN 19 22   CREATININE 0.78 0.97   CALCIUM 9.0 8.8       Recent Labs     03/31/23 2202   APTT 32.8   INR  1.11                 Imaging  EC-ECHOCARDIOGRAM COMPLETE W/ CONT   Final Result      CT-RENAL COLIC EVALUATION(A/P W/O)   Final Result         1.  Mild right renal pelviectasis, possibly parapelvic cyst or dilated extrarenal pelvis.   2.  No obstructive uropathy or urolithiasis is seen.   3.  Atherosclerosis.   4.  Prior cholecystectomy.               [id number] VIEW   Final Result         No specific finding to suggest small bowel obstruction.      CL-LEFT HEART CATHETERIZATION WITH POSSIBLE INTERVENTION    (Results Pending)          Assessment/Plan  * NSTEMI (non-ST elevated myocardial infarction) (HCC)- (present on admission)  Assessment & Plan  Patient with multiple cardiovascular risk factors and known CAD status post PCI and CABG    Status post PCI to ostial circumflex with residual severe PDA stenosis scheduled for staged PCI  Echo EF 45-50%  Continue metoprolol switch to SR  Continue aspirin Plavix        Ischemic cardiomyopathy  Assessment & Plan  Clinically euvolemic    Continue Toprol-XL  Patient started on Entresto farxiga and spironolactone  Discussed with cardiology    HLD (hyperlipidemia)- (present on admission)  Assessment & Plan  Continue atorvastatin     Polyneuropathy- (present on admission)  Assessment & Plan  Continue gabapentin    Opioid dependence (HCC)- (present on admission)  Assessment & Plan  Patient recently discharged on MS Contin and oxycodone  Reordered home doses  Avoid escalating IV narcotics    Obesity (BMI 30-39.9)- (present on admission)  Assessment & Plan  Body mass index is 35.7 kg/m².   Counseled on lifestyle changes    Generalized abdominal pain- (present on admission)  Assessment & Plan  CT negative for acute pathology  Abdominal exam is benign  Improved monitor clinically    Diabetes (HCC)  Assessment & Plan  With hyperglycemia     Hemoglobin A1c 4 months ago 12.5    Resume Lantus hold a.m. dose when n.p.o.  Continue sliding scale insulin monitor  CBGs        Hyperlipidemia- (present on admission)  Assessment & Plan  High-dose atorvastatin         VTE prophylaxis: Heparin drip    I have performed a physical exam and reviewed and updated ROS and Plan today (4/2/2023). In review of yesterday's note (4/1/2023), there are no changes except as documented above.

## 2023-04-02 NOTE — CARE PLAN
Problem: Knowledge Deficit - Standard  Goal: Patient and family/care givers will demonstrate understanding of plan of care, disease process/condition, diagnostic tests and medications  Outcome: Progressing     Problem: Psychosocial  Goal: Patient's level of anxiety will decrease  Outcome: Progressing  Goal: Patient's ability to verbalize feelings about condition will improve  Outcome: Progressing  Goal: Patient's ability to re-evaluate and adapt role responsibilities will improve  Outcome: Progressing  Goal: Patient and family will demonstrate ability to cope with life altering diagnosis and/or procedure  Outcome: Progressing  Goal: Spiritual and cultural needs incorporated into hospitalization  Outcome: Progressing     Problem: Communication  Goal: The ability to communicate needs accurately and effectively will improve  Outcome: Progressing     Problem: Discharge Barriers/Planning  Goal: Patient's continuum of care needs are met  Outcome: Progressing     Problem: Hemodynamics  Goal: Patient's hemodynamics, fluid balance and neurologic status will be stable or improve  Outcome: Progressing     Problem: Respiratory  Goal: Patient will achieve/maintain optimum respiratory ventilation and gas exchange  Outcome: Progressing     Problem: Chest Tube Management  Goal: Complications related to chest tube will be avoided or minimized  Outcome: Progressing     Problem: Fluid Volume  Goal: Fluid volume balance will be maintained  Outcome: Progressing     Problem: Mechanical Ventilation  Goal: Safe management of artificial airway and ventilation  Outcome: Progressing  Goal: Successful weaning off mechanical ventilator, spontaneously maintains adequate gas exchange  Outcome: Progressing  Goal: Patient will be able to express needs and understand communication  Outcome: Progressing     Problem: Dysphagia  Goal: Dysphagia will improve  Outcome: Progressing     Problem: Risk for Aspiration  Goal: Patient's risk for aspiration  will be absent or decrease  Outcome: Progressing     Problem: Nutrition  Goal: Patient's nutritional and fluid intake will be adequate or improve  Outcome: Progressing  Goal: Enteral nutrition will be maintained or improve  Outcome: Progressing  Goal: Enteral nutrition will be maintained or improve  Outcome: Progressing     Problem: Urinary Elimination  Goal: Establish and maintain regular urinary output  Outcome: Progressing     Problem: Bowel Elimination  Goal: Establish and maintain regular bowel function  Outcome: Progressing     Problem: Gastrointestinal Irritability  Goal: Nausea and vomiting will be absent or improve  Outcome: Progressing  Goal: Diarrhea will be absent or improved  Outcome: Progressing     Problem: Rectal Tube  Goal: Fecal output will be contained and skin will remain free from irritation  Outcome: Progressing     Problem: Mobility  Goal: Patient's capacity to carry out activities will improve  Outcome: Progressing     Problem: Self Care  Goal: Patient will have the ability to perform ADLs independently or with assistance (bathe, groom, dress, toilet and feed)  Outcome: Progressing     Problem: Infection - Standard  Goal: Patient will remain free from infection  Outcome: Progressing     Problem: Wound/ / Incision Healing  Goal: Patient's wound/surgical incision will decrease in size and heals properly  Outcome: Progressing     Problem: Pain - Standard  Goal: Alleviation of pain or a reduction in pain to the patient’s comfort goal  Outcome: Progressing   The patient is Stable - Low risk of patient condition declining or worsening    Shift Goals  Clinical Goals: heart cath, npo for cath, mobility  Patient Goals: comfort  Family Goals: jenna    Progress made toward(s) clinical / shift goals:  patient maintains mobility, patient receives heart cath,     Patient is not progressing towards the following goals:

## 2023-04-02 NOTE — PROGRESS NOTES
Cardiology Progress Note:    Liz Tamayo M.D.  Date & Time note created:    4/2/2023   12:41 PM     Referring MD:  Dr. Gladys Tellez    Patient ID:   Name:             Julisa Carrion   YOB: 1960  Age:                 62 y.o.  female   MRN:               [id number]                                                             Chief Complaint / Reason for consult:  NSTEMI    History of Present Illness:    This is a 62 years old woman with complex history of coronary arterial disease status post CABG along with PCI, hypertension, hyperlipidemia, diabetes, presented to the hospital with acute coronary syndrome.    On April 2, 2023, patient underwent coronary angiogram and cardiac cath with PCI and stent placement of ostial LAD.  She does have residual disease in the proximal right PDA, OM1.    Patient also had a transthoracic echocardiogram which showed LV function of 40%, no significant valvular disease.  I personally interpreted the images of her transthoracic echocardiogram.    I personally interpreted the EKG tracing as well.  Sinus rhythm without evidence of ST elevation.    Overnight events:  No acute events overnight.  No telemetry events.      Review of Systems:      Constitutional: Denies fevers, Denies weight changes  Eyes: Denies changes in vision, no eye pain  Ears/Nose/Throat/Mouth: Denies nasal congestion or sore throat   Cardiovascular: no chest pain, no palpitations   Respiratory: no shortness of breath , Denies cough  Gastrointestinal/Hepatic: Denies abdominal pain, nausea, vomiting, diarrhea, constipation or GI bleeding   Genitourinary: Denies dysuria or frequency  Musculoskeletal/Rheum: Denies  joint pain and swelling   Skin: Denies rash  Neurological: Denies headache, confusion, memory loss or focal weakness/parasthesias  Psychiatric: denies mood disorder   Endocrine: Racquel thyroid problems  Heme/Oncology/Lymph Nodes: Denies enlarged lymph nodes, denies brusing or known  bleeding disorder  All other systems were reviewed and are negative (AMA/CMS criteria)                Past Medical History:   Past Medical History:   Diagnosis Date    ACC/AHA stage C systolic heart failure (HCC) 2023    CAD (coronary artery disease)     PER [facility]    Congestive heart failure (HCC)     PER [facility]    Diabetes     Diabetes (HCC)     PER [facility]    Diverticulosis     PER [facility]    GERD (gastroesophageal reflux disease)     PER [facility]    High cholesterol 5/15/2011    Hypertension     Intestinal mass     Ischemic cardiomyopathy 2023    Left ventricular systolic dysfunction, NYHA class 3 2023    Migraine     Staph skin infection      Active Hospital Problems    Diagnosis     Generalized abdominal pain [R10.84]      Priority: Medium    Polyneuropathy [G62.9]      Priority: Low    Opioid dependence ([facility]) [F11.20]      Priority: Low    Obesity (BMI 30-39.9) [E66.9]      Priority: Low    Diabetes ([facility]) [E11.9]      Priority: Low    Hyperlipidemia [E78.5]      Priority: Low    Ischemic cardiomyopathy [I25.5]     ACC/AHA stage C systolic heart failure (HCC) [I50.20]     Left ventricular systolic dysfunction, NYHA class 3 [I51.89]     NSTEMI (non-ST elevated myocardial infarction) (HCC) [I21.4]     HLD (hyperlipidemia) [E78.5]        Past Surgical History:  Past Surgical History:   Procedure Laterality Date    STENT PLACEMENT  2018    2 cardiac stents    GASTROSCOPY  9/3/2017    Procedure: GASTROSCOPY WITH DILATION;  Surgeon: Kerri Carter M.D.;  Location: SURGERY [facility];  Service: Gastroenterology    COLONOSCOPY  9/3/2017    Procedure: COLONOSCOPY;  Surgeon: Kerri Carter M.D.;  Location: [facility];  Service: Gastroenterology    OTHER ORTHOPEDIC SURGERY      right wrist surgery    ABDOMINAL EXPLORATION      Lower intestine d/t mass - benign    GYN SURGERY       x  3,tubal ligation    OTHER ABDOMINAL SURGERY      cholecystectomy    OTHER CARDIAC SURGERY      PER [facility] 11/04/2019 4 STENTS; 05/01/2019 BALLOON ANGIOPLASTY R RAMUS INTERMEDIATE BRANCH; STERNOTOMY       Hospital Medications:    Current Facility-Administered Medications:     [START ON 4/3/2023] clopidogrel (PLAVIX) tablet 75 mg, 75 mg, Oral, DAILY, Julio Velázquez M.D.    metoprolol SR (TOPROL XL) tablet 50 mg, 50 mg, Oral, Q DAY, Liz Tamayo M.D.    sacubitril-valsartan (Entresto) 24-26 MG 1 Tablet, 1 Tablet, Oral, BID, Liz Tamayo M.D.    dapagliflozin propanediol (Farxiga) tablet 10 mg, 10 mg, Oral, DAILY, Liz Tamayo M.D.    gabapentin (NEURONTIN) capsule 800 mg, 800 mg, Oral, TID, Herminio Tellez M.D., 800 mg at 04/02/23 1157    nitroglycerin (NITROSTAT) tablet 0.4 mg, 0.4 mg, Sublingual, Q5 MIN PRN, Herminio Tellez M.D.    morphine ER (MS CONTIN) tablet 30 mg, 30 mg, Oral, Q12HRS, Herminio Tellez M.D., 30 mg at 04/02/23 0604    oxyCODONE immediate-release (ROXICODONE) tablet 5 mg, 5 mg, Oral, Q4HRS PRN, Herminio Tellez M.D., 5 mg at 04/02/23 1157    aspirin (ASA) chewable tab 81 mg, 81 mg, Oral, DAILY, Herminio Tellez M.D., 81 mg at 04/02/23 0410    atorvastatin (LIPITOR) tablet 80 mg, 80 mg, Oral, Q EVENING, Herminio Tellez M.D., 80 mg at 04/01/23 1806    [Held by provider] insulin GLARGINE (Lantus,Semglee) injection, 18 Units, Subcutaneous, BID **AND** insulin lispro (AdmeLOG,HumaLOG) injection, 0.2 Units/kg/day, Subcutaneous, TID AC, 6 Units at 04/02/23 1200 **AND** insulin lispro (AdmeLOG,HumaLOG) injection, 1-6 Units, Subcutaneous, 4X/DAY ACHS, 3 Units at 04/02/23 1202 **AND** POC blood glucose manual result, , , Q AC AND BEDTIME(S) **AND** NOTIFY MD and PharmD, , , Once **AND** Administer 20 grams of glucose (approximately 8 ounces of fruit juice) every 15 minutes PRN FSBG less than 70 mg/dL, , , PRN **AND** dextrose 10 % BOLUS 25 g, 25 g,  Intravenous, Q15 MIN PRN, Herminio Tellez M.D.    acetaminophen (Tylenol) tablet 650 mg, 650 mg, Oral, Q6HRS PRN, Ayan Nguyen M.D.    heparin injection 2,000 Units, 2,000 Units, Intravenous, PRN, Ayan Nguyen M.D., 2,000 Units at 04/01/23 2054    morphine 4 MG/ML injection 1 mg, 1 mg, Intravenous, Q4HRS PRN, Ayan Nguyen M.D., 1 mg at 04/01/23 0755    ondansetron (ZOFRAN) syringe/vial injection 4 mg, 4 mg, Intravenous, Q4HRS PRN, Ayan Nguyen M.D.    traZODone (DESYREL) tablet 150 mg, 150 mg, Oral, QHS, Ayan Nguyen M.D., 150 mg at 04/01/23 0157    Current Outpatient Medications:  Medications Prior to Admission   Medication Sig Dispense Refill Last Dose    cloNIDine (CATAPRES) 0.1 MG Tab Take 0.1 mg by mouth as needed.       oxyCODONE-acetaminophen (PERCOCET-10)  MG Tab Take 1 Tablet by mouth every 6 hours as needed for Severe Pain.       pregabalin (LYRICA) 100 MG Cap Take 100 mg by mouth 2 times a day.       metFORMIN (GLUCOPHAGE) 500 MG Tab Take 500 mg by mouth 2 times a day with meals.       Morphine Sulfate (MORPHINE PCA) 1 MG/ML Solution 30 mg 2 times a day. Indications: Pain       oxyCODONE immediate-release (ROXICODONE) 5 MG Tab Take 10 mg by mouth every 6 hours as needed for Severe Pain.       insulin GLARGINE (LANTUS,SEMGLEE) 100 UNIT/ML Solution Inject 20 Units under the skin 2 times a day. 10 mL      traZODone (DESYREL) 150 MG Tab Take 1 Tablet by mouth every evening. 30 Tablet 3 3/30/2023 at 2100    SITagliptin (JANUVIA) 50 MG Tab Take 50 mg by mouth every day.   3/31/2023 at 0900    clopidogrel or PLACEBO (STUDY DRUG) 75 mg Tab Take 75 mg by mouth every day.       atorvastatin (LIPITOR) 10 MG Tab Take  by mouth every evening. Unsure of dose   3/31/2023 at 0900    isosorbide mononitrate SR (IMDUR) 30 MG TABLET SR 24 HR Take 1 Tab by mouth every day. 30 Tab 1     ferrous sulfate 325 (65 Fe) MG tablet Take 1 Tab by mouth every day with lunch. 30 Tab 0     ascorbic  acid (VITAMIN C) 500 MG tablet Take 1 Tab by mouth every day. 30 Tab 3 3/31/2023 at 0900    aspirin EC 81 MG EC tablet Take 1 Tab by mouth every day. 30 Tab 0 3/31/2023    clopidogrel (PLAVIX) 75 MG Tab Take 1 Tab by mouth every day. 30 Tab 0 3/31/2023 at 0900    insulin glargine (LANTUS) 100 UNIT/ML Solution Inject 30 Units as instructed 2 Times a Day.   3/31/2023 at 2100    gabapentin (NEURONTIN) 800 MG tablet Take 800 mg by mouth 3 times a day.       traZODone (DESYREL) 100 MG Tab Take 50 mg by mouth every bedtime. 100 mg AM  200 mg PM          Medication Allergy:  No Known Allergies    Family History:  Family History   Problem Relation Age of Onset    Cancer Maternal Aunt         Lung cancer d/t smoking       Social History:  Social History     Socioeconomic History    Marital status: Single     Spouse name: Not on file    Number of children: Not on file    Years of education: Not on file    Highest education level: Not on file   Occupational History    Not on file   Tobacco Use    Smoking status: Former     Packs/day: 2.00     Years: 20.00     Pack years: 40.00     Types: Cigarettes     Start date:      Quit date:      Years since quittin.2    Smokeless tobacco: Never   Vaping Use    Vaping Use: Never used   Substance and Sexual Activity    Alcohol use: Not Currently    Drug use: Yes     Types: Inhaled     Comment: just prescribed meds    Sexual activity: Not on file   Other Topics Concern    Primary/coprimary nurse & associates Not Asked    Family contact information Not Asked    OK to release patient information to the following Not Asked    Patient preferred routine/Privacy concerns Not Asked    Patient likes and dislikes Not Asked    Participating In Research Study Not Asked    Miscellaneous Not Asked   Social History Narrative    ** Merged History Encounter **         No known exposure to asbestos, dyes, chemicals, or pesticides.  Patient does not work.  She has 3 children and many  "grand-children.  Has a significant other for about 20 years. Moved to [place] in 2011.      Social Determinants of Health     Financial Resource Strain: Not on file   Food Insecurity: Not on file   Transportation Needs: Not on file   Physical Activity: Not on file   Stress: Not on file   Social Connections: Not on file   Intimate Partner Violence: Not on file   Housing Stability: Not on file         Physical Exam:  Vitals/ General Appearance:   Weight/BMI: Body mass index is 34.56 kg/m².  /52   Pulse 63   Temp 36.5 °C (97.7 °F) (Temporal)   Resp 16   Ht 1.651 m (5' 5\")   Wt 94.2 kg (207 lb 10.8 oz)   SpO2 94%   Vitals:    04/02/23 1122 04/02/23 1140 04/02/23 1150 04/02/23 1208   BP: 105/56 98/51 118/63 105/52   Pulse: 63      Resp: 16      Temp: 36.5 °C (97.7 °F)      TempSrc: Temporal      SpO2: 94%      Weight:       Height:         Oxygen Therapy:  Pulse Oximetry: 94 %, O2 (LPM): 0, O2 Delivery Device: None - Room Air    Constitutional:   Well developed, Well nourished, No acute distress  HENMT:  Normocephalic, Atraumatic, Oropharynx moist mucous membranes, No oral exudates, Nose normal.  No thyromegaly.  Eyes:  EOMI, Conjunctiva normal, No discharge.  Neck:  Normal range of motion, No cervical tenderness,  no JVD.  Cardiovascular:  Normal heart rate, Normal rhythm, No murmurs, No rubs, No gallops.   Extremitites with intact distal pulses, no cyanosis, or edema.  Lungs:  Normal breath sounds, breath sounds clear to auscultation bilaterally,  no rales, no rhonchi, no wheezing.   Abdomen: Bowel sounds normal, Soft, No tenderness, No guarding, No rebound, No masses, No hepatosplenomegaly.  Skin: Warm, Dry, No erythema, No rash, no induration.  Neurologic: Alert & oriented x 3, No focal deficits noted, cranial nerves II through X are intact.  Psychiatric: Affect normal, Judgment normal, Mood normal.      MDM (Data Review):     Records reviewed and summarized in current documentation    Lab Data " Review:  Recent Results (from the past 24 hour(s))   Heparin Anti-Xa    Collection Time: 04/01/23  1:45 PM   Result Value Ref Range    Heparin Xa (UFH) 0.36 IU/mL   TROPONIN    Collection Time: 04/01/23  1:45 PM   Result Value Ref Range    Troponin T 198 (H) 6 - 19 ng/L   POCT glucose device results    Collection Time: 04/01/23  5:39 PM   Result Value Ref Range    POC Glucose, Blood 253 (H) 65 - 99 mg/dL   Heparin Anti-Xa    Collection Time: 04/01/23  7:31 PM   Result Value Ref Range    Heparin Xa (UFH) 0.28 IU/mL   POCT glucose device results    Collection Time: 04/01/23  8:44 PM   Result Value Ref Range    POC Glucose, Blood 301 (H) 65 - 99 mg/dL   Basic Metabolic Panel    Collection Time: 04/02/23  2:42 AM   Result Value Ref Range    Sodium 137 135 - 145 mmol/L    Potassium 4.3 3.6 - 5.5 mmol/L    Chloride 104 96 - 112 mmol/L    Co2 23 20 - 33 mmol/L    Glucose 297 (H) 65 - 99 mg/dL    Bun 22 8 - 22 mg/dL    Creatinine 0.97 0.50 - 1.40 mg/dL    Calcium 8.8 8.5 - 10.5 mg/dL    Anion Gap 10.0 7.0 - 16.0   MAGNESIUM    Collection Time: 04/02/23  2:42 AM   Result Value Ref Range    Magnesium 2.0 1.5 - 2.5 mg/dL   CBC WITHOUT DIFFERENTIAL    Collection Time: 04/02/23  2:42 AM   Result Value Ref Range    WBC 8.4 4.8 - 10.8 K/uL    RBC 3.83 (L) 4.20 - 5.40 M/uL    Hemoglobin 10.4 (L) 12.0 - 16.0 g/dL    Hematocrit 31.9 (L) 37.0 - 47.0 %    MCV 83.3 81.4 - 97.8 fL    MCH 27.2 27.0 - 33.0 pg    MCHC 32.6 (L) 33.6 - 35.0 g/dL    RDW 39.8 35.9 - 50.0 fL    Platelet Count 279 164 - 446 K/uL    MPV 9.4 9.0 - 12.9 fL   Heparin Anti-Xa    Collection Time: 04/02/23  2:42 AM   Result Value Ref Range    Heparin Xa (UFH) 0.44 IU/mL   ESTIMATED GFR    Collection Time: 04/02/23  2:42 AM   Result Value Ref Range    GFR (CKD-EPI) 67 >60 mL/min/1.73 m 2   POCT glucose device results    Collection Time: 04/02/23  8:27 AM   Result Value Ref Range    POC Glucose, Blood 247 (H) 65 - 99 mg/dL   EC-ECHOCARDIOGRAM COMPLETE W/ CONT     Collection Time: 23  8:32 AM   Result Value Ref Range    Eject.Frac. MOD BP 49.38     Eject.Frac. MOD 4C 50.2     Eject.Frac. MOD 2C 51.81     Left Ventrical Ejection Fraction 45    POCT activated clotting time device results    Collection Time: 23  9:36 AM   Result Value Ref Range    Istat Activated Clotting Time 221 (H) 74 - 137 sec   POCT activated clotting time device results    Collection Time: 23  9:55 AM   Result Value Ref Range    Istat Activated Clotting Time 251 (H) 74 - 137 sec   POCT activated clotting time device results    Collection Time: 23 10:17 AM   Result Value Ref Range    Istat Activated Clotting Time 257 (H) 74 - 137 sec   POCT activated clotting time device results    Collection Time: 23 10:39 AM   Result Value Ref Range    Istat Activated Clotting Time 239 (H) 74 - 137 sec   POCT activated clotting time device results    Collection Time: 23 10:59 AM   Result Value Ref Range    Istat Activated Clotting Time 275 (H) 74 - 137 sec   EKG STAT    Collection Time: 23 11:47 AM   Result Value Ref Range    Report       [facility]    Test Date:  2023  Pt Name:    ALIDA HUTCHINSON                 Department: 183  MRN:        [id number]                      Room:       T828  Gender:     Female                       Technician: SANNA  :        1960                   Requested By:LIZ AMIN  Order #:    [id number]                    Reading MD: Obi Lewis MD    Measurements  Intervals                                Axis  Rate:       69                           P:          48  NC:         179                          QRS:        11  QRSD:       102                          T:          84  QT:         430  QTc:        461    Interpretive Statements  Sinus rhythm  Compared to ECG 2023 09:41:08  No significant changes  Electronically Signed On 2023 12:05:49 PDT by Obi Lewis MD     POCT glucose device results    Collection Time:  04/02/23 11:52 AM   Result Value Ref Range    POC Glucose, Blood 258 (H) 65 - 99 mg/dL       Imaging/Procedures Review:    Chest Xray:  Reviewed    EKG:   As in HPI.     MDM (Assessment and Plan):     Active Hospital Problems    Diagnosis     Generalized abdominal pain [R10.84]      Priority: Medium    Polyneuropathy [G62.9]      Priority: Low    Opioid dependence (HCC) [F11.20]      Priority: Low    Obesity (BMI 30-39.9) [E66.9]      Priority: Low    Diabetes (HCC) [E11.9]      Priority: Low    Hyperlipidemia [E78.5]      Priority: Low    Ischemic cardiomyopathy [I25.5]     ACC/AHA stage C systolic heart failure (HCC) [I50.20]     Left ventricular systolic dysfunction, NYHA class 3 [I51.89]     NSTEMI (non-ST elevated myocardial infarction) (HCC) [I21.4]     HLD (hyperlipidemia) [E78.5]      Ischemic Cardiomyopathy:  Chronically illed condition which requires ongoing close monitoring and treatment to improve survival rate along with decreasing risk of clinical decompensation sudden cardiac death and hospitalization.    Today, based on physical examination findings, patient is euvolemic. No JVD, lungs are clear to auscultation, no pitting edema in bilateral lower extremities, no ascites.     Dry weight is 207 lbs.    We will switch Lopressor to Toprol XL 50 mg p.o. once a day.    We will add Entresto 50 mg p.o. twice a day.    Based on recent data on SGLT2 and heart failure with reduced ejection fraction, patient will be benefited from Farxiga 10 mg p.o. once a day for further reduction in mortality and hospitalization with absolute risk reduction of 4.9%.  Therefore, I will start patient on Farxiga 10 mg p.o. once a day.  Risks and benefits were explained to patient and patient has agreed to proceed.    We will add spironolactone 25 mg p.o. once a day.    We will stop Imdur to avoid hypotension.    With LV of 40%, no indication for ICD placement.    Coronary arterial disease with complex history of CABG along with  PCI and most recent distal left main ostial LAD stenting with residual disease in proximal right PDA:  Continue clopidogrel, asa, BB, ARNI and statin at current dose.  Staged PCI of proximal right PDA in the future 4 to 6 weeks.    Hypertension:  Optimize control using cardiomyopathy medical regimen as well.    Hyperlipidemia:  Optimize statin as within guidelines of CAD treatment as above.     Optimize diabetic control and management with primary team.  Farxiga will be beneficial as well.  Already added.    We will consult palliative care for advance care planning.    Addendum:  Patient lives in [place] and has trouble getting here from there because of driving situation.  Therefore, we will plan for staged PCI next week of the right PDA.    Thank you for referring this patient to our cardiology service.  We will follow patient with you.      Liz Tamayo MD.   Cardiology Inpatient Service.  [facility].  [phone].  [place].

## 2023-04-02 NOTE — PROCEDURES
Cardiac Catheterization Procedure    DATE: 4/2/2023    : Julio Velázquez MD, MPH    PROCEDURES PERFORMED:  Left heart catheterization  Coronary angiography  Bypass graft angiography   percutaneous coronary intervention of ostial / proximal Left circumflex coronary artery  PTCA of ostial LAD  Intravascular ultrasound to guide revascularization  Intravascular lithotripsy-shockwave  Moderate conscious sedation    INDICATIONS:  NSTEMI  History of LAD PCI pre-CABG  History of LIMA-LAD CABG  History of left circumflex PCI post CABG    CONSENT:  The complete alternatives, risks, and benefits of the procedure were explained to the patient. Signed informed consent was obtained and placed in the chart prior to the procedure.    MEDICATIONS:  Lidocaine  Fentanyl  Midazolam  Nitroglycerin  Verapamil  Heparin  Plavix 300 mg p.o.    MODERATE CONSCIOUS SEDATION:  I personally supervised the administration of moderate conscious sedation by the nursing staff for 104 minutes.  Start time: 9:14 AM  End time: 10:58 AM    CONTRAST: Omnipaque 79 cc    ESTIMATED BLOOD LOSS: < 50 cc    COMPLICATIONS: None apparent    PROCEDURE OVERVIEW:  The patient was brought to the cardiac catheterization laboratory in the fasting state. The skin over the left wrist was prepped and draped in the usual sterile fashion. Lidocaine infiltration was used to anesthetize the tissue over the left radial artery. Using the micropuncture technique, a 6-Montenegrin Glidesheath was inserted in the right radial artery. A 6 Montenegrin JUSTYNA diagnostic catheter was then advanced over a standard J-wire into the left subclavian artery where it was gently aspirated, flushed and opening pressure was recorded.  This artery was used to engage the pedicle  LIMA-LAD graft and cineangiograms were performed in multiple projections for full evaluation.  Then this catheter was exchanged over J-wire to a 6 Montenegrin JR4 diagnostic catheter which was advanced into the left ventricular  cavity where it was gently aspirated, flushed, and then withdrawn across the aortic valve with sequential pressures measured. This catheter was then used to engage the ostium of the right coronary artery and cineangiograms were obtained in multiple projections for complete evaluation of the right coronary system. This catheter was then exchanged over a J-wire to a 6 Macedonian JL 4 diagnostic catheter which was used to engage the ostium of the left coronary artery and cineangiograms were obtained in multiple projections for complete evaluation of the left coronary system. Following completion of coronary angiography, we proceeded with PCI of the ostial left circumflex -ISR. See below for more details.     HEMODYNAMICS:  Aortic pressure: 113 /57 mmHg  LVEDP: 18 mmHg  No significant aortic gradient on pullback    CORONARY ANGIOGRAPHY:  The left main coronary artery: Large in caliber vessel with patent stent, bifurcates to LAD and left circumflex coronary arteries.  The left anterior descending coronary artery: Large in caliber vessel with 50% ostial stenosis (ISR) and severe ISR in the mid LAD.  Distal/apical LAD fills via patent LIMA graft, with nonobstructive CAD.  The LAD gives off 2 medium caliber diagonal branches with 80% stenosis in D1 (jailed by the proximal LAD stent).  The left circumflex coronary artery: Large in caliber vessel with severe 99% ostial/proximal ISR -  with patent mid left circumflex stent, status post successful PCI as detailed below.  Of note, on IVUS imaging, there appears to be a very short segment of no stent in the proximal circumflex between the ostial/proximal and mid circumflex stents.  There is also mild 40% stenosis in proximal OM 3.  The circumflex gives off a medium caliber OM1 that has severe diffuse disease and a medium caliber OM 2 with luminal irregularities.  The right coronary artery: Large in caliber dominant vessel with 50% mid RCA stenosis and severe 80% proximal right  PDA stenosis.    PERCUTANEOUS CORONARY INTERVENTION of ostial/proximal left circumflex:  Pre: 99% stenosis and ZAIN II flow  Post: 0% residual stenosis and ZAIN III flow.   Guide Catheter(s): 6 Yakut EBU 3.0, guidezilla, Mamba microcatheter  Guidewire(s): Fielder XT (left circumflex),  50 (LAD)  Anticoagulation:  Heparin to maintain ACT > 250  Antiplatelet(s): Aspirin, Plavix  Pre-dilation balloon(s): 1.5 x 12 mm, 2 x 8 mm, 2 x 8 mm NC, 3 x 12 mm NC  IVUS or OCT used: IVUS guided vascularization  Intracoronary Atherectomy / Lithotripsy: Intracoronary lithotripsy using 3 x 12 mm balloon catheter  Stent: Synergy 3 x 20 mm ANA  Post-dilation balloon(s): 3 x 8 mm NC, 3.5 x 12 mm NC    After ostial left circumflex PCI, the LAD was wired with a  50 guidewire and PTCA was performed of the ostial LAD using 2 x 8 mm NC, 3 x 12 mm NC with excellent results.  Then distal left main kissing balloon inflation was performed using 2 x 8 mm NC in the ostial LAD and 3 x 12 mm NC in the ostial left circumflex with excellent results.    I decided against placing a stent across the ostial LAD given that it is protected by the LIMA graft and to avoid additional stent layers in the distal left main across the left circumflex which already has 2 layers of stents and is ungrafted.    No dissection, signs of no-reflow, distal embolization or perforation post PCI.    Closing: At completion of the procedure the relevant equipment was removed from the body and hemostasis achieved by Radial band for the left radial arteriotomy site.    The patient left the cath lab  CP free,  hemodynamically stable and neurologically intact.      IMPRESSION:  1.  Severe ostial /proximal circumflex ISR- status post successful IVUS guided intravascular lithotripsy and revascularization deploying Synergy 3 x 20 mm ANA, postdilated to 3.7 mm at the ostium /distal left main.  2.  Residual severe proximal right PDA stenosis, recommend staged PCI either  this admission or in 4 to 6 weeks as an outpatient.  3.  Residual severe diffuse stenoses and medium in caliber OM1, and severe proximal D1 stenosis (jailed by proximal LAD stent), for which I recommend optimal medical therapy .  4.  Mildly elevated resting LVEDP at 18 mmHg no significant transaortic gradient on pullback    RECOMMENDATIONS:  Consider lifetime dual antiplatelet therapy with PPI, as long as she tolerates it well with no bleeding concerns.  HI statin: Target LDL < 70 and TG < 150  GDMT and lifestyle modifications for secondary ASCVD prevention  Cardiac Rehab referral placed  Staged PCI to proximal right PDA  TR band protocol  Stop heparin drip    NOTIFICATION:  The patient's daughter - Elina- was called and notified of the results.    Referring providers - Dr. Tamayo and Miguel BENNETT - and  hospitalist - Dr. Gladys Tellez - were notified.    Julio Velázquez MD, MPH [facility]  Interventional Cardiologist  [facility]   of Clinical Internal Medicine - [facility]

## 2023-04-02 NOTE — CARE PLAN
The patient is Watcher - Medium risk of patient condition declining or worsening    Shift Goals  Clinical Goals: cardio consult, heparin gtt  Patient Goals: comfort  Family Goals: [facility]    Progress made toward(s) clinical / shift goals:  heparin gtt maintained planning for LHC    Patient is not progressing towards the following goals: patient with positive troponins pending C

## 2023-04-03 ENCOUNTER — APPOINTMENT (OUTPATIENT)
Dept: CARDIOLOGY | Facility: MEDICAL CENTER | Age: 63
DRG: 246 | End: 2023-04-03
Attending: NURSE PRACTITIONER
Payer: MEDICAID

## 2023-04-03 DIAGNOSIS — R07.89 OTHER CHEST PAIN: Primary | ICD-10-CM

## 2023-04-03 LAB
ACT BLD: 257 SEC (ref 74–137)
ANION GAP SERPL CALC-SCNC: 12 MMOL/L (ref 7–16)
BUN SERPL-MCNC: 19 MG/DL (ref 8–22)
CALCIUM SERPL-MCNC: 9.3 MG/DL (ref 8.5–10.5)
CHLORIDE SERPL-SCNC: 104 MMOL/L (ref 96–112)
CO2 SERPL-SCNC: 22 MMOL/L (ref 20–33)
CREAT SERPL-MCNC: 0.98 MG/DL (ref 0.5–1.4)
ERYTHROCYTE [DISTWIDTH] IN BLOOD BY AUTOMATED COUNT: 41.1 FL (ref 35.9–50)
GFR SERPLBLD CREATININE-BSD FMLA CKD-EPI: 66 ML/MIN/1.73 M 2
GLUCOSE BLD STRIP.AUTO-MCNC: 216 MG/DL (ref 65–99)
GLUCOSE BLD STRIP.AUTO-MCNC: 276 MG/DL (ref 65–99)
GLUCOSE BLD STRIP.AUTO-MCNC: 301 MG/DL (ref 65–99)
GLUCOSE BLD STRIP.AUTO-MCNC: 330 MG/DL (ref 65–99)
GLUCOSE SERPL-MCNC: 226 MG/DL (ref 65–99)
HCT VFR BLD AUTO: 35.3 % (ref 37–47)
HGB BLD-MCNC: 11.2 G/DL (ref 12–16)
MCH RBC QN AUTO: 27.6 PG (ref 27–33)
MCHC RBC AUTO-ENTMCNC: 31.7 G/DL (ref 33.6–35)
MCV RBC AUTO: 86.9 FL (ref 81.4–97.8)
PLATELET # BLD AUTO: 267 K/UL (ref 164–446)
PMV BLD AUTO: 9.2 FL (ref 9–12.9)
POTASSIUM SERPL-SCNC: 4.7 MMOL/L (ref 3.6–5.5)
RBC # BLD AUTO: 4.06 M/UL (ref 4.2–5.4)
SODIUM SERPL-SCNC: 138 MMOL/L (ref 135–145)
WBC # BLD AUTO: 7.8 K/UL (ref 4.8–10.8)

## 2023-04-03 PROCEDURE — 99233 SBSQ HOSP IP/OBS HIGH 50: CPT | Performed by: INTERNAL MEDICINE

## 2023-04-03 PROCEDURE — 93005 ELECTROCARDIOGRAM TRACING: CPT

## 2023-04-03 PROCEDURE — 700102 HCHG RX REV CODE 250 W/ 637 OVERRIDE(OP): Performed by: INTERNAL MEDICINE

## 2023-04-03 PROCEDURE — 700111 HCHG RX REV CODE 636 W/ 250 OVERRIDE (IP)

## 2023-04-03 PROCEDURE — A9270 NON-COVERED ITEM OR SERVICE: HCPCS | Performed by: INTERNAL MEDICINE

## 2023-04-03 PROCEDURE — 92928 PRQ TCAT PLMT NTRAC ST 1 LES: CPT | Mod: RC | Performed by: INTERNAL MEDICINE

## 2023-04-03 PROCEDURE — 700105 HCHG RX REV CODE 258: Performed by: NURSE PRACTITIONER

## 2023-04-03 PROCEDURE — 82962 GLUCOSE BLOOD TEST: CPT

## 2023-04-03 PROCEDURE — 700117 HCHG RX CONTRAST REV CODE 255: Performed by: INTERNAL MEDICINE

## 2023-04-03 PROCEDURE — 700111 HCHG RX REV CODE 636 W/ 250 OVERRIDE (IP): Performed by: HOSPITALIST

## 2023-04-03 PROCEDURE — 027034Z DILATION OF CORONARY ARTERY, ONE ARTERY WITH DRUG-ELUTING INTRALUMINAL DEVICE, PERCUTANEOUS APPROACH: ICD-10-PCS | Performed by: INTERNAL MEDICINE

## 2023-04-03 PROCEDURE — 85347 COAGULATION TIME ACTIVATED: CPT

## 2023-04-03 PROCEDURE — A9270 NON-COVERED ITEM OR SERVICE: HCPCS | Performed by: HOSPITALIST

## 2023-04-03 PROCEDURE — 99153 MOD SED SAME PHYS/QHP EA: CPT

## 2023-04-03 PROCEDURE — 700102 HCHG RX REV CODE 250 W/ 637 OVERRIDE(OP): Performed by: HOSPITALIST

## 2023-04-03 PROCEDURE — 700101 HCHG RX REV CODE 250

## 2023-04-03 PROCEDURE — 99232 SBSQ HOSP IP/OBS MODERATE 35: CPT | Performed by: HOSPITALIST

## 2023-04-03 PROCEDURE — 36415 COLL VENOUS BLD VENIPUNCTURE: CPT

## 2023-04-03 PROCEDURE — 99152 MOD SED SAME PHYS/QHP 5/>YRS: CPT | Performed by: INTERNAL MEDICINE

## 2023-04-03 PROCEDURE — 80048 BASIC METABOLIC PNL TOTAL CA: CPT

## 2023-04-03 PROCEDURE — 85027 COMPLETE CBC AUTOMATED: CPT

## 2023-04-03 PROCEDURE — 770020 HCHG ROOM/CARE - TELE (206)

## 2023-04-03 RX ORDER — INSULIN LISPRO 100 [IU]/ML
0.2 INJECTION, SOLUTION INTRAVENOUS; SUBCUTANEOUS
Status: DISCONTINUED | OUTPATIENT
Start: 2023-04-04 | End: 2023-04-03

## 2023-04-03 RX ORDER — MIDAZOLAM HYDROCHLORIDE 1 MG/ML
INJECTION INTRAMUSCULAR; INTRAVENOUS
Status: COMPLETED
Start: 2023-04-03 | End: 2023-04-03

## 2023-04-03 RX ORDER — INSULIN LISPRO 100 [IU]/ML
1-6 INJECTION, SOLUTION INTRAVENOUS; SUBCUTANEOUS
Status: DISCONTINUED | OUTPATIENT
Start: 2023-04-03 | End: 2023-04-03

## 2023-04-03 RX ORDER — HEPARIN SODIUM 1000 [USP'U]/ML
INJECTION, SOLUTION INTRAVENOUS; SUBCUTANEOUS
Status: COMPLETED
Start: 2023-04-03 | End: 2023-04-03

## 2023-04-03 RX ORDER — ENOXAPARIN SODIUM 100 MG/ML
40 INJECTION SUBCUTANEOUS DAILY
Status: DISCONTINUED | OUTPATIENT
Start: 2023-04-03 | End: 2023-04-04 | Stop reason: HOSPADM

## 2023-04-03 RX ORDER — VERAPAMIL HYDROCHLORIDE 2.5 MG/ML
INJECTION, SOLUTION INTRAVENOUS
Status: COMPLETED
Start: 2023-04-03 | End: 2023-04-03

## 2023-04-03 RX ORDER — INSULIN LISPRO 100 [IU]/ML
0.2 INJECTION, SOLUTION INTRAVENOUS; SUBCUTANEOUS
Status: DISCONTINUED | OUTPATIENT
Start: 2023-04-03 | End: 2023-04-04 | Stop reason: HOSPADM

## 2023-04-03 RX ORDER — LIDOCAINE HYDROCHLORIDE 20 MG/ML
INJECTION, SOLUTION INFILTRATION; PERINEURAL
Status: COMPLETED
Start: 2023-04-03 | End: 2023-04-03

## 2023-04-03 RX ORDER — HEPARIN SODIUM 200 [USP'U]/100ML
INJECTION, SOLUTION INTRAVENOUS
Status: COMPLETED
Start: 2023-04-03 | End: 2023-04-03

## 2023-04-03 RX ORDER — INSULIN LISPRO 100 [IU]/ML
1-6 INJECTION, SOLUTION INTRAVENOUS; SUBCUTANEOUS
Status: DISCONTINUED | OUTPATIENT
Start: 2023-04-03 | End: 2023-04-04 | Stop reason: HOSPADM

## 2023-04-03 RX ORDER — PHENYLEPHRINE HCL IN 0.9% NACL 0.5 MG/5ML
SYRINGE (ML) INTRAVENOUS
Status: COMPLETED
Start: 2023-04-03 | End: 2023-04-03

## 2023-04-03 RX ADMIN — ASPIRIN 81 MG 81 MG: 81 TABLET ORAL at 04:27

## 2023-04-03 RX ADMIN — SPIRONOLACTONE 25 MG: 25 TABLET ORAL at 04:27

## 2023-04-03 RX ADMIN — OMEPRAZOLE 20 MG: 20 CAPSULE, DELAYED RELEASE ORAL at 04:27

## 2023-04-03 RX ADMIN — LOPERAMIDE HYDROCHLORIDE 2 MG: 2 CAPSULE ORAL at 07:54

## 2023-04-03 RX ADMIN — HEPARIN SODIUM: 1000 INJECTION, SOLUTION INTRAVENOUS; SUBCUTANEOUS at 10:12

## 2023-04-03 RX ADMIN — SACUBITRIL AND VALSARTAN 1 TABLET: 24; 26 TABLET, FILM COATED ORAL at 17:17

## 2023-04-03 RX ADMIN — INSULIN LISPRO 2 UNITS: 100 INJECTION, SOLUTION INTRAVENOUS; SUBCUTANEOUS at 11:57

## 2023-04-03 RX ADMIN — NITROGLYCERIN 10 ML: 20 INJECTION INTRAVENOUS at 10:12

## 2023-04-03 RX ADMIN — ENOXAPARIN SODIUM 40 MG: 100 INJECTION SUBCUTANEOUS at 18:11

## 2023-04-03 RX ADMIN — GABAPENTIN 800 MG: 400 CAPSULE ORAL at 12:47

## 2023-04-03 RX ADMIN — DAPAGLIFLOZIN 10 MG: 10 TABLET, FILM COATED ORAL at 04:27

## 2023-04-03 RX ADMIN — OXYCODONE HYDROCHLORIDE 5 MG: 5 TABLET ORAL at 07:52

## 2023-04-03 RX ADMIN — VERAPAMIL HYDROCHLORIDE 2.5 MG: 2.5 INJECTION, SOLUTION INTRAVENOUS at 10:12

## 2023-04-03 RX ADMIN — LIDOCAINE HYDROCHLORIDE: 20 INJECTION, SOLUTION INFILTRATION; PERINEURAL at 10:12

## 2023-04-03 RX ADMIN — HEPARIN SODIUM 2000 UNITS: 200 INJECTION, SOLUTION INTRAVENOUS at 10:12

## 2023-04-03 RX ADMIN — IOHEXOL 26 ML: 350 INJECTION, SOLUTION INTRAVENOUS at 10:44

## 2023-04-03 RX ADMIN — CLOPIDOGREL BISULFATE 75 MG: 75 TABLET ORAL at 05:36

## 2023-04-03 RX ADMIN — FENTANYL CITRATE 100 MCG: 50 INJECTION, SOLUTION INTRAMUSCULAR; INTRAVENOUS at 10:18

## 2023-04-03 RX ADMIN — MIDAZOLAM HYDROCHLORIDE 2 MG: 1 INJECTION, SOLUTION INTRAMUSCULAR; INTRAVENOUS at 10:17

## 2023-04-03 RX ADMIN — ATORVASTATIN CALCIUM 80 MG: 80 TABLET, FILM COATED ORAL at 17:17

## 2023-04-03 RX ADMIN — OXYCODONE HYDROCHLORIDE 5 MG: 5 TABLET ORAL at 01:03

## 2023-04-03 RX ADMIN — MORPHINE SULFATE 30 MG: 15 TABLET, FILM COATED, EXTENDED RELEASE ORAL at 17:17

## 2023-04-03 RX ADMIN — GABAPENTIN 800 MG: 400 CAPSULE ORAL at 17:17

## 2023-04-03 RX ADMIN — MORPHINE SULFATE 30 MG: 15 TABLET, FILM COATED, EXTENDED RELEASE ORAL at 04:37

## 2023-04-03 RX ADMIN — Medication 200 MCG: at 10:44

## 2023-04-03 RX ADMIN — SODIUM CHLORIDE: 9 INJECTION, SOLUTION INTRAVENOUS at 04:33

## 2023-04-03 RX ADMIN — METOPROLOL SUCCINATE 50 MG: 50 TABLET, EXTENDED RELEASE ORAL at 05:36

## 2023-04-03 RX ADMIN — INSULIN LISPRO 6 UNITS: 100 INJECTION, SOLUTION INTRAVENOUS; SUBCUTANEOUS at 19:51

## 2023-04-03 RX ADMIN — INSULIN LISPRO 6 UNITS: 100 INJECTION, SOLUTION INTRAVENOUS; SUBCUTANEOUS at 11:58

## 2023-04-03 RX ADMIN — SACUBITRIL AND VALSARTAN 1 TABLET: 24; 26 TABLET, FILM COATED ORAL at 04:36

## 2023-04-03 RX ADMIN — INSULIN LISPRO 4 UNITS: 100 INJECTION, SOLUTION INTRAVENOUS; SUBCUTANEOUS at 19:56

## 2023-04-03 RX ADMIN — GABAPENTIN 800 MG: 400 CAPSULE ORAL at 04:27

## 2023-04-03 RX ADMIN — OXYCODONE HYDROCHLORIDE 5 MG: 5 TABLET ORAL at 14:35

## 2023-04-03 ASSESSMENT — ENCOUNTER SYMPTOMS
SHORTNESS OF BREATH: 1
NAUSEA: 0
ABDOMINAL PAIN: 0
BACK PAIN: 1

## 2023-04-03 ASSESSMENT — FIBROSIS 4 INDEX: FIB4 SCORE: 0.54

## 2023-04-03 ASSESSMENT — PAIN DESCRIPTION - PAIN TYPE
TYPE: ACUTE PAIN;CHRONIC PAIN
TYPE: ACUTE PAIN;CHRONIC PAIN
TYPE: ACUTE PAIN

## 2023-04-03 NOTE — DISCHARGE PLANNING
Was asked by team at rounds to set up transportation for this patient.  She is anticipated to be ready for discharge tomorrow and lives 3 hours away from this hospital.      Met with her at bedside and she states she does not have any one who can transport her home.  She agreed to [facility] home tomorrow.  She confirmed she has her house key.      Completed rideline and faxed information to [employer] to set up transportation for 10AM tomorrow.

## 2023-04-03 NOTE — CARE PLAN
The patient is Stable - Low risk of patient condition declining or worsening    Shift Goals  Clinical Goals: Heart Cath, NPO at midnight, BG checks  Patient Goals: Rest  Family Goals: No family present at this time    Progress made toward(s) clinical / shift goals:      Problem: Psychosocial  Goal: Patient's level of anxiety will decrease  Outcome: Progressing     Problem: Hemodynamics  Goal: Patient's hemodynamics, fluid balance and neurologic status will be stable or improve  Outcome: Progressing     Problem: Mobility  Goal: Patient's capacity to carry out activities will improve  Outcome: Progressing     Problem: Self Care  Goal: Patient will have the ability to perform ADLs independently or with assistance (bathe, groom, dress, toilet and feed)  Outcome: Progressing     Problem: Pain - Standard  Goal: Alleviation of pain or a reduction in pain to the patient’s comfort goal  Outcome: Progressing     Problem: Fall Risk  Goal: Patient will remain free from falls  Outcome: Progressing       Patient is not progressing towards the following goals:

## 2023-04-03 NOTE — PALLIATIVE CARE
"Palliative Care    Palliative care consult received and chart reviewed. Order has been cancelled. Recommend initial/further goals of care (GOC) discussion from primary team. Please reconsult if complex pain and symptom management needs arise or if the complexity of GOC discussions exceed the ability of primary provider (physician, PA, APRN), case management, and other members of the interdisciplinary team.    Updated: Ordering provider  To    Contact ext. [phone] with additional needs and/or re-consult for complex palliative care needs.    Betzaida \"Sharyn\"DAVION Doherty RN, APRN, [facility]  Palliative Care Nurse Practitioner  [phone]      "

## 2023-04-03 NOTE — PROGRESS NOTES
Cardiology Progress Note    Interval History:  Patient is being seen in cardiac follow up for NSTEMI s/p PCI, ischemic cardiomyopathy.    No acute events overnight. Patient underwent successful coronary gram and was found to have severe obstructive disease involving LAD and LCx.  Patient had successful ostial/proximal LCx PCI performed with ANA x1. Also had successful PTCA of ostial LAD. RCA was noted to have severe stenosis 80% involving rPDA.    She was seen at bedside this morning. No complaints of chest pain or shortness of breath. She is doing well overall. She is currently NPO for repeat cath later today.    Medications / Drug list prior to admission:  No current facility-administered medications on file prior to encounter.     Current Outpatient Medications on File Prior to Encounter   Medication Sig Dispense Refill    cloNIDine (CATAPRES) 0.1 MG Tab Take 0.1 mg by mouth as needed.      oxyCODONE-acetaminophen (PERCOCET-10)  MG Tab Take 1 Tablet by mouth every 6 hours as needed for Severe Pain.      pregabalin (LYRICA) 100 MG Cap Take 100 mg by mouth 2 times a day.      metFORMIN (GLUCOPHAGE) 500 MG Tab Take 500 mg by mouth 2 times a day with meals.      Morphine Sulfate (MORPHINE PCA) 1 MG/ML Solution 30 mg 2 times a day. Indications: Pain      oxyCODONE immediate-release (ROXICODONE) 5 MG Tab Take 10 mg by mouth every 6 hours as needed for Severe Pain.      insulin GLARGINE (LANTUS,SEMGLEE) 100 UNIT/ML Solution Inject 20 Units under the skin 2 times a day. 10 mL     traZODone (DESYREL) 150 MG Tab Take 1 Tablet by mouth every evening. 30 Tablet 3    SITagliptin (JANUVIA) 50 MG Tab Take 50 mg by mouth every day.      clopidogrel or PLACEBO (STUDY DRUG) 75 mg Tab Take 75 mg by mouth every day.      atorvastatin (LIPITOR) 10 MG Tab Take  by mouth every evening. Unsure of dose      isosorbide mononitrate SR (IMDUR) 30 MG TABLET SR 24 HR Take 1 Tab by mouth every day. 30 Tab 1    ferrous sulfate 325 (65 Fe)  MG tablet Take 1 Tab by mouth every day with lunch. 30 Tab 0    ascorbic acid (VITAMIN C) 500 MG tablet Take 1 Tab by mouth every day. 30 Tab 3    aspirin EC 81 MG EC tablet Take 1 Tab by mouth every day. 30 Tab 0    clopidogrel (PLAVIX) 75 MG Tab Take 1 Tab by mouth every day. 30 Tab 0    insulin glargine (LANTUS) 100 UNIT/ML Solution Inject 30 Units as instructed 2 Times a Day.      gabapentin (NEURONTIN) 800 MG tablet Take 800 mg by mouth 3 times a day.      traZODone (DESYREL) 100 MG Tab Take 50 mg by mouth every bedtime. 100 mg AM  200 mg PM         Current list of administered Medications:    Current Facility-Administered Medications:     clopidogrel (PLAVIX) tablet 75 mg, 75 mg, Oral, DAILY, Julio Velázquez M.D., 75 mg at 04/03/23 0536    metoprolol SR (TOPROL XL) tablet 50 mg, 50 mg, Oral, Q DAY, Liz Tamayo M.D., 50 mg at 04/03/23 0536    sacubitril-valsartan (Entresto) 24-26 MG 1 Tablet, 1 Tablet, Oral, BID, Liz Tamayo M.D., 1 Tablet at 04/03/23 0436    dapagliflozin propanediol (Farxiga) tablet 10 mg, 10 mg, Oral, DAILY, Liz Tamayo M.D., 10 mg at 04/03/23 0427    spironolactone (ALDACTONE) tablet 25 mg, 25 mg, Oral, Q DAY, Liz Tamayo M.D., 25 mg at 04/03/23 0427    NS infusion, , Intravenous, Continuous, Miguel Tavera, A.P.N., Last Rate: 50 mL/hr at 04/03/23 0433, New Bag at 04/03/23 0433    lidocaine (XYLOCAINE) 1 % injection 0.5 mL, 0.5 mL, Intradermal, Once PRN, Miguel Tavera, A.P.N.    loperamide (IMODIUM) capsule 2 mg, 2 mg, Oral, 4X/DAY PRN, Herminio Tellez M.D., 2 mg at 04/03/23 0754    insulin GLARGINE (Lantus,Semglee) injection, 18 Units, Subcutaneous, Q EVENING, 18 Units at 04/02/23 1746 **AND** insulin lispro (AdmeLOG,HumaLOG) injection, 0.2 Units/kg/day, Subcutaneous, TID AC, 6 Units at 04/02/23 1745 **AND** insulin lispro (AdmeLOG,HumaLOG) injection, 1-6 Units, Subcutaneous, 4X/DAY ACHS, 1 Units at 04/02/23 2046 **AND** POC blood glucose manual result, ,  , Q AC AND BEDTIME(S) **AND** NOTIFY MD and PharmD, , , Once **AND** Administer 20 grams of glucose (approximately 8 ounces of fruit juice) every 15 minutes PRN FSBG less than 70 mg/dL, , , PRN **AND** dextrose 10 % BOLUS 25 g, 25 g, Intravenous, Q15 MIN PRN, Herminio Tellez M.D.    omeprazole (PRILOSEC) capsule 20 mg, 20 mg, Oral, DAILY, Herminio Tellez M.D., 20 mg at 04/03/23 0427    gabapentin (NEURONTIN) capsule 800 mg, 800 mg, Oral, TID, Herminio Tellez M.D., 800 mg at 04/03/23 0427    nitroglycerin (NITROSTAT) tablet 0.4 mg, 0.4 mg, Sublingual, Q5 MIN PRN, Herminio Tellez M.D.    morphine ER (MS CONTIN) tablet 30 mg, 30 mg, Oral, Q12HRS, Herminio Tellez M.D., 30 mg at 04/03/23 0437    oxyCODONE immediate-release (ROXICODONE) tablet 5 mg, 5 mg, Oral, Q4HRS PRN, Herminio Tellez M.D., 5 mg at 04/03/23 0752    aspirin (ASA) chewable tab 81 mg, 81 mg, Oral, DAILY, Herminio Tellez M.D., 81 mg at 04/03/23 0427    atorvastatin (LIPITOR) tablet 80 mg, 80 mg, Oral, Q EVENING, Herminio Tellez M.D., 80 mg at 04/02/23 1626    acetaminophen (Tylenol) tablet 650 mg, 650 mg, Oral, Q6HRS PRN, Ayan Nguyen M.D.    morphine 4 MG/ML injection 1 mg, 1 mg, Intravenous, Q4HRS PRN, Ayan Nguyen M.D., 1 mg at 04/01/23 0755    ondansetron (ZOFRAN) syringe/vial injection 4 mg, 4 mg, Intravenous, Q4HRS PRN, Ayan Nguyen M.D.    traZODone (DESYREL) tablet 150 mg, 150 mg, Oral, QHS, Ayan Nguyen M.D., 150 mg at 04/02/23 2122    Past Medical History:   Diagnosis Date    ACC/AHA stage C systolic heart failure (HCC) 4/2/2023    CAD (coronary artery disease)     PER [facility]    Congestive heart failure (HCC)     PER [facility]    Diabetes     Diabetes (HCC)     PER [facility]    Diverticulosis     PER [facility]    GERD (gastroesophageal reflux disease)     PER [facility]    High cholesterol 5/15/2011    Hypertension      Intestinal mass 2015    Ischemic cardiomyopathy 2023    Left ventricular systolic dysfunction, NYHA class 3 2023    Migraine     Staph skin infection        Past Surgical History:   Procedure Laterality Date    STENT PLACEMENT  2018    2 cardiac stents    GASTROSCOPY  9/3/2017    Procedure: GASTROSCOPY WITH DILATION;  Surgeon: Kerri Carter M.D.;  Location: SURGERY [facility];  Service: Gastroenterology    COLONOSCOPY  9/3/2017    Procedure: COLONOSCOPY;  Surgeon: Kerri Carter M.D.;  Location: SURGERY [facility];  Service: Gastroenterology    OTHER ORTHOPEDIC SURGERY      right wrist surgery    ABDOMINAL EXPLORATION      Lower intestine d/t mass - benign    GYN SURGERY       x 3,tubal ligation    OTHER ABDOMINAL SURGERY      cholecystectomy    OTHER CARDIAC SURGERY      PER [facility] 2019 4 STENTS; 2019 BALLOON ANGIOPLASTY R RAMUS INTERMEDIATE BRANCH; STERNOTOMY       Family History   Problem Relation Age of Onset    Cancer Maternal Aunt         Lung cancer d/t smoking     Patient family history was personally reviewed, no pertinent family history to current presentation    Social History     Tobacco Use    Smoking status: Former     Packs/day: 2.00     Years: 20.00     Pack years: 40.00     Types: Cigarettes     Start date:      Quit date:      Years since quittin.2    Smokeless tobacco: Never   Vaping Use    Vaping Use: Never used   Substance Use Topics    Alcohol use: Not Currently    Drug use: Yes     Types: Inhaled     Comment: just prescribed meds       ALLERGIES:  No Known Allergies    Review of systems:  A complete review of symptoms was reviewed with the patient. This is reviewed in H&P and PMH. ALL OTHERS reviewed and negative    Physical exam:  Patient Vitals for the past 24 hrs:   BP Temp Temp src Pulse Resp SpO2 Weight   23 0712 118/72 36.2 °C (97.2 °F) Temporal 77 17 93 % --   23 0536 123/64 -- --  70 -- -- --   04/03/23 0500 106/53 -- -- -- -- 90 % --   04/02/23 2322 103/54 36.8 °C (98.2 °F) Temporal 65 16 93 % --   04/02/23 2136 108/68 -- -- -- -- -- --   04/02/23 2000 95/41 -- -- -- -- 95 % --   04/02/23 1944 95/41 36.5 °C (97.7 °F) Temporal 69 16 94 % 96.8 kg (213 lb 6.5 oz)   04/02/23 1700 107/52 -- -- -- -- 92 % --   04/02/23 1640 107/52 36.5 °C (97.7 °F) Temporal 72 16 93 % --   04/02/23 1350 133/65 -- -- -- -- -- --   04/02/23 1320 113/60 -- -- -- -- -- --   04/02/23 1310 115/52 -- -- -- -- -- --   04/02/23 1250 (!) 83/55 -- -- -- -- -- --   04/02/23 1215 106/57 -- -- -- -- -- --   04/02/23 1210 105/52 -- -- -- -- -- --   04/02/23 1208 105/52 -- -- -- -- -- --   04/02/23 1150 118/63 -- -- -- -- -- --   04/02/23 1140 98/51 -- -- -- -- -- --   04/02/23 1122 105/56 36.5 °C (97.7 °F) Temporal 63 16 94 % --   04/02/23 1120 105/56 -- -- -- -- 97 % --   04/02/23 1115 -- -- -- -- -- 96 % --   04/02/23 0840 120/63 -- -- -- -- 92 % --   04/02/23 0836 120/63 36.2 °C (97.2 °F) Temporal 65 16 93 % --     General: Not in acute distress, lying comfortably in bed  HEENT: OP clear   Neck:  No carotid bruits, No JVD appreciated  CVS:  RRR, Normal S1, S2. No murmurs, rubs or gallops  Resp: Normal respiratory effort, lungs CTA bilaterally. No rales or rhonchi  Abdomen: Soft, non-distended, non-tender to palpation  Skin: no cyanosis  Neurological: Alert and oriented x3, moves all extremities, no focal neurologic deficits  Extremities:   Extremities warm, pulses intact, no significant lower extremity edema bilaterally      Data:  Laboratory studies personally reviewed by me:  Recent Results (from the past 24 hour(s))   POCT glucose device results    Collection Time: 04/02/23  8:27 AM   Result Value Ref Range    POC Glucose, Blood 247 (H) 65 - 99 mg/dL   EC-ECHOCARDIOGRAM COMPLETE W/ CONT    Collection Time: 04/02/23  8:32 AM   Result Value Ref Range    Eject.Frac. MOD BP 49.38     Eject.Frac. MOD 4C 50.2     Eject.Frac. MOD  2C 51.81     Left Ventrical Ejection Fraction 45    POCT activated clotting time device results    Collection Time: 23  9:36 AM   Result Value Ref Range    Istat Activated Clotting Time 221 (H) 74 - 137 sec   POCT activated clotting time device results    Collection Time: 23  9:55 AM   Result Value Ref Range    Istat Activated Clotting Time 251 (H) 74 - 137 sec   POCT activated clotting time device results    Collection Time: 23 10:17 AM   Result Value Ref Range    Istat Activated Clotting Time 257 (H) 74 - 137 sec   POCT activated clotting time device results    Collection Time: 23 10:39 AM   Result Value Ref Range    Istat Activated Clotting Time 239 (H) 74 - 137 sec   POCT activated clotting time device results    Collection Time: 23 10:59 AM   Result Value Ref Range    Istat Activated Clotting Time 275 (H) 74 - 137 sec   EKG STAT    Collection Time: 23 11:47 AM   Result Value Ref Range    Report       [facility]    Test Date:  2023  Pt Name:    ALIDA HUTCHINSON                 Department: [facility]  MRN:        [id number]                      Room:       [facility]  Gender:     Female                       Technician: SANNA  :        1960                   Requested By:LIZ AMIN  Order #:    [id number]                    Reading MD: Obi Lewis MD    Measurements  Intervals                                Axis  Rate:       69                           P:          48  WY:         179                          QRS:        11  QRSD:       102                          T:          84  QT:         430  QTc:        461    Interpretive Statements  Sinus rhythm  Compared to ECG 2023 09:41:08  No significant changes  Electronically Signed On 2023 12:05:49 PDT by Obi Lewis MD     POCT glucose device results    Collection Time: 23 11:52 AM   Result Value Ref Range    POC Glucose, Blood 258 (H) 65 - 99 mg/dL   POCT glucose device results    Collection Time:  04/02/23  4:33 PM   Result Value Ref Range    POC Glucose, Blood 401 (HH) 65 - 99 mg/dL   POCT glucose device results    Collection Time: 04/02/23  5:38 PM   Result Value Ref Range    POC Glucose, Blood 319 (H) 65 - 99 mg/dL   POCT glucose device results    Collection Time: 04/02/23  8:39 PM   Result Value Ref Range    POC Glucose, Blood 187 (H) 65 - 99 mg/dL   Basic Metabolic Panel (BMP)    Collection Time: 04/03/23  2:42 AM   Result Value Ref Range    Sodium 138 135 - 145 mmol/L    Potassium 4.7 3.6 - 5.5 mmol/L    Chloride 104 96 - 112 mmol/L    Co2 22 20 - 33 mmol/L    Glucose 226 (H) 65 - 99 mg/dL    Bun 19 8 - 22 mg/dL    Creatinine 0.98 0.50 - 1.40 mg/dL    Calcium 9.3 8.5 - 10.5 mg/dL    Anion Gap 12.0 7.0 - 16.0   CBC without differential    Collection Time: 04/03/23  2:42 AM   Result Value Ref Range    WBC 7.8 4.8 - 10.8 K/uL    RBC 4.06 (L) 4.20 - 5.40 M/uL    Hemoglobin 11.2 (L) 12.0 - 16.0 g/dL    Hematocrit 35.3 (L) 37.0 - 47.0 %    MCV 86.9 81.4 - 97.8 fL    MCH 27.6 27.0 - 33.0 pg    MCHC 31.7 (L) 33.6 - 35.0 g/dL    RDW 41.1 35.9 - 50.0 fL    Platelet Count 267 164 - 446 K/uL    MPV 9.2 9.0 - 12.9 fL   ESTIMATED GFR    Collection Time: 04/03/23  2:42 AM   Result Value Ref Range    GFR (CKD-EPI) 66 >60 mL/min/1.73 m 2   POCT glucose device results    Collection Time: 04/03/23  7:46 AM   Result Value Ref Range    POC Glucose, Blood 276 (H) 65 - 99 mg/dL       Imaging:  EC-ECHOCARDIOGRAM COMPLETE W/ CONT   Final Result      CT-RENAL COLIC EVALUATION(A/P W/O)   Final Result         1.  Mild right renal pelviectasis, possibly parapelvic cyst or dilated extrarenal pelvis.   2.  No obstructive uropathy or urolithiasis is seen.   3.  Atherosclerosis.   4.  Prior cholecystectomy.               [id number] VIEW   Final Result         No specific finding to suggest small bowel obstruction.      CL-LEFT HEART CATHETERIZATION WITH POSSIBLE INTERVENTION    (Results Pending)   CL-LEFT HEART CATHETERIZATION WITH  POSSIBLE INTERVENTION    (Results Pending)       EKG tracings 4/2/23 personally reviewed by me shows sinus rhythm    Echocardiogram 4/2/23 images personally reviewed by me shows mildly reduced LV systolic function with LVEF 45% global hypokinesis, aortic valve sclerosis without stenosis, normal RV size and function.    All pertinent features of laboratory and imaging reviewed including primary images where applicable      Principal Problem:    NSTEMI (non-ST elevated myocardial infarction) ([facility]) POA: Yes  Active Problems:    Hyperlipidemia (Chronic) POA: Yes    Diabetes (HCC) POA: Unknown    Generalized abdominal pain POA: Yes    Obesity (BMI 30-39.9) (Chronic) POA: Yes    Opioid dependence (HCC) (Chronic) POA: Yes    Polyneuropathy POA: Yes    HLD (hyperlipidemia) POA: Yes    Ischemic cardiomyopathy POA: Unknown    ACC/AHA stage C systolic heart failure (HCC) POA: Unknown    Left ventricular systolic dysfunction, NYHA class 3 POA: Unknown  Resolved Problems:    * No resolved hospital problems. *      Assessment / Plan:  Patient is a 62-year-old woman with past medical history significant for coronary artery disease status post prior PCI to the LAD and CABG, hyperlipidemia, obesity, ischemic cardiomyopathy LVEF of 45% who was admitted with NSTEMI found to have severe obstructive native vessel disease status post PCI to ostial LCx.    Non-ST elevation myocardial infarction  Severe obstructive native vessel CAD status post PCI to ostial LCx, PTCA to LAD  Residual obstructive disease involving RPDA  Hyperlipidemia  Ischemic cardiomyopathy, HFrEF LVEF 45%  Obesity  Hypertension    Recommendations:  Multivessel Coronary Artery Disease  Patient underwent successful PCI to ostial LCx and PTCA involving LAD.  Noted to have residual obstructive disease involving RPDA.  At this time, we will proceed with revascularization involving of RPDA prior to discharge.  Keep patient n.p.o. today.  On the schedule for cath later  today.  Continue DAPT with Aspirin and Plavix  Continue high intensity atorvastatin  Needs aggressive cardiac risk factor modification, goal LDL < 70    Ischemic Cardiomyopathy  No evidence of significant volume overload  Echocardiogram showed mildly reduced LV EF 45%  Continue GDMT with Farxiga 10mg, Metoprolol Succinate 50mg, Aldactone 25mg daily and low dose Entresto    Hypertension  Continue with GDMT as above. BP currently at goal    Hyperlipidemia  Continue Atorvastatin 80mg. Goal LDL < 70      The risks, benefits, and alternatives to coronary angiography with IV sedation were discussed in great detail. Specific risks mentioned include bleeding, infection, kidney damage, allergic reaction, cardiac perforation with possible tamponade requiring brittany-cardiocentesis or possible open heart surgery. In addition, we discussed that 10% of patients will experience small to moderate bruising at the side of the arterial puncture. Lastly the risks of heart attack, stroke, and death were discussed; the risks of major complications such as heart attack or stroke caused by the angiogram is less than 1%; the risk of death is approximately 1 in 1000. The patient verbalized understanding of these potential complications and wishes to proceed with this procedure.      It is my pleasure to participate in the care of Ms. Carrion.  Please do not hesitate to contact me with questions or concerns.    True Patterson MD  Cardiologist, [facility]    4/3/2023    Please note that this dictation was created using voice recognition software. I have made every reasonable attempt to correct obvious errors, but it is possible there are errors of grammar and possibly content that I did not discover before finalizing the note.

## 2023-04-03 NOTE — DISCHARGE PLANNING
Agency/Facility Name: [facility]  Outcome: [facility] arranged transport for tomorrow at 1000, going to Pt's home. [facility] to update their travel team due to distance being over 100 miles, and are to update RN CM as needed.     RN CM notified.

## 2023-04-03 NOTE — PROGRESS NOTES
Patient had small hematoma above TR band proximal to ulnar access.  Pressure held for 5 min and hematoma resolved.  MD notified. Per MD additional TR band placed proximal to initial band with no air added. Patient not given Plavix loading dose post cath as she received 300 post cath on 3/2/23 and 75 mg this AM

## 2023-04-03 NOTE — PROCEDURES
"Cardiac Catheterization and Percutaneous Intervention Procedure Report    4/3/2023    Referring MD:     Indication for procedure: Non-ST elevation MI, elective PCI of right posterior descending artery    Procedures:  Successful percutaneous coronary intervention of right posterior descending artery using 2.5 x 16 mm Synergy drug-eluting stent    Procedure details:  Julisa Josue was brought to the cardiac catheterization lab where the right wrist was prepped and draped in the usual manner for cardiac catheterization.  Timeout was performed.  The area was anesthetized with lidocaine and a 5/6 FR sheath was inserted into the right ulnar artery without difficulty.  Right radial artery is calcified, unable to cannulate.  A  Patient underwent percutaneous coronary revascularization as outlined below.  At the completion of the case the sheath was removed and hemostasis achieved utilizing a radial compression band .  Patient was pain-free and hemodynamically stable at the completion of the case.  There were no apparent complications.    Interventional Procedure:     Given the patient's clinical presentation and coronary anatomy, PCI was indicated and we proceeded with the intervention as detailed below.    Indication for PCI:  NSTE- ACD    Pre: 90 %, 12 mm length, ZAIN 3 flow  Post: 0%, ZAIN 3 flow    Lesion complexity  Non-High  Severe calcification No  Bifurcation  No    Guide catheter: AR1 was advanced to the ostia of the right coronary artery.    Guide wire: A 0.014\" mm  Runthrough was advanced into the artery and crossed the lesion.    Balloon pre-dilatation: 2.0 by 12 mm NC Emerge inflated to 8 VI to pre-dilate the lesion.    Stent: A 2.5 by 16 mm Synergy drug-eluting  stent was deployed in proximal  posterior descending coronary artery at 11 VI.    Post dilatation is achieved using  2.5 by 12 mm NC Emergeinflated to 14 VI.      Anticoagulant: Heparin  Antiplatelet: Clopidogrel  EBL <25 cc  Complications: " none  Specimens: none  Contrast: 36 cc  Fluorotime : see cath lab flowsheet      Sedation: I supervised moderate sedation over a trained independent observer.    Sedation start time: 09:00  End time: 09:25      Electronically signed by   Clark Lau M.D., [place]  Interventional cardiologist  4/3/2023  10:53 AM

## 2023-04-03 NOTE — CARE PLAN
Problem: Knowledge Deficit - Standard  Goal: Patient and family/care givers will demonstrate understanding of plan of care, disease process/condition, diagnostic tests and medications  Outcome: Progressing     Problem: Psychosocial  Goal: Patient's level of anxiety will decrease  Outcome: Progressing  Goal: Patient's ability to verbalize feelings about condition will improve  Outcome: Progressing  Goal: Patient's ability to re-evaluate and adapt role responsibilities will improve  Outcome: Progressing  Goal: Patient and family will demonstrate ability to cope with life altering diagnosis and/or procedure  Outcome: Progressing  Goal: Spiritual and cultural needs incorporated into hospitalization  Outcome: Progressing     Problem: Communication  Goal: The ability to communicate needs accurately and effectively will improve  Outcome: Progressing     Problem: Discharge Barriers/Planning  Goal: Patient's continuum of care needs are met  Outcome: Progressing     Problem: Hemodynamics  Goal: Patient's hemodynamics, fluid balance and neurologic status will be stable or improve  Outcome: Progressing     Problem: Respiratory  Goal: Patient will achieve/maintain optimum respiratory ventilation and gas exchange  Outcome: Progressing     Problem: Chest Tube Management  Goal: Complications related to chest tube will be avoided or minimized  Outcome: Progressing     Problem: Fluid Volume  Goal: Fluid volume balance will be maintained  Outcome: Progressing     Problem: Mechanical Ventilation  Goal: Safe management of artificial airway and ventilation  Outcome: Progressing  Goal: Successful weaning off mechanical ventilator, spontaneously maintains adequate gas exchange  Outcome: Progressing  Goal: Patient will be able to express needs and understand communication  Outcome: Progressing     Problem: Dysphagia  Goal: Dysphagia will improve  Outcome: Progressing     Problem: Risk for Aspiration  Goal: Patient's risk for aspiration  will be absent or decrease  Outcome: Progressing     Problem: Nutrition  Goal: Patient's nutritional and fluid intake will be adequate or improve  Outcome: Progressing  Goal: Enteral nutrition will be maintained or improve  Outcome: Progressing  Goal: Enteral nutrition will be maintained or improve  Outcome: Progressing     Problem: Urinary Elimination  Goal: Establish and maintain regular urinary output  Outcome: Progressing     Problem: Bowel Elimination  Goal: Establish and maintain regular bowel function  Outcome: Progressing     Problem: Gastrointestinal Irritability  Goal: Nausea and vomiting will be absent or improve  Outcome: Progressing  Goal: Diarrhea will be absent or improved  Outcome: Progressing     Problem: Rectal Tube  Goal: Fecal output will be contained and skin will remain free from irritation  Outcome: Progressing     Problem: Mobility  Goal: Patient's capacity to carry out activities will improve  Outcome: Progressing     Problem: Self Care  Goal: Patient will have the ability to perform ADLs independently or with assistance (bathe, groom, dress, toilet and feed)  Outcome: Progressing     Problem: Infection - Standard  Goal: Patient will remain free from infection  Outcome: Progressing     Problem: Wound/ / Incision Healing  Goal: Patient's wound/surgical incision will decrease in size and heals properly  Outcome: Progressing     Problem: Pain - Standard  Goal: Alleviation of pain or a reduction in pain to the patient’s comfort goal  Outcome: Progressing     Problem: Fall Risk  Goal: Patient will remain free from falls  Outcome: Progressing   The patient is Stable - Low risk of patient condition declining or worsening    Shift Goals  Clinical Goals: heart cath, monitor BG, monitor cath site  Patient Goals: rest, eat  Family Goals: jenna    Progress made toward(s) clinical / shift goals: cath site remains c/d/I, safety,     Patient is not progressing towards the following goals:

## 2023-04-04 ENCOUNTER — PHARMACY VISIT (OUTPATIENT)
Dept: PHARMACY | Facility: MEDICAL CENTER | Age: 63
End: 2023-04-04
Payer: COMMERCIAL

## 2023-04-04 VITALS
DIASTOLIC BLOOD PRESSURE: 43 MMHG | OXYGEN SATURATION: 99 % | HEIGHT: 65 IN | WEIGHT: 212.52 LBS | SYSTOLIC BLOOD PRESSURE: 99 MMHG | HEART RATE: 65 BPM | BODY MASS INDEX: 35.41 KG/M2 | RESPIRATION RATE: 16 BRPM | TEMPERATURE: 98.8 F

## 2023-04-04 DIAGNOSIS — R07.89 OTHER CHEST PAIN: Primary | ICD-10-CM

## 2023-04-04 LAB
ANION GAP SERPL CALC-SCNC: 12 MMOL/L (ref 7–16)
BUN SERPL-MCNC: 18 MG/DL (ref 8–22)
CALCIUM SERPL-MCNC: 8.8 MG/DL (ref 8.5–10.5)
CHLORIDE SERPL-SCNC: 104 MMOL/L (ref 96–112)
CO2 SERPL-SCNC: 22 MMOL/L (ref 20–33)
CREAT SERPL-MCNC: 1.25 MG/DL (ref 0.5–1.4)
EST. AVERAGE GLUCOSE BLD GHB EST-MCNC: 186 MG/DL
GFR SERPLBLD CREATININE-BSD FMLA CKD-EPI: 49 ML/MIN/1.73 M 2
GLUCOSE BLD STRIP.AUTO-MCNC: 231 MG/DL (ref 65–99)
GLUCOSE BLD STRIP.AUTO-MCNC: 293 MG/DL (ref 65–99)
GLUCOSE SERPL-MCNC: 340 MG/DL (ref 65–99)
HBA1C MFR BLD: 8.1 % (ref 4–5.6)
POTASSIUM SERPL-SCNC: 5.1 MMOL/L (ref 3.6–5.5)
SODIUM SERPL-SCNC: 138 MMOL/L (ref 135–145)

## 2023-04-04 PROCEDURE — 80048 BASIC METABOLIC PNL TOTAL CA: CPT

## 2023-04-04 PROCEDURE — RXMED WILLOW AMBULATORY MEDICATION CHARGE: Performed by: HOSPITALIST

## 2023-04-04 PROCEDURE — 82962 GLUCOSE BLOOD TEST: CPT

## 2023-04-04 PROCEDURE — 99232 SBSQ HOSP IP/OBS MODERATE 35: CPT | Mod: FS | Performed by: INTERNAL MEDICINE

## 2023-04-04 PROCEDURE — A9270 NON-COVERED ITEM OR SERVICE: HCPCS | Performed by: INTERNAL MEDICINE

## 2023-04-04 PROCEDURE — 700102 HCHG RX REV CODE 250 W/ 637 OVERRIDE(OP): Performed by: HOSPITALIST

## 2023-04-04 PROCEDURE — 700102 HCHG RX REV CODE 250 W/ 637 OVERRIDE(OP): Performed by: STUDENT IN AN ORGANIZED HEALTH CARE EDUCATION/TRAINING PROGRAM

## 2023-04-04 PROCEDURE — 83036 HEMOGLOBIN GLYCOSYLATED A1C: CPT

## 2023-04-04 PROCEDURE — 93000 ELECTROCARDIOGRAM COMPLETE: CPT | Performed by: INTERNAL MEDICINE

## 2023-04-04 PROCEDURE — A9270 NON-COVERED ITEM OR SERVICE: HCPCS | Performed by: STUDENT IN AN ORGANIZED HEALTH CARE EDUCATION/TRAINING PROGRAM

## 2023-04-04 PROCEDURE — 99239 HOSP IP/OBS DSCHRG MGMT >30: CPT | Performed by: HOSPITALIST

## 2023-04-04 PROCEDURE — A9270 NON-COVERED ITEM OR SERVICE: HCPCS | Performed by: HOSPITALIST

## 2023-04-04 PROCEDURE — 700102 HCHG RX REV CODE 250 W/ 637 OVERRIDE(OP): Performed by: INTERNAL MEDICINE

## 2023-04-04 PROCEDURE — 36415 COLL VENOUS BLD VENIPUNCTURE: CPT

## 2023-04-04 RX ORDER — ATORVASTATIN CALCIUM 80 MG/1
80 TABLET, FILM COATED ORAL EVERY EVENING
Qty: 30 TABLET | Refills: 5 | Status: ON HOLD | OUTPATIENT
Start: 2023-04-04 | End: 2023-04-24

## 2023-04-04 RX ORDER — TRAZODONE HYDROCHLORIDE 150 MG/1
150 TABLET ORAL
Qty: 1 TABLET | Refills: 0
Start: 2023-04-04

## 2023-04-04 RX ORDER — DAPAGLIFLOZIN 10 MG/1
10 TABLET, FILM COATED ORAL DAILY
Qty: 30 TABLET | Refills: 5 | Status: ON HOLD | OUTPATIENT
Start: 2023-04-05 | End: 2023-04-24

## 2023-04-04 RX ORDER — METOPROLOL SUCCINATE 50 MG/1
50 TABLET, EXTENDED RELEASE ORAL DAILY
Qty: 30 TABLET | Refills: 5 | Status: ON HOLD | OUTPATIENT
Start: 2023-04-05 | End: 2023-04-24

## 2023-04-04 RX ORDER — SPIRONOLACTONE 25 MG/1
25 TABLET ORAL DAILY
Qty: 30 TABLET | Refills: 5 | Status: ON HOLD | OUTPATIENT
Start: 2023-04-05 | End: 2023-04-24

## 2023-04-04 RX ADMIN — OXYCODONE HYDROCHLORIDE 5 MG: 5 TABLET ORAL at 00:33

## 2023-04-04 RX ADMIN — OXYCODONE HYDROCHLORIDE 5 MG: 5 TABLET ORAL at 11:28

## 2023-04-04 RX ADMIN — GABAPENTIN 800 MG: 400 CAPSULE ORAL at 11:28

## 2023-04-04 RX ADMIN — TRAZODONE HYDROCHLORIDE 150 MG: 100 TABLET ORAL at 00:31

## 2023-04-04 RX ADMIN — CLOPIDOGREL BISULFATE 75 MG: 75 TABLET ORAL at 05:13

## 2023-04-04 RX ADMIN — INSULIN LISPRO 2 UNITS: 100 INJECTION, SOLUTION INTRAVENOUS; SUBCUTANEOUS at 09:45

## 2023-04-04 RX ADMIN — DAPAGLIFLOZIN 10 MG: 10 TABLET, FILM COATED ORAL at 05:12

## 2023-04-04 RX ADMIN — GABAPENTIN 800 MG: 400 CAPSULE ORAL at 05:12

## 2023-04-04 RX ADMIN — INSULIN LISPRO 6 UNITS: 100 INJECTION, SOLUTION INTRAVENOUS; SUBCUTANEOUS at 05:27

## 2023-04-04 RX ADMIN — METOPROLOL SUCCINATE 50 MG: 50 TABLET, EXTENDED RELEASE ORAL at 05:12

## 2023-04-04 RX ADMIN — OMEPRAZOLE 20 MG: 20 CAPSULE, DELAYED RELEASE ORAL at 05:12

## 2023-04-04 RX ADMIN — SPIRONOLACTONE 25 MG: 25 TABLET ORAL at 05:12

## 2023-04-04 RX ADMIN — SACUBITRIL AND VALSARTAN 1 TABLET: 24; 26 TABLET, FILM COATED ORAL at 05:12

## 2023-04-04 RX ADMIN — ASPIRIN 81 MG 81 MG: 81 TABLET ORAL at 05:12

## 2023-04-04 RX ADMIN — MORPHINE SULFATE 30 MG: 15 TABLET, FILM COATED, EXTENDED RELEASE ORAL at 05:12

## 2023-04-04 SDOH — ECONOMIC STABILITY: TRANSPORTATION INSECURITY
IN THE PAST 12 MONTHS, HAS THE LACK OF TRANSPORTATION KEPT YOU FROM MEDICAL APPOINTMENTS OR FROM GETTING MEDICATIONS?: YES

## 2023-04-04 SDOH — ECONOMIC STABILITY: TRANSPORTATION INSECURITY
IN THE PAST 12 MONTHS, HAS LACK OF TRANSPORTATION KEPT YOU FROM MEETINGS, WORK, OR FROM GETTING THINGS NEEDED FOR DAILY LIVING?: YES

## 2023-04-04 ASSESSMENT — ENCOUNTER SYMPTOMS
DIZZINESS: 0
AGITATION: 0
NERVOUS/ANXIOUS: 0
BLOOD IN STOOL: 0
CONFUSION: 0
CHEST TIGHTNESS: 0
PALPITATIONS: 0
ABDOMINAL DISTENTION: 0
COLOR CHANGE: 0
SHORTNESS OF BREATH: 0
DIAPHORESIS: 0
CHILLS: 0
COUGH: 0
FEVER: 0
TROUBLE SWALLOWING: 0
ABDOMINAL PAIN: 0
NUMBNESS: 0

## 2023-04-04 ASSESSMENT — PAIN DESCRIPTION - PAIN TYPE
TYPE: ACUTE PAIN
TYPE: ACUTE PAIN

## 2023-04-04 NOTE — DISCHARGE PLANNING
ESTER Ascencio met with patient via bedside and introduced Community Care Management and completed SDOH. Patient lives in [facility] alone and is not an active  and relies on family members for all transportation.  Pt is currently working towards transferring to assisted living at [facility] in [place] when accepted. Pt has great family support and receives SNAP benefits. Pt states PCP recently retired and will establish with a PCP in [place]. Patient denies any barriers with food, housing, transportation and is confident in managing her health and medications.    Pt will need transportation to [facility] at the time of discharge.  Community Health Worker Intake  Social determinates of health intake Completed.   Identified barriers to None.  Contact information provided to Julisa Josue Yes  Has PCP appointment scheduled for PT will schedule  Scheduled Food Delivery/Home Visit/Outpatient Visit: No  Accepted/Declined Meds-To-Beds. Yes  Inpatient assessment completed.     Plan:  Discussed MTM ( verfied [facility] website) and pt is aware of services.   Provided all contact information  Will discharge from [facility] and remove from case load as pt has no needs.

## 2023-04-04 NOTE — DISCHARGE SUMMARY
Discharge Summary    CHIEF COMPLAINT ON ADMISSION  No chief complaint on file.      Reason for Admission  NSTEMI     Admission Date  3/31/2023    CODE STATUS  Full Code    HPI & HOSPITAL COURSE  Julisa Josue is a 62-year-old female with history of CAD with prior history of CABG PCI diabetes hypertension chronic pain syndrome presented to outside facility about a week ago with abdominal pain had normal troponin discharged home return with persistent symptoms and noted to have elevated troponin.  She was transferred to our facility for cardiology evaluation for NSTEMI. She had two staged PCI.  Medical compliance was discussed.      Therefore, she is discharged in good and stable condition to home with close outpatient follow-up.    The patient met 2-midnight criteria for an inpatient stay at the time of discharge.    Discharge Date  4/4/23    FOLLOW UP ITEMS POST DISCHARGE  None    DISCHARGE DIAGNOSES  Principal Problem:    NSTEMI (non-ST elevated myocardial infarction) (HCC) POA: Yes  Active Problems:    Hyperlipidemia (Chronic) POA: Yes    Diabetes (HCC) POA: Unknown    Obesity (BMI 30-39.9) (Chronic) POA: Yes    Opioid dependence (HCC) (Chronic) POA: Yes    Polyneuropathy POA: Yes    HLD (hyperlipidemia) POA: Yes    Ischemic cardiomyopathy POA: Unknown    ACC/AHA stage C systolic heart failure (HCC) POA: Unknown    Left ventricular systolic dysfunction, NYHA class 3 POA: Unknown  Resolved Problems:    Generalized abdominal pain POA: Yes      FOLLOW UP  No future appointments.  [facility]  [address]  [address]  [facility] [id number] [phone]  Call  Your doctor has referred you for Cardiac Rehab, which is important for your recovery. Please call to make an appointment, or speak with your local doctor to find a program in your area.      MEDICATIONS ON DISCHARGE     Medication List        START taking these medications        Instructions   dapagliflozin propanediol 10 MG Tabs  Start  taking on: April 5, 2023  Commonly known as: Farxiga   Take 1 Tablet by mouth every day.  Dose: 10 mg     metoprolol SR 50 MG Tb24  Start taking on: April 5, 2023  Commonly known as: TOPROL XL   Take 1 Tablet by mouth every day.  Dose: 50 mg     sacubitril-valsartan 24-26 MG Tabs  Commonly known as: Entresto   Take 1 Tablet by mouth 2 times a day.  Dose: 1 Tablet     spironolactone 25 MG Tabs  Start taking on: April 5, 2023  Commonly known as: ALDACTONE   Take 1 Tablet by mouth every day.  Dose: 25 mg            CHANGE how you take these medications        Instructions   atorvastatin 80 MG tablet  What changed:   medication strength  how much to take  when to take this  additional instructions  Commonly known as: LIPITOR   Take 1 Tablet by mouth every evening.  Dose: 80 mg     clopidogrel 75 MG Tabs  What changed: Another medication with the same name was removed. Continue taking this medication, and follow the directions you see here.  Commonly known as: PLAVIX   Take 1 Tab by mouth every day.  Dose: 75 mg            CONTINUE taking these medications        Instructions   ascorbic acid 500 MG tablet  Commonly known as: Vitamin C   Take 1 Tab by mouth every day.  Dose: 500 mg     aspirin 81 MG EC tablet   Take 1 Tab by mouth every day.  Dose: 81 mg     cloNIDine 0.1 MG Tabs  Commonly known as: CATAPRES   Take 0.1 mg by mouth as needed.  Dose: 0.1 mg     ferrous sulfate 325 (65 Fe) MG tablet   Doctor's comments: .  Take 1 Tab by mouth every day with lunch.  Dose: 325 mg     gabapentin 800 MG tablet  Commonly known as: NEURONTIN   Take 800 mg by mouth 3 times a day.  Dose: 800 mg     * insulin glargine 100 UNIT/ML Soln  Commonly known as: Lantus   Inject 30 Units as instructed 2 Times a Day.  Dose: 30 Units     * insulin GLARGINE 100 UNIT/ML Soln  Commonly known as: Lantus,Semglee   Inject 20 Units under the skin 2 times a day.  Dose: 20 Units     isosorbide mononitrate SR 30 MG Tb24  Commonly known as: IMDUR    Doctor's comments: .  Take 1 Tab by mouth every day.  Dose: 30 mg     metFORMIN 500 MG Tabs  Commonly known as: GLUCOPHAGE   Take 500 mg by mouth 2 times a day with meals.  Dose: 500 mg     morphine PCA 1 MG/ML Soln   30 mg 2 times a day. Indications: Pain  Dose: 30 mg     oxyCODONE immediate-release 5 MG Tabs  Commonly known as: ROXICODONE   Take 10 mg by mouth every 6 hours as needed for Severe Pain.  Dose: 10 mg     oxyCODONE-acetaminophen  MG Tabs  Commonly known as: PERCOCET-10   Take 1 Tablet by mouth every 6 hours as needed for Severe Pain.  Dose: 1 Tablet     pregabalin 100 MG Caps  Commonly known as: LYRICA   Take 100 mg by mouth 2 times a day.  Dose: 100 mg     SITagliptin 50 MG Tabs  Commonly known as: JANUVIA   Take 50 mg by mouth every day.  Dose: 50 mg     * traZODone 100 MG Tabs  Commonly known as: DESYREL   Take 50 mg by mouth every bedtime. 100 mg AM  200 mg PM  Dose: 50 mg     * traZODone 150 MG Tabs  Commonly known as: DESYREL   Take 1 Tablet by mouth every evening.  Dose: 150 mg           * This list has 4 medication(s) that are the same as other medications prescribed for you. Read the directions carefully, and ask your doctor or other care provider to review them with you.                  Allergies  No Known Allergies    DIET  Orders Placed This Encounter   Procedures    Diet Order Diet: Cardiac     Standing Status:   Standing     Number of Occurrences:   1     Order Specific Question:   Diet:     Answer:   Cardiac [6]       ACTIVITY  As tolerated.  Weight bearing as tolerated    CONSULTATIONS  Cardiology: Ayan Mendiola    PROCEDURES  4/2/23 Cath:  IMPRESSION:  1.  Severe ostial /proximal circumflex ISR- status post successful IVUS guided intravascular lithotripsy and revascularization deploying Synergy 3 x 20 mm ANA, postdilated to 3.7 mm at the ostium /distal left main.  2.  Residual severe proximal right PDA stenosis, recommend staged PCI either this admission or in 4 to 6 weeks as  an outpatient.  3.  Residual severe diffuse stenoses and medium in caliber OM1, and severe proximal D1 stenosis (jailed by proximal LAD stent), for which I recommend optimal medical therapy .  4.  Mildly elevated resting LVEDP at 18 mmHg no significant transaortic gradient on pullback    4/3/23 Cardiac Cath:  Successful percutaneous coronary intervention of right posterior descending artery using 2.5 x 16 mm Synergy drug-eluting stent    LABORATORY  Lab Results   Component Value Date    SODIUM 138 04/04/2023    POTASSIUM 5.1 04/04/2023    CHLORIDE 104 04/04/2023    CO2 22 04/04/2023    GLUCOSE 340 (H) 04/04/2023    BUN 18 04/04/2023    CREATININE 1.25 04/04/2023        Lab Results   Component Value Date    WBC 7.8 04/03/2023    HEMOGLOBIN 11.2 (L) 04/03/2023    HEMATOCRIT 35.3 (L) 04/03/2023    PLATELETCT 267 04/03/2023      EC-ECHOCARDIOGRAM COMPLETE W/ CONT   Final Result      CT-RENAL COLIC EVALUATION(A/P W/O)   Final Result         1.  Mild right renal pelviectasis, possibly parapelvic cyst or dilated extrarenal pelvis.   2.  No obstructive uropathy or urolithiasis is seen.   3.  Atherosclerosis.   4.  Prior cholecystectomy.               [id number] VIEW   Final Result         No specific finding to suggest small bowel obstruction.      CL-LEFT HEART CATHETERIZATION WITH POSSIBLE INTERVENTION    (Results Pending)   CL-LEFT HEART CATHETERIZATION WITH POSSIBLE INTERVENTION    (Results Pending)     4/2/23 Echocardiogram:  CONCLUSIONS  Normal left ventricular chamber size.  Mildly reduced left ventricular systolic function.  The left ventricular ejection fraction is visually estimated to be 45-  50%.  Global hypokinesis.  Grade II diastolic dysfunction.  Aortic valve sclerosis without stenosis.  The right ventricle is normal in size and systolic function.  No significant valvular disease.   Compared to the prior study on 12/1/2022,  there has been no   significant change.       Total time of the discharge process  exceeds 31 minutes.

## 2023-04-04 NOTE — PROGRESS NOTES
0700 - Report received from Ayesha MCWILLIAMS at patient's bedside. Patient resting in bed quietly with no complaints at this time. Telemetry monitor intact et functioning. Call light and belongings within reach, safety measures intact, white board updated.     0830 - Received call from medical transport, they wanted to  patient at 1000. Reported to transport that I would return call once I had DC order in and knew when patient could go.     0900 - Returned call to medical transport, set  time to 1100 - 1130    1100 - All discharge instructions reviewed with patient who verbalized understanding. IV's removed with immediate hemostasis.  Telemetry removed with immediate hemostasis. Medical transport here to take patient home. Taken to taxi via wheelchair.

## 2023-04-04 NOTE — CARE PLAN
The patient is Stable - Low risk of patient condition declining or worsening    Shift Goals  Clinical Goals: monitor BG, monitor cath site  Patient Goals: rest, eat  Family Goals: jenna    Progress made toward(s) clinical / shift goals:  Patient to DC home       Problem: Psychosocial  Goal: Patient's level of anxiety will decrease  Outcome: Met  Goal: Patient's ability to verbalize feelings about condition will improve  Outcome: Met  Goal: Patient's ability to re-evaluate and adapt role responsibilities will improve  Outcome: Met  Goal: Patient and family will demonstrate ability to cope with life altering diagnosis and/or procedure  Outcome: Met  Goal: Spiritual and cultural needs incorporated into hospitalization  Outcome: Met     Problem: Communication  Goal: The ability to communicate needs accurately and effectively will improve  Outcome: Met     Problem: Discharge Barriers/Planning  Goal: Patient's continuum of care needs are met  Outcome: Met     Problem: Hemodynamics  Goal: Patient's hemodynamics, fluid balance and neurologic status will be stable or improve  Outcome: Met     Problem: Respiratory  Goal: Patient will achieve/maintain optimum respiratory ventilation and gas exchange  Outcome: Met     Problem: Chest Tube Management  Goal: Complications related to chest tube will be avoided or minimized  Outcome: Met     Problem: Fluid Volume  Goal: Fluid volume balance will be maintained  Outcome: Met     Problem: Mechanical Ventilation  Goal: Safe management of artificial airway and ventilation  Outcome: Met  Goal: Successful weaning off mechanical ventilator, spontaneously maintains adequate gas exchange  Outcome: Met  Goal: Patient will be able to express needs and understand communication  Outcome: Met     Problem: Dysphagia  Goal: Dysphagia will improve  Outcome: Met     Problem: Risk for Aspiration  Goal: Patient's risk for aspiration will be absent or decrease  Outcome: Met     Problem: Nutrition  Goal:  Patient's nutritional and fluid intake will be adequate or improve  Outcome: Met  Goal: Enteral nutrition will be maintained or improve  Outcome: Met  Goal: Enteral nutrition will be maintained or improve  Outcome: Met     Problem: Urinary Elimination  Goal: Establish and maintain regular urinary output  Outcome: Met     Problem: Bowel Elimination  Goal: Establish and maintain regular bowel function  Outcome: Met     Problem: Gastrointestinal Irritability  Goal: Nausea and vomiting will be absent or improve  Outcome: Met  Goal: Diarrhea will be absent or improved  Outcome: Met     Problem: Rectal Tube  Goal: Fecal output will be contained and skin will remain free from irritation  Outcome: Met     Problem: Mobility  Goal: Patient's capacity to carry out activities will improve  Outcome: Met     Problem: Self Care  Goal: Patient will have the ability to perform ADLs independently or with assistance (bathe, groom, dress, toilet and feed)  Outcome: Met     Problem: Infection - Standard  Goal: Patient will remain free from infection  Outcome: Met     Problem: Wound/ / Incision Healing  Goal: Patient's wound/surgical incision will decrease in size and heals properly  Outcome: Met     Problem: Fall Risk  Goal: Patient will remain free from falls  Outcome: Met     Problem: Knowledge Deficit - Standard  Goal: Patient and family/care givers will demonstrate understanding of plan of care, disease process/condition, diagnostic tests and medications  Outcome: Discharged - Not Met       Patient is not progressing towards the following goals:

## 2023-04-04 NOTE — PROGRESS NOTES
Cardiology Follow Up Progress Note    Date of Service  4/4/2023    Attending Physician  Hollis Elizalde D.O.    Chief Complaint   Chest pain     HPI  Julisa Josue is a 62 y.o. female admitted 3/31/2023 with past medical history of CAD, s/p CABG x1 9/2021, diabetes, hypertension, colon cancer, hyperlipidemia, and asthma.    Presented with chest pain, noted NSTEMI s/p PCI, ischemic cardiomyopathy. Patient underwent successful coronary angiogram and was found to have severe obstructive disease involving LAD and LCx.  Patient had successful ostial/proximal LCx PCI performed with ANA x1. Also had successful PTCA of ostial LAD. RCA was noted to have severe stenosis 80% involving rPDA.    Interim Events  Right radial is soft and good peripheral pulse  NSR on the monitor   Vs stable   GFR 49, creatinine 1.25    Review of Systems  Review of Systems   Constitutional:  Negative for chills, diaphoresis and fever.   HENT:  Negative for nosebleeds and trouble swallowing.    Respiratory:  Negative for cough, chest tightness and shortness of breath.    Cardiovascular:  Negative for chest pain, palpitations and leg swelling.   Gastrointestinal:  Negative for abdominal distention, abdominal pain and blood in stool.   Genitourinary:  Negative for hematuria.   Skin:  Negative for color change.   Neurological:  Negative for dizziness, syncope and numbness.   Psychiatric/Behavioral:  Negative for agitation and confusion. The patient is not nervous/anxious.      Vital signs in last 24 hours  Temp:  [36.4 °C (97.5 °F)-37.1 °C (98.8 °F)] 37.1 °C (98.8 °F)  Pulse:  [65-69] 65  Resp:  [16-18] 16  BP: ()/() 99/43  SpO2:  [89 %-100 %] 99 %    Physical Exam  Physical Exam  Vitals and nursing note reviewed.   Constitutional:       Appearance: Normal appearance.   HENT:      Head: Normocephalic and atraumatic.   Eyes:      Pupils: Pupils are equal, round, and reactive to light.   Cardiovascular:      Rate and Rhythm: Normal rate and  regular rhythm.      Heart sounds: Normal heart sounds. No murmur heard.  Pulmonary:      Effort: Pulmonary effort is normal.      Breath sounds: Normal breath sounds.   Abdominal:      General: Abdomen is flat.   Musculoskeletal:      Cervical back: Normal range of motion.      Right lower leg: No edema.      Left lower leg: No edema.   Skin:     General: Skin is warm and dry.   Neurological:      General: No focal deficit present.      Mental Status: She is alert and oriented to person, place, and time.   Psychiatric:         Mood and Affect: Mood normal.         Behavior: Behavior normal.         Thought Content: Thought content normal.         Judgment: Judgment normal.       Lab Review  Lab Results   Component Value Date/Time    WBC 7.8 04/03/2023 02:42 AM    RBC 4.06 (L) 04/03/2023 02:42 AM    HEMOGLOBIN 11.2 (L) 04/03/2023 02:42 AM    HEMATOCRIT 35.3 (L) 04/03/2023 02:42 AM    MCV 86.9 04/03/2023 02:42 AM    MCH 27.6 04/03/2023 02:42 AM    MCHC 31.7 (L) 04/03/2023 02:42 AM    MPV 9.2 04/03/2023 02:42 AM      Lab Results   Component Value Date/Time    SODIUM 138 04/04/2023 05:23 AM    POTASSIUM 5.1 04/04/2023 05:23 AM    CHLORIDE 104 04/04/2023 05:23 AM    CO2 22 04/04/2023 05:23 AM    GLUCOSE 340 (H) 04/04/2023 05:23 AM    BUN 18 04/04/2023 05:23 AM    CREATININE 1.25 04/04/2023 05:23 AM      Lab Results   Component Value Date/Time    ASTSGOT 8 (L) 04/01/2023 04:55 AM    ALTSGPT 11 04/01/2023 04:55 AM     Lab Results   Component Value Date/Time    CHOLSTRLTOT 352 (H) 11/29/2022 03:34 AM     (H) 11/29/2022 03:34 AM    HDL 51 11/29/2022 03:34 AM    TRIGLYCERIDE 191 (H) 11/29/2022 03:34 AM    TROPONINT 198 (H) 04/01/2023 01:45 PM       No results for input(s): NTPROBNP in the last 72 hours.    Cardiac Imaging and Procedures Review  Telemetry showed NSR     Echocardiogram:    4/2/2023  Left Ventricle  The left ventricle is normal in size and thickness. Normal left   ventricular chamber size.   3mL of  contrast was used to enhance   visualization of the endocardial border. Existing IV was used at the   left forearm. Mildly reduced left ventricular systolic function. The   left ventricular ejection fraction is visually estimated to be 45-50%.   Global hypokinesis. Grade II diastolic dysfunction.     Right Ventricle  The right ventricle is normal in size and systolic function.     Right Atrium  The right atrium is normal in size. Normal inferior vena cava size and   inspiratory collapse.     Left Atrium  The left atrium appears dilated. Left atrial volume index is 25 mL/sq   m.     Mitral Valve  Structurally normal mitral valve without stenosis or regurgitation.     Aortic Valve  Aortic valve sclerosis without stenosis. No aortic insufficiency.     Tricuspid Valve  Structurally normal tricuspid valve without stenosis or regurgitation.    Unable to estimate right ventricular systolic pressure due to an   inadequate tricuspid regurgitant jet. Right atrial pressure is   estimated to be 3 mmHg.     Pulmonic Valve  Structurally normal pulmonic valve without stenosis or regurgitation.     Pericardium  No pericardial effusion.     Aorta  Normal aortic root for body surface area. The ascending aorta diameter   is 2.8 cm.    Cardiac Catheterization:    4/3/2023  Indication for procedure: Non-ST elevation MI, elective PCI of right posterior descending artery  Successful percutaneous coronary intervention of right posterior descending artery using 2.5 x 16 mm Synergy drug-eluting stent    Cardiac catheterization  4/2/2023  HEMODYNAMICS:  Aortic pressure: 113 /57 mmHg  LVEDP: 18 mmHg  No significant aortic gradient on pullback     CORONARY ANGIOGRAPHY:  The left main coronary artery: Large in caliber vessel with patent stent, bifurcates to LAD and left circumflex coronary arteries.  The left anterior descending coronary artery: Large in caliber vessel with 50% ostial stenosis (ISR) and severe ISR in the mid LAD.  Distal/apical  LAD fills via patent LIMA graft, with nonobstructive CAD.  The LAD gives off 2 medium caliber diagonal branches with 80% stenosis in D1 (jailed by the proximal LAD stent).  The left circumflex coronary artery: Large in caliber vessel with severe 99% ostial/proximal ISR -  with patent mid left circumflex stent, status post successful PCI as detailed below.  Of note, on IVUS imaging, there appears to be a very short segment of no stent in the proximal circumflex between the ostial/proximal and mid circumflex stents.  There is also mild 40% stenosis in proximal OM 3.  The circumflex gives off a medium caliber OM1 that has severe diffuse disease and a medium caliber OM 2 with luminal irregularities.  The right coronary artery: Large in caliber dominant vessel with 50% mid RCA stenosis and severe 80% proximal right PDA stenosis.     PERCUTANEOUS CORONARY INTERVENTION of ostial/proximal left circumflex:  Pre: 99% stenosis and ZAIN II flow  Post: 0% residual stenosis and ZAIN III flow.   Guide Catheter(s): 6 Persian EBU 3.0, guideGiovana thomasba microcatheter  Guidewire(s): Fielder XT (left circumflex),  50 (LAD)  Anticoagulation:  Heparin to maintain ACT > 250  Antiplatelet(s): Aspirin, Plavix  Pre-dilation balloon(s): 1.5 x 12 mm, 2 x 8 mm, 2 x 8 mm NC, 3 x 12 mm NC  IVUS or OCT used: IVUS guided vascularization  Intracoronary Atherectomy / Lithotripsy: Intracoronary lithotripsy using 3 x 12 mm balloon catheter  Stent: Synergy 3 x 20 mm ANA  Post-dilation balloon(s): 3 x 8 mm NC, 3.5 x 12 mm NC     After ostial left circumflex PCI, the LAD was wired with a  50 guidewire and PTCA was performed of the ostial LAD using 2 x 8 mm NC, 3 x 12 mm NC with excellent results.  Then distal left main kissing balloon inflation was performed using 2 x 8 mm NC in the ostial LAD and 3 x 12 mm NC in the ostial left circumflex with excellent results.     I decided against placing a stent across the ostial LAD given that it is  protected by the LIMA graft and to avoid additional stent layers in the distal left main across the left circumflex which already has 2 layers of stents and is ungrafted.    Assessment/Plan  No new Assessment & Plan notes have been filed under this hospital service since the last note was generated.  Service: Cardiology      NSTEMI: Multivessel Coronary Artery Disease  - PCI to ostial LCx and PTCA involving LAD 4/2/2023.  - staged PCI to RCA 4/3/2023  - Continue DAPT with Aspirin and Plavix  - Continue high intensity atorvastatin  - ECHO showed ef 45%   - cardiac rehab consult in place      2. Ischemic Cardiomyopathy  - No evidence of significant volume overload  - stage C, class 2-3  - Continue GDMT with Farxiga 10mg, Metoprolol Succinate 50mg, Aldactone 25mg daily and low dose Entresto       3. Hyperlipidemia  - Continue Atorvastatin 80mg. Goal LDL < 70, previously on atorvastatin 10mg qd     I personally spent a total of 16 minutes which includes face-to-face time and non-face-to-face time spent on preparing to see the patient, reviewing hospital notes and tests, obtaining history from the patient, performing a medically appropriate exam, counseling and educating the patient, ordering medications/tests/procedures/referrals as clinically indicated, and documenting information in the electronic medical record.      Thank you for allowing me to participate in the care of this patient.  Cardiology will sign off on this patient. She is planning on following up with cardiologist LUDIVINA Dyer.     Please contact me with any questions.    CECI Self.

## 2023-04-04 NOTE — DISCHARGE PLANNING
Case Management Discharge Planning    Admission Date: 3/31/2023  GMLOS: 3.8  ALOS: 4    6-Clicks ADL Score: 20  6-Clicks Mobility Score: 24      Anticipated Discharge Dispo:      DME Needed: No    Action(s) Taken: Transport Arranged     Escalations Completed: None    Medically Clear: Yes    Barriers to Discharge: None

## 2023-04-04 NOTE — PROGRESS NOTES
Hospital Medicine Daily Progress Note    Date of Service  4/3/2023    Chief Complaint  Julisa Carrion is a 62 y.o. female admitted 3/31/2023 with abdominal and chest pain    Hospital Course  62-year-old female with history of CAD with prior history of CABG PCI diabetes hypertension chronic pain syndrome presented to outside facility about a week ago with abdominal pain had normal troponin discharged home return with persistent symptoms and noted to have elevated troponin at 772 hyperglycemia.  She was transferred to our facility for cardiology evaluation for NSTEMI    Interval Problem Update    patient denies chest pain  Scheduled for staged PCI to PDA  Afebrile  Denies abdominal pain  CBGs elevated will increase insulin to home dose postprocedure      I have discussed this patient's plan of care and discharge plan at IDT rounds today with Case Management, Nursing, Nursing leadership, and other members of the IDT team.    Consultants/Specialty  cardiology    Code Status  Full Code    Disposition  Patient is not medically cleared for discharge.   Anticipate discharge to to home with close outpatient follow-up.  I have placed the appropriate orders for post-discharge needs.    Review of Systems  Review of Systems   Respiratory:  Positive for shortness of breath.    Gastrointestinal:  Negative for abdominal pain and nausea.   Musculoskeletal:  Positive for back pain and joint pain.   All other systems reviewed and are negative.     Physical Exam  Temp:  [36.2 °C (97.2 °F)-36.8 °C (98.2 °F)] 36.2 °C (97.2 °F)  Pulse:  [65-77] 77  Resp:  [16-17] 17  BP: ()/() 106/51  SpO2:  [89 %-100 %] 92 %    Physical Exam  Vitals and nursing note reviewed.   Constitutional:       General: She is not in acute distress.     Appearance: She is obese.   HENT:      Head: Normocephalic and atraumatic.      Nose: Nose normal. No rhinorrhea.      Mouth/Throat:      Pharynx: No oropharyngeal exudate or posterior oropharyngeal  erythema.   Eyes:      General: No scleral icterus.        Right eye: No discharge.         Left eye: No discharge.   Cardiovascular:      Rate and Rhythm: Normal rate and regular rhythm.      Heart sounds: Normal heart sounds. No murmur heard.    No friction rub. No gallop.   Pulmonary:      Effort: Pulmonary effort is normal. No respiratory distress.      Breath sounds: No stridor. Decreased breath sounds present. No wheezing or rhonchi.   Chest:      Chest wall: No tenderness.   Abdominal:      General: Bowel sounds are normal. There is no distension.      Palpations: Abdomen is soft. There is no mass.      Tenderness: There is no abdominal tenderness. There is no rebound.   Musculoskeletal:         General: No swelling or tenderness.      Cervical back: Neck supple. No rigidity.   Skin:     General: Skin is warm and dry.      Coloration: Skin is not cyanotic or jaundiced.      Nails: There is no clubbing.   Neurological:      General: No focal deficit present.      Mental Status: She is alert and oriented to person, place, and time.      Cranial Nerves: No cranial nerve deficit.      Motor: No weakness.   Psychiatric:         Mood and Affect: Mood normal.         Behavior: Behavior normal.       Fluids    Intake/Output Summary (Last 24 hours) at 4/3/2023 1711  Last data filed at 4/3/2023 0600  Gross per 24 hour   Intake 191.74 ml   Output --   Net 191.74 ml         Laboratory  Recent Labs     04/01/23 0455 04/02/23 0242 04/03/23 0242   WBC 7.1 8.4 7.8   RBC 4.04* 3.83* 4.06*   HEMOGLOBIN 11.1* 10.4* 11.2*   HEMATOCRIT 33.3* 31.9* 35.3*   MCV 82.4 83.3 86.9   MCH 27.5 27.2 27.6   MCHC 33.3* 32.6* 31.7*   RDW 39.5 39.8 41.1   PLATELETCT 274 279 267   MPV 9.3 9.4 9.2       Recent Labs     04/01/23 0455 04/02/23 0242 04/03/23 0242   SODIUM 140 137 138   POTASSIUM 3.6 4.3 4.7   CHLORIDE 106 104 104   CO2 23 23 22   GLUCOSE 306* 297* 226*   BUN 19 22 19   CREATININE 0.78 0.97 0.98   CALCIUM 9.0 8.8 9.3        Recent Labs     03/31/23  2202   APTT 32.8   INR 1.11                 Imaging  EC-ECHOCARDIOGRAM COMPLETE W/ CONT   Final Result      CT-RENAL COLIC EVALUATION(A/P W/O)   Final Result         1.  Mild right renal pelviectasis, possibly parapelvic cyst or dilated extrarenal pelvis.   2.  No obstructive uropathy or urolithiasis is seen.   3.  Atherosclerosis.   4.  Prior cholecystectomy.               [id number] VIEW   Final Result         No specific finding to suggest small bowel obstruction.      CL-LEFT HEART CATHETERIZATION WITH POSSIBLE INTERVENTION    (Results Pending)   CL-LEFT HEART CATHETERIZATION WITH POSSIBLE INTERVENTION    (Results Pending)          Assessment/Plan  * NSTEMI (non-ST elevated myocardial infarction) (HCC)- (present on admission)  Assessment & Plan  Patient with multiple cardiovascular risk factors and known CAD status post PCI and CABG    Status post PCI to ostial circumflex with residual severe PDA stenosis scheduled for staged PCI today  Echo EF 45-50%  Continue metoprolol SR statin Entresto aspirin Plavix          Ischemic cardiomyopathy  Assessment & Plan  Clinically euvolemic    Continue Toprol-XL  Patient started on Entresto farxiga and spironolactone  Clinically euvolemic    HLD (hyperlipidemia)- (present on admission)  Assessment & Plan  Continue atorvastatin     Polyneuropathy- (present on admission)  Assessment & Plan  Continue gabapentin    Opioid dependence (HCC)- (present on admission)  Assessment & Plan  Patient recently discharged on MS Contin and oxycodone  Reordered home doses  Avoid escalating IV narcotics    Obesity (BMI 30-39.9)- (present on admission)  Assessment & Plan  Body mass index is 35.7 kg/m².   Counseled on lifestyle changes    Generalized abdominal pain- (present on admission)  Assessment & Plan  CT negative for acute pathology  Abdominal exam is benign  Clinically improved    Diabetes (HCC)  Assessment & Plan  With hyperglycemia     Hemoglobin A1c 4  months ago 12.5    Increase Lantus to twice daily (home dose)    Continue sliding scale insulin monitor CBGs        Hyperlipidemia- (present on admission)  Assessment & Plan  High-dose atorvastatin         VTE prophylaxis: lovenox    I have performed a physical exam and reviewed and updated ROS and Plan today (4/3/2023). In review of yesterday's note (4/2/2023), there are no changes except as documented above.

## 2023-04-04 NOTE — CARE PLAN
The patient is Stable - Low risk of patient condition declining or worsening    Clinical Goals: monitor BG, monitor cath site  Patient Goals: rest, eat  Family Goals: jenna    Progress made toward(s) clinical / shift goals:      Problem: Knowledge Deficit - Standard  Goal: Patient and family/care givers will demonstrate understanding of plan of care, disease process/condition, diagnostic tests and medications  Outcome: Progressing     Problem: Communication  Goal: The ability to communicate needs accurately and effectively will improve  Outcome: Progressing     Problem: Self Care  Goal: Patient will have the ability to perform ADLs independently or with assistance (bathe, groom, dress, toilet and feed)  Outcome: Progressing     Problem: Pain - Standard  Goal: Alleviation of pain or a reduction in pain to the patient’s comfort goal  Outcome: Met     Problem: Fall Risk  Goal: Patient will remain free from falls  Outcome: Progressing

## 2023-04-04 NOTE — DISCHARGE INSTRUCTIONS
Discharge Instructions per Hollis Elizalde D.O.      DIET: healthy balanced diet    ACTIVITY: as tolerates    DIAGNOSIS: Non ST elevated myocardial infarction    Return to ER if needed          Diagnosis:  Acute Coronary Syndrome (ACS) is a diagnosis that encompasses cardiac-related chest pain and heart attack. ACS occurs when the blood flow to the heart muscle is severely reduced or cut off completely due to a slow process called atherosclerosis.  Atherosclerosis is a disease in which the coronary arteries become narrow from a buildup of fat, cholesterol, and other substances that combine to form plaque. If the plaque breaks, a blood clot will form and block the blood flow to the heart muscle. This lack of blood flow can cause damage or death to the heart muscle which is called a heart attack or Myocardial Infarction (MI). There are two different types of MIs:  ST Elevation Myocardial Infarction or STEMI (the most severe type of heart attack) and Non-ST Elevation Myocardial Infarction or NSTEMI.    Treatment Plan:  Cardiac Diet  - Low fat, low salt, low cholesterol   Cardiac Rehab  - Your doctor has ordered you a referral to [facility].  Call [phone] to schedule an appointment.  Attend my follow-up appointment with my Cardiologist.  Take my medications as prescribed by my doctor  Exercise daily  Lower my bad cholesterol and raise my good cholesterol, lower my blood pressure, and Reduce stress    Medications:  Certain medications are used to treat ACS.  Remember to always take medications as prescribed and never stop talking medications unless told by your doctor.    You have been prescribed the following medicatons:    Aspirin - Aspirin is used as a blood thinning medication and you will require this medication indefinitely.  Anti-platelet/blood thinner - Your Anti-platelet/Blood thinning medication is called Plavix, and is used in combination with aspirin to prevent clots from forming in your heart and/or  around your stent.  Your doctor will determine how long you need to be on this medicine.  Beta-Blocker - Beta-Blocker Metoprolol SR is used to lower blood pressure and heart rate, and/or helps your heart heal after a heart attack.  Statin - Statin Atorvastatin is used to lower cholesterol.    Radial Catherization Discharge Instructions      Do not subject hand/arm to any forceful movements for 24 hours    i.e. supporting weight when rising from the chair or bed.   Do not drive a car for 24 hours  You may remove the dressing tomorrow  You may shower on the day following your procedure.  Do not take a tub bath or submerge the puncture site in water for 3 days following the procedure.  No Lifting more than 3-5 pounds with affected wrist for 5 days  Follow up with  PCP  2-4 weeks.  Increase fluids for 2 days post procedure.  Continue all previous medications unless otherwise instructed.    If bleeding should occur following discharge:  Sit down and apply firm pressure to site with your fingers for 10 minutes  If the bleeding stops, continue to sit quietly, keeping your wrist straight for 2 hours.  Notify physician as soon as possible ( [phone])  If bleeding does not stop after 10 minutes, or if there is a large amount of bleeding or spurting, call 911 immediately.  Do not drive yourself to the hospital.

## 2023-04-05 ENCOUNTER — TELEPHONE (OUTPATIENT)
Dept: CARDIOLOGY | Facility: MEDICAL CENTER | Age: 63
End: 2023-04-05
Payer: MEDICAID

## 2023-04-05 LAB
EKG IMPRESSION: NORMAL

## 2023-04-05 NOTE — TELEPHONE ENCOUNTER
Received a phone call from [place] [facility] regarding this patient. Briefly, this is a 64-year-old woman with history of CAD status post CABG status post PCI to LCx, PTCA to LAD, staged PCI to RPDA recent hospitalization, discharged yesterday.  The patient presented with epigastric pain/chest pain. We are called as troponin was elevated at 329.  I personally reviewed the EKG from the [place] [facility], which did not show any significant ST elevations or depressions.  Elevated troponin could be seen after PCI.  I recommended ruling out by serially trending troponin.  Troponins should downtrend. I also recommended EKG with recurrent chest pain.  If troponin uptrending or EKG changes seen with recurrent chest pain, I have advised the provider at [place] [facility] to call us back for further recommendations.

## 2023-04-06 ENCOUNTER — HOSPITAL ENCOUNTER (OUTPATIENT)
Dept: RADIOLOGY | Facility: MEDICAL CENTER | Age: 63
End: 2023-04-06
Payer: MEDICAID

## 2023-04-10 DIAGNOSIS — R07.89 OTHER CHEST PAIN: Primary | ICD-10-CM

## 2023-04-12 LAB
EKG IMPRESSION: NORMAL

## 2023-04-23 ENCOUNTER — APPOINTMENT (OUTPATIENT)
Dept: RADIOLOGY | Facility: MEDICAL CENTER | Age: 63
DRG: 247 | End: 2023-04-23
Attending: EMERGENCY MEDICINE
Payer: MEDICAID

## 2023-04-23 ENCOUNTER — APPOINTMENT (OUTPATIENT)
Dept: CARDIOLOGY | Facility: MEDICAL CENTER | Age: 63
DRG: 247 | End: 2023-04-23
Attending: INTERNAL MEDICINE
Payer: MEDICAID

## 2023-04-23 ENCOUNTER — HOSPITAL ENCOUNTER (INPATIENT)
Facility: MEDICAL CENTER | Age: 63
LOS: 8 days | DRG: 247 | End: 2023-05-01
Attending: EMERGENCY MEDICINE | Admitting: INTERNAL MEDICINE
Payer: MEDICAID

## 2023-04-23 DIAGNOSIS — R42 DIZZINESS: ICD-10-CM

## 2023-04-23 DIAGNOSIS — I25.5 ISCHEMIC CARDIOMYOPATHY: ICD-10-CM

## 2023-04-23 DIAGNOSIS — I25.10 CORONARY ARTERY DISEASE INVOLVING NATIVE HEART WITHOUT ANGINA PECTORIS, UNSPECIFIED VESSEL OR LESION TYPE: ICD-10-CM

## 2023-04-23 DIAGNOSIS — I21.4 NSTEMI (NON-ST ELEVATED MYOCARDIAL INFARCTION) (HCC): ICD-10-CM

## 2023-04-23 DIAGNOSIS — Z95.5 S/P INSERTION OF NON-DRUG ELUTING CORONARY ARTERY STENT: ICD-10-CM

## 2023-04-23 DIAGNOSIS — I24.1 DRESSLER SYNDROME (HCC): ICD-10-CM

## 2023-04-23 PROBLEM — Z79.02 LONG TERM (CURRENT) USE OF ANTITHROMBOTICS/ANTIPLATELETS: Status: ACTIVE | Noted: 2023-04-23

## 2023-04-23 PROBLEM — I25.83 CORONARY ARTERY DISEASE DUE TO LIPID RICH PLAQUE: Status: ACTIVE | Noted: 2022-11-29

## 2023-04-23 LAB
ACT BLD: 209 SEC (ref 74–137)
ACT BLD: 299 SEC (ref 74–137)
ALBUMIN SERPL BCP-MCNC: 3.3 G/DL (ref 3.2–4.9)
ALBUMIN/GLOB SERPL: 0.9 G/DL
ALP SERPL-CCNC: 76 U/L (ref 30–99)
ALT SERPL-CCNC: 11 U/L (ref 2–50)
ANION GAP SERPL CALC-SCNC: 13 MMOL/L (ref 7–16)
AST SERPL-CCNC: 28 U/L (ref 12–45)
BASOPHILS # BLD AUTO: 0.7 % (ref 0–1.8)
BASOPHILS # BLD: 0.06 K/UL (ref 0–0.12)
BILIRUB SERPL-MCNC: 0.3 MG/DL (ref 0.1–1.5)
BUN SERPL-MCNC: 20 MG/DL (ref 8–22)
CALCIUM ALBUM COR SERPL-MCNC: 9.4 MG/DL (ref 8.5–10.5)
CALCIUM SERPL-MCNC: 8.8 MG/DL (ref 8.5–10.5)
CFT BLD TEG: >17 MIN (ref 4.6–9.1)
CFT P HPASE BLD TEG: 9.9 MIN (ref 4.3–8.3)
CHLORIDE SERPL-SCNC: 105 MMOL/L (ref 96–112)
CLOT ANGLE BLD TEG: <39 DEGREES (ref 63–78)
CLOT LYSIS 30M P MA LENFR BLD TEG: ABNORMAL % (ref 0–2.6)
CO2 SERPL-SCNC: 20 MMOL/L (ref 20–33)
CREAT SERPL-MCNC: 1.32 MG/DL (ref 0.5–1.4)
CT.EXTRINSIC BLD ROTEM: ABNORMAL MIN (ref 0.8–2.1)
EKG IMPRESSION: NORMAL
EOSINOPHIL # BLD AUTO: 0.09 K/UL (ref 0–0.51)
EOSINOPHIL NFR BLD: 1 % (ref 0–6.9)
ERYTHROCYTE [DISTWIDTH] IN BLOOD BY AUTOMATED COUNT: 42.6 FL (ref 35.9–50)
GFR SERPLBLD CREATININE-BSD FMLA CKD-EPI: 46 ML/MIN/1.73 M 2
GLOBULIN SER CALC-MCNC: 3.6 G/DL (ref 1.9–3.5)
GLUCOSE SERPL-MCNC: 226 MG/DL (ref 65–99)
HCT VFR BLD AUTO: 33.6 % (ref 37–47)
HGB BLD-MCNC: 11 G/DL (ref 12–16)
IMM GRANULOCYTES # BLD AUTO: 0.04 K/UL (ref 0–0.11)
IMM GRANULOCYTES NFR BLD AUTO: 0.4 % (ref 0–0.9)
LYMPHOCYTES # BLD AUTO: 2.8 K/UL (ref 1–4.8)
LYMPHOCYTES NFR BLD: 30.4 % (ref 22–41)
MAGNESIUM SERPL-MCNC: 1.6 MG/DL (ref 1.5–2.5)
MCF BLD TEG: <40 MM (ref 52–69)
MCF.PLATELET INHIB BLD ROTEM: <4 MM (ref 15–32)
MCH RBC QN AUTO: 27.6 PG (ref 27–33)
MCHC RBC AUTO-ENTMCNC: 32.7 G/DL (ref 33.6–35)
MCV RBC AUTO: 84.4 FL (ref 81.4–97.8)
MONOCYTES # BLD AUTO: 0.71 K/UL (ref 0–0.85)
MONOCYTES NFR BLD AUTO: 7.7 % (ref 0–13.4)
NEUTROPHILS # BLD AUTO: 5.52 K/UL (ref 2–7.15)
NEUTROPHILS NFR BLD: 59.8 % (ref 44–72)
NRBC # BLD AUTO: 0 K/UL
NRBC BLD-RTO: 0 /100 WBC
PA AA BLD-ACNC: ABNORMAL % (ref 0–11)
PA ADP BLD-ACNC: ABNORMAL % (ref 0–17)
PLATELET # BLD AUTO: 237 K/UL (ref 164–446)
PMV BLD AUTO: 9.5 FL (ref 9–12.9)
POTASSIUM SERPL-SCNC: 3.9 MMOL/L (ref 3.6–5.5)
PROT SERPL-MCNC: 6.9 G/DL (ref 6–8.2)
RBC # BLD AUTO: 3.98 M/UL (ref 4.2–5.4)
SODIUM SERPL-SCNC: 138 MMOL/L (ref 135–145)
TEG ALGORITHM TGALG: ABNORMAL
TROPONIN T SERPL-MCNC: 1764 NG/L (ref 6–19)
WBC # BLD AUTO: 9.2 K/UL (ref 4.8–10.8)

## 2023-04-23 PROCEDURE — 80053 COMPREHEN METABOLIC PANEL: CPT

## 2023-04-23 PROCEDURE — 85347 COAGULATION TIME ACTIVATED: CPT

## 2023-04-23 PROCEDURE — 700102 HCHG RX REV CODE 250 W/ 637 OVERRIDE(OP): Performed by: INTERNAL MEDICINE

## 2023-04-23 PROCEDURE — 92920 PRQ TRLUML C ANGIOP 1ART&/BR: CPT | Mod: LM

## 2023-04-23 PROCEDURE — 99291 CRITICAL CARE FIRST HOUR: CPT | Performed by: INTERNAL MEDICINE

## 2023-04-23 PROCEDURE — 84484 ASSAY OF TROPONIN QUANT: CPT

## 2023-04-23 PROCEDURE — 96366 THER/PROPH/DIAG IV INF ADDON: CPT

## 2023-04-23 PROCEDURE — 700101 HCHG RX REV CODE 250

## 2023-04-23 PROCEDURE — 700117 HCHG RX CONTRAST REV CODE 255: Performed by: INTERNAL MEDICINE

## 2023-04-23 PROCEDURE — 71045 X-RAY EXAM CHEST 1 VIEW: CPT

## 2023-04-23 PROCEDURE — 92920 PRQ TRLUML C ANGIOP 1ART&/BR: CPT | Mod: 59,LC | Performed by: INTERNAL MEDICINE

## 2023-04-23 PROCEDURE — 92978 ENDOLUMINL IVUS OCT C 1ST: CPT | Mod: 26,LC | Performed by: INTERNAL MEDICINE

## 2023-04-23 PROCEDURE — 700102 HCHG RX REV CODE 250 W/ 637 OVERRIDE(OP)

## 2023-04-23 PROCEDURE — 93005 ELECTROCARDIOGRAM TRACING: CPT | Performed by: EMERGENCY MEDICINE

## 2023-04-23 PROCEDURE — A9270 NON-COVERED ITEM OR SERVICE: HCPCS | Performed by: STUDENT IN AN ORGANIZED HEALTH CARE EDUCATION/TRAINING PROGRAM

## 2023-04-23 PROCEDURE — 770000 HCHG ROOM/CARE - INTERMEDIATE ICU *

## 2023-04-23 PROCEDURE — 93459 L HRT ART/GRFT ANGIO: CPT | Mod: 26,59 | Performed by: INTERNAL MEDICINE

## 2023-04-23 PROCEDURE — C1887 CATHETER, GUIDING: HCPCS

## 2023-04-23 PROCEDURE — 99255 IP/OBS CONSLTJ NEW/EST HI 80: CPT | Performed by: INTERNAL MEDICINE

## 2023-04-23 PROCEDURE — B240ZZ3 ULTRASONOGRAPHY OF SINGLE CORONARY ARTERY, INTRAVASCULAR: ICD-10-PCS | Performed by: INTERNAL MEDICINE

## 2023-04-23 PROCEDURE — 83735 ASSAY OF MAGNESIUM: CPT

## 2023-04-23 PROCEDURE — B2111ZZ FLUOROSCOPY OF MULTIPLE CORONARY ARTERIES USING LOW OSMOLAR CONTRAST: ICD-10-PCS | Performed by: INTERNAL MEDICINE

## 2023-04-23 PROCEDURE — 700111 HCHG RX REV CODE 636 W/ 250 OVERRIDE (IP): Performed by: STUDENT IN AN ORGANIZED HEALTH CARE EDUCATION/TRAINING PROGRAM

## 2023-04-23 PROCEDURE — 85576 BLOOD PLATELET AGGREGATION: CPT | Mod: 91

## 2023-04-23 PROCEDURE — 99152 MOD SED SAME PHYS/QHP 5/>YRS: CPT | Performed by: INTERNAL MEDICINE

## 2023-04-23 PROCEDURE — 02703Z6 DILATION OF CORONARY ARTERY, ONE ARTERY, BIFURCATION, PERCUTANEOUS APPROACH: ICD-10-PCS | Performed by: INTERNAL MEDICINE

## 2023-04-23 PROCEDURE — 0270346 DILATION OF CORONARY ARTERY, ONE ARTERY, BIFURCATION, WITH DRUG-ELUTING INTRALUMINAL DEVICE, PERCUTANEOUS APPROACH: ICD-10-PCS | Performed by: INTERNAL MEDICINE

## 2023-04-23 PROCEDURE — 36415 COLL VENOUS BLD VENIPUNCTURE: CPT

## 2023-04-23 PROCEDURE — 99285 EMERGENCY DEPT VISIT HI MDM: CPT

## 2023-04-23 PROCEDURE — 700111 HCHG RX REV CODE 636 W/ 250 OVERRIDE (IP): Performed by: EMERGENCY MEDICINE

## 2023-04-23 PROCEDURE — 4A023N7 MEASUREMENT OF CARDIAC SAMPLING AND PRESSURE, LEFT HEART, PERCUTANEOUS APPROACH: ICD-10-PCS | Performed by: INTERNAL MEDICINE

## 2023-04-23 PROCEDURE — 93005 ELECTROCARDIOGRAM TRACING: CPT | Performed by: INTERNAL MEDICINE

## 2023-04-23 PROCEDURE — 85025 COMPLETE CBC W/AUTO DIFF WBC: CPT

## 2023-04-23 PROCEDURE — 96365 THER/PROPH/DIAG IV INF INIT: CPT

## 2023-04-23 PROCEDURE — 02C03ZZ EXTIRPATION OF MATTER FROM CORONARY ARTERY, ONE ARTERY, PERCUTANEOUS APPROACH: ICD-10-PCS | Performed by: INTERNAL MEDICINE

## 2023-04-23 PROCEDURE — 82962 GLUCOSE BLOOD TEST: CPT

## 2023-04-23 PROCEDURE — B41F1ZZ FLUOROSCOPY OF RIGHT LOWER EXTREMITY ARTERIES USING LOW OSMOLAR CONTRAST: ICD-10-PCS | Performed by: INTERNAL MEDICINE

## 2023-04-23 PROCEDURE — B2181ZZ FLUOROSCOPY OF LEFT INTERNAL MAMMARY BYPASS GRAFT USING LOW OSMOLAR CONTRAST: ICD-10-PCS | Performed by: INTERNAL MEDICINE

## 2023-04-23 PROCEDURE — 92941 PRQ TRLML REVSC TOT OCCL AMI: CPT | Mod: LC | Performed by: INTERNAL MEDICINE

## 2023-04-23 PROCEDURE — 96375 TX/PRO/DX INJ NEW DRUG ADDON: CPT

## 2023-04-23 PROCEDURE — 85384 FIBRINOGEN ACTIVITY: CPT

## 2023-04-23 PROCEDURE — 700102 HCHG RX REV CODE 250 W/ 637 OVERRIDE(OP): Performed by: STUDENT IN AN ORGANIZED HEALTH CARE EDUCATION/TRAINING PROGRAM

## 2023-04-23 PROCEDURE — A9270 NON-COVERED ITEM OR SERVICE: HCPCS

## 2023-04-23 PROCEDURE — 700111 HCHG RX REV CODE 636 W/ 250 OVERRIDE (IP)

## 2023-04-23 RX ORDER — PREGABALIN 100 MG/1
100 CAPSULE ORAL 2 TIMES DAILY
Status: DISCONTINUED | OUTPATIENT
Start: 2023-04-23 | End: 2023-04-23

## 2023-04-23 RX ORDER — PROMETHAZINE HYDROCHLORIDE 25 MG/1
12.5-25 SUPPOSITORY RECTAL EVERY 4 HOURS PRN
Status: DISCONTINUED | OUTPATIENT
Start: 2023-04-23 | End: 2023-05-01 | Stop reason: HOSPADM

## 2023-04-23 RX ORDER — OXYCODONE AND ACETAMINOPHEN 10; 325 MG/1; MG/1
1 TABLET ORAL EVERY 4 HOURS PRN
Status: DISCONTINUED | OUTPATIENT
Start: 2023-04-23 | End: 2023-04-23

## 2023-04-23 RX ORDER — ACETAMINOPHEN 325 MG/1
650 TABLET ORAL EVERY 6 HOURS PRN
Status: DISCONTINUED | OUTPATIENT
Start: 2023-04-23 | End: 2023-05-01 | Stop reason: HOSPADM

## 2023-04-23 RX ORDER — ONDANSETRON 4 MG/1
4 TABLET, ORALLY DISINTEGRATING ORAL EVERY 4 HOURS PRN
Status: DISCONTINUED | OUTPATIENT
Start: 2023-04-23 | End: 2023-05-01 | Stop reason: HOSPADM

## 2023-04-23 RX ORDER — PREGABALIN 100 MG/1
100 CAPSULE ORAL 3 TIMES DAILY
Status: DISCONTINUED | OUTPATIENT
Start: 2023-04-23 | End: 2023-05-01 | Stop reason: HOSPADM

## 2023-04-23 RX ORDER — EPTIFIBATIDE 2 MG/ML
INJECTION, SOLUTION INTRAVENOUS
Status: COMPLETED
Start: 2023-04-23 | End: 2023-04-23

## 2023-04-23 RX ORDER — VERAPAMIL HYDROCHLORIDE 2.5 MG/ML
INJECTION, SOLUTION INTRAVENOUS
Status: COMPLETED
Start: 2023-04-23 | End: 2023-04-23

## 2023-04-23 RX ORDER — PROMETHAZINE HYDROCHLORIDE 25 MG/1
12.5-25 TABLET ORAL EVERY 4 HOURS PRN
Status: DISCONTINUED | OUTPATIENT
Start: 2023-04-23 | End: 2023-05-01 | Stop reason: HOSPADM

## 2023-04-23 RX ORDER — METOPROLOL SUCCINATE 50 MG/1
50 TABLET, EXTENDED RELEASE ORAL DAILY
Status: DISCONTINUED | OUTPATIENT
Start: 2023-04-24 | End: 2023-04-26

## 2023-04-23 RX ORDER — BISACODYL 10 MG
10 SUPPOSITORY, RECTAL RECTAL
Status: DISCONTINUED | OUTPATIENT
Start: 2023-04-23 | End: 2023-05-01 | Stop reason: HOSPADM

## 2023-04-23 RX ORDER — ATORVASTATIN CALCIUM 80 MG/1
80 TABLET, FILM COATED ORAL EVERY EVENING
Status: DISCONTINUED | OUTPATIENT
Start: 2023-04-24 | End: 2023-05-01 | Stop reason: HOSPADM

## 2023-04-23 RX ORDER — PRASUGREL 10 MG/1
60 TABLET, FILM COATED ORAL ONCE
Status: COMPLETED | OUTPATIENT
Start: 2023-04-23 | End: 2023-04-23

## 2023-04-23 RX ORDER — NITROGLYCERIN 0.4 MG/1
0.4 TABLET SUBLINGUAL
Status: DISCONTINUED | OUTPATIENT
Start: 2023-04-23 | End: 2023-05-01 | Stop reason: HOSPADM

## 2023-04-23 RX ORDER — HEPARIN SODIUM 1000 [USP'U]/ML
INJECTION, SOLUTION INTRAVENOUS; SUBCUTANEOUS
Status: COMPLETED
Start: 2023-04-23 | End: 2023-04-23

## 2023-04-23 RX ORDER — AMOXICILLIN 250 MG
2 CAPSULE ORAL 2 TIMES DAILY
Status: DISCONTINUED | OUTPATIENT
Start: 2023-04-23 | End: 2023-05-01 | Stop reason: HOSPADM

## 2023-04-23 RX ORDER — NITROGLYCERIN 20 MG/100ML
40 INJECTION INTRAVENOUS CONTINUOUS
Status: DISCONTINUED | OUTPATIENT
Start: 2023-04-23 | End: 2023-04-25

## 2023-04-23 RX ORDER — PRASUGREL 10 MG/1
TABLET, FILM COATED ORAL
Status: COMPLETED
Start: 2023-04-23 | End: 2023-04-23

## 2023-04-23 RX ORDER — CLOPIDOGREL BISULFATE 75 MG/1
75 TABLET ORAL DAILY
Status: DISCONTINUED | OUTPATIENT
Start: 2023-04-23 | End: 2023-04-23

## 2023-04-23 RX ORDER — MORPHINE SULFATE/0.9% NACL/PF 1 MG/ML
30 SYRINGE (ML) INJECTION 2 TIMES DAILY
Status: DISCONTINUED | OUTPATIENT
Start: 2023-04-23 | End: 2023-04-23

## 2023-04-23 RX ORDER — GABAPENTIN 800 MG/1
800 TABLET ORAL 3 TIMES DAILY
Status: DISCONTINUED | OUTPATIENT
Start: 2023-04-23 | End: 2023-04-23

## 2023-04-23 RX ORDER — PROCHLORPERAZINE EDISYLATE 5 MG/ML
5-10 INJECTION INTRAMUSCULAR; INTRAVENOUS EVERY 4 HOURS PRN
Status: DISCONTINUED | OUTPATIENT
Start: 2023-04-23 | End: 2023-05-01 | Stop reason: HOSPADM

## 2023-04-23 RX ORDER — MORPHINE SULFATE 4 MG/ML
4 INJECTION INTRAVENOUS EVERY 4 HOURS PRN
Status: DISCONTINUED | OUTPATIENT
Start: 2023-04-23 | End: 2023-04-25

## 2023-04-23 RX ORDER — LABETALOL HYDROCHLORIDE 5 MG/ML
10 INJECTION, SOLUTION INTRAVENOUS EVERY 4 HOURS PRN
Status: DISCONTINUED | OUTPATIENT
Start: 2023-04-23 | End: 2023-05-01 | Stop reason: HOSPADM

## 2023-04-23 RX ORDER — SPIRONOLACTONE 25 MG/1
25 TABLET ORAL DAILY
Status: DISCONTINUED | OUTPATIENT
Start: 2023-04-24 | End: 2023-04-28

## 2023-04-23 RX ORDER — LIDOCAINE HYDROCHLORIDE 20 MG/ML
INJECTION, SOLUTION INFILTRATION; PERINEURAL
Status: COMPLETED
Start: 2023-04-23 | End: 2023-04-23

## 2023-04-23 RX ORDER — FERROUS SULFATE 325(65) MG
325 TABLET ORAL
Status: DISCONTINUED | OUTPATIENT
Start: 2023-04-24 | End: 2023-04-24

## 2023-04-23 RX ORDER — MIDAZOLAM HYDROCHLORIDE 1 MG/ML
INJECTION INTRAMUSCULAR; INTRAVENOUS
Status: COMPLETED
Start: 2023-04-23 | End: 2023-04-23

## 2023-04-23 RX ORDER — POLYETHYLENE GLYCOL 3350 17 G/17G
1 POWDER, FOR SOLUTION ORAL
Status: DISCONTINUED | OUTPATIENT
Start: 2023-04-23 | End: 2023-05-01 | Stop reason: HOSPADM

## 2023-04-23 RX ORDER — HEPARIN SODIUM 200 [USP'U]/100ML
INJECTION, SOLUTION INTRAVENOUS
Status: COMPLETED
Start: 2023-04-23 | End: 2023-04-23

## 2023-04-23 RX ORDER — DIPHENHYDRAMINE HYDROCHLORIDE 50 MG/ML
INJECTION INTRAMUSCULAR; INTRAVENOUS
Status: COMPLETED
Start: 2023-04-23 | End: 2023-04-23

## 2023-04-23 RX ORDER — MORPHINE SULFATE 4 MG/ML
4 INJECTION INTRAVENOUS ONCE
Status: COMPLETED | OUTPATIENT
Start: 2023-04-23 | End: 2023-04-23

## 2023-04-23 RX ORDER — MORPHINE SULFATE 15 MG/1
30 TABLET, FILM COATED, EXTENDED RELEASE ORAL EVERY 12 HOURS
Status: DISCONTINUED | OUTPATIENT
Start: 2023-04-23 | End: 2023-05-01 | Stop reason: HOSPADM

## 2023-04-23 RX ORDER — ONDANSETRON 2 MG/ML
4 INJECTION INTRAMUSCULAR; INTRAVENOUS EVERY 4 HOURS PRN
Status: DISCONTINUED | OUTPATIENT
Start: 2023-04-23 | End: 2023-05-01 | Stop reason: HOSPADM

## 2023-04-23 RX ORDER — PRASUGREL 10 MG/1
10 TABLET, FILM COATED ORAL DAILY
Status: DISCONTINUED | OUTPATIENT
Start: 2023-04-24 | End: 2023-05-01 | Stop reason: HOSPADM

## 2023-04-23 RX ORDER — OXYCODONE HYDROCHLORIDE 10 MG/1
10 TABLET ORAL EVERY 6 HOURS PRN
Status: DISCONTINUED | OUTPATIENT
Start: 2023-04-23 | End: 2023-05-01 | Stop reason: HOSPADM

## 2023-04-23 RX ORDER — ISOSORBIDE MONONITRATE 30 MG/1
30 TABLET, EXTENDED RELEASE ORAL DAILY
Status: DISCONTINUED | OUTPATIENT
Start: 2023-04-24 | End: 2023-04-23

## 2023-04-23 RX ADMIN — MIDAZOLAM HYDROCHLORIDE 2 MG: 1 INJECTION, SOLUTION INTRAMUSCULAR; INTRAVENOUS at 18:41

## 2023-04-23 RX ADMIN — EPTIFIBATIDE 32 MG: 2 INJECTION, SOLUTION INTRAVENOUS at 19:21

## 2023-04-23 RX ADMIN — LIDOCAINE HYDROCHLORIDE: 20 INJECTION, SOLUTION INFILTRATION; PERINEURAL at 18:34

## 2023-04-23 RX ADMIN — FENTANYL CITRATE 100 MCG: 50 INJECTION, SOLUTION INTRAMUSCULAR; INTRAVENOUS at 18:49

## 2023-04-23 RX ADMIN — MIDAZOLAM HYDROCHLORIDE 2 MG: 1 INJECTION, SOLUTION INTRAMUSCULAR; INTRAVENOUS at 18:49

## 2023-04-23 RX ADMIN — MORPHINE SULFATE 4 MG: 4 INJECTION, SOLUTION INTRAMUSCULAR; INTRAVENOUS at 16:05

## 2023-04-23 RX ADMIN — IOHEXOL 105 ML: 350 INJECTION, SOLUTION INTRAVENOUS at 19:57

## 2023-04-23 RX ADMIN — NITROGLYCERIN 20 MCG/MIN: 20 INJECTION INTRAVENOUS at 21:28

## 2023-04-23 RX ADMIN — PRASUGREL 60 MG: 10 TABLET, FILM COATED ORAL at 19:57

## 2023-04-23 RX ADMIN — INSULIN HUMAN 3 UNITS: 100 INJECTION, SOLUTION PARENTERAL at 21:31

## 2023-04-23 RX ADMIN — PREGABALIN 100 MG: 100 CAPSULE ORAL at 21:59

## 2023-04-23 RX ADMIN — NITROGLYCERIN 10 ML: 20 INJECTION INTRAVENOUS at 18:35

## 2023-04-23 RX ADMIN — VERAPAMIL HYDROCHLORIDE 2.5 MG: 2.5 INJECTION, SOLUTION INTRAVENOUS at 18:34

## 2023-04-23 RX ADMIN — FENTANYL CITRATE 100 MCG: 50 INJECTION, SOLUTION INTRAMUSCULAR; INTRAVENOUS at 19:20

## 2023-04-23 RX ADMIN — FENTANYL CITRATE 100 MCG: 50 INJECTION, SOLUTION INTRAMUSCULAR; INTRAVENOUS at 18:34

## 2023-04-23 RX ADMIN — DIPHENHYDRAMINE HYDROCHLORIDE 25 MG: 50 INJECTION INTRAMUSCULAR; INTRAVENOUS at 19:04

## 2023-04-23 RX ADMIN — HEPARIN SODIUM 6000 UNITS: 1000 INJECTION, SOLUTION INTRAVENOUS; SUBCUTANEOUS at 19:57

## 2023-04-23 RX ADMIN — FENTANYL CITRATE 100 MCG: 50 INJECTION, SOLUTION INTRAMUSCULAR; INTRAVENOUS at 18:37

## 2023-04-23 RX ADMIN — MIDAZOLAM HYDROCHLORIDE 2 MG: 1 INJECTION, SOLUTION INTRAMUSCULAR; INTRAVENOUS at 18:35

## 2023-04-23 RX ADMIN — HEPARIN SODIUM 2000 UNITS: 200 INJECTION, SOLUTION INTRAVENOUS at 18:35

## 2023-04-23 RX ADMIN — NITROGLYCERIN 40 MCG/MIN: 20 INJECTION INTRAVENOUS at 16:00

## 2023-04-23 RX ADMIN — HEPARIN SODIUM: 1000 INJECTION, SOLUTION INTRAVENOUS; SUBCUTANEOUS at 18:34

## 2023-04-23 RX ADMIN — MORPHINE SULFATE 30 MG: 30 TABLET, FILM COATED, EXTENDED RELEASE ORAL at 21:59

## 2023-04-23 ASSESSMENT — LIFESTYLE VARIABLES
HAVE YOU EVER FELT YOU SHOULD CUT DOWN ON YOUR DRINKING: NO
TOTAL SCORE: 0
AVERAGE NUMBER OF DAYS PER WEEK YOU HAVE A DRINK CONTAINING ALCOHOL: 0
TOTAL SCORE: 0
EVER HAD A DRINK FIRST THING IN THE MORNING TO STEADY YOUR NERVES TO GET RID OF A HANGOVER: NO
EVER FELT BAD OR GUILTY ABOUT YOUR DRINKING: NO
HAVE PEOPLE ANNOYED YOU BY CRITICIZING YOUR DRINKING: NO
HOW MANY TIMES IN THE PAST YEAR HAVE YOU HAD 5 OR MORE DRINKS IN A DAY: 0
ON A TYPICAL DAY WHEN YOU DRINK ALCOHOL HOW MANY DRINKS DO YOU HAVE: 0
ALCOHOL_USE: NO
TOTAL SCORE: 0
DOES PATIENT WANT TO STOP DRINKING: NO
CONSUMPTION TOTAL: NEGATIVE

## 2023-04-23 ASSESSMENT — ENCOUNTER SYMPTOMS
DIZZINESS: 0
SPUTUM PRODUCTION: 0
FEVER: 0
ABDOMINAL PAIN: 0
WEAKNESS: 1
DEPRESSION: 0
FALLS: 1
CHILLS: 0
VOMITING: 0
MYALGIAS: 0
HEADACHES: 0
BLURRED VISION: 0
NAUSEA: 1
COUGH: 1
SHORTNESS OF BREATH: 1

## 2023-04-23 ASSESSMENT — PAIN DESCRIPTION - PAIN TYPE
TYPE: ACUTE PAIN

## 2023-04-23 ASSESSMENT — FIBROSIS 4 INDEX: FIB4 SCORE: 0.54

## 2023-04-23 ASSESSMENT — COGNITIVE AND FUNCTIONAL STATUS - GENERAL: TURNING FROM BACK TO SIDE WHILE IN FLAT BAD: A LITTLE

## 2023-04-23 NOTE — ED NOTES
Jerry from Lab called with critical result of Troponin 1764 at 1629. Critical lab result read back to Jerry.   Dr. Ace notified of critical lab result at 1429.  Critical lab result read back by Dr. Ace.

## 2023-04-23 NOTE — CONSULTS
Reason for Consult:  Asked by Dr Dereck Ace M.D. to see this patient with  ACS  Patient's PCP: Pcp Pt States None    CC:   Chief Complaint   Patient presents with    Chest Pain     Onset last night   Pain 7/10 on arrival    N/V     Onset last night        HPI: 62-year-old woman with a history of coronary disease ischemic cardiomyopathy prior history of CABG status post recent high risk stenting to her left circumflex ostial disease and PDA in the setting of MI with ongoing chest pains.  She reports that she was reevaluate in the hospital for 3 days and have indeterminate troponin which is chronically elevated per her report and not followed up she was leery to go back to the hospital for evaluation but she developed abdominal pain and chest pain she reports compliance with her medications in the outside emergency room she was found to have a widely elevated troponin she was given nitroglycerin with immediate hypotension and given Lovenox 90 mg subcu and transferred here here she continues to have chest pain has been placed on a nitro drip and has an adequate blood pressure currently.  She believes that [facility] because of the lack of medical care there    Medications / Drug list prior to admission:  No current facility-administered medications on file prior to encounter.     Current Outpatient Medications on File Prior to Encounter   Medication Sig Dispense Refill    atorvastatin (LIPITOR) 80 MG tablet Take 1 Tablet by mouth every evening. 30 Tablet 5    dapagliflozin propanediol (FARXIGA) 10 MG Tab Take 1 Tablet by mouth every day. 30 Tablet 5    metoprolol SR (TOPROL XL) 50 MG TABLET SR 24 HR Take 1 Tablet by mouth every day. 30 Tablet 5    sacubitril-valsartan (ENTRESTO) 24-26 MG Tab Take 1 Tablet by mouth 2 times a day. 60 Tablet 5    spironolactone (ALDACTONE) 25 MG Tab Take 1 Tablet by mouth every day. 30 Tablet 5    traZODone (DESYREL) 150 MG Tab Take 1 Tablet  by mouth at bedtime. 1 Tablet 0    cloNIDine (CATAPRES) 0.1 MG Tab Take 0.1 mg by mouth as needed.      oxyCODONE-acetaminophen (PERCOCET-10)  MG Tab Take 1 Tablet by mouth every 6 hours as needed for Severe Pain.      pregabalin (LYRICA) 100 MG Cap Take 100 mg by mouth 2 times a day.      metFORMIN (GLUCOPHAGE) 500 MG Tab Take 500 mg by mouth 2 times a day with meals.      Morphine Sulfate (MORPHINE PCA) 1 MG/ML Solution 30 mg 2 times a day. Indications: Pain      insulin GLARGINE (LANTUS,SEMGLEE) 100 UNIT/ML Solution Inject 20 Units under the skin 2 times a day. 10 mL     SITagliptin (JANUVIA) 50 MG Tab Take 50 mg by mouth every day.      isosorbide mononitrate SR (IMDUR) 30 MG TABLET SR 24 HR Take 1 Tab by mouth every day. 30 Tab 1    ferrous sulfate 325 (65 Fe) MG tablet Take 1 Tab by mouth every day with lunch. 30 Tab 0    ascorbic acid (VITAMIN C) 500 MG tablet Take 1 Tab by mouth every day. 30 Tab 3    aspirin EC 81 MG EC tablet Take 1 Tab by mouth every day. 30 Tab 0    clopidogrel (PLAVIX) 75 MG Tab Take 1 Tab by mouth every day. 30 Tab 0    gabapentin (NEURONTIN) 800 MG tablet Take 800 mg by mouth 3 times a day.         Current list of administered Medications:    Current Facility-Administered Medications:     nitroglycerin 50 mg in D5W 250 ml infusion, 0-200 mcg/min, Intravenous, Continuous, Dereck Ace M.D., Last Rate: 12 mL/hr at 04/23/23 1600, 40 mcg/min at 04/23/23 1600    aspirin EC (ECOTRIN) tablet 81 mg, 81 mg, Oral, DAILY, PJ Goetz.O.    atorvastatin (LIPITOR) tablet 80 mg, 80 mg, Oral, Q EVENING, Soila Torres D.O.    clopidogrel (PLAVIX) tablet 75 mg, 75 mg, Oral, DAILY, Soila Torres D.O.    [START ON 4/24/2023] ferrous sulfate tablet 325 mg, 325 mg, Oral, DAILY WITH LUNCH, Soila Torres D.O.    gabapentin (NEURONTIN) tablet 800 mg, 800 mg, Oral, TID, Soila Torres D.O.    insulin GLARGINE (Lantus,Semglee) injection, 20 Units, Subcutaneous, BID, Soila LARA  RAHUL Torres    [START ON 4/24/2023] isosorbide mononitrate SR (IMDUR) tablet 30 mg, 30 mg, Oral, DAILY, Soila Torres D.O.    [START ON 4/24/2023] metoprolol SR (TOPROL XL) tablet 50 mg, 50 mg, Oral, DAILY, KRISTOPHER GoetzO.    morphine PCA 1 MG/ML injection 30 mg, 30 mg, Intravenous, BID, KRISTOPHER GoetzO.    oxyCODONE-acetaminophen (PERCOCET-10)  MG per tablet 1 Tablet, 1 Tablet, Oral, Q4HRS PRN, Soila Torres D.O.    pregabalin (LYRICA) capsule 100 mg, 100 mg, Oral, BID, KRISTOPHER GoetzO.    [START ON 4/24/2023] sacubitril-valsartan (Entresto) 24-26 MG 1 Tablet, 1 Tablet, Oral, BID, KRISTOPHER GoetzO.    [START ON 4/24/2023] spironolactone (ALDACTONE) tablet 25 mg, 25 mg, Oral, DAILY, KRISTOPHER GoetzO.    traZODone (DESYREL) tablet 150 mg, 150 mg, Oral, QHS, PJ Goetz.O.    insulin regular (HumuLIN R,NovoLIN R) injection, 2-9 Units, Subcutaneous, 4X/DAY ACHS **AND** POC blood glucose manual result, , , Q AC AND BEDTIME(S) **AND** NOTIFY MD and PharmD, , , Once **AND** Administer 20 grams of glucose (approximately 8 ounces of fruit juice) every 15 minutes PRN FSBG less than 70 mg/dL, , , PRN **AND** dextrose 10 % BOLUS 25 g, 25 g, Intravenous, Q15 MIN PRN, Soila Torres D.O.    Current Outpatient Medications:     atorvastatin (LIPITOR) 80 MG tablet, Take 1 Tablet by mouth every evening., Disp: 30 Tablet, Rfl: 5    dapagliflozin propanediol (FARXIGA) 10 MG Tab, Take 1 Tablet by mouth every day., Disp: 30 Tablet, Rfl: 5    metoprolol SR (TOPROL XL) 50 MG TABLET SR 24 HR, Take 1 Tablet by mouth every day., Disp: 30 Tablet, Rfl: 5    sacubitril-valsartan (ENTRESTO) 24-26 MG Tab, Take 1 Tablet by mouth 2 times a day., Disp: 60 Tablet, Rfl: 5    spironolactone (ALDACTONE) 25 MG Tab, Take 1 Tablet by mouth every day., Disp: 30 Tablet, Rfl: 5    traZODone (DESYREL) 150 MG Tab, Take 1 Tablet by mouth at bedtime., Disp: 1 Tablet, Rfl: 0    cloNIDine (CATAPRES) 0.1 MG Tab, Take 0.1  mg by mouth as needed., Disp: , Rfl:     oxyCODONE-acetaminophen (PERCOCET-10)  MG Tab, Take 1 Tablet by mouth every 6 hours as needed for Severe Pain., Disp: , Rfl:     pregabalin (LYRICA) 100 MG Cap, Take 100 mg by mouth 2 times a day., Disp: , Rfl:     metFORMIN (GLUCOPHAGE) 500 MG Tab, Take 500 mg by mouth 2 times a day with meals., Disp: , Rfl:     Morphine Sulfate (MORPHINE PCA) 1 MG/ML Solution, 30 mg 2 times a day. Indications: Pain, Disp: , Rfl:     insulin GLARGINE (LANTUS,SEMGLEE) 100 UNIT/ML Solution, Inject 20 Units under the skin 2 times a day., Disp: 10 mL, Rfl:     SITagliptin (JANUVIA) 50 MG Tab, Take 50 mg by mouth every day., Disp: , Rfl:     isosorbide mononitrate SR (IMDUR) 30 MG TABLET SR 24 HR, Take 1 Tab by mouth every day., Disp: 30 Tab, Rfl: 1    ferrous sulfate 325 (65 Fe) MG tablet, Take 1 Tab by mouth every day with lunch., Disp: 30 Tab, Rfl: 0    ascorbic acid (VITAMIN C) 500 MG tablet, Take 1 Tab by mouth every day., Disp: 30 Tab, Rfl: 3    aspirin EC 81 MG EC tablet, Take 1 Tab by mouth every day., Disp: 30 Tab, Rfl: 0    clopidogrel (PLAVIX) 75 MG Tab, Take 1 Tab by mouth every day., Disp: 30 Tab, Rfl: 0    gabapentin (NEURONTIN) 800 MG tablet, Take 800 mg by mouth 3 times a day., Disp: , Rfl:     Past Medical History:   Diagnosis Date    ACC/AHA stage C systolic heart failure (HCC) 4/2/2023    CAD (coronary artery disease)     PER [facility]    Congestive heart failure (HCC)     PER [facility]    Diabetes     Diabetes (HCC)     PER [facility]    Diverticulosis     PER [facility]    GERD (gastroesophageal reflux disease)     PER [facility]    High cholesterol 5/15/2011    Hypertension     Intestinal mass 2015    Ischemic cardiomyopathy 4/2/2023    Left ventricular systolic dysfunction, NYHA class 3 4/2/2023    Migraine     Staph skin infection        Past Surgical History:   Procedure Laterality Date    STENT PLACEMENT   2018    2 cardiac stents    GASTROSCOPY  9/3/2017    Procedure: GASTROSCOPY WITH DILATION;  Surgeon: Kerri Carter M.D.;  Location: SURGERY [facility];  Service: Gastroenterology    COLONOSCOPY  9/3/2017    Procedure: COLONOSCOPY;  Surgeon: Kerri Carter M.D.;  Location: SURGERY [facility];  Service: Gastroenterology    OTHER ORTHOPEDIC SURGERY  -    right wrist surgery    ABDOMINAL EXPLORATION      Lower intestine d/t mass - benign    GYN SURGERY       x 3,tubal ligation    OTHER ABDOMINAL SURGERY      cholecystectomy    OTHER CARDIAC SURGERY      PER [facility] 2019 4 STENTS; 2019 BALLOON ANGIOPLASTY R RAMUS INTERMEDIATE BRANCH; STERNOTOMY       Family History   Problem Relation Age of Onset    Cancer Maternal Aunt         Lung cancer d/t smoking     Patient family history was personally reviewed, no pertinent family history to current presentation    Social History     Tobacco Use    Smoking status: Former     Packs/day: 2.00     Years: 20.00     Pack years: 40.00     Types: Cigarettes     Start date:      Quit date:      Years since quittin.3    Smokeless tobacco: Never   Vaping Use    Vaping Use: Never used   Substance Use Topics    Alcohol use: Not Currently    Drug use: Yes     Types: Inhaled     Comment: just prescribed meds       ALLERGIES:  No Known Allergies    Review of systems:  A complete review of symptoms was reviewed with patient. This is reviewed in H&P and PMH. ALL OTHERS reviewed and negative    Physical exam:  Patient Vitals for the past 24 hrs:   BP Temp Temp src Pulse Resp SpO2 Weight   23 1600 114/56 -- -- 78 18 100 % --   23 1531 (!) 146/69 -- -- 77 16 100 % --   23 1526 -- 36.4 °C (97.6 °F) Temporal -- -- -- 88 kg (194 lb 0.1 oz)   23 1522 135/65 -- -- 79 14 100 % --   23 1516 (!) 141/67 -- -- 80 18 100 % --     General: No acute distress.   EYES: no jaundice  HEENT: OP clear    Neck:  No JVD.   CVS:  RRR.   Resp: Normal respiratory effort,   Abdomen: ND,  Skin: Grossly nothing acute no obvious rashes  Neurological: Alert, Moves all extremities  Extremities:    no edema. No cyanosis.       Data:  Laboratory studies personally reviewed by me:  Recent Results (from the past 24 hour(s))   EKG    Collection Time: 23  3:10 PM   Result Value Ref Range    Report       [facility]    Test Date:  2023  Pt Name:    ALIDA JACKSON                  Department: ER  MRN:        [id number]                      Room:       [facility]  Gender:     Female                       Technician: [id number]  :        [id number]                   Requested By:SHIRA MARI  Order #:    [id number]                    Reading MD: SHIRA MARI MD    Measurements  Intervals                                Axis  Rate:       79                           P:          9  IL:         169                          QRS:        58  QRSD:       119                          T:          -21  QT:         428  QTc:        491    Interpretive Statements  12 Lead EKG interpreted by me to show: -- Rate 79 -- Rhythm: Normal sinus  rhythm  -- Axis: Normal -- IL and QRS Intervals: Normal -- T waves: No acute changes  --  ST segments: No acute changes -- Ectopy: None. My impression of this EKG:  Does  not indicate acute ischemia at this time.  Electronically Sig pavan On 2023 16:16:03 PDT by SHIRA MARI MD     CBC with Differential    Collection Time: 23  3:37 PM   Result Value Ref Range    WBC 9.2 4.8 - 10.8 K/uL    RBC 3.98 (L) 4.20 - 5.40 M/uL    Hemoglobin 11.0 (L) 12.0 - 16.0 g/dL    Hematocrit 33.6 (L) 37.0 - 47.0 %    MCV 84.4 81.4 - 97.8 fL    MCH 27.6 27.0 - 33.0 pg    MCHC 32.7 (L) 33.6 - 35.0 g/dL    RDW 42.6 35.9 - 50.0 fL    Platelet Count 237 164 - 446 K/uL    MPV 9.5 9.0 - 12.9 fL    Neutrophils-Polys 59.80 44.00 - 72.00 %    Lymphocytes 30.40 22.00 - 41.00 %    Monocytes  7.70 0.00 - 13.40 %    Eosinophils 1.00 0.00 - 6.90 %    Basophils 0.70 0.00 - 1.80 %    Immature Granulocytes 0.40 0.00 - 0.90 %    Nucleated RBC 0.00 /100 WBC    Neutrophils (Absolute) 5.52 2.00 - 7.15 K/uL    Lymphs (Absolute) 2.80 1.00 - 4.80 K/uL    Monos (Absolute) 0.71 0.00 - 0.85 K/uL    Eos (Absolute) 0.09 0.00 - 0.51 K/uL    Baso (Absolute) 0.06 0.00 - 0.12 K/uL    Immature Granulocytes (abs) 0.04 0.00 - 0.11 K/uL    NRBC (Absolute) 0.00 K/uL   Complete Metabolic Panel (CMP)    Collection Time: 04/23/23  3:37 PM   Result Value Ref Range    Sodium 138 135 - 145 mmol/L    Potassium 3.9 3.6 - 5.5 mmol/L    Chloride 105 96 - 112 mmol/L    Co2 20 20 - 33 mmol/L    Anion Gap 13.0 7.0 - 16.0    Glucose 226 (H) 65 - 99 mg/dL    Bun 20 8 - 22 mg/dL    Creatinine 1.32 0.50 - 1.40 mg/dL    Calcium 8.8 8.5 - 10.5 mg/dL    AST(SGOT) 28 12 - 45 U/L    ALT(SGPT) 11 2 - 50 U/L    Alkaline Phosphatase 76 30 - 99 U/L    Total Bilirubin 0.3 0.1 - 1.5 mg/dL    Albumin 3.3 3.2 - 4.9 g/dL    Total Protein 6.9 6.0 - 8.2 g/dL    Globulin 3.6 (H) 1.9 - 3.5 g/dL    A-G Ratio 0.9 g/dL   Troponins NOW    Collection Time: 04/23/23  3:37 PM   Result Value Ref Range    Troponin T 1764 (H) 6 - 19 ng/L   CORRECTED CALCIUM    Collection Time: 04/23/23  3:37 PM   Result Value Ref Range    Correct Calcium 9.4 8.5 - 10.5 mg/dL   ESTIMATED GFR    Collection Time: 04/23/23  3:37 PM   Result Value Ref Range    GFR (CKD-EPI) 46 (A) >60 mL/min/1.73 m 2       Imaging:  DX-CHEST-PORTABLE (1 VIEW)   Final Result      No acute cardiopulmonary abnormality.                 EKG tracings personally reviewed by me sinus rhythm with nonspecific repolarization changes there is not trupti STEMI but there is possible ST elevation in lead III and V1    Echocardiogram EF is 45% from last hospitalization and early April    All pertinent features of laboratory and imaging reviewed including primary images where applicable      Principal Problem:    NSTEMI (non-ST  elevated myocardial infarction) (HCC) POA: Yes  Active Problems:    Essential hypertension POA: Yes    Hyperlipidemia (Chronic) POA: Yes    Diabetes (HCC) POA: Yes    Chronic pain syndrome with narcotic dependence (Chronic) POA: Yes    Impaired instrumental activities of daily living (IADL) POA: Yes    ACC/AHA stage C systolic heart failure (HCC) POA: Yes  Resolved Problems:    * No resolved hospital problems. *      Assessment / Plan:  Non-STEMI type I most consistent with acute coronary syndrome she reports compliance with her aspirin and Plavix but based on her troponin elevation which is significant she likely had in-stent thrombosis of either of her PDA or left circumflex stent that does not reflect in the EKG unfortunately she waited significant time before presenting to the outside hospital so she will have some infarction from this given the ongoing chest pain we discussed the merits of doing an urgent coronary angiogram and she agrees to proceed.  If she is found to have in-stent thrombosis and reports good medical compliance with aspirin and Plavix consider alternative antiplatelet    Clearly she needs more guidance and assistance with severe coronary artery disease and multiple other medical issues I encouraged [place].    I personally discussed her case with  Dr Dereck Ace M.D. and Dr. Soila Torres and Dr. Julio Velázquez    It is my pleasure to participate in the care of Ms. Josue.  Please do not hesitate to contact me with questions or concerns.    Shyam Vick MD PhD [facility]  Cardiologist [facility]    4/23/2023    Please note that this dictation was created using voice recognition software. There may be errors I did not discover before finalizing the note.

## 2023-04-23 NOTE — H&P
Hospital Medicine History & Physical Note    Date of Service  4/23/2023    Primary Care Physician  Pcp Pt States None    Consultants  cardiology    Specialist Names: Dr. Vick    Code Status  Full Code    Chief Complaint  Chief Complaint   Patient presents with    Chest Pain     Onset last night   Pain 7/10 on arrival    N/V     Onset last night        History of Presenting Illness  Julisa Josue is a 62 y.o. female with past medical history of severe CAD with prior history of CABG and PCI, diabetes, hypertension, chronic pain syndrome who presented 4/23/2023 with chest pain. Of note patient was recently discharged 4/4/2023 also for evaluation of NSTEMI status post 2 staged PCI.  Patient presented to an outside facility today for complaint of chest pain, shortness of breath and nausea.  She reports that she has had exertional chest pain since her last discharge.  In the ER she was found to have a troponin greater than 8000, her baseline is reportedly around 200.  She was given Lovenox and nitro drip and transferred here for cardiology evaluation.  She continues to complain of chest pain, does report she started having chest pain while at rest.  Also reports that she has been having multiple falls which is new for her.  Reports she has been compliant with her aspirin and Plavix.    Vital signs stable, is on 2 L nasal cannula however satting 100%.  Labs significant for glucose 226, creatinine 1.32, GFR 46, troponin 1764.  Chest x-ray shows no acute abnormality.  EKG shows normal sinus without acute ST changes.  Discussed with cardiology, plan for cardiac cath tonight.    I discussed the plan of care with patient, bedside RN, cardiology, and ERP .    Review of Systems  Review of Systems   Constitutional:  Positive for malaise/fatigue. Negative for chills and fever.   HENT:  Negative for congestion.    Eyes:  Negative for blurred vision.   Respiratory:  Positive for cough and shortness of breath. Negative for  sputum production.    Cardiovascular:  Positive for chest pain. Negative for leg swelling.   Gastrointestinal:  Positive for nausea. Negative for abdominal pain and vomiting.   Genitourinary:  Negative for dysuria.   Musculoskeletal:  Positive for falls. Negative for myalgias.   Skin:  Negative for rash.   Neurological:  Positive for weakness. Negative for dizziness and headaches.   Psychiatric/Behavioral:  Negative for depression.    All other systems reviewed and are negative.    Past Medical History   has a past medical history of ACC/AHA stage C systolic heart failure (HCC) (2023), CAD (coronary artery disease), Congestive heart failure (HCC), Diabetes, Diabetes (HCC), Diverticulosis, GERD (gastroesophageal reflux disease), High cholesterol (5/15/2011), Hypertension, Intestinal mass (), Ischemic cardiomyopathy (2023), Left ventricular systolic dysfunction, NYHA class 3 (2023), Migraine, and Staph skin infection.    Surgical History   has a past surgical history that includes other abdominal surgery; gyn surgery; other orthopedic surgery (-); abdominal exploration; gastroscopy (9/3/2017); colonoscopy (9/3/2017); stent placement (2018); and other cardiac surgery.     Family History  family history includes Cancer in her maternal aunt.   Family history reviewed with patient. There is no family history that is pertinent to the chief complaint.     Social History   reports that she quit smoking about 31 years ago. Her smoking use included cigarettes. She started smoking about 49 years ago. She has a 40.00 pack-year smoking history. She has never used smokeless tobacco. She reports that she does not currently use alcohol. She reports current drug use. Drug: Inhaled.    Allergies  No Known Allergies    Medications  Prior to Admission Medications   Prescriptions Last Dose Informant Patient Reported? Taking?   Morphine Sulfate (MORPHINE PCA) 1 MG/ML Solution   Yes No   Si mg 2 times a day.  Indications: Pain   SITagliptin (JANUVIA) 50 MG Tab  Patient's Home Pharmacy Yes No   Sig: Take 50 mg by mouth every day.   ascorbic acid (VITAMIN C) 500 MG tablet  Patient No No   Sig: Take 1 Tab by mouth every day.   aspirin EC 81 MG EC tablet  Patient No No   Sig: Take 1 Tab by mouth every day.   atorvastatin (LIPITOR) 80 MG tablet   No No   Sig: Take 1 Tablet by mouth every evening.   cloNIDine (CATAPRES) 0.1 MG Tab   Yes No   Sig: Take 0.1 mg by mouth as needed.   clopidogrel (PLAVIX) 75 MG Tab  Patient No No   Sig: Take 1 Tab by mouth every day.   dapagliflozin propanediol (FARXIGA) 10 MG Tab   No No   Sig: Take 1 Tablet by mouth every day.   ferrous sulfate 325 (65 Fe) MG tablet   No No   Sig: Take 1 Tab by mouth every day with lunch.   gabapentin (NEURONTIN) 800 MG tablet  Patient Yes No   Sig: Take 800 mg by mouth 3 times a day.   insulin GLARGINE (LANTUS,SEMGLEE) 100 UNIT/ML Solution   No No   Sig: Inject 20 Units under the skin 2 times a day.   isosorbide mononitrate SR (IMDUR) 30 MG TABLET SR 24 HR   No No   Sig: Take 1 Tab by mouth every day.   metFORMIN (GLUCOPHAGE) 500 MG Tab   Yes No   Sig: Take 500 mg by mouth 2 times a day with meals.   metoprolol SR (TOPROL XL) 50 MG TABLET SR 24 HR   No No   Sig: Take 1 Tablet by mouth every day.   oxyCODONE-acetaminophen (PERCOCET-10)  MG Tab   Yes No   Sig: Take 1 Tablet by mouth every 6 hours as needed for Severe Pain.   pregabalin (LYRICA) 100 MG Cap   Yes No   Sig: Take 100 mg by mouth 2 times a day.   sacubitril-valsartan (ENTRESTO) 24-26 MG Tab   No No   Sig: Take 1 Tablet by mouth 2 times a day.   spironolactone (ALDACTONE) 25 MG Tab   No No   Sig: Take 1 Tablet by mouth every day.   traZODone (DESYREL) 150 MG Tab   No No   Sig: Take 1 Tablet by mouth at bedtime.      Facility-Administered Medications: None       Physical Exam  Temp:  [36.4 °C (97.6 °F)] 36.4 °C (97.6 °F)  Pulse:  [77-80] 78  Resp:  [14-18] 18  BP: (114-146)/(56-69) 114/56  SpO2:   [100 %] 100 %  Blood Pressure: 114/56   Temperature: 36.4 °C (97.6 °F)   Pulse: 78   Respiration: 18   Pulse Oximetry: 100 %       Physical Exam  Vitals and nursing note reviewed.   Constitutional:       General: She is not in acute distress.     Appearance: She is obese. She is ill-appearing.   HENT:      Head: Normocephalic and atraumatic.      Right Ear: External ear normal.      Left Ear: External ear normal.      Nose: Nose normal.      Mouth/Throat:      Pharynx: Oropharynx is clear.   Eyes:      Extraocular Movements: Extraocular movements intact.      Conjunctiva/sclera: Conjunctivae normal.      Pupils: Pupils are equal, round, and reactive to light.   Cardiovascular:      Rate and Rhythm: Normal rate and regular rhythm.      Heart sounds: Murmur heard.   Pulmonary:      Effort: Pulmonary effort is normal. No respiratory distress.      Breath sounds: Normal breath sounds. No wheezing or rales.      Comments: Has nasal cannula at her chin, satting 96%  Abdominal:      General: Abdomen is flat. There is no distension.      Palpations: Abdomen is soft.      Tenderness: There is no abdominal tenderness.   Musculoskeletal:      Cervical back: Normal range of motion and neck supple.      Right lower leg: No edema.      Left lower leg: No edema.   Skin:     General: Skin is warm and dry.   Neurological:      General: No focal deficit present.      Mental Status: She is alert and oriented to person, place, and time.      Cranial Nerves: No cranial nerve deficit.      Sensory: No sensory deficit.   Psychiatric:         Mood and Affect: Mood normal.         Behavior: Behavior normal.       Laboratory:  Recent Labs     04/23/23  1537   WBC 9.2   RBC 3.98*   HEMOGLOBIN 11.0*   HEMATOCRIT 33.6*   MCV 84.4   MCH 27.6   MCHC 32.7*   RDW 42.6   PLATELETCT 237   MPV 9.5     Recent Labs     04/23/23  1537   SODIUM 138   POTASSIUM 3.9   CHLORIDE 105   CO2 20   GLUCOSE 226*   BUN 20   CREATININE 1.32   CALCIUM 8.8     Recent  Labs     04/23/23  1537   ALTSGPT 11   ASTSGOT 28   ALKPHOSPHAT 76   TBILIRUBIN 0.3   GLUCOSE 226*         No results for input(s): NTPROBNP in the last 72 hours.      Recent Labs     04/23/23  1537   TROPONINT 1764*       Imaging:  DX-CHEST-PORTABLE (1 VIEW)   Final Result      No acute cardiopulmonary abnormality.         CL-LEFT HEART CATHETERIZATION WITH POSSIBLE INTERVENTION    (Results Pending)       X-Ray:  I have personally reviewed the images and compared with prior images.  EKG:  I have personally reviewed the images and compared with prior images.    Assessment/Plan:  Justification for Admission Status  I anticipate this patient will require at least two midnights for appropriate medical management, necessitating inpatient admission because NSTEMI, cardiology consulted patient to go to Cath Lab tonight concern for in-stent thrombosis.  Patient will need telemetry monitoring, currently she is on a nitro drip and has been plan to be admitted to [facility]    * NSTEMI (non-ST elevated myocardial infarction) (HCC)- (present on admission)  Assessment & Plan  Patient with ongoing chest pain with troponin greater than 8000 at outside facility  Cardiology consulted  Plan for cardiac catheter tonight  Keep n.p.o.  Continue nitro drip  Given full dose Lovenox prior to transfer  Continue aspirin, statin  If the stent is occluded query Plavix resistance, may need alternative therapy  Pain control  Telemetry monitoring  Admit to [facility]    ACC/AHA stage C systolic heart failure (HCC)- (present on admission)  Assessment & Plan  Continue home metoprolol, Aldactone, Entresto    Impaired instrumental activities of daily living (IADL)- (present on admission)  Assessment & Plan  Patient reports that typically she was living at assisted living however she went back to her apartment to give her 30-day notice, plans to go back to assisted living on discharge  Patient has reported multiple falls, denies any trauma to her  head  PT/OT    Chronic pain syndrome with narcotic dependence- (present on admission)  Assessment & Plan  Continue home opiate pain meds  Continue home gabapentin and Lyrica    Diabetes (HCC)- (present on admission)  Assessment & Plan  Held home oral medications  ISS with hypoglycemic protocol  Continue home 20 units glargine twice daily    Hyperlipidemia- (present on admission)  Assessment & Plan  Continue statin    Essential hypertension- (present on admission)  Assessment & Plan  Continue home medications  As needed antihypertensives  Currently on nitro drip        VTE prophylaxis: therapeutic anticoagulation with lovenox     Patient is critically ill.   The patient has: NSTEMI  The vital organ system that is affected is the: heart  If untreated there is a high chance of deterioration into: heart failure and eventually death.   The critical care that I am providing today is: Close monitoring of vital signs, patient is currently on a nitro glycerin drip, plan for cardiac cath by cardiology  The critical that has been undertaken is medically complex.   There has been no overlap in critical care time.   Critical Care Time not including procedures: 53 minutes

## 2023-04-23 NOTE — ED PROVIDER NOTES
ED Provider Note    CHIEF COMPLAINT  Chief Complaint   Patient presents with    Chest Pain     Onset last night   Pain 7/10 on arrival    N/V     Onset last night        EXTERNAL RECORDS REVIEWED  Inpatient Notes discharge summary April 4, 2023, NSTEMI with staged PCI, 2 catheterizations    EDWAR/VINCENT    Julisa Josue is a 62 y.o. female who presents for evaluation of an NSTEMI, transferred from outside facility after a troponin resulted around 8500.  Known severe coronary disease status post CABG, recently evaluated here at this facility earlier this month and was stented.  She reports since being discharged in the hospital she has had continual exertional chest pain, shortness of breath and nausea.  She has been admitted at least once since then to an outside hospital.  Today she went back, she was found to have a troponin greater than 8000 which she states is way off of her baseline of around 200.  For that she was transferred here.  Treated with Lovenox.  Arrives on a nitro drip.    PAST MEDICAL HISTORY   has a past medical history of ACC/AHA stage C systolic heart failure (HCC) (4/2/2023), CAD (coronary artery disease), Congestive heart failure (HCC), Diabetes, Diabetes (HCC), Diverticulosis, GERD (gastroesophageal reflux disease), High cholesterol (5/15/2011), Hypertension, Intestinal mass (2015), Ischemic cardiomyopathy (4/2/2023), Left ventricular systolic dysfunction, NYHA class 3 (4/2/2023), Migraine, and Staph skin infection.    SURGICAL HISTORY   has a past surgical history that includes other abdominal surgery; gyn surgery; other orthopedic surgery (6-2014); abdominal exploration; gastroscopy (9/3/2017); colonoscopy (9/3/2017); stent placement (12/20/2018); and other cardiac surgery.    FAMILY HISTORY  Family History   Problem Relation Age of Onset    Cancer Maternal Aunt         Lung cancer d/t smoking       SOCIAL HISTORY  Social History     Tobacco Use    Smoking status: Former     Packs/day: 2.00      Years: 20.00     Pack years: 40.00     Types: Cigarettes     Start date:      Quit date:      Years since quittin.3    Smokeless tobacco: Never   Vaping Use    Vaping Use: Never used   Substance and Sexual Activity    Alcohol use: Not Currently    Drug use: Yes     Types: Inhaled     Comment: just prescribed meds    Sexual activity: Not on file       CURRENT MEDICATIONS  Home Medications       Reviewed by Alejandra Celis R.N. (Registered Nurse) on 23 at 1530  Med List Status: Partial     Medication Last Dose Status   ascorbic acid (VITAMIN C) 500 MG tablet  Active   aspirin EC 81 MG EC tablet  Active   atorvastatin (LIPITOR) 80 MG tablet  Active   cloNIDine (CATAPRES) 0.1 MG Tab  Active   clopidogrel (PLAVIX) 75 MG Tab  Active   dapagliflozin propanediol (FARXIGA) 10 MG Tab  Active   ferrous sulfate 325 (65 Fe) MG tablet  Active   gabapentin (NEURONTIN) 800 MG tablet  Active   insulin GLARGINE (LANTUS,SEMGLEE) 100 UNIT/ML Solution  Active   isosorbide mononitrate SR (IMDUR) 30 MG TABLET SR 24 HR  Active   metFORMIN (GLUCOPHAGE) 500 MG Tab  Active   metoprolol SR (TOPROL XL) 50 MG TABLET SR 24 HR  Active   Morphine Sulfate (MORPHINE PCA) 1 MG/ML Solution  Active   oxyCODONE-acetaminophen (PERCOCET-10)  MG Tab  Active   pregabalin (LYRICA) 100 MG Cap  Active   sacubitril-valsartan (ENTRESTO) 24-26 MG Tab  Active   SITagliptin (JANUVIA) 50 MG Tab  Active   spironolactone (ALDACTONE) 25 MG Tab  Active   traZODone (DESYREL) 150 MG Tab  Active                    ALLERGIES  No Known Allergies    PHYSICAL EXAM  VITAL SIGNS: BP (!) 146/69   Pulse 77   Temp 36.4 °C (97.6 °F) (Temporal)   Resp 16   Wt 88 kg (194 lb 0.1 oz)   LMP 2009   SpO2 100%   BMI 32.28 kg/m²    Constitutional: Awake, alert, chronically ill-appearing.  HENT: Normocephalic, no obvious evidence of acute trauma.  Eyes: No scleral icterus. Normal conjunctiva   Thorax & Lungs: Normal nonlabored respirations. I  appreciate no wheezing, rhonchi or rales. There is normal air movement.  Upon cardiac ascultation I appreciate a regular heart rhythm and a normal rate.   Abdomen: The abdomen is not visibly distended. Upon palpation, I find it to be without tenderness.  No mass appreciated.  Skin: The exposed portions of skin reveal no obvious rash or other abnormalities.  Neurologic: Alert & oriented. No focal deficits observed.      DIAGNOSTIC STUDIES / PROCEDURES  LABS  Results for orders placed or performed during the hospital encounter of 04/23/23   CBC with Differential   Result Value Ref Range    WBC 9.2 4.8 - 10.8 K/uL    RBC 3.98 (L) 4.20 - 5.40 M/uL    Hemoglobin 11.0 (L) 12.0 - 16.0 g/dL    Hematocrit 33.6 (L) 37.0 - 47.0 %    MCV 84.4 81.4 - 97.8 fL    MCH 27.6 27.0 - 33.0 pg    MCHC 32.7 (L) 33.6 - 35.0 g/dL    RDW 42.6 35.9 - 50.0 fL    Platelet Count 237 164 - 446 K/uL    MPV 9.5 9.0 - 12.9 fL    Neutrophils-Polys 59.80 44.00 - 72.00 %    Lymphocytes 30.40 22.00 - 41.00 %    Monocytes 7.70 0.00 - 13.40 %    Eosinophils 1.00 0.00 - 6.90 %    Basophils 0.70 0.00 - 1.80 %    Immature Granulocytes 0.40 0.00 - 0.90 %    Nucleated RBC 0.00 /100 WBC    Neutrophils (Absolute) 5.52 2.00 - 7.15 K/uL    Lymphs (Absolute) 2.80 1.00 - 4.80 K/uL    Monos (Absolute) 0.71 0.00 - 0.85 K/uL    Eos (Absolute) 0.09 0.00 - 0.51 K/uL    Baso (Absolute) 0.06 0.00 - 0.12 K/uL    Immature Granulocytes (abs) 0.04 0.00 - 0.11 K/uL    NRBC (Absolute) 0.00 K/uL   Complete Metabolic Panel (CMP)   Result Value Ref Range    Sodium 138 135 - 145 mmol/L    Potassium 3.9 3.6 - 5.5 mmol/L    Chloride 105 96 - 112 mmol/L    Co2 20 20 - 33 mmol/L    Anion Gap 13.0 7.0 - 16.0    Glucose 226 (H) 65 - 99 mg/dL    Bun 20 8 - 22 mg/dL    Creatinine 1.32 0.50 - 1.40 mg/dL    Calcium 8.8 8.5 - 10.5 mg/dL    AST(SGOT) 28 12 - 45 U/L    ALT(SGPT) 11 2 - 50 U/L    Alkaline Phosphatase 76 30 - 99 U/L    Total Bilirubin 0.3 0.1 - 1.5 mg/dL    Albumin 3.3 3.2 - 4.9  g/dL    Total Protein 6.9 6.0 - 8.2 g/dL    Globulin 3.6 (H) 1.9 - 3.5 g/dL    A-G Ratio 0.9 g/dL   Troponins NOW   Result Value Ref Range    Troponin T 1764 (H) 6 - 19 ng/L   CORRECTED CALCIUM   Result Value Ref Range    Correct Calcium 9.4 8.5 - 10.5 mg/dL   ESTIMATED GFR   Result Value Ref Range    GFR (CKD-EPI) 46 (A) >60 mL/min/1.73 m 2   EKG   Result Value Ref Range    Report       [facility] Emergency Dept.    Test Date:  2023  Pt Name:    ALIDA JACKSON                  Department: ER  MRN:        [id number]                      Room:        07  Gender:     Female                       Technician: [id number]  :        [id number]                   Requested By:SHIRA MARI  Order #:    [id number]                    Reading MD: SHIRA MARI MD    Measurements  Intervals                                Axis  Rate:       79                           P:          9  CO:         169                          QRS:        58  QRSD:       119                          T:          -21  QT:         428  QTc:        491    Interpretive Statements  12 Lead EKG interpreted by me to show: -- Rate 79 -- Rhythm: Normal sinus  rhythm  -- Axis: Normal -- CO and QRS Intervals: Normal -- T waves: No acute changes  --  ST segments: No acute changes -- Ectopy: None. My impression of this EKG:  Does  not indicate acute ischemia at this time.  Electronically Sig pavan On 2023 16:16:03 PDT by SHIRA MARI MD          RADIOLOGY  I have independently interpreted the diagnostic imaging associated with this visit and am waiting the final reading from the radiologist.   My preliminary interpretation is as follows: No pneumothorax, no edema  Radiologist interpretation:   DX-CHEST-PORTABLE (1 VIEW)   Final Result      No acute cardiopulmonary abnormality.               COURSE & MEDICAL DECISION MAKING    ED Observation Status? No; Patient does not meet criteria for ED Observation.     INITIAL ASSESSMENT,  COURSE AND PLAN  Care Narrative: 62-year-old female with known severe coronary disease status post CABG status post stents earlier this month, returns with NSTEMI.  According to the outside facility her troponin was greater than 8000.  She has had ongoing exertional chest pain and dyspnea since her recent catheterizations.  Treated with Lovenox and a nitro drip.  She arrives with some ongoing chest pain.  Her EKG does not reveal obvious ischemic changes from her most recent.  The plan will be to trend her troponin.  Consult cardiology.  She will be maintained on this nitro infusion and she will be reevaluated    Troponin results at 1700.  I discussed the case with Dr. Vick, cardiologist.  I also discussed the case with Dr. Torres, hospitalist.  The patient will be admitted to the stepdown ICU in guarded condition on the nitro infusion      CRITICAL CARE  The very real possibilty of a deterioration of this patient's condition required the highest level of my preparedness for sudden, emergent intervention.  I provided critical care services, which included medication orders, frequent reevaluations of the patient's condition and response to treatment, ordering and reviewing test results, and discussing the case with various consultants.  The critical care time associated with the care of the patient was 39 minutes. Review chart for interventions. This time is exclusive of any other billable procedures.        FINAL DIAGNOSIS  1. NSTEMI (non-ST elevated myocardial infarction) (HCC)           Electronically signed by: Dereck Ace M.D., 4/23/2023 3:46 PM

## 2023-04-23 NOTE — ED TRIAGE NOTES
.  Chief Complaint   Patient presents with    Chest Pain     Onset last night   Pain 7/10 on arrival    N/V     Onset last night     BIBA med flight. Pt transferred from [facility] Reports onset CP n/v yesterday. Pt has significant cardiac hx. Pt also reports home pain medications being decreased. Pain is mid chest non radiating 7/10. Pt on NTG gtt.   EKG complete on arrival chest x ray complete.

## 2023-04-24 ENCOUNTER — APPOINTMENT (OUTPATIENT)
Dept: CARDIOLOGY | Facility: MEDICAL CENTER | Age: 63
DRG: 247 | End: 2023-04-24
Attending: INTERNAL MEDICINE
Payer: MEDICAID

## 2023-04-24 PROBLEM — E83.42 HYPOMAGNESEMIA: Status: ACTIVE | Noted: 2023-04-24

## 2023-04-24 LAB
ANION GAP SERPL CALC-SCNC: 13 MMOL/L (ref 7–16)
BUN SERPL-MCNC: 18 MG/DL (ref 8–22)
CALCIUM SERPL-MCNC: 8.2 MG/DL (ref 8.5–10.5)
CHLORIDE SERPL-SCNC: 107 MMOL/L (ref 96–112)
CHOLEST SERPL-MCNC: 151 MG/DL (ref 100–199)
CO2 SERPL-SCNC: 17 MMOL/L (ref 20–33)
CREAT SERPL-MCNC: 1.03 MG/DL (ref 0.5–1.4)
EKG IMPRESSION: NORMAL
EKG IMPRESSION: NORMAL
ERYTHROCYTE [DISTWIDTH] IN BLOOD BY AUTOMATED COUNT: 42.4 FL (ref 35.9–50)
GFR SERPLBLD CREATININE-BSD FMLA CKD-EPI: 62 ML/MIN/1.73 M 2
GLUCOSE BLD STRIP.AUTO-MCNC: 203 MG/DL (ref 65–99)
GLUCOSE BLD STRIP.AUTO-MCNC: 220 MG/DL (ref 65–99)
GLUCOSE BLD STRIP.AUTO-MCNC: 231 MG/DL (ref 65–99)
GLUCOSE BLD STRIP.AUTO-MCNC: 294 MG/DL (ref 65–99)
GLUCOSE BLD STRIP.AUTO-MCNC: 320 MG/DL (ref 65–99)
GLUCOSE BLD STRIP.AUTO-MCNC: 347 MG/DL (ref 65–99)
GLUCOSE BLD STRIP.AUTO-MCNC: 348 MG/DL (ref 65–99)
GLUCOSE SERPL-MCNC: 216 MG/DL (ref 65–99)
HCT VFR BLD AUTO: 31 % (ref 37–47)
HDLC SERPL-MCNC: 33 MG/DL
HGB BLD-MCNC: 10.2 G/DL (ref 12–16)
LDLC SERPL CALC-MCNC: 86 MG/DL
MCH RBC QN AUTO: 27.7 PG (ref 27–33)
MCHC RBC AUTO-ENTMCNC: 32.9 G/DL (ref 33.6–35)
MCV RBC AUTO: 84.2 FL (ref 81.4–97.8)
PLATELET # BLD AUTO: 211 K/UL (ref 164–446)
PMV BLD AUTO: 9.4 FL (ref 9–12.9)
POTASSIUM SERPL-SCNC: 3.8 MMOL/L (ref 3.6–5.5)
RBC # BLD AUTO: 3.68 M/UL (ref 4.2–5.4)
SODIUM SERPL-SCNC: 137 MMOL/L (ref 135–145)
TRIGL SERPL-MCNC: 161 MG/DL (ref 0–149)
WBC # BLD AUTO: 7.7 K/UL (ref 4.8–10.8)

## 2023-04-24 PROCEDURE — 93308 TTE F-UP OR LMTD: CPT

## 2023-04-24 PROCEDURE — 700102 HCHG RX REV CODE 250 W/ 637 OVERRIDE(OP): Performed by: INTERNAL MEDICINE

## 2023-04-24 PROCEDURE — 93010 ELECTROCARDIOGRAM REPORT: CPT | Performed by: INTERNAL MEDICINE

## 2023-04-24 PROCEDURE — A9270 NON-COVERED ITEM OR SERVICE: HCPCS | Performed by: INTERNAL MEDICINE

## 2023-04-24 PROCEDURE — 80048 BASIC METABOLIC PNL TOTAL CA: CPT

## 2023-04-24 PROCEDURE — 93005 ELECTROCARDIOGRAM TRACING: CPT | Performed by: INTERNAL MEDICINE

## 2023-04-24 PROCEDURE — 85027 COMPLETE CBC AUTOMATED: CPT

## 2023-04-24 PROCEDURE — 700102 HCHG RX REV CODE 250 W/ 637 OVERRIDE(OP): Performed by: STUDENT IN AN ORGANIZED HEALTH CARE EDUCATION/TRAINING PROGRAM

## 2023-04-24 PROCEDURE — 770000 HCHG ROOM/CARE - INTERMEDIATE ICU *

## 2023-04-24 PROCEDURE — 82962 GLUCOSE BLOOD TEST: CPT | Mod: 91

## 2023-04-24 PROCEDURE — 80061 LIPID PANEL: CPT

## 2023-04-24 PROCEDURE — 700111 HCHG RX REV CODE 636 W/ 250 OVERRIDE (IP): Performed by: STUDENT IN AN ORGANIZED HEALTH CARE EDUCATION/TRAINING PROGRAM

## 2023-04-24 PROCEDURE — 99232 SBSQ HOSP IP/OBS MODERATE 35: CPT | Performed by: HOSPITALIST

## 2023-04-24 PROCEDURE — A9270 NON-COVERED ITEM OR SERVICE: HCPCS | Performed by: STUDENT IN AN ORGANIZED HEALTH CARE EDUCATION/TRAINING PROGRAM

## 2023-04-24 PROCEDURE — 99291 CRITICAL CARE FIRST HOUR: CPT | Performed by: INTERNAL MEDICINE

## 2023-04-24 RX ORDER — LOPERAMIDE HYDROCHLORIDE 2 MG/1
2 CAPSULE ORAL 4 TIMES DAILY PRN
COMMUNITY

## 2023-04-24 RX ORDER — INSULIN ASPART 100 [IU]/ML
2-8 INJECTION, SOLUTION INTRAVENOUS; SUBCUTANEOUS 2 TIMES DAILY
COMMUNITY

## 2023-04-24 RX ORDER — COLCHICINE 0.6 MG/1
0.6 TABLET ORAL 2 TIMES DAILY
Status: DISCONTINUED | OUTPATIENT
Start: 2023-04-24 | End: 2023-04-27

## 2023-04-24 RX ORDER — MORPHINE SULFATE 30 MG/1
30 TABLET, FILM COATED, EXTENDED RELEASE ORAL EVERY 12 HOURS
Status: ON HOLD | COMMUNITY
End: 2023-05-01 | Stop reason: SDUPTHER

## 2023-04-24 RX ORDER — ACETAMINOPHEN 325 MG/1
650 TABLET ORAL EVERY 6 HOURS PRN
COMMUNITY

## 2023-04-24 RX ORDER — ATORVASTATIN CALCIUM 10 MG/1
10 TABLET, FILM COATED ORAL NIGHTLY
Status: ON HOLD | COMMUNITY
End: 2023-04-27

## 2023-04-24 RX ORDER — OXYCODONE HYDROCHLORIDE 10 MG/1
10 TABLET ORAL EVERY 6 HOURS PRN
Status: ON HOLD | COMMUNITY
End: 2023-05-01

## 2023-04-24 RX ADMIN — INSULIN HUMAN 3 UNITS: 100 INJECTION, SOLUTION PARENTERAL at 07:48

## 2023-04-24 RX ADMIN — ASPIRIN 81 MG: 81 TABLET, COATED ORAL at 05:32

## 2023-04-24 RX ADMIN — INSULIN HUMAN 2 UNITS: 100 INJECTION, SOLUTION PARENTERAL at 16:55

## 2023-04-24 RX ADMIN — OXYCODONE HYDROCHLORIDE 10 MG: 10 TABLET ORAL at 20:57

## 2023-04-24 RX ADMIN — MORPHINE SULFATE 30 MG: 30 TABLET, FILM COATED, EXTENDED RELEASE ORAL at 17:48

## 2023-04-24 RX ADMIN — INSULIN GLARGINE-YFGN 20 UNITS: 100 INJECTION, SOLUTION SUBCUTANEOUS at 05:30

## 2023-04-24 RX ADMIN — OXYCODONE HYDROCHLORIDE 10 MG: 10 TABLET ORAL at 10:28

## 2023-04-24 RX ADMIN — SACUBITRIL AND VALSARTAN 1 TABLET: 24; 26 TABLET, FILM COATED ORAL at 17:49

## 2023-04-24 RX ADMIN — ATORVASTATIN CALCIUM 80 MG: 80 TABLET, FILM COATED ORAL at 17:48

## 2023-04-24 RX ADMIN — INSULIN HUMAN 6 UNITS: 100 INJECTION, SOLUTION PARENTERAL at 11:16

## 2023-04-24 RX ADMIN — INSULIN HUMAN 8 UNITS: 100 INJECTION, SOLUTION PARENTERAL at 21:12

## 2023-04-24 RX ADMIN — PRASUGREL 10 MG: 10 TABLET, FILM COATED ORAL at 05:33

## 2023-04-24 RX ADMIN — MORPHINE SULFATE 4 MG: 4 INJECTION INTRAVENOUS at 04:10

## 2023-04-24 RX ADMIN — INSULIN HUMAN 2 UNITS: 100 INJECTION, SOLUTION PARENTERAL at 08:04

## 2023-04-24 RX ADMIN — INSULIN GLARGINE-YFGN 20 UNITS: 100 INJECTION, SOLUTION SUBCUTANEOUS at 18:00

## 2023-04-24 RX ADMIN — MORPHINE SULFATE 30 MG: 30 TABLET, FILM COATED, EXTENDED RELEASE ORAL at 05:33

## 2023-04-24 RX ADMIN — PREGABALIN 100 MG: 100 CAPSULE ORAL at 11:12

## 2023-04-24 RX ADMIN — DOCUSATE SODIUM 50 MG AND SENNOSIDES 8.6 MG 2 TABLET: 8.6; 5 TABLET, FILM COATED ORAL at 05:33

## 2023-04-24 RX ADMIN — COLCHICINE 0.6 MG: 0.6 TABLET, FILM COATED ORAL at 16:55

## 2023-04-24 RX ADMIN — PREGABALIN 100 MG: 100 CAPSULE ORAL at 17:48

## 2023-04-24 RX ADMIN — TRAZODONE HYDROCHLORIDE 150 MG: 100 TABLET ORAL at 20:57

## 2023-04-24 RX ADMIN — PREGABALIN 100 MG: 100 CAPSULE ORAL at 05:33

## 2023-04-24 ASSESSMENT — PAIN DESCRIPTION - PAIN TYPE
TYPE: ACUTE PAIN
TYPE: CHRONIC PAIN
TYPE: CHRONIC PAIN
TYPE: ACUTE PAIN

## 2023-04-24 ASSESSMENT — LIFESTYLE VARIABLES: SUBSTANCE_ABUSE: 1

## 2023-04-24 ASSESSMENT — ENCOUNTER SYMPTOMS
SHORTNESS OF BREATH: 1
NECK PAIN: 0
ABDOMINAL PAIN: 0
HEADACHES: 0
COUGH: 0
NERVOUS/ANXIOUS: 0
SORE THROAT: 0
BACK PAIN: 0
DIZZINESS: 0
WEIGHT LOSS: 0
NAUSEA: 0

## 2023-04-24 ASSESSMENT — FIBROSIS 4 INDEX: FIB4 SCORE: 2.4

## 2023-04-24 NOTE — ED NOTES
Med rec completed per MAR from [facility].   Patient was discharged on 03/23, however we are unable to obtain further medication information for the patient that is more recent. Per ER Pharmacist, the Med Rec is to be completed per MAR.

## 2023-04-24 NOTE — PROGRESS NOTES
Cardiology Follow-up Consult Note    Date of Service:    4/24/2023      Consulting Physician: Hollis Elizalde D.O.    Name:   Julisa Josue   YOB: 1960  Age:   62 y.o.  female   MRN:   [id number]      Subjective:  No acute events overnight.  Patient underwent successful left circumflex stent placement.  Has been complaining of ongoing chest discomfort since this morning.  Is a sharp, left-sided without radiation.  Reports that this is different from her presenting chest pain.  No associated lightheadedness, dizziness, palpitations or headache.    All other review of systems reviewed and negative.      Past medical, surgical, social, and family history reviewed and unchanged from admission except as noted in assessment and plan.    Medications: Reviewed in MAR      No Known Allergies      Intake/Output Summary (Last 24 hours) at 4/24/2023 1441  Last data filed at 4/24/2023 1000  Gross per 24 hour   Intake 737.31 ml   Output --   Net 737.31 ml        Physical Exam  Body mass index is 32.28 kg/m².  /56   Pulse 88   Temp 36.1 °C (97 °F) (Temporal)   Resp (!) 43   Wt 88 kg (194 lb 0.1 oz)   SpO2 94%   Vitals:    04/24/23 1100 04/24/23 1103 04/24/23 1200 04/24/23 1400   BP: 98/55 (!) 87/42 118/54 117/56   Pulse: (!) 108 (!) 103 88 88   Resp: (!) 58 (!) 39 20 (!) 43   Temp:   36.3 °C (97.3 °F) 36.1 °C (97 °F)   TempSrc:   Temporal Temporal   SpO2: 91% 92% 95% 94%   Weight:         Oxygen Therapy:  Pulse Oximetry: 94 %, O2 (LPM): 2, O2 Delivery Device: None - Room Air    General: Well appearing and in no apparent distress  Eyes: nl conjunctiva  ENT: OP clear  Neck: JVP not elevated, no carotid bruits  Lungs: normal respiratory effort, CTAB  Heart: RRR, no murmurs, no rubs or gallops, no edema bilateral lower extremities. No LV/RV heave on cardiac palpatation. 2+ bilateral radial pulses.  2+ bilateral dp pulses.   Abdomen: soft, non tender, non distended, no masses, normal bowel sounds.  No  HSM.  Extremities/MSK: no clubbing, no cyanosis  Neurological: No focal sensory deficits  Psychiatric: Appropriate affect, A/O x 3  Skin: Warm extremities    Labs (personally reviewed and notable for):   Lab Results   Component Value Date/Time    SODIUM 137 04/24/2023 04:02 AM    POTASSIUM 3.8 04/24/2023 04:02 AM    CHLORIDE 107 04/24/2023 04:02 AM    CO2 17 (L) 04/24/2023 04:02 AM    GLUCOSE 216 (H) 04/24/2023 04:02 AM    BUN 18 04/24/2023 04:02 AM    CREATININE 1.03 04/24/2023 04:02 AM      Lab Results   Component Value Date/Time    WBC 7.7 04/24/2023 04:02 AM    RBC 3.68 (L) 04/24/2023 04:02 AM    HEMOGLOBIN 10.2 (L) 04/24/2023 04:02 AM    HEMATOCRIT 31.0 (L) 04/24/2023 04:02 AM    MCV 84.2 04/24/2023 04:02 AM    MCH 27.7 04/24/2023 04:02 AM    MCHC 32.9 (L) 04/24/2023 04:02 AM    MPV 9.4 04/24/2023 04:02 AM    NEUTSPOLYS 59.80 04/23/2023 03:37 PM    LYMPHOCYTES 30.40 04/23/2023 03:37 PM    MONOCYTES 7.70 04/23/2023 03:37 PM    EOSINOPHILS 1.00 04/23/2023 03:37 PM    EOSINOPHILS 3 05/08/2017 03:00 PM    BASOPHILS 0.70 04/23/2023 03:37 PM    HYPOCHROMIA 1 05/20/2014 09:05 PM    ANISOCYTOSIS 1+ 07/07/2017 03:15 PM      Lab Results   Component Value Date/Time    CHOLSTRLTOT 151 04/24/2023 04:02 AM    LDL 86 04/24/2023 04:02 AM    HDL 33 (A) 04/24/2023 04:02 AM    TRIGLYCERIDE 161 (H) 04/24/2023 04:02 AM       Lab Results   Component Value Date/Time    TROPONINT 1764 (H) 04/23/2023 1537     No results found for: NTPROBNP    Cardiac Imaging and Procedures Review:    ECG: ECG performed 4/23/2023 was interpreted by me which shows sinus rhythm    Echo: Echocardiogram performed on 4/2/2023 was personally interpreted by me which shows low normal left ventricular systolic function, LVEF 45-50%    [facility] 4/23/2023:  INDICATIONS:  Delayed posterior STEMI  NSTEMI  History of LAD PCI pre-CABG  History of LIMA-LAD CABG  History of left circumflex PCI post CABG  History of L-Cx  PCI 4/2/23  History of PDA PCI 4/3/23  Questionable  adherence to Plavix  CORONARY ANGIOGRAPHY:  The left main coronary artery: Large in caliber vessel with patent stent, bifurcates to LAD and left circumflex coronary arteries.  The left anterior descending coronary artery: Large in caliber vessel with 50% ostial stenosis (ISR) and severe ISR in the mid LAD.  Distal/apical LAD fills via patent LIMA graft, with nonobstructive CAD.  The LAD gives off 2 medium caliber diagonal branches with 80% stenosis in D1 (jailed by the proximal LAD stent).  The left circumflex coronary artery: Large in caliber vessel with 100 % occlusion at its ostium within stent. The right coronary artery: Large in caliber dominant vessel with 50% mid RCA stenosis and patent proximal right PDA stent (deployed 4/3/23).  IMPRESSION:  This is most likely a delayed posterior STEMI presentation with possible concerns for Plavix non-adherence or non-responder.  1.  Occluded left circumflex s/p IVUS guided PCI deploying a Synergy 3 x 38mm ANA in the mid Cx - prox OM (overlapped with the Synergy stent from 4/2/23) and post-dilated to 4.0 mm proximally.  2. PTCA of distal LM and ostial L-Cx stent from 4/2/23 for more optimization using a 4 x 12 mm NC balloon to high pressure.  3.  Normal resting LVEDP 5 mmHg without significant transaortic gradient on pullback.    [facility] 4/2/23:  IMPRESSION:  1.  Severe ostial /proximal circumflex ISR- status post successful IVUS guided intravascular lithotripsy and revascularization deploying Synergy 3 x 20 mm ANA, postdilated to 3.7 mm at the ostium /distal left main.  2.  Residual severe proximal right PDA stenosis, recommend staged PCI either this admission or in 4 to 6 weeks as an outpatient.  3.  Residual severe diffuse stenoses and medium in caliber OM1, and severe proximal D1 stenosis (jailed by proximal LAD stent), for which I recommend optimal medical therapy .  4.  Mildly elevated resting LVEDP at 18 mmHg no significant transaortic gradient on pullback    [facility]  4/3/2023:  Successful percutaneous coronary intervention of right posterior descending artery using 2.5 x 16 mm Synergy drug-eluting stent    Radiology test Review:  DX-CHEST-PORTABLE (1 VIEW)   Final Result      No acute cardiopulmonary abnormality.         CL-LEFT HEART CATHETERIZATION WITH POSSIBLE INTERVENTION    (Results Pending)   EC-ECHOCARDIOGRAM LTD W/O CONT    (Results Pending)       Problem list:  Principal Problem:    NSTEMI (non-ST elevated myocardial infarction) (HCC) POA: Yes  Active Problems:    Essential hypertension POA: Yes    Hyperlipidemia (Chronic) POA: Yes    Diabetes (HCC) POA: Yes    Chronic pain syndrome with narcotic dependence (Chronic) POA: Yes    S/P insertion of non-drug eluting coronary artery stent POA: Yes    Coronary artery disease due to lipid rich plaque POA: Yes    Impaired instrumental activities of daily living (IADL) POA: Yes    ACC/AHA stage C systolic heart failure (HCC) POA: Yes    Long term (current) use of antithrombotics/antiplatelets - DAPT recommended indefinitely POA: Unknown  Resolved Problems:    * No resolved hospital problems. *  #Dressler syndrome    Recommendations:  -- Complex cardiac case.  Patient with recent admission for NSTEMI s/p stent placement to left circumflex artery and RCA presented back to the hospital with delayed presentation of possible STEMI with occlusion of left circumflex artery stent.  Concerning for Plavix nonresponder.  -- She is now on DAPT with aspirin and Effient.  Denies any bleeding concerns.  -- She is currently on IV nitroglycerin 40 mcg/min.  Titrate down by 10 mcg/min every 15 minutes and monitor symptoms and blood pressure.  -- Chest pain today is concerning for possible Dressler syndrome.  Initiate colchicine 0.6 mg twice daily.  --Echo from previous admission showed mildly reduced LV systolic function.  Obtain repeat limited echo to reassess LVEF, wall motion abnormality and to rule out pericardial effusion.  -- Remains in  guarded condition.    Recommendations discussed with Dr. Elizalde.    Cardiology Critical Care Documentation    Patient is critically ill with a life threatening illness as noted above required my direct presence, involvement and maximal preparedness.  I have spent a total of 43 minutes providing care for this patient, independent of other providers time. No time overlap. Procedures were not included in this time. This time was spent at the bedside evaluating the patient's chart, labs, imaging, physical exam, discussing with MD and bedside RN, formulating assessment and plan of titration of IV nitroglycerin.    Thank you for allowing me to participate in the care of this patient, cardiology will continue to follow.  Please contact me with any questions.    Dixon Galeano M.D.  Cardiologist, [facility] [place]   [phone]    Please note that this dictation was created using voice recognition software. I have made every reasonable attempt to correct obvious errors, but it is possible there are errors of grammar and possibly content that I did not discover before finalizing the note.

## 2023-04-24 NOTE — ASSESSMENT & PLAN NOTE
glargine to 24 units twice daily.  Add 5 U mealtime insulin   Continue insulin sliding scale with hypoglycemia protocol.  Holding home Januvia and meformin  Diabetic diet    uncontrolled

## 2023-04-24 NOTE — DIETARY
Nutrition Services: Cardiac diet Education Consult   Day 1 of admit.  Julias Josue is a 62 y.o. female with admitting DX of NSTEMI (non-ST elevated myocardial infarction) (HCC) [I21.4]    RD able to visit pt at bedside to provide cardiac diet education. RD discussed a heart healthy consistent CHO diet, provided handout reinforcing topics discussed. Pt demonstrated readiness and evidence of learning. RD able to answer all questions to patient's satisfaction.     No other education needs identified at this time. Consider referral to outpatient nutrition services for continuation of education as indicated or per pt preferences.     Please re-consult RD as indicated.

## 2023-04-24 NOTE — ASSESSMENT & PLAN NOTE
Patient reports that typically she was living at assisted living however she went back to her apartment to give her 30-day notice, plans to go back to assisted living on discharge  Patient has reported multiple falls, denies any trauma to her head  PT/OT

## 2023-04-24 NOTE — PROGRESS NOTES
Patient is experiencing more discomfort and pain. Let MD Elizalde know and he was about to start a procedure so advised I let cardiology know. Spoke with MD Galeano and let them know the patient is in pain that is worsening and vitals. Currently nitro drip is off due to SBP <90. Awaiting orders.     12:11 as per MD Galeano, EKG STAT is ordered, no nitro drip at this time.

## 2023-04-24 NOTE — PROCEDURES
4 Eyes Skin Assessment Completed by OJ Cardona and OJ Whipple.    Head WDL  Ears WDL  Nose WDL  Mouth WDL  Neck WDL  Breast/Chest Scar  Shoulder Blades Redness and Blanching  Spine Redness and Blanching  (R) Arm/Elbow/Hand Edema  (L) Arm/Elbow/Hand Edema Cath site  Abdomen WDL  Groin WDL  Scrotum/Coccyx/Buttocks Redness and Blanching  (R) Leg Scab Cath site  (L) Leg Scab    (R) Heel/Foot/Toe WDL  (L) Heel/Foot/Toe WDL          Devices In Places ECG, Tele Box, Blood Pressure Cuff, Pulse Ox, and Nasal Cannula      Interventions In Place Gray Ear Foams, Q2 Turns, and Low Air Loss Mattress    Possible Skin Injury No    Pictures Uploaded Into Epic Yes  Wound Consult Placed N/A  RN Wound Prevention Protocol Ordered No

## 2023-04-24 NOTE — PROGRESS NOTES
"Patient BP is low after patient got up to bedside commode.   98/55  retook it and it was lower 85/4 HR 92, patient states she feels a little light headed. Patient is sitting in bed at this time. MD Elizalde made aware.    Placing nitro drip on hold as per MD and orders \" hold for SBP <90\". Will continue to monitor and assess. Will set machine to do q15 vitals.  No further orders at this time.   "

## 2023-04-24 NOTE — ED NOTES
Pr prepped for cath lab. Wearing hospital gown. Defib pads and electrodes placed. Pt updated to poc.

## 2023-04-24 NOTE — CARE PLAN
The patient is Stable - Low risk of patient condition declining or worsening    Shift Goals  Clinical Goals: monitor cardiac, monitor chest pain, comfort, safety when ambulating  Patient Goals: comfort, rest and get better to go home  Family Goals: jenna    Progress made toward(s) clinical / shift goals:    Pulses intact. No signs of bleeding. Patient calls when she needs something and knows not to get up by herself.      Problem: Knowledge Deficit - Standard  Goal: Patient and family/care givers will demonstrate understanding of plan of care, disease process/condition, diagnostic tests and medications  Outcome: Progressing     Problem: Skin Integrity  Goal: Skin integrity is maintained or improved  Outcome: Progressing     Problem: Psychosocial  Goal: Patient's level of anxiety will decrease  Outcome: Progressing  Goal: Patient's ability to verbalize feelings about condition will improve  Outcome: Progressing  Goal: Patient's ability to re-evaluate and adapt role responsibilities will improve  Outcome: Progressing  Goal: Patient and family will demonstrate ability to cope with life altering diagnosis and/or procedure  Outcome: Progressing  Goal: Spiritual and cultural needs incorporated into hospitalization  Outcome: Progressing     Problem: Hemodynamics  Goal: Patient's hemodynamics, fluid balance and neurologic status will be stable or improve  Outcome: Progressing     Problem: Respiratory  Goal: Patient will achieve/maintain optimum respiratory ventilation and gas exchange  Outcome: Progressing     Problem: Fluid Volume  Goal: Fluid volume balance will be maintained  Outcome: Progressing      Patient is not progressing towards the following goals: Patient has constant pain in her chest 4/10 on pain scale. Patient is on nitro drip currently at this time.   Patient needs education on diet after her MI.     Problem: Pain - Standard  Goal: Alleviation of pain or a reduction in pain to the patient’s comfort  goal  Outcome: Not Progressing      Problem: Self Care  Goal: Patient will have the ability to perform ADLs independently or with assistance (bathe, groom, dress, toilet and feed)  Outcome: Not Progressing       Problem: Nutrition  Goal: Patient's nutritional and fluid intake will be adequate or improve  Outcome: Progressing  Goal: Enteral nutrition will be maintained or improve  Outcome: Progressing  Goal: Enteral nutrition will be maintained or improve  Outcome: Not Progressing

## 2023-04-24 NOTE — ASSESSMENT & PLAN NOTE
Metoprolol 50mg qd  Held sacubitril-valsartan 24-26mg BID, aldactone 25mg daily for now   Continue to monitor input and output.  Echocardiogram EF 45%

## 2023-04-24 NOTE — ASSESSMENT & PLAN NOTE
Cardiology has been following her.  Continue current medical management with aspirin, Effient, Entresto and metoprolol.   Continue to monitor closely on telemetry.  Echocardiogram ordered currently results pending.  4/23 Cardiac cath:  IMPRESSION:  This is most likely a delayed posterior STEMI presentation with possible concerns for Plavix non-adherence or non-responder.  1.  Occluded left circumflex s/p IVUS guided PCI deploying a Synergy 3 x 38mm ANA in the mid Cx - prox OM (overlapped with the Synergy stent from 4/2/23) and post-dilated to 4.0 mm proximally.  2. PTCA of distal LM and ostial L-Cx stent from 4/2/23 for more optimization using a 4 x 12 mm NC balloon to high pressure.  3.  Normal resting LVEDP 5 mmHg without significant transaortic gradient on pullback.  Discontinued nitroglycerin gtt.  I discussed plan of care with cardiologist.  Continue IV morphine on April 26, 2023.  Use IV narcotics with caution due to ongoing hypotension.

## 2023-04-24 NOTE — PROGRESS NOTES
Paged regarding chest pain and IMCU transfer post heart catherization:    62-year-old female with history of CAD s/p prior CABG and PCI, diabetes, hyperlipidemia, CHF, chronic pain syndrome dependent on narcotic admitted by my colleague earlier for NSTEMI/unstable angina.  Patient underwent heart catheterization, had PCI to left circumflex and PTCA of distal LM.  Postoperatively, patient reported having chest pain, ICU was consulted and patient was subsequently transferred to IMCU for higher level care.    Physical Exam:  Alert awake  HR 110s  /115  CV: S1, S2, No chest wall tenderness  Pulm: minimal rales  Abd: NT, ND    Impression:  Unstable angina, ?concern for Plavix nonresponse -- s/p [facility] 4/23:  PCI to left cirumflex, PTCA of distal LM    NSTEMI  Hypertensive emergency  CHF 45% EF  Diabetes, hyperlipidemia, hypertension  CKD III  Chronic pain syndrome dependent on narcotic  Class I morbid obesity    Plan:  -Given history of severe CAD with complaint of chest pain 8 out of 10-- started on nitroglycerin drip --wean off as tolerated until chest pain free or SBP<110  -Also added nitropaste 0.5inch every 6hrs, PRN nitro SL and PRN Morphine 4mg IV q4hr    -Continue Imdur 30 mg daily  -Continue aspirin and Effient (previously on Plavix)  -Continue GDMT  -[place]  reviewed -- she does take Lyrica 100mg PO TID, MS Contin 30mg PO BID -- continued    Critical care: 35minutes spent in chart review, medical management, coordinating care with patient/IMCU staff/RN/consulting providers.

## 2023-04-24 NOTE — PROGRESS NOTES
Cath lab RN  transported pt to T609 on zoll. Cath lab RN and bedside RN assessed R femoral groin site. Groin site clean dry and intact but small area of firmness noted above dressing.  Cath lab RN notified Dr Velázquez and held pressure for 10 min.  After holding pressure site soft and no sign of hematoma.  Dressing is clean, dry, and intact. Pedal pulses are 2+ bilaterally. Cath lab RN reviewed groin management protocol with bedside RN. Per MD Pt to remain on bedrest for 6 hours post procedure until 0230. No further questions per bedside RN.

## 2023-04-24 NOTE — PROGRESS NOTES
Patient is in constant steady chest pain at 4/10 as per patient. Patient is currently on a continuous nitro drip for her pain. MD Elizalde made aware.

## 2023-04-24 NOTE — PROCEDURES
Cardiac Catheterization Procedure    DATE: 4/23/2023    : Julio Velázquez MD, MPH    PROCEDURES PERFORMED:  Left heart catheterization  Coronary angiography  Bypass graft angiography  PCI of left circumflex coronary artery  PTCA of distal LM  Intravascular ultrasound to guide revascularization  Moderate conscious sedation  Right femoral angiography  Ultrasound-guided right common femoral arterial access  Perclose    INDICATIONS:  Delayed posterior STEMI  NSTEMI  History of LAD PCI pre-CABG  History of LIMA-LAD CABG  History of left circumflex PCI post CABG  History of L-Cx  PCI 4/2/23  History of PDA PCI 4/3/23  Questionable adherence to Plavix    CONSENT:  The complete alternatives, risks, and benefits of the procedure were explained to the patient. Signed informed consent was obtained and placed in the chart prior to the procedure.    MEDICATIONS:  Lidocaine  Fentanyl  Midazolam  Nitroglycerin  Verapamil  Heparin  Integrilin bolus x2  Prasugrel 60 mg    MODERATE CONSCIOUS SEDATION:  I personally supervised the administration of moderate conscious sedation by the nursing staff for 94 minutes.  Start time: 1821  End time: 1955    CONTRAST: Omnipaque 105 cc    RADIATION (Air Kerma): 1530 mGy    FLUOROSCOPY TIME: 29 minutes    ESTIMATED BLOOD LOSS: < 50 cc    COMPLICATIONS: None apparent    PROCEDURE OVERVIEW:  The patient was brought to the cardiac catheterization laboratory in the fasting state. The skin over the left wrist was prepped and draped in the usual sterile fashion. Lidocaine infiltration was used to anesthetize the tissue over the left radial artery. Using the micropuncture technique, a 6-Japanese Glidesheath was inserted in the left radial artery. A 6 Japanese JUSTYNA diagnostic catheter was then advanced over a Wholey wire into the left subclavian artery and used to engage the pedicle  LIMA graft.  Cineangiograms were obtained in multiple projections for complete evaluation of the LMA-LAD graft.  Then  this catheter was exchanged over a J-wire to a 6 Nepalese JR4 diagnostic catheter which was advanced into the left ventricular cavity where it was gently aspirated, flushed, and then withdrawn across the aortic valve with sequential pressures measured. This catheter was then used to engage the ostium of the right coronary artery and cineangiograms were obtained in multiple projections for complete evaluation of the right coronary system. This catheter was then exchanged over J-wire to a 6 Nepalese EBU 3.0 guide catheter but was unable to engage and patient unable to keep her left arm still despite high amount of sedation and being restless; hence we converted to R-CFA for access.    The skin over the right groin was already prepped and draped in the usual sterile fashion.  Lidocaine filtration was used to anesthetize the tissue over the right femoral artery.  Using the micropuncture technique and ultrasound guidance, a 6 [place]e [place] sheath was inserted in the right common femoral artery and adequate access was confirmed on cineangiography.    Then the 6-Fr EBU 3.0 guide catheter was advanced over a J-wire into the aortic root and used to engage the ostium of the left coronary artery and cineangiograms were obtained in multiple projections for complete evaluation of the left coronary system. Following completion of coronary angiography, we proceeded with PCI of the left circumflex coronary artery. See below for more details.     HEMODYNAMICS:  Aortic pressure: 116 /51/59 mmHg  LVEDP: 5 mmHg  No significant aortic gradient on pullback    CORONARY ANGIOGRAPHY:  The left main coronary artery: Large in caliber vessel with patent stent, bifurcates to LAD and left circumflex coronary arteries.  The left anterior descending coronary artery: Large in caliber vessel with 50% ostial stenosis (ISR) and severe ISR in the mid LAD.  Distal/apical LAD fills via patent LIMA graft, with nonobstructive CAD.  The LAD gives off 2 medium  caliber diagonal branches with 80% stenosis in D1 (jailed by the proximal LAD stent).  The left circumflex coronary artery: Large in caliber vessel with 100 % occlusion at its ostium within stent. The right coronary artery: Large in caliber dominant vessel with 50% mid RCA stenosis and patent proximal right PDA stent (deployed 4/3/23).    PERCUTANEOUS CORONARY INTERVENTION of left circumflex and PTCA of distal LM, and aspiration thrombectomy:  Pre: 100% stenosis and ZAIN 0 flow  Post: 0% residual stenosis and ZAIN III flow.   Guide Catheter(s): 6 Irish EBU 3.0  Guidewire(s):  200 without difficulty  Anticoagulation:  Heparin to maintain ACT > 250  Antiplatelet(s): Aspirin, prasugrel, Integrilin.  No Plavix was given today.  Pre-dilation balloon(s): 2 x 12 mm, 3 x 12 mm NC  IVUS or OCT used: IVUS showed adequately expanded and apposed stent that was deployed 4/2/23 (about 3.7-3.9mm in diameter). Post PTCA and aspiration thrombectomy, there appears to be potential sites for plaque rupture either distal to the previously deployed stent in 4/2/23 (in an older stent) and/or in the proximal large OM.  Intracoronary Atherectomy / Lithotripsy: None  Stent: Synergy 3 x 38mm ANA  Post-dilation balloon(s): 3 x 12mm distally to 12 makayla and (4 x 12 mm NC to 14 makayla at its mid portion, at the overlap, and to 18atm at the ostial Cx and distal LM)    No dissection, signs of no-reflow, distal embolization or perforation post PCI.    Closing: At completion of the procedure the relevant equipment was removed from the body and hemostasis achieved by Radial band for the right radial arteriotomy site, and perclose for the right common femoral arteriotomy site.    The patient left the cath lab with improved chest pain,  hemodynamically stable and neurologically intact.      IMPRESSION:  This is most likely a delayed posterior STEMI presentation with possible concerns for Plavix non-adherence or non-responder.  1.  Occluded left  circumflex s/p IVUS guided PCI deploying a Synergy 3 x 38mm ANA in the mid Cx - prox OM (overlapped with the Synergy stent from 4/2/23) and post-dilated to 4.0 mm proximally.  2. PTCA of distal LM and ostial L-Cx stent from 4/2/23 for more optimization using a 4 x 12 mm NC balloon to high pressure.  3.  Normal resting LVEDP 5 mmHg without significant transaortic gradient on pullback.    RECOMMENDATIONS:  Dual antiplatelet therapy for life if well tolerated (ASA + Prasugrel), with PPI  HI statin: Target LDL < 70 and TG < 150  GDMT and lifestyle modifications for secondary ASCVD prevention  Cardiac Rehab referral placed  TR band protocol  6 hours of bed rest as long as adequate hemostasis of the groin access site continues.    NOTIFICATION:  The patient's daughter - Elina - was updated.    Referring provider - Dr. Vick - was notified.    Julio Velázquez MD, MPH [facility]  Interventional Cardiologist  [facility]   of Clinical Internal Medicine - [facility]

## 2023-04-24 NOTE — ED NOTES
Med rec unable to obtain as patient is unaware of her medications and Carlyle Ferreira ([phone]) will not answer their phone to obtain a MAR. Will leave message to try again tomorrow.

## 2023-04-24 NOTE — CARE PLAN
The patient is Watcher - Medium risk of patient condition declining or worsening    Shift Goals  Clinical Goals: Increase perfusion to heart  Patient Goals: Decrease pain    Progress made toward(s) clinical / shift goals:    Problem: Pain - Standard  Goal: Alleviation of pain or a reduction in pain to the patient’s comfort goal  Outcome: Progressing     Problem: Hemodynamics  Goal: Patient's hemodynamics, fluid balance and neurologic status will be stable or improve  Outcome: Progressing     Problem: Wound/ / Incision Healing  Goal: Patient's wound/surgical incision will decrease in size and heals properly  Outcome: Progressing       Patient is not progressing towards the following goals:

## 2023-04-24 NOTE — PROGRESS NOTES
Hospital Medicine Daily Progress Note    Date of Service  4/24/2023    Chief Complaint  Chest pain, nausea and vomiting.    Hospital Course  Julisa Josue is a 62 y.o. female with CAD prior CABG, DM, HTN, chronic pain. Julisa had a recent admit for CAD. She was admitted 4/23/2023 with chest pain and NSTEMI type I. Her troponin:1764.  She was started on lovenox and nitro drip.  On admit day she was taken for cardiac cath    She was taken 4/23 to cardiac cath: 1.  Occluded left circumflex s/p IVUS guided PCI deploying a Synergy 3 x 38mm ANA in the mid Cx - prox OM (overlapped with the Synergy stent from 4/2/23) and post-dilated to 4.0 mm proximally.  2. PTCA of distal LM and ostial L-Cx stent from 4/2/23 for more optimization using a 4 x 12 mm NC balloon to high pressure.      Interval Problem Update  Patient still with 4 out of 10 chest pain this morning while on nitroglycerin.  Patient had 2 have nitroglycerin turned off as she had drop in her systolic blood pressures less than 90.  Per nursing the patient was complaining of a history of things getting stuck in her esophagus and having to throw this back up.  She did not complain of that earlier this morning to me.  Denies any shortness of breath but some difficulties with deep inspiratory efforts.  Later off of the nitroglycerin drip patient did complain of worsening chest pain.  I alerted nursing to alert cardiologist.  Patient does have a history of alcohol use and recommended cessation of alcohol.    I have discussed this patient's plan of care and discharge plan at IDT rounds today with Case Management, Nursing, Nursing leadership, and other members of the IDT team.    Consultants/Specialty  cardiology    Code Status  Full Code    Disposition  Patient is not medically cleared for discharge.   Anticipate discharge to to home with close outpatient follow-up.  I have placed the appropriate orders for post-discharge needs.    Review of Systems  Review of  Systems   Constitutional:  Negative for malaise/fatigue and weight loss.   HENT:  Negative for sore throat.    Respiratory:  Positive for shortness of breath. Negative for cough.    Cardiovascular:  Positive for chest pain. Negative for leg swelling.   Gastrointestinal:  Negative for abdominal pain and nausea.   Musculoskeletal:  Negative for back pain and neck pain.   Neurological:  Negative for dizziness and headaches.   Psychiatric/Behavioral:  Positive for substance abuse. The patient is not nervous/anxious.       Physical Exam  Temp:  [36.1 °C (97 °F)-36.8 °C (98.2 °F)] 36.1 °C (97 °F)  Pulse:  [] 106  Resp:  [9-78] 13  BP: ()/(42-81) 119/72  SpO2:  [90 %-98 %] 94 %    Physical Exam  Vitals reviewed.   Constitutional:       Appearance: Normal appearance. She is ill-appearing. She is not diaphoretic.   HENT:      Head: Normocephalic and atraumatic.      Nose: Nose normal.      Mouth/Throat:      Mouth: Mucous membranes are moist.      Pharynx: No oropharyngeal exudate.   Eyes:      General: No scleral icterus.        Right eye: No discharge.         Left eye: No discharge.      Extraocular Movements: Extraocular movements intact.      Conjunctiva/sclera: Conjunctivae normal.   Neck:      Vascular: No carotid bruit.   Cardiovascular:      Rate and Rhythm: Normal rate and regular rhythm.      Pulses:           Radial pulses are 2+ on the right side and 2+ on the left side.        Dorsalis pedis pulses are 2+ on the right side and 2+ on the left side.      Heart sounds: No murmur heard.  Pulmonary:      Effort: Pulmonary effort is normal. No respiratory distress.      Breath sounds: Normal breath sounds. No wheezing or rales.   Abdominal:      General: Bowel sounds are normal. There is no distension.      Palpations: Abdomen is soft.   Musculoskeletal:         General: No swelling or tenderness.      Cervical back: No muscular tenderness.   Lymphadenopathy:      Cervical: No cervical adenopathy.    Skin:     Coloration: Skin is not jaundiced or pale.   Neurological:      General: No focal deficit present.      Mental Status: She is alert and oriented to person, place, and time. Mental status is at baseline.      Cranial Nerves: No cranial nerve deficit.   Psychiatric:         Mood and Affect: Mood normal.         Behavior: Behavior normal.       Fluids    Intake/Output Summary (Last 24 hours) at 4/24/2023 1937  Last data filed at 4/24/2023 1000  Gross per 24 hour   Intake 737.31 ml   Output --   Net 737.31 ml       Laboratory  Recent Labs     04/23/23  1537 04/24/23  0402   WBC 9.2 7.7   RBC 3.98* 3.68*   HEMOGLOBIN 11.0* 10.2*   HEMATOCRIT 33.6* 31.0*   MCV 84.4 84.2   MCH 27.6 27.7   MCHC 32.7* 32.9*   RDW 42.6 42.4   PLATELETCT 237 211   MPV 9.5 9.4     Recent Labs     04/23/23  1537 04/24/23  0402   SODIUM 138 137   POTASSIUM 3.9 3.8   CHLORIDE 105 107   CO2 20 17*   GLUCOSE 226* 216*   BUN 20 18   CREATININE 1.32 1.03   CALCIUM 8.8 8.2*             Recent Labs     04/24/23  0402   TRIGLYCERIDE 161*   HDL 33*   LDL 86       Imaging  EC-ECHOCARDIOGRAM LTD W/O CONT         DX-CHEST-PORTABLE (1 VIEW)   Final Result      No acute cardiopulmonary abnormality.         CL-LEFT HEART CATHETERIZATION WITH POSSIBLE INTERVENTION    (Results Pending)        Assessment/Plan  * NSTEMI (non-ST elevated myocardial infarction) (HCC)- (present on admission)  Assessment & Plan  Cardiology consulting  Medical management with asa, effient, statin, metoprolol, Entresto  Monitor on tele  Check echocardiogram  Nitroglycerin drip wean as able.  4/23 Cardiac cath:  IMPRESSION:  This is most likely a delayed posterior STEMI presentation with possible concerns for Plavix non-adherence or non-responder.  1.  Occluded left circumflex s/p IVUS guided PCI deploying a Synergy 3 x 38mm ANA in the mid Cx - prox OM (overlapped with the Synergy stent from 4/2/23) and post-dilated to 4.0 mm proximally.  2. PTCA of distal LM and ostial L-Cx  stent from 23 for more optimization using a 4 x 12 mm NC balloon to high pressure.  3.  Normal resting LVEDP 5 mmHg without significant transaortic gradient on pullback.    Hypomagnesemia  Assessment & Plan   M.6  Replace and monitor    ACC/AHA stage C systolic heart failure (HCC)- (present on admission)  Assessment & Plan  Metoprolol 50mg qd, sacubitril-valsartan 24-26mg BID, aldactone 25mg daily  Monitor vitals, I/O    Impaired instrumental activities of daily living (IADL)- (present on admission)  Assessment & Plan  Patient reports that typically she was living at assisted living however she went back to her apartment to give her 30-day notice, plans to go back to assisted living on discharge  Patient has reported multiple falls, denies any trauma to her head  PT/OT    Coronary artery disease due to lipid rich plaque- (present on admission)  Assessment & Plan  Atorvastatin, asa, effient  Monitor on tele    Anemia  Assessment & Plan   Hgb:10.2 <11  Monitor CBC    Chronic pain syndrome with narcotic dependence- (present on admission)  Assessment & Plan  Continue home opiate pain meds  Continue home gabapentin and Lyrica    Obesity (BMI 30.0-34.9)  Assessment & Plan  Body mass index is 32.28 kg/m².  Encourage lifestyle modification.    Diabetes (HCC)- (present on admission)  Assessment & Plan  Glargine 20 units BID  Monitor acuchecks and cover with SSI  Hypoglycemic protocol  Holding home Januvia and meformin  Diabetic diet    Hyperlipidemia- (present on admission)  Assessment & Plan  Continue statin  Lab Results   Component Value Date/Time    CHOLSTRLTOT 151 2023 04:02 AM    LDL 86 2023 04:02 AM    HDL 33 (A) 2023 04:02 AM    TRIGLYCERIDE 161 (H) 2023 04:02 AM           Essential hypertension- (present on admission)  Assessment & Plan  Metoprolol, aldactone, Entresto  Monitor vitals  On nitroglycerin drip         VTE prophylaxis: SCDs/TEDs    I have performed a physical exam  and reviewed and updated ROS and Plan today (4/24/2023). In review of yesterday's note (4/23/2023), there are no changes except as documented above.

## 2023-04-24 NOTE — PROGRESS NOTES
Brief intensivist note.     Pt being admitted for NSTEMI. On nitro gtt. Cardiology consulted  Hemodynamically stable.   Can admit to IMCU. ADT order in place  Consult hospital medicine    Jatin Hood D.O.

## 2023-04-25 DIAGNOSIS — R07.89 OTHER CHEST PAIN: Primary | ICD-10-CM

## 2023-04-25 PROBLEM — I95.9 HYPOTENSION: Status: ACTIVE | Noted: 2023-04-25

## 2023-04-25 LAB
EKG IMPRESSION: NORMAL
GLUCOSE BLD STRIP.AUTO-MCNC: 225 MG/DL (ref 65–99)
GLUCOSE BLD STRIP.AUTO-MCNC: 236 MG/DL (ref 65–99)
GLUCOSE BLD STRIP.AUTO-MCNC: 248 MG/DL (ref 65–99)
GLUCOSE BLD STRIP.AUTO-MCNC: 307 MG/DL (ref 65–99)
LV EJECT FRACT  99904: 50
LV EJECT FRACT MOD 4C 99902: 50.98

## 2023-04-25 PROCEDURE — 99233 SBSQ HOSP IP/OBS HIGH 50: CPT | Performed by: INTERNAL MEDICINE

## 2023-04-25 PROCEDURE — 700102 HCHG RX REV CODE 250 W/ 637 OVERRIDE(OP): Performed by: INTERNAL MEDICINE

## 2023-04-25 PROCEDURE — 700102 HCHG RX REV CODE 250 W/ 637 OVERRIDE(OP): Performed by: STUDENT IN AN ORGANIZED HEALTH CARE EDUCATION/TRAINING PROGRAM

## 2023-04-25 PROCEDURE — A9270 NON-COVERED ITEM OR SERVICE: HCPCS | Performed by: STUDENT IN AN ORGANIZED HEALTH CARE EDUCATION/TRAINING PROGRAM

## 2023-04-25 PROCEDURE — 700105 HCHG RX REV CODE 258: Performed by: INTERNAL MEDICINE

## 2023-04-25 PROCEDURE — 97535 SELF CARE MNGMENT TRAINING: CPT

## 2023-04-25 PROCEDURE — 700111 HCHG RX REV CODE 636 W/ 250 OVERRIDE (IP): Performed by: INTERNAL MEDICINE

## 2023-04-25 PROCEDURE — 93308 TTE F-UP OR LMTD: CPT | Mod: 26 | Performed by: INTERNAL MEDICINE

## 2023-04-25 PROCEDURE — 93005 ELECTROCARDIOGRAM TRACING: CPT | Performed by: INTERNAL MEDICINE

## 2023-04-25 PROCEDURE — A9270 NON-COVERED ITEM OR SERVICE: HCPCS | Performed by: INTERNAL MEDICINE

## 2023-04-25 PROCEDURE — 82962 GLUCOSE BLOOD TEST: CPT | Mod: 91

## 2023-04-25 PROCEDURE — 93010 ELECTROCARDIOGRAM REPORT: CPT | Performed by: INTERNAL MEDICINE

## 2023-04-25 PROCEDURE — 93000 ELECTROCARDIOGRAM COMPLETE: CPT | Performed by: INTERNAL MEDICINE

## 2023-04-25 PROCEDURE — 97162 PT EVAL MOD COMPLEX 30 MIN: CPT

## 2023-04-25 PROCEDURE — 770000 HCHG ROOM/CARE - INTERMEDIATE ICU *

## 2023-04-25 RX ORDER — SODIUM CHLORIDE 9 MG/ML
500 INJECTION, SOLUTION INTRAVENOUS ONCE
Status: COMPLETED | OUTPATIENT
Start: 2023-04-25 | End: 2023-04-25

## 2023-04-25 RX ORDER — SODIUM CHLORIDE 9 MG/ML
500 INJECTION, SOLUTION INTRAVENOUS ONCE
Status: COMPLETED | OUTPATIENT
Start: 2023-04-26 | End: 2023-04-26

## 2023-04-25 RX ORDER — MORPHINE SULFATE 4 MG/ML
2 INJECTION INTRAVENOUS EVERY 4 HOURS PRN
Status: DISCONTINUED | OUTPATIENT
Start: 2023-04-25 | End: 2023-05-01 | Stop reason: HOSPADM

## 2023-04-25 RX ADMIN — INSULIN HUMAN 6 UNITS: 100 INJECTION, SOLUTION PARENTERAL at 12:25

## 2023-04-25 RX ADMIN — SACUBITRIL AND VALSARTAN 1 TABLET: 24; 26 TABLET, FILM COATED ORAL at 06:02

## 2023-04-25 RX ADMIN — MORPHINE SULFATE 2 MG: 4 INJECTION INTRAVENOUS at 16:13

## 2023-04-25 RX ADMIN — METOPROLOL SUCCINATE 50 MG: 50 TABLET, EXTENDED RELEASE ORAL at 05:53

## 2023-04-25 RX ADMIN — INSULIN HUMAN 3 UNITS: 100 INJECTION, SOLUTION PARENTERAL at 16:49

## 2023-04-25 RX ADMIN — ATORVASTATIN CALCIUM 80 MG: 80 TABLET, FILM COATED ORAL at 17:53

## 2023-04-25 RX ADMIN — SODIUM CHLORIDE 500 ML: 9 INJECTION, SOLUTION INTRAVENOUS at 18:42

## 2023-04-25 RX ADMIN — PREGABALIN 100 MG: 100 CAPSULE ORAL at 05:53

## 2023-04-25 RX ADMIN — OXYCODONE HYDROCHLORIDE 10 MG: 10 TABLET ORAL at 12:28

## 2023-04-25 RX ADMIN — ASPIRIN 81 MG: 81 TABLET, COATED ORAL at 05:53

## 2023-04-25 RX ADMIN — MORPHINE SULFATE 30 MG: 30 TABLET, FILM COATED, EXTENDED RELEASE ORAL at 17:53

## 2023-04-25 RX ADMIN — PRASUGREL 10 MG: 10 TABLET, FILM COATED ORAL at 05:53

## 2023-04-25 RX ADMIN — SPIRONOLACTONE 25 MG: 25 TABLET ORAL at 05:53

## 2023-04-25 RX ADMIN — INSULIN HUMAN 3 UNITS: 100 INJECTION, SOLUTION PARENTERAL at 08:04

## 2023-04-25 RX ADMIN — PREGABALIN 100 MG: 100 CAPSULE ORAL at 17:53

## 2023-04-25 RX ADMIN — PROMETHAZINE HYDROCHLORIDE 25 MG: 25 TABLET ORAL at 13:37

## 2023-04-25 RX ADMIN — DOCUSATE SODIUM 50 MG AND SENNOSIDES 8.6 MG 2 TABLET: 8.6; 5 TABLET, FILM COATED ORAL at 05:53

## 2023-04-25 RX ADMIN — INSULIN HUMAN 3 UNITS: 100 INJECTION, SOLUTION PARENTERAL at 20:54

## 2023-04-25 RX ADMIN — COLCHICINE 0.6 MG: 0.6 TABLET, FILM COATED ORAL at 17:53

## 2023-04-25 RX ADMIN — COLCHICINE 0.6 MG: 0.6 TABLET, FILM COATED ORAL at 05:53

## 2023-04-25 RX ADMIN — TRAZODONE HYDROCHLORIDE 150 MG: 100 TABLET ORAL at 21:45

## 2023-04-25 RX ADMIN — MORPHINE SULFATE 30 MG: 30 TABLET, FILM COATED, EXTENDED RELEASE ORAL at 05:53

## 2023-04-25 RX ADMIN — PREGABALIN 100 MG: 100 CAPSULE ORAL at 12:27

## 2023-04-25 ASSESSMENT — COGNITIVE AND FUNCTIONAL STATUS - GENERAL
WALKING IN HOSPITAL ROOM: A LITTLE
SUGGESTED CMS G CODE MODIFIER MOBILITY: CK
HELP NEEDED FOR BATHING: A LITTLE
TURNING FROM BACK TO SIDE WHILE IN FLAT BAD: A LITTLE
TOILETING: A LITTLE
MOBILITY SCORE: 17
SUGGESTED CMS G CODE MODIFIER MOBILITY: CK
DRESSING REGULAR UPPER BODY CLOTHING: A LITTLE
MOVING TO AND FROM BED TO CHAIR: A LITTLE
STANDING UP FROM CHAIR USING ARMS: A LITTLE
MOVING TO AND FROM BED TO CHAIR: A LITTLE
CLIMB 3 TO 5 STEPS WITH RAILING: A LOT
CLIMB 3 TO 5 STEPS WITH RAILING: A LOT
MOVING FROM LYING ON BACK TO SITTING ON SIDE OF FLAT BED: A LITTLE
DRESSING REGULAR LOWER BODY CLOTHING: A LITTLE
SUGGESTED CMS G CODE MODIFIER DAILY ACTIVITY: CJ
MOBILITY SCORE: 17
WALKING IN HOSPITAL ROOM: A LITTLE
MOVING FROM LYING ON BACK TO SITTING ON SIDE OF FLAT BED: A LITTLE
STANDING UP FROM CHAIR USING ARMS: A LITTLE
TURNING FROM BACK TO SIDE WHILE IN FLAT BAD: A LITTLE
DAILY ACTIVITIY SCORE: 20

## 2023-04-25 ASSESSMENT — ENCOUNTER SYMPTOMS
SHORTNESS OF BREATH: 1
WEAKNESS: 1

## 2023-04-25 ASSESSMENT — PAIN DESCRIPTION - PAIN TYPE
TYPE: CHRONIC PAIN
TYPE: ACUTE PAIN
TYPE: ACUTE PAIN
TYPE: CHRONIC PAIN
TYPE: ACUTE PAIN
TYPE: CHRONIC PAIN
TYPE: ACUTE PAIN;CHRONIC PAIN
TYPE: ACUTE PAIN
TYPE: ACUTE PAIN

## 2023-04-25 ASSESSMENT — GAIT ASSESSMENTS
DEVIATION: DECREASED BASE OF SUPPORT;SHUFFLED GAIT
GAIT LEVEL OF ASSIST: STANDBY ASSIST
DISTANCE (FEET): 30
ASSISTIVE DEVICE: FRONT WHEEL WALKER

## 2023-04-25 NOTE — PROGRESS NOTES
Patient is now hypotensive. 85/46 HR 77, MD Arenas made aware and came to bedside to assess patient. Patient states she feels nausea , compazine given , will continue to monitor.

## 2023-04-25 NOTE — ASSESSMENT & PLAN NOTE
Continue atorvastatin and dual antiplatelet therapy.  Cardiology is following her.  Monitor on tele

## 2023-04-25 NOTE — PROGRESS NOTES
Brought patient to the bathroom, most current vitals were stable prior to getting up. Walked patient to bathroom, no complaints of dizziness. Patients eyes closed and all of a sudden was unresponsive, guided patient down to the toilet. Staff assist called. MD made aware and came to bedside. Pulse at all times.     Vitals checked, /70 HR 88, 98% on room air. Patient started to wake back up. Patient unaware of what happened. Patient remembers walking to the bathroom. Patient states that this has happened to her in the past. Patient states her stomach is bothering her at this time.   Patient brought back to bed safely. Patient vitals q15 . Stable at this time. Still on room air .    EKG STAT done

## 2023-04-25 NOTE — PROGRESS NOTES
Cardiology Follow-up Consult Note    Date of Service:    4/25/2023      Consulting Physician: Hollis Elizalde D.O.    Name:   Julisa Josue   YOB: 1960  Age:   62 y.o.  female   MRN:   [id number]      Interim Events:  4/25: No acute events overnight.  Patient reports feeling better with less chest pain.  Has been experiencing lightheadedness dizziness with standing.    4/24:No acute events overnight.  Patient underwent successful left circumflex stent placement.  Has been complaining of ongoing chest discomfort since this morning.  Is a sharp, left-sided without radiation.  Reports that this is different from her presenting chest pain.  No associated lightheadedness, dizziness, palpitations or headache.    All other review of systems reviewed and negative.      Past medical, surgical, social, and family history reviewed and unchanged from admission except as noted in assessment and plan.    Medications: Reviewed in MAR      No Known Allergies      Intake/Output Summary (Last 24 hours) at 4/25/2023 0837  Last data filed at 4/25/2023 0800  Gross per 24 hour   Intake 480 ml   Output --   Net 480 ml          Physical Exam  Body mass index is 34.16 kg/m².  /55   Pulse 97   Temp 36.3 °C (97.3 °F)   Resp 20   Wt 93.1 kg (205 lb 4 oz)   SpO2 92%   Vitals:    04/24/23 2300 04/25/23 0100 04/25/23 0300 04/25/23 0808   BP: 111/53 114/53 98/46 100/55   Pulse: 78 77 78 97   Resp: 14   20   Temp:    36.3 °C (97.3 °F)   TempSrc:       SpO2: 98% 99% 100% 92%   Weight:         Oxygen Therapy:  Pulse Oximetry: 92 %, O2 (LPM): 2, O2 Delivery Device: None - Room Air    General: Well appearing and in no apparent distress  Eyes: nl conjunctiva  ENT: OP clear  Neck: JVP not elevated, no carotid bruits  Lungs: normal respiratory effort, CTAB  Heart: RRR, no murmurs, no rubs or gallops, no edema bilateral lower extremities. No LV/RV heave on cardiac palpatation. 2+ bilateral radial pulses.  2+ bilateral dp  pulses.   Abdomen: soft, non tender, non distended, no masses, normal bowel sounds.  No HSM.  Extremities/MSK: no clubbing, no cyanosis  Neurological: No focal sensory deficits  Psychiatric: Appropriate affect, A/O x 3  Skin: Warm extremities    Labs (personally reviewed and notable for):   Lab Results   Component Value Date/Time    SODIUM 137 04/24/2023 04:02 AM    POTASSIUM 3.8 04/24/2023 04:02 AM    CHLORIDE 107 04/24/2023 04:02 AM    CO2 17 (L) 04/24/2023 04:02 AM    GLUCOSE 216 (H) 04/24/2023 04:02 AM    BUN 18 04/24/2023 04:02 AM    CREATININE 1.03 04/24/2023 04:02 AM      Lab Results   Component Value Date/Time    WBC 7.7 04/24/2023 04:02 AM    RBC 3.68 (L) 04/24/2023 04:02 AM    HEMOGLOBIN 10.2 (L) 04/24/2023 04:02 AM    HEMATOCRIT 31.0 (L) 04/24/2023 04:02 AM    MCV 84.2 04/24/2023 04:02 AM    MCH 27.7 04/24/2023 04:02 AM    MCHC 32.9 (L) 04/24/2023 04:02 AM    MPV 9.4 04/24/2023 04:02 AM    NEUTSPOLYS 59.80 04/23/2023 03:37 PM    LYMPHOCYTES 30.40 04/23/2023 03:37 PM    MONOCYTES 7.70 04/23/2023 03:37 PM    EOSINOPHILS 1.00 04/23/2023 03:37 PM    EOSINOPHILS 3 05/08/2017 03:00 PM    BASOPHILS 0.70 04/23/2023 03:37 PM    HYPOCHROMIA 1 05/20/2014 09:05 PM    ANISOCYTOSIS 1+ 07/07/2017 03:15 PM      Lab Results   Component Value Date/Time    CHOLSTRLTOT 151 04/24/2023 04:02 AM    LDL 86 04/24/2023 04:02 AM    HDL 33 (A) 04/24/2023 04:02 AM    TRIGLYCERIDE 161 (H) 04/24/2023 04:02 AM       Lab Results   Component Value Date/Time    TROPONINT 1764 (H) 04/23/2023 1537     No results found for: NTPROBNP    Cardiac Imaging and Procedures Review:    ECG: ECG performed 4/23/2023 was interpreted by me which shows sinus rhythm    Echo: Echocardiogram performed on 4/2/2023 was personally interpreted by me which shows low normal left ventricular systolic function, LVEF 45-50%    [facility] 4/23/2023:  INDICATIONS:  Delayed posterior STEMI  NSTEMI  History of LAD PCI pre-CABG  History of LIMA-LAD CABG  History of left  circumflex PCI post CABG  History of L-Cx  PCI 4/2/23  History of PDA PCI 4/3/23  Questionable adherence to Plavix  CORONARY ANGIOGRAPHY:  The left main coronary artery: Large in caliber vessel with patent stent, bifurcates to LAD and left circumflex coronary arteries.  The left anterior descending coronary artery: Large in caliber vessel with 50% ostial stenosis (ISR) and severe ISR in the mid LAD.  Distal/apical LAD fills via patent LIMA graft, with nonobstructive CAD.  The LAD gives off 2 medium caliber diagonal branches with 80% stenosis in D1 (jailed by the proximal LAD stent).  The left circumflex coronary artery: Large in caliber vessel with 100 % occlusion at its ostium within stent. The right coronary artery: Large in caliber dominant vessel with 50% mid RCA stenosis and patent proximal right PDA stent (deployed 4/3/23).  IMPRESSION:  This is most likely a delayed posterior STEMI presentation with possible concerns for Plavix non-adherence or non-responder.  1.  Occluded left circumflex s/p IVUS guided PCI deploying a Synergy 3 x 38mm ANA in the mid Cx - prox OM (overlapped with the Synergy stent from 4/2/23) and post-dilated to 4.0 mm proximally.  2. PTCA of distal LM and ostial L-Cx stent from 4/2/23 for more optimization using a 4 x 12 mm NC balloon to high pressure.  3.  Normal resting LVEDP 5 mmHg without significant transaortic gradient on pullback.    [facility] 4/2/23:  IMPRESSION:  1.  Severe ostial /proximal circumflex ISR- status post successful IVUS guided intravascular lithotripsy and revascularization deploying Synergy 3 x 20 mm ANA, postdilated to 3.7 mm at the ostium /distal left main.  2.  Residual severe proximal right PDA stenosis, recommend staged PCI either this admission or in 4 to 6 weeks as an outpatient.  3.  Residual severe diffuse stenoses and medium in caliber OM1, and severe proximal D1 stenosis (jailed by proximal LAD stent), for which I recommend optimal medical therapy .  4.   Mildly elevated resting LVEDP at 18 mmHg no significant transaortic gradient on pullback    [facility] 4/3/2023:  Successful percutaneous coronary intervention of right posterior descending artery using 2.5 x 16 mm Synergy drug-eluting stent    Echo: Echocardiogram performed on 4/25/2023 was personally interpreted by me which shows low normal LVEF 50%, small pericardial effusion    24-hour telemetry personally reviewed which shows sinus rhythm    Radiology test Review:  EC-ECHOCARDIOGRAM LTD W/O CONT   Final Result      DX-CHEST-PORTABLE (1 VIEW)   Final Result      No acute cardiopulmonary abnormality.         CL-LEFT HEART CATHETERIZATION WITH POSSIBLE INTERVENTION    (Results Pending)       Problem list:  Principal Problem:    NSTEMI (non-ST elevated myocardial infarction) (HCC) POA: Yes  Active Problems:    Essential hypertension POA: Yes    Hyperlipidemia (Chronic) POA: Yes    Diabetes (HCC) POA: Yes    Obesity (BMI 30.0-34.9) POA: Unknown    Chronic pain syndrome with narcotic dependence (Chronic) POA: Yes    Anemia POA: Unknown    S/P insertion of non-drug eluting coronary artery stent POA: Yes    Coronary artery disease due to lipid rich plaque POA: Yes    Impaired instrumental activities of daily living (IADL) POA: Yes    ACC/AHA stage C systolic heart failure (HCC) POA: Yes    Long term (current) use of antithrombotics/antiplatelets - DAPT recommended indefinitely POA: Unknown    Hypomagnesemia POA: Unknown  Resolved Problems:    * No resolved hospital problems. *  #Dressler syndrome    Recommendations:  -- Complex cardiac case.  Patient with recent admission for NSTEMI s/p stent placement to left circumflex artery and RCA presented back to the hospital with delayed presentation of possible STEMI with occlusion of left circumflex artery stent.  Concerning for Plavix nonresponder.  --We discussed echocardiogram findings of pericardial effusion consistent with Dressler syndrome.  She has received 2 doses of  colchicine and felt improvement in chest pain.  We discussed continuing colchicine for a minimum of 3 months.  --She is certainly at a risk for hemodynamic compromise with increase in pericardial effusion and tamponade physiology.  --Okay to do gentle fluid resuscitation with  cc over few hours for hypotension.  --Continue DAPT with aspirin and Effient.  --Remains in guarded condition.    Recommendations discussed with Dr. Arenas.    Thank you for allowing me to participate in the care of this patient, cardiology will continue to follow.  Please contact me with any questions.    Dixon Galeano M.D.  Cardiologist, [facility] [place]   [phone]    Please note that this dictation was created using voice recognition software. I have made every reasonable attempt to correct obvious errors, but it is possible there are errors of grammar and possibly content that I did not discover before finalizing the note.

## 2023-04-25 NOTE — CARE PLAN
The patient is Stable - Low risk of patient condition declining or worsening    Shift Goals  Clinical Goals: o2 monitoring, pain, comfort, monitor BS  Patient Goals: rest, get better to go home  Family Goals: jenna    Progress made toward(s) clinical / shift goals:    Patient is uses call bell safely and is now getting up to use bathroom instead of bedside commode. Patient is a little unsteady on her feet but makes an effort to move around. Patient isn't getting pain meds as frequently.     Problem: Pain - Standard  Goal: Alleviation of pain or a reduction in pain to the patient’s comfort goal  Outcome: Progressing     Problem: Knowledge Deficit - Standard  Goal: Patient and family/care givers will demonstrate understanding of plan of care, disease process/condition, diagnostic tests and medications  Outcome: Progressing     Problem: Skin Integrity  Goal: Skin integrity is maintained or improved  Outcome: Progressing     Problem: Psychosocial  Goal: Patient's level of anxiety will decrease  Outcome: Progressing  Goal: Patient's ability to verbalize feelings about condition will improve  Outcome: Progressing  Goal: Patient's ability to re-evaluate and adapt role responsibilities will improve  Outcome: Progressing     Problem: Communication  Goal: The ability to communicate needs accurately and effectively will improve  Outcome: Progressing     Problem: Hemodynamics  Goal: Patient's hemodynamics, fluid balance and neurologic status will be stable or improve  Outcome: Progressing     Problem: Respiratory  Goal: Patient will achieve/maintain optimum respiratory ventilation and gas exchange  Outcome: Progressing     Problem: Urinary Elimination  Goal: Establish and maintain regular urinary output  Outcome: Progressing     Problem: Self Care  Goal: Patient will have the ability to perform ADLs independently or with assistance (bathe, groom, dress, toilet and feed)  Outcome: Progressing       Patient is not progressing  towards the following goals:      Problem: Bowel Elimination  Goal: Establish and maintain regular bowel function  Outcome: Not Progressing

## 2023-04-25 NOTE — ASSESSMENT & PLAN NOTE
She found to have hypomagnesemia.  She received 2 g of IV magnesium overnight.  I ordered additional 2 g of IV magnesium.  Continue to monitor and replace as needed.

## 2023-04-25 NOTE — CARE PLAN
The patient is Watcher - Medium risk of patient condition declining or worsening    Shift Goals  Clinical Goals: hemodynamic stability  Patient Goals: rest  Family Goals: jenna    Progress made toward(s) clinical / shift goals:    Problem: Pain - Standard  Goal: Alleviation of pain or a reduction in pain to the patient’s comfort goal  Outcome: Progressing     Problem: Knowledge Deficit - Standard  Goal: Patient and family/care givers will demonstrate understanding of plan of care, disease process/condition, diagnostic tests and medications  Outcome: Progressing     Problem: Skin Integrity  Goal: Skin integrity is maintained or improved  Outcome: Progressing     Problem: Psychosocial  Goal: Patient's level of anxiety will decrease  Outcome: Progressing  Goal: Patient's ability to verbalize feelings about condition will improve  Outcome: Progressing  Goal: Patient's ability to re-evaluate and adapt role responsibilities will improve  Outcome: Progressing  Goal: Patient and family will demonstrate ability to cope with life altering diagnosis and/or procedure  Outcome: Progressing  Goal: Spiritual and cultural needs incorporated into hospitalization  Outcome: Progressing     Problem: Communication  Goal: The ability to communicate needs accurately and effectively will improve  Outcome: Progressing     Problem: Discharge Barriers/Planning  Goal: Patient's continuum of care needs are met  Outcome: Progressing     Problem: Hemodynamics  Goal: Patient's hemodynamics, fluid balance and neurologic status will be stable or improve  Outcome: Progressing     Problem: Respiratory  Goal: Patient will achieve/maintain optimum respiratory ventilation and gas exchange  Outcome: Progressing     Problem: Chest Tube Management  Goal: Complications related to chest tube will be avoided or minimized  Outcome: Progressing     Problem: Fluid Volume  Goal: Fluid volume balance will be maintained  Outcome: Progressing     Problem: Mechanical  Ventilation  Goal: Safe management of artificial airway and ventilation  Outcome: Progressing  Goal: Successful weaning off mechanical ventilator, spontaneously maintains adequate gas exchange  Outcome: Progressing  Goal: Patient will be able to express needs and understand communication  Outcome: Progressing     Problem: Dysphagia  Goal: Dysphagia will improve  Outcome: Progressing     Problem: Risk for Aspiration  Goal: Patient's risk for aspiration will be absent or decrease  Outcome: Progressing     Problem: Nutrition  Goal: Patient's nutritional and fluid intake will be adequate or improve  Outcome: Progressing  Goal: Enteral nutrition will be maintained or improve  Outcome: Progressing  Goal: Enteral nutrition will be maintained or improve  Outcome: Progressing     Problem: Urinary Elimination  Goal: Establish and maintain regular urinary output  Outcome: Progressing     Problem: Bowel Elimination  Goal: Establish and maintain regular bowel function  Outcome: Progressing     Problem: Gastrointestinal Irritability  Goal: Nausea and vomiting will be absent or improve  Outcome: Progressing  Goal: Diarrhea will be absent or improved  Outcome: Progressing     Problem: Rectal Tube  Goal: Fecal output will be contained and skin will remain free from irritation  Outcome: Progressing     Problem: Mobility  Goal: Patient's capacity to carry out activities will improve  Outcome: Progressing     Problem: Self Care  Goal: Patient will have the ability to perform ADLs independently or with assistance (bathe, groom, dress, toilet and feed)  Outcome: Progressing     Problem: Infection - Standard  Goal: Patient will remain free from infection  Outcome: Progressing     Problem: Wound/ / Incision Healing  Goal: Patient's wound/surgical incision will decrease in size and heals properly  Outcome: Progressing     Problem: Care Map:  Admission Optimal Outcome for the Heart Failure Patient  Goal: Admission:  Optimal Care of the heart  failure patient  Outcome: Progressing     Problem: Care Map:  Day 1 Optimal Outcome for the Heart Failure Patient  Goal: Day 1:  Optimal Care of the heart failure patient  Outcome: Progressing     Problem: Care Map:  Day 2 Optimal Outcome for the Heart Failure Patient  Goal: Day 2:  Optimal Care of the heart failure patient  Outcome: Progressing     Problem: Care Map:  Day 3 Optimal Outcome for the Heart Failure Patient  Goal: Day 3:  Optimal Care of the heart failure patient  Outcome: Progressing     Problem: Care Map:  Day Before Discharge Optimal Outcome for the Heart Failure Patient  Goal: Day Before Discharge:  Optimal Care of the heart failure patient  Outcome: Progressing     Problem: Care Map:  Day of Discharge Optimal Outcome for the Heart Failure Patient  Goal: Day of Discharge:  Optimal Care of the heart failure patient  Outcome: Progressing       Patient is not progressing towards the following goals:

## 2023-04-25 NOTE — THERAPY
Physical Therapy   Initial Evaluation     Patient Name: Julisa Josue  Age:  62 y.o., Sex:  female  Medical Record #: [id number]  Today's Date: 4/25/2023     Precautions  Precautions: (P) Fall Risk;Cardiac Precautions (See Comments)    Assessment  Patient is 62 y.o. female with a diagnosis of NSTEMI and underwent PCI on 4/23. PMH includes prior CABG, DM, HTN, chronic pain.     Pt tolerated session well. She ambulated 30' with an FWW and SBA. She was initially orthostatic however quickly resolving with upright activity. She did report significant fatigue 8/10 after just 30' ambulation vitals stable. Pt presents with impaired functional independence, impaired balance, impaired upright tolerance, impaired cardiovascular endurance. She will benefit from acute PT services to improve her functional mobility, improve her cardiovascular endurance, and facilitate improved ambulation distances.     Pt was provided with cardiac rehab handout which was verbally reviewed including the following topics: outpatient cardiac rehab, return to walking program, RPE scale, cardiac symptom management. She was receptive to all education.     Plan    Physical Therapy Initial Treatment Plan   Treatment Plan : (P) Bed Mobility, Equipment, Neuro Re-Education / Balance, Group Therapy, Gait Training, Manual Therapy, Orthotics Training , Self Care / Home Evaluation, Stair Training, Therapeutic Activities, Therapeutic Exercise    DC Equipment Recommendations: (P) Unable to determine at this time  Discharge Recommendations: (P) Recommend home health for continued physical therapy services       Subjective    Pt is pleasant and cooperative throughout session.        Objective       04/25/23 1457   Initial Contact Note    Initial Contact Note Order Received and Verified, Physical Therapy Evaluation in Progress with Full Report to Follow.   Precautions   Precautions Fall Risk;Cardiac Precautions (See Comments)   Vitals   Pulse 80   Patient BP  Position Supine   Blood Pressure 115/53   Pulse Oximetry 92 %   O2 (LPM) 0   O2 Delivery Device None - Room Air   Vitals Comments Rn suggested orthostatic vitals. BP in sitting at 96/67mmgh pt was mildly dizzy however resolved after standing marching x 30s. She did not endorse any dizziness for rest of session. Upon return to bed BP recorded at 100/52.   Prior Living Situation   Prior Services Home-Independent   Housing / Facility Assisted Living Residence   Steps Into Home 0   Steps In Home 0   Equipment Owned 4-Wheel Walker;Wheelchair   Comments Pt reports recently moving to assisted living. She intends to return there when discharged from the hospital   Prior Level of Functional Mobility   Bed Mobility Independent   Transfer Status Independent   Ambulation Independent   Ambulation Distance comm   Assistive Devices Used 4-Wheel Walker   Stairs Independent   History of Falls   History of Falls Yes   Passive ROM Lower Body   Comments Limited by body habitus   Active ROM Lower Body    Comments Limited by body habitus   Strength Lower Body   Lower Body Strength  X   Rt Hip Flexion Strength 4 (G)   Rt Knee Extension Strength 4 (G)   Rt Ankle Dorsiflexion Strength 4 (G)   Lt Hip Flexion Strength 4 (G)   Lt Knee Extension Strength 4 (G)   Lt Ankle Dorsiflexion Strength 4 (G)   Balance Assessment   Sitting Balance (Static) Good   Sitting Balance (Dynamic) Fair +   Standing Balance (Static) Fair   Standing Balance (Dynamic) Fair   Weight Shift Sitting Good   Weight Shift Standing Fair   Bed Mobility    Supine to Sit Supervised   Sit to Supine Supervised   Gait Analysis   Gait Level Of Assist Standby Assist   Assistive Device Front Wheel Walker   Distance (Feet) 30   # of Times Distance was Traveled 1   Deviation Decreased Base Of Support;Shuffled Gait   Comments Pt ambulated to bathroom with FWW she had urination in sitting. Pt then ambulated 30' with FWW and SBA. She was provided with SBA for all ambulation today. She  denied dizziness/lightheadedness throughout gait but did endorse fatigue. Upon returning to bed she reported 8/10 RPE.   Functional Mobility   Sit to Stand Standby Assist   Toilet Transfers Standby Assist   How much difficulty does the patient currently have...   Turning over in bed (including adjusting bedclothes, sheets and blankets)? 3   Sitting down on and standing up from a chair with arms (e.g., wheelchair, bedside commode, etc.) 3   Moving from lying on back to sitting on the side of the bed? 3   How much help from another person does the patient currently need...   Moving to and from a bed to a chair (including a wheelchair)? 3   Need to walk in a hospital room? 3   Climbing 3-5 steps with a railing? 2   6 clicks Mobility Score 17   Short Term Goals    Short Term Goal # 1 Pt will ambulate 150' with FWW and supervision.   Short Term Goal # 2 Pt will tolerate 8 mins of upright out of bed activity without orthostatic symptoms.   Education Group   Education Provided Role of Physical Therapist;Cardiac Precautions   Cardiac Precautions Patient Response Patient;Acceptance;Explanation;Demonstration;Handout;Verbal Demonstration;Action Demonstration   Role of Physical Therapist Patient Response Patient;Acceptance;Demonstration;Verbal Demonstration   Physical Therapy Initial Treatment Plan    Treatment Plan  Bed Mobility;Equipment;Neuro Re-Education / Balance;Group Therapy;Gait Training;Manual Therapy;Orthotics Training ;Self Care / Home Evaluation;Stair Training;Therapeutic Activities;Therapeutic Exercise   Problem List    Problems Impaired Bed Mobility;Impaired Transfers;Impaired Ambulation;Functional Strength Deficit;Decreased Activity Tolerance;Safety Awareness Deficits / Cognition;Limited Knowledge of Post-Op Precautions   Anticipated Discharge Equipment and Recommendations   DC Equipment Recommendations Unable to determine at this time   Discharge Recommendations Recommend home health for continued physical  therapy services   Interdisciplinary Plan of Care Collaboration   IDT Collaboration with  Nursing   Session Information   Date / Session Number  4/25-1 (1/4, 5/1)

## 2023-04-25 NOTE — PROGRESS NOTES
Hospital Medicine Daily Progress Note    Date of Service  4/25/2023    Chief Complaint  Chest pain, nausea and vomiting.    Hospital Course  Julisa Josue is a 62 y.o. female with CAD prior CABG, DM, HTN, chronic pain. Julisa had a recent admit for CAD. She was admitted 4/23/2023 with chest pain and NSTEMI type I. Her troponin:1764.  She was started on lovenox and nitro drip.  On admit day she was taken for cardiac cath    She was taken 4/23 to cardiac cath: 1.  Occluded left circumflex s/p IVUS guided PCI deploying a Synergy 3 x 38mm ANA in the mid Cx - prox OM (overlapped with the Synergy stent from 4/2/23) and post-dilated to 4.0 mm proximally.  2. PTCA of distal LM and ostial L-Cx stent from 4/2/23 for more optimization using a 4 x 12 mm NC balloon to high pressure.      During her hospitalization cardiology is following him and he was placed on nitroglycerin gtt for chest pain.  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  Currently she is on dual antiplatelet therapy with aspirin and Effient.    Interval Problem Update    04/25/23    I evaluated and examined her at the bedside.  She reported that she is not feeling well.  She had multiple episodes throughout the day that she developed hypotension.  I ordered IV fluid bolus.  I discussed plan of care with cardiology.  For pain control I ordered IV morphine.  Currently she is hemodynamically stable.  Systolic blood pressure on the lower side  Blood glucose is running on the higher side I increased the dose of glargine from 20 units twice daily to 22 units twice daily.  Continue nitroglycerin gtt. for chest pain management.  I discussed plan of care with patient.  I discussed plan of care with bedside RN.      I have discussed this patient's plan of care and discharge plan at IDT rounds today with Case Management, Nursing, Nursing leadership, and other members of the IDT team.    Consultants/Specialty  cardiology    Code Status  Full  Code    Disposition  Patient is not medically cleared for discharge.   Anticipate discharge to to home with close outpatient follow-up.  I have placed the appropriate orders for post-discharge needs.    Review of Systems  Review of Systems   Constitutional:  Positive for malaise/fatigue.   Respiratory:  Positive for shortness of breath.    Cardiovascular:  Positive for chest pain.   Neurological:  Positive for weakness.   All other systems reviewed and are negative.     Physical Exam  Temp:  [36.1 °C (97 °F)-36.8 °C (98.2 °F)] 36.1 °C (97 °F)  Pulse:  [] 78  Resp:  [13-58] 14  BP: ()/(42-81) 98/46  SpO2:  [91 %-100 %] 100 %    Physical Exam  Vitals reviewed.   Constitutional:       General: She is not in acute distress.     Appearance: Normal appearance. She is ill-appearing.   HENT:      Head: Normocephalic and atraumatic.      Nose: No congestion.   Eyes:      General:         Right eye: No discharge.         Left eye: No discharge.      Pupils: Pupils are equal, round, and reactive to light.   Cardiovascular:      Rate and Rhythm: Normal rate and regular rhythm.      Pulses: Normal pulses.      Heart sounds: Normal heart sounds. No murmur heard.  Pulmonary:      Effort: Pulmonary effort is normal. No respiratory distress.      Breath sounds: Normal breath sounds. No stridor.   Abdominal:      General: Bowel sounds are normal. There is no distension.      Palpations: Abdomen is soft.      Tenderness: There is no abdominal tenderness.   Musculoskeletal:         General: No swelling or tenderness. Normal range of motion.      Cervical back: Normal range of motion. No rigidity.   Skin:     General: Skin is warm.      Capillary Refill: Capillary refill takes less than 2 seconds.      Coloration: Skin is not jaundiced or pale.      Findings: No bruising.   Neurological:      General: No focal deficit present.      Mental Status: She is alert and oriented to person, place, and time.      Cranial Nerves: No  cranial nerve deficit.   Psychiatric:         Mood and Affect: Mood is anxious.         Behavior: Behavior normal.       Fluids    Intake/Output Summary (Last 24 hours) at 4/25/2023 0745  Last data filed at 4/24/2023 1000  Gross per 24 hour   Intake 240 ml   Output --   Net 240 ml         Laboratory  Recent Labs     04/23/23  1537 04/24/23  0402   WBC 9.2 7.7   RBC 3.98* 3.68*   HEMOGLOBIN 11.0* 10.2*   HEMATOCRIT 33.6* 31.0*   MCV 84.4 84.2   MCH 27.6 27.7   MCHC 32.7* 32.9*   RDW 42.6 42.4   PLATELETCT 237 211   MPV 9.5 9.4       Recent Labs     04/23/23  1537 04/24/23  0402   SODIUM 138 137   POTASSIUM 3.9 3.8   CHLORIDE 105 107   CO2 20 17*   GLUCOSE 226* 216*   BUN 20 18   CREATININE 1.32 1.03   CALCIUM 8.8 8.2*               Recent Labs     04/24/23  0402   TRIGLYCERIDE 161*   HDL 33*   LDL 86         Imaging  EC-ECHOCARDIOGRAM LTD W/O CONT         DX-CHEST-PORTABLE (1 VIEW)   Final Result      No acute cardiopulmonary abnormality.         CL-LEFT HEART CATHETERIZATION WITH POSSIBLE INTERVENTION    (Results Pending)          Assessment/Plan  * NSTEMI (non-ST elevated myocardial infarction) (HCC)- (present on admission)  Assessment & Plan  Cardiology has been following her.  Continue current medical management with aspirin, Effient, Entresto and metoprolol.   Continue to monitor closely on telemetry.  Echocardiogram ordered currently results pending.  4/23 Cardiac cath:  IMPRESSION:  This is most likely a delayed posterior STEMI presentation with possible concerns for Plavix non-adherence or non-responder.  1.  Occluded left circumflex s/p IVUS guided PCI deploying a Synergy 3 x 38mm ANA in the mid Cx - prox OM (overlapped with the Synergy stent from 4/2/23) and post-dilated to 4.0 mm proximally.  2. PTCA of distal LM and ostial L-Cx stent from 4/2/23 for more optimization using a 4 x 12 mm NC balloon to high pressure.  3.  Normal resting LVEDP 5 mmHg without significant transaortic gradient on  pullback.  Discontinued nitroglycerin gtt.  I discussed plan of care with cardiologist.  I ordered IV morphine.      Hypotension  Assessment & Plan  Patient found to have hypotension.  Ordered 500 cc of IV fluid bolus.  I evaluated her multiple times throughout the day.    Hypomagnesemia  Assessment & Plan  Continue to monitor and replace as needed.  I ordered magnesium level for tomorrow morning.    ACC/AHA stage C systolic heart failure (HCC)- (present on admission)  Assessment & Plan  Metoprolol 50mg qd, sacubitril-valsartan 24-26mg BID, aldactone 25mg daily  Continue to monitor input and output.  Echocardiogram ordered.    Impaired instrumental activities of daily living (IADL)- (present on admission)  Assessment & Plan  Patient reports that typically she was living at assisted living however she went back to her apartment to give her 30-day notice, plans to go back to assisted living on discharge  Patient has reported multiple falls, denies any trauma to her head  PT/OT    Coronary artery disease due to lipid rich plaque- (present on admission)  Assessment & Plan  Continue atorvastatin and dual antiplatelet therapy.  Cardiology is following her.  Monitor on tele    S/P insertion of non-drug eluting coronary artery stent- (present on admission)  Assessment & Plan  Continue dual antiplatelet therapy with aspirin and Effient.      Anemia  Assessment & Plan  4/24 Hgb:10.2 <11  Ordered CBC for tomorrow.    Chronic pain syndrome with narcotic dependence- (present on admission)  Assessment & Plan  Continue home opiate pain meds  Continue home gabapentin and Lyrica    Obesity (BMI 30.0-34.9)  Assessment & Plan  Body mass index is 32.28 kg/m².  Encourage lifestyle modification.    Diabetes (HCC)- (present on admission)  Assessment & Plan  I increased insulin glargine from 20 units twice daily to 22 units twice daily due to ongoing poor glucose control.  Continue insulin sliding scale with hypoglycemia protocol.  Holding  home Januvia and meformin  Diabetic diet    Hyperlipidemia- (present on admission)  Assessment & Plan  Continue atorvastatin 80 mg  Lab Results   Component Value Date/Time    CHOLSTRLTOT 151 04/24/2023 04:02 AM    LDL 86 04/24/2023 04:02 AM    HDL 33 (A) 04/24/2023 04:02 AM    TRIGLYCERIDE 161 (H) 04/24/2023 04:02 AM           Essential hypertension- (present on admission)  Assessment & Plan  Metoprolol, aldactone, Entresto  Monitor vitals  Currently patient is on nitroglycerin gtt. for chest pain control.  Monitor for hypotension.        I discussed plan of care during multidisciplinary rounds regarding patient's current medical condition and plan of care.       I personally discussed case with Dr. Galeano cardiologist regarding patient current medical condition and plan of care.    VTE prophylaxis: SCDs/TEDs    I have performed a physical exam and reviewed and updated ROS and Plan today (4/25/2023). In review of yesterday's note (4/24/2023), there are no changes except as documented above.

## 2023-04-25 NOTE — PROGRESS NOTES
Endorsed patient to night RN. Patient is aox4. Bed in lowest locked position, call bell at bedside.

## 2023-04-26 LAB
ALBUMIN SERPL BCP-MCNC: 2.5 G/DL (ref 3.2–4.9)
ALBUMIN/GLOB SERPL: 1.1 G/DL
ALP SERPL-CCNC: 53 U/L (ref 30–99)
ALT SERPL-CCNC: 7 U/L (ref 2–50)
ANION GAP SERPL CALC-SCNC: 10 MMOL/L (ref 7–16)
AST SERPL-CCNC: 7 U/L (ref 12–45)
BASOPHILS # BLD AUTO: 0.6 % (ref 0–1.8)
BASOPHILS # BLD: 0.04 K/UL (ref 0–0.12)
BILIRUB SERPL-MCNC: <0.2 MG/DL (ref 0.1–1.5)
BUN SERPL-MCNC: 13 MG/DL (ref 8–22)
CALCIUM ALBUM COR SERPL-MCNC: 7.8 MG/DL (ref 8.5–10.5)
CALCIUM SERPL-MCNC: 6.6 MG/DL (ref 8.5–10.5)
CHLORIDE SERPL-SCNC: 111 MMOL/L (ref 96–112)
CO2 SERPL-SCNC: 17 MMOL/L (ref 20–33)
CREAT SERPL-MCNC: 1.4 MG/DL (ref 0.5–1.4)
EOSINOPHIL # BLD AUTO: 0.27 K/UL (ref 0–0.51)
EOSINOPHIL NFR BLD: 4.1 % (ref 0–6.9)
ERYTHROCYTE [DISTWIDTH] IN BLOOD BY AUTOMATED COUNT: 42.6 FL (ref 35.9–50)
GFR SERPLBLD CREATININE-BSD FMLA CKD-EPI: 43 ML/MIN/1.73 M 2
GLOBULIN SER CALC-MCNC: 2.2 G/DL (ref 1.9–3.5)
GLUCOSE BLD STRIP.AUTO-MCNC: 172 MG/DL (ref 65–99)
GLUCOSE BLD STRIP.AUTO-MCNC: 234 MG/DL (ref 65–99)
GLUCOSE BLD STRIP.AUTO-MCNC: 236 MG/DL (ref 65–99)
GLUCOSE BLD STRIP.AUTO-MCNC: 281 MG/DL (ref 65–99)
GLUCOSE SERPL-MCNC: 213 MG/DL (ref 65–99)
HCT VFR BLD AUTO: 27.4 % (ref 37–47)
HCT VFR BLD AUTO: 29.1 % (ref 37–47)
HGB BLD-MCNC: 8.8 G/DL (ref 12–16)
HGB BLD-MCNC: 9.3 G/DL (ref 12–16)
IMM GRANULOCYTES # BLD AUTO: 0.04 K/UL (ref 0–0.11)
IMM GRANULOCYTES NFR BLD AUTO: 0.6 % (ref 0–0.9)
LYMPHOCYTES # BLD AUTO: 2.51 K/UL (ref 1–4.8)
LYMPHOCYTES NFR BLD: 38.3 % (ref 22–41)
MAGNESIUM SERPL-MCNC: 1.2 MG/DL (ref 1.5–2.5)
MCH RBC QN AUTO: 27.7 PG (ref 27–33)
MCHC RBC AUTO-ENTMCNC: 32.1 G/DL (ref 33.6–35)
MCV RBC AUTO: 86.2 FL (ref 81.4–97.8)
MONOCYTES # BLD AUTO: 0.44 K/UL (ref 0–0.85)
MONOCYTES NFR BLD AUTO: 6.7 % (ref 0–13.4)
NEUTROPHILS # BLD AUTO: 3.26 K/UL (ref 2–7.15)
NEUTROPHILS NFR BLD: 49.7 % (ref 44–72)
NRBC # BLD AUTO: 0 K/UL
NRBC BLD-RTO: 0 /100 WBC
PLATELET # BLD AUTO: 215 K/UL (ref 164–446)
PMV BLD AUTO: 9.5 FL (ref 9–12.9)
POTASSIUM SERPL-SCNC: 3.3 MMOL/L (ref 3.6–5.5)
PROT SERPL-MCNC: 4.7 G/DL (ref 6–8.2)
RBC # BLD AUTO: 3.18 M/UL (ref 4.2–5.4)
SODIUM SERPL-SCNC: 138 MMOL/L (ref 135–145)
WBC # BLD AUTO: 6.6 K/UL (ref 4.8–10.8)

## 2023-04-26 PROCEDURE — 700111 HCHG RX REV CODE 636 W/ 250 OVERRIDE (IP): Performed by: INTERNAL MEDICINE

## 2023-04-26 PROCEDURE — A9270 NON-COVERED ITEM OR SERVICE: HCPCS | Performed by: STUDENT IN AN ORGANIZED HEALTH CARE EDUCATION/TRAINING PROGRAM

## 2023-04-26 PROCEDURE — 85025 COMPLETE CBC W/AUTO DIFF WBC: CPT

## 2023-04-26 PROCEDURE — 770020 HCHG ROOM/CARE - TELE (206)

## 2023-04-26 PROCEDURE — 97530 THERAPEUTIC ACTIVITIES: CPT | Mod: CQ

## 2023-04-26 PROCEDURE — 700102 HCHG RX REV CODE 250 W/ 637 OVERRIDE(OP): Performed by: HOSPITALIST

## 2023-04-26 PROCEDURE — 700111 HCHG RX REV CODE 636 W/ 250 OVERRIDE (IP): Performed by: HOSPITALIST

## 2023-04-26 PROCEDURE — 99291 CRITICAL CARE FIRST HOUR: CPT | Performed by: INTERNAL MEDICINE

## 2023-04-26 PROCEDURE — A9270 NON-COVERED ITEM OR SERVICE: HCPCS | Performed by: INTERNAL MEDICINE

## 2023-04-26 PROCEDURE — 80053 COMPREHEN METABOLIC PANEL: CPT

## 2023-04-26 PROCEDURE — 700102 HCHG RX REV CODE 250 W/ 637 OVERRIDE(OP): Performed by: INTERNAL MEDICINE

## 2023-04-26 PROCEDURE — 700105 HCHG RX REV CODE 258: Performed by: STUDENT IN AN ORGANIZED HEALTH CARE EDUCATION/TRAINING PROGRAM

## 2023-04-26 PROCEDURE — 82962 GLUCOSE BLOOD TEST: CPT

## 2023-04-26 PROCEDURE — 700102 HCHG RX REV CODE 250 W/ 637 OVERRIDE(OP): Performed by: STUDENT IN AN ORGANIZED HEALTH CARE EDUCATION/TRAINING PROGRAM

## 2023-04-26 PROCEDURE — C9113 INJ PANTOPRAZOLE SODIUM, VIA: HCPCS | Performed by: INTERNAL MEDICINE

## 2023-04-26 PROCEDURE — 83735 ASSAY OF MAGNESIUM: CPT

## 2023-04-26 PROCEDURE — 85014 HEMATOCRIT: CPT

## 2023-04-26 PROCEDURE — A9270 NON-COVERED ITEM OR SERVICE: HCPCS | Performed by: HOSPITALIST

## 2023-04-26 PROCEDURE — 99233 SBSQ HOSP IP/OBS HIGH 50: CPT | Performed by: INTERNAL MEDICINE

## 2023-04-26 PROCEDURE — 85018 HEMOGLOBIN: CPT

## 2023-04-26 RX ORDER — PANTOPRAZOLE SODIUM 40 MG/10ML
40 INJECTION, POWDER, LYOPHILIZED, FOR SOLUTION INTRAVENOUS DAILY
Status: DISCONTINUED | OUTPATIENT
Start: 2023-04-26 | End: 2023-04-26

## 2023-04-26 RX ORDER — MAGNESIUM SULFATE HEPTAHYDRATE 40 MG/ML
2 INJECTION, SOLUTION INTRAVENOUS ONCE
Status: COMPLETED | OUTPATIENT
Start: 2023-04-26 | End: 2023-04-26

## 2023-04-26 RX ORDER — METOPROLOL SUCCINATE 25 MG/1
25 TABLET, EXTENDED RELEASE ORAL DAILY
Status: DISCONTINUED | OUTPATIENT
Start: 2023-04-27 | End: 2023-04-27

## 2023-04-26 RX ORDER — POTASSIUM CHLORIDE 20 MEQ/1
40 TABLET, EXTENDED RELEASE ORAL 2 TIMES DAILY
Status: COMPLETED | OUTPATIENT
Start: 2023-04-26 | End: 2023-04-26

## 2023-04-26 RX ORDER — PANTOPRAZOLE SODIUM 40 MG/10ML
40 INJECTION, POWDER, LYOPHILIZED, FOR SOLUTION INTRAVENOUS 2 TIMES DAILY
Status: DISCONTINUED | OUTPATIENT
Start: 2023-04-26 | End: 2023-04-27

## 2023-04-26 RX ORDER — POTASSIUM CHLORIDE 20 MEQ/1
40 TABLET, EXTENDED RELEASE ORAL ONCE
Status: COMPLETED | OUTPATIENT
Start: 2023-04-26 | End: 2023-04-26

## 2023-04-26 RX ORDER — CALCIUM GLUCONATE 20 MG/ML
2 INJECTION, SOLUTION INTRAVENOUS ONCE
Status: COMPLETED | OUTPATIENT
Start: 2023-04-26 | End: 2023-04-26

## 2023-04-26 RX ORDER — MAGNESIUM SULFATE HEPTAHYDRATE 40 MG/ML
4 INJECTION, SOLUTION INTRAVENOUS ONCE
Status: DISCONTINUED | OUTPATIENT
Start: 2023-04-26 | End: 2023-04-26

## 2023-04-26 RX ADMIN — PREGABALIN 100 MG: 100 CAPSULE ORAL at 12:14

## 2023-04-26 RX ADMIN — OXYCODONE HYDROCHLORIDE 10 MG: 10 TABLET ORAL at 16:49

## 2023-04-26 RX ADMIN — PRASUGREL 10 MG: 10 TABLET, FILM COATED ORAL at 04:57

## 2023-04-26 RX ADMIN — OXYCODONE HYDROCHLORIDE 10 MG: 10 TABLET ORAL at 09:36

## 2023-04-26 RX ADMIN — CALCIUM GLUCONATE 2 G: 20 INJECTION, SOLUTION INTRAVENOUS at 02:44

## 2023-04-26 RX ADMIN — SPIRONOLACTONE 25 MG: 25 TABLET ORAL at 04:57

## 2023-04-26 RX ADMIN — INSULIN HUMAN 3 UNITS: 100 INJECTION, SOLUTION PARENTERAL at 09:42

## 2023-04-26 RX ADMIN — ASPIRIN 81 MG: 81 TABLET, COATED ORAL at 04:57

## 2023-04-26 RX ADMIN — INSULIN HUMAN 5 UNITS: 100 INJECTION, SOLUTION PARENTERAL at 12:23

## 2023-04-26 RX ADMIN — INSULIN HUMAN 3 UNITS: 100 INJECTION, SOLUTION PARENTERAL at 20:35

## 2023-04-26 RX ADMIN — TRAZODONE HYDROCHLORIDE 150 MG: 100 TABLET ORAL at 20:32

## 2023-04-26 RX ADMIN — MORPHINE SULFATE 30 MG: 30 TABLET, FILM COATED, EXTENDED RELEASE ORAL at 04:57

## 2023-04-26 RX ADMIN — SODIUM CHLORIDE 500 ML: 9 INJECTION, SOLUTION INTRAVENOUS at 00:25

## 2023-04-26 RX ADMIN — METOPROLOL SUCCINATE 50 MG: 50 TABLET, EXTENDED RELEASE ORAL at 09:35

## 2023-04-26 RX ADMIN — PREGABALIN 100 MG: 100 CAPSULE ORAL at 17:49

## 2023-04-26 RX ADMIN — INSULIN GLARGINE-YFGN 24 UNITS: 100 INJECTION, SOLUTION SUBCUTANEOUS at 17:49

## 2023-04-26 RX ADMIN — PANTOPRAZOLE SODIUM 40 MG: 40 INJECTION, POWDER, LYOPHILIZED, FOR SOLUTION INTRAVENOUS at 18:15

## 2023-04-26 RX ADMIN — PREGABALIN 100 MG: 100 CAPSULE ORAL at 05:01

## 2023-04-26 RX ADMIN — POTASSIUM CHLORIDE 40 MEQ: 1500 TABLET, EXTENDED RELEASE ORAL at 18:15

## 2023-04-26 RX ADMIN — POTASSIUM CHLORIDE 40 MEQ: 1500 TABLET, EXTENDED RELEASE ORAL at 09:36

## 2023-04-26 RX ADMIN — COLCHICINE 0.6 MG: 0.6 TABLET, FILM COATED ORAL at 04:57

## 2023-04-26 RX ADMIN — POTASSIUM CHLORIDE 40 MEQ: 1500 TABLET, EXTENDED RELEASE ORAL at 02:37

## 2023-04-26 RX ADMIN — COLCHICINE 0.6 MG: 0.6 TABLET, FILM COATED ORAL at 16:49

## 2023-04-26 RX ADMIN — MORPHINE SULFATE 30 MG: 30 TABLET, FILM COATED, EXTENDED RELEASE ORAL at 17:49

## 2023-04-26 RX ADMIN — INSULIN HUMAN 2 UNITS: 100 INJECTION, SOLUTION PARENTERAL at 17:50

## 2023-04-26 RX ADMIN — ATORVASTATIN CALCIUM 80 MG: 80 TABLET, FILM COATED ORAL at 16:49

## 2023-04-26 RX ADMIN — MAGNESIUM SULFATE HEPTAHYDRATE 2 G: 40 INJECTION, SOLUTION INTRAVENOUS at 02:45

## 2023-04-26 RX ADMIN — PANTOPRAZOLE SODIUM 40 MG: 40 INJECTION, POWDER, LYOPHILIZED, FOR SOLUTION INTRAVENOUS at 11:59

## 2023-04-26 RX ADMIN — MAGNESIUM SULFATE HEPTAHYDRATE 2 G: 40 INJECTION, SOLUTION INTRAVENOUS at 09:25

## 2023-04-26 ASSESSMENT — GAIT ASSESSMENTS: GAIT LEVEL OF ASSIST: UNABLE TO PARTICIPATE

## 2023-04-26 ASSESSMENT — PAIN DESCRIPTION - PAIN TYPE
TYPE: ACUTE PAIN;CHRONIC PAIN

## 2023-04-26 ASSESSMENT — FIBROSIS 4 INDEX
FIB4 SCORE: 0.74
FIB4 SCORE: 0.74

## 2023-04-26 ASSESSMENT — COGNITIVE AND FUNCTIONAL STATUS - GENERAL
MOBILITY SCORE: 17
MOVING FROM LYING ON BACK TO SITTING ON SIDE OF FLAT BED: A LITTLE
WALKING IN HOSPITAL ROOM: A LITTLE
STANDING UP FROM CHAIR USING ARMS: A LITTLE
MOVING TO AND FROM BED TO CHAIR: A LITTLE
SUGGESTED CMS G CODE MODIFIER MOBILITY: CK
TURNING FROM BACK TO SIDE WHILE IN FLAT BAD: A LITTLE
CLIMB 3 TO 5 STEPS WITH RAILING: A LOT

## 2023-04-26 ASSESSMENT — ENCOUNTER SYMPTOMS
WEAKNESS: 1
SHORTNESS OF BREATH: 1

## 2023-04-26 NOTE — PROGRESS NOTES
Patient is hypotensive again, asymptomatic. 78/40 Hr 88 and MAP 55 . MD Arenas made aware, ordered 500 cc bolus of NS. Will continue to monitor vitals .    1844- patients only IV is no good. Bolus immediately stopped. Warm pack given. Patient states she is a hard stick and needs US guided. Charge RN made aware. Night RN aware and can try via US. Bolus needs to be given .

## 2023-04-26 NOTE — THERAPY
Physical Therapy   Daily Treatment     Patient Name: Julisa Josue  Age:  62 y.o., Sex:  female  Medical Record #: [id number]  Today's Date: 4/26/2023     Precautions  Precautions: (P) Fall Risk;Cardiac Precautions (See Comments)  Comments: (P) NSTEMI    Assessment    Nrsg cleared pt to participate w/PT, being transferred to T8. The pt did not require any assistance w/bed mobility, no STS or amb 2* low BP. Nrsg notified of following BP responses during PT. Supine: 93/54 MAP 68  EOB: 81/48 MAP 60  PT will cont to follow.     Plan    Treatment Plan Status: (P) Continue Current Treatment Plan  Type of Treatment: Bed Mobility, Equipment, Neuro Re-Education / Balance, Group Therapy, Gait Training, Manual Therapy, Orthotics Training , Self Care / Home Evaluation, Stair Training, Therapeutic Activities, Therapeutic Exercise  Treatment Frequency: (P) 4 Times per Week  Treatment Duration: (P) Until Therapy Goals Met    DC Equipment Recommendations: (P) Unable to determine at this time  Discharge Recommendations: (P) Recommend home health for continued physical therapy services    Objective       04/26/23 1535   Charge Group   PT Therapeutic Activities (Units) 2   Total Time Spent   PT Therapeutic Activities Time Spent (Mins) 25   Precautions   Precautions Fall Risk;Cardiac Precautions (See Comments)   Comments NSTEMI   Pain 0 - 10 Group   Pain Rating Scale (NPRS) 3   Therapist Pain Assessment During Activity   Other Treatments   Other Treatments Provided BP's supine 93/52 MAP 68, EOB 81/48 MAP 60. Nrsg notified.   Balance   Sitting Balance (Static) Good   Sitting Balance (Dynamic) Fair +   Weight Shift Sitting Good   Bed Mobility    Supine to Sit Supervised   Sit to Supine Supervised   Scooting Supervised   Gait Analysis   Gait Level Of Assist Unable to Participate   Comments Pt did not amb during PT session 2* low BP, nrsg notified.   Functional Mobility   Sit to Stand Unable to Participate   Bed, Chair, Wheelchair  Transfer Unable to Participate   How much difficulty does the patient currently have...   Turning over in bed (including adjusting bedclothes, sheets and blankets)? 3   Sitting down on and standing up from a chair with arms (e.g., wheelchair, bedside commode, etc.) 3   Moving from lying on back to sitting on the side of the bed? 3   How much help from another person does the patient currently need...   Moving to and from a bed to a chair (including a wheelchair)? 3   Need to walk in a hospital room? 3   Climbing 3-5 steps with a railing? 2   6 clicks Mobility Score 17   Short Term Goals    Short Term Goal # 1 Pt will ambulate 150' with FWW and supervision.   Goal Outcome # 1 goal not met   Short Term Goal # 2 Pt will tolerate 8 mins of upright out of bed activity without orthostatic symptoms.   Goal Outcome # 2 Goal not met   Education Group   Role of Physical Therapist Patient Response Patient;Acceptance;Explanation;Action Demonstration   Physical Therapy Treatment Plan   Physical Therapy Treatment Plan Continue Current Treatment Plan   Treatment Frequency 4 Times per Week   Duration Until Therapy Goals Met   Anticipated Discharge Equipment and Recommendations   DC Equipment Recommendations Unable to determine at this time   Discharge Recommendations Recommend home health for continued physical therapy services   Interdisciplinary Plan of Care Collaboration   IDT Collaboration with  Nursing   Patient Position at End of Therapy In Bed;Call Light within Reach;Tray Table within Reach;Phone within Reach   Collaboration Comments Nrsg notified of pt's BP   Session Information   Date / Session Number  4/26--2 (2/4, 5/1) PTA/1  (Chandrika to keep)   Supervising Physical Therapist (PTA Treatments Only)   Supervising Physical Therapist Marysol Smith

## 2023-04-26 NOTE — CARE PLAN
The patient is Watcher - Medium risk of patient condition declining or worsening    Shift Goals  Clinical Goals: Monitor bp, pain control  Patient Goals: Comfort  Family Goals: [facility]    Progress made toward(s) clinical / shift goals:    Problem: Pain - Standard  Goal: Alleviation of pain or a reduction in pain to the patient’s comfort goal  Outcome: Progressing, pt states lowered pain with prior intervention.      Problem: Knowledge Deficit - Standard  Goal: Patient and family/care givers will demonstrate understanding of plan of care, disease process/condition, diagnostic tests and medications  Outcome: Progressing, pt able to verbalize  understanding of not being able to get pain medications and not ambulate with lower sbp's.     Problem: Risk for Aspiration  Goal: Patient's risk for aspiration will be absent or decrease  Outcome: Progressing, pt able to eat without aspiration.        Patient is not progressing towards the following goals:

## 2023-04-26 NOTE — PROGRESS NOTES
Notified by nursing regarding hypotension post 500 cc bolus.  Discussed with cardiology on call recommended to give an additional gentle IV fluid bolus and to reevaluate.  If patient blood pressure does not improve start dopamine, gtt. and obtain stat limited echo  to assess for worsening pericardial effusion.

## 2023-04-26 NOTE — DISCHARGE PLANNING
Care Transition Team Assessment    CM RN met with pt at bedside. Pt stating will need transport home at discharge. Pt listed as having active MTM benefits. FAVIO RN to help set up MTM ride once pt is ready to DC. Pt lives in apartment alone and has FWW. Per Pt would like HH if can be obtained. Per Medicare website HH companies in the area are [employer] and [employer]. Pt advised unsure if companies take pts insurance but CM RN will follow up.     Pt stating her dtr is helping her get set up to go to [facility] Admit date for [facility] is hopefully in the next month per pt. Pt stating has lived at [facility] in the past and is familiar with facility.     Information Source  Orientation Level: Oriented X4      Elopement Risk  Legal Hold: No  Ambulatory or Self Mobile in Wheelchair: Yes  Disoriented: No  Psychiatric Symptoms: None  History of Wandering: No  Elopement this Admit: No  Vocalizing Wanting to Leave: No  Displays Behaviors, Body Language Wanting to Leave: No-Not at Risk for Elopement  Elopement Risk: Not at Risk for Elopement    Interdisciplinary Discharge Planning  Lives with - Patient's Self Care Capacity: Alone and Able to Care For Self, Other (Comments) (Assisted living)  Patient or legal guardian wants to designate a caregiver: No  Support Systems: Family Member(s)  Housing / Facility: Assisted Living Residence  Prior Services: Home-Independent    Discharge Preparedness  What is your plan after discharge?: Home with help  What are your discharge supports?: Child  Prior Functional Level: Ambulatory    Functional Assesment  Prior Functional Level: Ambulatory         Vision / Hearing Impairment  Right Eye Vision: Impaired, Wears Glasses  Left Eye Vision: Impaired, Wears Glasses  Hearing Impairment : Yes  Hearing Impairment: Other (Comments) (reading glasses)  Does Pt Need Special Equipment for the Hearing Impaired?: No              Domestic Abuse  Have you ever been the victim of abuse or  violence?: No  Physical Abuse or Sexual Abuse: No  Verbal Abuse or Emotional Abuse: No  Possible Abuse/Neglect Reported to:: Not Applicable         Discharge Risks or Barriers  Discharge risks or barriers?: No PCP  Patient risk factors: Vulnerable adult    Anticipated Discharge Information  Discharge Disposition: Discharged to home/self care (01)

## 2023-04-26 NOTE — ASSESSMENT & PLAN NOTE
Patient found to have hypotension.  Overnight she developed hypotension and received IV fluid bolus.    resolved

## 2023-04-26 NOTE — PROGRESS NOTES
Cardiology paged in regards to pt sbp fluctuations, and lightheadedness. Spoke to MD Nuzhat. Pt to have bolus.

## 2023-04-26 NOTE — PROGRESS NOTES
Cardiology Follow-up Consult Note    Date of Service:    4/26/2023      Consulting Physician: Hollis Elizalde D.O.    Name:   Julisa Josue   YOB: 1960  Age:   62 y.o.  female   MRN:   [id number]      Interim Events:  4/26: Overnight, became hypotensive with BP 80/45.  Received IV bolus of 500 cc with improvement in blood pressure.  Morning labs show significant electrolyte derangement.    4/25: No acute events overnight.  Patient reports feeling better with less chest pain.  Has been experiencing lightheadedness dizziness with standing.    4/24:No acute events overnight.  Patient underwent successful left circumflex stent placement.  Has been complaining of ongoing chest discomfort since this morning.  Is a sharp, left-sided without radiation.  Reports that this is different from her presenting chest pain.  No associated lightheadedness, dizziness, palpitations or headache.    All other review of systems reviewed and negative.      Past medical, surgical, social, and family history reviewed and unchanged from admission except as noted in assessment and plan.    Medications: Reviewed in MAR      No Known Allergies      Intake/Output Summary (Last 24 hours) at 4/26/2023 0939  Last data filed at 4/26/2023 0559  Gross per 24 hour   Intake 1200 ml   Output 1050 ml   Net 150 ml          Physical Exam  Body mass index is 36.03 kg/m².  /63   Pulse 77   Temp 36.7 °C (98.1 °F) (Temporal)   Resp 16   Wt 98.2 kg (216 lb 7.9 oz)   SpO2 90%   Vitals:    04/26/23 0602 04/26/23 0800 04/26/23 0900 04/26/23 0935   BP: 100/56 101/57 108/56 123/63   Pulse: 76 70 71 77   Resp: 16      Temp:  36.7 °C (98.1 °F)     TempSrc:  Temporal     SpO2: 90%      Weight:         Oxygen Therapy:  Pulse Oximetry: 90 %, O2 (LPM): 0, O2 Delivery Device: None - Room Air    General: Well appearing and in no apparent distress  Eyes: nl conjunctiva  ENT: OP clear  Neck: JVP not elevated, no carotid bruits  Lungs: normal  respiratory effort, CTAB  Heart: RRR, no murmurs, no rubs or gallops, no edema bilateral lower extremities. No LV/RV heave on cardiac palpatation. 2+ bilateral radial pulses.  2+ bilateral dp pulses.   Abdomen: soft, non tender, non distended, no masses, normal bowel sounds.  No HSM.  Extremities/MSK: no clubbing, no cyanosis  Neurological: No focal sensory deficits  Psychiatric: Appropriate affect, A/O x 3  Skin: Warm extremities    Labs (personally reviewed and notable for):   Lab Results   Component Value Date/Time    SODIUM 138 04/26/2023 12:26 AM    POTASSIUM 3.3 (L) 04/26/2023 12:26 AM    CHLORIDE 111 04/26/2023 12:26 AM    CO2 17 (L) 04/26/2023 12:26 AM    GLUCOSE 213 (H) 04/26/2023 12:26 AM    BUN 13 04/26/2023 12:26 AM    CREATININE 1.40 04/26/2023 12:26 AM      Lab Results   Component Value Date/Time    WBC 6.6 04/26/2023 01:57 AM    RBC 3.18 (L) 04/26/2023 01:57 AM    HEMOGLOBIN 8.8 (L) 04/26/2023 01:57 AM    HEMATOCRIT 27.4 (L) 04/26/2023 01:57 AM    MCV 86.2 04/26/2023 01:57 AM    MCH 27.7 04/26/2023 01:57 AM    MCHC 32.1 (L) 04/26/2023 01:57 AM    MPV 9.5 04/26/2023 01:57 AM    NEUTSPOLYS 49.70 04/26/2023 01:57 AM    LYMPHOCYTES 38.30 04/26/2023 01:57 AM    MONOCYTES 6.70 04/26/2023 01:57 AM    EOSINOPHILS 4.10 04/26/2023 01:57 AM    EOSINOPHILS 3 05/08/2017 03:00 PM    BASOPHILS 0.60 04/26/2023 01:57 AM    HYPOCHROMIA 1 05/20/2014 09:05 PM    ANISOCYTOSIS 1+ 07/07/2017 03:15 PM      Lab Results   Component Value Date/Time    CHOLSTRLTOT 151 04/24/2023 04:02 AM    LDL 86 04/24/2023 04:02 AM    HDL 33 (A) 04/24/2023 04:02 AM    TRIGLYCERIDE 161 (H) 04/24/2023 04:02 AM       Lab Results   Component Value Date/Time    TROPONINT 1764 (H) 04/23/2023 1537     No results found for: NTPROBNP    Cardiac Imaging and Procedures Review:    ECG: ECG performed 4/23/2023 was interpreted by me which shows sinus rhythm    Echo: Echocardiogram performed on 4/2/2023 was personally interpreted by me which shows low normal  left ventricular systolic function, LVEF 45-50%    [facility] 4/23/2023:  INDICATIONS:  Delayed posterior STEMI  NSTEMI  History of LAD PCI pre-CABG  History of LIMA-LAD CABG  History of left circumflex PCI post CABG  History of L-Cx  PCI 4/2/23  History of PDA PCI 4/3/23  Questionable adherence to Plavix  CORONARY ANGIOGRAPHY:  The left main coronary artery: Large in caliber vessel with patent stent, bifurcates to LAD and left circumflex coronary arteries.  The left anterior descending coronary artery: Large in caliber vessel with 50% ostial stenosis (ISR) and severe ISR in the mid LAD.  Distal/apical LAD fills via patent LIMA graft, with nonobstructive CAD.  The LAD gives off 2 medium caliber diagonal branches with 80% stenosis in D1 (jailed by the proximal LAD stent).  The left circumflex coronary artery: Large in caliber vessel with 100 % occlusion at its ostium within stent. The right coronary artery: Large in caliber dominant vessel with 50% mid RCA stenosis and patent proximal right PDA stent (deployed 4/3/23).  IMPRESSION:  This is most likely a delayed posterior STEMI presentation with possible concerns for Plavix non-adherence or non-responder.  1.  Occluded left circumflex s/p IVUS guided PCI deploying a Synergy 3 x 38mm ANA in the mid Cx - prox OM (overlapped with the Synergy stent from 4/2/23) and post-dilated to 4.0 mm proximally.  2. PTCA of distal LM and ostial L-Cx stent from 4/2/23 for more optimization using a 4 x 12 mm NC balloon to high pressure.  3.  Normal resting LVEDP 5 mmHg without significant transaortic gradient on pullback.    [facility] 4/2/23:  IMPRESSION:  1.  Severe ostial /proximal circumflex ISR- status post successful IVUS guided intravascular lithotripsy and revascularization deploying Synergy 3 x 20 mm ANA, postdilated to 3.7 mm at the ostium /distal left main.  2.  Residual severe proximal right PDA stenosis, recommend staged PCI either this admission or in 4 to 6 weeks as an  outpatient.  3.  Residual severe diffuse stenoses and medium in caliber OM1, and severe proximal D1 stenosis (jailed by proximal LAD stent), for which I recommend optimal medical therapy .  4.  Mildly elevated resting LVEDP at 18 mmHg no significant transaortic gradient on pullback    [facility] 4/3/2023:  Successful percutaneous coronary intervention of right posterior descending artery using 2.5 x 16 mm Synergy drug-eluting stent    Echo: Echocardiogram performed on 4/25/2023 was personally interpreted by me which shows low normal LVEF 50%, small pericardial effusion    24-hour telemetry personally reviewed which shows sinus rhythm    Radiology test Review:  EC-ECHOCARDIOGRAM LTD W/O CONT   Final Result      DX-CHEST-PORTABLE (1 VIEW)   Final Result      No acute cardiopulmonary abnormality.         CL-LEFT HEART CATHETERIZATION WITH POSSIBLE INTERVENTION    (Results Pending)       Problem list:  Principal Problem:    NSTEMI (non-ST elevated myocardial infarction) (HCC) POA: Yes  Active Problems:    Essential hypertension POA: Yes    Hyperlipidemia (Chronic) POA: Yes    Diabetes (HCC) POA: Yes    Obesity (BMI 30.0-34.9) POA: Unknown    Chronic pain syndrome with narcotic dependence (Chronic) POA: Yes    Anemia POA: Unknown    S/P insertion of non-drug eluting coronary artery stent POA: Yes    Coronary artery disease due to lipid rich plaque POA: Yes    Impaired instrumental activities of daily living (IADL) POA: Yes    ACC/AHA stage C systolic heart failure (HCC) POA: Yes    Long term (current) use of antithrombotics/antiplatelets - DAPT recommended indefinitely POA: Unknown    Hypomagnesemia POA: Unknown    Hypotension POA: Unknown  Resolved Problems:    * No resolved hospital problems. *  #Dressler syndrome  #Hypokalemia  #Hypocalcemia  #Hypomagnesemia  #Hypotension  #Pericardial effusion    Recommendations:  -- Complex cardiac case.  Patient with recent admission for NSTEMI s/p stent placement to left circumflex  artery and RCA presented back to the hospital with delayed presentation of possible STEMI with occlusion of left circumflex artery stent.  Concerning for Plavix nonresponder.  -- Recommend IV replacement of calcium, magnesium and potassium.  Magnesium is critically low at 1.2 and can lead to life-threatening arrhythmias.  --Continues to remain hypotensive requiring IVF boluses.  Hold Entresto and decrease metoprolol XL to 25 mg daily.  --Labs show acute anemia with significant drop in hemoglobin concerning for GIB.  Start IV Protonix 40 mg twice daily since she is on DAPT with aspirin and Effient.  Monitor serial H&H.  --Remains in guarded condition    Recommendations discussed with Dr. Arenas.    Cardiology Critical Care Documentation    Patient is critically ill with a life threatening illness as noted above required my direct presence, involvement and maximal preparedness.  I have spent a total of 34 minutes providing care for this patient, independent of other providers time. No time overlap. Procedures were not included in this time. This time was spent at the bedside evaluating the patient's chart, labs, imaging, physical exam, discussing with MD, formulating assessment and plan with IV medications.    Thank you for allowing me to participate in the care of this patient, cardiology will continue to follow.  Please contact me with any questions.    Dixon Galeano M.D.  Cardiologist, [facility] [place]   [phone]    Please note that this dictation was created using voice recognition software. I have made every reasonable attempt to correct obvious errors, but it is possible there are errors of grammar and possibly content that I did not discover before finalizing the note.

## 2023-04-26 NOTE — CARE PLAN
Problem: Pain - Standard  Goal: Alleviation of pain or a reduction in pain to the patient’s comfort goal  Outcome: Progressing     Problem: Knowledge Deficit - Standard  Goal: Patient and family/care givers will demonstrate understanding of plan of care, disease process/condition, diagnostic tests and medications  Outcome: Progressing   The patient is Stable - Low risk of patient condition declining or worsening    Shift Goals  Clinical Goals: monitor hemodynamic stability  Patient Goals: [facility]  Family Goals: [facility]    Progress made toward(s) clinical / shift goals:  pt reports tolerable pain

## 2023-04-26 NOTE — PROGRESS NOTES
4 Eyes Skin Assessment Completed by OJ Cisneros and OJ Garza.    Head WDL  Ears WDL  Nose WDL  Mouth WDL  Neck WDL  Breast/Chest Scar  Shoulder Blades WDL  Spine WDL  (R) Arm/Elbow/Hand Bruising, Scab, and Discoloration  (L) Arm/Elbow/Hand Bruising, Scab, and Discoloration  Abdomen Bruising  Groin Bruising  Scrotum/Coccyx/Buttocks Redness and Blanching  (R) Leg Scar, Scab, and Abrasion  (L) Leg Scar, Scab, Bruising, and Abrasion  (R) Heel/Foot/Toe Redness, Blanching, and Boggy  (L) Heel/Foot/Toe Redness, Blanching, and Boggy          Devices In Places Tele Box      Interventions In Place Pillows    Possible Skin Injury No    Pictures Uploaded Into Epic N/A  Wound Consult Placed N/A  RN Wound Prevention Protocol Ordered No

## 2023-04-26 NOTE — PROGRESS NOTES
Called by RN regarding hypotension.  Also discussed with Dr. Cleaning regarding drop in hgb.  Patient is a 62-year-old woman with a history of coronary artery disease with history of coronary bypass surgery, multiple stents, who presented with chest pain and underwent successful left circumflex stent placement on 4/24/23, but had ongoing chest pain post stent placement.  Patient had subsequent echocardiogram 4/24/23 with small pericardial effusion, felt consistent with Dressler syndrome.  She is on colchicine.  Patient has had low blood pressure readings.  Did receive metoprolol succinate on morning of 4/24/23.  Patient's blood pressure came up after 500 mL of saline bolus.  However started to come back down again.    Recommended another 500 mL of saline bolus.  If blood pressure does not come up, can order stat echo.  Of note, hemoglobin check shows there is a drop down to 8.8.  We will continue to monitor.  Cardiology is following.

## 2023-04-26 NOTE — PROGRESS NOTES
Hospital Medicine Daily Progress Note    Date of Service  4/26/2023    Chief Complaint  Chest pain, nausea and vomiting.    Hospital Course  Julisa Josue is a 62 y.o. female with CAD prior CABG, DM, HTN, chronic pain. Julisa had a recent admit for CAD. She was admitted 4/23/2023 with chest pain and NSTEMI type I. Her troponin:1764.  She was started on lovenox and nitro drip.  On admit day she was taken for cardiac cath    She was taken 4/23 to cardiac cath: 1.  Occluded left circumflex s/p IVUS guided PCI deploying a Synergy 3 x 38mm ANA in the mid Cx - prox OM (overlapped with the Synergy stent from 4/2/23) and post-dilated to 4.0 mm proximally.  2. PTCA of distal LM and ostial L-Cx stent from 4/2/23 for more optimization using a 4 x 12 mm NC balloon to high pressure.      During her hospitalization cardiology is following him and he was placed on nitroglycerin gtt for chest pain.  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  Currently she is on dual antiplatelet therapy with aspirin and Effient.    Interval Problem Update    04/26/23    I evaluated and examined her at the bedside.  She was sitting in chair and reported that she is feeling better.  Overnight she became hypotensive and required IV fluid bolus.  Night team discussed with on-call cardiology.  Patient found to have significant electrolytes abnormalities per  She found to have hypomagnesemia she received 2 g of IV magnesium I ordered additional 2 g of IV magnesium.  She also found to hypokalemia I started her on potassium replacement.  Her hemoglobin is trending down I ordered repeat H&H.  She is also found to have hypocalcemia and she was placed on IV calcium replacement.  I discussed plan of care with patient      I have discussed this patient's plan of care and discharge plan at IDT rounds today with Case Management, Nursing, Nursing leadership, and other members of the IDT team.    Consultants/Specialty  cardiology    Code  Status  Full Code    Disposition  Patient is not medically cleared for discharge.   Anticipate discharge to to home with close outpatient follow-up.  I have placed the appropriate orders for post-discharge needs.    Review of Systems  Review of Systems   Constitutional:  Positive for malaise/fatigue.   Respiratory:  Positive for shortness of breath.    Cardiovascular:  Positive for chest pain.   Neurological:  Positive for weakness.   All other systems reviewed and are negative.     Physical Exam  Temp:  [36.2 °C (97.1 °F)-36.6 °C (97.9 °F)] 36.4 °C (97.6 °F)  Pulse:  [61-97] 76  Resp:  [16-20] 16  BP: ()/(43-66) 100/56  SpO2:  [89 %-96 %] 90 %    Physical Exam  Vitals reviewed.   Constitutional:       General: She is not in acute distress.     Appearance: Normal appearance. She is ill-appearing.   HENT:      Head: Normocephalic and atraumatic.      Nose: No congestion.   Eyes:      General:         Right eye: No discharge.         Left eye: No discharge.      Pupils: Pupils are equal, round, and reactive to light.   Cardiovascular:      Rate and Rhythm: Normal rate and regular rhythm.      Pulses: Normal pulses.      Heart sounds: Normal heart sounds. No murmur heard.  Pulmonary:      Effort: Pulmonary effort is normal. No respiratory distress.      Breath sounds: Normal breath sounds. No stridor.   Abdominal:      General: Bowel sounds are normal. There is no distension.      Palpations: Abdomen is soft.      Tenderness: There is no abdominal tenderness.   Musculoskeletal:         General: No swelling or tenderness. Normal range of motion.      Cervical back: Normal range of motion. No rigidity.   Skin:     General: Skin is warm.      Capillary Refill: Capillary refill takes less than 2 seconds.      Coloration: Skin is not jaundiced or pale.      Findings: No bruising.   Neurological:      General: No focal deficit present.      Mental Status: She is alert and oriented to person, place, and time.       Cranial Nerves: No cranial nerve deficit.   Psychiatric:         Mood and Affect: Mood is anxious.         Behavior: Behavior normal.       Fluids    Intake/Output Summary (Last 24 hours) at 4/26/2023 0745  Last data filed at 4/26/2023 0559  Gross per 24 hour   Intake 1440 ml   Output 1050 ml   Net 390 ml         Laboratory  Recent Labs     04/23/23  1537 04/24/23  0402 04/26/23  0157   WBC 9.2 7.7 6.6   RBC 3.98* 3.68* 3.18*   HEMOGLOBIN 11.0* 10.2* 8.8*   HEMATOCRIT 33.6* 31.0* 27.4*   MCV 84.4 84.2 86.2   MCH 27.6 27.7 27.7   MCHC 32.7* 32.9* 32.1*   RDW 42.6 42.4 42.6   PLATELETCT 237 211 215   MPV 9.5 9.4 9.5       Recent Labs     04/23/23  1537 04/24/23  0402 04/26/23  0026   SODIUM 138 137 138   POTASSIUM 3.9 3.8 3.3*   CHLORIDE 105 107 111   CO2 20 17* 17*   GLUCOSE 226* 216* 213*   BUN 20 18 13   CREATININE 1.32 1.03 1.40   CALCIUM 8.8 8.2* 6.6*               Recent Labs     04/24/23  0402   TRIGLYCERIDE 161*   HDL 33*   LDL 86         Imaging  EC-ECHOCARDIOGRAM LTD W/O CONT   Final Result      DX-CHEST-PORTABLE (1 VIEW)   Final Result      No acute cardiopulmonary abnormality.         CL-LEFT HEART CATHETERIZATION WITH POSSIBLE INTERVENTION    (Results Pending)          Assessment/Plan  * NSTEMI (non-ST elevated myocardial infarction) (HCC)- (present on admission)  Assessment & Plan  Cardiology has been following her.  Continue current medical management with aspirin, Effient, Entresto and metoprolol.   Continue to monitor closely on telemetry.  Echocardiogram ordered currently results pending.  4/23 Cardiac cath:  IMPRESSION:  This is most likely a delayed posterior STEMI presentation with possible concerns for Plavix non-adherence or non-responder.  1.  Occluded left circumflex s/p IVUS guided PCI deploying a Synergy 3 x 38mm ANA in the mid Cx - prox OM (overlapped with the Synergy stent from 4/2/23) and post-dilated to 4.0 mm proximally.  2. PTCA of distal LM and ostial L-Cx stent from 4/2/23 for more  optimization using a 4 x 12 mm NC balloon to high pressure.  3.  Normal resting LVEDP 5 mmHg without significant transaortic gradient on pullback.  Discontinued nitroglycerin gtt.  I discussed plan of care with cardiologist.  Continue IV morphine on April 26, 2023.  Use IV narcotics with caution due to ongoing hypotension.      Hypotension  Assessment & Plan  Patient found to have hypotension.  Overnight she developed hypotension and received IV fluid bolus.      Hypomagnesemia  Assessment & Plan  She found to have hypomagnesemia.  She received 2 g of IV magnesium overnight.  I ordered additional 2 g of IV magnesium.  Continue to monitor and replace as needed.    ACC/AHA stage C systolic heart failure (HCC)- (present on admission)  Assessment & Plan  Metoprolol 50mg qd, sacubitril-valsartan 24-26mg BID, aldactone 25mg daily  Continue to monitor input and output.  Echocardiogram ordered.    Impaired instrumental activities of daily living (IADL)- (present on admission)  Assessment & Plan  Patient reports that typically she was living at assisted living however she went back to her apartment to give her 30-day notice, plans to go back to assisted living on discharge  Patient has reported multiple falls, denies any trauma to her head  PT/OT    Coronary artery disease due to lipid rich plaque- (present on admission)  Assessment & Plan  Continue atorvastatin and dual antiplatelet therapy.  Cardiology is following her.  Monitor on tele    S/P insertion of non-drug eluting coronary artery stent- (present on admission)  Assessment & Plan  Continue dual antiplatelet therapy with aspirin and Effient.      Anemia  Assessment & Plan  4/24 Hgb:10.2 <11  Hemoglobin is trending down.  I ordered repeat H&H.    Chronic pain syndrome with narcotic dependence- (present on admission)  Assessment & Plan  Continue home opiate pain meds  Continue home gabapentin and Lyrica    Obesity (BMI 30.0-34.9)  Assessment & Plan  Body mass index is  32.28 kg/m².  Encourage lifestyle modification.    Diabetes (HCC)- (present on admission)  Assessment & Plan  Today on April 26, 2023 she continued to have hyperglycemia I further increase the dose of glargine to 24 units twice daily.  Continue insulin sliding scale with hypoglycemia protocol.  Holding home Januvia and meformin  Diabetic diet    Hyperlipidemia- (present on admission)  Assessment & Plan  Continue atorvastatin 80 mg  Lab Results   Component Value Date/Time    CHOLSTRLTOT 151 04/24/2023 04:02 AM    LDL 86 04/24/2023 04:02 AM    HDL 33 (A) 04/24/2023 04:02 AM    TRIGLYCERIDE 161 (H) 04/24/2023 04:02 AM           Essential hypertension- (present on admission)  Assessment & Plan  Metoprolol, aldactone, Entresto  Monitor vitals  Currently patient is on nitroglycerin gtt. for chest pain control.  Monitor for hypotension.        I discussed plan of care during multidisciplinary rounds regarding patient's current medical condition and plan of care.     I personally discussed case with cardiologist Dr. Galeano regarding patient's current medical condition and plan of care.    VTE prophylaxis: SCDs/TEDs    I have performed a physical exam and reviewed and updated ROS and Plan today (4/26/2023). In review of yesterday's note (4/25/2023), there are no changes except as documented above.

## 2023-04-27 LAB
ALBUMIN SERPL BCP-MCNC: 3.5 G/DL (ref 3.2–4.9)
ALBUMIN/GLOB SERPL: 1.1 G/DL
ALP SERPL-CCNC: 74 U/L (ref 30–99)
ALT SERPL-CCNC: 11 U/L (ref 2–50)
ANION GAP SERPL CALC-SCNC: 11 MMOL/L (ref 7–16)
AST SERPL-CCNC: 10 U/L (ref 12–45)
BILIRUB SERPL-MCNC: 0.2 MG/DL (ref 0.1–1.5)
BUN SERPL-MCNC: 14 MG/DL (ref 8–22)
CALCIUM ALBUM COR SERPL-MCNC: 9.1 MG/DL (ref 8.5–10.5)
CALCIUM SERPL-MCNC: 8.7 MG/DL (ref 8.5–10.5)
CHLORIDE SERPL-SCNC: 105 MMOL/L (ref 96–112)
CO2 SERPL-SCNC: 17 MMOL/L (ref 20–33)
CREAT SERPL-MCNC: 1.54 MG/DL (ref 0.5–1.4)
EKG IMPRESSION: NORMAL
ERYTHROCYTE [DISTWIDTH] IN BLOOD BY AUTOMATED COUNT: 43.1 FL (ref 35.9–50)
GFR SERPLBLD CREATININE-BSD FMLA CKD-EPI: 38 ML/MIN/1.73 M 2
GLOBULIN SER CALC-MCNC: 3.2 G/DL (ref 1.9–3.5)
GLUCOSE BLD STRIP.AUTO-MCNC: 195 MG/DL (ref 65–99)
GLUCOSE BLD STRIP.AUTO-MCNC: 233 MG/DL (ref 65–99)
GLUCOSE BLD STRIP.AUTO-MCNC: 234 MG/DL (ref 65–99)
GLUCOSE BLD STRIP.AUTO-MCNC: 299 MG/DL (ref 65–99)
GLUCOSE SERPL-MCNC: 275 MG/DL (ref 65–99)
HCT VFR BLD AUTO: 31.7 % (ref 37–47)
HGB BLD-MCNC: 9.8 G/DL (ref 12–16)
MAGNESIUM SERPL-MCNC: 2.2 MG/DL (ref 1.5–2.5)
MCH RBC QN AUTO: 26.9 PG (ref 27–33)
MCHC RBC AUTO-ENTMCNC: 30.9 G/DL (ref 33.6–35)
MCV RBC AUTO: 87.1 FL (ref 81.4–97.8)
PLATELET # BLD AUTO: 262 K/UL (ref 164–446)
PMV BLD AUTO: 9.8 FL (ref 9–12.9)
POTASSIUM SERPL-SCNC: 5.4 MMOL/L (ref 3.6–5.5)
PROT SERPL-MCNC: 6.7 G/DL (ref 6–8.2)
RBC # BLD AUTO: 3.64 M/UL (ref 4.2–5.4)
SODIUM SERPL-SCNC: 133 MMOL/L (ref 135–145)
WBC # BLD AUTO: 7.1 K/UL (ref 4.8–10.8)

## 2023-04-27 PROCEDURE — 700102 HCHG RX REV CODE 250 W/ 637 OVERRIDE(OP): Performed by: INTERNAL MEDICINE

## 2023-04-27 PROCEDURE — 700111 HCHG RX REV CODE 636 W/ 250 OVERRIDE (IP): Performed by: INTERNAL MEDICINE

## 2023-04-27 PROCEDURE — 99233 SBSQ HOSP IP/OBS HIGH 50: CPT | Performed by: INTERNAL MEDICINE

## 2023-04-27 PROCEDURE — C9113 INJ PANTOPRAZOLE SODIUM, VIA: HCPCS | Performed by: INTERNAL MEDICINE

## 2023-04-27 PROCEDURE — 80053 COMPREHEN METABOLIC PANEL: CPT

## 2023-04-27 PROCEDURE — A9270 NON-COVERED ITEM OR SERVICE: HCPCS | Performed by: NURSE PRACTITIONER

## 2023-04-27 PROCEDURE — 85027 COMPLETE CBC AUTOMATED: CPT

## 2023-04-27 PROCEDURE — A9270 NON-COVERED ITEM OR SERVICE: HCPCS | Performed by: INTERNAL MEDICINE

## 2023-04-27 PROCEDURE — 97166 OT EVAL MOD COMPLEX 45 MIN: CPT

## 2023-04-27 PROCEDURE — 83735 ASSAY OF MAGNESIUM: CPT

## 2023-04-27 PROCEDURE — 700102 HCHG RX REV CODE 250 W/ 637 OVERRIDE(OP): Performed by: NURSE PRACTITIONER

## 2023-04-27 PROCEDURE — 36415 COLL VENOUS BLD VENIPUNCTURE: CPT

## 2023-04-27 PROCEDURE — A9270 NON-COVERED ITEM OR SERVICE: HCPCS | Performed by: STUDENT IN AN ORGANIZED HEALTH CARE EDUCATION/TRAINING PROGRAM

## 2023-04-27 PROCEDURE — 770020 HCHG ROOM/CARE - TELE (206)

## 2023-04-27 PROCEDURE — 700101 HCHG RX REV CODE 250: Performed by: INTERNAL MEDICINE

## 2023-04-27 PROCEDURE — 82962 GLUCOSE BLOOD TEST: CPT | Mod: 91

## 2023-04-27 PROCEDURE — 700102 HCHG RX REV CODE 250 W/ 637 OVERRIDE(OP): Performed by: STUDENT IN AN ORGANIZED HEALTH CARE EDUCATION/TRAINING PROGRAM

## 2023-04-27 RX ORDER — COLCHICINE 0.6 MG/1
0.6 TABLET ORAL DAILY
Status: DISCONTINUED | OUTPATIENT
Start: 2023-04-28 | End: 2023-05-01 | Stop reason: HOSPADM

## 2023-04-27 RX ORDER — OMEPRAZOLE 20 MG/1
20 CAPSULE, DELAYED RELEASE ORAL 2 TIMES DAILY
Status: DISCONTINUED | OUTPATIENT
Start: 2023-04-27 | End: 2023-05-01 | Stop reason: HOSPADM

## 2023-04-27 RX ORDER — ATORVASTATIN CALCIUM 80 MG/1
80 TABLET, FILM COATED ORAL EVERY EVENING
Qty: 30 TABLET | Refills: 5 | Status: SHIPPED | OUTPATIENT
Start: 2023-04-27 | End: 2023-05-01 | Stop reason: SDUPTHER

## 2023-04-27 RX ORDER — METOPROLOL SUCCINATE 25 MG/1
25 TABLET, EXTENDED RELEASE ORAL DAILY
Status: DISCONTINUED | OUTPATIENT
Start: 2023-04-27 | End: 2023-05-01 | Stop reason: HOSPADM

## 2023-04-27 RX ORDER — LIDOCAINE 50 MG/G
1 PATCH TOPICAL EVERY 24 HOURS
Status: DISCONTINUED | OUTPATIENT
Start: 2023-04-27 | End: 2023-05-01 | Stop reason: HOSPADM

## 2023-04-27 RX ADMIN — COLCHICINE 0.6 MG: 0.6 TABLET, FILM COATED ORAL at 04:10

## 2023-04-27 RX ADMIN — OXYCODONE HYDROCHLORIDE 10 MG: 10 TABLET ORAL at 14:20

## 2023-04-27 RX ADMIN — DOCUSATE SODIUM 50 MG AND SENNOSIDES 8.6 MG 2 TABLET: 8.6; 5 TABLET, FILM COATED ORAL at 17:36

## 2023-04-27 RX ADMIN — OXYCODONE HYDROCHLORIDE 10 MG: 10 TABLET ORAL at 21:01

## 2023-04-27 RX ADMIN — PRASUGREL 10 MG: 10 TABLET, FILM COATED ORAL at 04:10

## 2023-04-27 RX ADMIN — ATORVASTATIN CALCIUM 80 MG: 80 TABLET, FILM COATED ORAL at 17:36

## 2023-04-27 RX ADMIN — PANTOPRAZOLE SODIUM 40 MG: 40 INJECTION, POWDER, LYOPHILIZED, FOR SOLUTION INTRAVENOUS at 04:10

## 2023-04-27 RX ADMIN — INSULIN HUMAN 3 UNITS: 100 INJECTION, SOLUTION PARENTERAL at 21:03

## 2023-04-27 RX ADMIN — PREGABALIN 100 MG: 100 CAPSULE ORAL at 17:36

## 2023-04-27 RX ADMIN — PREGABALIN 100 MG: 100 CAPSULE ORAL at 04:10

## 2023-04-27 RX ADMIN — OMEPRAZOLE 20 MG: 20 CAPSULE, DELAYED RELEASE ORAL at 17:36

## 2023-04-27 RX ADMIN — OXYCODONE HYDROCHLORIDE 10 MG: 10 TABLET ORAL at 05:28

## 2023-04-27 RX ADMIN — LIDOCAINE 1 PATCH: 50 PATCH TOPICAL at 10:28

## 2023-04-27 RX ADMIN — METOPROLOL SUCCINATE 25 MG: 25 TABLET, EXTENDED RELEASE ORAL at 12:32

## 2023-04-27 RX ADMIN — SPIRONOLACTONE 25 MG: 25 TABLET ORAL at 04:10

## 2023-04-27 RX ADMIN — TRAZODONE HYDROCHLORIDE 150 MG: 100 TABLET ORAL at 21:01

## 2023-04-27 RX ADMIN — INSULIN GLARGINE-YFGN 24 UNITS: 100 INJECTION, SOLUTION SUBCUTANEOUS at 05:32

## 2023-04-27 RX ADMIN — INSULIN HUMAN 5 UNITS: 100 INJECTION, SOLUTION PARENTERAL at 13:51

## 2023-04-27 RX ADMIN — INSULIN HUMAN 2 UNITS: 100 INJECTION, SOLUTION PARENTERAL at 18:14

## 2023-04-27 RX ADMIN — MORPHINE SULFATE 30 MG: 30 TABLET, FILM COATED, EXTENDED RELEASE ORAL at 17:36

## 2023-04-27 RX ADMIN — INSULIN HUMAN 3 UNITS: 100 INJECTION, SOLUTION PARENTERAL at 08:52

## 2023-04-27 RX ADMIN — INSULIN GLARGINE-YFGN 24 UNITS: 100 INJECTION, SOLUTION SUBCUTANEOUS at 18:15

## 2023-04-27 RX ADMIN — MORPHINE SULFATE 30 MG: 30 TABLET, FILM COATED, EXTENDED RELEASE ORAL at 04:10

## 2023-04-27 RX ADMIN — ASPIRIN 81 MG: 81 TABLET, COATED ORAL at 04:10

## 2023-04-27 RX ADMIN — PREGABALIN 100 MG: 100 CAPSULE ORAL at 12:32

## 2023-04-27 ASSESSMENT — ACTIVITIES OF DAILY LIVING (ADL): TOILETING: REQUIRES ASSIST

## 2023-04-27 ASSESSMENT — ENCOUNTER SYMPTOMS
WHEEZING: 0
CHILLS: 0
NAUSEA: 0
SHORTNESS OF BREATH: 0
FEVER: 0
APNEA: 0
CHOKING: 0
WEAKNESS: 1
COUGH: 0
CHEST TIGHTNESS: 0
SHORTNESS OF BREATH: 1
MYALGIAS: 1
VOMITING: 0
STRIDOR: 0
ABDOMINAL PAIN: 0

## 2023-04-27 ASSESSMENT — FIBROSIS 4 INDEX: FIB4 SCORE: 0.69

## 2023-04-27 ASSESSMENT — COGNITIVE AND FUNCTIONAL STATUS - GENERAL
DRESSING REGULAR UPPER BODY CLOTHING: A LITTLE
SUGGESTED CMS G CODE MODIFIER DAILY ACTIVITY: CK
TOILETING: A LITTLE
PERSONAL GROOMING: A LITTLE
DRESSING REGULAR LOWER BODY CLOTHING: A LOT
HELP NEEDED FOR BATHING: A LOT
DAILY ACTIVITIY SCORE: 17

## 2023-04-27 ASSESSMENT — PAIN DESCRIPTION - PAIN TYPE
TYPE: ACUTE PAIN;CHRONIC PAIN
TYPE: ACUTE PAIN;CHRONIC PAIN

## 2023-04-27 NOTE — CARE PLAN
The patient is Stable - Low risk of patient condition declining or worsening    Shift Goals  Clinical Goals: Remain hemodynamically stable, no dizziness  Patient Goals: Pain management  Family Goals: [facility]    Progress made toward(s) clinical / shift goals:      Problem: Pain - Standard  Goal: Alleviation of pain or a reduction in pain to the patient’s comfort goal  Outcome: Progressing  Note: RN discussed pain regimen in place, pt verbalized understanding. Pain meds given per MAR      Problem: Hemodynamics  Goal: Patient's hemodynamics, fluid balance and neurologic status will be stable or improve  Outcome: Progressing  Note: Vital signs stable at this time. RN discussed the need to call for assistance due to previous dizzy and hypotensive episodes.      Problem: Care Map:  Day 3 Optimal Outcome for the Heart Failure Patient  Goal: Day 3:  Optimal Care of the heart failure patient  Outcome: Progressing       Patient is not progressing towards the following goals:

## 2023-04-27 NOTE — PROGRESS NOTES
Cardiology Follow Up Progress Note    Date of Service  4/27/2023    Attending Physician  Soila Torres D.O.    Chief Complaint   Delayed posterior STEMI    HPI  Julisa Josue is a 62 y.o. female with recent NSTEMI s/p  PCI LCX & rPDA 4/3/23 , prior PCI LCX 2018, CABG x1 LIMA to LAD 9/14/2021, type 2 diabetes, hypertension admitted 4/23/2023 with likely delayed posterior STEMI concern for Plavix not adherence or nonresponder.    Interim Events  No overnight cardiac events  Telemetry-SR  H&H stable  Mild OJ, serum creatinine 1.54  BP appropriate  Calcium 8.7   magnesium 2.2  Potassium 5.4    Review of Systems  Review of Systems   Respiratory:  Negative for apnea, cough, choking, chest tightness, shortness of breath, wheezing and stridor.      Vital signs in last 24 hours  Temp:  [36.4 °C (97.5 °F)-37 °C (98.6 °F)] 36.7 °C (98.1 °F)  Pulse:  [71-95] 73  Resp:  [16-18] 16  BP: ()/(48-62) 110/61  SpO2:  [88 %-97 %] 94 %    Physical Exam  Physical Exam    Lab Review  Lab Results   Component Value Date/Time    WBC 7.1 04/27/2023 02:55 AM    RBC 3.64 (L) 04/27/2023 02:55 AM    HEMOGLOBIN 9.8 (L) 04/27/2023 02:55 AM    HEMATOCRIT 31.7 (L) 04/27/2023 02:55 AM    MCV 87.1 04/27/2023 02:55 AM    MCH 26.9 (L) 04/27/2023 02:55 AM    MCHC 30.9 (L) 04/27/2023 02:55 AM    MPV 9.8 04/27/2023 02:55 AM      Lab Results   Component Value Date/Time    SODIUM 133 (L) 04/27/2023 02:55 AM    POTASSIUM 5.4 04/27/2023 02:55 AM    CHLORIDE 105 04/27/2023 02:55 AM    CO2 17 (L) 04/27/2023 02:55 AM    GLUCOSE 275 (H) 04/27/2023 02:55 AM    BUN 14 04/27/2023 02:55 AM    CREATININE 1.54 (H) 04/27/2023 02:55 AM      Lab Results   Component Value Date/Time    ASTSGOT 10 (L) 04/27/2023 02:55 AM    ALTSGPT 11 04/27/2023 02:55 AM     Lab Results   Component Value Date/Time    CHOLSTRLTOT 151 04/24/2023 04:02 AM    LDL 86 04/24/2023 04:02 AM    HDL 33 (A) 04/24/2023 04:02 AM    TRIGLYCERIDE 161 (H) 04/24/2023 04:02 AM    TROPONINT 1764  (H) 04/23/2023 03:37 PM             Assessment/Plan    # Delayed posterior STEMI secondary to nonadherence to Plavix versus Plavix nonresponder.  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     Thank you for allowing me to participate in the care of this patient.  I will continue to follow this patient    Please contact me with any questions.    SHILPA Mays.   Cardiologist, [facility]  [phone]

## 2023-04-27 NOTE — PROGRESS NOTES
Hospital Medicine Daily Progress Note    Date of Service  4/27/2023    Chief Complaint  Chest pain, nausea and vomiting.    Hospital Course  Julisa Josue is a 62 y.o. female with CAD prior CABG, DM, HTN, chronic pain. Julisa had a recent admit for CAD. She was admitted 4/23/2023 with chest pain and NSTEMI type I. Her troponin:1764.  She was started on lovenox and nitro drip.  On admit day she was taken for cardiac cath    She was taken 4/23 to cardiac cath: 1.  Occluded left circumflex s/p IVUS guided PCI deploying a Synergy 3 x 38mm ANA in the mid Cx - prox OM (overlapped with the Synergy stent from 4/2/23) and post-dilated to 4.0 mm proximally.  2. PTCA of distal LM and ostial L-Cx stent from 4/2/23 for more optimization using a 4 x 12 mm NC balloon to high pressure.    During her hospitalization cardiology is following him and he was placed on nitroglycerin gtt for chest pain.  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  Currently she is on dual antiplatelet therapy with aspirin and Effient.    Interval Problem Update  04/26/23  I evaluated and examined her at the bedside.  She was sitting in chair and reported that she is feeling better.  Overnight she became hypotensive and required IV fluid bolus.  Night team discussed with on-call cardiology.  Patient found to have significant electrolytes abnormalities per  She found to have hypomagnesemia she received 2 g of IV magnesium I ordered additional 2 g of IV magnesium.  She also found to hypokalemia I started her on potassium replacement.  Her hemoglobin is trending down I ordered repeat H&H.  She is also found to have hypocalcemia and she was placed on IV calcium replacement.  I discussed plan of care with patient    4/27 vital signs stable, blood pressure improved after medication adjustment yesterday.  Electrolytes have all improved potassium, magnesium, and calcium are all now within normal limits.  Patient continues to complain of  chest pain and shoulder pain that she reports has been unchanged since her cardiac cath. Left shoulder pain from a prior fracture, ordered lidocaine patch. Discussed with cardiology, will to monitor the patient blood pressure and electrolytes.  If they continue to be stable tomorrow can likely DC home. I have ordered home health for the patient however after discussion she reports that they were unable to set this up for her in the past based on where she lives.  She does live at an assisted living facility that has therapy.    I have discussed this patient's plan of care and discharge plan at IDT rounds today with Case Management, Nursing, Nursing leadership, and other members of the IDT team.    Consultants/Specialty  cardiology    Code Status  Full Code    Disposition  Patient is not medically cleared for discharge.   Anticipate discharge to to home with close outpatient follow-up.  I have placed the appropriate orders for post-discharge needs.    Review of Systems  Review of Systems   Constitutional:  Positive for malaise/fatigue. Negative for chills and fever.   Respiratory:  Positive for shortness of breath.    Cardiovascular:  Positive for chest pain. Negative for leg swelling.   Gastrointestinal:  Negative for abdominal pain, nausea and vomiting.   Musculoskeletal:  Positive for myalgias.   Neurological:  Positive for weakness.   All other systems reviewed and are negative.     Physical Exam  Temp:  [36.4 °C (97.5 °F)-37 °C (98.6 °F)] 36.7 °C (98.1 °F)  Pulse:  [71-95] 91  Resp:  [16-18] 18  BP: ()/(48-62) 107/55  SpO2:  [93 %-97 %] 94 %    Physical Exam  Vitals reviewed.   Constitutional:       General: She is not in acute distress.     Appearance: Normal appearance. She is ill-appearing.   HENT:      Head: Normocephalic and atraumatic.   Eyes:      Conjunctiva/sclera: Conjunctivae normal.   Cardiovascular:      Rate and Rhythm: Normal rate and regular rhythm.      Pulses: Normal pulses.      Heart  sounds: Normal heart sounds.   Pulmonary:      Effort: Pulmonary effort is normal. No respiratory distress.      Breath sounds: Normal breath sounds. No stridor.   Abdominal:      General: Bowel sounds are normal. There is no distension.      Palpations: Abdomen is soft.      Tenderness: There is no abdominal tenderness.   Musculoskeletal:         General: Tenderness and signs of injury present.      Cervical back: Normal range of motion. No rigidity.      Comments: Left shoulder pain with decreased ROM   Skin:     General: Skin is warm.      Capillary Refill: Capillary refill takes less than 2 seconds.      Coloration: Skin is not jaundiced or pale.      Findings: No bruising.   Neurological:      General: No focal deficit present.      Mental Status: She is alert and oriented to person, place, and time.      Cranial Nerves: No cranial nerve deficit.      Motor: Weakness (left arm) present.   Psychiatric:         Mood and Affect: Mood is anxious.         Behavior: Behavior normal.       Fluids    Intake/Output Summary (Last 24 hours) at 4/27/2023 1518  Last data filed at 4/27/2023 1405  Gross per 24 hour   Intake 630 ml   Output 350 ml   Net 280 ml       Laboratory  Recent Labs     04/26/23  0157 04/26/23  0935 04/27/23  0255   WBC 6.6  --  7.1   RBC 3.18*  --  3.64*   HEMOGLOBIN 8.8* 9.3* 9.8*   HEMATOCRIT 27.4* 29.1* 31.7*   MCV 86.2  --  87.1   MCH 27.7  --  26.9*   MCHC 32.1*  --  30.9*   RDW 42.6  --  43.1   PLATELETCT 215  --  262   MPV 9.5  --  9.8     Recent Labs     04/26/23  0026 04/27/23  0255   SODIUM 138 133*   POTASSIUM 3.3* 5.4   CHLORIDE 111 105   CO2 17* 17*   GLUCOSE 213* 275*   BUN 13 14   CREATININE 1.40 1.54*   CALCIUM 6.6* 8.7                     Imaging  EC-ECHOCARDIOGRAM LTD W/O CONT   Final Result      DX-CHEST-PORTABLE (1 VIEW)   Final Result      No acute cardiopulmonary abnormality.         CL-LEFT HEART CATHETERIZATION WITH POSSIBLE INTERVENTION    (Results Pending)           Assessment/Plan  * NSTEMI (non-ST elevated myocardial infarction) (HCC)- (present on admission)  Assessment & Plan  Cardiology has been following her.  Continue current medical management with aspirin, Effient, Entresto and metoprolol.   Continue to monitor closely on telemetry.  Echocardiogram ordered currently results pending.  4/23 Cardiac cath:  IMPRESSION:  This is most likely a delayed posterior STEMI presentation with possible concerns for Plavix non-adherence or non-responder.  1.  Occluded left circumflex s/p IVUS guided PCI deploying a Synergy 3 x 38mm ANA in the mid Cx - prox OM (overlapped with the Synergy stent from 4/2/23) and post-dilated to 4.0 mm proximally.  2. PTCA of distal LM and ostial L-Cx stent from 4/2/23 for more optimization using a 4 x 12 mm NC balloon to high pressure.  3.  Normal resting LVEDP 5 mmHg without significant transaortic gradient on pullback.  Discontinued nitroglycerin gtt.  I discussed plan of care with cardiologist.  Continue IV morphine on April 26, 2023.  Use IV narcotics with caution due to ongoing hypotension.      Hypotension  Assessment & Plan  Patient found to have hypotension.  Overnight she developed hypotension and received IV fluid bolus.    resolved    Hypomagnesemia  Assessment & Plan  She found to have hypomagnesemia.  She received 2 g of IV magnesium overnight.  I ordered additional 2 g of IV magnesium.  Continue to monitor and replace as needed.    ACC/AHA stage C systolic heart failure (HCC)- (present on admission)  Assessment & Plan  Metoprolol 50mg qd, sacubitril-valsartan 24-26mg BID, aldactone 25mg daily  Continue to monitor input and output.  Echocardiogram ordered.    Impaired instrumental activities of daily living (IADL)- (present on admission)  Assessment & Plan  Patient reports that typically she was living at assisted living however she went back to her apartment to give her 30-day notice, plans to go back to assisted living on  discharge  Patient has reported multiple falls, denies any trauma to her head  PT/OT    Coronary artery disease due to lipid rich plaque- (present on admission)  Assessment & Plan  Continue atorvastatin and dual antiplatelet therapy.  Cardiology is following her.  Monitor on tele    S/P insertion of non-drug eluting coronary artery stent- (present on admission)  Assessment & Plan  Continue dual antiplatelet therapy with aspirin and Effient.      Anemia  Assessment & Plan  4/24 Hgb:10.2 <11  Hemoglobin is trending down.  I ordered repeat H&H.    Chronic pain syndrome with narcotic dependence- (present on admission)  Assessment & Plan  Continue home opiate pain meds  Continue home gabapentin and Lyrica    Obesity (BMI 30.0-34.9)  Assessment & Plan  Body mass index is 32.28 kg/m².  Encourage lifestyle modification.    Diabetes (HCC)- (present on admission)  Assessment & Plan  Today on April 26, 2023 she continued to have hyperglycemia I further increase the dose of glargine to 24 units twice daily.  Continue insulin sliding scale with hypoglycemia protocol.  Holding home Januvia and meformin  Diabetic diet    Hyperlipidemia- (present on admission)  Assessment & Plan  Continue atorvastatin 80 mg      Essential hypertension- (present on admission)  Assessment & Plan  Metoprolol, aldactone, Entresto  Monitor vitals  Currently patient is on nitroglycerin gtt. for chest pain control.  Monitor for hypotension.        Total time in chart review, with the patient and discussing with consultants: 54 minutes    VTE prophylaxis: SCDs/TEDs    I have performed a physical exam and reviewed and updated ROS and Plan today (4/27/2023). In review of yesterday's note (4/26/2023), there are no changes except as documented above.

## 2023-04-27 NOTE — FACE TO FACE
Face to Face Supporting Documentation - Home Health    The encounter with this patient was in whole or in part the primary reason for home health admission.    Date of encounter:   Patient:                    MRN:                       YOB: 2021  Julisa Josue  [id number]  1960     Home health to see patient for:  Skilled Nursing care for assessment, interventions & education, Home health aide, Physical Therapy evaluation and treatment, and Occupational therapy evaluation and treatment    Skilled need for:  New Onset Medical Diagnosis NSTEMI    Skilled nursing interventions to include:  Line/Drain/Airway education and care    Homebound status evidenced by:  Needs the assistance of another person in order to leave the home. Leaving home requires a considerable and taxing effort. There is a normal inability to leave the home.    Community Physician to provide follow up care: Pcp Pt States None     Optional Interventions? No      I certify the face to face encounter for this home health care referral meets the CMS requirements and the encounter/clinical assessment with the patient was, in whole, or in part, for the medical condition(s) listed above, which is the primary reason for home health care. Based on my clinical findings: the service(s) are medically necessary, support the need for home health care, and the homebound criteria are met.  I certify that this patient has had a face to face encounter by myself.  Soila Torres D.O. - NPI: [id number]

## 2023-04-27 NOTE — THERAPY
Physical Therapy Contact Note    Patient Name: Julisa Josue  Age:  62 y.o., Sex:  female  Medical Record #: [id number]  Today's Date: 4/27/2023 04/27/23 1623   Treatment Variance   Reason For Missed Therapy Medical - Patient on Hold from Therapy   Other Treatments   Other Treatments Provided Hold of PT services today 2* ongoing drop in BP. The pt had a significant drop in BP w/OT ealier and demo'd drop in BP w/PT yesterday. The pt has also had a syncope episode w/nrsg in the BR. Functionally the pt has the strength for OOB activies but her BP remains her limiting factor. PT will cont to follow and work toward ambulation when her BP is not so labile.   Physical Therapy Treatment Plan   Physical Therapy Treatment Plan Continue Current Treatment Plan   Session Information   Date / Session Number  4/26--2 (2/4, 5/1) PTA/1. Hold PT 4/27   Supervising Physical Therapist (PTA Treatments Only)   Supervising Physical Therapist Libia Penn

## 2023-04-27 NOTE — PROGRESS NOTES
Assumed care of patient, bedside report received from Ese MCWILLIAMS. Updated on POC, call light within reach and fall precautions in place. Bed locked and in lowest position. Patient instructed to call for assistance before getting out of bed. All questions answered, no other needs at this time.

## 2023-04-27 NOTE — THERAPY
"Occupational Therapy   Initial Evaluation     Patient Name: Julisa Josue  Age:  62 y.o., Sex:  female  Medical Record #: [id number]  Today's Date: 2023     Precautions: Fall Risk, Cardiac Precautions (See Comments)  Comments: NSTEMI    Assessment    Patient is 62 y.o. female admitted with chest pain and NSTEMI type 1, pmhx includes CAD prior CABG, DM, HTN, chronic pain. Functional independence evaluation limited by asymptomatic orthostatic hypotension, pt reports no change in baseline dizziness when sitting up but BP dropped significantly from supine to sittin/63 supine, 67/35 sitting (RN alerted). Pt returned to supine, states she is unable to care for herself at is looking into assisted living placements with her daughter. Acute OT to follow while admitted.    Plan    Occupational Therapy Initial Treatment Plan   Treatment Interventions: Self Care / Activities of Daily Living, Therapeutic Exercises, Therapeutic Activity, Adaptive Equipment  Treatment Frequency: 3 Times per Week  Duration: Until Therapy Goals Met    DC Equipment Recommendations: Unable to determine at this time  Discharge Recommendations: Recommend post-acute placement for additional occupational therapy services prior to discharge home     Subjective    \"I can't take care of myself, much less my dog. I had to give her to my granddaughter.\"     Objective       23 1455   Prior Living Situation   Prior Services None   Housing / Facility 1 Story Apartment / Condo   Bathroom Set up Bathtub / Shower Combination   Equipment Owned 4-Wheel Walker;Front-Wheel Walker   Lives with - Patient's Self Care Capacity Alone and Unable to Care For Self   Comments pt states she is unable to care for herself anymore and wants to move to assisted living so she is not a burden on her daughter   Prior Level of ADL Function   Self Feeding Independent   Grooming / Hygiene Independent   Bathing Requires Assist   Dressing Requires Assist   Toileting " Requires Assist   Prior Level of IADL Function   Comments dtr assists with IADLs   History of Falls   Date of Last Fall   (reports recent falls at home, knees have scabs)   Precautions   Precautions Fall Risk;Cardiac Precautions (See Comments)   Vitals   Vitals Comments positive orthostatic hypotension: 114/63 supine, 67/36 sitting EOB after 7min   Pain 0 - 10 Group   Therapist Pain Assessment Post Activity Pain Same as Prior to Activity;Nurse Notified  (reports chronic LUE pain)   Cognition    Level of Consciousness Alert   Comments agreeable, pleasant   Balance Assessment   Sitting Balance (Static) Fair +   Sitting Balance (Dynamic) Fair   Weight Shift Sitting Fair   Comments standing deferred 2/2 +OH   Bed Mobility    Supine to Sit Supervised   Sit to Supine Supervised   Scooting Supervised   Rolling Supervised   ADL Assessment   Eating Modified Independent   Grooming Supervision;Seated   Upper Body Dressing Supervision   Comments additional ADL assessment limited by asymptomatic orthostatic hypotension   How much help from another person does the patient currently need...   Putting on and taking off regular lower body clothing? 2   Bathing (including washing, rinsing, and drying)? 2   Toileting, which includes using a toilet, bedpan, or urinal? 3   Putting on and taking off regular upper body clothing? 3   Taking care of personal grooming such as brushing teeth? 3   Eating meals? 4   6 Clicks Daily Activity Score 17   Functional Mobility   Sit to Stand Unable to Participate   Activity Tolerance   Sitting Edge of Bed 8min   Patient / Family Goals   Patient / Family Goal #1 move to [facility]   Short Term Goals   Short Term Goal # 1 pt will complete toileting ADL with Ld   Short Term Goal # 2 pt will complete standing grooming at sink at SPV level   Short Term Goal # 3 pt will don socks with Ld   Education Group   Education Provided Activities of Daily Living;Role of Occupational Therapist   Role of Occupational  Therapist Patient Response Explanation;Verbal Demonstration;Acceptance;Patient   ADL Patient Response Patient;Acceptance;Explanation;Verbal Demonstration;Action Demonstration   Occupational Therapy Initial Treatment Plan    Treatment Interventions Self Care / Activities of Daily Living;Therapeutic Exercises;Therapeutic Activity;Adaptive Equipment   Treatment Frequency 3 Times per Week   Duration Until Therapy Goals Met   Problem List   Problem List Decreased Active Daily Living Skills;Decreased Homemaking Skills;Decreased Upper Extremity Strength Right;Decreased Upper Extremity Strength Left;Decreased Functional Mobility;Decreased Activity Tolerance;Impaired Postural Control / Balance   Anticipated Discharge Equipment and Recommendations   DC Equipment Recommendations Unable to determine at this time   Discharge Recommendations Recommend post-acute placement for additional occupational therapy services prior to discharge home

## 2023-04-27 NOTE — DISCHARGE PLANNING
Case Management Discharge Planning    Admission Date: 4/23/2023  GMLOS: 2  ALOS: 4    6-Clicks ADL Score: 17  6-Clicks Mobility Score: 17  PT and/or OT Eval ordered: Yes  Post-acute Referrals Ordered: No  Post-acute Choice Obtained: No  Has referral(s) been sent to post-acute provider:  No      Anticipated Discharge Dispo: Discharge Disposition: D/T to home under HHA care in anticipation of covered skilled care (06)    DME Needed: No    Action(s) Taken: MDR discussion, cardiology consult pending.     CM met with patient at bedside, discussed plan for discharge home when stable. She would prefer to go directly to the AL at [facility] but not sure if her room is ready there. Currently living in her own apt and may need to go there first and work on AL placement later. Will need [employer] transport home as no family able to drive.     Escalations Completed: None    Medically Clear: No    Next Steps: Cardiology consult.     Barriers to Discharge: Medical clearance    Is the patient up for discharge tomorrow: No

## 2023-04-28 ENCOUNTER — APPOINTMENT (OUTPATIENT)
Dept: CARDIOLOGY | Facility: MEDICAL CENTER | Age: 63
DRG: 247 | End: 2023-04-28
Attending: NURSE PRACTITIONER
Payer: MEDICAID

## 2023-04-28 LAB
ALBUMIN SERPL BCP-MCNC: 3.3 G/DL (ref 3.2–4.9)
BUN SERPL-MCNC: 14 MG/DL (ref 8–22)
CALCIUM ALBUM COR SERPL-MCNC: 9.4 MG/DL (ref 8.5–10.5)
CALCIUM SERPL-MCNC: 8.8 MG/DL (ref 8.5–10.5)
CHLORIDE SERPL-SCNC: 106 MMOL/L (ref 96–112)
CO2 SERPL-SCNC: 21 MMOL/L (ref 20–33)
CREAT SERPL-MCNC: 1.31 MG/DL (ref 0.5–1.4)
ERYTHROCYTE [DISTWIDTH] IN BLOOD BY AUTOMATED COUNT: 43.6 FL (ref 35.9–50)
FERRITIN SERPL-MCNC: 110 NG/ML (ref 10–291)
GFR SERPLBLD CREATININE-BSD FMLA CKD-EPI: 46 ML/MIN/1.73 M 2
GLUCOSE BLD STRIP.AUTO-MCNC: 169 MG/DL (ref 65–99)
GLUCOSE BLD STRIP.AUTO-MCNC: 195 MG/DL (ref 65–99)
GLUCOSE BLD STRIP.AUTO-MCNC: 220 MG/DL (ref 65–99)
GLUCOSE BLD STRIP.AUTO-MCNC: 232 MG/DL (ref 65–99)
GLUCOSE BLD STRIP.AUTO-MCNC: 285 MG/DL (ref 65–99)
GLUCOSE SERPL-MCNC: 242 MG/DL (ref 65–99)
HCT VFR BLD AUTO: 28.6 % (ref 37–47)
HGB BLD-MCNC: 9.1 G/DL (ref 12–16)
IRON SATN MFR SERPL: 26 % (ref 15–55)
IRON SERPL-MCNC: 71 UG/DL (ref 40–170)
LV EJECT FRACT  99904: 45
LV EJECT FRACT MOD 2C 99903: 33.19
LV EJECT FRACT MOD 4C 99902: 32.8
LV EJECT FRACT MOD BP 99901: 32.99
MAGNESIUM SERPL-MCNC: 1.9 MG/DL (ref 1.5–2.5)
MCH RBC QN AUTO: 27.4 PG (ref 27–33)
MCHC RBC AUTO-ENTMCNC: 31.8 G/DL (ref 33.6–35)
MCV RBC AUTO: 86.1 FL (ref 81.4–97.8)
PHOSPHATE SERPL-MCNC: 2.4 MG/DL (ref 2.5–4.5)
PLATELET # BLD AUTO: 245 K/UL (ref 164–446)
PMV BLD AUTO: 9.8 FL (ref 9–12.9)
POTASSIUM SERPL-SCNC: 5.2 MMOL/L (ref 3.6–5.5)
RBC # BLD AUTO: 3.32 M/UL (ref 4.2–5.4)
SODIUM SERPL-SCNC: 134 MMOL/L (ref 135–145)
TIBC SERPL-MCNC: 268 UG/DL (ref 250–450)
UIBC SERPL-MCNC: 197 UG/DL (ref 110–370)
WBC # BLD AUTO: 5.7 K/UL (ref 4.8–10.8)

## 2023-04-28 PROCEDURE — 83540 ASSAY OF IRON: CPT

## 2023-04-28 PROCEDURE — 83735 ASSAY OF MAGNESIUM: CPT

## 2023-04-28 PROCEDURE — 82728 ASSAY OF FERRITIN: CPT

## 2023-04-28 PROCEDURE — 700102 HCHG RX REV CODE 250 W/ 637 OVERRIDE(OP): Performed by: INTERNAL MEDICINE

## 2023-04-28 PROCEDURE — 36415 COLL VENOUS BLD VENIPUNCTURE: CPT

## 2023-04-28 PROCEDURE — A9270 NON-COVERED ITEM OR SERVICE: HCPCS | Performed by: NURSE PRACTITIONER

## 2023-04-28 PROCEDURE — A9270 NON-COVERED ITEM OR SERVICE: HCPCS | Performed by: INTERNAL MEDICINE

## 2023-04-28 PROCEDURE — 93308 TTE F-UP OR LMTD: CPT

## 2023-04-28 PROCEDURE — A9270 NON-COVERED ITEM OR SERVICE: HCPCS | Performed by: STUDENT IN AN ORGANIZED HEALTH CARE EDUCATION/TRAINING PROGRAM

## 2023-04-28 PROCEDURE — 82962 GLUCOSE BLOOD TEST: CPT | Mod: 91

## 2023-04-28 PROCEDURE — 99233 SBSQ HOSP IP/OBS HIGH 50: CPT | Performed by: INTERNAL MEDICINE

## 2023-04-28 PROCEDURE — 85027 COMPLETE CBC AUTOMATED: CPT

## 2023-04-28 PROCEDURE — 93308 TTE F-UP OR LMTD: CPT | Mod: 26 | Performed by: INTERNAL MEDICINE

## 2023-04-28 PROCEDURE — 700105 HCHG RX REV CODE 258: Performed by: INTERNAL MEDICINE

## 2023-04-28 PROCEDURE — 700102 HCHG RX REV CODE 250 W/ 637 OVERRIDE(OP): Performed by: NURSE PRACTITIONER

## 2023-04-28 PROCEDURE — 700102 HCHG RX REV CODE 250 W/ 637 OVERRIDE(OP): Performed by: STUDENT IN AN ORGANIZED HEALTH CARE EDUCATION/TRAINING PROGRAM

## 2023-04-28 PROCEDURE — 83550 IRON BINDING TEST: CPT

## 2023-04-28 PROCEDURE — 80069 RENAL FUNCTION PANEL: CPT

## 2023-04-28 PROCEDURE — 700101 HCHG RX REV CODE 250: Performed by: INTERNAL MEDICINE

## 2023-04-28 PROCEDURE — 770020 HCHG ROOM/CARE - TELE (206)

## 2023-04-28 RX ORDER — SODIUM CHLORIDE 9 MG/ML
INJECTION, SOLUTION INTRAVENOUS CONTINUOUS
Status: ACTIVE | OUTPATIENT
Start: 2023-04-28 | End: 2023-04-28

## 2023-04-28 RX ADMIN — METOPROLOL SUCCINATE 25 MG: 25 TABLET, EXTENDED RELEASE ORAL at 05:28

## 2023-04-28 RX ADMIN — LIDOCAINE 1 PATCH: 50 PATCH TOPICAL at 09:19

## 2023-04-28 RX ADMIN — OMEPRAZOLE 20 MG: 20 CAPSULE, DELAYED RELEASE ORAL at 17:45

## 2023-04-28 RX ADMIN — DOCUSATE SODIUM 50 MG AND SENNOSIDES 8.6 MG 2 TABLET: 8.6; 5 TABLET, FILM COATED ORAL at 17:45

## 2023-04-28 RX ADMIN — OMEPRAZOLE 20 MG: 20 CAPSULE, DELAYED RELEASE ORAL at 05:27

## 2023-04-28 RX ADMIN — ATORVASTATIN CALCIUM 80 MG: 80 TABLET, FILM COATED ORAL at 17:46

## 2023-04-28 RX ADMIN — SODIUM CHLORIDE: 9 INJECTION, SOLUTION INTRAVENOUS at 17:56

## 2023-04-28 RX ADMIN — INSULIN HUMAN 2 UNITS: 100 INJECTION, SOLUTION PARENTERAL at 18:37

## 2023-04-28 RX ADMIN — INSULIN HUMAN 5 UNITS: 100 INJECTION, SOLUTION PARENTERAL at 12:31

## 2023-04-28 RX ADMIN — PREGABALIN 100 MG: 100 CAPSULE ORAL at 17:46

## 2023-04-28 RX ADMIN — INSULIN HUMAN 3 UNITS: 100 INJECTION, SOLUTION PARENTERAL at 09:19

## 2023-04-28 RX ADMIN — PREGABALIN 100 MG: 100 CAPSULE ORAL at 12:31

## 2023-04-28 RX ADMIN — OXYCODONE HYDROCHLORIDE 10 MG: 10 TABLET ORAL at 20:57

## 2023-04-28 RX ADMIN — COLCHICINE 0.6 MG: 0.6 TABLET, FILM COATED ORAL at 05:26

## 2023-04-28 RX ADMIN — INSULIN HUMAN 2 UNITS: 100 INJECTION, SOLUTION PARENTERAL at 20:54

## 2023-04-28 RX ADMIN — ASPIRIN 81 MG: 81 TABLET, COATED ORAL at 05:26

## 2023-04-28 RX ADMIN — INSULIN GLARGINE-YFGN 24 UNITS: 100 INJECTION, SOLUTION SUBCUTANEOUS at 05:32

## 2023-04-28 RX ADMIN — PRASUGREL 10 MG: 10 TABLET, FILM COATED ORAL at 05:27

## 2023-04-28 RX ADMIN — MORPHINE SULFATE 30 MG: 30 TABLET, FILM COATED, EXTENDED RELEASE ORAL at 17:46

## 2023-04-28 RX ADMIN — TRAZODONE HYDROCHLORIDE 150 MG: 100 TABLET ORAL at 20:57

## 2023-04-28 RX ADMIN — OXYCODONE HYDROCHLORIDE 10 MG: 10 TABLET ORAL at 05:28

## 2023-04-28 RX ADMIN — DIBASIC SODIUM PHOSPHATE, MONOBASIC POTASSIUM PHOSPHATE AND MONOBASIC SODIUM PHOSPHATE 250 MG: 852; 155; 130 TABLET ORAL at 09:20

## 2023-04-28 RX ADMIN — INSULIN GLARGINE-YFGN 24 UNITS: 100 INJECTION, SOLUTION SUBCUTANEOUS at 18:35

## 2023-04-28 RX ADMIN — MORPHINE SULFATE 30 MG: 30 TABLET, FILM COATED, EXTENDED RELEASE ORAL at 05:27

## 2023-04-28 RX ADMIN — PREGABALIN 100 MG: 100 CAPSULE ORAL at 05:27

## 2023-04-28 ASSESSMENT — ENCOUNTER SYMPTOMS
APNEA: 0
VOMITING: 0
ABDOMINAL PAIN: 0
COUGH: 0
FEVER: 0
WHEEZING: 0
CHEST TIGHTNESS: 0
SHORTNESS OF BREATH: 0
SHORTNESS OF BREATH: 1
WEAKNESS: 1
CHILLS: 0
STRIDOR: 0
DIZZINESS: 1
MYALGIAS: 1
CHOKING: 0
NAUSEA: 0

## 2023-04-28 ASSESSMENT — FIBROSIS 4 INDEX: FIB4 SCORE: 0.74

## 2023-04-28 ASSESSMENT — PAIN DESCRIPTION - PAIN TYPE: TYPE: ACUTE PAIN;CHRONIC PAIN

## 2023-04-28 NOTE — CARE PLAN
The patient is Stable - Low risk of patient condition declining or worsening    Shift Goals  Clinical Goals: Monitor BP  Patient Goals: pain management  Family Goals: [facility]    Progress made toward(s) clinical / shift goals:    Problem: Pain - Standard  Goal: Alleviation of pain or a reduction in pain to the patient’s comfort goal  Outcome: Progressing  Note: Patient's pain is managed per MAR.     Problem: Knowledge Deficit - Standard  Goal: Patient and family/care givers will demonstrate understanding of plan of care, disease process/condition, diagnostic tests and medications  Outcome: Progressing     Problem: Skin Integrity  Goal: Skin integrity is maintained or improved  Outcome: Progressing     Problem: Hemodynamics  Goal: Patient's hemodynamics, fluid balance and neurologic status will be stable or improve  Outcome: Progressing  Note: Patient's BP has been stable throughout shift.       Patient is not progressing towards the following goals:

## 2023-04-28 NOTE — DISCHARGE PLANNING
"Discussed patient in AM rounds.  She is not medically stable for discharge home as clinician is: \"Holding spironolactone 25 mg & Entresto -awaiting limited echo to evaluate pericardial effusion\".    Choice was obtained and referral for [facility] was sent to [facility].  Awaiting response.   "

## 2023-04-28 NOTE — CARE PLAN
The patient is Watcher - Medium risk of patient condition declining or worsening    Shift Goals  Clinical Goals: Monitor BP  Patient Goals: pain management  Family Goals: [facility]    Progress made toward(s) clinical / shift goals:  blood pressure is improving    Patient is not progressing towards the following goals: Pt still non-compliant with diet and managing DM  Problem: Pain - Standard  Goal: Alleviation of pain or a reduction in pain to the patient’s comfort goal  Outcome: Progressing     Problem: Knowledge Deficit - Standard  Goal: Patient and family/care givers will demonstrate understanding of plan of care, disease process/condition, diagnostic tests and medications  Outcome: Progressing     Problem: Skin Integrity  Goal: Skin integrity is maintained or improved  Outcome: Progressing     Problem: Psychosocial  Goal: Patient's level of anxiety will decrease  Outcome: Progressing  Goal: Patient's ability to verbalize feelings about condition will improve  Outcome: Progressing  Goal: Patient's ability to re-evaluate and adapt role responsibilities will improve  Outcome: Progressing  Goal: Patient and family will demonstrate ability to cope with life altering diagnosis and/or procedure  Outcome: Progressing  Goal: Spiritual and cultural needs incorporated into hospitalization  Outcome: Progressing     Problem: Communication  Goal: The ability to communicate needs accurately and effectively will improve  Outcome: Progressing     Problem: Discharge Barriers/Planning  Goal: Patient's continuum of care needs are met  Outcome: Progressing     Problem: Hemodynamics  Goal: Patient's hemodynamics, fluid balance and neurologic status will be stable or improve  Outcome: Progressing     Problem: Respiratory  Goal: Patient will achieve/maintain optimum respiratory ventilation and gas exchange  Outcome: Progressing     Problem: Chest Tube Management  Goal: Complications related to chest tube will be avoided or  minimized  Outcome: Progressing     Problem: Fluid Volume  Goal: Fluid volume balance will be maintained  Outcome: Progressing     Problem: Mechanical Ventilation  Goal: Safe management of artificial airway and ventilation  Outcome: Progressing  Goal: Successful weaning off mechanical ventilator, spontaneously maintains adequate gas exchange  Outcome: Progressing  Goal: Patient will be able to express needs and understand communication  Outcome: Progressing     Problem: Dysphagia  Goal: Dysphagia will improve  Outcome: Progressing     Problem: Nutrition  Goal: Patient's nutritional and fluid intake will be adequate or improve  Outcome: Progressing  Goal: Enteral nutrition will be maintained or improve  Outcome: Progressing  Goal: Enteral nutrition will be maintained or improve  Outcome: Progressing     Problem: Urinary Elimination  Goal: Establish and maintain regular urinary output  Outcome: Progressing     Problem: Bowel Elimination  Goal: Establish and maintain regular bowel function  Outcome: Progressing     Problem: Gastrointestinal Irritability  Goal: Nausea and vomiting will be absent or improve  Outcome: Progressing  Goal: Diarrhea will be absent or improved  Outcome: Progressing     Problem: Rectal Tube  Goal: Fecal output will be contained and skin will remain free from irritation  Outcome: Progressing     Problem: Mobility  Goal: Patient's capacity to carry out activities will improve  Outcome: Progressing     Problem: Self Care  Goal: Patient will have the ability to perform ADLs independently or with assistance (bathe, groom, dress, toilet and feed)  Outcome: Progressing     Problem: Infection - Standard  Goal: Patient will remain free from infection  Outcome: Progressing     Problem: Wound/ / Incision Healing  Goal: Patient's wound/surgical incision will decrease in size and heals properly  Outcome: Progressing     Problem: Care Map:  Admission Optimal Outcome for the Heart Failure Patient  Goal:  Admission:  Optimal Care of the heart failure patient  Outcome: Progressing     Problem: Care Map:  Day 3 Optimal Outcome for the Heart Failure Patient  Goal: Day 3:  Optimal Care of the heart failure patient  Outcome: Progressing     Problem: Care Map:  Day Before Discharge Optimal Outcome for the Heart Failure Patient  Goal: Day Before Discharge:  Optimal Care of the heart failure patient  Outcome: Progressing     Problem: Care Map:  Day of Discharge Optimal Outcome for the Heart Failure Patient  Goal: Day of Discharge:  Optimal Care of the heart failure patient  Outcome: Progressing     Problem: Fall Risk  Goal: Patient will remain free from falls  Outcome: Progressing     Problem: Risk for Aspiration  Goal: Patient's risk for aspiration will be absent or decrease  Outcome: Met

## 2023-04-28 NOTE — PROGRESS NOTES
Hospital Medicine Daily Progress Note    Date of Service  4/28/2023    Chief Complaint  Chest pain, nausea and vomiting.    Hospital Course  Julisa Josue is a 62 y.o. female with CAD prior CABG, DM, HTN, chronic pain. Julisa had a recent admit for CAD. She was admitted 4/23/2023 with chest pain and NSTEMI type I. Her troponin:1764.  She was started on lovenox and nitro drip.  On admit day she was taken for cardiac cath    She was taken 4/23 to cardiac cath: 1.  Occluded left circumflex s/p IVUS guided PCI deploying a Synergy 3 x 38mm ANA in the mid Cx - prox OM (overlapped with the Synergy stent from 4/2/23) and post-dilated to 4.0 mm proximally.  2. PTCA of distal LM and ostial L-Cx stent from 4/2/23 for more optimization using a 4 x 12 mm NC balloon to high pressure.    During her hospitalization cardiology is following him and he was placed on nitroglycerin gtt for chest pain.  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  Currently she is on dual antiplatelet therapy with aspirin and Effient.    Interval Problem Update  04/26/23  I evaluated and examined her at the bedside.  She was sitting in chair and reported that she is feeling better.  Overnight she became hypotensive and required IV fluid bolus.  Night team discussed with on-call cardiology.  Patient found to have significant electrolytes abnormalities per  She found to have hypomagnesemia she received 2 g of IV magnesium I ordered additional 2 g of IV magnesium.  She also found to hypokalemia I started her on potassium replacement.  Her hemoglobin is trending down I ordered repeat H&H.  She is also found to have hypocalcemia and she was placed on IV calcium replacement.  I discussed plan of care with patient    4/27 vital signs stable, blood pressure improved after medication adjustment yesterday.  Electrolytes have all improved potassium, magnesium, and calcium are all now within normal limits.  Patient continues to complain of  chest pain and shoulder pain that she reports has been unchanged since her cardiac cath. Left shoulder pain from a prior fracture, ordered lidocaine patch. Discussed with cardiology, will to monitor the patient blood pressure and electrolytes.  If they continue to be stable tomorrow can likely DC home. I have ordered home health for the patient however after discussion she reports that they were unable to set this up for her in the past based on where she lives.  She does live at an assisted living facility that has therapy.    4/28 Vitals stable on room air.  Echo shows no pericardial effusion, EF 45%.  Patient is having significant orthostatic episodes with dizziness when standing and difficulty walking.  Glucose consistently above 200 add 5 units mealtime insulin.  Patient continues to complain of chest and shoulder pain.  Discussed with cardiology who recommended to hold spironolactone and Entresto for now.  Okay for 500 mL of IV fluids.  Will check iron studies as previous labs showed borderline ferritin level    I have discussed this patient's plan of care and discharge plan at IDT rounds today with Case Management, Nursing, Nursing leadership, and other members of the IDT team.    Consultants/Specialty  cardiology    Code Status  Full Code    Disposition  Patient is not medically cleared for discharge.   Anticipate discharge to to home with close outpatient follow-up.  I have placed the appropriate orders for post-discharge needs.    Review of Systems  Review of Systems   Constitutional:  Positive for malaise/fatigue. Negative for chills and fever.   Respiratory:  Positive for shortness of breath.    Cardiovascular:  Positive for chest pain. Negative for leg swelling.   Gastrointestinal:  Negative for abdominal pain, nausea and vomiting.   Musculoskeletal:  Positive for myalgias.   Neurological:  Positive for dizziness and weakness.   All other systems reviewed and are negative.     Physical Exam  Temp:   [35.9 °C (96.6 °F)-37.1 °C (98.8 °F)] 36.9 °C (98.4 °F)  Pulse:  [71-91] 85  Resp:  [15-18] 18  BP: ()/(40-62) 95/40  SpO2:  [89 %-95 %] 95 %    Physical Exam  Vitals reviewed.   Constitutional:       General: She is not in acute distress.     Appearance: Normal appearance. She is ill-appearing.   HENT:      Head: Normocephalic and atraumatic.   Eyes:      Conjunctiva/sclera: Conjunctivae normal.   Cardiovascular:      Rate and Rhythm: Normal rate and regular rhythm.      Pulses: Normal pulses.      Heart sounds: Normal heart sounds.   Pulmonary:      Effort: Pulmonary effort is normal. No respiratory distress.      Breath sounds: Normal breath sounds. No stridor.   Abdominal:      General: Bowel sounds are normal. There is no distension.      Palpations: Abdomen is soft.      Tenderness: There is no abdominal tenderness.   Musculoskeletal:         General: Tenderness and signs of injury present.      Cervical back: Normal range of motion. No rigidity.      Comments: Left shoulder pain with decreased ROM   Skin:     General: Skin is warm.   Neurological:      General: No focal deficit present.      Mental Status: She is alert and oriented to person, place, and time.      Cranial Nerves: No cranial nerve deficit.      Motor: Weakness (left arm) present.   Psychiatric:         Mood and Affect: Mood is anxious.         Behavior: Behavior normal.       Fluids    Intake/Output Summary (Last 24 hours) at 4/28/2023 1626  Last data filed at 4/28/2023 0800  Gross per 24 hour   Intake 240 ml   Output --   Net 240 ml       Laboratory  Recent Labs     04/26/23  0157 04/26/23  0935 04/27/23  0255 04/28/23  0257   WBC 6.6  --  7.1 5.7   RBC 3.18*  --  3.64* 3.32*   HEMOGLOBIN 8.8* 9.3* 9.8* 9.1*   HEMATOCRIT 27.4* 29.1* 31.7* 28.6*   MCV 86.2  --  87.1 86.1   MCH 27.7  --  26.9* 27.4   MCHC 32.1*  --  30.9* 31.8*   RDW 42.6  --  43.1 43.6   PLATELETCT 215  --  262 245   MPV 9.5  --  9.8 9.8     Recent Labs      04/26/23  0026 04/27/23  0255 04/28/23  0257   SODIUM 138 133* 134*   POTASSIUM 3.3* 5.4 5.2   CHLORIDE 111 105 106   CO2 17* 17* 21   GLUCOSE 213* 275* 242*   BUN 13 14 14   CREATININE 1.40 1.54* 1.31   CALCIUM 6.6* 8.7 8.8                     Imaging  EC-ECHOCARDIOGRAM LTD W/O CONT   Final Result      EC-ECHOCARDIOGRAM LTD W/O CONT   Final Result      DX-CHEST-PORTABLE (1 VIEW)   Final Result      No acute cardiopulmonary abnormality.         CL-LEFT HEART CATHETERIZATION WITH POSSIBLE INTERVENTION    (Results Pending)          Assessment/Plan  * NSTEMI (non-ST elevated myocardial infarction) ([facility])- (present on admission)  Assessment & Plan  Cardiology has been following her.  Continue current medical management with aspirin, Effient, Entresto and metoprolol.   Continue to monitor closely on telemetry.  Echocardiogram ordered currently results pending.  4/23 Cardiac cath:  IMPRESSION:  This is most likely a delayed posterior STEMI presentation with possible concerns for Plavix non-adherence or non-responder.  1.  Occluded left circumflex s/p IVUS guided PCI deploying a Synergy 3 x 38mm ANA in the mid Cx - prox OM (overlapped with the Synergy stent from 4/2/23) and post-dilated to 4.0 mm proximally.  2. PTCA of distal LM and ostial L-Cx stent from 4/2/23 for more optimization using a 4 x 12 mm NC balloon to high pressure.  3.  Normal resting LVEDP 5 mmHg without significant transaortic gradient on pullback.  Discontinued nitroglycerin gtt.  I discussed plan of care with cardiologist.  Continue IV morphine on April 26, 2023.  Use IV narcotics with caution due to ongoing hypotension.      Hypotension  Assessment & Plan  Patient found to have hypotension.  Overnight she developed hypotension and received IV fluid bolus.    resolved    Hypomagnesemia  Assessment & Plan  She found to have hypomagnesemia.  She received 2 g of IV magnesium overnight.  I ordered additional 2 g of IV magnesium.  Continue to monitor and  replace as needed.    ACC/AHA stage C systolic heart failure (HCC)- (present on admission)  Assessment & Plan  Metoprolol 50mg qd  Held sacubitril-valsartan 24-26mg BID, aldactone 25mg daily for now   Continue to monitor input and output.  Echocardiogram EF 45%    Impaired instrumental activities of daily living (IADL)- (present on admission)  Assessment & Plan  Patient reports that typically she was living at assisted living however she went back to her apartment to give her 30-day notice, plans to go back to assisted living on discharge  Patient has reported multiple falls, denies any trauma to her head  PT/OT    Coronary artery disease due to lipid rich plaque- (present on admission)  Assessment & Plan  Continue atorvastatin and dual antiplatelet therapy.  Cardiology is following her.  Monitor on tele    S/P insertion of non-drug eluting coronary artery stent- (present on admission)  Assessment & Plan  Continue dual antiplatelet therapy with aspirin and Effient.      Anemia  Assessment & Plan  4/24 Hgb:10.2 <11  Stable   Check iron and ferritin   Trend CBC daily     Chronic pain syndrome with narcotic dependence- (present on admission)  Assessment & Plan  Continue home opiate pain meds  Continue home gabapentin and Lyrica    Obesity (BMI 30.0-34.9)  Assessment & Plan  Body mass index is 32.28 kg/m².  Encourage lifestyle modification.    Diabetes (HCC)- (present on admission)  Assessment & Plan  glargine to 24 units twice daily.  Add 5 U mealtime insulin   Continue insulin sliding scale with hypoglycemia protocol.  Holding home Januvia and meformin  Diabetic diet    uncontrolled    Hyperlipidemia- (present on admission)  Assessment & Plan  Continue atorvastatin 80 mg      Essential hypertension- (present on admission)  Assessment & Plan  Metoprolol, aldactone, Entresto  Monitor vitals        VTE prophylaxis: SCDs/TEDs    I have performed a physical exam and reviewed and updated ROS and Plan today (4/28/2023). In  review of yesterday's note (4/27/2023), there are no changes except as documented above.

## 2023-04-28 NOTE — PROGRESS NOTES
Cardiology Follow Up Progress Note    Date of Service  4/28/2023    Attending Physician  Soila Torres D.O.    Chief Complaint   Delayed posterior STEMI    HPI  Julisa Josue is a 62 y.o. female with recent NSTEMI s/p  PCI LCX & rPDA 4/3/23 , prior PCI LCX 2018, CABG x1 LIMA to LAD 9/14/2021, type 2 diabetes, hypertension admitted 4/23/2023 with likely delayed posterior STEMI concern for Plavix non-adherence or nonresponder.    Interim Events  No overnight cardiac events  Telemetry-SR  H&H stable  OJ, improved  BP appropriate  Phos 2.4  Limited echo pending    Review of Systems  Review of Systems   Respiratory:  Negative for apnea, cough, choking, chest tightness, shortness of breath, wheezing and stridor.      Vital signs in last 24 hours  Temp:  [36.8 °C (98.2 °F)-37.1 °C (98.8 °F)] 36.9 °C (98.4 °F)  Pulse:  [71-91] 71  Resp:  [15-18] 15  BP: ()/(40-88) 106/62  SpO2:  [90 %-94 %] 94 %        Lab Review  Lab Results   Component Value Date/Time    WBC 5.7 04/28/2023 02:57 AM    RBC 3.32 (L) 04/28/2023 02:57 AM    HEMOGLOBIN 9.1 (L) 04/28/2023 02:57 AM    HEMATOCRIT 28.6 (L) 04/28/2023 02:57 AM    MCV 86.1 04/28/2023 02:57 AM    MCH 27.4 04/28/2023 02:57 AM    MCHC 31.8 (L) 04/28/2023 02:57 AM    MPV 9.8 04/28/2023 02:57 AM      Lab Results   Component Value Date/Time    SODIUM 134 (L) 04/28/2023 02:57 AM    POTASSIUM 5.2 04/28/2023 02:57 AM    CHLORIDE 106 04/28/2023 02:57 AM    CO2 21 04/28/2023 02:57 AM    GLUCOSE 242 (H) 04/28/2023 02:57 AM    BUN 14 04/28/2023 02:57 AM    CREATININE 1.31 04/28/2023 02:57 AM      Lab Results   Component Value Date/Time    ASTSGOT 10 (L) 04/27/2023 02:55 AM    ALTSGPT 11 04/27/2023 02:55 AM     Lab Results   Component Value Date/Time    CHOLSTRLTOT 151 04/24/2023 04:02 AM    LDL 86 04/24/2023 04:02 AM    HDL 33 (A) 04/24/2023 04:02 AM    TRIGLYCERIDE 161 (H) 04/24/2023 04:02 AM    TROPONINT 1764 (H) 04/23/2023 03:37 PM             Assessment/Plan    # Delayed  posterior STEMI secondary to non-adherence to Plavix versus Plavix nonresponder.  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      Cardiology will follow along    I personally spent a total of 16 minutes which includes face-to-face time and non-face-to-face time spent on preparing to see the patient, reviewing hospital notes and tests, obtaining history from the patient, performing a medically appropriate exam, counseling and educating the patient, ordering medications/tests/procedures/referrals as clinically indicated, and documenting information in the electronic medical record.     Thank you for allowing me to participate in the care of this patient.  I will continue to follow this patient    Please contact me with any questions.    SHILPA Mays.   Cardiologist, [facility]  [phone]

## 2023-04-28 NOTE — DISCHARGE SUMMARY
Discharge Summary    CHIEF COMPLAINT ON ADMISSION  Chief Complaint   Patient presents with    Chest Pain     Onset last night   Pain 7/10 on arrival    N/V     Onset last night        Reason for Admission  ems     Admission Date  4/23/2023    CODE STATUS  Full Code    HPI & HOSPITAL COURSE  This is a 62 y.o. female here with chest pain     62 y.o. female with CAD prior CABG, DM, HTN, chronic pain. Julisa had a recent admit for CAD. She was admitted 4/23/2023 with chest pain and NSTEMI type I. Her troponin:1764.  She was started on lovenox and nitro drip.  On admit day she was taken for cardiac cath     She was taken 4/23 to cardiac cath: 1.  Occluded left circumflex s/p IVUS guided PCI deploying a Synergy 3 x 38mm ANA in the mid Cx - prox OM (overlapped with the Synergy stent from 4/2/23) and post-dilated to 4.0 mm proximally.  2. PTCA of distal LM and ostial L-Cx stent from 4/2/23 for more optimization using a 4 x 12 mm NC balloon to high pressure.     During her hospitalization cardiology is following him and he was placed on nitroglycerin gtt for chest pain.  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  Currently she is on dual antiplatelet therapy with aspirin and Effient as she is considered a plavix nonresponder.  Patient was having recurrent chest pain, repeat echo showed no pericardial effusion, EF 45%.  Patient was found to be orthostatic positive, she was treated with IV fluids and this improved.  Patient was still having some dizziness, treated with some as needed meclizine with improvement.  Patient's vital signs are stable, she has a wheelchair at home and is planning to discharge to an assisted living facility.  Her medications were filled prior to discharge.  I have held her home metformin due to her CKD.  She is to follow-up outpatient with her primary care physician and if her renal function remains stable she should be started on ARB and Farxiga.  Patient is ready for discharge and is to  return to the ER if her condition worsens.    Therefore, she is discharged in fair and stable condition to home with close outpatient follow-up.    The patient met 2-midnight criteria for an inpatient stay at the time of discharge.    Discharge Date  5/1/2023    FOLLOW UP ITEMS POST DISCHARGE  Follow up with primary care and cardiology   Recommend repeat lab work, if renal function stable start Farxiga, metformin, and ARB.    DISCHARGE DIAGNOSES  Principal Problem:    NSTEMI (non-ST elevated myocardial infarction) (HCC) POA: Yes  Active Problems:    Essential hypertension POA: Yes    Hyperlipidemia (Chronic) POA: Yes    Diabetes (HCC) POA: Yes    Obesity (BMI 30.0-34.9) POA: Unknown    Chronic pain syndrome with narcotic dependence (Chronic) POA: Yes    Anemia POA: Unknown    S/P insertion of non-drug eluting coronary artery stent POA: Yes    Coronary artery disease due to lipid rich plaque POA: Yes    Impaired instrumental activities of daily living (IADL) POA: Yes    ACC/AHA stage C systolic heart failure (HCC) POA: Yes    Long term (current) use of antithrombotics/antiplatelets - DAPT recommended indefinitely POA: Unknown    Hypomagnesemia POA: Unknown    Hypotension POA: Unknown  Resolved Problems:    * No resolved hospital problems. *      FOLLOW UP  Future Appointments   Date Time Provider Department Center   5/4/2023  1:15 PM VAUGHN HolcombCB None     [facility]  [address]  [address]  [facility] [id number] [phone]  Call  Your doctor has referred you for Cardiac Rehab, which is important for your recovery. Please call to make an appointment, or speak with your local doctor to find a program in your area.    [facility]  [address]  [facility] [address]  [phone]  Go on 5/5/2023  Arrive at 7:00am for hospital follow up care. This is a walk in clinic, you can go anytime between 7am-5:30pm.      MEDICATIONS ON DISCHARGE      Medication List        START taking these medications        Instructions   aspirin 81 MG EC tablet  Start taking on: May 2, 2023   Take 1 Tablet by mouth every day.  Dose: 81 mg     colchicine 0.6 MG Tabs  Start taking on: May 2, 2023  Commonly known as: COLCRYS   Take 1 Tablet by mouth every day.  Dose: 0.6 mg     meclizine 25 MG Tabs  Commonly known as: ANTIVERT   Take 1 Tablet by mouth 3 times a day as needed for Dizziness or Vertigo.  Dose: 25 mg     omeprazole 40 MG delayed-release capsule  Commonly known as: PRILOSEC   Take 1 Capsule by mouth every day.  Dose: 40 mg     prasugrel 10 MG Tabs  Start taking on: May 2, 2023  Commonly known as: EFFIENT   Take 1 Tablet by mouth every day.  Dose: 10 mg            CHANGE how you take these medications        Instructions   atorvastatin 80 MG tablet  What changed: Another medication with the same name was removed. Continue taking this medication, and follow the directions you see here.  Commonly known as: LIPITOR   Take 1 Tablet by mouth every evening.  Dose: 80 mg     metoprolol SR 25 MG Tb24  Start taking on: May 2, 2023  What changed:   medication strength  how much to take  Commonly known as: TOPROL XL   Take 1 Tablet by mouth every day.  Dose: 25 mg            CONTINUE taking these medications        Instructions   acetaminophen 325 MG Tabs  Commonly known as: Tylenol   Take 650 mg by mouth every 6 hours as needed. Indications: Pain  Dose: 650 mg     insulin aspart 100 UNIT/ML Soln  Commonly known as: NovoLOG   Inject 2-8 Units under the skin 2 times a day. If blood sugar is less than 70 call MD  If Blood Sugar is 200-250 = 2 units  If Blood Sugar is 251-300 = 4 units  If Blood Sugar is 301-350 = 6 units  If Blood Sugar is 351-400 = 8 units  Dose: 2-8 Units     insulin GLARGINE 100 UNIT/ML Soln  Commonly known as: Lantus,Semglee   Inject 20 Units under the skin 2 times a day.  Dose: 20 Units     loperamide 2 MG Caps  Commonly known as: IMODIUM   Take 2 mg by  mouth 4 times a day as needed for Diarrhea.  Dose: 2 mg     morphine ER 30 MG Tbcr tablet  Commonly known as: MS CONTIN   Take 30 mg by mouth every 12 hours.  Dose: 30 mg     oxyCODONE immediate release 10 MG immediate release tablet  Commonly known as: ROXICODONE   Take 10 mg by mouth every 6 hours as needed for Moderate Pain.  Dose: 10 mg     pregabalin 100 MG Caps  Commonly known as: LYRICA   Take 100 mg by mouth 2 times a day.  Dose: 100 mg     SITagliptin 50 MG Tabs  Commonly known as: JANUVIA   Take 50 mg by mouth every day.  Dose: 50 mg     traZODone 150 MG Tabs  Commonly known as: DESYREL   Doctor's comments: Already has at home.  Take 1 Tablet by mouth at bedtime.  Dose: 150 mg            STOP taking these medications      cloNIDine 0.1 MG Tabs  Commonly known as: CATAPRES     clopidogrel 75 MG Tabs  Commonly known as: PLAVIX     metformin 1000 MG tablet  Commonly known as: GLUCOPHAGE              Allergies  Allergies   Allergen Reactions    Plavix [Clopidogrel]      Non responder       DIET  Orders Placed This Encounter   Procedures    Diet Order Diet: Consistent CHO (Diabetic)     Standing Status:   Standing     Number of Occurrences:   1     Order Specific Question:   Diet:     Answer:   Consistent CHO (Diabetic) [4]       ACTIVITY  As tolerated.  Weight bearing as tolerated    CONSULTATIONS  Cardiology  Electrophysiology    PROCEDURES  4/23/23  PROCEDURES PERFORMED:  Left heart catheterization  Coronary angiography  Bypass graft angiography  PCI of left circumflex coronary artery  PTCA of distal LM  Intravascular ultrasound to guide revascularization  Moderate conscious sedation  Right femoral angiography  Ultrasound-guided right common femoral arterial access  Perclose    LABORATORY  Lab Results   Component Value Date    SODIUM 140 04/30/2023    POTASSIUM 4.7 04/30/2023    CHLORIDE 109 04/30/2023    CO2 21 04/30/2023    GLUCOSE 193 (H) 04/30/2023    BUN 13 04/30/2023    CREATININE 1.50 (H) 04/30/2023         Lab Results   Component Value Date    WBC 5.4 04/29/2023    HEMOGLOBIN 9.3 (L) 04/29/2023    HEMATOCRIT 30.1 (L) 04/29/2023    PLATELETCT 258 04/29/2023        Total time of the discharge process exceeds 42 minutes.

## 2023-04-29 LAB
ANION GAP SERPL CALC-SCNC: 9 MMOL/L (ref 7–16)
BUN SERPL-MCNC: 14 MG/DL (ref 8–22)
CALCIUM SERPL-MCNC: 8.9 MG/DL (ref 8.5–10.5)
CHLORIDE SERPL-SCNC: 106 MMOL/L (ref 96–112)
CO2 SERPL-SCNC: 23 MMOL/L (ref 20–33)
CREAT SERPL-MCNC: 1.18 MG/DL (ref 0.5–1.4)
ERYTHROCYTE [DISTWIDTH] IN BLOOD BY AUTOMATED COUNT: 44.9 FL (ref 35.9–50)
GFR SERPLBLD CREATININE-BSD FMLA CKD-EPI: 53 ML/MIN/1.73 M 2
GLUCOSE BLD STRIP.AUTO-MCNC: 184 MG/DL (ref 65–99)
GLUCOSE BLD STRIP.AUTO-MCNC: 201 MG/DL (ref 65–99)
GLUCOSE BLD STRIP.AUTO-MCNC: 202 MG/DL (ref 65–99)
GLUCOSE BLD STRIP.AUTO-MCNC: 239 MG/DL (ref 65–99)
GLUCOSE BLD STRIP.AUTO-MCNC: 269 MG/DL (ref 65–99)
GLUCOSE SERPL-MCNC: 222 MG/DL (ref 65–99)
HCT VFR BLD AUTO: 30.1 % (ref 37–47)
HGB BLD-MCNC: 9.3 G/DL (ref 12–16)
MAGNESIUM SERPL-MCNC: 2 MG/DL (ref 1.5–2.5)
MCH RBC QN AUTO: 27.1 PG (ref 27–33)
MCHC RBC AUTO-ENTMCNC: 30.9 G/DL (ref 33.6–35)
MCV RBC AUTO: 87.8 FL (ref 81.4–97.8)
PLATELET # BLD AUTO: 258 K/UL (ref 164–446)
PMV BLD AUTO: 9.5 FL (ref 9–12.9)
POTASSIUM SERPL-SCNC: 4.8 MMOL/L (ref 3.6–5.5)
RBC # BLD AUTO: 3.43 M/UL (ref 4.2–5.4)
SODIUM SERPL-SCNC: 138 MMOL/L (ref 135–145)
WBC # BLD AUTO: 5.4 K/UL (ref 4.8–10.8)

## 2023-04-29 PROCEDURE — 99233 SBSQ HOSP IP/OBS HIGH 50: CPT | Performed by: INTERNAL MEDICINE

## 2023-04-29 PROCEDURE — A9270 NON-COVERED ITEM OR SERVICE: HCPCS | Performed by: STUDENT IN AN ORGANIZED HEALTH CARE EDUCATION/TRAINING PROGRAM

## 2023-04-29 PROCEDURE — A9270 NON-COVERED ITEM OR SERVICE: HCPCS | Performed by: INTERNAL MEDICINE

## 2023-04-29 PROCEDURE — 700102 HCHG RX REV CODE 250 W/ 637 OVERRIDE(OP): Performed by: INTERNAL MEDICINE

## 2023-04-29 PROCEDURE — 700105 HCHG RX REV CODE 258: Performed by: INTERNAL MEDICINE

## 2023-04-29 PROCEDURE — 770020 HCHG ROOM/CARE - TELE (206)

## 2023-04-29 PROCEDURE — 700102 HCHG RX REV CODE 250 W/ 637 OVERRIDE(OP): Performed by: STUDENT IN AN ORGANIZED HEALTH CARE EDUCATION/TRAINING PROGRAM

## 2023-04-29 PROCEDURE — 700101 HCHG RX REV CODE 250: Performed by: INTERNAL MEDICINE

## 2023-04-29 PROCEDURE — A9270 NON-COVERED ITEM OR SERVICE: HCPCS | Performed by: NURSE PRACTITIONER

## 2023-04-29 PROCEDURE — 83735 ASSAY OF MAGNESIUM: CPT

## 2023-04-29 PROCEDURE — 700102 HCHG RX REV CODE 250 W/ 637 OVERRIDE(OP): Performed by: NURSE PRACTITIONER

## 2023-04-29 PROCEDURE — 85027 COMPLETE CBC AUTOMATED: CPT

## 2023-04-29 PROCEDURE — 36415 COLL VENOUS BLD VENIPUNCTURE: CPT

## 2023-04-29 PROCEDURE — 82962 GLUCOSE BLOOD TEST: CPT | Mod: 91

## 2023-04-29 PROCEDURE — 80048 BASIC METABOLIC PNL TOTAL CA: CPT

## 2023-04-29 RX ORDER — SODIUM CHLORIDE 9 MG/ML
INJECTION, SOLUTION INTRAVENOUS CONTINUOUS
Status: DISCONTINUED | OUTPATIENT
Start: 2023-04-29 | End: 2023-04-30

## 2023-04-29 RX ADMIN — ATORVASTATIN CALCIUM 80 MG: 80 TABLET, FILM COATED ORAL at 16:52

## 2023-04-29 RX ADMIN — INSULIN GLARGINE-YFGN 24 UNITS: 100 INJECTION, SOLUTION SUBCUTANEOUS at 04:37

## 2023-04-29 RX ADMIN — INSULIN HUMAN 2 UNITS: 100 INJECTION, SOLUTION PARENTERAL at 17:24

## 2023-04-29 RX ADMIN — TRAZODONE HYDROCHLORIDE 150 MG: 100 TABLET ORAL at 20:10

## 2023-04-29 RX ADMIN — OXYCODONE HYDROCHLORIDE 10 MG: 10 TABLET ORAL at 02:56

## 2023-04-29 RX ADMIN — INSULIN HUMAN 3 UNITS: 100 INJECTION, SOLUTION PARENTERAL at 13:23

## 2023-04-29 RX ADMIN — INSULIN HUMAN 3 UNITS: 100 INJECTION, SOLUTION PARENTERAL at 20:08

## 2023-04-29 RX ADMIN — INSULIN GLARGINE-YFGN 24 UNITS: 100 INJECTION, SOLUTION SUBCUTANEOUS at 17:20

## 2023-04-29 RX ADMIN — INSULIN HUMAN 3 UNITS: 100 INJECTION, SOLUTION PARENTERAL at 08:30

## 2023-04-29 RX ADMIN — MORPHINE SULFATE 30 MG: 30 TABLET, FILM COATED, EXTENDED RELEASE ORAL at 04:33

## 2023-04-29 RX ADMIN — COLCHICINE 0.6 MG: 0.6 TABLET, FILM COATED ORAL at 04:33

## 2023-04-29 RX ADMIN — SODIUM CHLORIDE: 9 INJECTION, SOLUTION INTRAVENOUS at 22:35

## 2023-04-29 RX ADMIN — METOPROLOL SUCCINATE 25 MG: 25 TABLET, EXTENDED RELEASE ORAL at 04:33

## 2023-04-29 RX ADMIN — MORPHINE SULFATE 30 MG: 30 TABLET, FILM COATED, EXTENDED RELEASE ORAL at 17:19

## 2023-04-29 RX ADMIN — PRASUGREL 10 MG: 10 TABLET, FILM COATED ORAL at 04:34

## 2023-04-29 RX ADMIN — OMEPRAZOLE 20 MG: 20 CAPSULE, DELAYED RELEASE ORAL at 04:34

## 2023-04-29 RX ADMIN — ASPIRIN 81 MG: 81 TABLET, COATED ORAL at 04:33

## 2023-04-29 RX ADMIN — PREGABALIN 100 MG: 100 CAPSULE ORAL at 04:34

## 2023-04-29 RX ADMIN — PREGABALIN 100 MG: 100 CAPSULE ORAL at 17:19

## 2023-04-29 RX ADMIN — PREGABALIN 100 MG: 100 CAPSULE ORAL at 13:24

## 2023-04-29 RX ADMIN — OMEPRAZOLE 20 MG: 20 CAPSULE, DELAYED RELEASE ORAL at 16:52

## 2023-04-29 RX ADMIN — LIDOCAINE 1 PATCH: 50 PATCH TOPICAL at 08:27

## 2023-04-29 RX ADMIN — SODIUM CHLORIDE: 9 INJECTION, SOLUTION INTRAVENOUS at 13:00

## 2023-04-29 ASSESSMENT — ENCOUNTER SYMPTOMS
COUGH: 0
ABDOMINAL PAIN: 0
SHORTNESS OF BREATH: 1
VOMITING: 0
CHILLS: 0
STRIDOR: 0
MYALGIAS: 1
CHEST TIGHTNESS: 0
FEVER: 0
CHOKING: 0
WEAKNESS: 1
WHEEZING: 0
DIZZINESS: 1
SHORTNESS OF BREATH: 0
NAUSEA: 0
APNEA: 0

## 2023-04-29 ASSESSMENT — COGNITIVE AND FUNCTIONAL STATUS - GENERAL
CLIMB 3 TO 5 STEPS WITH RAILING: A LOT
DRESSING REGULAR UPPER BODY CLOTHING: A LITTLE
MOBILITY SCORE: 17
TURNING FROM BACK TO SIDE WHILE IN FLAT BAD: A LITTLE
MOVING TO AND FROM BED TO CHAIR: A LITTLE
DAILY ACTIVITIY SCORE: 19
TOILETING: A LITTLE
MOVING FROM LYING ON BACK TO SITTING ON SIDE OF FLAT BED: A LITTLE
DRESSING REGULAR LOWER BODY CLOTHING: A LITTLE
SUGGESTED CMS G CODE MODIFIER MOBILITY: CK
STANDING UP FROM CHAIR USING ARMS: A LITTLE
HELP NEEDED FOR BATHING: A LITTLE
SUGGESTED CMS G CODE MODIFIER DAILY ACTIVITY: CK
PERSONAL GROOMING: A LITTLE
WALKING IN HOSPITAL ROOM: A LITTLE

## 2023-04-29 ASSESSMENT — PAIN DESCRIPTION - PAIN TYPE: TYPE: ACUTE PAIN

## 2023-04-29 NOTE — PROGRESS NOTES
Hospital Medicine Daily Progress Note    Date of Service  4/29/2023    Chief Complaint  Chest pain, nausea and vomiting.    Hospital Course  Julisa Josue is a 62 y.o. female with CAD prior CABG, DM, HTN, chronic pain. Julisa had a recent admit for CAD. She was admitted 4/23/2023 with chest pain and NSTEMI type I. Her troponin:1764.  She was started on lovenox and nitro drip.  On admit day she was taken for cardiac cath    She was taken 4/23 to cardiac cath: 1.  Occluded left circumflex s/p IVUS guided PCI deploying a Synergy 3 x 38mm ANA in the mid Cx - prox OM (overlapped with the Synergy stent from 4/2/23) and post-dilated to 4.0 mm proximally.  2. PTCA of distal LM and ostial L-Cx stent from 4/2/23 for more optimization using a 4 x 12 mm NC balloon to high pressure.    During her hospitalization cardiology is following him and he was placed on nitroglycerin gtt for chest pain.  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  Currently she is on dual antiplatelet therapy with aspirin and Effient.    Interval Problem Update  04/26/23  I evaluated and examined her at the bedside.  She was sitting in chair and reported that she is feeling better.  Overnight she became hypotensive and required IV fluid bolus.  Night team discussed with on-call cardiology.  Patient found to have significant electrolytes abnormalities per  She found to have hypomagnesemia she received 2 g of IV magnesium I ordered additional 2 g of IV magnesium.  She also found to hypokalemia I started her on potassium replacement.  Her hemoglobin is trending down I ordered repeat H&H.  She is also found to have hypocalcemia and she was placed on IV calcium replacement.  I discussed plan of care with patient    4/27 vital signs stable, blood pressure improved after medication adjustment yesterday.  Electrolytes have all improved potassium, magnesium, and calcium are all now within normal limits.  Patient continues to complain of  chest pain and shoulder pain that she reports has been unchanged since her cardiac cath. Left shoulder pain from a prior fracture, ordered lidocaine patch. Discussed with cardiology, will to monitor the patient blood pressure and electrolytes.  If they continue to be stable tomorrow can likely DC home. I have ordered home health for the patient however after discussion she reports that they were unable to set this up for her in the past based on where she lives.  She does live at an assisted living facility that has therapy.    4/28 Vitals stable on room air.  Echo shows no pericardial effusion, EF 45%.  Patient is having significant orthostatic episodes with dizziness when standing and difficulty walking.  Glucose consistently above 200 add 5 units mealtime insulin.  Patient continues to complain of chest and shoulder pain.  Discussed with cardiology who recommended to hold spironolactone and Entresto for now.  Okay for 500 mL of IV fluids.  Will check iron studies as previous labs showed borderline ferritin level    4/29 +orthostatics. Started IVF, will repeat this afternoon. H&H stable. Telemetry continues to show sinus rhythm. Discussed with cardiology continue to hold aldactone and entresto for the +orthostatics. Patient still having chest pain and dizziness. Glucose still >200s, increase mealtime 8 U.     I have discussed this patient's plan of care and discharge plan at IDT rounds today with Case Management, Nursing, Nursing leadership, and other members of the IDT team.    Consultants/Specialty  cardiology    Code Status  Full Code    Disposition  Patient is not medically cleared for discharge.   Anticipate discharge to to home with close outpatient follow-up.  I have placed the appropriate orders for post-discharge needs.    Review of Systems  Review of Systems   Constitutional:  Positive for malaise/fatigue. Negative for chills and fever.   Respiratory:  Positive for shortness of breath.    Cardiovascular:   Positive for chest pain. Negative for leg swelling.   Gastrointestinal:  Negative for abdominal pain, nausea and vomiting.   Musculoskeletal:  Positive for myalgias.   Neurological:  Positive for dizziness and weakness.   All other systems reviewed and are negative.     Physical Exam  Temp:  [36.5 °C (97.7 °F)-36.9 °C (98.4 °F)] 36.6 °C (97.9 °F)  Pulse:  [77-85] 82  Resp:  [16-18] 17  BP: ()/(40-70) 121/61  SpO2:  [92 %-97 %] 97 %    Physical Exam  Vitals reviewed.   Constitutional:       General: She is not in acute distress.     Appearance: Normal appearance. She is ill-appearing.      Comments: Sitting up in bed   HENT:      Head: Normocephalic and atraumatic.   Eyes:      Conjunctiva/sclera: Conjunctivae normal.   Cardiovascular:      Rate and Rhythm: Normal rate and regular rhythm.      Pulses: Normal pulses.      Heart sounds: Normal heart sounds.   Pulmonary:      Effort: Pulmonary effort is normal. No respiratory distress.      Breath sounds: Normal breath sounds. No stridor.   Abdominal:      General: Bowel sounds are normal. There is no distension.      Palpations: Abdomen is soft.      Tenderness: There is no abdominal tenderness.   Musculoskeletal:         General: Tenderness and signs of injury present.      Cervical back: Normal range of motion. No rigidity.      Comments: Left shoulder pain with decreased ROM   Skin:     General: Skin is warm.   Neurological:      General: No focal deficit present.      Mental Status: She is alert and oriented to person, place, and time.      Cranial Nerves: No cranial nerve deficit.      Motor: Weakness (left arm) present.   Psychiatric:         Mood and Affect: Mood is anxious.         Behavior: Behavior normal.       Fluids    Intake/Output Summary (Last 24 hours) at 4/29/2023 1329  Last data filed at 4/29/2023 1044  Gross per 24 hour   Intake 720 ml   Output --   Net 720 ml         Laboratory  Recent Labs     04/27/23  0255 04/28/23  0257 04/29/23  0234    WBC 7.1 5.7 5.4   RBC 3.64* 3.32* 3.43*   HEMOGLOBIN 9.8* 9.1* 9.3*   HEMATOCRIT 31.7* 28.6* 30.1*   MCV 87.1 86.1 87.8   MCH 26.9* 27.4 27.1   MCHC 30.9* 31.8* 30.9*   RDW 43.1 43.6 44.9   PLATELETCT 262 245 258   MPV 9.8 9.8 9.5       Recent Labs     04/27/23  0255 04/28/23  0257 04/29/23  0234   SODIUM 133* 134* 138   POTASSIUM 5.4 5.2 4.8   CHLORIDE 105 106 106   CO2 17* 21 23   GLUCOSE 275* 242* 222*   BUN 14 14 14   CREATININE 1.54* 1.31 1.18   CALCIUM 8.7 8.8 8.9                       Imaging  EC-ECHOCARDIOGRAM LTD W/O CONT   Final Result      EC-ECHOCARDIOGRAM LTD W/O CONT   Final Result      DX-CHEST-PORTABLE (1 VIEW)   Final Result      No acute cardiopulmonary abnormality.         CL-LEFT HEART CATHETERIZATION WITH POSSIBLE INTERVENTION    (Results Pending)          Assessment/Plan  * NSTEMI (non-ST elevated myocardial infarction) (HCC)- (present on admission)  Assessment & Plan  Cardiology has been following her.  Continue current medical management with aspirin, Effient, Entresto and metoprolol.   Continue to monitor closely on telemetry.  Echocardiogram ordered currently results pending.  4/23 Cardiac cath:  IMPRESSION:  This is most likely a delayed posterior STEMI presentation with possible concerns for Plavix non-adherence or non-responder.  1.  Occluded left circumflex s/p IVUS guided PCI deploying a Synergy 3 x 38mm ANA in the mid Cx - prox OM (overlapped with the Synergy stent from 4/2/23) and post-dilated to 4.0 mm proximally.  2. PTCA of distal LM and ostial L-Cx stent from 4/2/23 for more optimization using a 4 x 12 mm NC balloon to high pressure.  3.  Normal resting LVEDP 5 mmHg without significant transaortic gradient on pullback.  Discontinued nitroglycerin gtt.  I discussed plan of care with cardiologist.  Continue IV morphine on April 26, 2023.  Use IV narcotics with caution due to ongoing hypotension.      Hypotension  Assessment & Plan  Patient found to have hypotension.  Overnight  she developed hypotension and received IV fluid bolus.    resolved    Hypomagnesemia  Assessment & Plan  She found to have hypomagnesemia.  She received 2 g of IV magnesium overnight.  I ordered additional 2 g of IV magnesium.  Continue to monitor and replace as needed.    ACC/AHA stage C systolic heart failure (HCC)- (present on admission)  Assessment & Plan  Metoprolol 50mg qd  Held sacubitril-valsartan 24-26mg BID, aldactone 25mg daily for now   Continue to monitor input and output.  Echocardiogram EF 45%    Impaired instrumental activities of daily living (IADL)- (present on admission)  Assessment & Plan  Patient reports that typically she was living at assisted living however she went back to her apartment to give her 30-day notice, plans to go back to assisted living on discharge  Patient has reported multiple falls, denies any trauma to her head  PT/OT    Coronary artery disease due to lipid rich plaque- (present on admission)  Assessment & Plan  Continue atorvastatin and dual antiplatelet therapy.  Cardiology is following her.  Monitor on tele    S/P insertion of non-drug eluting coronary artery stent- (present on admission)  Assessment & Plan  Continue dual antiplatelet therapy with aspirin and Effient.      Anemia  Assessment & Plan  4/24 Hgb:10.2 <11  Stable   Check iron and ferritin   Trend CBC daily     Chronic pain syndrome with narcotic dependence- (present on admission)  Assessment & Plan  Continue home opiate pain meds  Continue home gabapentin and Lyrica    Obesity (BMI 30.0-34.9)  Assessment & Plan  Body mass index is 32.28 kg/m².  Encourage lifestyle modification.    Diabetes (HCC)- (present on admission)  Assessment & Plan  glargine to 24 units twice daily.  Add 5 U mealtime insulin   Continue insulin sliding scale with hypoglycemia protocol.  Holding home Januvia and meformin  Diabetic diet    uncontrolled    Hyperlipidemia- (present on admission)  Assessment & Plan  Continue atorvastatin 80  mg      Essential hypertension- (present on admission)  Assessment & Plan  Metoprolol, aldactone, Entresto  Monitor vitals        VTE prophylaxis: SCDs/TEDs    I have performed a physical exam and reviewed and updated ROS and Plan today (4/29/2023). In review of yesterday's note (4/28/2023), there are no changes except as documented above.

## 2023-04-29 NOTE — PROGRESS NOTES
Cardiology Follow Up Progress Note    Date of Service  4/29/2023    Attending Physician  Soila Torres D.O.    Chief Complaint   Delayed posterior STEMI    HPI  Julisa Josue is a 62 y.o. female with recent NSTEMI s/p  PCI LCX & rPDA 4/3/23 , prior PCI LCX 2018, CABG x1 LIMA to LAD 9/14/2021, type 2 diabetes, hypertension admitted 4/23/2023 with likely delayed posterior STEMI concern for Plavix non-adherence or nonresponder.    Interim Events  No overnight cardiac events  Telemetry-SR  H&H stable  OJ, improved  Remains fatigued, deconditioned, diaphoretic and hypotensive with exertion    Review of Systems  Review of Systems   Respiratory:  Negative for apnea, cough, choking, chest tightness, shortness of breath, wheezing and stridor.      Vital signs in last 24 hours  Temp:  [35.9 °C (96.6 °F)-36.9 °C (98.4 °F)] 36.6 °C (97.9 °F)  Pulse:  [71-85] 84  Resp:  [16-18] 16  BP: ()/(40-70) 102/53  SpO2:  [89 %-95 %] 92 %        Lab Review  Lab Results   Component Value Date/Time    WBC 5.4 04/29/2023 02:34 AM    RBC 3.43 (L) 04/29/2023 02:34 AM    HEMOGLOBIN 9.3 (L) 04/29/2023 02:34 AM    HEMATOCRIT 30.1 (L) 04/29/2023 02:34 AM    MCV 87.8 04/29/2023 02:34 AM    MCH 27.1 04/29/2023 02:34 AM    MCHC 30.9 (L) 04/29/2023 02:34 AM    MPV 9.5 04/29/2023 02:34 AM      Lab Results   Component Value Date/Time    SODIUM 138 04/29/2023 02:34 AM    POTASSIUM 4.8 04/29/2023 02:34 AM    CHLORIDE 106 04/29/2023 02:34 AM    CO2 23 04/29/2023 02:34 AM    GLUCOSE 222 (H) 04/29/2023 02:34 AM    BUN 14 04/29/2023 02:34 AM    CREATININE 1.18 04/29/2023 02:34 AM      Lab Results   Component Value Date/Time    ASTSGOT 10 (L) 04/27/2023 02:55 AM    ALTSGPT 11 04/27/2023 02:55 AM     Lab Results   Component Value Date/Time    CHOLSTRLTOT 151 04/24/2023 04:02 AM    LDL 86 04/24/2023 04:02 AM    HDL 33 (A) 04/24/2023 04:02 AM    TRIGLYCERIDE 161 (H) 04/24/2023 04:02 AM    TROPONINT 1764 (H) 04/23/2023 03:37 PM              Assessment/Plan    # Delayed posterior STEMI secondary to non-adherence to Plavix versus Plavix nonresponder.  # Recent NSTEMI s/p PCI LCX 4/2/23  # Staged PCI rPDA 4/3/23  # PCI mLCX/OM 4/23/23 , PTCA Left main/oLCX 4/23/23  #LIMA to LAD CABG x 1  9/2021  #History of PCI LCx , pre CABG  # History of PCI LAD ,post CABG  # LVEF 45%-50%  #Chronic pain syndrome with narcotic dependence  #Obesity (BMI 35)  #Type 2 diabetes, most recent A1c 8.1    Recommendations  -Continues to feel unwell, fatigued & diaphoretic with hypotension with exertion this morning  -Hold initiating Farxiga  -DAPT for life in the form of aspirin 81 & prasugrel 10 mg  -Continue atorvastatin 80 mg daily  -Continue colchicine 0.6 daily for Dressler syndrome  -Toprol-XL 25 mg daily  -Holding spironolactone 25 mg & Entresto secondary to symptomatic soft BP with exertion.  -Repeat limited echo 4/28/2023 showed no evidence of pericardial effusion.  Work-up for anemia pending    Cardiology will follow along    I personally spent a total of 17  minutes which includes face-to-face time and non-face-to-face time spent on preparing to see the patient, reviewing hospital notes and tests, obtaining history from the patient, performing a medically appropriate exam, counseling and educating the patient, ordering medications/tests/procedures/referrals as clinically indicated, and documenting information in the electronic medical record.     Thank you for allowing me to participate in the care of this patient.  I will continue to follow this patient    Please contact me with any questions.    SHILPA Mays.   Cardiologist, [facility]  [phone]

## 2023-04-29 NOTE — CARE PLAN
The patient is Stable - Low risk of patient condition declining or worsening    Shift Goals  Clinical Goals: Monitor BP  Patient Goals: pain management  Family Goals: [facility]    Progress made toward(s) clinical / shift goals:    Problem: Pain - Standard  Goal: Alleviation of pain or a reduction in pain to the patient’s comfort goal  Outcome: Progressing     Problem: Knowledge Deficit - Standard  Goal: Patient and family/care givers will demonstrate understanding of plan of care, disease process/condition, diagnostic tests and medications  Outcome: Progressing     Problem: Skin Integrity  Goal: Skin integrity is maintained or improved  Outcome: Progressing     Problem: Discharge Barriers/Planning  Goal: Patient's continuum of care needs are met  Outcome: Progressing     Problem: Hemodynamics  Goal: Patient's hemodynamics, fluid balance and neurologic status will be stable or improve  Outcome: Progressing     Problem: Respiratory  Goal: Patient will achieve/maintain optimum respiratory ventilation and gas exchange  Outcome: Progressing     Note: Patient's pain management has improved. She only asked for pain med once. Her BP has stabilized in the 100's. Patient saturates well on room air and often ambulates to the bathroom. Daily weight taken stand up scale. Patient's educated on taking daily weight and keeping record of it at home.    Patient is not progressing towards the following goals:

## 2023-04-29 NOTE — CARE PLAN
The patient is Stable - Low risk of patient condition declining or worsening    Shift Goals  Clinical Goals: Monitor BP  Patient Goals: Discharge  Family Goals: [facility]    Progress made toward(s) clinical / shift goals:      Problem: Pain - Standard  Goal: Alleviation of pain or a reduction in pain to the patient’s comfort goal  Description: Target End Date:  Prior to discharge or change in level of care    Document on Vitals flowsheet    1.  Document pain using the appropriate pain scale per order or unit policy  2.  Educate and implement non-pharmacologic comfort measures (i.e. relaxation, distraction, massage, cold/heat therapy, etc.)  3.  Pain management medications as ordered  4.  Reassess pain after pain med administration per policy  5.  If opiods administered assess patient's response to pain medication is appropriate per POSS sedation scale  6.  Follow pain management plan developed in collaboration with patient and interdisciplinary team (including palliative care or pain specialists if applicable)  Outcome: Progressing     Problem: Knowledge Deficit - Standard  Goal: Patient and family/care givers will demonstrate understanding of plan of care, disease process/condition, diagnostic tests and medications  Description: Target End Date:  1-3 days or as soon as patient condition allows    Document in Patient Education    1.  Patient and family/caregiver oriented to unit, equipment, visitation policy and means for communicating concern  2.  Complete/review Learning Assessment  3.  Assess knowledge level of disease process/condition, treatment plan, diagnostic tests and medications  4.  Explain disease process/condition, treatment plan, diagnostic tests and medications  Outcome: Progressing     Problem: Skin Integrity  Goal: Skin integrity is maintained or improved  Description: Target End Date:  Prior to discharge or change in level of care    Document interventions on Skin Risk/Jd flowsheet groups and  corresponding LDA    1.  Assess and monitor skin integrity, appearance and/or temperature  2.  Assess risk factors for impaired skin integrity and/or pressures ulcers  3.  Implement precautions to protect skin integrity in collaboration with interdisciplinary team  4.  Implement pressure ulcer prevention protocol if at risk for skin breakdown  5.  Confirm wound care consult if at risk for skin breakdown  6.  Ensure patient use of pressure relieving devices  (Low air loss bed, waffle overlay, heel protectors, ROHO cushion, etc)  Outcome: Progressing     Problem: Psychosocial  Goal: Patient's level of anxiety will decrease  Description: Target End Date:  1-3 days or as soon as patient condition allows    1.  Collaborate with patient and family/caregiver to identify triggers and develop strategies to cope with anxiety  2.  Implement stimuli reduction, calming techniques  3.  Pharmacologic management per provider order  4.  Encourage patient/family/care giver participation  5.  Collaborate with interdisciplinary team including Psychologist or Behavioral Health Team as needed  Outcome: Progressing  Goal: Patient's ability to verbalize feelings about condition will improve  Description: Target End Date:  Prior to discharge or change in level of care    1.  Discuss coping with medical condition and its effects  2.  Encourage patient participation in care  3.  Encourage acknowledgement of body changes and accompanying emotions  4.  Perform depression screening  Outcome: Progressing  Goal: Patient's ability to re-evaluate and adapt role responsibilities will improve  Description: Target End Date:  Prior to discharge or change in level of care    1.  Assess family support  2.  Encourage support system participation in care  3.  Encouraged verbalization of feelings regarding caregiver responsibilities  4.  Discuss changes in role and responsibilities caused by patient's condition  Outcome: Progressing  Goal: Patient and family  will demonstrate ability to cope with life altering diagnosis and/or procedure  Description: Target End Date:  1-3 days or as soon as patient condition allows    1. Expect initial shock and disbelief following diagnosis of cancer and traumatizing procedures (disfiguring surgery, colostomy, amputation).  2.  Assess patient and family/caregiver for stage of grief currently being experienced. Explain process as appropriate.  3.  Provide open, nonjudgmental environment. Use therapeutic communication skills of Active-Listening, acknowledgment, and so on.  4.  Encourage verbalization of thoughts or concerns and accept expressions of sadness, anger, rejection. Acknowledge normality of these feelings  5.  Be aware of mood swings, hostility, and other acting-out behavior. Set limits on inappropriate behavior, redirect negative thinking  6.  Be aware of debilitating depression. Ask patient direct questions about state of mind.  7.  Visit frequently and provide physical contact as appropriate, or provide frequent phone support as appropriate for setting. Arrange for care provider and support person to stay with patient as needed  8.  Reinforce teaching regarding disease process and treatments and provide information as appropriate about dying. Be honest; do not give false hope while providing emotional support  9.  Review past life experiences, role changes, and coping skills. Talk about things that interest the patient  Outcome: Progressing  Goal: Spiritual and cultural needs incorporated into hospitalization  Description: Target End Date:  End of day 1    1.  Encourage verbalization of feelings, concerns, expectations and needs  2.  Collaborate with Case Management/  3.  Collaborate with Pastoral Care to meet spiritual needs  Outcome: Progressing     Problem: Communication  Goal: The ability to communicate needs accurately and effectively will improve  Description: Target End Date:  End of day 1    1.  Assess  ability to communicate and understand  2.  Provide augmentative or alternative methods of communication devices  3.  Use /language line as appropriate  4.  Collaborate with Speech Therapy as needed  Outcome: Progressing     Problem: Discharge Barriers/Planning  Goal: Patient's continuum of care needs are met  Description: Target End Date:  Prior to discharge or change in level of care    1.  Identify potential discharge barriers on admission and throughout hospitalization  2.  Collaborate with Case Management, , Clinical Educators, Navigators and others on the transitional care team to meet discharge needs  3.  Involve patient/family/caregivers in setting and prioritizing goals for hospitalization and discharge  4.  Ensure Flu vaccinations are addressed  5.  Inquire if patient is interested in the Meds to Bed program  6.  Ensure patient and family/caregiver are able to demonstrate use of equipment as prescribed  7.  Ensure patient and family/caregiver can verbalize understanding of patient education  8.  Explain discharge instructions and medication reconciliation to patient and family/caregiver  Outcome: Progressing     Problem: Hemodynamics  Goal: Patient's hemodynamics, fluid balance and neurologic status will be stable or improve  Description: Target End Date:  Prior to discharge or change in level of care    Document on Assessment and I/O flowsheet templates    1.  Monitor vital signs, pulse oximetry and cardiac monitor per provider order and/or policy  2.  Maintain blood pressure per provider order  3.  Hemodynamic monitoring per provider order  4.  Manage IV fluids and IV infusions  5.  Monitor intake and output  6.  Daily weights per unit policy or provider order  7.  Assess peripheral pulses and capillary refill  8.  Assess color and body temperature  9.  Position patient for maximum circulation/cardiac output  10. Monitor for signs/symptoms of excessive bleeding  11. Assess mental  status, restlessness and changes in level of consciousness  12. Monitor temperature and report fever or hypothermia to provider immediately. Consideration of targeted temperature management.  Outcome: Progressing     Problem: Respiratory  Goal: Patient will achieve/maintain optimum respiratory ventilation and gas exchange  Description: Target End Date:  Prior to discharge or change in level of care    Document on Assessment flowsheet    1.  Assess and monitor rate, rhythm, depth and effort of respiration  2.  Breath sounds assessed qshift and/or as needed  3.  Assess O2 saturation, administer/titrate oxygen as ordered  4.  Position patient for maximum ventilatory efficiency  5.  Turn, cough, and deep breath with splinting to improve effectiveness  6.  Collaborate with RT to administer medication/treatments per order  7.  Encourage use of incentive spirometer and encourage patient to cough after use and utilize splinting techniques if applicable  8.  Airway suctioning  9.  Monitor sputum production for changes in color, consistency and frequency  10. Perform frequent oral hygiene  11. Alternate physical activity with rest periods  Outcome: Progressing     Problem: Chest Tube Management  Goal: Complications related to chest tube will be avoided or minimized  Description: Target End Date:  when tube discontinued    1.  Frequent respiratory assessments  2.  Encourage cough and deep breathing exercises  3.  Encourage mobility and meals out of bed  4.  Monitor chest tube output (drainage amount/color)  5.  Assess chest tube connection sites to ensure tube is patent with no air leaks  6.  Ensure water-seal chamber is at correct level  7.  Ensure appropriate level of function (water-seal, -20 cm H20, etc...)  8.  Monitor for sign/symptoms of crepitus  Outcome: Progressing     Problem: Fluid Volume  Goal: Fluid volume balance will be maintained  Description: Target End Date:  Prior to discharge or change in level of  care    Document on I/O flowsheet    1.  Monitor intake and output as ordered  2.  Promote oral intake as appropriate  3.  Report inadequate intake or output to physician  4.  Administer IV therapy as ordered  5.  Weights per provider order  6.  Assess for signs and symptoms of bleeding  7.  Monitor for signs of fluid overload (respiratory changes, edema, weight gain, increased abdominal girth)  8.  Monitor of signs for inadequate fluid volume (poor skin turgor, dry mucous membranes)  9.  Instruct patient on adherence to fluid restrictions  Outcome: Progressing     Problem: Mechanical Ventilation  Goal: Safe management of artificial airway and ventilation  Description: Target End Date:  when vent discontinued    Document on VAP flowsheet and Airway LDA    1.  Daily awakening trials per provider order/policy  2.  Suctioning and care of ET/Trach tube (document on LDA)  3.  Collaborate with RT to administer medications/treatments  4.  Ambu bag at bedside and available for transport  5.  Trach patient - replacement trach at bedside  6.  Provide communication tools if applicable  Outcome: Progressing  Goal: Successful weaning off mechanical ventilator, spontaneously maintains adequate gas exchange  Description: Target End Date:  when vent discontinued    1.  Follow universal weaning protocol for patients on mechanical ventilation per order  2.  Review contraindication list.  Obtain provider order to wean in presence of contraindication.  3.  Obtain Phi Sedation-Agitation Score  4.  Phi Score 1-2:  sedation vacation  5.  Phi Score 3-4:  Collaborate with provider and/or RT to determine readiness for trial  6.  Begin 2 hour trial of weaning following protocol  7.  Evaluate for fatigue parameters per protocol  8.  Fatigue parameters triggered:  Stop wean and return to previous ASV% setting or increase % minute volume to offset work or breathing  Outcome: Progressing  Goal: Patient will be able to express needs and  understand communication  Description: Target End Date:  when vent discontinued    1.  Assess ability to communicate and understand  2.  Provide communication tools  3.  Collaborate with Speech Therapy for [facility]  Outcome: Progressing     Problem: Dysphagia  Goal: Dysphagia will improve  Description: Target End Date:  Prior to discharge or change in level of care    1.  Assess and monitor ability to swallow  2.  Collaborate with Speech Therapy to determine appropriate adaptation for safe administration of medications and oral nutrition  3.  Elevate head of bed to 90 degrees during feedings and for 30 minutes after each feeding  4.  Encourage proper swallowing techniques  5.  Screening on admission or as soon as possible  Outcome: Progressing     Problem: Nutrition  Goal: Patient's nutritional and fluid intake will be adequate or improve  Description: Target End Date:  Prior to discharge or change in level of care    Document on I/O flowsheet    1.  Monitor nutritional intake  2.  Monitor weight per provider order  3.  Assess patient's ability to take oral nutrition  4.  Collaborate with Speech Therapy, Dietitian and interdisciplinary team for appropriate feeding and fluid intake  5.  Assist with feeding  Outcome: Progressing  Goal: Enteral nutrition will be maintained or improve  Description: Target End Date:  Prior to discharge or change in level of care    1. Enteral access to be obtained or modified  2. Advance to goal rate per protocol  3. Collaborate with Clinical Dietitian for signs/symptoms of intolerance  4. Weights per provider order  5. Consider Speech Therapy consult for swallowing difficulties  Outcome: Progressing  Goal: Enteral nutrition will be maintained or improve  Description: Target End Date:  Prior to discharge or change in level of care    1.  Fingerstick glucose every 8 hours  2.  Notify provider for fever or elevated glucose  3.  TPN tubing and port changes every 24 hours.  Turn TPN through  dedicated line. (Document on LDA)  4.  TPN dressing changes 24 hours after insertion and then every Saturday  (Document on LDA)  5.  Daily weights  6.  Monitor TPN lab results  7.  Collaborate with Clinical Dietician  8.  Collaborate with Pharmacist  Outcome: Progressing     Problem: Urinary Elimination  Goal: Establish and maintain regular urinary output  Description: Target End Date:  Prior to discharge or change in level of care    Document on I/O and Assessment flowsheets    1.  Evaluate need to continue indwelling catheter every shift  2.  Assess signs and symptoms of urinary retention  3.  Assess post-void residual volumes  4.  Implement bladder training program  5.  Encourage scheduled voidings  6.  Assist patient to sit on bedside commode or toilet for voiding  7.  Educate patient and family/caregiver on use and purpose of urine collection devices (document in Patient Education)  Outcome: Progressing     Problem: Bowel Elimination  Goal: Establish and maintain regular bowel function  Description: Target End Date:  Prior to discharge or change in level of care    1.   Note date of last BM  2.   Educate about diet, fluid intake, medication and activity to promote bowel function  3.   Educate signs and symptoms of constipation and interventions to implement  4.   Pharmacologic bowel management per provider order  5.   Regular toileting schedule  6.   Upright position for toileting  7.   High fiber diet  8.   Encourage hydration  9.   Collaborate with Clinical Dietician  10. Care and maintenance of ostomy if applicable  Outcome: Progressing     Problem: Gastrointestinal Irritability  Goal: Nausea and vomiting will be absent or improve  Description: Target End Date:  Prior to discharge or change in level of care    Document on I/O and Assessment flowsheets    1.  Assess for history, duration, frequency, severity, and potential precipitating factors  2.  Administer antiemetics as ordered  3.  Emesis basin within  easy reach of the patient, but out of sight if psychogenic component  4.  Eliminate strong odors from surroundings  5.  Introduce cold water, ice chips, sravan products, and room temperature broth or bouillon if tolerated and appropriate to the patient's diet  6.  Encourage small amounts of bland, simple foods like broth, rice, bananas, Jell-O, crackers or toast if tolerated and appropriate to patient's diet  7.  Position the patient upright while eating and for 1 to 2 hours post-meal  8.  Encourage nonpharmacological nausea control techniques such as relaxation, guided imagery, music therapy, distraction, or deep breathing exercises  Outcome: Progressing  Goal: Diarrhea will be absent or improved  Description: Target End Date:  Prior to discharge or change in level of care    1.  Assess for abdominal discomfort, pain, cramping, frequency, urgency, loose or liquid stools, and hyperactive bowel sensations.  2.  Evaluate pattern of defecation  3.  Administer antidiarrheal agents per order  4. Culture stool, per order  5.  Inquire about food intolerances, medications, change in eating pattern and current stressors  6.  Assess for fecal impaction  7.  Assess hydration status  8.  Assess condition of perianal skin  9.  Loss of bowel elimination control can lead of feelings of decreased self esteem.  Examine the emotional impact of illness, hospitalization and/or accidents.  Outcome: Progressing     Problem: Rectal Tube  Goal: Fecal output will be contained and skin will remain free from irritation  Description: Target End Date:  3 to 5 days    1.  Check tubing for leakage throughout shift, troubleshoot PRN per rectal tube order  2.  Flush blue irrigation tube once per shift and PRN with 60 mL of warm tap water  3.  Without removing tube from rectum, remove water from balloon and ensure less then or equal 45mL  4.  Ensure balloon is seeded to rectum by pulling slightly down on tubing  5.  Cleanse skin and apply barrier  paste around tubing  Outcome: Progressing     Problem: Mobility  Goal: Patient's capacity to carry out activities will improve  Description: Target End Date:  Prior to discharge or change in level of care    1.  Assess for barriers to mobility/activity  2.  Implement activity per interdisciplinary team recommendations  3.  Target activity level identified and patient/family/caregiver aware of goal  4.  Provide assistive devices  5.  Instruct patient/caregiver on proper use of assistive/adaptive devices  6.  Schedule activities and rest periods to decrease effects of fatigue  7.  Encourage mobilization to extent of ability  8.  Maintain proper body alignment  9.  Provide adequate pain management to allow progressive mobilization  10. Implement pace maker precautions as needed  Outcome: Progressing     Problem: Self Care  Goal: Patient will have the ability to perform ADLs independently or with assistance (bathe, groom, dress, toilet and feed)  Description: Target End Date:  Prior to discharge or change in level of care    Document on ADL flowsheet    1.  Assess the capability and level of deficiency to perform ADLs  2.  Encourage family/care giver involvement  3.  Provide assistive devices  4.  Consider PT/OT evaluations  5.  Maintain support, give positive feedback, encourage self-care allowing extra time and verbal cuing as needed  6.  Avoid doing something for patients they can do themselves, but provide assistance as needed  7.  Assist in anticipating/planning individual needs  8.  Collaborate with Case Management and  to meet discharge needs  Outcome: Progressing     Problem: Infection - Standard  Goal: Patient will remain free from infection  Description: Target End Date:  Prior to discharge or change in level of care    1.  Utilize Standard Precautions at all times to reduce the risk of transmission of microorganisms from both recognized and  unrecognized sources of infection  2.  Infection  prevention handouts provided (general/device/diagnosis specific) and documented in Patient Education  3.  Educate patient and family/caregiver on isolation precautions if applicable  Outcome: Progressing     Problem: Wound/ / Incision Healing  Goal: Patient's wound/surgical incision will decrease in size and heals properly  Description: Target End Date:  Prior to discharge or change in level of care    Document on LDA    1.  Assess and document surgical incision/wound  2.  Provide incision/wound care per policy and/or provider orders  3.  Manage surgical drains per policy if applicable  4.  Encourage adequate nutrition to promote wound healing  5.  Collaborate with Clinical Dietician  Outcome: Progressing     Problem: Care Map:  Admission Optimal Outcome for the Heart Failure Patient  Goal: Admission:  Optimal Care of the heart failure patient  Description: Target End Date:  end of day 1  Outcome: Progressing     Problem: Care Map:  Day 1 Optimal Outcome for the Heart Failure Patient  Goal: Day 1:  Optimal Care of the heart failure patient  Description: Target End Date:  end of day 1  Outcome: Progressing     Problem: Care Map:  Day 2 Optimal Outcome for the Heart Failure Patient  Goal: Day 2:  Optimal Care of the heart failure patient  Description: Target End Date:  end of day 2  Outcome: Progressing     Problem: Care Map:  Day 3 Optimal Outcome for the Heart Failure Patient  Goal: Day 3:  Optimal Care of the heart failure patient  Description: Target End Date:  end of day 3  Outcome: Progressing     Problem: Care Map:  Day Before Discharge Optimal Outcome for the Heart Failure Patient  Goal: Day Before Discharge:  Optimal Care of the heart failure patient  Description: Target End Date:  Prior to discharge or change in level of care  Outcome: Progressing     Problem: Care Map:  Day of Discharge Optimal Outcome for the Heart Failure Patient  Goal: Day of Discharge:  Optimal Care of the heart failure  patient  Description: Target End Date:  Prior to discharge or change in level of care  Outcome: Progressing     Problem: Fall Risk  Goal: Patient will remain free from falls  Description: Target End Date:  Prior to discharge or change in level of care    Document interventions on the May Guerra Fall Risk Assessment    1.  Assess for fall risk factors  2.  Implement fall precautions  Outcome: Progressing

## 2023-04-29 NOTE — PROGRESS NOTES
Assumed care of pt. Bedside report completed with Prosper MCIWLLI[facility]. Updated on POC. Pt is sleeping in bed. Call light within reach. Bed in a low and locked position. Will continue to monitor.

## 2023-04-30 LAB
ANION GAP SERPL CALC-SCNC: 10 MMOL/L (ref 7–16)
BUN SERPL-MCNC: 13 MG/DL (ref 8–22)
CALCIUM SERPL-MCNC: 8.7 MG/DL (ref 8.5–10.5)
CHLORIDE SERPL-SCNC: 109 MMOL/L (ref 96–112)
CO2 SERPL-SCNC: 21 MMOL/L (ref 20–33)
CREAT SERPL-MCNC: 1.5 MG/DL (ref 0.5–1.4)
GFR SERPLBLD CREATININE-BSD FMLA CKD-EPI: 39 ML/MIN/1.73 M 2
GLUCOSE BLD STRIP.AUTO-MCNC: 127 MG/DL (ref 65–99)
GLUCOSE BLD STRIP.AUTO-MCNC: 140 MG/DL (ref 65–99)
GLUCOSE BLD STRIP.AUTO-MCNC: 191 MG/DL (ref 65–99)
GLUCOSE BLD STRIP.AUTO-MCNC: 214 MG/DL (ref 65–99)
GLUCOSE BLD STRIP.AUTO-MCNC: 249 MG/DL (ref 65–99)
GLUCOSE BLD STRIP.AUTO-MCNC: 258 MG/DL (ref 65–99)
GLUCOSE SERPL-MCNC: 193 MG/DL (ref 65–99)
POTASSIUM SERPL-SCNC: 4.7 MMOL/L (ref 3.6–5.5)
SODIUM SERPL-SCNC: 140 MMOL/L (ref 135–145)

## 2023-04-30 PROCEDURE — A9270 NON-COVERED ITEM OR SERVICE: HCPCS | Performed by: INTERNAL MEDICINE

## 2023-04-30 PROCEDURE — 82962 GLUCOSE BLOOD TEST: CPT

## 2023-04-30 PROCEDURE — 700102 HCHG RX REV CODE 250 W/ 637 OVERRIDE(OP): Performed by: STUDENT IN AN ORGANIZED HEALTH CARE EDUCATION/TRAINING PROGRAM

## 2023-04-30 PROCEDURE — A9270 NON-COVERED ITEM OR SERVICE: HCPCS | Performed by: NURSE PRACTITIONER

## 2023-04-30 PROCEDURE — 80048 BASIC METABOLIC PNL TOTAL CA: CPT

## 2023-04-30 PROCEDURE — 700102 HCHG RX REV CODE 250 W/ 637 OVERRIDE(OP): Performed by: INTERNAL MEDICINE

## 2023-04-30 PROCEDURE — 700101 HCHG RX REV CODE 250: Performed by: INTERNAL MEDICINE

## 2023-04-30 PROCEDURE — 700102 HCHG RX REV CODE 250 W/ 637 OVERRIDE(OP): Performed by: NURSE PRACTITIONER

## 2023-04-30 PROCEDURE — 99233 SBSQ HOSP IP/OBS HIGH 50: CPT | Performed by: INTERNAL MEDICINE

## 2023-04-30 PROCEDURE — 770001 HCHG ROOM/CARE - MED/SURG/GYN PRIV*

## 2023-04-30 PROCEDURE — 99232 SBSQ HOSP IP/OBS MODERATE 35: CPT | Performed by: INTERNAL MEDICINE

## 2023-04-30 PROCEDURE — 36415 COLL VENOUS BLD VENIPUNCTURE: CPT

## 2023-04-30 PROCEDURE — A9270 NON-COVERED ITEM OR SERVICE: HCPCS | Performed by: STUDENT IN AN ORGANIZED HEALTH CARE EDUCATION/TRAINING PROGRAM

## 2023-04-30 RX ORDER — FERROUS SULFATE 325(65) MG
325 TABLET ORAL
Status: DISCONTINUED | OUTPATIENT
Start: 2023-05-01 | End: 2023-04-30

## 2023-04-30 RX ORDER — MECLIZINE HYDROCHLORIDE 25 MG/1
25 TABLET ORAL 3 TIMES DAILY PRN
Status: DISCONTINUED | OUTPATIENT
Start: 2023-04-30 | End: 2023-05-01 | Stop reason: HOSPADM

## 2023-04-30 RX ADMIN — ASPIRIN 81 MG: 81 TABLET, COATED ORAL at 04:22

## 2023-04-30 RX ADMIN — INSULIN GLARGINE-YFGN 24 UNITS: 100 INJECTION, SOLUTION SUBCUTANEOUS at 18:17

## 2023-04-30 RX ADMIN — INSULIN GLARGINE-YFGN 24 UNITS: 100 INJECTION, SOLUTION SUBCUTANEOUS at 04:19

## 2023-04-30 RX ADMIN — OMEPRAZOLE 20 MG: 20 CAPSULE, DELAYED RELEASE ORAL at 04:21

## 2023-04-30 RX ADMIN — DOCUSATE SODIUM 50 MG AND SENNOSIDES 8.6 MG 2 TABLET: 8.6; 5 TABLET, FILM COATED ORAL at 18:22

## 2023-04-30 RX ADMIN — COLCHICINE 0.6 MG: 0.6 TABLET, FILM COATED ORAL at 04:21

## 2023-04-30 RX ADMIN — OMEPRAZOLE 20 MG: 20 CAPSULE, DELAYED RELEASE ORAL at 18:22

## 2023-04-30 RX ADMIN — PREGABALIN 100 MG: 100 CAPSULE ORAL at 06:00

## 2023-04-30 RX ADMIN — MORPHINE SULFATE 30 MG: 30 TABLET, FILM COATED, EXTENDED RELEASE ORAL at 18:22

## 2023-04-30 RX ADMIN — ATORVASTATIN CALCIUM 80 MG: 80 TABLET, FILM COATED ORAL at 18:22

## 2023-04-30 RX ADMIN — PREGABALIN 100 MG: 100 CAPSULE ORAL at 18:22

## 2023-04-30 RX ADMIN — OXYCODONE HYDROCHLORIDE 10 MG: 10 TABLET ORAL at 22:13

## 2023-04-30 RX ADMIN — METOPROLOL SUCCINATE 25 MG: 25 TABLET, EXTENDED RELEASE ORAL at 04:21

## 2023-04-30 RX ADMIN — INSULIN HUMAN 3 UNITS: 100 INJECTION, SOLUTION PARENTERAL at 10:00

## 2023-04-30 RX ADMIN — INSULIN HUMAN 5 UNITS: 100 INJECTION, SOLUTION PARENTERAL at 13:18

## 2023-04-30 RX ADMIN — PREGABALIN 100 MG: 100 CAPSULE ORAL at 13:07

## 2023-04-30 RX ADMIN — LIDOCAINE 1 PATCH: 50 PATCH TOPICAL at 10:47

## 2023-04-30 RX ADMIN — TRAZODONE HYDROCHLORIDE 150 MG: 100 TABLET ORAL at 21:23

## 2023-04-30 RX ADMIN — MORPHINE SULFATE 30 MG: 30 TABLET, FILM COATED, EXTENDED RELEASE ORAL at 04:21

## 2023-04-30 RX ADMIN — PRASUGREL 10 MG: 10 TABLET, FILM COATED ORAL at 04:22

## 2023-04-30 ASSESSMENT — ENCOUNTER SYMPTOMS
CHOKING: 0
VOMITING: 0
STRIDOR: 0
CHILLS: 0
COUGH: 0
MYALGIAS: 1
WEAKNESS: 1
ABDOMINAL PAIN: 0
SHORTNESS OF BREATH: 1
DIZZINESS: 1
WHEEZING: 0
NAUSEA: 0
FEVER: 0
CHEST TIGHTNESS: 0
SHORTNESS OF BREATH: 0
APNEA: 0

## 2023-04-30 ASSESSMENT — PAIN DESCRIPTION - PAIN TYPE
TYPE: ACUTE PAIN
TYPE: ACUTE PAIN

## 2023-04-30 ASSESSMENT — FIBROSIS 4 INDEX: FIB4 SCORE: 0.7

## 2023-04-30 NOTE — PROGRESS NOTES
Hospital Medicine Daily Progress Note    Date of Service  4/30/2023    Chief Complaint  Chest pain, nausea and vomiting.    Hospital Course  Julisa Josue is a 62 y.o. female with CAD prior CABG, DM, HTN, chronic pain. Julisa had a recent admit for CAD. She was admitted 4/23/2023 with chest pain and NSTEMI type I. Her troponin:1764.  She was started on lovenox and nitro drip.  On admit day she was taken for cardiac cath    She was taken 4/23 to cardiac cath: 1.  Occluded left circumflex s/p IVUS guided PCI deploying a Synergy 3 x 38mm ANA in the mid Cx - prox OM (overlapped with the Synergy stent from 4/2/23) and post-dilated to 4.0 mm proximally.  2. PTCA of distal LM and ostial L-Cx stent from 4/2/23 for more optimization using a 4 x 12 mm NC balloon to high pressure.    During her hospitalization cardiology is following him and he was placed on nitroglycerin gtt for chest pain.  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  Currently she is on dual antiplatelet therapy with aspirin and Effient.    Interval Problem Update  04/26/23  I evaluated and examined her at the bedside.  She was sitting in chair and reported that she is feeling better.  Overnight she became hypotensive and required IV fluid bolus.  Night team discussed with on-call cardiology.  Patient found to have significant electrolytes abnormalities per  She found to have hypomagnesemia she received 2 g of IV magnesium I ordered additional 2 g of IV magnesium.  She also found to hypokalemia I started her on potassium replacement.  Her hemoglobin is trending down I ordered repeat H&H.  She is also found to have hypocalcemia and she was placed on IV calcium replacement.  I discussed plan of care with patient    4/27 vital signs stable, blood pressure improved after medication adjustment yesterday.  Electrolytes have all improved potassium, magnesium, and calcium are all now within normal limits.  Patient continues to complain of  chest pain and shoulder pain that she reports has been unchanged since her cardiac cath. Left shoulder pain from a prior fracture, ordered lidocaine patch. Discussed with cardiology, will to monitor the patient blood pressure and electrolytes.  If they continue to be stable tomorrow can likely DC home. I have ordered home health for the patient however after discussion she reports that they were unable to set this up for her in the past based on where she lives.  She does live at an assisted living facility that has therapy.    4/28 Vitals stable on room air.  Echo shows no pericardial effusion, EF 45%.  Patient is having significant orthostatic episodes with dizziness when standing and difficulty walking.  Glucose consistently above 200 add 5 units mealtime insulin.  Patient continues to complain of chest and shoulder pain.  Discussed with cardiology who recommended to hold spironolactone and Entresto for now.  Okay for 500 mL of IV fluids.  Will check iron studies as previous labs showed borderline ferritin level    4/29 +orthostatics. Started IVF, will repeat this afternoon. H&H stable. Telemetry continues to show sinus rhythm. Discussed with cardiology continue to hold aldactone and entresto for the +orthostatics. Patient still having chest pain and dizziness. Glucose still >200s, increase mealtime 8 U.     4/30 patient's vital signs are stable.  Her orthostatics are no longer positive.  I have DC'd her IV fluids.  Attempted to get patient up and walking around the unit, she only needed about 100 feet before she became extremely dizzy and almost passed out.  I have added as needed meclizine, it is unclear what is causing her dizziness at this point.  I have discussed with cardiology, will try to get patient up again this afternoon.    I have discussed this patient's plan of care and discharge plan at IDT rounds today with Case Management, Nursing, Nursing leadership, and other members of the IDT  team.    Consultants/Specialty  cardiology    Code Status  Full Code    Disposition  Patient is not medically cleared for discharge.   Anticipate discharge to to home with close outpatient follow-up.  I have placed the appropriate orders for post-discharge needs.    Review of Systems  Review of Systems   Constitutional:  Positive for malaise/fatigue. Negative for chills and fever.   Respiratory:  Positive for shortness of breath.    Cardiovascular:  Positive for chest pain. Negative for leg swelling.   Gastrointestinal:  Negative for abdominal pain, nausea and vomiting.   Musculoskeletal:  Positive for myalgias.   Neurological:  Positive for dizziness and weakness.   All other systems reviewed and are negative.     Physical Exam  Temp:  [36.7 °C (98.1 °F)-37 °C (98.6 °F)] 36.8 °C (98.2 °F)  Pulse:  [] 88  Resp:  [14-16] 14  BP: ()/(49-77) 144/68  SpO2:  [90 %-95 %] 90 %    Physical Exam  Vitals reviewed.   Constitutional:       General: She is not in acute distress.     Appearance: Normal appearance. She is ill-appearing.      Comments: Sitting up in bed   HENT:      Head: Normocephalic and atraumatic.   Eyes:      Conjunctiva/sclera: Conjunctivae normal.   Cardiovascular:      Rate and Rhythm: Normal rate and regular rhythm.      Pulses: Normal pulses.      Heart sounds: Normal heart sounds.   Pulmonary:      Effort: Pulmonary effort is normal. No respiratory distress.      Breath sounds: Normal breath sounds. No wheezing.   Abdominal:      General: There is no distension.      Palpations: Abdomen is soft.      Tenderness: There is no abdominal tenderness.   Musculoskeletal:         General: Tenderness and signs of injury present.      Cervical back: Normal range of motion. No rigidity.      Comments: Left shoulder pain with decreased ROM   Skin:     General: Skin is warm.   Neurological:      General: No focal deficit present.      Mental Status: She is alert and oriented to person, place, and time.       Cranial Nerves: No cranial nerve deficit.      Motor: Weakness (left arm) present.   Psychiatric:         Mood and Affect: Mood is anxious.         Behavior: Behavior normal.       Fluids    Intake/Output Summary (Last 24 hours) at 4/30/2023 1430  Last data filed at 4/30/2023 1100  Gross per 24 hour   Intake 1180 ml   Output 1250 ml   Net -70 ml       Laboratory  Recent Labs     04/28/23  0257 04/29/23  0234   WBC 5.7 5.4   RBC 3.32* 3.43*   HEMOGLOBIN 9.1* 9.3*   HEMATOCRIT 28.6* 30.1*   MCV 86.1 87.8   MCH 27.4 27.1   MCHC 31.8* 30.9*   RDW 43.6 44.9   PLATELETCT 245 258   MPV 9.8 9.5     Recent Labs     04/28/23 0257 04/29/23  0234 04/30/23  0145   SODIUM 134* 138 140   POTASSIUM 5.2 4.8 4.7   CHLORIDE 106 106 109   CO2 21 23 21   GLUCOSE 242* 222* 193*   BUN 14 14 13   CREATININE 1.31 1.18 1.50*   CALCIUM 8.8 8.9 8.7                     Imaging  EC-ECHOCARDIOGRAM LTD W/O CONT   Final Result      EC-ECHOCARDIOGRAM LTD W/O CONT   Final Result      DX-CHEST-PORTABLE (1 VIEW)   Final Result      No acute cardiopulmonary abnormality.         CL-LEFT HEART CATHETERIZATION WITH POSSIBLE INTERVENTION    (Results Pending)          Assessment/Plan  * NSTEMI (non-ST elevated myocardial infarction) (HCC)- (present on admission)  Assessment & Plan  Cardiology has been following her.  Continue current medical management with aspirin, Effient, Entresto and metoprolol.   Continue to monitor closely on telemetry.  Echocardiogram ordered currently results pending.  4/23 Cardiac cath:  IMPRESSION:  This is most likely a delayed posterior STEMI presentation with possible concerns for Plavix non-adherence or non-responder.  1.  Occluded left circumflex s/p IVUS guided PCI deploying a Synergy 3 x 38mm ANA in the mid Cx - prox OM (overlapped with the Synergy stent from 4/2/23) and post-dilated to 4.0 mm proximally.  2. PTCA of distal LM and ostial L-Cx stent from 4/2/23 for more optimization using a 4 x 12 mm NC balloon to high  pressure.  3.  Normal resting LVEDP 5 mmHg without significant transaortic gradient on pullback.  Discontinued nitroglycerin gtt.  I discussed plan of care with cardiologist.  Continue IV morphine on April 26, 2023.  Use IV narcotics with caution due to ongoing hypotension.      Hypotension  Assessment & Plan  Patient found to have hypotension.  Overnight she developed hypotension and received IV fluid bolus.    resolved    Hypomagnesemia  Assessment & Plan  She found to have hypomagnesemia.  She received 2 g of IV magnesium overnight.  I ordered additional 2 g of IV magnesium.  Continue to monitor and replace as needed.    ACC/AHA stage C systolic heart failure (HCC)- (present on admission)  Assessment & Plan  Metoprolol 50mg qd  Held sacubitril-valsartan 24-26mg BID, aldactone 25mg daily for now   Continue to monitor input and output.  Echocardiogram EF 45%    Impaired instrumental activities of daily living (IADL)- (present on admission)  Assessment & Plan  Patient reports that typically she was living at assisted living however she went back to her apartment to give her 30-day notice, plans to go back to assisted living on discharge  Patient has reported multiple falls, denies any trauma to her head  PT/OT    Coronary artery disease due to lipid rich plaque- (present on admission)  Assessment & Plan  Continue atorvastatin and dual antiplatelet therapy.  Cardiology is following her.  Monitor on tele    S/P insertion of non-drug eluting coronary artery stent- (present on admission)  Assessment & Plan  Continue dual antiplatelet therapy with aspirin and Effient.      Anemia  Assessment & Plan  4/24 Hgb:10.2 <11  Stable   Iron studies wnl   Trend CBC daily     Chronic pain syndrome with narcotic dependence- (present on admission)  Assessment & Plan  Continue home opiate pain meds  Continue home gabapentin and Lyrica    Obesity (BMI 30.0-34.9)  Assessment & Plan  Body mass index is 32.28 kg/m².  Encourage lifestyle  modification.    Diabetes (HCC)- (present on admission)  Assessment & Plan  glargine to 24 units twice daily.  Add 5 U mealtime insulin   Continue insulin sliding scale with hypoglycemia protocol.  Holding home Januvia and meformin  Diabetic diet    uncontrolled    Hyperlipidemia- (present on admission)  Assessment & Plan  Continue atorvastatin 80 mg      Essential hypertension- (present on admission)  Assessment & Plan  Metoprolol, aldactone, Entresto  Monitor vitals        VTE prophylaxis: SCDs/TEDs    I have performed a physical exam and reviewed and updated ROS and Plan today (4/30/2023). In review of yesterday's note (4/29/2023), there are no changes except as documented above.

## 2023-04-30 NOTE — PROGRESS NOTES
Cardiology Follow Up Progress Note    Date of Service  4/30/2023    Attending Physician  Soila Torres D.O.    Chief Complaint   Delayed posterior STEMI    HPI  Julisa Josue is a 62 y.o. female with recent NSTEMI s/p  PCI LCX & rPDA 4/3/23 , prior PCI LCX 2018, CABG x1 LIMA to LAD 9/14/2021, type 2 diabetes, hypertension admitted 4/23/2023 with likely delayed posterior STEMI concern for Plavix non-adherence or nonresponder.    Interim Events    No overnight cardiac events  Telemetry-SR  Recurrent persistent dizziness, fatigue, near syncope with minimal exertion  H&H stable  Scr 1.50. mild bump      Review of Systems  Review of Systems   Respiratory:  Negative for apnea, cough, choking, chest tightness, shortness of breath, wheezing and stridor.      Vital signs in last 24 hours  Temp:  [36.7 °C (98.1 °F)-37 °C (98.6 °F)] 36.8 °C (98.2 °F)  Pulse:  [] 88  Resp:  [14-16] 14  BP: ()/(49-77) 144/68  SpO2:  [90 %-95 %] 90 %        Lab Review  Lab Results   Component Value Date/Time    WBC 5.4 04/29/2023 02:34 AM    RBC 3.43 (L) 04/29/2023 02:34 AM    HEMOGLOBIN 9.3 (L) 04/29/2023 02:34 AM    HEMATOCRIT 30.1 (L) 04/29/2023 02:34 AM    MCV 87.8 04/29/2023 02:34 AM    MCH 27.1 04/29/2023 02:34 AM    MCHC 30.9 (L) 04/29/2023 02:34 AM    MPV 9.5 04/29/2023 02:34 AM      Lab Results   Component Value Date/Time    SODIUM 140 04/30/2023 01:45 AM    POTASSIUM 4.7 04/30/2023 01:45 AM    CHLORIDE 109 04/30/2023 01:45 AM    CO2 21 04/30/2023 01:45 AM    GLUCOSE 193 (H) 04/30/2023 01:45 AM    BUN 13 04/30/2023 01:45 AM    CREATININE 1.50 (H) 04/30/2023 01:45 AM      Lab Results   Component Value Date/Time    ASTSGOT 10 (L) 04/27/2023 02:55 AM    ALTSGPT 11 04/27/2023 02:55 AM     Lab Results   Component Value Date/Time    CHOLSTRLTOT 151 04/24/2023 04:02 AM    LDL 86 04/24/2023 04:02 AM    HDL 33 (A) 04/24/2023 04:02 AM    TRIGLYCERIDE 161 (H) 04/24/2023 04:02 AM    TROPONINT 1764 (H) 04/23/2023 03:37 PM              Assessment/Plan    # Delayed posterior STEMI secondary to non-adherence to Plavix versus Plavix nonresponder.  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  -Work-up for anemia showed mild iron deficiency      Cardiology will follow along    I personally spent a total of 17  minutes which includes face-to-face time and non-face-to-face time spent on preparing to see the patient, reviewing hospital notes and tests, obtaining history from the patient, performing a medically appropriate exam, counseling and educating the patient, ordering medications/tests/procedures/referrals as clinically indicated, and documenting information in the electronic medical record.     Thank you for allowing me to participate in the care of this patient.  I will continue to follow this patient    Please contact me with any questions.    SHILPA Mays.   Cardiologist, [facility]  [phone]          Physical Exam

## 2023-05-01 ENCOUNTER — PHARMACY VISIT (OUTPATIENT)
Dept: PHARMACY | Facility: MEDICAL CENTER | Age: 63
End: 2023-05-01
Payer: COMMERCIAL

## 2023-05-01 VITALS
HEART RATE: 69 BPM | BODY MASS INDEX: 34.85 KG/M2 | WEIGHT: 209.44 LBS | DIASTOLIC BLOOD PRESSURE: 48 MMHG | SYSTOLIC BLOOD PRESSURE: 101 MMHG | OXYGEN SATURATION: 90 % | TEMPERATURE: 97.5 F | RESPIRATION RATE: 18 BRPM

## 2023-05-01 LAB
GLUCOSE BLD STRIP.AUTO-MCNC: 162 MG/DL (ref 65–99)
GLUCOSE BLD STRIP.AUTO-MCNC: 171 MG/DL (ref 65–99)
GLUCOSE BLD STRIP.AUTO-MCNC: 188 MG/DL (ref 65–99)
GLUCOSE BLD STRIP.AUTO-MCNC: 201 MG/DL (ref 65–99)

## 2023-05-01 PROCEDURE — 700102 HCHG RX REV CODE 250 W/ 637 OVERRIDE(OP): Performed by: STUDENT IN AN ORGANIZED HEALTH CARE EDUCATION/TRAINING PROGRAM

## 2023-05-01 PROCEDURE — 700102 HCHG RX REV CODE 250 W/ 637 OVERRIDE(OP): Performed by: INTERNAL MEDICINE

## 2023-05-01 PROCEDURE — RXMED WILLOW AMBULATORY MEDICATION CHARGE: Performed by: INTERNAL MEDICINE

## 2023-05-01 PROCEDURE — 99239 HOSP IP/OBS DSCHRG MGMT >30: CPT | Performed by: INTERNAL MEDICINE

## 2023-05-01 PROCEDURE — A9270 NON-COVERED ITEM OR SERVICE: HCPCS | Performed by: INTERNAL MEDICINE

## 2023-05-01 PROCEDURE — 82962 GLUCOSE BLOOD TEST: CPT

## 2023-05-01 PROCEDURE — 700102 HCHG RX REV CODE 250 W/ 637 OVERRIDE(OP): Performed by: NURSE PRACTITIONER

## 2023-05-01 PROCEDURE — A9270 NON-COVERED ITEM OR SERVICE: HCPCS | Performed by: STUDENT IN AN ORGANIZED HEALTH CARE EDUCATION/TRAINING PROGRAM

## 2023-05-01 PROCEDURE — 700101 HCHG RX REV CODE 250: Performed by: INTERNAL MEDICINE

## 2023-05-01 PROCEDURE — A9270 NON-COVERED ITEM OR SERVICE: HCPCS | Performed by: NURSE PRACTITIONER

## 2023-05-01 RX ORDER — PRASUGREL 10 MG/1
10 TABLET, FILM COATED ORAL DAILY
Qty: 30 TABLET | Refills: 0 | Status: SHIPPED | OUTPATIENT
Start: 2023-05-02

## 2023-05-01 RX ORDER — ATORVASTATIN CALCIUM 80 MG/1
80 TABLET, FILM COATED ORAL EVERY EVENING
Qty: 30 TABLET | Refills: 0 | Status: SHIPPED | OUTPATIENT
Start: 2023-05-01

## 2023-05-01 RX ORDER — MORPHINE SULFATE 30 MG/1
30 TABLET, FILM COATED, EXTENDED RELEASE ORAL EVERY 12 HOURS
Qty: 6 TABLET | Refills: 0 | Status: SHIPPED | OUTPATIENT
Start: 2023-05-01 | End: 2023-05-04

## 2023-05-01 RX ORDER — OMEPRAZOLE 40 MG/1
40 CAPSULE, DELAYED RELEASE ORAL DAILY
Qty: 30 CAPSULE | Refills: 0 | Status: SHIPPED | OUTPATIENT
Start: 2023-05-01

## 2023-05-01 RX ORDER — COLCHICINE 0.6 MG/1
0.6 TABLET ORAL DAILY
Qty: 90 TABLET | Refills: 0 | Status: SHIPPED | OUTPATIENT
Start: 2023-05-02

## 2023-05-01 RX ORDER — METOPROLOL SUCCINATE 25 MG/1
25 TABLET, EXTENDED RELEASE ORAL DAILY
Qty: 60 TABLET | Refills: 0 | Status: SHIPPED | OUTPATIENT
Start: 2023-05-02

## 2023-05-01 RX ORDER — ASPIRIN 81 MG/1
81 TABLET ORAL DAILY
Qty: 30 TABLET | Refills: 0 | Status: SHIPPED | OUTPATIENT
Start: 2023-05-02

## 2023-05-01 RX ORDER — MECLIZINE HYDROCHLORIDE 25 MG/1
25 TABLET ORAL 3 TIMES DAILY PRN
Qty: 30 TABLET | Refills: 0 | Status: SHIPPED | OUTPATIENT
Start: 2023-05-01

## 2023-05-01 RX ADMIN — MORPHINE SULFATE 30 MG: 30 TABLET, FILM COATED, EXTENDED RELEASE ORAL at 04:47

## 2023-05-01 RX ADMIN — MECLIZINE HYDROCHLORIDE 25 MG: 25 TABLET ORAL at 10:31

## 2023-05-01 RX ADMIN — PREGABALIN 100 MG: 100 CAPSULE ORAL at 18:43

## 2023-05-01 RX ADMIN — LIDOCAINE 1 PATCH: 50 PATCH TOPICAL at 09:23

## 2023-05-01 RX ADMIN — PRASUGREL 10 MG: 10 TABLET, FILM COATED ORAL at 04:41

## 2023-05-01 RX ADMIN — OXYCODONE HYDROCHLORIDE 10 MG: 10 TABLET ORAL at 12:52

## 2023-05-01 RX ADMIN — ATORVASTATIN CALCIUM 80 MG: 80 TABLET, FILM COATED ORAL at 18:43

## 2023-05-01 RX ADMIN — INSULIN GLARGINE-YFGN 24 UNITS: 100 INJECTION, SOLUTION SUBCUTANEOUS at 04:48

## 2023-05-01 RX ADMIN — DOCUSATE SODIUM 50 MG AND SENNOSIDES 8.6 MG 2 TABLET: 8.6; 5 TABLET, FILM COATED ORAL at 18:43

## 2023-05-01 RX ADMIN — INSULIN HUMAN 2 UNITS: 100 INJECTION, SOLUTION PARENTERAL at 18:36

## 2023-05-01 RX ADMIN — INSULIN HUMAN 2 UNITS: 100 INJECTION, SOLUTION PARENTERAL at 14:34

## 2023-05-01 RX ADMIN — OMEPRAZOLE 20 MG: 20 CAPSULE, DELAYED RELEASE ORAL at 18:43

## 2023-05-01 RX ADMIN — ASPIRIN 81 MG: 81 TABLET, COATED ORAL at 04:41

## 2023-05-01 RX ADMIN — PREGABALIN 100 MG: 100 CAPSULE ORAL at 12:53

## 2023-05-01 RX ADMIN — OMEPRAZOLE 20 MG: 20 CAPSULE, DELAYED RELEASE ORAL at 04:41

## 2023-05-01 RX ADMIN — INSULIN GLARGINE-YFGN 24 UNITS: 100 INJECTION, SOLUTION SUBCUTANEOUS at 18:37

## 2023-05-01 RX ADMIN — COLCHICINE 0.6 MG: 0.6 TABLET, FILM COATED ORAL at 04:41

## 2023-05-01 RX ADMIN — MORPHINE SULFATE 30 MG: 30 TABLET, FILM COATED, EXTENDED RELEASE ORAL at 18:43

## 2023-05-01 RX ADMIN — METOPROLOL SUCCINATE 25 MG: 25 TABLET, EXTENDED RELEASE ORAL at 04:42

## 2023-05-01 RX ADMIN — PREGABALIN 100 MG: 100 CAPSULE ORAL at 04:42

## 2023-05-01 RX ADMIN — INSULIN HUMAN 2 UNITS: 100 INJECTION, SOLUTION PARENTERAL at 09:20

## 2023-05-01 NOTE — DISCHARGE PLANNING
Meds-to-Beds: Discharge prescription orders listed below delivered to patient's bedside. OJ Salcedo notified. Patient counseled. Patient elected to have payment for colchicine and meclizine billed to patient account (aware not covered and ok with prices).     Patient inquired about pain med, OJ Salcedo was going to reach out to MD regarding this order.     Current Outpatient Medications   Medication Sig Dispense Refill    [START ON 5/2/2023] colchicine (COLCRYS) 0.6 MG Tab Take 1 Tablet by mouth every day. 90 Tablet 0    [START ON 5/2/2023] aspirin 81 MG EC tablet Take 1 Tablet by mouth every day. 30 Tablet 0    omeprazole (PRILOSEC) 40 MG delayed-release capsule Take 1 Capsule by mouth every day. 30 Capsule 0    meclizine (ANTIVERT) 25 MG Tab Take 1 Tablet by mouth 3 times a day as needed for Dizziness or Vertigo. 30 Tablet 0    [START ON 5/2/2023] prasugrel (EFFIENT) 10 MG Tab Take 1 Tablet by mouth every day. 30 Tablet 0    [START ON 5/2/2023] metoprolol SR (TOPROL XL) 25 MG TABLET SR 24 HR Take 1 Tablet by mouth every day. 60 Tablet 0    atorvastatin (LIPITOR) 80 MG tablet Take 1 Tablet by mouth every evening. 30 Tablet 0        1545: Meds-to-Beds: Discharge prescription order listed below delivered to patient's bedside OJ Salcedo. Written information regarding the dispensed prescriptions was provided to the patient including the phone number of the pharmacy to call for any questions. Patient elected to have payment billed to patient account (aware insurance will not cover at this pharmacy).        Current Outpatient Medications   Medication Sig Dispense Refill    morphine ER (MS CONTIN) 30 MG Tab CR tablet Take 1 Tablet by mouth every 12 hours for 3 days. 6 Tablet 0      Simona Carter, PharmD    Simona Carter, AmritD

## 2023-05-01 NOTE — DISCHARGE PLANNING
Patient is medically cleared for discharge.  She has been accepted to [facility] and they are able to accept her today.      Forwarded Valdo for [facility] transport.  Awaiting  time.      Addendum 1518: [facility] is booked for today.  Valdo is working on obtaining authorization for [employer] from [facility] along with a  time.

## 2023-05-01 NOTE — DISCHARGE INSTRUCTIONS
Diagnosis:  Acute Coronary Syndrome (ACS) is a diagnosis that encompasses cardiac-related chest pain and heart attack. ACS occurs when the blood flow to the heart muscle is severely reduced or cut off completely due to a slow process called atherosclerosis.  Atherosclerosis is a disease in which the coronary arteries become narrow from a buildup of fat, cholesterol, and other substances that combine to form plaque. If the plaque breaks, a blood clot will form and block the blood flow to the heart muscle. This lack of blood flow can cause damage or death to the heart muscle which is called a heart attack or Myocardial Infarction (MI). There are two different types of MIs:  ST Elevation Myocardial Infarction or STEMI (the most severe type of heart attack) and Non-ST Elevation Myocardial Infarction or NSTEMI.    Treatment Plan:  Cardiac Diet  - Low fat, low salt, low cholesterol   Cardiac Rehab  - Your doctor has ordered you a referral to [facility].  Call [phone] to schedule an appointment.  Attend my follow-up appointment with my Cardiologist.  Take my medications as prescribed by my doctor  Exercise daily  Quit Smoking, Lower my bad cholesterol and raise my good cholesterol, lower my blood pressure, Reduce stress, and Control my diabetes    Medications:  Certain medications are used to treat ACS.  Remember to always take medications as prescribed and never stop talking medications unless told by your doctor.    You have been prescribed the following medicatons:    Aspirin - Aspirin is used as a blood thinning medication and you will require this medication indefinitely.  Anti-platelet/blood thinner - Your Anti-platelet/Blood thinning medication is called Effient, and is used in combination with aspirin to prevent clots from forming in your heart and/or around your stent.  Your doctor will determine how long you need to be on this medicine.  Beta-Blocker - Beta-Blocker Metoprolol is used to lower blood pressure  and heart rate, and/or helps your heart heal after a heart attack.  Statin - Statin Atorvastatin is used to lower cholesterol.

## 2023-05-01 NOTE — CARE PLAN
The patient is Stable - Low risk of patient condition declining or worsening    Shift Goals  Clinical Goals: monitor orthostatic  Patient Goals: rest  Family Goals: [facility]    Progress made toward(s) clinical / shift goals:  Pt walked more today    Patient is not progressing towards the following goals: pt still dizzy  Problem: Pain - Standard  Goal: Alleviation of pain or a reduction in pain to the patient’s comfort goal  Outcome: Progressing     Problem: Knowledge Deficit - Standard  Goal: Patient and family/care givers will demonstrate understanding of plan of care, disease process/condition, diagnostic tests and medications  Outcome: Progressing     Problem: Skin Integrity  Goal: Skin integrity is maintained or improved  Outcome: Progressing     Problem: Psychosocial  Goal: Patient's level of anxiety will decrease  Outcome: Progressing  Goal: Patient's ability to verbalize feelings about condition will improve  Outcome: Progressing  Goal: Patient's ability to re-evaluate and adapt role responsibilities will improve  Outcome: Progressing  Goal: Patient and family will demonstrate ability to cope with life altering diagnosis and/or procedure  Outcome: Progressing  Goal: Spiritual and cultural needs incorporated into hospitalization  Outcome: Progressing     Problem: Communication  Goal: The ability to communicate needs accurately and effectively will improve  Outcome: Progressing     Problem: Discharge Barriers/Planning  Goal: Patient's continuum of care needs are met  Outcome: Progressing     Problem: Hemodynamics  Goal: Patient's hemodynamics, fluid balance and neurologic status will be stable or improve  Outcome: Progressing     Problem: Respiratory  Goal: Patient will achieve/maintain optimum respiratory ventilation and gas exchange  Outcome: Progressing     Problem: Chest Tube Management  Goal: Complications related to chest tube will be avoided or minimized  Outcome: Progressing     Problem: Fluid Volume  Goal:  Fluid volume balance will be maintained  Outcome: Progressing     Problem: Dysphagia  Goal: Dysphagia will improve  Outcome: Progressing     Problem: Nutrition  Goal: Patient's nutritional and fluid intake will be adequate or improve  Outcome: Progressing  Goal: Enteral nutrition will be maintained or improve  Outcome: Progressing  Goal: Enteral nutrition will be maintained or improve  Outcome: Progressing     Problem: Urinary Elimination  Goal: Establish and maintain regular urinary output  Outcome: Progressing     Problem: Bowel Elimination  Goal: Establish and maintain regular bowel function  Outcome: Progressing     Problem: Gastrointestinal Irritability  Goal: Nausea and vomiting will be absent or improve  Outcome: Progressing  Goal: Diarrhea will be absent or improved  Outcome: Progressing     Problem: Mobility  Goal: Patient's capacity to carry out activities will improve  Outcome: Progressing     Problem: Self Care  Goal: Patient will have the ability to perform ADLs independently or with assistance (bathe, groom, dress, toilet and feed)  Outcome: Progressing     Problem: Infection - Standard  Goal: Patient will remain free from infection  Outcome: Progressing     Problem: Wound/ / Incision Healing  Goal: Patient's wound/surgical incision will decrease in size and heals properly  Outcome: Progressing     Problem: Care Map:  Admission Optimal Outcome for the Heart Failure Patient  Goal: Admission:  Optimal Care of the heart failure patient  Outcome: Progressing     Problem: Care Map:  Day 1 Optimal Outcome for the Heart Failure Patient  Goal: Day 1:  Optimal Care of the heart failure patient  Outcome: Progressing     Problem: Care Map:  Day 2 Optimal Outcome for the Heart Failure Patient  Goal: Day 2:  Optimal Care of the heart failure patient  Outcome: Progressing     Problem: Care Map:  Day 3 Optimal Outcome for the Heart Failure Patient  Goal: Day 3:  Optimal Care of the heart failure patient  Outcome:  Progressing     Problem: Care Map:  Day Before Discharge Optimal Outcome for the Heart Failure Patient  Goal: Day Before Discharge:  Optimal Care of the heart failure patient  Outcome: Progressing     Problem: Care Map:  Day of Discharge Optimal Outcome for the Heart Failure Patient  Goal: Day of Discharge:  Optimal Care of the heart failure patient  Outcome: Progressing     Problem: Fall Risk  Goal: Patient will remain free from falls  Outcome: Progressing

## 2023-05-01 NOTE — PROGRESS NOTES
[facility] Cardiology Follow-up Note    Date of Service:    5/1/2023      Name:   Julisa Josue   YOB: 1960  Age:   62 y.o.  female   MRN:   [id number]    Reason for cardiology consult: CP    Attending Provider: Dr Torres    Primary Cardiologist: Pancho Gamez    HPI:  Julisa Josue is a 62 y.o. female with recent NSTEMI s/p PCI LCX & rPDA 4/3/23 , prior PCI LCX 2018, CABG x1 LIMA to LAD 9/14/2021, type 2 diabetes, hypertension admitted 4/23/2023 with likely delayed posterior STEMI concern for Plavix non-adherence or nonresponder.    4/23/23: Return to Cath Lab in which she underwent mLCX/OM and PTCA Left main/oLCX    Interim Events:  - Recurrent persistent dizziness/fatigue/near syncope with minimal exertion, again yesterday  - Personal Telemetry interpretation: SR 70-80's  - Vitals: -110's, room air  - Labs reviewed: No labs assessed today  - I/O's: 1.6L UO yesterday, net -400mls  - Weight: 209lbs    ROS  Constitutional: + fatigue.  Respiratory:  Denies shortness of breath, no cough.  Cardiovascular: denies chest pain. denies lower extremity edema.  Denies orthopnea or PND.  : denies polyuria, no dysuria.  GI:  Denies nausea/vomiting.  No abdominal distention.  Neuro:  Denies dizziness, syncope.  Hem/lymph: Denies easy bleeding/bruising.      All other review of systems reviewed and negative.    Past medical, surgical, social, and family history reviewed and unchanged from admission except as noted in HPI.    Medications: Reviewed in MAR  Current Facility-Administered Medications   Medication Dose Frequency Provider Last Rate Last Admin    meclizine (ANTIVERT) tablet 25 mg  25 mg TID PRN Soila Torres D.O.   25 mg at 05/01/23 1031    insulin regular (HumuLIN R,NovoLIN R) injection  8 Units TID AC Soila Torres D.O.   8 Units at 05/01/23 0921    lidocaine (LIDODERM) 5 % 1 Patch  1 Patch Q24HR PJ Goetz.O.   1 Patch at 05/01/23 0923    metoprolol SR (TOPROL XL)  tablet 25 mg  25 mg DAILY Miguel Tavera, A.P.N.   25 mg at 05/01/23 0442    colchicine (COLCRYS) tablet 0.6 mg  0.6 mg DAILY Miguel Tavera, A.P.N.   0.6 mg at 05/01/23 0441    omeprazole (PRILOSEC) capsule 20 mg  20 mg BID PJ Goetz.O.   20 mg at 05/01/23 0441    insulin GLARGINE (Lantus,Semglee) injection  24 Units BID Alon Arenas M.D.   24 Units at 05/01/23 0448    morphine 4 MG/ML injection 2 mg  2 mg Q4HRS PRN Alon Arenas M.D.   2 mg at 04/25/23 1613    atorvastatin (LIPITOR) tablet 80 mg  80 mg Q EVENING PJ Goetz.O.   80 mg at 04/30/23 1822    traZODone (DESYREL) tablet 150 mg  150 mg QHS Soila Torres D.O.   150 mg at 04/30/23 2123    insulin regular (HumuLIN R,NovoLIN R) injection  2-9 Units 4X/DAY ACHS KRISTOPHER GoetzO.   2 Units at 05/01/23 0920    And    dextrose 10 % BOLUS 25 g  25 g Q15 MIN PRN KRISTOPHER GoetzO.        senna-docusate (PERICOLACE or SENOKOT S) 8.6-50 MG per tablet 2 Tablet  2 Tablet BID PJ Goetz.O.   2 Tablet at 04/30/23 1822    And    polyethylene glycol/lytes (MIRALAX) PACKET 1 Packet  1 Packet QDAY PRN PJ Goetz.O.        And    magnesium hydroxide (MILK OF MAGNESIA) suspension 30 mL  30 mL QDAY PRN PJ Goetz.O.        And    bisacodyl (DULCOLAX) suppository 10 mg  10 mg QDAY PRN PJ Goetz.O.        acetaminophen (Tylenol) tablet 650 mg  650 mg Q6HRS PRN KRISTOPHER GoetzO.        ondansetron (ZOFRAN) syringe/vial injection 4 mg  4 mg Q4HRS PRN KRISTOPHER GoetzO.        ondansetron (ZOFRAN ODT) dispertab 4 mg  4 mg Q4HRS PRN KRISTOPHER GoetzO.        promethazine (PHENERGAN) tablet 12.5-25 mg  12.5-25 mg Q4HRS PRN PJ Goetz.O.   25 mg at 04/25/23 1337    promethazine (PHENERGAN) suppository 12.5-25 mg  12.5-25 mg Q4HRS PRN KRISTOPHER GoetzO.        prochlorperazine (COMPAZINE) injection 5-10 mg  5-10 mg Q4HRS PRN JP Goetz.O.        labetalol (NORMODYNE/TRANDATE)  injection 10 mg  10 mg Q4HRS PRN Soila Torres D.O.        aspirin EC (ECOTRIN) tablet 81 mg  81 mg DAILY Julio Velázquez M.D.   81 mg at 05/01/23 0441    prasugrel (EFFIENT) tablet 10 mg  10 mg DAILY Julio Velázquez M.D.   10 mg at 05/01/23 0441    morphine ER (MS CONTIN) tablet 30 mg  30 mg Q12HRS Jose Alanis M.D.   30 mg at 05/01/23 0447    pregabalin (LYRICA) capsule 100 mg  100 mg TID Jose Alanis M.D.   100 mg at 05/01/23 0442    oxyCODONE immediate release (ROXICODONE) tablet 10 mg  10 mg Q6HRS PRN Jose Alanis M.D.   10 mg at 04/30/23 2213    nitroglycerin (NITROSTAT) tablet 0.4 mg  0.4 mg Q5 MIN PRN Jose Alanis M.D.       Last reviewed on 4/24/2023  2:58 AM by Addi Tucker   Allergies   Allergen Reactions    Plavix [Clopidogrel]      Non responder       Physical Exam  Body mass index is 34.85 kg/m². /46   Pulse 69   Temp 36.8 °C (98.2 °F) (Temporal)   Resp 17   Wt 95 kg (209 lb 7 oz)   SpO2 98%    Vitals:    04/30/23 1856 05/01/23 0315 05/01/23 0442 05/01/23 0812   BP: 113/53 110/53 111/51 108/46   Pulse: 91 84 81 69   Resp: 17 16  17   Temp: 36.4 °C (97.5 °F) 37 °C (98.6 °F)  36.8 °C (98.2 °F)   TempSrc: Temporal Temporal  Temporal   SpO2: 97% 94%  98%   Weight: 95 kg (209 lb 7 oz)       Oxygen Therapy:  Pulse Oximetry: 98 %, O2 (LPM): 0, O2 Delivery Device: None - Room Air    General: no acute distress, chronically ill-appearing. BMI 34.8  Neck: No JVD, no bruits  Lungs: CTAB, normal effort. no wheezing, rales, or rhonchi  Heart: RRR, normal S1 /S2, no murmur, no rub  EXT: no lower extremity edema, 2+ radial pulses. 2+ pedal pulses.   Abdomen: soft, non tender, non distended  Neurological: No focal deficits, no facial asymmetry.  Normal speech  Psychiatric: Appropriate affect, alert and oriented x 3  Skin: Warm and dry extremities, no rashes    Labs (personally reviewed):     Lab Results   Component Value Date/Time    SODIUM 140 04/30/2023 01:45 AM    POTASSIUM 4.7 04/30/2023  01:45 AM    CHLORIDE 109 04/30/2023 01:45 AM    CO2 21 04/30/2023 01:45 AM    GLUCOSE 193 (H) 04/30/2023 01:45 AM    BUN 13 04/30/2023 01:45 AM    CREATININE 1.50 (H) 04/30/2023 01:45 AM     Lab Results   Component Value Date/Time    ALKPHOSPHAT 74 04/27/2023 02:55 AM    ASTSGOT 10 (L) 04/27/2023 02:55 AM    ALTSGPT 11 04/27/2023 02:55 AM    TBILIRUBIN 0.2 04/27/2023 02:55 AM      Lab Results   Component Value Date/Time    CHOLSTRLTOT 151 04/24/2023 04:02 AM    LDL 86 04/24/2023 04:02 AM    HDL 33 (A) 04/24/2023 04:02 AM    TRIGLYCERIDE 161 (H) 04/24/2023 04:02 AM       Cardiac Imaging and Procedures Review:    (See Dr. Velázquez's attestation for review)    Assessment and Medical Decision Making:    Delayed posterior STEMI   Recent NSTEMI s/p PCI LCX 4/2/23  Staged PCI rPDA 4/3/23  PCI mLCX/OM and PTCA Left main/oLCX 4/23/23  Hx of LIMA to LAD CABG x 1  9/2021, PCI LCx , pre CABG, and PCI to LAD post CABG  - Stent reocclusion possibly secondary to non-adherence to Plavix versus Plavix nonresponder  - Continue DAPT aspirin 81 mg daily and prasugrel 10 mg daily for life as tolerated  - Continue atorvastatin 80 mg daily    LVEF 45%-50%  -Continue metoprolol SR 25 mg daily  -Patient has ARB as outpatient if kidneys remain stable, as well as Farxiga given DM and CAD    Dressler syndrome  -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  Please contact me with any questions or concerns.    Please see Dr. Velázquez attestation for additions and further recommendations.  Cardiology follow-up appointment will be scheduled.    I personally spent a total of 15 minutes which includes face-to-face time and non-face-to-face time spent on preparing to see the patient, reviewing hospital notes and tests, obtaining history from the patient, performing a medically appropriate exam, counseling and educating the patient, ordering  medications/tests/procedures/referrals as clinically indicated, and documenting information in the electronic medical record.    PLEASE NOTE: Some of this dictation was created using voice recognition software. I have made every reasonable attempt to correct obvious errors, but I expect that there are errors of grammar and possibly content that I did not discover before finalizing the note.     CECI Moss.   [facility]  [phone]

## 2023-05-01 NOTE — DISCHARGE PLANNING
DC Transport Scheduled    Received request at: 5/1/2023 at 1509    Transport Company Scheduled:  [employer]  Spoke with Ave at [facility] to schedule transport.  [facility] Trip #: [id number]     Scheduled Date: 5/1/2023  Scheduled Time: 1700    Destination: [facility]  [facility]     Notified care team of scheduled transport via Voalte.     If there are any changes needed to the DC transportation scheduled, please contact [facility] at ext. [phone] between the hours of 2897-6794 Mon-Fri. If outside those hours, contact the ED Case Manager at ext. [phone].

## 2023-05-02 NOTE — CARE PLAN
The patient is Stable - Low risk of patient condition declining or worsening    Shift Goals  Clinical Goals: monitor hymodynamic stability  Patient Goals: rest and sleep  Family Goals: [facility]    Progress made toward(s) clinical / shift goals:  PT DCd  Problem: Pain - Standard  Goal: Alleviation of pain or a reduction in pain to the patient’s comfort goal  Outcome: Met     Problem: Knowledge Deficit - Standard  Goal: Patient and family/care givers will demonstrate understanding of plan of care, disease process/condition, diagnostic tests and medications  Outcome: Met     Problem: Skin Integrity  Goal: Skin integrity is maintained or improved  Outcome: Met     Problem: Psychosocial  Goal: Patient's level of anxiety will decrease  Outcome: Met  Goal: Patient's ability to verbalize feelings about condition will improve  Outcome: Met  Goal: Patient's ability to re-evaluate and adapt role responsibilities will improve  Outcome: Met  Goal: Patient and family will demonstrate ability to cope with life altering diagnosis and/or procedure  Outcome: Met  Goal: Spiritual and cultural needs incorporated into hospitalization  Outcome: Met     Problem: Communication  Goal: The ability to communicate needs accurately and effectively will improve  Outcome: Met     Problem: Discharge Barriers/Planning  Goal: Patient's continuum of care needs are met  Outcome: Met     Problem: Hemodynamics  Goal: Patient's hemodynamics, fluid balance and neurologic status will be stable or improve  Outcome: Met     Problem: Respiratory  Goal: Patient will achieve/maintain optimum respiratory ventilation and gas exchange  Outcome: Met     Problem: Dysphagia  Goal: Dysphagia will improve  Outcome: Met     Problem: Nutrition  Goal: Patient's nutritional and fluid intake will be adequate or improve  Outcome: Met  Goal: Enteral nutrition will be maintained or improve  Outcome: Met  Goal: Enteral nutrition will be maintained or improve  Outcome: Met      Problem: Urinary Elimination  Goal: Establish and maintain regular urinary output  Outcome: Met     Problem: Bowel Elimination  Goal: Establish and maintain regular bowel function  Outcome: Met     Problem: Gastrointestinal Irritability  Goal: Nausea and vomiting will be absent or improve  Outcome: Met  Goal: Diarrhea will be absent or improved  Outcome: Met     Problem: Rectal Tube  Goal: Fecal output will be contained and skin will remain free from irritation  Outcome: Met     Problem: Mobility  Goal: Patient's capacity to carry out activities will improve  Outcome: Met     Problem: Self Care  Goal: Patient will have the ability to perform ADLs independently or with assistance (bathe, groom, dress, toilet and feed)  Outcome: Met     Problem: Infection - Standard  Goal: Patient will remain free from infection  Outcome: Met     Problem: Wound/ / Incision Healing  Goal: Patient's wound/surgical incision will decrease in size and heals properly  Outcome: Met     Problem: Care Map:  Admission Optimal Outcome for the Heart Failure Patient  Goal: Admission:  Optimal Care of the heart failure patient  Outcome: Met     Problem: Care Map:  Day 1 Optimal Outcome for the Heart Failure Patient  Goal: Day 1:  Optimal Care of the heart failure patient  Outcome: Met     Problem: Care Map:  Day 2 Optimal Outcome for the Heart Failure Patient  Goal: Day 2:  Optimal Care of the heart failure patient  Outcome: Met     Problem: Care Map:  Day 3 Optimal Outcome for the Heart Failure Patient  Goal: Day 3:  Optimal Care of the heart failure patient  Outcome: Met     Problem: Care Map:  Day Before Discharge Optimal Outcome for the Heart Failure Patient  Goal: Day Before Discharge:  Optimal Care of the heart failure patient  Outcome: Met     Problem: Care Map:  Day of Discharge Optimal Outcome for the Heart Failure Patient  Goal: Day of Discharge:  Optimal Care of the heart failure patient  Outcome: Met     Problem: Fall Risk  Goal:  Patient will remain free from falls  Outcome: Met       Patient is not progressing towards the following goals:

## 2023-05-02 NOTE — PROGRESS NOTES
Patient being discharged. Pt educated on discharge instructions and new prescriptions, verbalized understanding. Follow up appointment made with Cardiology and PCP. PIV removed, monitor checked in. Patient going assisted living via transport

## 2023-05-03 ENCOUNTER — TELEPHONE (OUTPATIENT)
Dept: CARDIOLOGY | Facility: MEDICAL CENTER | Age: 63
End: 2023-05-03
Payer: MEDICAID

## 2023-05-03 NOTE — DISCHARGE PLANNING
Agency/Facility Name: [facility]  Spoke To: Marivel  Outcome: HH confirmed they do not take Pt's insurance and they are declining.     RN CM notified.

## 2023-05-03 NOTE — TELEPHONE ENCOUNTER
"[facility] CARDIOLOGY TRIAGE REPORT  Facility: [facility]  Physician: Dr. Hatch  Chief complaint: Chest pain.  Elevated troponin  Pertinent history & patient course: Julisa Josue is a 62 y.o. female with recent NSTEMI s/p PCI LCX & rPDA 4/3/23 , prior PCI LCX 2018, CABG x1 LIMA to LAD 9/14/2021, type 2 diabetes, hypertension with history of drug-seeking behavior and ecstasy use with reported cognitive deficits admitted 4/23/2023 with delayed posterior STEMI undergoing successful PCI with aspiration thrombectomy, PTCA stent (3.0 x 38 mm ANA), also treated for \"Dressler syndrome\" with colchicine, discharged 5/1/2023.  Presents today at [facility], ER with chest tightness and sharp chest pain into the shoulders.  EKG showed sinus rhythm rate 90 intraventricular conduction delay with no acute ischemic changes.  Initial troponin 342, follow-up 337.  The reportedly has been compliant with medications.  After further discussion the patient will be admitted at [facility].  Additional serial troponin levels will be obtained.  She will be given Toradol IV.  She will be continued on her preadmission medications with DAPT (aspirin/prasugrel), atorvastatin and metoprolol and continued on omeprazole.  At this time it was not felt that the patient was having ACS and it was not felt the patient would require transfer to [facility].  However if the patient's clinical circumstances change or laboratory results are significantly abnormal to warrant reevaluation Dr. Mendiola will contact [facility] Cardiology for reassessment         "

## 2023-05-22 DIAGNOSIS — R07.89 OTHER CHEST PAIN: Primary | ICD-10-CM

## 2023-05-22 DIAGNOSIS — R10.9 ABDOMINAL PAIN, UNSPECIFIED ABDOMINAL LOCATION: Primary | ICD-10-CM

## 2023-05-24 LAB
EKG IMPRESSION: NORMAL

## 2023-06-13 DIAGNOSIS — R07.89 OTHER CHEST PAIN: Primary | ICD-10-CM

## 2023-06-16 LAB
EKG IMPRESSION: NORMAL
EKG IMPRESSION: NORMAL

## 2023-06-16 PROCEDURE — 93010 ELECTROCARDIOGRAM REPORT: CPT | Performed by: INTERNAL MEDICINE

## 2023-06-16 PROCEDURE — 93010 ELECTROCARDIOGRAM REPORT: CPT | Mod: 76 | Performed by: INTERNAL MEDICINE

## 2023-07-12 DIAGNOSIS — R07.89 OTHER CHEST PAIN: Primary | ICD-10-CM

## 2023-07-25 DIAGNOSIS — R07.89 OTHER CHEST PAIN: Primary | ICD-10-CM

## 2023-08-27 NOTE — PROGRESS NOTES
Telemetry Shift Summary     Rhythm: ST  Rate: 101-107  Measurements: .16/.08/.36  Ectopy (reported by Monitor Tech): f PVC, o Coup, r Trig      Normal Values  Rhythm: Sinus  HR:   Measurements: 0.12-0.20/0.06-0.10/0.30-0.52     Gen: See HPI  Eyes: No eye irritation or discharge  ENT: No ear pain, congestion, sore throat  Resp: No cough or trouble breathing  Cardiovascular: No chest pain or palpitation  Gastroenteric: See HPI  :  No change in urine output; no dysuria  MS: No joint or muscle pain  Skin: No rashes  Neuro: See HPI  Remainder negative, except as per the HPI

## 2024-06-02 DIAGNOSIS — R73.9 HYPERGLYCEMIA: Primary | ICD-10-CM

## 2024-06-02 DIAGNOSIS — R07.89 OTHER CHEST PAIN: Primary | ICD-10-CM

## 2024-06-05 LAB
EKG IMPRESSION: NORMAL
EKG IMPRESSION: NORMAL

## 2024-06-05 PROCEDURE — 93010 ELECTROCARDIOGRAM REPORT: CPT | Performed by: INTERNAL MEDICINE

## 2024-06-05 PROCEDURE — 93010 ELECTROCARDIOGRAM REPORT: CPT | Mod: 76 | Performed by: INTERNAL MEDICINE

## 2024-07-09 DIAGNOSIS — R07.89 OTHER CHEST PAIN: Primary | ICD-10-CM

## 2024-07-11 LAB — EKG IMPRESSION: NORMAL

## 2024-07-11 PROCEDURE — 93010 ELECTROCARDIOGRAM REPORT: CPT | Performed by: INTERNAL MEDICINE

## 2024-08-09 DIAGNOSIS — R07.9 CHEST PAIN, UNSPECIFIED TYPE: Primary | ICD-10-CM

## 2024-08-10 LAB — EKG IMPRESSION: NORMAL

## 2024-08-10 PROCEDURE — 93010 ELECTROCARDIOGRAM REPORT: CPT | Performed by: INTERNAL MEDICINE

## 2024-08-14 DIAGNOSIS — R07.9 CHEST PAIN, UNSPECIFIED TYPE: Primary | ICD-10-CM

## 2024-08-14 DIAGNOSIS — R07.89 OTHER CHEST PAIN: Primary | ICD-10-CM

## 2024-08-20 LAB
EKG IMPRESSION: NORMAL
EKG IMPRESSION: NORMAL

## 2024-08-20 PROCEDURE — 93010 ELECTROCARDIOGRAM REPORT: CPT | Performed by: INTERNAL MEDICINE

## 2024-08-20 PROCEDURE — 93010 ELECTROCARDIOGRAM REPORT: CPT | Mod: 76 | Performed by: INTERNAL MEDICINE

## 2024-09-05 DIAGNOSIS — R07.9 CHEST PAIN, UNSPECIFIED TYPE: Primary | ICD-10-CM

## 2024-09-06 LAB
EKG IMPRESSION: NORMAL

## 2024-09-06 PROCEDURE — 93010 ELECTROCARDIOGRAM REPORT: CPT | Performed by: INTERNAL MEDICINE

## 2024-09-06 PROCEDURE — 93010 ELECTROCARDIOGRAM REPORT: CPT | Mod: 76 | Performed by: INTERNAL MEDICINE

## 2024-09-10 DIAGNOSIS — R07.9 CHEST PAIN, UNSPECIFIED TYPE: Primary | ICD-10-CM

## 2024-09-12 LAB — EKG IMPRESSION: NORMAL

## 2024-09-12 PROCEDURE — 93010 ELECTROCARDIOGRAM REPORT: CPT | Performed by: INTERNAL MEDICINE

## 2024-09-24 ENCOUNTER — APPOINTMENT (OUTPATIENT)
Dept: RADIOLOGY | Facility: MEDICAL CENTER | Age: 64
DRG: 982 | End: 2024-09-24
Attending: EMERGENCY MEDICINE
Payer: MEDICAID

## 2024-09-24 ENCOUNTER — HOSPITAL ENCOUNTER (INPATIENT)
Facility: MEDICAL CENTER | Age: 64
End: 2024-09-24
Attending: EMERGENCY MEDICINE | Admitting: STUDENT IN AN ORGANIZED HEALTH CARE EDUCATION/TRAINING PROGRAM
Payer: MEDICAID

## 2024-09-24 DIAGNOSIS — R33.9 URINARY RETENTION: ICD-10-CM

## 2024-09-24 DIAGNOSIS — I25.5 ISCHEMIC CARDIOMYOPATHY: ICD-10-CM

## 2024-09-24 DIAGNOSIS — I70.209 FEMORAL ARTERY STENOSIS (HCC): ICD-10-CM

## 2024-09-24 DIAGNOSIS — R73.9 HYPERGLYCEMIA: ICD-10-CM

## 2024-09-24 DIAGNOSIS — I25.10 CORONARY ARTERY DISEASE INVOLVING NATIVE HEART WITHOUT ANGINA PECTORIS, UNSPECIFIED VESSEL OR LESION TYPE: ICD-10-CM

## 2024-09-24 DIAGNOSIS — N17.9 ACUTE KIDNEY INJURY (HCC): ICD-10-CM

## 2024-09-24 DIAGNOSIS — R07.89 OTHER CHEST PAIN: Primary | ICD-10-CM

## 2024-09-24 DIAGNOSIS — R07.9 CHEST PAIN, UNSPECIFIED TYPE: ICD-10-CM

## 2024-09-24 DIAGNOSIS — Z95.5 STENTED CORONARY ARTERY: ICD-10-CM

## 2024-09-24 DIAGNOSIS — E87.29 HIGH ANION GAP METABOLIC ACIDOSIS: ICD-10-CM

## 2024-09-24 DIAGNOSIS — I50.20 ACC/AHA STAGE C SYSTOLIC HEART FAILURE (HCC): ICD-10-CM

## 2024-09-24 DIAGNOSIS — I73.9 PAD (PERIPHERAL ARTERY DISEASE) (HCC): ICD-10-CM

## 2024-09-24 DIAGNOSIS — I21.4 NSTEMI (NON-ST ELEVATED MYOCARDIAL INFARCTION) (HCC): ICD-10-CM

## 2024-09-24 DIAGNOSIS — N17.9 AKI (ACUTE KIDNEY INJURY) (HCC): ICD-10-CM

## 2024-09-24 DIAGNOSIS — I20.0 UNSTABLE ANGINA (HCC): ICD-10-CM

## 2024-09-24 LAB
ALBUMIN SERPL BCP-MCNC: 3.7 G/DL (ref 3.2–4.9)
ALBUMIN/GLOB SERPL: 1.2 G/DL
ALP SERPL-CCNC: 87 U/L (ref 30–99)
ALT SERPL-CCNC: 15 U/L (ref 2–50)
ANION GAP SERPL CALC-SCNC: 17 MMOL/L (ref 7–16)
AST SERPL-CCNC: 18 U/L (ref 12–45)
BASOPHILS # BLD AUTO: 0.5 % (ref 0–1.8)
BASOPHILS # BLD: 0.05 K/UL (ref 0–0.12)
BILIRUB SERPL-MCNC: 0.2 MG/DL (ref 0.1–1.5)
BUN SERPL-MCNC: 31 MG/DL (ref 8–22)
CALCIUM ALBUM COR SERPL-MCNC: 9.1 MG/DL (ref 8.5–10.5)
CALCIUM SERPL-MCNC: 8.9 MG/DL (ref 8.5–10.5)
CHLORIDE SERPL-SCNC: 103 MMOL/L (ref 96–112)
CO2 SERPL-SCNC: 17 MMOL/L (ref 20–33)
CREAT SERPL-MCNC: 2.08 MG/DL (ref 0.5–1.4)
CREAT UR-MCNC: 97.5 MG/DL
EOSINOPHIL # BLD AUTO: 0.08 K/UL (ref 0–0.51)
EOSINOPHIL NFR BLD: 0.8 % (ref 0–6.9)
ERYTHROCYTE [DISTWIDTH] IN BLOOD BY AUTOMATED COUNT: 43.6 FL (ref 35.9–50)
GFR SERPLBLD CREATININE-BSD FMLA CKD-EPI: 26 ML/MIN/1.73 M 2
GLOBULIN SER CALC-MCNC: 3.2 G/DL (ref 1.9–3.5)
GLUCOSE SERPL-MCNC: 277 MG/DL (ref 65–99)
HCT VFR BLD AUTO: 39.4 % (ref 37–47)
HGB BLD-MCNC: 12.7 G/DL (ref 12–16)
IMM GRANULOCYTES # BLD AUTO: 0.03 K/UL (ref 0–0.11)
IMM GRANULOCYTES NFR BLD AUTO: 0.3 % (ref 0–0.9)
LIPASE SERPL-CCNC: 26 U/L (ref 11–82)
LYMPHOCYTES # BLD AUTO: 3.77 K/UL (ref 1–4.8)
LYMPHOCYTES NFR BLD: 39.4 % (ref 22–41)
MCH RBC QN AUTO: 28.9 PG (ref 27–33)
MCHC RBC AUTO-ENTMCNC: 32.2 G/DL (ref 32.2–35.5)
MCV RBC AUTO: 89.5 FL (ref 81.4–97.8)
MONOCYTES # BLD AUTO: 0.8 K/UL (ref 0–0.85)
MONOCYTES NFR BLD AUTO: 8.4 % (ref 0–13.4)
NEUTROPHILS # BLD AUTO: 4.85 K/UL (ref 1.82–7.42)
NEUTROPHILS NFR BLD: 50.6 % (ref 44–72)
NRBC # BLD AUTO: 0 K/UL
NRBC BLD-RTO: 0 /100 WBC (ref 0–0.2)
PLATELET # BLD AUTO: 285 K/UL (ref 164–446)
PMV BLD AUTO: 9.4 FL (ref 9–12.9)
POTASSIUM SERPL-SCNC: 3.4 MMOL/L (ref 3.6–5.5)
PROT SERPL-MCNC: 6.9 G/DL (ref 6–8.2)
RBC # BLD AUTO: 4.4 M/UL (ref 4.2–5.4)
SODIUM SERPL-SCNC: 137 MMOL/L (ref 135–145)
SODIUM UR-SCNC: 113 MMOL/L
TROPONIN T SERPL-MCNC: 26 NG/L (ref 6–19)
TROPONIN T SERPL-MCNC: 28 NG/L (ref 6–19)
WBC # BLD AUTO: 9.6 K/UL (ref 4.8–10.8)

## 2024-09-24 PROCEDURE — 99223 1ST HOSP IP/OBS HIGH 75: CPT | Performed by: STUDENT IN AN ORGANIZED HEALTH CARE EDUCATION/TRAINING PROGRAM

## 2024-09-24 PROCEDURE — 700111 HCHG RX REV CODE 636 W/ 250 OVERRIDE (IP): Mod: JZ,UD | Performed by: EMERGENCY MEDICINE

## 2024-09-24 PROCEDURE — 96374 THER/PROPH/DIAG INJ IV PUSH: CPT | Mod: XU

## 2024-09-24 PROCEDURE — 83690 ASSAY OF LIPASE: CPT

## 2024-09-24 PROCEDURE — 85025 COMPLETE CBC W/AUTO DIFF WBC: CPT

## 2024-09-24 PROCEDURE — 80053 COMPREHEN METABOLIC PANEL: CPT

## 2024-09-24 PROCEDURE — 700117 HCHG RX CONTRAST REV CODE 255: Mod: UD | Performed by: EMERGENCY MEDICINE

## 2024-09-24 PROCEDURE — 700111 HCHG RX REV CODE 636 W/ 250 OVERRIDE (IP): Performed by: STUDENT IN AN ORGANIZED HEALTH CARE EDUCATION/TRAINING PROGRAM

## 2024-09-24 PROCEDURE — 96372 THER/PROPH/DIAG INJ SC/IM: CPT | Mod: XU

## 2024-09-24 PROCEDURE — 700105 HCHG RX REV CODE 258: Mod: UD | Performed by: EMERGENCY MEDICINE

## 2024-09-24 PROCEDURE — 99285 EMERGENCY DEPT VISIT HI MDM: CPT

## 2024-09-24 PROCEDURE — 82570 ASSAY OF URINE CREATININE: CPT

## 2024-09-24 PROCEDURE — 84300 ASSAY OF URINE SODIUM: CPT

## 2024-09-24 PROCEDURE — 770020 HCHG ROOM/CARE - TELE (206)

## 2024-09-24 PROCEDURE — 84484 ASSAY OF TROPONIN QUANT: CPT

## 2024-09-24 PROCEDURE — 36415 COLL VENOUS BLD VENIPUNCTURE: CPT

## 2024-09-24 PROCEDURE — 71275 CT ANGIOGRAPHY CHEST: CPT

## 2024-09-24 RX ORDER — MORPHINE SULFATE 4 MG/ML
4 INJECTION INTRAVENOUS ONCE
Status: COMPLETED | OUTPATIENT
Start: 2024-09-24 | End: 2024-09-24

## 2024-09-24 RX ORDER — HEPARIN SODIUM 5000 [USP'U]/ML
5000 INJECTION, SOLUTION INTRAVENOUS; SUBCUTANEOUS EVERY 8 HOURS
Status: DISCONTINUED | OUTPATIENT
Start: 2024-09-24 | End: 2024-09-28 | Stop reason: SINTOL

## 2024-09-24 RX ORDER — SODIUM CHLORIDE, SODIUM LACTATE, POTASSIUM CHLORIDE, CALCIUM CHLORIDE 600; 310; 30; 20 MG/100ML; MG/100ML; MG/100ML; MG/100ML
1000 INJECTION, SOLUTION INTRAVENOUS ONCE
Status: COMPLETED | OUTPATIENT
Start: 2024-09-24 | End: 2024-09-24

## 2024-09-24 RX ADMIN — HEPARIN SODIUM 5000 UNITS: 5000 INJECTION, SOLUTION INTRAVENOUS; SUBCUTANEOUS at 21:56

## 2024-09-24 RX ADMIN — MORPHINE SULFATE 4 MG: 4 INJECTION, SOLUTION INTRAMUSCULAR; INTRAVENOUS at 20:49

## 2024-09-24 RX ADMIN — IOHEXOL 100 ML: 350 INJECTION, SOLUTION INTRAVENOUS at 20:11

## 2024-09-24 RX ADMIN — SODIUM CHLORIDE, POTASSIUM CHLORIDE, SODIUM LACTATE AND CALCIUM CHLORIDE 1000 ML: 600; 310; 30; 20 INJECTION, SOLUTION INTRAVENOUS at 20:15

## 2024-09-24 SDOH — ECONOMIC STABILITY: TRANSPORTATION INSECURITY
IN THE PAST 12 MONTHS, HAS LACK OF RELIABLE TRANSPORTATION KEPT YOU FROM MEDICAL APPOINTMENTS, MEETINGS, WORK OR FROM GETTING THINGS NEEDED FOR DAILY LIVING?: NO

## 2024-09-24 ASSESSMENT — COGNITIVE AND FUNCTIONAL STATUS - GENERAL
SUGGESTED CMS G CODE MODIFIER MOBILITY: CK
TOILETING: A LITTLE
CLIMB 3 TO 5 STEPS WITH RAILING: A LOT
STANDING UP FROM CHAIR USING ARMS: A LITTLE
WALKING IN HOSPITAL ROOM: A LOT
MOVING FROM LYING ON BACK TO SITTING ON SIDE OF FLAT BED: A LITTLE
MOBILITY SCORE: 16
SUGGESTED CMS G CODE MODIFIER DAILY ACTIVITY: CI
DAILY ACTIVITIY SCORE: 23
MOVING TO AND FROM BED TO CHAIR: A LITTLE
TURNING FROM BACK TO SIDE WHILE IN FLAT BAD: A LITTLE

## 2024-09-24 ASSESSMENT — FIBROSIS 4 INDEX
FIB4 SCORE: 1.01
FIB4 SCORE: 1.01
FIB4 SCORE: 0.72

## 2024-09-24 ASSESSMENT — SOCIAL DETERMINANTS OF HEALTH (SDOH)
IN THE PAST 12 MONTHS, HAS THE ELECTRIC, GAS, OIL, OR WATER COMPANY THREATENED TO SHUT OFF SERVICE IN YOUR HOME?: NO
WITHIN THE LAST YEAR, HAVE YOU BEEN HUMILIATED OR EMOTIONALLY ABUSED IN OTHER WAYS BY YOUR PARTNER OR EX-PARTNER?: NO
WITHIN THE LAST YEAR, HAVE TO BEEN RAPED OR FORCED TO HAVE ANY KIND OF SEXUAL ACTIVITY BY YOUR PARTNER OR EX-PARTNER?: NO
WITHIN THE PAST 12 MONTHS, YOU WORRIED THAT YOUR FOOD WOULD RUN OUT BEFORE YOU GOT THE MONEY TO BUY MORE: NEVER TRUE
WITHIN THE LAST YEAR, HAVE YOU BEEN KICKED, HIT, SLAPPED, OR OTHERWISE PHYSICALLY HURT BY YOUR PARTNER OR EX-PARTNER?: NO
WITHIN THE LAST YEAR, HAVE YOU BEEN AFRAID OF YOUR PARTNER OR EX-PARTNER?: NO
WITHIN THE PAST 12 MONTHS, THE FOOD YOU BOUGHT JUST DIDN'T LAST AND YOU DIDN'T HAVE MONEY TO GET MORE: NEVER TRUE

## 2024-09-24 ASSESSMENT — LIFESTYLE VARIABLES
DOES PATIENT WANT TO STOP DRINKING: NO
HAVE YOU EVER FELT YOU SHOULD CUT DOWN ON YOUR DRINKING: NO
EVER FELT BAD OR GUILTY ABOUT YOUR DRINKING: NO
ALCOHOL_USE: NO
EVER HAD A DRINK FIRST THING IN THE MORNING TO STEADY YOUR NERVES TO GET RID OF A HANGOVER: NO
TOTAL SCORE: 0
HOW MANY TIMES IN THE PAST YEAR HAVE YOU HAD 5 OR MORE DRINKS IN A DAY: 0
TOTAL SCORE: 0
TOTAL SCORE: 0
CONSUMPTION TOTAL: NEGATIVE
AVERAGE NUMBER OF DAYS PER WEEK YOU HAVE A DRINK CONTAINING ALCOHOL: 0
HAVE PEOPLE ANNOYED YOU BY CRITICIZING YOUR DRINKING: NO
ON A TYPICAL DAY WHEN YOU DRINK ALCOHOL HOW MANY DRINKS DO YOU HAVE: 0

## 2024-09-24 ASSESSMENT — PAIN DESCRIPTION - PAIN TYPE
TYPE: ACUTE PAIN
TYPE: ACUTE PAIN;CHRONIC PAIN

## 2024-09-24 ASSESSMENT — PATIENT HEALTH QUESTIONNAIRE - PHQ9
2. FEELING DOWN, DEPRESSED, IRRITABLE, OR HOPELESS: NOT AT ALL
SUM OF ALL RESPONSES TO PHQ9 QUESTIONS 1 AND 2: 0
1. LITTLE INTEREST OR PLEASURE IN DOING THINGS: NOT AT ALL

## 2024-09-25 ENCOUNTER — APPOINTMENT (OUTPATIENT)
Dept: RADIOLOGY | Facility: MEDICAL CENTER | Age: 64
DRG: 982 | End: 2024-09-25
Attending: STUDENT IN AN ORGANIZED HEALTH CARE EDUCATION/TRAINING PROGRAM
Payer: MEDICAID

## 2024-09-25 LAB
ALBUMIN SERPL BCP-MCNC: 3.4 G/DL (ref 3.2–4.9)
ALBUMIN SERPL BCP-MCNC: 3.4 G/DL (ref 3.2–4.9)
ALBUMIN/GLOB SERPL: 1 G/DL
ALBUMIN/GLOB SERPL: 1.1 G/DL
ALP SERPL-CCNC: 82 U/L (ref 30–99)
ALP SERPL-CCNC: 85 U/L (ref 30–99)
ALT SERPL-CCNC: 11 U/L (ref 2–50)
ALT SERPL-CCNC: 13 U/L (ref 2–50)
ANION GAP SERPL CALC-SCNC: 12 MMOL/L (ref 7–16)
ANION GAP SERPL CALC-SCNC: 16 MMOL/L (ref 7–16)
AST SERPL-CCNC: 16 U/L (ref 12–45)
AST SERPL-CCNC: 24 U/L (ref 12–45)
BILIRUB SERPL-MCNC: 0.3 MG/DL (ref 0.1–1.5)
BILIRUB SERPL-MCNC: 0.3 MG/DL (ref 0.1–1.5)
BUN SERPL-MCNC: 24 MG/DL (ref 8–22)
BUN SERPL-MCNC: 27 MG/DL (ref 8–22)
CALCIUM ALBUM COR SERPL-MCNC: 9 MG/DL (ref 8.5–10.5)
CALCIUM ALBUM COR SERPL-MCNC: 9.2 MG/DL (ref 8.5–10.5)
CALCIUM SERPL-MCNC: 8.5 MG/DL (ref 8.5–10.5)
CALCIUM SERPL-MCNC: 8.7 MG/DL (ref 8.5–10.5)
CHLORIDE SERPL-SCNC: 103 MMOL/L (ref 96–112)
CHLORIDE SERPL-SCNC: 105 MMOL/L (ref 96–112)
CHOLEST SERPL-MCNC: 211 MG/DL (ref 100–199)
CO2 SERPL-SCNC: 16 MMOL/L (ref 20–33)
CO2 SERPL-SCNC: 19 MMOL/L (ref 20–33)
CREAT SERPL-MCNC: 1.29 MG/DL (ref 0.5–1.4)
CREAT SERPL-MCNC: 1.45 MG/DL (ref 0.5–1.4)
EKG IMPRESSION: NORMAL
EST. AVERAGE GLUCOSE BLD GHB EST-MCNC: 309 MG/DL
GFR SERPLBLD CREATININE-BSD FMLA CKD-EPI: 41 ML/MIN/1.73 M 2
GFR SERPLBLD CREATININE-BSD FMLA CKD-EPI: 47 ML/MIN/1.73 M 2
GLOBULIN SER CALC-MCNC: 3.2 G/DL (ref 1.9–3.5)
GLOBULIN SER CALC-MCNC: 3.4 G/DL (ref 1.9–3.5)
GLUCOSE BLD STRIP.AUTO-MCNC: 157 MG/DL (ref 65–99)
GLUCOSE BLD STRIP.AUTO-MCNC: 183 MG/DL (ref 65–99)
GLUCOSE BLD STRIP.AUTO-MCNC: 228 MG/DL (ref 65–99)
GLUCOSE BLD STRIP.AUTO-MCNC: 232 MG/DL (ref 65–99)
GLUCOSE BLD STRIP.AUTO-MCNC: 320 MG/DL (ref 65–99)
GLUCOSE SERPL-MCNC: 195 MG/DL (ref 65–99)
GLUCOSE SERPL-MCNC: 269 MG/DL (ref 65–99)
HBA1C MFR BLD: 12.4 % (ref 4–5.6)
HDLC SERPL-MCNC: 28 MG/DL
LDLC SERPL CALC-MCNC: 133 MG/DL
POTASSIUM SERPL-SCNC: 3.7 MMOL/L (ref 3.6–5.5)
POTASSIUM SERPL-SCNC: 3.8 MMOL/L (ref 3.6–5.5)
PROT SERPL-MCNC: 6.6 G/DL (ref 6–8.2)
PROT SERPL-MCNC: 6.8 G/DL (ref 6–8.2)
SODIUM SERPL-SCNC: 134 MMOL/L (ref 135–145)
SODIUM SERPL-SCNC: 137 MMOL/L (ref 135–145)
TRIGL SERPL-MCNC: 251 MG/DL (ref 0–149)
TROPONIN T SERPL-MCNC: 24 NG/L (ref 6–19)

## 2024-09-25 PROCEDURE — 80061 LIPID PANEL: CPT

## 2024-09-25 PROCEDURE — 36415 COLL VENOUS BLD VENIPUNCTURE: CPT

## 2024-09-25 PROCEDURE — 700102 HCHG RX REV CODE 250 W/ 637 OVERRIDE(OP): Performed by: STUDENT IN AN ORGANIZED HEALTH CARE EDUCATION/TRAINING PROGRAM

## 2024-09-25 PROCEDURE — 700102 HCHG RX REV CODE 250 W/ 637 OVERRIDE(OP)

## 2024-09-25 PROCEDURE — 83036 HEMOGLOBIN GLYCOSYLATED A1C: CPT

## 2024-09-25 PROCEDURE — 90471 IMMUNIZATION ADMIN: CPT

## 2024-09-25 PROCEDURE — 93005 ELECTROCARDIOGRAM TRACING: CPT | Performed by: STUDENT IN AN ORGANIZED HEALTH CARE EDUCATION/TRAINING PROGRAM

## 2024-09-25 PROCEDURE — 82962 GLUCOSE BLOOD TEST: CPT

## 2024-09-25 PROCEDURE — 700102 HCHG RX REV CODE 250 W/ 637 OVERRIDE(OP): Mod: UD | Performed by: STUDENT IN AN ORGANIZED HEALTH CARE EDUCATION/TRAINING PROGRAM

## 2024-09-25 PROCEDURE — 97162 PT EVAL MOD COMPLEX 30 MIN: CPT

## 2024-09-25 PROCEDURE — 99233 SBSQ HOSP IP/OBS HIGH 50: CPT | Performed by: STUDENT IN AN ORGANIZED HEALTH CARE EDUCATION/TRAINING PROGRAM

## 2024-09-25 PROCEDURE — 76775 US EXAM ABDO BACK WALL LIM: CPT

## 2024-09-25 PROCEDURE — 80053 COMPREHEN METABOLIC PANEL: CPT | Mod: 91

## 2024-09-25 PROCEDURE — 3E02340 INTRODUCTION OF INFLUENZA VACCINE INTO MUSCLE, PERCUTANEOUS APPROACH: ICD-10-PCS | Performed by: STUDENT IN AN ORGANIZED HEALTH CARE EDUCATION/TRAINING PROGRAM

## 2024-09-25 PROCEDURE — G0378 HOSPITAL OBSERVATION PER HR: HCPCS

## 2024-09-25 PROCEDURE — A9270 NON-COVERED ITEM OR SERVICE: HCPCS | Performed by: STUDENT IN AN ORGANIZED HEALTH CARE EDUCATION/TRAINING PROGRAM

## 2024-09-25 PROCEDURE — 700111 HCHG RX REV CODE 636 W/ 250 OVERRIDE (IP): Performed by: STUDENT IN AN ORGANIZED HEALTH CARE EDUCATION/TRAINING PROGRAM

## 2024-09-25 PROCEDURE — 700105 HCHG RX REV CODE 258: Performed by: STUDENT IN AN ORGANIZED HEALTH CARE EDUCATION/TRAINING PROGRAM

## 2024-09-25 PROCEDURE — 84484 ASSAY OF TROPONIN QUANT: CPT

## 2024-09-25 PROCEDURE — 96372 THER/PROPH/DIAG INJ SC/IM: CPT | Mod: XU

## 2024-09-25 PROCEDURE — 51798 US URINE CAPACITY MEASURE: CPT

## 2024-09-25 PROCEDURE — 96376 TX/PRO/DX INJ SAME DRUG ADON: CPT | Mod: XU

## 2024-09-25 PROCEDURE — 93010 ELECTROCARDIOGRAM REPORT: CPT | Performed by: INTERNAL MEDICINE

## 2024-09-25 PROCEDURE — A9270 NON-COVERED ITEM OR SERVICE: HCPCS

## 2024-09-25 PROCEDURE — 90656 IIV3 VACC NO PRSV 0.5 ML IM: CPT | Performed by: STUDENT IN AN ORGANIZED HEALTH CARE EDUCATION/TRAINING PROGRAM

## 2024-09-25 PROCEDURE — A9270 NON-COVERED ITEM OR SERVICE: HCPCS | Mod: UD | Performed by: STUDENT IN AN ORGANIZED HEALTH CARE EDUCATION/TRAINING PROGRAM

## 2024-09-25 PROCEDURE — 700111 HCHG RX REV CODE 636 W/ 250 OVERRIDE (IP): Mod: JZ,UD

## 2024-09-25 RX ORDER — INSULIN LISPRO 100 [IU]/ML
3-15 INJECTION, SOLUTION INTRAVENOUS; SUBCUTANEOUS EVERY 6 HOURS
Status: DISCONTINUED | OUTPATIENT
Start: 2024-09-25 | End: 2024-09-25

## 2024-09-25 RX ORDER — PRASUGREL 10 MG/1
10 TABLET, FILM COATED ORAL DAILY
Status: DISCONTINUED | OUTPATIENT
Start: 2024-09-25 | End: 2024-09-25

## 2024-09-25 RX ORDER — MORPHINE SULFATE 4 MG/ML
2 INJECTION INTRAVENOUS ONCE
Status: COMPLETED | OUTPATIENT
Start: 2024-09-25 | End: 2024-09-25

## 2024-09-25 RX ORDER — LOSARTAN POTASSIUM 25 MG/1
25 TABLET ORAL DAILY
COMMUNITY

## 2024-09-25 RX ORDER — INSULIN LISPRO 100 [IU]/ML
3-15 INJECTION, SOLUTION INTRAVENOUS; SUBCUTANEOUS
Status: DISCONTINUED | OUTPATIENT
Start: 2024-09-25 | End: 2024-09-25

## 2024-09-25 RX ORDER — OXYCODONE HYDROCHLORIDE 5 MG/1
5 TABLET ORAL EVERY 6 HOURS PRN
Status: DISCONTINUED | OUTPATIENT
Start: 2024-09-25 | End: 2024-09-25

## 2024-09-25 RX ORDER — OXYCODONE HYDROCHLORIDE 5 MG/1
5 TABLET ORAL EVERY 6 HOURS PRN
Status: DISCONTINUED | OUTPATIENT
Start: 2024-09-25 | End: 2024-09-26

## 2024-09-25 RX ORDER — INSULIN LISPRO 100 [IU]/ML
1-6 INJECTION, SOLUTION INTRAVENOUS; SUBCUTANEOUS
Status: DISCONTINUED | OUTPATIENT
Start: 2024-09-25 | End: 2024-09-29

## 2024-09-25 RX ORDER — OMEPRAZOLE 40 MG/1
40 CAPSULE, DELAYED RELEASE ORAL DAILY
Status: DISCONTINUED | OUTPATIENT
Start: 2024-09-25 | End: 2024-09-25

## 2024-09-25 RX ORDER — DEXTROSE MONOHYDRATE 25 G/50ML
25 INJECTION, SOLUTION INTRAVENOUS
Status: DISCONTINUED | OUTPATIENT
Start: 2024-09-25 | End: 2024-09-29

## 2024-09-25 RX ORDER — ASPIRIN 81 MG/1
81 TABLET ORAL DAILY
Status: DISCONTINUED | OUTPATIENT
Start: 2024-09-25 | End: 2024-09-25

## 2024-09-25 RX ORDER — CLOPIDOGREL BISULFATE 75 MG/1
75 TABLET ORAL DAILY
Status: DISCONTINUED | OUTPATIENT
Start: 2024-09-25 | End: 2024-09-26

## 2024-09-25 RX ORDER — CLOPIDOGREL BISULFATE 75 MG/1
75 TABLET ORAL DAILY
Status: ON HOLD | COMMUNITY
End: 2024-10-01

## 2024-09-25 RX ORDER — OXYCODONE HYDROCHLORIDE 10 MG/1
10 TABLET ORAL EVERY 6 HOURS PRN
Status: DISCONTINUED | OUTPATIENT
Start: 2024-09-25 | End: 2024-09-25

## 2024-09-25 RX ORDER — DEXTROSE MONOHYDRATE 25 G/50ML
25 INJECTION, SOLUTION INTRAVENOUS
Status: DISCONTINUED | OUTPATIENT
Start: 2024-09-25 | End: 2024-09-25

## 2024-09-25 RX ORDER — ACETAMINOPHEN 325 MG/1
650 TABLET ORAL EVERY 6 HOURS PRN
Status: DISCONTINUED | OUTPATIENT
Start: 2024-09-25 | End: 2024-09-25

## 2024-09-25 RX ORDER — OXYCODONE HYDROCHLORIDE 5 MG/1
5 TABLET ORAL ONCE
Status: COMPLETED | OUTPATIENT
Start: 2024-09-25 | End: 2024-09-25

## 2024-09-25 RX ORDER — PREGABALIN 100 MG/1
100 CAPSULE ORAL 2 TIMES DAILY
Status: DISCONTINUED | OUTPATIENT
Start: 2024-09-25 | End: 2024-10-01 | Stop reason: HOSPADM

## 2024-09-25 RX ORDER — ATORVASTATIN CALCIUM 80 MG/1
80 TABLET, FILM COATED ORAL EVERY EVENING
Status: DISCONTINUED | OUTPATIENT
Start: 2024-09-25 | End: 2024-10-01 | Stop reason: HOSPADM

## 2024-09-25 RX ORDER — FUROSEMIDE 40 MG/1
40 TABLET ORAL DAILY
Status: ON HOLD | COMMUNITY
End: 2024-10-01

## 2024-09-25 RX ORDER — ACETAMINOPHEN 500 MG
1000 TABLET ORAL 3 TIMES DAILY
Status: DISCONTINUED | OUTPATIENT
Start: 2024-09-25 | End: 2024-09-29

## 2024-09-25 RX ORDER — SODIUM CHLORIDE, SODIUM LACTATE, POTASSIUM CHLORIDE, CALCIUM CHLORIDE 600; 310; 30; 20 MG/100ML; MG/100ML; MG/100ML; MG/100ML
INJECTION, SOLUTION INTRAVENOUS CONTINUOUS
Status: DISCONTINUED | OUTPATIENT
Start: 2024-09-25 | End: 2024-09-26

## 2024-09-25 RX ADMIN — INFLUENZA A VIRUS A/VICTORIA/4897/2022 IVR-238 (H1N1) ANTIGEN (FORMALDEHYDE INACTIVATED), INFLUENZA A VIRUS A/CALIFORNIA/122/2022 SAN-022 (H3N2) ANTIGEN (FORMALDEHYDE INACTIVATED), AND INFLUENZA B VIRUS B/MICHIGAN/01/2021 ANTIGEN (FORMALDEHYDE INACTIVATED) 0.5 ML: 15; 15; 15 INJECTION, SUSPENSION INTRAMUSCULAR at 01:55

## 2024-09-25 RX ADMIN — INSULIN LISPRO 2 UNITS: 100 INJECTION, SOLUTION INTRAVENOUS; SUBCUTANEOUS at 18:48

## 2024-09-25 RX ADMIN — OMEPRAZOLE 40 MG: 20 CAPSULE, DELAYED RELEASE ORAL at 18:42

## 2024-09-25 RX ADMIN — Medication 5 MG: at 20:38

## 2024-09-25 RX ADMIN — HEPARIN SODIUM 5000 UNITS: 5000 INJECTION, SOLUTION INTRAVENOUS; SUBCUTANEOUS at 05:22

## 2024-09-25 RX ADMIN — PREGABALIN 100 MG: 100 CAPSULE ORAL at 05:23

## 2024-09-25 RX ADMIN — Medication 5 MG: at 02:00

## 2024-09-25 RX ADMIN — ACETAMINOPHEN 1000 MG: 500 TABLET ORAL at 18:42

## 2024-09-25 RX ADMIN — OXYCODONE 5 MG: 5 TABLET ORAL at 18:42

## 2024-09-25 RX ADMIN — SODIUM CHLORIDE, POTASSIUM CHLORIDE, SODIUM LACTATE AND CALCIUM CHLORIDE: 600; 310; 30; 20 INJECTION, SOLUTION INTRAVENOUS at 03:12

## 2024-09-25 RX ADMIN — MORPHINE SULFATE 2 MG: 4 INJECTION, SOLUTION INTRAMUSCULAR; INTRAVENOUS at 06:16

## 2024-09-25 RX ADMIN — INSULIN GLARGINE-YFGN 15 UNITS: 100 INJECTION, SOLUTION SUBCUTANEOUS at 18:48

## 2024-09-25 RX ADMIN — HEPARIN SODIUM 5000 UNITS: 5000 INJECTION, SOLUTION INTRAVENOUS; SUBCUTANEOUS at 22:16

## 2024-09-25 RX ADMIN — INSULIN LISPRO 3 UNITS: 100 INJECTION, SOLUTION INTRAVENOUS; SUBCUTANEOUS at 02:59

## 2024-09-25 RX ADMIN — ATORVASTATIN CALCIUM 80 MG: 80 TABLET, FILM COATED ORAL at 18:42

## 2024-09-25 RX ADMIN — INSULIN LISPRO 4 UNITS: 100 INJECTION, SOLUTION INTRAVENOUS; SUBCUTANEOUS at 20:52

## 2024-09-25 RX ADMIN — CLOPIDOGREL BISULFATE 75 MG: 75 TABLET ORAL at 05:23

## 2024-09-25 RX ADMIN — INSULIN LISPRO 6 UNITS: 100 INJECTION, SOLUTION INTRAVENOUS; SUBCUTANEOUS at 12:05

## 2024-09-25 RX ADMIN — PREGABALIN 100 MG: 100 CAPSULE ORAL at 18:42

## 2024-09-25 RX ADMIN — OXYCODONE 5 MG: 5 TABLET ORAL at 11:56

## 2024-09-25 RX ADMIN — OXYCODONE 5 MG: 5 TABLET ORAL at 00:27

## 2024-09-25 RX ADMIN — INSULIN LISPRO 3 UNITS: 100 INJECTION, SOLUTION INTRAVENOUS; SUBCUTANEOUS at 05:34

## 2024-09-25 RX ADMIN — SODIUM CHLORIDE, POTASSIUM CHLORIDE, SODIUM LACTATE AND CALCIUM CHLORIDE: 600; 310; 30; 20 INJECTION, SOLUTION INTRAVENOUS at 15:15

## 2024-09-25 RX ADMIN — ACETAMINOPHEN 1000 MG: 500 TABLET ORAL at 11:56

## 2024-09-25 RX ADMIN — HEPARIN SODIUM 5000 UNITS: 5000 INJECTION, SOLUTION INTRAVENOUS; SUBCUTANEOUS at 15:16

## 2024-09-25 ASSESSMENT — COGNITIVE AND FUNCTIONAL STATUS - GENERAL
WALKING IN HOSPITAL ROOM: A LITTLE
MOBILITY SCORE: 22
CLIMB 3 TO 5 STEPS WITH RAILING: A LITTLE
SUGGESTED CMS G CODE MODIFIER MOBILITY: CJ

## 2024-09-25 ASSESSMENT — ENCOUNTER SYMPTOMS
MUSCULOSKELETAL NEGATIVE: 1
CARDIOVASCULAR NEGATIVE: 1
RESPIRATORY NEGATIVE: 1
PSYCHIATRIC NEGATIVE: 1
EYES NEGATIVE: 1
ABDOMINAL PAIN: 1
WEAKNESS: 1

## 2024-09-25 ASSESSMENT — FIBROSIS 4 INDEX: FIB4 SCORE: 1.01

## 2024-09-25 ASSESSMENT — PAIN DESCRIPTION - PAIN TYPE
TYPE: ACUTE PAIN

## 2024-09-25 ASSESSMENT — GAIT ASSESSMENTS
DEVIATION: DECREASED BASE OF SUPPORT
GAIT LEVEL OF ASSIST: STANDBY ASSIST
DISTANCE (FEET): 5

## 2024-09-25 NOTE — ED TRIAGE NOTES
Chief Complaint   Patient presents with    Chest Pain     Patient transferred from [place] for chest and abdominal pain. Work-up at [facility] mostly unremarkable and patient transferred for further work-up. Patient mildly hypotensive upon arrival. 2L bolus given PTA.     Vitals:    09/24/24 1845   BP: (!) 88/57   Pulse: 97   Resp: 12   Temp: 36.9 °C (98.4 °F)   SpO2: 99%

## 2024-09-25 NOTE — ED PROVIDER NOTES
ED Provider Note    Scribed for Dr. Land by Sailaja Louis. 9/24/2024,  7:00 PM.      CHIEF COMPLAINT  Chief Complaint   Patient presents with    Chest Pain     Patient transferred from [place] for chest and abdominal pain. Work-up at [facility] mostly unremarkable and patient transferred for further work-up. Patient mildly hypotensive upon arrival. 2L bolus given PTA.       EXTERNAL RECORDS REVIEWED  Review of records at outside hospital reveal that the patient had a CT abdomen pelvis without, which was normal. CT head negative. She had an elevated D dimer, normal CBC, and creatine of 2.17. She had low blood pressure and was given IV fluids.     HPI  LIMITATION TO HISTORY   Select: : None  OUTSIDE HISTORIAN(S):  None    Julisa Josue is a 64 y.o. female with a history of CHF, diabetes, multiple MIs with 5 stents who presents to the Emergency Department as a transfer from [place] for chest pain in addition to diffuse abdominal pain onset one day ago. Her chest pain radiates toward her back, and is reportedly the worst pain she has ever experienced. She explains that she was seen at the ER in [place] and had CT scans which were unremarkable. She states they gave her pain medication in which did not alleviate any of her symptoms. She has had low appetite due to nausea. She reports shortness of breath. The patient states that her urine is dark in color and has an odor. She has an increase in urinary frequency, and is taking water tablets. She has a surgical history of 3 C sections, large bowel resection, and cholecystectomy. She denies any drug or alcohol use.     REVIEW OF SYSTEMS  See HPI for further details. All other systems are negative.     PAST MEDICAL HISTORY     Past Medical History:   Diagnosis Date    ACC/AHA stage C systolic heart failure (HCC) 4/2/2023    CAD (coronary artery disease)     PER [facility]    Congestive heart failure (HCC)     PER [facility]  HOSPITAL    Diabetes     Diabetes (HCC)     PER [facility]    Diverticulosis     PER [facility]    GERD (gastroesophageal reflux disease)     [facility]    High cholesterol 5/15/2011    Hypertension     Intestinal mass     Ischemic cardiomyopathy 2023    Left ventricular systolic dysfunction, NYHA class 3 2023    Migraine     Staph skin infection        SURGICAL HISTORY  Past Surgical History:   Procedure Laterality Date    STENT PLACEMENT  2018    2 cardiac stents    GASTROSCOPY  9/3/2017    Procedure: GASTROSCOPY WITH DILATION;  Surgeon: Kerri Carter M.D.;  Location: SURGERY [facility];  Service: Gastroenterology    COLONOSCOPY  9/3/2017    Procedure: COLONOSCOPY;  Surgeon: Kerri Carter M.D.;  Location: SURGERY [facility];  Service: Gastroenterology    OTHER ORTHOPEDIC SURGERY      right wrist surgery    ABDOMINAL EXPLORATION      Lower intestine d/t mass - benign    GYN SURGERY       x 3,tubal ligation    OTHER ABDOMINAL SURGERY      cholecystectomy    OTHER CARDIAC SURGERY      PER [facility] 2019 4 STENTS; 2019 BALLOON ANGIOPLASTY R RAMUS INTERMEDIATE BRANCH; STERNOTOMY       FAMILY HISTORY  Family History   Problem Relation Age of Onset    Cancer Maternal Aunt         Lung cancer d/t smoking       SOCIAL HISTORY    reports that she quit smoking about 32 years ago. Her smoking use included cigarettes. She started smoking about 50 years ago. She has a 40 pack-year smoking history. She has never used smokeless tobacco. She reports that she does not currently use alcohol. She reports current drug use. Drug: Inhaled.    CURRENT MEDICATIONS  Home Medications    **Home medications have not yet been reviewed for this encounter**       Audit from Redirected Encounters    **Home medications have not yet been reviewed for this encounter**         ALLERGIES  No Active Allergies      PHYSICAL EXAM  VITAL  SIGNS: BP (!) 88/57   Pulse 97   Temp 36.9 °C (98.4 °F) (Temporal)   Resp 12   Wt 94.3 kg (208 lb)   LMP 2009   SpO2 99%   BMI 34.61 kg/m²   Gen: Alert, no acute distress  HEENT: ATNC  Eyes: PERRL, EOMI, normal conjunctiva  Neck: trachea midline  Resp: no respiratory distress  CV: No JVD, regular rate and rhythm  Abd: non-distended, superficial abrasion RLQ wound that appears to be healing   Ext: No deformities  Back: Bilateral CVA tenderness.   Neuro: speech fluent    DIAGNOSTIC STUDIES / PROCEDURES  EKG  I independently interpreted this EKG.  Results for orders placed or performed in visit on 24   EKG   Result Value Ref Range    Report       [facility]    Test Date:  2024  Pt Name:    ALIDA JACKSON                  Department: [facility]  MRN:        [id number]                      Room:  Gender:     Female                       Technician: CAMERON  :        1960                   Requested By:RANGEL  TO  Order #:    [id number]                    Reading MD:    Measurements  Intervals                                Axis  Rate:       97                           P:          53  WY:         152                          QRS:        -20  QRSD:       124                          T:          116  QT:         375  QTc:        477    Interpretive Statements  Sinus rhythm  Probable left atrial enlargement  Left bundle branch block  Compared to ECG 2024 13:52:05  No significant changes         LABS  Labs Reviewed   COMP METABOLIC PANEL - Abnormal; Notable for the following components:       Result Value    Potassium 3.4 (*)     Co2 17 (*)     Anion Gap 17.0 (*)     Glucose 277 (*)     Bun 31 (*)     Creatinine 2.08 (*)     All other components within normal limits   TROPONIN - Abnormal; Notable for the following components:    Troponin T 28 (*)     All other components within normal limits   ESTIMATED GFR - Abnormal; Notable for the following components:    GFR (CKD-EPI) 26 (*)      All other components within normal limits   TROPONIN - Abnormal; Notable for the following components:    Troponin T 26 (*)     All other components within normal limits   CBC WITH DIFFERENTIAL   LIPASE   URINE SODIUM RANDOM   URINE CREATININE RANDOM   LIPID PROFILE         RADIOLOGY  I have independently interpreted the diagnostic imaging associated with this visit.  My preliminary interpretation is as follows: CTA aorta: No PE, no aortic dissection  Radiologist interpretation:    CT-CTA COMPLETE THORACOABDOMINAL AORTA   Final Result         1.  No aortic aneurysm or dissection identified.   2.  Hepatomegaly   3.  Atherosclerosis and atherosclerotic coronary artery disease                        US-RENAL    (Results Pending)       COURSE & MEDICAL DECISION MAKING  Pertinent Labs & Imaging studies were reviewed. (See chart for details)    7:00 PM Patient seen and examined at bedside.     7:49 PM- Bedside ultrasound performed at this time as detailed below.     The patient had the opportunity to ask any questions. The plan for hospitalization was discussed with the patient given their current presentation and diagnostic study results. Patient is understanding and agreeable to the plan for hospitalization.    Point of Care Ultrasound    ED POINT OF CARE ULTRASOUND: LIMITED CARDIAC    Indication for exam: Chest pain  LVEF: normal some wall motion abnormalities  Pericardial Effusion:not present  RV Strain: Unable to assess  Pulmonary B Lines:not present    Image retained through [facility] as seen below:                   This study is a limited ultrasound examination performed and interpreted to evaluate for limited conditions as outlined above. There may be other clinically important information contained in the images that is outside this scope. When clinically warranted, a comprehensive ultrasound through the appropriate department is considered.      INITIAL ASSESSMENT AND PLAN  Medical Decision Making: Patient  transferred from outside center with acute kidney injury, has initially low borderline low blood pressure, and has pain that is concerning either for possible pulmonary embolism or aortic dissection.  Despite the patient's acute kidney injury I believe the benefit of ruling out these diagnoses outweighs the risk.  The patient underwent CTA aorta, which demonstrates no dissection or pulmonary embolism.    Patient's labs returned with an elevated creatinine, elevated glucose and elevated anion gap metabolic acidosis with mild hypokalemia.  Troponin just above the normal cutoff range.  Low suspicion for type I NSTEMI at this point in time.  Patient given IV fluids for dehydration.  Will plan for hospitalization    ADDITIONAL PROBLEM LIST AND DISPOSITION      I have discussed management of the patient with the following medical professionals:  Dr. Cleaning (Hospitalist)     Barriers to care at this time, including but not limited to: Patient does not have established PCP.       Hydration: Patient received IV fluids for dehydration. Oral hydration alone was not sufficient. After IV fluids patient is improved.      DISPOSITION:  Patient will be hospitalized by Dr. Cleaning in guarded condition.    FINAL IMPRESSION  1. OJ (acute kidney injury) (HCC)    2. Chest pain, unspecified type    3. Hyperglycemia    4. High anion gap metabolic acidosis         Sailaja DELAROSA (Scribe), am scribing for, and in the presence of, Jim Land M.D..    Electronically signed by: Sailaja Louis (Scribe), 9/24/2024    IJim M.D. personally performed the services described in this documentation, as scribed by Sailaja Louis in my presence, and it is both accurate and complete.    The note accurately reflects work and decisions made by me.  Jim Land M.D.  9/24/2024  11:57 PM      This dictation was created using voice recognition software. The accuracy of the dictation is limited to the abilities of the  software. I expect there may be some errors of grammar and possibly content. The nursing notes were reviewed and certain aspects of this information were incorporated into this note.

## 2024-09-25 NOTE — PROGRESS NOTES
Hospital Medicine Daily Progress Note    Date of Service  9/25/2024    Chief Complaint  Julisa Josue is a 64 y.o. female admitted 9/24/2024 with OJ    Hospital Course   64 y.o. female who presented 9/24/2024 as a transfer from [place] for chest and back pain. She reports the chest pain radiates to her back. Also complaining of abdominal pain. In the ER, CTA thoracicoabdominal was negative for dissection. Patient was found to have acute kidney injury at Cr of 2.08 she ge be admitted for further workup.     Interval Problem Update  Still having some abdominal pain  Tolerating meal  Stable vitals  On room air  CBC stable  Acute renal failure resolved  Acidosis improving  Continue IV hydration  Adjust insulin regimen  Pending abdominal/pelvic CT  Labs in a.m.    I have discussed this patient's plan of care and discharge plan at IDT rounds today with Case Management, Nursing, Nursing leadership, and other members of the IDT team.    Consultants/Specialty  None    Code Status  Full Code    Disposition  The patient is not medically cleared for discharge to home or a post-acute facility.      I have placed the appropriate orders for post-discharge needs.    Review of Systems  Review of Systems   Constitutional:  Positive for malaise/fatigue.   HENT: Negative.     Eyes: Negative.    Respiratory: Negative.     Cardiovascular: Negative.    Gastrointestinal:  Positive for abdominal pain.   Genitourinary: Negative.    Musculoskeletal: Negative.    Skin: Negative.    Neurological:  Positive for weakness.   Endo/Heme/Allergies: Negative.    Psychiatric/Behavioral: Negative.          Physical Exam  Temp:  [36.1 °C (97 °F)-36.9 °C (98.4 °F)] 36.7 °C (98.1 °F)  Pulse:  [78-97] 93  Resp:  [12-18] 16  BP: ()/(52-87) 106/55  SpO2:  [93 %-99 %] 96 %    Physical Exam  Constitutional:       Appearance: She is obese.   HENT:      Head: Normocephalic and atraumatic.      Mouth/Throat:      Mouth: Mucous membranes are  moist.   Eyes:      Extraocular Movements: Extraocular movements intact.      Pupils: Pupils are equal, round, and reactive to light.   Cardiovascular:      Rate and Rhythm: Normal rate and regular rhythm.      Pulses: Normal pulses.      Heart sounds: Normal heart sounds.   Pulmonary:      Effort: Pulmonary effort is normal.      Breath sounds: Normal breath sounds.   Abdominal:      General: Bowel sounds are normal.      Palpations: Abdomen is soft.      Tenderness: There is abdominal tenderness.   Musculoskeletal:         General: No swelling. Normal range of motion.      Cervical back: Normal range of motion and neck supple.   Skin:     General: Skin is warm.      Coloration: Skin is not jaundiced.   Neurological:      General: No focal deficit present.      Mental Status: She is alert and oriented to person, place, and time. Mental status is at baseline.      Cranial Nerves: No cranial nerve deficit.   Psychiatric:         Mood and Affect: Mood normal.         Behavior: Behavior normal.         Thought Content: Thought content normal.         Judgment: Judgment normal.         Fluids    Intake/Output Summary (Last 24 hours) at 9/25/2024 1611  Last data filed at 9/25/2024 1000  Gross per 24 hour   Intake 240 ml   Output 1300 ml   Net -1060 ml        Laboratory  Recent Labs     09/24/24  1845   WBC 9.6   RBC 4.40   HEMOGLOBIN 12.7   HEMATOCRIT 39.4   MCV 89.5   MCH 28.9   MCHC 32.2   RDW 43.6   PLATELETCT 285   MPV 9.4     Recent Labs     09/24/24  1845 09/25/24  0535 09/25/24  1159   SODIUM 137 137 134*   POTASSIUM 3.4* 3.8 3.7   CHLORIDE 103 105 103   CO2 17* 16* 19*   GLUCOSE 277* 195* 269*   BUN 31* 27* 24*   CREATININE 2.08* 1.45* 1.29   CALCIUM 8.9 8.7 8.5             Recent Labs     09/25/24  0535   TRIGLYCERIDE 251*   HDL 28*   *       Imaging  US-RENAL   Final Result      1.  Right-sided pelviectasis.   2.  No hydronephrosis.      CT-CTA COMPLETE THORACOABDOMINAL AORTA   Final Result         1.   No aortic aneurysm or dissection identified.   2.  Hepatomegaly   3.  Atherosclerosis and atherosclerotic coronary artery disease                        CT-ABDOMEN-PELVIS W/O    (Results Pending)        Assessment/Plan  * Acute kidney injury (HCC)- (present on admission)  Assessment & Plan  IV Fluids   Check urine sodium and creatinine   Bladder scan   Renal sonogram   Avoid nephrotoxic agents     HLD (hyperlipidemia)- (present on admission)  Assessment & Plan  Continue with atorvastatin     Chest pain- (present on admission)  Assessment & Plan  The ASCVD Risk score (Yuan GARCIA, et al., 2019) failed to calculate for the following reasons:    The patient has a prior MI or stroke diagnosis  Telemetry monitoring  Serial troponin   Echocardiogram   CTA neg for dissection     Diabetes (HCC)- (present on admission)  Assessment & Plan  SSI+ Accu checks + hypoglycemia protocol          VTE prophylaxis: Heparin    I have performed a physical exam and reviewed and updated ROS and Plan today (9/25/2024). In review of yesterday's note (9/24/2024), there are no changes except as documented above.    Greater than 51 minutes spent prepping to see patient (e.g. review of tests) obtaining and/or reviewing separately obtained history. Performing a medically appropriate examination and/ evaluation.  Counseling and educating the patient/family/caregiver.  Ordering medications, tests, or procedures.  Referring and communicating with other health care professionals.  Documenting clinical information in EPIC.  Independently interpreting results and communicating results to patient/family/caregiver.  Care coordination

## 2024-09-25 NOTE — ASSESSMENT & PLAN NOTE
The ASCVD Risk score (Yuan GARCIA, et al., 2019) failed to calculate for the following reasons:    The patient has a prior MI or stroke diagnosis    ASA/Statin/Plavix  Cardiology following  Aim for LH-C today

## 2024-09-25 NOTE — ED NOTES
Pt transported to floor via gurney on RA and continuous cardiac monitor w/ RN.   Urine sent to lab.

## 2024-09-25 NOTE — DIETARY
Nutrition Services: Brief Update    Pt is in RD list for admit screen with oral supplement use at home.     Pt drinks Boost chocolate at home, denies weight loss and reports poor PO intake on admit screen.     Weight: 92.8 kg, Height: 165.1 cm, BMI: 34.05 (obesity class I)    Will order daily Ensure Max protein for pt and monitor per department policy.

## 2024-09-25 NOTE — ED NOTES
Bedside report from OJ Mcneal. Pt on gurney in lowest, locked position, on RA, and continuous cardiac monitoring. Fall precautions in place, including fall risk sign. Pt updated on plan of care. No needs at this time. Call light within reach.

## 2024-09-25 NOTE — H&P
Hospital Medicine History & Physical Note    Date of Service  9/24/2024    Primary Care Physician  Pcp Pt States None    Consultants  none    Code Status  Full Code    Chief Complaint  Chief Complaint   Patient presents with    Chest Pain     Patient transferred from [place] for chest and abdominal pain. Work-up at [facility] mostly unremarkable and patient transferred for further work-up. Patient mildly hypotensive upon arrival. 2L bolus given PTA.       History of Presenting Illness  Julisa Josue is a 64 y.o. female who presented 9/24/2024 as a transfer from [place] for chest and back pain. She reports the chest pain radiates to her back. Also complaining of abdominal pain. In the ER, CTA thoracicoabdominal was negative for dissection. Patient was found to have acute kidney injury at Cr of 2.08 she ge be admitted for further workup.     I discussed the plan of care with patient.    Review of Systems  Review of Systems   Cardiovascular:  Positive for chest pain.   Gastrointestinal:  Positive for abdominal pain.       Past Medical History   has a past medical history of ACC/AHA stage C systolic heart failure (HCC) (4/2/2023), CAD (coronary artery disease), Congestive heart failure (HCC), Diabetes, Diabetes (HCC), Diverticulosis, GERD (gastroesophageal reflux disease), High cholesterol (5/15/2011), Hypertension, Intestinal mass (2015), Ischemic cardiomyopathy (4/2/2023), Left ventricular systolic dysfunction, NYHA class 3 (4/2/2023), Migraine, and Staph skin infection.    Surgical History   has a past surgical history that includes other abdominal surgery; gyn surgery; other orthopedic surgery (6-2014); abdominal exploration; gastroscopy (9/3/2017); colonoscopy (9/3/2017); stent placement (12/20/2018); and other cardiac surgery.     Family History  family history includes Cancer in her maternal aunt.   Family history reviewed with patient. There is no family history that is pertinent to the chief  complaint.     Social History   reports that she quit smoking about 32 years ago. Her smoking use included cigarettes. She started smoking about 50 years ago. She has a 40 pack-year smoking history. She has never used smokeless tobacco. She reports that she does not currently use alcohol. She reports current drug use. Drug: Inhaled.    Allergies  Allergies   Allergen Reactions    Plavix [Clopidogrel]      Non responder       Medications  Prior to Admission Medications   Prescriptions Last Dose Informant Patient Reported? Taking?   SITagliptin (JANUVIA) 50 MG Tab  MAR from Other Facility Yes No   Sig: Take 50 mg by mouth every day.   acetaminophen (TYLENOL) 325 MG Tab  MAR from Other Facility Yes No   Sig: Take 650 mg by mouth every 6 hours as needed. Indications: Pain   aspirin 81 MG EC tablet   No No   Sig: Take 1 Tablet by mouth every day.   atorvastatin (LIPITOR) 80 MG tablet   No No   Sig: Take 1 Tablet by mouth every evening.   colchicine (COLCRYS) 0.6 MG Tab   No No   Sig: Take 1 Tablet by mouth every day.   insulin GLARGINE (LANTUS,SEMGLEE) 100 UNIT/ML Solution  MAR from Other Facility No No   Sig: Inject 20 Units under the skin 2 times a day.   insulin aspart (NOVOLOG) 100 UNIT/ML Solution  MAR from Other Facility Yes No   Sig: Inject 2-8 Units under the skin 2 times a day. If blood sugar is less than 70 call MD  If Blood Sugar is 200-250 = 2 units  If Blood Sugar is 251-300 = 4 units  If Blood Sugar is 301-350 = 6 units  If Blood Sugar is 351-400 = 8 units   loperamide (IMODIUM) 2 MG Cap  MAR from Other Facility Yes No   Sig: Take 2 mg by mouth 4 times a day as needed for Diarrhea.   meclizine (ANTIVERT) 25 MG Tab   No No   Sig: Take 1 Tablet by mouth 3 times a day as needed for Dizziness or Vertigo.   metoprolol SR (TOPROL XL) 25 MG TABLET SR 24 HR   No No   Sig: Take 1 Tablet by mouth every day.   omeprazole (PRILOSEC) 40 MG delayed-release capsule   No No   Sig: Take 1 Capsule by mouth every day.    prasugrel (EFFIENT) 10 MG Tab   No No   Sig: Take 1 Tablet by mouth every day.   pregabalin (LYRICA) 100 MG Cap  MAR from Other Facility Yes No   Sig: Take 100 mg by mouth 2 times a day.   traZODone (DESYREL) 150 MG Tab  MAR from Other Facility No No   Sig: Take 1 Tablet by mouth at bedtime.      Facility-Administered Medications: None       Physical Exam  Temp:  [36.9 °C (98.4 °F)] 36.9 °C (98.4 °F)  Pulse:  [90-97] 90  Resp:  [12-16] 16  BP: ()/(52-62) 107/53  SpO2:  [94 %-99 %] 99 %  Blood Pressure: 107/53   Temperature: 36.9 °C (98.4 °F)   Pulse: 90   Respiration: 16   Pulse Oximetry: 99 %       Physical Exam  Vitals and nursing note reviewed.   Constitutional:       Appearance: Normal appearance.   HENT:      Head: Normocephalic and atraumatic.      Right Ear: Tympanic membrane normal.      Left Ear: Tympanic membrane normal.      Nose: Nose normal.      Mouth/Throat:      Mouth: Mucous membranes are moist.      Pharynx: Oropharynx is clear.   Eyes:      Extraocular Movements: Extraocular movements intact.      Pupils: Pupils are equal, round, and reactive to light.   Cardiovascular:      Rate and Rhythm: Normal rate and regular rhythm.      Pulses: Normal pulses.      Heart sounds: Normal heart sounds.   Pulmonary:      Effort: Pulmonary effort is normal.      Breath sounds: Normal breath sounds.   Abdominal:      General: Bowel sounds are normal. There is no distension.      Palpations: Abdomen is soft. There is no mass.      Tenderness: There is abdominal tenderness.   Musculoskeletal:         General: Normal range of motion.      Cervical back: Neck supple.   Skin:     General: Skin is warm.      Capillary Refill: Capillary refill takes less than 2 seconds.   Neurological:      General: No focal deficit present.      Mental Status: She is alert. Mental status is at baseline.   Psychiatric:         Mood and Affect: Mood normal.         Behavior: Behavior normal.         Laboratory:  Recent Labs      "09/24/24 1845   WBC 9.6   RBC 4.40   HEMOGLOBIN 12.7   HEMATOCRIT 39.4   MCV 89.5   MCH 28.9   MCHC 32.2   RDW 43.6   PLATELETCT 285   MPV 9.4     Recent Labs     09/24/24 1845   CREATININE 2.08*     Recent Labs     09/24/24 1845   LIPASE 26         No results for input(s): \"NTPROBNP\" in the last 72 hours.      Recent Labs     09/24/24 1845   TROPONINT 28*       Imaging:  CT-CTA COMPLETE THORACOABDOMINAL AORTA   Final Result         1.  No aortic aneurysm or dissection identified.   2.  Hepatomegaly   3.  Atherosclerosis and atherosclerotic coronary artery disease                        US-RENAL    (Results Pending)       no X-Ray or EKG requiring interpretation    Assessment/Plan:  Justification for Admission Status  I anticipate this patient will require at least two midnights for appropriate medical management, necessitating inpatient admission because acute kidney injury     Patient will need a Telemetry bed on MEDICAL service .  The need is secondary to see above.    * Acute kidney injury (HCC)- (present on admission)  Assessment & Plan  IV Fluids   Check urine sodium and creatinine   Bladder scan   Renal sonogram   Avoid nephrotoxic agents     HLD (hyperlipidemia)- (present on admission)  Assessment & Plan  Continue with atorvastatin     Chest pain- (present on admission)  Assessment & Plan  The ASCVD Risk score (Yuan GARCIA, et al., 2019) failed to calculate for the following reasons:    The patient has a prior MI or stroke diagnosis  Telemetry monitoring  Serial troponin   Echocardiogram   CTA neg for dissection     Diabetes (HCC)- (present on admission)  Assessment & Plan  SSI+ Accu checks + hypoglycemia protocol         VTE prophylaxis: SCDs/TEDs  "

## 2024-09-25 NOTE — DISCHARGE PLANNING
Patient rolled back to observation/outpatient status per attending MD determination, Seb Rosales, and UR committee IPA secondary reviewer, Pablo Maguire. Condition code 44 complete.

## 2024-09-25 NOTE — CARE PLAN
The patient is Stable - Low risk of patient condition declining or worsening    Shift Goals  Clinical Goals: Pain management  Patient Goals: Pain management, rest  Family Goals: [facility]    Progress made toward(s) clinical / shift goals:      Problem: Pain - Standard  Goal: Alleviation of pain or a reduction in pain to the patient’s comfort goal  Outcome: Progressing  Note: Pain assessed on a 0-10 pain scale. Pharm and non-pharm interventions provided. Pain reassessed after narcotic administration.      Problem: Knowledge Deficit - Standard  Goal: Patient and family/care givers will demonstrate understanding of plan of care, disease process/condition, diagnostic tests and medications  Outcome: Progressing  Note: Patient oriented to the floor and discussed medications, plan of care, and upcoming procedures. All questions answered.     Problem: Fall Risk  Goal: Patient will remain free from falls  Outcome: Progressing  Note: Bed alarm on. Bed locked and in lowest position. Rounding in place. Call light within reach.       Patient is not progressing towards the following goals:

## 2024-09-25 NOTE — DISCHARGE PLANNING
Case Management Discharge Planning    Admission Date: 9/24/2024  GMLOS: 2.2  ALOS: 0    6-Clicks ADL Score: 23  6-Clicks Mobility Score: 16  PT and/or OT Eval ordered: Yes  Post-acute Referrals Ordered: Yes  Post-acute Choice Obtained: No  Has referral(s) been sent to post-acute provider:  Yes      Anticipated Discharge Dispo: Discharge Disposition: D/T to SNF with Medicare cert in anticipation of skilled care (03)      Action(s) Taken:   SNF order placed per protocol.  SNF referrals sent to [facility], [facility], [facility] and [facility].    Medically Clear: No    Next Steps:   Follow up on referrals.    Barriers to Discharge: Medical clearance

## 2024-09-25 NOTE — PROGRESS NOTES
4 Eyes Skin Assessment Completed by OJ Pina and OJ Perez.    Head WDL  Ears WDL  Nose WDL  Mouth WDL  Neck WDL  Breast/Chest WDL  Shoulder Blades WDL  Spine WDL  (R) Arm/Elbow/Hand WDL  (L) Arm/Elbow/Hand WDL  Abdomen Scar and Scab  Groin WDL  Scrotum/Coccyx/Buttocks WDL  (R) Leg Scar and Edema  (L) Leg Scar and Edema  (R) Heel/Foot/Toe WDL  (L) Heel/Foot/Toe WDL          Devices In Places Tele Box and Pulse Ox      Interventions In Place N/A    Possible Skin Injury No    Pictures Uploaded Into Epic Yes  Wound Consult Placed N/A  RN Wound Prevention Protocol Ordered No

## 2024-09-26 ENCOUNTER — APPOINTMENT (OUTPATIENT)
Dept: RADIOLOGY | Facility: MEDICAL CENTER | Age: 64
DRG: 982 | End: 2024-09-26
Attending: STUDENT IN AN ORGANIZED HEALTH CARE EDUCATION/TRAINING PROGRAM
Payer: MEDICAID

## 2024-09-26 LAB
ANION GAP SERPL CALC-SCNC: 9 MMOL/L (ref 7–16)
BUN SERPL-MCNC: 23 MG/DL (ref 8–22)
CALCIUM SERPL-MCNC: 9.4 MG/DL (ref 8.5–10.5)
CHLORIDE SERPL-SCNC: 103 MMOL/L (ref 96–112)
CO2 SERPL-SCNC: 23 MMOL/L (ref 20–33)
CREAT SERPL-MCNC: 1.08 MG/DL (ref 0.5–1.4)
EKG IMPRESSION: NORMAL
GFR SERPLBLD CREATININE-BSD FMLA CKD-EPI: 58 ML/MIN/1.73 M 2
GLUCOSE BLD STRIP.AUTO-MCNC: 252 MG/DL (ref 65–99)
GLUCOSE BLD STRIP.AUTO-MCNC: 252 MG/DL (ref 65–99)
GLUCOSE BLD STRIP.AUTO-MCNC: 263 MG/DL (ref 65–99)
GLUCOSE BLD STRIP.AUTO-MCNC: 363 MG/DL (ref 65–99)
GLUCOSE SERPL-MCNC: 280 MG/DL (ref 65–99)
POTASSIUM SERPL-SCNC: 4.1 MMOL/L (ref 3.6–5.5)
SODIUM SERPL-SCNC: 135 MMOL/L (ref 135–145)
TROPONIN T SERPL-MCNC: 18 NG/L (ref 6–19)

## 2024-09-26 PROCEDURE — 96376 TX/PRO/DX INJ SAME DRUG ADON: CPT | Mod: XU

## 2024-09-26 PROCEDURE — A9270 NON-COVERED ITEM OR SERVICE: HCPCS | Mod: UD | Performed by: NURSE PRACTITIONER

## 2024-09-26 PROCEDURE — 93005 ELECTROCARDIOGRAM TRACING: CPT | Performed by: STUDENT IN AN ORGANIZED HEALTH CARE EDUCATION/TRAINING PROGRAM

## 2024-09-26 PROCEDURE — 96372 THER/PROPH/DIAG INJ SC/IM: CPT | Mod: XU

## 2024-09-26 PROCEDURE — 700105 HCHG RX REV CODE 258: Mod: UD | Performed by: STUDENT IN AN ORGANIZED HEALTH CARE EDUCATION/TRAINING PROGRAM

## 2024-09-26 PROCEDURE — 99245 OFF/OP CONSLTJ NEW/EST HI 55: CPT | Mod: FS | Performed by: INTERNAL MEDICINE

## 2024-09-26 PROCEDURE — 74176 CT ABD & PELVIS W/O CONTRAST: CPT

## 2024-09-26 PROCEDURE — G0378 HOSPITAL OBSERVATION PER HR: HCPCS

## 2024-09-26 PROCEDURE — 700102 HCHG RX REV CODE 250 W/ 637 OVERRIDE(OP): Mod: UD | Performed by: STUDENT IN AN ORGANIZED HEALTH CARE EDUCATION/TRAINING PROGRAM

## 2024-09-26 PROCEDURE — 700111 HCHG RX REV CODE 636 W/ 250 OVERRIDE (IP): Mod: JZ,UD | Performed by: STUDENT IN AN ORGANIZED HEALTH CARE EDUCATION/TRAINING PROGRAM

## 2024-09-26 PROCEDURE — 700102 HCHG RX REV CODE 250 W/ 637 OVERRIDE(OP): Mod: UD | Performed by: NURSE PRACTITIONER

## 2024-09-26 PROCEDURE — 82962 GLUCOSE BLOOD TEST: CPT | Mod: 91

## 2024-09-26 PROCEDURE — A9270 NON-COVERED ITEM OR SERVICE: HCPCS | Mod: UD | Performed by: STUDENT IN AN ORGANIZED HEALTH CARE EDUCATION/TRAINING PROGRAM

## 2024-09-26 PROCEDURE — 84484 ASSAY OF TROPONIN QUANT: CPT

## 2024-09-26 PROCEDURE — 51798 US URINE CAPACITY MEASURE: CPT

## 2024-09-26 PROCEDURE — 80048 BASIC METABOLIC PNL TOTAL CA: CPT

## 2024-09-26 PROCEDURE — 700111 HCHG RX REV CODE 636 W/ 250 OVERRIDE (IP): Mod: UD | Performed by: STUDENT IN AN ORGANIZED HEALTH CARE EDUCATION/TRAINING PROGRAM

## 2024-09-26 PROCEDURE — 93010 ELECTROCARDIOGRAM REPORT: CPT | Performed by: INTERNAL MEDICINE

## 2024-09-26 PROCEDURE — 36415 COLL VENOUS BLD VENIPUNCTURE: CPT

## 2024-09-26 PROCEDURE — 99232 SBSQ HOSP IP/OBS MODERATE 35: CPT | Performed by: STUDENT IN AN ORGANIZED HEALTH CARE EDUCATION/TRAINING PROGRAM

## 2024-09-26 PROCEDURE — 96375 TX/PRO/DX INJ NEW DRUG ADDON: CPT | Mod: XU

## 2024-09-26 RX ORDER — OXYCODONE HYDROCHLORIDE 10 MG/1
10 TABLET ORAL EVERY 6 HOURS PRN
Status: DISCONTINUED | OUTPATIENT
Start: 2024-09-26 | End: 2024-10-01 | Stop reason: HOSPADM

## 2024-09-26 RX ORDER — ASPIRIN 81 MG/1
81 TABLET, CHEWABLE ORAL DAILY
Status: DISCONTINUED | OUTPATIENT
Start: 2024-09-26 | End: 2024-10-01 | Stop reason: HOSPADM

## 2024-09-26 RX ORDER — TAMSULOSIN HYDROCHLORIDE 0.4 MG/1
0.4 CAPSULE ORAL
Status: DISCONTINUED | OUTPATIENT
Start: 2024-09-26 | End: 2024-10-01 | Stop reason: HOSPADM

## 2024-09-26 RX ORDER — NITROGLYCERIN 0.4 MG/1
0.4 TABLET SUBLINGUAL
Status: DISCONTINUED | OUTPATIENT
Start: 2024-09-26 | End: 2024-10-01 | Stop reason: HOSPADM

## 2024-09-26 RX ORDER — CLOPIDOGREL BISULFATE 75 MG/1
75 TABLET ORAL DAILY
Status: DISCONTINUED | OUTPATIENT
Start: 2024-09-26 | End: 2024-10-01 | Stop reason: HOSPADM

## 2024-09-26 RX ORDER — HYDROMORPHONE HYDROCHLORIDE 1 MG/ML
1 INJECTION, SOLUTION INTRAMUSCULAR; INTRAVENOUS; SUBCUTANEOUS EVERY 6 HOURS PRN
Status: DISCONTINUED | OUTPATIENT
Start: 2024-09-26 | End: 2024-09-27

## 2024-09-26 RX ORDER — DIPHENHYDRAMINE HCL 25 MG
25 TABLET ORAL ONCE
Status: COMPLETED | OUTPATIENT
Start: 2024-09-26 | End: 2024-09-26

## 2024-09-26 RX ORDER — SODIUM CHLORIDE 9 MG/ML
INJECTION, SOLUTION INTRAVENOUS CONTINUOUS
OUTPATIENT
Start: 2024-09-27

## 2024-09-26 RX ORDER — CARVEDILOL 6.25 MG/1
3.12 TABLET ORAL 2 TIMES DAILY WITH MEALS
Status: DISCONTINUED | OUTPATIENT
Start: 2024-09-26 | End: 2024-10-01 | Stop reason: HOSPADM

## 2024-09-26 RX ADMIN — OMEPRAZOLE 40 MG: 20 CAPSULE, DELAYED RELEASE ORAL at 06:02

## 2024-09-26 RX ADMIN — PREGABALIN 100 MG: 100 CAPSULE ORAL at 06:02

## 2024-09-26 RX ADMIN — ACETAMINOPHEN 1000 MG: 500 TABLET ORAL at 06:02

## 2024-09-26 RX ADMIN — OXYCODONE HYDROCHLORIDE 10 MG: 10 TABLET ORAL at 18:05

## 2024-09-26 RX ADMIN — INSULIN LISPRO 3 UNITS: 100 INJECTION, SOLUTION INTRAVENOUS; SUBCUTANEOUS at 21:48

## 2024-09-26 RX ADMIN — CLOPIDOGREL BISULFATE 75 MG: 75 TABLET ORAL at 15:54

## 2024-09-26 RX ADMIN — NITROGLYCERIN 0.4 MG: 0.4 TABLET, ORALLY DISINTEGRATING SUBLINGUAL at 18:06

## 2024-09-26 RX ADMIN — HEPARIN SODIUM 5000 UNITS: 5000 INJECTION, SOLUTION INTRAVENOUS; SUBCUTANEOUS at 15:32

## 2024-09-26 RX ADMIN — ACETAMINOPHEN 1000 MG: 500 TABLET ORAL at 12:35

## 2024-09-26 RX ADMIN — DIPHENHYDRAMINE HYDROCHLORIDE 25 MG: 25 TABLET ORAL at 20:11

## 2024-09-26 RX ADMIN — HEPARIN SODIUM 5000 UNITS: 5000 INJECTION, SOLUTION INTRAVENOUS; SUBCUTANEOUS at 21:43

## 2024-09-26 RX ADMIN — HYDROMORPHONE HYDROCHLORIDE 1 MG: 1 INJECTION, SOLUTION INTRAMUSCULAR; INTRAVENOUS; SUBCUTANEOUS at 15:36

## 2024-09-26 RX ADMIN — OXYCODONE 5 MG: 5 TABLET ORAL at 06:19

## 2024-09-26 RX ADMIN — ASPIRIN 81 MG: 81 TABLET, CHEWABLE ORAL at 15:31

## 2024-09-26 RX ADMIN — NITROGLYCERIN 0.4 MG: 0.4 TABLET, ORALLY DISINTEGRATING SUBLINGUAL at 15:30

## 2024-09-26 RX ADMIN — INSULIN LISPRO 3 UNITS: 100 INJECTION, SOLUTION INTRAVENOUS; SUBCUTANEOUS at 18:07

## 2024-09-26 RX ADMIN — HYDROMORPHONE HYDROCHLORIDE 1 MG: 1 INJECTION, SOLUTION INTRAMUSCULAR; INTRAVENOUS; SUBCUTANEOUS at 09:38

## 2024-09-26 RX ADMIN — HEPARIN SODIUM 5000 UNITS: 5000 INJECTION, SOLUTION INTRAVENOUS; SUBCUTANEOUS at 06:03

## 2024-09-26 RX ADMIN — INSULIN LISPRO 3 UNITS: 100 INJECTION, SOLUTION INTRAVENOUS; SUBCUTANEOUS at 09:30

## 2024-09-26 RX ADMIN — TAMSULOSIN HYDROCHLORIDE 0.4 MG: 0.4 CAPSULE ORAL at 12:35

## 2024-09-26 RX ADMIN — ACETAMINOPHEN 1000 MG: 500 TABLET ORAL at 18:04

## 2024-09-26 RX ADMIN — Medication 5 MG: at 21:43

## 2024-09-26 RX ADMIN — PREGABALIN 100 MG: 100 CAPSULE ORAL at 18:05

## 2024-09-26 RX ADMIN — OXYCODONE HYDROCHLORIDE 10 MG: 10 TABLET ORAL at 12:37

## 2024-09-26 RX ADMIN — INSULIN LISPRO 5 UNITS: 100 INJECTION, SOLUTION INTRAVENOUS; SUBCUTANEOUS at 15:45

## 2024-09-26 RX ADMIN — INSULIN GLARGINE-YFGN 15 UNITS: 100 INJECTION, SOLUTION SUBCUTANEOUS at 15:47

## 2024-09-26 RX ADMIN — CLOPIDOGREL BISULFATE 75 MG: 75 TABLET ORAL at 06:03

## 2024-09-26 RX ADMIN — SODIUM CHLORIDE, POTASSIUM CHLORIDE, SODIUM LACTATE AND CALCIUM CHLORIDE: 600; 310; 30; 20 INJECTION, SOLUTION INTRAVENOUS at 02:27

## 2024-09-26 RX ADMIN — CARVEDILOL 3.12 MG: 6.25 TABLET, FILM COATED ORAL at 18:04

## 2024-09-26 RX ADMIN — OMEPRAZOLE 40 MG: 20 CAPSULE, DELAYED RELEASE ORAL at 18:05

## 2024-09-26 RX ADMIN — HYDROMORPHONE HYDROCHLORIDE 1 MG: 1 INJECTION, SOLUTION INTRAMUSCULAR; INTRAVENOUS; SUBCUTANEOUS at 21:55

## 2024-09-26 RX ADMIN — ATORVASTATIN CALCIUM 80 MG: 80 TABLET, FILM COATED ORAL at 18:05

## 2024-09-26 ASSESSMENT — COGNITIVE AND FUNCTIONAL STATUS - GENERAL
SUGGESTED CMS G CODE MODIFIER DAILY ACTIVITY: CH
MOBILITY SCORE: 24
SUGGESTED CMS G CODE MODIFIER MOBILITY: CH
DAILY ACTIVITIY SCORE: 24

## 2024-09-26 ASSESSMENT — PAIN DESCRIPTION - PAIN TYPE
TYPE: ACUTE PAIN

## 2024-09-26 ASSESSMENT — ENCOUNTER SYMPTOMS
CARDIOVASCULAR NEGATIVE: 1
RESPIRATORY NEGATIVE: 1
ABDOMINAL PAIN: 1
MUSCULOSKELETAL NEGATIVE: 1
EYES NEGATIVE: 1
WEAKNESS: 1
PSYCHIATRIC NEGATIVE: 1

## 2024-09-26 ASSESSMENT — FIBROSIS 4 INDEX: FIB4 SCORE: 1.05

## 2024-09-26 NOTE — DISCHARGE PLANNING
Care Transition Team Assessment    LMSW met with pt at bedside to complete assessment, pt able to confirm information on face sheet.  Pt lives in a single story home with no steps at entrance.  Pt's daughters/emergency contacts Elina lives in [place] and Shirley lives in [facility].  Pt's PCP is Nancy Hilton at [facility].  No AD on file, pt refused ACP booklet.  Pt uses a walker and wheelchair at baseline.  Pt independent with ADLS/IADLS at baseline.  Pt reports pt's 2 granddaughters and 2 daughters provide good support.  Pt's granddaughter provides pt with care Monday through Thursday from [id number] and assists with housekeeping, laundry, dishes, cooking, and grocery shopping.  Pt receives monthly disability income.  Pt receives daily lunch delivery from [facility].  Pt reports prior SNF placement at [facility] in [place], [place].  Pt denies prior IPR placement or HH use.  Pt denies SA or MH concerns.  Pt's granddaughter provides pt transportation.  Pt confirmed pt's granddaughters or daughter Elina are able to provide pt transportation once medically cleared.     Pt was discussed in IDT rounds.  PT recommendations for HH were noted, however pt's residential location and Medicaid FFS are barriers to pt receiving HH services.  MD and pt aware.      Information Source  Orientation Level: Oriented X4  Information Given By: Patient  Who is responsible for making decisions for patient? : Patient    Readmission Evaluation  Is this a readmission?: No    Elopement Risk  Legal Hold: No  Ambulatory or Self Mobile in Wheelchair: Yes  Disoriented: No  Psychiatric Symptoms: None  History of Wandering: No  Elopement this Admit: No  Vocalizing Wanting to Leave: No  Displays Behaviors, Body Language Wanting to Leave: No-Not at Risk for Elopement  Elopement Risk: Not at Risk for Elopement    Interdisciplinary Discharge Planning  Lives with - Patient's Self Care Capacity: Attendant / Paid Care Giver  Patient or legal  guardian wants to designate a caregiver: No  Support Systems: Children, Family Member(s), Home Care Staff  Housing / Facility: 1 Story Apartment / Condo  Prior Services: Intermittent Physical Support for ADL Per Family    Discharge Preparedness  What is your plan after discharge?: Home with help  What are your discharge supports?: Child, Other (comment) (Granddaughters)  Prior Functional Level: Independent with Activities of Daily Living  Difficulity with ADLs: Walking  Difficulty with ADLs Comment: Uses walker and wheelchair  Difficulity with IADLs: Cooking, Driving, Shopping, Laundry (Granddaughter assists)    Functional Assesment  Prior Functional Level: Independent with Activities of Daily Living    Finances  Financial Barriers to Discharge: No  Prescription Coverage: Yes    Vision / Hearing Impairment  Vision Impairment : Yes  Right Eye Vision: Impaired, Wears Glasses  Left Eye Vision: Impaired, Wears Glasses  Hearing Impairment : No    Advance Directive  Advance Directive?: None  Advance Directive offered?: AD Booklet refused    Domestic Abuse  Have you ever been the victim of abuse or violence?: No  Possible Abuse/Neglect Reported to:: Not Applicable    Psychological Assessment  History of Substance Abuse: None  History of Psychiatric Problems: No  Non-compliant with Treatment: No  Newly Diagnosed Illness: No    Discharge Risks or Barriers  Discharge risks or barriers?: Lives alone, no community support, Uninsured / underinsured  Patient risk factors: Lives alone and no community support, Uninsured or underinsured    Anticipated Discharge Information  Discharge Disposition: Discharged to home/self care (01)  Discharge Address: [address] [place] [id number]  Discharge Contact Phone Number: [phone]

## 2024-09-26 NOTE — THERAPY
Is This A New Presentation, Or A Follow-Up?: Subcutaneous Nodule Physical Therapy   Initial Evaluation     Patient Name: Julisa Josue  Age:  64 y.o., Sex:  female  Medical Record #: [id number]  Today's Date: 9/25/2024     Precautions  Precautions: Fall Risk  Comments: EF 18%    Assessment  Pt presents with impaired activity tolerance in setting of chief complaint of back and chest pain, ongoing w/u, noted to have OJ. Pt reports she feels near her functional baseline, has granddaughter as paid caregiver as needed for tasks she cannot perform; functionally mobilizing at SBA; likely to benefit from health PT if available in her area; will follow as remains in house; she is currently observation status and is declining any form of placement.     Plan    Physical Therapy Initial Treatment Plan   Treatment Plan : Equipment, Bed Mobility, Family / Caregiver Training, Gait Training, Manual Therapy, Neuro Re-Education / Balance, Self Care / Home Evaluation, Stair Training, Therapeutic Activities, Therapeutic Exercise  Treatment Frequency: 3 Times per Week  Duration: Until Therapy Goals Met    DC Equipment Recommendations: None  Discharge Recommendations: Recommend home health for continued physical therapy services       Abridged Subjective/Objective     09/25/24 1715   Prior Living Situation   Prior Services Intermittent Physical Support for ADL Per Family   Housing / Facility 1 Story Apartment / Condo   Steps Into Home 0   Steps In Home 0   Equipment Owned 4-Wheel Walker   Lives with - Patient's Self Care Capacity Attendant / Paid Care Giver   Comments reporst living with her granddaughter who provides paid caregiving services from 10-4 daily; denies falls since ~ 2 years ago related to BP   Prior Level of Functional Mobility   Bed Mobility Independent   Transfer Status Independent   Ambulation Independent   Ambulation Distance household   Assistive Devices Used None   Stairs Independent   Comments only uses 4ww if leaving the house   Cognition    Cognition / Consciousness WDL    Level of Consciousness Alert   Comments pleasant and cooperative   Passive ROM Lower Body   Passive ROM Lower Body WDL   Strength Lower Body   Lower Body Strength  WDL   Balance Assessment   Sitting Balance (Static) Good   Sitting Balance (Dynamic) Fair +   Standing Balance (Static) Fair +   Standing Balance (Dynamic) Fair   Weight Shift Sitting Good   Weight Shift Standing Fair   Comments B UE support in sitting/standing; no loss of balance   Bed Mobility    Supine to Sit Modified Independent   Sit to Supine Modified Independent   Comments slightly raised HOB   Gait Analysis   Gait Level Of Assist Standby Assist   Assistive Device None   Distance (Feet) 5   # of Times Distance was Traveled 1   Deviation Decreased Base Of Support   Weight Bearing Status full   Vision Deficits Impacting Mobility denies   Comments distance limited by therapist, assesing for orhtostatics   Functional Mobility   Sit to Stand Supervised   Bed, Chair, Wheelchair Transfer Supervised   Patient / Family Goals    Patient / Family Goal #1 to go home   Short Term Goals    Short Term Goal # 1 Pt will ambulate x 150ft with supervision and no device to ensure independent mobility at home.   Short Term Goal # 2 Pt will demonstrate independenc with daily weights and signs/symptoms of hypotension wihtin 6 visits to reduce fall risk.   Education Group   Role of Physical Therapist Patient Response Patient;Acceptance;Demonstration;Explanation;Action Demonstration;Verbal Demonstration   Additional Comments daily weights and aerobic exercise goals dictated by talk test

## 2024-09-26 NOTE — PROGRESS NOTES
Patient back from CT. AO x4, tele in place, LR running, bed low and locked, bed alarm in place. Call light within reach. No needs at this time.

## 2024-09-26 NOTE — PROGRESS NOTES
Monitor Summary: SR 78-93, NV 0.15, QRS 0.12, QT 0.33, with rare PVCs per strip from monitor room.

## 2024-09-26 NOTE — CONSULTS
Cardiology Consult Note:    TAYLOR Self  Date & Time note created:    9/26/2024   2:59 PM     Referring MD:  Dr. Seb Rosales    Patient ID:   Name:             Julisa Josue   YOB: 1960  Age:                 64 y.o.  female   MRN:               [id number]                                                             Reason for Consult:      Chest pain and CHRISTIAN     History of Present Illness:    64 years old female presented with chest pain and back pain. Transferred from [place].     Past medical history of CAD history of CABG x1 (LIMA to LAD) 9/2021, NSTEMI 4/23/2023 ( PCI to mid CX- prox OM with overlapping ANA and PTCA of distal LM and ostial LCx stent). NSTEMI 8/9/2024 [place], She underwent PTCA to the LCx. Echo showed severe low flow low gradient aortic stenosis. Transferred to [facility] at [place]. She underwent Dobutamine Stress Echo which confirmed severe AS. Nav revealed EF 18% and a tricuspid aortic valve with severe AS. CABG turndown due to poor targets. Underwent TAVR on 8/14/2021 23 [place] 3 (+2cc).    In the ER, CTA thoracicoabdominal was negative for dissection. Patient was found to have acute kidney injury at Cr of 2.08 she ge be admitted for further workup. Creatinine now 1.08.    Patient continues to have mid-sternum chest pressure 10/10, relieved with nitroglycerin. Trop T 24-18. EKG showed T wave abnormalities.    On asa, statin and losartan. Tolerating medication well. On DAPT therapy for recent TAVR and PTCA.     Followed by Dr. Estrada ( outpatient cardiologist)     Review of Systems:      Constitutional: Denies fevers, Denies weight changes  Cardiovascular: yes  chest pain, no palpitations   Respiratory: no shortness of breath , Denies cough  Gastrointestinal/Hepatic: Denies abdominal pain, nausea, vomiting,  Genitourinary: Denies dysuria or frequency  Musculoskeletal/Rheum:  no edema  Skin: Denies rash  Neurological: Denies headache,  confusion, memory loss  Psychiatric: denies mood disorder       Past Medical History:   Past Medical History:   Diagnosis Date    ACC/AHA stage C systolic heart failure (HCC) 2023    CAD (coronary artery disease)     PER [facility]    Congestive heart failure (HCC)     PER [facility]    Diabetes     Diabetes (HCC)     PER [facility]    Diverticulosis     PER [facility]    GERD (gastroesophageal reflux disease)     PER [facility]    High cholesterol 5/15/2011    Hypertension     Intestinal mass     Ischemic cardiomyopathy 2023    Left ventricular systolic dysfunction, NYHA class 3 2023    Migraine     Staph skin infection      Active Hospital Problems    Diagnosis     Acute kidney injury (HCC) [N17.9]     HLD (hyperlipidemia) [E78.5]     Chest pain [R07.9]     Diabetes (HCC) [E11.9]        Past Surgical History:  Past Surgical History:   Procedure Laterality Date    STENT PLACEMENT  2018    2 cardiac stents    GASTROSCOPY  9/3/2017    Procedure: GASTROSCOPY WITH DILATION;  Surgeon: Kerri Carter M.D.;  Location: SURGERY [facility];  Service: Gastroenterology    COLONOSCOPY  9/3/2017    Procedure: COLONOSCOPY;  Surgeon: Kerri Carter M.D.;  Location: SURGERY [facility];  Service: Gastroenterology    OTHER ORTHOPEDIC SURGERY      right wrist surgery    ABDOMINAL EXPLORATION      Lower intestine d/t mass - benign    GYN SURGERY       x 3,tubal ligation    OTHER ABDOMINAL SURGERY      cholecystectomy    OTHER CARDIAC SURGERY      PER [facility] 2019 4 STENTS; 2019 BALLOON ANGIOPLASTY R RAMUS INTERMEDIATE BRANCH; STERNOTOMY       Hospital Medications:    Current Facility-Administered Medications:     oxyCODONE immediate release (Roxicodone) tablet 10 mg, 10 mg, Oral, Q6HRS PRN, Seb Rosales M.D., 10 mg at 24 1237    HYDROmorphone (Dilaudid) injection 1 mg, 1 mg,  Intravenous, Q6HRS PRN, Seb Rosales M.D., 1 mg at 09/26/24 0938    tamsulosin (Flomax) capsule 0.4 mg, 0.4 mg, Oral, AFTER BREAKFAST, Seb Rosales M.D., 0.4 mg at 09/26/24 1235    nitroglycerin (Nitrostat) tablet 0.4 mg, 0.4 mg, Sublingual, Q5 MIN PRN, Seb Rosales M.D.    aspirin (Asa) chewable tab 81 mg, 81 mg, Oral, DAILY, Seb Rosales M.D.    atorvastatin (Lipitor) tablet 80 mg, 80 mg, Oral, Q EVENING, Robin Cleaning M.D., 80 mg at 09/25/24 1842    pregabalin (Lyrica) capsule 100 mg, 100 mg, Oral, BID, Robin Cleaning M.D., 100 mg at 09/26/24 0602    melatonin tablet 5 mg, 5 mg, Oral, Nightly, Robin Cleaning M.D., 5 mg at 09/25/24 2038    acetaminophen (Tylenol) tablet 1,000 mg, 1,000 mg, Oral, TID, Seb Rosales M.D., 1,000 mg at 09/26/24 1235    omeprazole (PriLOSEC) capsule 40 mg, 40 mg, Oral, BID, Seb Rosales M.D., 40 mg at 09/26/24 0602    insulin GLARGINE (Lantus,Semglee) injection, 15 Units, Subcutaneous, AFTER LUNCH, 15 Units at 09/25/24 1848 **AND** insulin lispro (HumaLOG,AdmeLOG) subcutaneous injection, 1-6 Units, Subcutaneous, 4X/DAY ACHS, 3 Units at 09/26/24 0930 **AND** POC blood glucose manual result, , , Q AC AND BEDTIME(S) **AND** NOTIFY MD and PharmD, , , Once **AND** Administer 20 grams of glucose (approximately 8 ounces of fruit juice) every 15 minutes PRN FSBG less than 70 mg/dL, , , PRN **AND** dextrose 50% (D50W) injection 25 g, 25 g, Intravenous, Q15 MIN PRN, Seb Rosales M.D.    Pharmacy Consult Request - to monitor for nephrotoxic agents, 1 Each, Other, PHARMACY TO DOSE, Robin Cleaning M.D.    heparin injection 5,000 Units, 5,000 Units, Subcutaneous, Q8HRS, Robin Cleaning M.D., 5,000 Units at 09/26/24 0603    Current Outpatient Medications:  Medications Prior to Admission   Medication Sig Dispense Refill Last Dose    metFORMIN (GLUCOPHAGE) 500 MG Tab Take 500 mg by mouth every day.        losartan (COZAAR) 25 MG Tab Take 25 mg by mouth every day.   2024    furosemide (LASIX) 40 MG Tab Take 40 mg by mouth every day.   2024 at 0900    clopidogrel (PLAVIX) 75 MG Tab Take 75 mg by mouth every day.   2024 at 0900    traZODone (DESYREL) 150 MG Tab Take 1 Tablet by mouth at bedtime. 1 Tablet 0 2024 at 2100    insulin GLARGINE (LANTUS,SEMGLEE) 100 UNIT/ML Solution Inject 20 Units under the skin 2 times a day. 10 mL  2024 at 0900    SITagliptin (JANUVIA) 50 MG Tab Take 50 mg by mouth every day.   2024 at 0900    pregabalin (LYRICA) 100 MG Cap Take 100 mg by mouth 2 times a day.          Medication Allergy:  No Active Allergies    Family History:  Family History   Problem Relation Age of Onset    Cancer Maternal Aunt         Lung cancer d/t smoking       Social History:  Social History     Socioeconomic History    Marital status: Single     Spouse name: Not on file    Number of children: Not on file    Years of education: Not on file    Highest education level: Not on file   Occupational History    Not on file   Tobacco Use    Smoking status: Former     Current packs/day: 0.00     Average packs/day: 2.0 packs/day for 20.0 years (40.0 ttl pk-yrs)     Types: Cigarettes     Start date:      Quit date:      Years since quittin.7    Smokeless tobacco: Never   Vaping Use    Vaping status: Never Used   Substance and Sexual Activity    Alcohol use: Not Currently    Drug use: Yes     Types: Inhaled     Comment: just prescribed meds    Sexual activity: Not on file   Other Topics Concern    Primary/coprimary nurse & associates Not Asked    Family contact information Not Asked    OK to release patient information to the following Not Asked    Patient preferred routine/Privacy concerns Not Asked    Patient likes and dislikes Not Asked    Participating In Research Study Not Asked    Miscellaneous Not Asked   Social History Narrative    ** Merged History Encounter **         No  "known exposure to asbestos, dyes, chemicals, or pesticides.  Patient does not work.  She has 3 children and many grand-children.  Has a significant other for about 20 years. Moved to [place] in 2011.      Social Determinants of Health     Financial Resource Strain: Not on file   Food Insecurity: No Food Insecurity (9/24/2024)    Hunger Vital Sign     Worried About Running Out of Food in the Last Year: Never true     Ran Out of Food in the Last Year: Never true   Transportation Needs: Unmet Transportation Needs (9/24/2024)    PRAPARE - Transportation     Lack of Transportation (Medical): Yes     Lack of Transportation (Non-Medical): No   Physical Activity: Not on file   Stress: Not on file   Social Connections: Not on file   Intimate Partner Violence: Not At Risk (9/24/2024)    Humiliation, Afraid, Rape, and Kick questionnaire     Fear of Current or Ex-Partner: No     Emotionally Abused: No     Physically Abused: No     Sexually Abused: No   Housing Stability: Low Risk  (9/24/2024)    Housing Stability Vital Sign     Unable to Pay for Housing in the Last Year: No     Number of Times Moved in the Last Year: 0     Homeless in the Last Year: No         Physical Exam:  Vitals/ General Appearance:   Weight/BMI: Body mass index is 35.11 kg/m².  /75   Pulse 96   Temp 36.6 °C (97.9 °F) (Temporal)   Resp 17   Ht 1.651 m (5' 5\")   Wt 95.7 kg (210 lb 15.7 oz)   SpO2 94%   Vitals:    09/26/24 0318 09/26/24 0810 09/26/24 0938 09/26/24 1105   BP: 105/64 135/83  124/75   Pulse: 91 89  96   Resp: 17 17 16 17   Temp: 36.4 °C (97.5 °F) 36.6 °C (97.9 °F)  36.6 °C (97.9 °F)   TempSrc: Temporal Temporal  Temporal   SpO2: 93% 95%  94%   Weight: 95.7 kg (210 lb 15.7 oz)      Height:         Oxygen Therapy:  Pulse Oximetry: 94 %, O2 (LPM): 0, O2 Delivery Device: None - Room Air    Constitutional:   Well developed, Well nourished, No acute distress  HENMT:  Normocephalic, Atraumatic  Neck:   no JVD.  Cardiovascular:  Normal heart " rate, Normal rhythm, No murmurs, No rubs, No gallops.   Extremitites with intact distal pulses, no cyanosis, or edema.  Lungs:  Normal breath sounds, breath sounds clear to auscultation bilaterally,  no crackles, no wheezing.   Skin: Warm, Dry, No erythema, No rash, no induration.  Neurologic: Alert & oriented x 3,  Psychiatric: Affect normal, Judgment normal, Mood normal.      MDM (Data Review):     Records reviewed and summarized in current documentation    Lab Data Review:  Recent Results (from the past 24 hour(s))   POCT glucose device results    Collection Time: 24  6:45 PM   Result Value Ref Range    POC Glucose, Blood 228 (H) 65 - 99 mg/dL   POCT glucose device results    Collection Time: 24  8:51 PM   Result Value Ref Range    POC Glucose, Blood 320 (H) 65 - 99 mg/dL   POCT glucose device results    Collection Time: 24  8:34 AM   Result Value Ref Range    POC Glucose, Blood 252 (H) 65 - 99 mg/dL   Basic Metabolic Panel    Collection Time: 24  8:35 AM   Result Value Ref Range    Sodium 135 135 - 145 mmol/L    Potassium 4.1 3.6 - 5.5 mmol/L    Chloride 103 96 - 112 mmol/L    Co2 23 20 - 33 mmol/L    Glucose 280 (H) 65 - 99 mg/dL    Bun 23 (H) 8 - 22 mg/dL    Creatinine 1.08 0.50 - 1.40 mg/dL    Calcium 9.4 8.5 - 10.5 mg/dL    Anion Gap 9.0 7.0 - 16.0   ESTIMATED GFR    Collection Time: 24  8:35 AM   Result Value Ref Range    GFR (CKD-EPI) 58 (A) >60 mL/min/1.73 m 2   EKG    Collection Time: 24 12:14 PM   Result Value Ref Range    Report       [facility]    Test Date:  2024  Pt Name:    ALIDA JACKSON                  Department: [facility]  MRN:        [id number]                      Room:       S183  Gender:     Female                       Technician: MANDA  :        1960                   Requested By:MILY CASTLE  Order #:    [id number]                    Reading MD:    Measurements  Intervals                                Axis  Rate:       88                            P:          56  MD:         169                          QRS:        -10  QRSD:       118                          T:          129  QT:         386  QTc:        467    Interpretive Statements  Sinus rhythm  Incomplete left bundle branch block  Minimal ST elevation, anterior leads  Artifact in lead(s) I,II,aVR,aVF,V1,V2,V3,V4,V5,V6  Compared to ECG 09/25/2024 01:01:12  Left bundle-branch block now present  ST (T wave) deviation now present  Left ventricular hypertrophy no longer present  Early repolarization no longer present  Left ante rior fascicular block no longer present     TROPONIN    Collection Time: 09/26/24 12:23 PM   Result Value Ref Range    Troponin T 18 6 - 19 ng/L       Imaging/Procedures Review:    Chest Xray:  Reviewed      ECHO:  Pending     CATH done at [place], [place] 8/2024  1. Right coronary artery is a small-caliber vessel which originates from the right sinus of Valsalva. Distally the RCA divides into PDA and PLV arteries. There are luminal irregularities in the RCA.  2. Left main artery is a moderate caliber vessel which divides into left anterior descending artery, left circumflex artery and ramus intermedius artery. There is presence of patent stent involving the left main artery.   3. Left circumflex artery is a moderate caliber vessel which has 90% InStent restenosis involving the proximal left circumflex artery. This was treated with balloon angioplasty with 50% residual stenosis.   4. Ramus intermedius artery is a small-caliber vessel which has diffuse 80% proximal and mid stenosis.   5. Left anterior descending artery is a moderate caliber vessel which is supplied by a patent LIMA. There is presence of stent in the proximal and mid LAD. Mid LAD stent has 80% InStent restenosis. Lad gives rise to small-caliber diagonal 1 artery.      MDM (Assessment and Plan):     Active Hospital Problems    Diagnosis     Acute kidney injury (HCC) [N17.9]     HLD (hyperlipidemia) [E78.5]      Chest pain [R07.9]     Diabetes (HCC) [E11.9]        Unstable angina   history of CABG and multiple PCI with known in-stent restenosis   - plan for [facility] to evaluate, NPO since midnight.  - resume plavix, continue asa and atorvastatin 80mg qd   - resume coreg 3.125mg BID   - nitroglycerin and morphine prn   - ECHO pending     2. S/p TAVR from severe AS   - ECHO pending     3. OJ   - improving creatinine 1.08    4. HFrEF 18%, stage C, class 2-3  - Today, based on physical examination findings, patient is euvolemic. No JVD, lungs are clear to auscultation, no pitting edema in bilateral lower extremities, no ascites.   - resumed coreg as noted above. Pending am labs resume arb and MRA    5. Diabetes   - glycohemoglobin 12.4, defer to primary     I personally spent a total of 30 minutes which includes face-to-face time and non-face-to-face time spent on preparing to see the patient, reviewing hospital notes and tests, obtaining history from the patient, performing a medically appropriate exam, counseling and educating the patient, ordering medications/tests/procedures/referrals as clinically indicated, and documenting information in the electronic medical record.    The risks, benefits, and alternatives to coronary angiography with IV sedation were discussed in great detail. Specific risks mentioned include bleeding, infection, kidney damage, allergic reaction, cardiac perforation with possible tamponade requiring pericardiocentesis or possibly open heart surgery. In addition, we discussed that 10% of patients will experience small to moderate bruising at the site of the arterial puncture. Lastly, the risks of heart attack, stroke and death were discussed; the risk of major complications such as heart attack or stroke caused by the angiogram is approximately 1%; the risk of death is approximately 1 in 1000. The patient verbalized understanding of the potential complications and wishes to proceed with this  procedure.      SABRINA Self   [facility]

## 2024-09-26 NOTE — PROGRESS NOTES
Monitor summary: SR, HR 76-91, DE .14, QRS .07, QT .55 with rare pvc per strip from monitor room

## 2024-09-26 NOTE — CARE PLAN
The patient is Stable - Low risk of patient condition declining or worsening    Shift Goals  Clinical Goals: Monitor urine retention  Patient Goals: Pain control  Family Goals: [facility]    Progress made toward(s) clinical / shift goals:      Problem: Neuro Status  Goal: Neuro status will remain stable or improve  Outcome: Progressing  Note: Q4h neuro checks in place. Patient remains AO x4 during shift with no change in mentation or sensation.      Problem: Self Care  Goal: Patient will have the ability to perform ADLs independently or with assistance (bathe, groom, dress, toilet and feed)  Outcome: Progressing  Note: Patient encouraged to call and ambulate to the bathroom versus utilizing the Purewick. Patient assisted to dress self during shift. Patient calls appropriately and understands the need to complete ADLs on her own as much as is safe.        Patient is not progressing towards the following goals:      Problem: Urinary Elimination  Goal: Establish and maintain regular urinary output  Outcome: Not Progressing  Note: Patient has been voiding using a Purewick. We determined that she has been retaining urine post void and bladder scanning appropriately. Straight cath utilized x2 over the past 24 hours for retention above 400mL post void. Patient encouraged to ambulate to the bathroom to empty bladder. Patient calls to do so.

## 2024-09-26 NOTE — PROGRESS NOTES
Hospital Medicine Daily Progress Note    Date of Service  9/26/2024    Chief Complaint  Julisa Josue is a 64 y.o. female admitted 9/24/2024 with OJ    Hospital Course  64 y.o. female PMH CABG, DM, HTN, chronic pain and CAD who presented 9/24/2024 as a transfer from [place] for chest and back pain. She reports the chest pain radiates to her back. Also complaining of abdominal pain. In the ER, CTA thoracicoabdominal was negative for dissection. Patient was found to have acute kidney injury at Cr of 2.08 she ge be admitted for further workup.     Interval Problem Update  Reporting substernal CP  Stable vitals  On room air  CBC stable  Acute renal failure resolved  Trop low  ASA/Statin  I discussed with cardiology  Labs in a.m.    I have discussed this patient's plan of care and discharge plan at IDT rounds today with Case Management, Nursing, Nursing leadership, and other members of the IDT team.    Consultants/Specialty  None    Code Status  Full Code    Disposition  The patient is not medically cleared for discharge to home or a post-acute facility.    I have placed the appropriate orders for post-discharge needs.    Review of Systems  Review of Systems   Constitutional:  Positive for malaise/fatigue.   HENT: Negative.     Eyes: Negative.    Respiratory: Negative.     Cardiovascular: Negative.    Gastrointestinal:  Positive for abdominal pain.   Genitourinary: Negative.    Musculoskeletal: Negative.    Skin: Negative.    Neurological:  Positive for weakness.   Endo/Heme/Allergies: Negative.    Psychiatric/Behavioral: Negative.          Physical Exam  Temp:  [36.3 °C (97.3 °F)-36.7 °C (98.1 °F)] 36.6 °C (97.9 °F)  Pulse:  [79-93] 89  Resp:  [16-17] 16  BP: (104-135)/(55-83) 135/83  SpO2:  [92 %-96 %] 95 %    Physical Exam  Constitutional:       Appearance: She is obese.   HENT:      Head: Normocephalic and atraumatic.      Mouth/Throat:      Mouth: Mucous membranes are moist.   Eyes:      Extraocular  Movements: Extraocular movements intact.      Pupils: Pupils are equal, round, and reactive to light.   Cardiovascular:      Rate and Rhythm: Normal rate and regular rhythm.      Pulses: Normal pulses.      Heart sounds: Normal heart sounds.   Pulmonary:      Effort: Pulmonary effort is normal.      Breath sounds: Normal breath sounds.   Abdominal:      General: Bowel sounds are normal.      Palpations: Abdomen is soft.      Tenderness: There is abdominal tenderness.   Musculoskeletal:         General: No swelling. Normal range of motion.      Cervical back: Normal range of motion and neck supple.   Skin:     General: Skin is warm.      Coloration: Skin is not jaundiced.   Neurological:      General: No focal deficit present.      Mental Status: She is alert and oriented to person, place, and time. Mental status is at baseline.      Cranial Nerves: No cranial nerve deficit.   Psychiatric:         Mood and Affect: Mood normal.         Behavior: Behavior normal.         Thought Content: Thought content normal.         Judgment: Judgment normal.       Fluids    Intake/Output Summary (Last 24 hours) at 9/26/2024 1053  Last data filed at 9/26/2024 0800  Gross per 24 hour   Intake 837 ml   Output 1175 ml   Net -338 ml        Laboratory  Recent Labs     09/24/24  1845   WBC 9.6   RBC 4.40   HEMOGLOBIN 12.7   HEMATOCRIT 39.4   MCV 89.5   MCH 28.9   MCHC 32.2   RDW 43.6   PLATELETCT 285   MPV 9.4     Recent Labs     09/25/24  0535 09/25/24  1159 09/26/24  0835   SODIUM 137 134* 135   POTASSIUM 3.8 3.7 4.1   CHLORIDE 105 103 103   CO2 16* 19* 23   GLUCOSE 195* 269* 280*   BUN 27* 24* 23*   CREATININE 1.45* 1.29 1.08   CALCIUM 8.7 8.5 9.4             Recent Labs     09/25/24  0535   TRIGLYCERIDE 251*   HDL 28*   *       Imaging  CT-ABDOMEN-PELVIS W/O   Final Result         1.  Hepatomegaly   2.  Dilatation right renal pelvis suggesting extra renal pelvis morphology, slightly more pronounced since prior study.   3.   Atherosclerosis and atherosclerotic coronary artery disease      US-RENAL   Final Result      1.  Right-sided pelviectasis.   2.  No hydronephrosis.      CT-CTA COMPLETE THORACOABDOMINAL AORTA   Final Result         1.  No aortic aneurysm or dissection identified.   2.  Hepatomegaly   3.  Atherosclerosis and atherosclerotic coronary artery disease                             Assessment/Plan  * Acute kidney injury (HCC)- (present on admission)  Assessment & Plan  IV Fluids   Check urine sodium and creatinine   Bladder scan   Renal sonogram   Avoid nephrotoxic agents     HLD (hyperlipidemia)- (present on admission)  Assessment & Plan  Continue with atorvastatin     Chest pain- (present on admission)  Assessment & Plan  The ASCVD Risk score (Yuan GARCIA, et al., 2019) failed to calculate for the following reasons:    The patient has a prior MI or stroke diagnosis  Telemetry monitoring  Serial troponin   Echocardiogram   CTA neg for dissection     Diabetes (HCC)- (present on admission)  Assessment & Plan  SSI+ Accu checks + hypoglycemia protocol          VTE prophylaxis: Heparin    I have performed a physical exam and reviewed and updated ROS and Plan today (9/26/2024). In review of yesterday's note (9/25/2024), there are no changes except as documented above.    Greater than 51 minutes spent prepping to see patient (e.g. review of tests) obtaining and/or reviewing separately obtained history. Performing a medically appropriate examination and/ evaluation.  Counseling and educating the patient/family/caregiver.  Ordering medications, tests, or procedures.  Referring and communicating with other health care professionals.  Documenting clinical information in EPIC.  Independently interpreting results and communicating results to patient/family/caregiver.  Care coordination

## 2024-09-27 ENCOUNTER — APPOINTMENT (OUTPATIENT)
Dept: CARDIOLOGY | Facility: MEDICAL CENTER | Age: 64
DRG: 982 | End: 2024-09-27
Attending: NURSE PRACTITIONER
Payer: MEDICAID

## 2024-09-27 LAB
ACT BLD: 232 SEC (ref 74–137)
ACT BLD: 263 SEC (ref 74–137)
ANION GAP SERPL CALC-SCNC: 8 MMOL/L (ref 7–16)
BUN SERPL-MCNC: 21 MG/DL (ref 8–22)
CALCIUM SERPL-MCNC: 9.2 MG/DL (ref 8.5–10.5)
CHLORIDE SERPL-SCNC: 103 MMOL/L (ref 96–112)
CO2 SERPL-SCNC: 24 MMOL/L (ref 20–33)
CREAT SERPL-MCNC: 0.98 MG/DL (ref 0.5–1.4)
GFR SERPLBLD CREATININE-BSD FMLA CKD-EPI: 65 ML/MIN/1.73 M 2
GLUCOSE BLD STRIP.AUTO-MCNC: 183 MG/DL (ref 65–99)
GLUCOSE BLD STRIP.AUTO-MCNC: 194 MG/DL (ref 65–99)
GLUCOSE BLD STRIP.AUTO-MCNC: 252 MG/DL (ref 65–99)
GLUCOSE SERPL-MCNC: 258 MG/DL (ref 65–99)
POTASSIUM SERPL-SCNC: 4.2 MMOL/L (ref 3.6–5.5)
SODIUM SERPL-SCNC: 135 MMOL/L (ref 135–145)

## 2024-09-27 PROCEDURE — 99215 OFFICE O/P EST HI 40 MIN: CPT | Mod: 25,FS | Performed by: INTERNAL MEDICINE

## 2024-09-27 PROCEDURE — 93572 IV DOP VEL&/PRESS C FLO EA: CPT | Mod: 26,52,LC | Performed by: INTERNAL MEDICINE

## 2024-09-27 PROCEDURE — 027034Z DILATION OF CORONARY ARTERY, ONE ARTERY WITH DRUG-ELUTING INTRALUMINAL DEVICE, PERCUTANEOUS APPROACH: ICD-10-PCS | Performed by: INTERNAL MEDICINE

## 2024-09-27 PROCEDURE — G0378 HOSPITAL OBSERVATION PER HR: HCPCS

## 2024-09-27 PROCEDURE — 700111 HCHG RX REV CODE 636 W/ 250 OVERRIDE (IP)

## 2024-09-27 PROCEDURE — 4A033BC MEASUREMENT OF ARTERIAL PRESSURE, CORONARY, PERCUTANEOUS APPROACH: ICD-10-PCS | Performed by: INTERNAL MEDICINE

## 2024-09-27 PROCEDURE — 36415 COLL VENOUS BLD VENIPUNCTURE: CPT

## 2024-09-27 PROCEDURE — 700102 HCHG RX REV CODE 250 W/ 637 OVERRIDE(OP): Mod: UD | Performed by: NURSE PRACTITIONER

## 2024-09-27 PROCEDURE — 80048 BASIC METABOLIC PNL TOTAL CA: CPT

## 2024-09-27 PROCEDURE — 93005 ELECTROCARDIOGRAM TRACING: CPT | Performed by: INTERNAL MEDICINE

## 2024-09-27 PROCEDURE — 92978 ENDOLUMINL IVUS OCT C 1ST: CPT | Mod: 26,RC | Performed by: INTERNAL MEDICINE

## 2024-09-27 PROCEDURE — B240ZZ3 ULTRASONOGRAPHY OF SINGLE CORONARY ARTERY, INTRAVASCULAR: ICD-10-PCS | Performed by: INTERNAL MEDICINE

## 2024-09-27 PROCEDURE — 85347 COAGULATION TIME ACTIVATED: CPT | Mod: 91

## 2024-09-27 PROCEDURE — 4A023N7 MEASUREMENT OF CARDIAC SAMPLING AND PRESSURE, LEFT HEART, PERCUTANEOUS APPROACH: ICD-10-PCS | Performed by: INTERNAL MEDICINE

## 2024-09-27 PROCEDURE — 99152 MOD SED SAME PHYS/QHP 5/>YRS: CPT | Performed by: INTERNAL MEDICINE

## 2024-09-27 PROCEDURE — 82962 GLUCOSE BLOOD TEST: CPT | Mod: 91

## 2024-09-27 PROCEDURE — 93571 IV DOP VEL&/PRESS C FLO 1ST: CPT | Mod: 26,52,RC | Performed by: INTERNAL MEDICINE

## 2024-09-27 PROCEDURE — 92928 PRQ TCAT PLMT NTRAC ST 1 LES: CPT | Mod: RC | Performed by: INTERNAL MEDICINE

## 2024-09-27 PROCEDURE — 96376 TX/PRO/DX INJ SAME DRUG ADON: CPT | Mod: XU

## 2024-09-27 PROCEDURE — A9270 NON-COVERED ITEM OR SERVICE: HCPCS | Mod: UD | Performed by: STUDENT IN AN ORGANIZED HEALTH CARE EDUCATION/TRAINING PROGRAM

## 2024-09-27 PROCEDURE — 96372 THER/PROPH/DIAG INJ SC/IM: CPT | Mod: XU

## 2024-09-27 PROCEDURE — 93459 L HRT ART/GRFT ANGIO: CPT | Mod: 26,59 | Performed by: INTERNAL MEDICINE

## 2024-09-27 PROCEDURE — 700117 HCHG RX CONTRAST REV CODE 255: Mod: UD | Performed by: INTERNAL MEDICINE

## 2024-09-27 PROCEDURE — 700111 HCHG RX REV CODE 636 W/ 250 OVERRIDE (IP): Mod: UD | Performed by: STUDENT IN AN ORGANIZED HEALTH CARE EDUCATION/TRAINING PROGRAM

## 2024-09-27 PROCEDURE — 96375 TX/PRO/DX INJ NEW DRUG ADDON: CPT | Mod: XU

## 2024-09-27 PROCEDURE — 700111 HCHG RX REV CODE 636 W/ 250 OVERRIDE (IP): Mod: JZ,UD | Performed by: INTERNAL MEDICINE

## 2024-09-27 PROCEDURE — 99153 MOD SED SAME PHYS/QHP EA: CPT

## 2024-09-27 PROCEDURE — 99232 SBSQ HOSP IP/OBS MODERATE 35: CPT | Performed by: STUDENT IN AN ORGANIZED HEALTH CARE EDUCATION/TRAINING PROGRAM

## 2024-09-27 PROCEDURE — 700101 HCHG RX REV CODE 250: Mod: UD

## 2024-09-27 PROCEDURE — B2181ZZ FLUOROSCOPY OF LEFT INTERNAL MAMMARY BYPASS GRAFT USING LOW OSMOLAR CONTRAST: ICD-10-PCS | Performed by: INTERNAL MEDICINE

## 2024-09-27 PROCEDURE — A9270 NON-COVERED ITEM OR SERVICE: HCPCS | Mod: UD | Performed by: NURSE PRACTITIONER

## 2024-09-27 PROCEDURE — 700102 HCHG RX REV CODE 250 W/ 637 OVERRIDE(OP): Mod: UD | Performed by: STUDENT IN AN ORGANIZED HEALTH CARE EDUCATION/TRAINING PROGRAM

## 2024-09-27 PROCEDURE — 700111 HCHG RX REV CODE 636 W/ 250 OVERRIDE (IP): Mod: JZ,UD | Performed by: STUDENT IN AN ORGANIZED HEALTH CARE EDUCATION/TRAINING PROGRAM

## 2024-09-27 RX ORDER — HYDROMORPHONE HYDROCHLORIDE 1 MG/ML
1 INJECTION, SOLUTION INTRAMUSCULAR; INTRAVENOUS; SUBCUTANEOUS EVERY 4 HOURS PRN
Status: DISCONTINUED | OUTPATIENT
Start: 2024-09-27 | End: 2024-09-29

## 2024-09-27 RX ORDER — ASPIRIN 81 MG/1
81 TABLET ORAL DAILY
Status: DISCONTINUED | OUTPATIENT
Start: 2024-09-28 | End: 2024-09-27

## 2024-09-27 RX ORDER — DIPHENHYDRAMINE HYDROCHLORIDE 50 MG/ML
25 INJECTION INTRAMUSCULAR; INTRAVENOUS EVERY 6 HOURS PRN
Status: DISCONTINUED | OUTPATIENT
Start: 2024-09-27 | End: 2024-09-29

## 2024-09-27 RX ORDER — HEPARIN SODIUM 1000 [USP'U]/ML
INJECTION, SOLUTION INTRAVENOUS; SUBCUTANEOUS
Status: COMPLETED
Start: 2024-09-27 | End: 2024-09-27

## 2024-09-27 RX ORDER — VERAPAMIL HYDROCHLORIDE 2.5 MG/ML
INJECTION, SOLUTION INTRAVENOUS
Status: COMPLETED
Start: 2024-09-27 | End: 2024-09-27

## 2024-09-27 RX ORDER — PHENYLEPHRINE HCL IN 0.9% NACL 1 MG/10 ML
SYRINGE (ML) INTRAVENOUS
Status: COMPLETED
Start: 2024-09-27 | End: 2024-09-27

## 2024-09-27 RX ORDER — HEPARIN SODIUM 200 [USP'U]/100ML
INJECTION, SOLUTION INTRAVENOUS
Status: COMPLETED
Start: 2024-09-27 | End: 2024-09-27

## 2024-09-27 RX ORDER — MIDAZOLAM HYDROCHLORIDE 1 MG/ML
INJECTION INTRAMUSCULAR; INTRAVENOUS
Status: COMPLETED
Start: 2024-09-27 | End: 2024-09-27

## 2024-09-27 RX ORDER — LIDOCAINE HYDROCHLORIDE 20 MG/ML
INJECTION, SOLUTION INFILTRATION; PERINEURAL
Status: COMPLETED
Start: 2024-09-27 | End: 2024-09-27

## 2024-09-27 RX ORDER — HYDROMORPHONE HYDROCHLORIDE 1 MG/ML
1 INJECTION, SOLUTION INTRAMUSCULAR; INTRAVENOUS; SUBCUTANEOUS ONCE
Status: COMPLETED | OUTPATIENT
Start: 2024-09-27 | End: 2024-09-27

## 2024-09-27 RX ADMIN — PREGABALIN 100 MG: 100 CAPSULE ORAL at 22:04

## 2024-09-27 RX ADMIN — NITROGLYCERIN: 20 INJECTION INTRAVENOUS at 16:00

## 2024-09-27 RX ADMIN — HYDROMORPHONE HYDROCHLORIDE 1 MG: 1 INJECTION, SOLUTION INTRAMUSCULAR; INTRAVENOUS; SUBCUTANEOUS at 04:15

## 2024-09-27 RX ADMIN — HYDROMORPHONE HYDROCHLORIDE 1 MG: 1 INJECTION, SOLUTION INTRAMUSCULAR; INTRAVENOUS; SUBCUTANEOUS at 19:20

## 2024-09-27 RX ADMIN — OMEPRAZOLE 40 MG: 20 CAPSULE, DELAYED RELEASE ORAL at 04:14

## 2024-09-27 RX ADMIN — HEPARIN SODIUM 5000 UNITS: 5000 INJECTION, SOLUTION INTRAVENOUS; SUBCUTANEOUS at 15:14

## 2024-09-27 RX ADMIN — INSULIN LISPRO 1 UNITS: 100 INJECTION, SOLUTION INTRAVENOUS; SUBCUTANEOUS at 21:58

## 2024-09-27 RX ADMIN — ATORVASTATIN CALCIUM 80 MG: 80 TABLET, FILM COATED ORAL at 22:03

## 2024-09-27 RX ADMIN — DIPHENHYDRAMINE HYDROCHLORIDE 25 MG: 50 INJECTION, SOLUTION INTRAMUSCULAR; INTRAVENOUS at 22:51

## 2024-09-27 RX ADMIN — Medication 5 MG: at 22:04

## 2024-09-27 RX ADMIN — ACETAMINOPHEN 1000 MG: 500 TABLET ORAL at 15:14

## 2024-09-27 RX ADMIN — HEPARIN SODIUM: 1000 INJECTION, SOLUTION INTRAVENOUS; SUBCUTANEOUS at 16:50

## 2024-09-27 RX ADMIN — HEPARIN SODIUM 2000 UNITS: 200 INJECTION, SOLUTION INTRAVENOUS at 16:24

## 2024-09-27 RX ADMIN — HEPARIN SODIUM: 1000 INJECTION, SOLUTION INTRAVENOUS; SUBCUTANEOUS at 16:24

## 2024-09-27 RX ADMIN — OMEPRAZOLE 40 MG: 20 CAPSULE, DELAYED RELEASE ORAL at 22:03

## 2024-09-27 RX ADMIN — HYDROMORPHONE HYDROCHLORIDE 1 MG: 1 INJECTION, SOLUTION INTRAMUSCULAR; INTRAVENOUS; SUBCUTANEOUS at 22:31

## 2024-09-27 RX ADMIN — LIDOCAINE HYDROCHLORIDE: 20 INJECTION, SOLUTION INFILTRATION; PERINEURAL at 16:24

## 2024-09-27 RX ADMIN — Medication 300 MCG: at 17:02

## 2024-09-27 RX ADMIN — OXYCODONE HYDROCHLORIDE 10 MG: 10 TABLET ORAL at 00:59

## 2024-09-27 RX ADMIN — DIPHENHYDRAMINE HYDROCHLORIDE 25 MG: 50 INJECTION, SOLUTION INTRAMUSCULAR; INTRAVENOUS at 10:51

## 2024-09-27 RX ADMIN — NITROGLYCERIN 0.4 MG: 0.4 TABLET, ORALLY DISINTEGRATING SUBLINGUAL at 15:14

## 2024-09-27 RX ADMIN — FENTANYL CITRATE 100 MCG: 50 INJECTION, SOLUTION INTRAMUSCULAR; INTRAVENOUS at 16:22

## 2024-09-27 RX ADMIN — NITROGLYCERIN 0.4 MG: 0.4 TABLET, ORALLY DISINTEGRATING SUBLINGUAL at 08:36

## 2024-09-27 RX ADMIN — MIDAZOLAM HYDROCHLORIDE 2 MG: 1 INJECTION, SOLUTION INTRAMUSCULAR; INTRAVENOUS at 16:50

## 2024-09-27 RX ADMIN — HYDROMORPHONE HYDROCHLORIDE 1 MG: 1 INJECTION, SOLUTION INTRAMUSCULAR; INTRAVENOUS; SUBCUTANEOUS at 10:44

## 2024-09-27 RX ADMIN — ACETAMINOPHEN 1000 MG: 500 TABLET ORAL at 22:04

## 2024-09-27 RX ADMIN — PREGABALIN 100 MG: 100 CAPSULE ORAL at 04:14

## 2024-09-27 RX ADMIN — TAMSULOSIN HYDROCHLORIDE 0.4 MG: 0.4 CAPSULE ORAL at 08:35

## 2024-09-27 RX ADMIN — IOHEXOL 95 ML: 350 INJECTION, SOLUTION INTRAVENOUS at 17:09

## 2024-09-27 RX ADMIN — HEPARIN SODIUM 5000 UNITS: 5000 INJECTION, SOLUTION INTRAVENOUS; SUBCUTANEOUS at 04:14

## 2024-09-27 RX ADMIN — MIDAZOLAM HYDROCHLORIDE 2 MG: 1 INJECTION, SOLUTION INTRAMUSCULAR; INTRAVENOUS at 16:22

## 2024-09-27 RX ADMIN — ACETAMINOPHEN 1000 MG: 500 TABLET ORAL at 04:14

## 2024-09-27 RX ADMIN — ASPIRIN 81 MG: 81 TABLET, CHEWABLE ORAL at 04:14

## 2024-09-27 RX ADMIN — HYDROMORPHONE HYDROCHLORIDE 1 MG: 1 INJECTION, SOLUTION INTRAMUSCULAR; INTRAVENOUS; SUBCUTANEOUS at 15:14

## 2024-09-27 RX ADMIN — OXYCODONE HYDROCHLORIDE 10 MG: 10 TABLET ORAL at 08:35

## 2024-09-27 RX ADMIN — CARVEDILOL 3.12 MG: 6.25 TABLET, FILM COATED ORAL at 22:02

## 2024-09-27 RX ADMIN — CLOPIDOGREL BISULFATE 75 MG: 75 TABLET ORAL at 04:14

## 2024-09-27 RX ADMIN — CARVEDILOL 3.12 MG: 6.25 TABLET, FILM COATED ORAL at 08:34

## 2024-09-27 RX ADMIN — OXYCODONE HYDROCHLORIDE 10 MG: 10 TABLET ORAL at 21:29

## 2024-09-27 ASSESSMENT — ENCOUNTER SYMPTOMS
SPUTUM PRODUCTION: 0
COUGH: 0
NEUROLOGICAL NEGATIVE: 1
ABDOMINAL PAIN: 1
NERVOUS/ANXIOUS: 1
WEAKNESS: 1
MUSCULOSKELETAL NEGATIVE: 1
EYES NEGATIVE: 1
PALPITATIONS: 0
CARDIOVASCULAR NEGATIVE: 1
RESPIRATORY NEGATIVE: 1
SHORTNESS OF BREATH: 1
PSYCHIATRIC NEGATIVE: 1

## 2024-09-27 ASSESSMENT — PAIN DESCRIPTION - PAIN TYPE
TYPE: ACUTE PAIN

## 2024-09-27 ASSESSMENT — FIBROSIS 4 INDEX: FIB4 SCORE: 1.05

## 2024-09-27 NOTE — CARE PLAN
The patient is Stable - Low risk of patient condition declining or worsening    Shift Goals  Clinical Goals: NPO after midnight, comfort with movement  Patient Goals: Rest comfortably  Family Goals: [facility]    Progress made toward(s) clinical / shift goals:      Problem: Pain - Standard  Goal: Alleviation of pain or a reduction in pain to the patient’s comfort goal  Outcome: Progressing  Note: Pain assessed on a 0-10 scale during shift. PRN pain medications administered as indicated per MAR. Non-pharm interventions suggested but patient declined.     Problem: Knowledge Deficit - Standard  Goal: Patient and family/care givers will demonstrate understanding of plan of care, disease process/condition, diagnostic tests and medications  Outcome: Progressing  Note: Patient updated on plan of care and upcoming procedures. Patient aware and compliant with NPO status after midnight. All questions answered.      Problem: Fall Risk  Goal: Patient will remain free from falls  Outcome: Progressing  Note: Patient remains free of falls. Bed alarm on, bed locked and in lowest position. Call light within reach and used appropriately. Treaded socks on.        Patient is not progressing towards the following goals:

## 2024-09-27 NOTE — CARE PLAN
The patient is Watcher - Medium risk of patient condition declining or worsening    Shift Goals  Clinical Goals: monitor lab trends, monitor UO, monitor pain  Patient Goals: pain control  Family Goals: jenna    Progress made toward(s) clinical / shift goals:    Problem: Pain - Standard  Goal: Alleviation of pain or a reduction in pain to the patient’s comfort goal  Outcome: Progressing     Problem: Neuro Status  Goal: Neuro status will remain stable or improve  Outcome: Progressing     Problem: Mobility  Goal: Patient's capacity to carry out activities will improve  Outcome: Progressing       Patient is not progressing towards the following goals:

## 2024-09-27 NOTE — PROGRESS NOTES
Patient made NPO (okay for meds with sips) after midnight 9/26 pending left heart cath procedure with possible intervention on 9/27. Per on-call provider, okay to give aspirin, plavix, and heparin with morning meds.

## 2024-09-27 NOTE — PROGRESS NOTES
Monitor summary: SR 79-93, ID 0.16, QRS 0.10, QT 0.34, with rare PVCs per strip from monitor room.

## 2024-09-27 NOTE — PROGRESS NOTES
Hospital Medicine Daily Progress Note    Date of Service  9/27/2024    Chief Complaint  Julisa Josue is a 64 y.o. female admitted 9/24/2024 with OJ    Hospital Course  64 y.o. female PMH CABG, DM, HTN, chronic pain and CAD who presented 9/24/2024 as a transfer from [place] for chest and back pain. She reports the chest pain radiates to her back. Also complaining of abdominal pain. In the ER, CTA thoracicoabdominal was negative for dissection. Patient was found to have acute kidney injury at Cr of 2.08 she ge be admitted for further workup.     Interval Problem Update  Still having substernal CP  Stable vitals  On room air  CBC stable  ASA/Statin/Plavix  Cardiology following  Aim for LH-C today  Labs in a.m.    I have discussed this patient's plan of care and discharge plan at IDT rounds today with Case Management, Nursing, Nursing leadership, and other members of the IDT team.    Consultants/Specialty  None    Code Status  Full Code    Disposition  The patient is not medically cleared for discharge to home or a post-acute facility.     I have placed the appropriate orders for post-discharge needs.    Review of Systems  Review of Systems   Constitutional:  Positive for malaise/fatigue.   HENT: Negative.     Eyes: Negative.    Respiratory: Negative.     Cardiovascular: Negative.    Gastrointestinal:  Positive for abdominal pain.   Genitourinary: Negative.    Musculoskeletal: Negative.    Skin: Negative.    Neurological:  Positive for weakness.   Endo/Heme/Allergies: Negative.    Psychiatric/Behavioral: Negative.          Physical Exam  Temp:  [36.3 °C (97.3 °F)-36.8 °C (98.2 °F)] 36.4 °C (97.5 °F)  Pulse:  [81-96] 86  Resp:  [16-18] 16  BP: (102-134)/(51-78) 134/65  SpO2:  [90 %-95 %] 90 %    Physical Exam  Constitutional:       Appearance: She is obese.   HENT:      Head: Normocephalic and atraumatic.      Mouth/Throat:      Mouth: Mucous membranes are moist.   Eyes:      Extraocular Movements:  Extraocular movements intact.      Pupils: Pupils are equal, round, and reactive to light.   Cardiovascular:      Rate and Rhythm: Normal rate and regular rhythm.      Pulses: Normal pulses.      Heart sounds: Normal heart sounds.   Pulmonary:      Effort: Pulmonary effort is normal.      Breath sounds: Normal breath sounds.   Abdominal:      General: Bowel sounds are normal.      Palpations: Abdomen is soft.      Tenderness: There is abdominal tenderness.   Musculoskeletal:         General: No swelling. Normal range of motion.      Cervical back: Normal range of motion and neck supple.   Skin:     General: Skin is warm.      Coloration: Skin is not jaundiced.   Neurological:      General: No focal deficit present.      Mental Status: She is alert and oriented to person, place, and time. Mental status is at baseline.      Cranial Nerves: No cranial nerve deficit.   Psychiatric:         Mood and Affect: Mood normal.         Behavior: Behavior normal.         Thought Content: Thought content normal.         Judgment: Judgment normal.         Fluids    Intake/Output Summary (Last 24 hours) at 9/27/2024 0904  Last data filed at 9/27/2024 0400  Gross per 24 hour   Intake 236 ml   Output 1700 ml   Net -1464 ml        Laboratory  Recent Labs     09/24/24  1845   WBC 9.6   RBC 4.40   HEMOGLOBIN 12.7   HEMATOCRIT 39.4   MCV 89.5   MCH 28.9   MCHC 32.2   RDW 43.6   PLATELETCT 285   MPV 9.4     Recent Labs     09/25/24  0535 09/25/24  1159 09/26/24  0835   SODIUM 137 134* 135   POTASSIUM 3.8 3.7 4.1   CHLORIDE 105 103 103   CO2 16* 19* 23   GLUCOSE 195* 269* 280*   BUN 27* 24* 23*   CREATININE 1.45* 1.29 1.08   CALCIUM 8.7 8.5 9.4             Recent Labs     09/25/24  0535   TRIGLYCERIDE 251*   HDL 28*   *       Imaging  CT-ABDOMEN-PELVIS W/O   Final Result         1.  Hepatomegaly   2.  Dilatation right renal pelvis suggesting extra renal pelvis morphology, slightly more pronounced since prior study.   3.   Atherosclerosis and atherosclerotic coronary artery disease      US-RENAL   Final Result      1.  Right-sided pelviectasis.   2.  No hydronephrosis.      CT-CTA COMPLETE THORACOABDOMINAL AORTA   Final Result         1.  No aortic aneurysm or dissection identified.   2.  Hepatomegaly   3.  Atherosclerosis and atherosclerotic coronary artery disease                        EC-ECHOCARDIOGRAM COMPLETE W/O CONT    (Results Pending)   CL-LEFT HEART CATHETERIZATION WITH POSSIBLE INTERVENTION    (Results Pending)        Assessment/Plan  * Chest pain- (present on admission)  Assessment & Plan  The ASCVD Risk score (Yuan GARCIA, et al., 2019) failed to calculate for the following reasons:    The patient has a prior MI or stroke diagnosis    ASA/Statin/Plavix  Cardiology following  Aim for LH-C today    Acute kidney injury (HCC)- (present on admission)  Assessment & Plan  IV Fluids   Check urine sodium and creatinine   Bladder scan   Renal sonogram   Avoid nephrotoxic agents     HLD (hyperlipidemia)- (present on admission)  Assessment & Plan  Continue with atorvastatin     Diabetes (HCC)- (present on admission)  Assessment & Plan  SSI+ Accu checks + hypoglycemia protocol          VTE prophylaxis: Heparin    I have performed a physical exam and reviewed and updated ROS and Plan today (9/27/2024). In review of yesterday's note (9/26/2024), there are no changes except as documented above.    Greater than 51 minutes spent prepping to see patient (e.g. review of tests) obtaining and/or reviewing separately obtained history. Performing a medically appropriate examination and/ evaluation.  Counseling and educating the patient/family/caregiver.  Ordering medications, tests, or procedures.  Referring and communicating with other health care professionals.  Documenting clinical information in EPIC.  Independently interpreting results and communicating results to patient/family/caregiver.  Care coordination

## 2024-09-27 NOTE — PROGRESS NOTES
Monitor summary: SR 84-94, IA .15, QRS .10, QT .34 with rare PVC's per strip from monitor room.

## 2024-09-27 NOTE — THERAPY
Physical Therapy Contact Note    Patient Name: Julisa Josue  Age:  64 y.o., Sex:  female  Medical Record #: [id number]  Today's Date: 9/27/2024    Pt pending cath today; will follow up post for accurate assessment of aerobic endurance/phase I walking program.     Joana MCKEON, PT,  [phone]

## 2024-09-27 NOTE — PROGRESS NOTES
Cardiology Follow-up Note    Name:   Julisa Josue   YOB: 1960  Age:   64 y.o.  female   MRN:   [id number]        Attending Provider: Dr Seb Rosales*     Chief Complaint: Chest Pain (Patient transferred from [place] for chest and abdominal pain. Work-up at [facility] mostly unremarkable and patient transferred for further work-up. Patient mildly hypotensive upon arrival. 2L bolus given PTA.)       Reason for cardiology consult: Dyspnea on exertion, chest pain    Outpatient cardiologist: Dr. Estrada       History of Present Illness  Julisa Josue is 64 y.o. female who was transferred from [facility] on 9/24/2024 with chest pain and back pain.    PMH: CAD, HTN, HLD, DM type II, PCI proximal/mid LAD (3.5 x 12 mm, 3.0 x 38 mm ANA) 12/22/2018, history of CABG x1 (LIMA to LAD) 9/2021, NSTEMI 4/23/2023 ( PCI to mid CX- prox OM with overlapping ANA and PTCA of distal LM and ostial LCx stent). NSTEMI 8/9/2024 [place], She underwent PTCA to the LCx.  Echo showed severe low flow low gradient aortic stenosis. Transferred to [facility] [place]. She underwent Dobutamine Stress Echo which confirmed severe AS. Nav revealed EF 18% and a tricuspid aortic valve with severe AS. CABG turndown due to poor targets. Underwent TAVR on 8/14/2021 23 [place] 3 (+2cc).   On DAPT therapy for recent TAVR and PTCA.     Patient found to have OJ with creatinine of 2.08.  CTA negative for dissection.      Interim Events 09/27/24   :  - Personal Telemetry interpretation: Sinus rhythm on the monitor  - Overnight events: Continues to have chest discomfort, states 10 out of 10 that is anterior radiating to abdomen.  Has been getting as needed nitro tabs and analgesics.  Patient denies edema, dizziness/lightheadedness, or palpitations.   N.p.o. for Cath Lab today.  - Vitals: 134/65, room air  - Labs reviewed: Serum creatinine 0.98  - I/O's: 1.9 L out yesterday  - Weight: 219 pounds         Review of  Systems   Respiratory:  Positive for shortness of breath. Negative for cough and sputum production.    Cardiovascular:  Positive for chest pain. Negative for palpitations and leg swelling.   Neurological: Negative.    Psychiatric/Behavioral:  The patient is nervous/anxious.         Medical History  Past Medical History:   Diagnosis Date    ACC/AHA stage C systolic heart failure (HCC) 2023    CAD (coronary artery disease)     PER [facility]    Congestive heart failure (HCC)     PER [facility]    Diabetes     Diabetes (HCC)     PER [facility]    Diverticulosis     PER [facility]    GERD (gastroesophageal reflux disease)     PER [facility]    High cholesterol 5/15/2011    Hypertension     Intestinal mass 2015    Ischemic cardiomyopathy 2023    Left ventricular systolic dysfunction, NYHA class 3 2023    Migraine     Staph skin infection          Family History   Problem Relation Age of Onset    Cancer Maternal Aunt         Lung cancer d/t smoking         Social History     Tobacco Use    Smoking status: Former     Current packs/day: 0.00     Average packs/day: 2.0 packs/day for 20.0 years (40.0 ttl pk-yrs)     Types: Cigarettes     Start date:      Quit date:      Years since quittin.7    Smokeless tobacco: Never   Vaping Use    Vaping status: Never Used   Substance Use Topics    Alcohol use: Not Currently    Drug use: Yes     Types: Inhaled     Comment: just prescribed meds         No Active Allergies      Medications   Scheduled Medications   Medication Dose Frequency    tamsulosin  0.4 mg AFTER BREAKFAST    aspirin  81 mg DAILY    clopidogrel  75 mg DAILY    carvedilol  3.125 mg BID WITH MEALS    atorvastatin  80 mg Q EVENING    pregabalin  100 mg BID    melatonin  5 mg Nightly    acetaminophen  1,000 mg TID    omeprazole  40 mg BID    insulin GLARGINE  15 Units AFTER LUNCH    And    insulin lispro  1-6 Units 4X/DAY [facility]    Pharmacy   "1 Each PHARMACY TO DOSE    heparin  5,000 Units Q8HRS           Physical Exam    Body mass index is 36.5 kg/m².     /65   Pulse 86   Temp 36.4 °C (97.5 °F) (Temporal)   Resp 16   Ht 1.651 m (5' 5\")   Wt 99.5 kg (219 lb 5.7 oz)   SpO2 90%      Vitals:    09/27/24 0808 09/27/24 0834 09/27/24 0835 09/27/24 0836   BP: 114/62 134/65  134/65   Pulse: 92 86  86   Resp: 18  16    Temp: 36.4 °C (97.5 °F)      TempSrc: Temporal      SpO2: 90%      Weight:       Height:            Oxygen Therapy:  Pulse Oximetry: 90 %, O2 (LPM): 0, O2 Delivery Device: None - Room Air      Physical Exam  Constitutional:       Appearance: She is obese.   Cardiovascular:      Rate and Rhythm: Normal rate and regular rhythm.      Heart sounds: No murmur heard.  Pulmonary:      Breath sounds: No wheezing or rhonchi.   Abdominal:      General: There is no distension.      Palpations: Abdomen is soft.   Musculoskeletal:      Right lower leg: No edema.      Left lower leg: No edema.   Skin:     General: Skin is warm and dry.      Capillary Refill: Capillary refill takes 2 to 3 seconds.   Neurological:      Mental Status: She is alert and oriented to person, place, and time.   Psychiatric:         Mood and Affect: Mood normal.         Thought Content: Thought content normal.               Labs (personally reviewed):     Estimated Creatinine Clearance: 63.1 mL/min (by C-G formula based on SCr of 1.08 mg/dL).    Lab Results   Component Value Date/Time    BNPBTYPENAT 98 12/20/2018 05:47 PM     Recent Labs     09/24/24  1845 09/25/24  0535 09/25/24  1159 09/26/24  0835   CREATININE 2.08* 1.45* 1.29 1.08   BUN 31* 27* 24* 23*   POTASSIUM 3.4* 3.8 3.7 4.1   SODIUM 137 137 134* 135   CALCIUM 8.9 8.7 8.5 9.4   CO2 17* 16* 19* 23   ALBUMIN 3.7 3.4 3.4  --      Recent Labs     09/25/24  0535 09/25/24  1159 09/26/24  0835   GLUCOSE 195* 269* 280*     Recent Labs     09/24/24  1845 09/25/24  0535 09/25/24  1159   ASTSGOT 18 24 16   ALTSGPT 15 13 11 "   ALKPHOSPHAT 87 85 82     Recent Labs     09/24/24  1845   WBC 9.6   HEMOGLOBIN 12.7   PLATELETCT 285     Recent Labs     09/24/24  2104 09/25/24  0535 09/25/24  1159 09/26/24  1223   TROPONINT 26* 24*  --  18   HBA1C  --   --  12.4*  --      Lab Results   Component Value Date/Time    CHOLSTRLTOT 211 (H) 09/25/2024 05:35 AM     (H) 09/25/2024 05:35 AM    HDL 28 (A) 09/25/2024 05:35 AM    TRIGLYCERIDE 251 (H) 09/25/2024 05:35 AM       Imaging:  CT-ABDOMEN-PELVIS W/O   Final Result         1.  Hepatomegaly   2.  Dilatation right renal pelvis suggesting extra renal pelvis morphology, slightly more pronounced since prior study.   3.  Atherosclerosis and atherosclerotic coronary artery disease      US-RENAL   Final Result      1.  Right-sided pelviectasis.   2.  No hydronephrosis.      CT-CTA COMPLETE THORACOABDOMINAL AORTA   Final Result         1.  No aortic aneurysm or dissection identified.   2.  Hepatomegaly   3.  Atherosclerosis and atherosclerotic coronary artery disease                        EC-ECHOCARDIOGRAM COMPLETE W/O CONT    (Results Pending)   CL-LEFT HEART CATHETERIZATION WITH POSSIBLE INTERVENTION    (Results Pending)       Cardiac Imaging and Procedures Review        Echocardiogram-pending    ROSALINA 8/14/2024  Preoperative evaluation:   Normal LV size with severely reduced LV systolic function, LVEF 18%   Normal RV size and RV systolic function   Left atrium appears dilated   Normal-appearing left atrial appendage with no evidence of thrombus   Intact interatrial septum with no evidence of shunt by color Doppler   Severe low flow/low gradient aortic stenosis   Aorta not well visualized   No pericardial effusion     Postoperative evaluation:   Well-seated 23 mm Avery [place] Resilia transcatheter aortic prosthesis   No aortic bioprosthesis stenosis (MG 2.5 mmHg FELIX 2.2 cm2) or   regurgitation   No pericardial effusion   Otherwise, no significant change from prior       Cardiac Catheterization  CATH  done at [place], [place] 8/2024  1. Right coronary artery is a small-caliber vessel which originates from the right sinus of Valsalva. Distally the RCA divides into PDA and PLV arteries. There are luminal irregularities in the RCA.  2. Left main artery is a moderate caliber vessel which divides into left anterior descending artery, left circumflex artery and ramus intermedius artery. There is presence of patent stent involving the left main artery.   3. Left circumflex artery is a moderate caliber vessel which has 90% InStent restenosis involving the proximal left circumflex artery. This was treated with balloon angioplasty with 50% residual stenosis.   4. Ramus intermedius artery is a small-caliber vessel which has diffuse 80% proximal and mid stenosis.   5. Left anterior descending artery is a moderate caliber vessel which is supplied by a patent LIMA. There is presence of stent in the proximal and mid LAD. Mid LAD stent has 80% InStent restenosis. Lad gives rise to small-caliber diagonal 1 artery.        Principal Problem:    Chest pain (POA: Yes)  Active Problems:    Diabetes (HCC) (POA: Yes)    HLD (hyperlipidemia) (POA: Yes)    Acute kidney injury (HCC) (POA: Yes)  Resolved Problems:    * No resolved hospital problems. *      Assessment and Medical Decision Making:    #.  Unstable angina  #.  Coronary artery disease with history of CABG and multiple recurrent PCI's  #.  Severe aortic stenosis, s/p TAVR  #.  Acute kidney injury  #.  HFrEF, severely reduced EF 18% on 8/2024  #.  Diabetes mellitus type 2, A1c 12.4  #.  Obesity, BMI 36        Recommendations:  History of CABG x 1 and multiple PCI with known in-stent restenosis of left circumflex artery stents as of August 2024 in [place].  Troponin downtrending from 28 to 18  Continues to have chest discomfort.  Cath Lab was originally delayed due to elevated creatinine and OJ  Patient states she is compliant with her aspirin and Plavix at home.  Plan for left heart cath  today to evaluate for ischemic causes of chest pain.  Keep n.p.o. status  -Continue aspirin 81 mg and Plavix 75 mg daily  -Continue HI statin-atorvastatin 80 mg daily  -Continue carvedilol 3.125 twice daily  -Repeat an echocardiogram  -Appears euvolemic on exam, lungs are clear to auscultation, no edema.  -Daily BMP  -Consider ARB and MRA pending a repeat echocardiogram, after Cath Lab        Please contact me with any questions.  Thank you for allowing us to participate in the care of Ms. Josue.      Please see Dr. Lau  attestation for further details and MDM.     I, TAYLOR Maciel performed a portion of the service face-to-face with the same  patient on the same date of service independently of Dr. Lau FOR 15 minutes preparing to see the patient, reviewing hospital notes and tests, obtaining history from the patient, performing a medically appropriate exam, counseling and educating the patient, ordering medications/tests/procedures/referrals as clinically indicated, and documenting information in the electronic medical record.    Please note this dictation was created using voice recognition software.  I have made every reasonable attempt to correct obvious errors, but there may be errors of grammar and possibly content that I did not discover before finalizing the note.    TAYLOR Maciel  [facility]  [phone]

## 2024-09-28 ENCOUNTER — APPOINTMENT (OUTPATIENT)
Dept: RADIOLOGY | Facility: MEDICAL CENTER | Age: 64
DRG: 982 | End: 2024-09-28
Attending: STUDENT IN AN ORGANIZED HEALTH CARE EDUCATION/TRAINING PROGRAM
Payer: MEDICAID

## 2024-09-28 ENCOUNTER — APPOINTMENT (OUTPATIENT)
Dept: RADIOLOGY | Facility: MEDICAL CENTER | Age: 64
DRG: 982 | End: 2024-09-28
Payer: MEDICAID

## 2024-09-28 ENCOUNTER — APPOINTMENT (OUTPATIENT)
Dept: CARDIOLOGY | Facility: MEDICAL CENTER | Age: 64
DRG: 982 | End: 2024-09-28
Attending: STUDENT IN AN ORGANIZED HEALTH CARE EDUCATION/TRAINING PROGRAM
Payer: MEDICAID

## 2024-09-28 PROBLEM — I20.0 UNSTABLE ANGINA (HCC): Status: ACTIVE | Noted: 2024-09-28

## 2024-09-28 PROBLEM — N18.9 ACUTE ON CHRONIC RENAL FAILURE (HCC): Status: ACTIVE | Noted: 2024-09-24

## 2024-09-28 LAB
ANION GAP SERPL CALC-SCNC: 9 MMOL/L (ref 7–16)
BUN SERPL-MCNC: 17 MG/DL (ref 8–22)
CALCIUM SERPL-MCNC: 8.8 MG/DL (ref 8.5–10.5)
CHLORIDE SERPL-SCNC: 104 MMOL/L (ref 96–112)
CO2 SERPL-SCNC: 24 MMOL/L (ref 20–33)
CREAT SERPL-MCNC: 0.95 MG/DL (ref 0.5–1.4)
CRP SERPL HS-MCNC: 1.39 MG/DL (ref 0–0.75)
EKG IMPRESSION: NORMAL
ERYTHROCYTE [DISTWIDTH] IN BLOOD BY AUTOMATED COUNT: 43.9 FL (ref 35.9–50)
GFR SERPLBLD CREATININE-BSD FMLA CKD-EPI: 68 ML/MIN/1.73 M 2
GLUCOSE BLD STRIP.AUTO-MCNC: 244 MG/DL (ref 65–99)
GLUCOSE BLD STRIP.AUTO-MCNC: 247 MG/DL (ref 65–99)
GLUCOSE BLD STRIP.AUTO-MCNC: 353 MG/DL (ref 65–99)
GLUCOSE BLD STRIP.AUTO-MCNC: 354 MG/DL (ref 65–99)
GLUCOSE SERPL-MCNC: 233 MG/DL (ref 65–99)
HCT VFR BLD AUTO: 32.8 % (ref 37–47)
HGB BLD-MCNC: 10.4 G/DL (ref 12–16)
LV EJECT FRACT  99904: 45
LV EJECT FRACT MOD 2C 99903: 54.81
LV EJECT FRACT MOD 4C 99902: 34.05
LV EJECT FRACT MOD BP 99901: 47.45
MCH RBC QN AUTO: 28.7 PG (ref 27–33)
MCHC RBC AUTO-ENTMCNC: 31.7 G/DL (ref 32.2–35.5)
MCV RBC AUTO: 90.4 FL (ref 81.4–97.8)
PLATELET # BLD AUTO: 210 K/UL (ref 164–446)
PMV BLD AUTO: 10.2 FL (ref 9–12.9)
POTASSIUM SERPL-SCNC: 4.1 MMOL/L (ref 3.6–5.5)
RBC # BLD AUTO: 3.63 M/UL (ref 4.2–5.4)
SODIUM SERPL-SCNC: 137 MMOL/L (ref 135–145)
WBC # BLD AUTO: 4.2 K/UL (ref 4.8–10.8)

## 2024-09-28 PROCEDURE — 85027 COMPLETE CBC AUTOMATED: CPT

## 2024-09-28 PROCEDURE — 36415 COLL VENOUS BLD VENIPUNCTURE: CPT

## 2024-09-28 PROCEDURE — 700102 HCHG RX REV CODE 250 W/ 637 OVERRIDE(OP): Mod: UD | Performed by: STUDENT IN AN ORGANIZED HEALTH CARE EDUCATION/TRAINING PROGRAM

## 2024-09-28 PROCEDURE — A9270 NON-COVERED ITEM OR SERVICE: HCPCS | Mod: UD | Performed by: NURSE PRACTITIONER

## 2024-09-28 PROCEDURE — 93005 ELECTROCARDIOGRAM TRACING: CPT

## 2024-09-28 PROCEDURE — 700111 HCHG RX REV CODE 636 W/ 250 OVERRIDE (IP): Mod: JZ | Performed by: STUDENT IN AN ORGANIZED HEALTH CARE EDUCATION/TRAINING PROGRAM

## 2024-09-28 PROCEDURE — 93922 UPR/L XTREMITY ART 2 LEVELS: CPT

## 2024-09-28 PROCEDURE — 93010 ELECTROCARDIOGRAM REPORT: CPT | Performed by: INTERNAL MEDICINE

## 2024-09-28 PROCEDURE — 96372 THER/PROPH/DIAG INJ SC/IM: CPT | Mod: XU

## 2024-09-28 PROCEDURE — 93306 TTE W/DOPPLER COMPLETE: CPT | Mod: 26 | Performed by: INTERNAL MEDICINE

## 2024-09-28 PROCEDURE — 80048 BASIC METABOLIC PNL TOTAL CA: CPT

## 2024-09-28 PROCEDURE — 99232 SBSQ HOSP IP/OBS MODERATE 35: CPT | Performed by: STUDENT IN AN ORGANIZED HEALTH CARE EDUCATION/TRAINING PROGRAM

## 2024-09-28 PROCEDURE — 71045 X-RAY EXAM CHEST 1 VIEW: CPT

## 2024-09-28 PROCEDURE — A9270 NON-COVERED ITEM OR SERVICE: HCPCS

## 2024-09-28 PROCEDURE — 770020 HCHG ROOM/CARE - TELE (206)

## 2024-09-28 PROCEDURE — 93010 ELECTROCARDIOGRAM REPORT: CPT | Mod: 77 | Performed by: INTERNAL MEDICINE

## 2024-09-28 PROCEDURE — 97164 PT RE-EVAL EST PLAN CARE: CPT

## 2024-09-28 PROCEDURE — 86140 C-REACTIVE PROTEIN: CPT

## 2024-09-28 PROCEDURE — 82962 GLUCOSE BLOOD TEST: CPT

## 2024-09-28 PROCEDURE — A9270 NON-COVERED ITEM OR SERVICE: HCPCS | Performed by: STUDENT IN AN ORGANIZED HEALTH CARE EDUCATION/TRAINING PROGRAM

## 2024-09-28 PROCEDURE — 93306 TTE W/DOPPLER COMPLETE: CPT

## 2024-09-28 PROCEDURE — 700102 HCHG RX REV CODE 250 W/ 637 OVERRIDE(OP): Performed by: STUDENT IN AN ORGANIZED HEALTH CARE EDUCATION/TRAINING PROGRAM

## 2024-09-28 PROCEDURE — 700102 HCHG RX REV CODE 250 W/ 637 OVERRIDE(OP): Mod: UD | Performed by: NURSE PRACTITIONER

## 2024-09-28 PROCEDURE — 700102 HCHG RX REV CODE 250 W/ 637 OVERRIDE(OP)

## 2024-09-28 PROCEDURE — 700105 HCHG RX REV CODE 258: Mod: UD | Performed by: INTERNAL MEDICINE

## 2024-09-28 PROCEDURE — A9270 NON-COVERED ITEM OR SERVICE: HCPCS | Mod: UD | Performed by: STUDENT IN AN ORGANIZED HEALTH CARE EDUCATION/TRAINING PROGRAM

## 2024-09-28 PROCEDURE — 93926 LOWER EXTREMITY STUDY: CPT | Mod: LT

## 2024-09-28 PROCEDURE — 96375 TX/PRO/DX INJ NEW DRUG ADDON: CPT | Mod: XU

## 2024-09-28 PROCEDURE — 700117 HCHG RX CONTRAST REV CODE 255: Mod: UD | Performed by: STUDENT IN AN ORGANIZED HEALTH CARE EDUCATION/TRAINING PROGRAM

## 2024-09-28 PROCEDURE — 96376 TX/PRO/DX INJ SAME DRUG ADON: CPT | Mod: XU

## 2024-09-28 RX ORDER — LOSARTAN POTASSIUM 50 MG/1
25 TABLET ORAL
Status: DISCONTINUED | OUTPATIENT
Start: 2024-09-28 | End: 2024-10-01 | Stop reason: HOSPADM

## 2024-09-28 RX ORDER — SODIUM CHLORIDE 9 MG/ML
500 INJECTION, SOLUTION INTRAVENOUS ONCE
Status: COMPLETED | OUTPATIENT
Start: 2024-09-28 | End: 2024-09-28

## 2024-09-28 RX ORDER — SPIRONOLACTONE 25 MG/1
12.5 TABLET ORAL
Status: DISCONTINUED | OUTPATIENT
Start: 2024-09-28 | End: 2024-09-29

## 2024-09-28 RX ADMIN — DIPHENHYDRAMINE HYDROCHLORIDE 25 MG: 50 INJECTION, SOLUTION INTRAMUSCULAR; INTRAVENOUS at 21:07

## 2024-09-28 RX ADMIN — Medication 5 MG: at 21:07

## 2024-09-28 RX ADMIN — ACETAMINOPHEN 1000 MG: 500 TABLET ORAL at 17:48

## 2024-09-28 RX ADMIN — ATORVASTATIN CALCIUM 80 MG: 80 TABLET, FILM COATED ORAL at 17:49

## 2024-09-28 RX ADMIN — PREGABALIN 100 MG: 100 CAPSULE ORAL at 06:14

## 2024-09-28 RX ADMIN — OMEPRAZOLE 40 MG: 20 CAPSULE, DELAYED RELEASE ORAL at 06:14

## 2024-09-28 RX ADMIN — LOSARTAN POTASSIUM 25 MG: 50 TABLET, FILM COATED ORAL at 15:34

## 2024-09-28 RX ADMIN — OXYCODONE HYDROCHLORIDE 10 MG: 10 TABLET ORAL at 15:40

## 2024-09-28 RX ADMIN — ASPIRIN 81 MG: 81 TABLET, CHEWABLE ORAL at 06:14

## 2024-09-28 RX ADMIN — CARVEDILOL 3.12 MG: 6.25 TABLET, FILM COATED ORAL at 17:49

## 2024-09-28 RX ADMIN — ACETAMINOPHEN 1000 MG: 500 TABLET ORAL at 06:14

## 2024-09-28 RX ADMIN — HYDROMORPHONE HYDROCHLORIDE 1 MG: 1 INJECTION, SOLUTION INTRAMUSCULAR; INTRAVENOUS; SUBCUTANEOUS at 22:20

## 2024-09-28 RX ADMIN — SPIRONOLACTONE 12.5 MG: 25 TABLET ORAL at 15:34

## 2024-09-28 RX ADMIN — HUMAN ALBUMIN MICROSPHERES AND PERFLUTREN 3 ML: 10; .22 INJECTION, SOLUTION INTRAVENOUS at 12:15

## 2024-09-28 RX ADMIN — OXYCODONE HYDROCHLORIDE 10 MG: 10 TABLET ORAL at 21:07

## 2024-09-28 RX ADMIN — INSULIN LISPRO 5 UNITS: 100 INJECTION, SOLUTION INTRAVENOUS; SUBCUTANEOUS at 13:33

## 2024-09-28 RX ADMIN — HYDROMORPHONE HYDROCHLORIDE 1 MG: 1 INJECTION, SOLUTION INTRAMUSCULAR; INTRAVENOUS; SUBCUTANEOUS at 06:20

## 2024-09-28 RX ADMIN — TAMSULOSIN HYDROCHLORIDE 0.4 MG: 0.4 CAPSULE ORAL at 09:02

## 2024-09-28 RX ADMIN — INSULIN LISPRO 2 UNITS: 100 INJECTION, SOLUTION INTRAVENOUS; SUBCUTANEOUS at 21:14

## 2024-09-28 RX ADMIN — HYDROMORPHONE HYDROCHLORIDE 1 MG: 1 INJECTION, SOLUTION INTRAMUSCULAR; INTRAVENOUS; SUBCUTANEOUS at 13:28

## 2024-09-28 RX ADMIN — INSULIN LISPRO 5 UNITS: 100 INJECTION, SOLUTION INTRAVENOUS; SUBCUTANEOUS at 17:53

## 2024-09-28 RX ADMIN — OMEPRAZOLE 40 MG: 20 CAPSULE, DELAYED RELEASE ORAL at 17:48

## 2024-09-28 RX ADMIN — CLOPIDOGREL BISULFATE 75 MG: 75 TABLET ORAL at 06:14

## 2024-09-28 RX ADMIN — OXYCODONE HYDROCHLORIDE 10 MG: 10 TABLET ORAL at 09:34

## 2024-09-28 RX ADMIN — SODIUM CHLORIDE 500 ML: 9 INJECTION, SOLUTION INTRAVENOUS at 01:50

## 2024-09-28 RX ADMIN — INSULIN GLARGINE-YFGN 15 UNITS: 100 INJECTION, SOLUTION SUBCUTANEOUS at 15:36

## 2024-09-28 RX ADMIN — INSULIN LISPRO 2 UNITS: 100 INJECTION, SOLUTION INTRAVENOUS; SUBCUTANEOUS at 09:31

## 2024-09-28 RX ADMIN — PREGABALIN 100 MG: 100 CAPSULE ORAL at 17:49

## 2024-09-28 RX ADMIN — ACETAMINOPHEN 1000 MG: 500 TABLET ORAL at 13:28

## 2024-09-28 RX ADMIN — CARVEDILOL 3.12 MG: 6.25 TABLET, FILM COATED ORAL at 09:02

## 2024-09-28 RX ADMIN — HYDROMORPHONE HYDROCHLORIDE 1 MG: 1 INJECTION, SOLUTION INTRAMUSCULAR; INTRAVENOUS; SUBCUTANEOUS at 17:48

## 2024-09-28 RX ADMIN — NITROGLYCERIN 0.4 MG: 0.4 TABLET, ORALLY DISINTEGRATING SUBLINGUAL at 10:54

## 2024-09-28 ASSESSMENT — GAIT ASSESSMENTS
GAIT LEVEL OF ASSIST: SUPERVISED
ASSISTIVE DEVICE: FRONT WHEEL WALKER
DISTANCE (FEET): 25
DEVIATION: BRADYKINETIC

## 2024-09-28 ASSESSMENT — PAIN DESCRIPTION - PAIN TYPE
TYPE: ACUTE PAIN

## 2024-09-28 ASSESSMENT — COGNITIVE AND FUNCTIONAL STATUS - GENERAL
CLIMB 3 TO 5 STEPS WITH RAILING: A LITTLE
SUGGESTED CMS G CODE MODIFIER MOBILITY: CI
MOBILITY SCORE: 23

## 2024-09-28 ASSESSMENT — ENCOUNTER SYMPTOMS
SPUTUM PRODUCTION: 0
WEAKNESS: 1
PSYCHIATRIC NEGATIVE: 1
MYALGIAS: 1
RESPIRATORY NEGATIVE: 1
CARDIOVASCULAR NEGATIVE: 1
PALPITATIONS: 0
NERVOUS/ANXIOUS: 1
BACK PAIN: 1
MUSCULOSKELETAL NEGATIVE: 1
COUGH: 0
ABDOMINAL PAIN: 1
SHORTNESS OF BREATH: 0
EYES NEGATIVE: 1
SENSORY CHANGE: 1

## 2024-09-28 ASSESSMENT — FIBROSIS 4 INDEX: FIB4 SCORE: 1.05

## 2024-09-28 NOTE — PROGRESS NOTES
Monitor summary: SR 78-84, AL .17, QRS .10, QT .41 with occasional/frequent PVCs per strip from monitor room.

## 2024-09-28 NOTE — PROGRESS NOTES
Monitor summary: SR 79-92, MS 0.17, QRS 0.09, QT 0.43, with rare PVCs per strip from monitor room.

## 2024-09-28 NOTE — PROCEDURES
Cardiac Catheterization Procedure    DATE: 9/27/2024    : Julio Velázquez MD, MPH    PROCEDURES PERFORMED:  Left heart catheterization  Coronary angiography  Bypass graft angiography  Right common femoral angiography  Percutaneous coronary intervention of proximal RCA  Instantaneous wave free ratio assessment of the proximal RCA  Instantaneous wave free ratio assessment of the ostial left circumflex  IVUS of the RCA to guide PCI  Perclose of the right CFA arteriotomy site  Moderate conscious sedation    INDICATIONS:  Unstable angina, ischemic cardiomyopathy, reduced LVEF    CONSENT:  The complete alternatives, risks, and benefits of the procedure were explained to the patient. Informed consent was obtained prior to the procedure.    MEDICATIONS:  Lidocaine  Fentanyl  Midazolam  Nitroglycerin  Verapamil  Heparin  On DAPT    MODERATE CONSCIOUS SEDATION:  I personally supervised the administration of moderate conscious sedation by the nursing staff for 53 minutes.  Start time: 1613  End time: 1706    CONTRAST: Omnipaque 95 cc    RADIATION (Air Kerma): 382 mGy    FLUOROSCOPY TIME: 10.8 minutes    ESTIMATED BLOOD LOSS: < 50 cc    COMPLICATIONS: None apparent    PROCEDURE OVERVIEW:  The patient was brought to the cardiac catheterization laboratory in the fasting state. The skin over the right groin was prepped and draped in the usual sterile fashion. Lidocaine infiltration was used to anesthetize the tissue over the right common femoral artery. Using the micropuncture technique, under ultrasound guidance, under fluoroscopic/cineangiographic confirmation, a 6-Serbian Roosevelt sheath was inserted in the right common femoral artery artery. A 6 Serbian JR4 diagnostic catheter catheter was then advanced over a standard J-wire into the left ventricular cavity where it was gently aspirated, flushed, and then withdrawn across the aortic valve with sequential pressures measured. This catheter was then used to engage the ostium  of the right coronary artery and cineangiograms were obtained in multiple projections for complete evaluation of the right coronary system. This catheter was then used to engage the left subclavian artery and using an exchange length J-wire was exchanged to a 6 Kyrgyz JUSTYNA catheter.  This catheter was used to engage the pedicle LIMA-LAD graft and used to perform cineangiograms in multiple projections for complete evaluation.  This catheter was then exchanged over J-wire to 6 Kyrgyz JL 4 diagnostic catheter which was used to engage the ostium of the left coronary artery and cineangiograms were obtained in multiple projections for complete evaluation of the left coronary system.     This catheter was then exchanged over J-wire to 6 Kyrgyz JR4 guide catheter which was used to engage the RCA to perform IFR followed by PCI as detailed below.  Then we proceeded with IFR of the ostial left circumflex using 6 Kyrgyz EBU 3.5 guide catheter.  See below for details     HEMODYNAMICS:  Aortic pressure: 104 /44/69 mmHg  LVEDP: 35 mmHg  No significant aortic gradient on pullback      CORONARY ANGIOGRAPHY:  The left main coronary artery: Large-caliber vessel with patent stent in the distal portion, bifurcates to LAD and left circumflex  The left anterior descending coronary artery: Large-caliber transapical vessel with severe 95% mid stenosis.  Mid/distal/apical LAD fills via patent pedicle LIMA graft.  Gives rise to large diagonal branch with mild CAD.  Mild nonobstructive disease throughout the ostial and proximal LAD.  The left circumflex coronary artery: Large-caliber vessel with mild-moderate ISR in the ostial portion of the stents. IFR 1.04 (not hemodynamically significant).  iFR performed twice for confirmation.  The right coronary artery: Large-caliber dominant vessel with severe proximal stenosis (IFR 0.8) status post successful IVUS guided PCI disease of the LAD.    Bypass graft angiography:  Pedicle LIMA-LAD:  Patent    INSTANTANEOUS WAVE-FREE RATIO (iFR) of proximal RCA:  6 JR4 guide catheter was seated appropriately. The pressure wire was zeroed in the hoop, advanced just distal to the tip of guide. After meticulous clearing of the catheters and flushing it was normalized. Subsequently he was navigated across the area of interest in the proximal RCA and intracoronary nitroglycerin was administered. The iFR was subsequently recorded and found to be 0.8.  Hemodynamically significant    PERCUTANEOUS CORONARY INTERVENTION of proximal RCA:  Pre: 70% stenosis and ZAIN III flow  Post: 0% residual stenosis and ZAIN III flow.   Guide Catheter(s): 6 Indonesian JR4  Guidewire(s): Run-through  Anticoagulation:  Heparin to maintain ACT > 250  Antiplatelet(s): Aspirin, Plavix  Pre-dilation balloon(s): 3 x 12 mm NC  IVUS or OCT used: IVUS guided PCI  Intracoronary Atherectomy / Lithotripsy: None  Stent: Synergy 3 x 20 mm ANA  Post-dilation balloon(s): 3.5 x 12 mm NC     No dissection, signs of no-reflow, distal embolization or perforation post PCI.    INSTANTANEOUS WAVE-FREE RATIO (iFR) of ostial left circumflex:  The JR4 guide was exchanged for an appropriate guide catheter (6 Indonesian EBU 3.5) which was seated appropriately. The pressure wire was zeroed in the hoop, advanced just distal to the tip of guide. After meticulous clearing of the catheters and flushing it was normalized. Subsequently he was navigated across the area of interest in the ostial left circumflex and intracoronary nitroglycerin was administered. The iFR was subsequently recorded and found to be 1.04.  Performed twice.  None hemodynamically significant      The patient left the cath lab  CP free,  hemodynamically stable and neurologically intact.    At the end of the procedure, all catheters, wires and sheaths were removed, and successful Perclose was deployed for the right common femoral arteriotomy site achieving adequate hemostasis.      IMPRESSION:  1.  Severe  proximal RCA stenosis (IFR 0.8) status post successful IVUS guided PCI deploying Synergy 3 x 20 mm ANA postdilated to 3.5 mm  2.  Nonobstructive disease throughout the left circumflex (patent stents, iFR ostial left circumflex ISR 1.04, not hemodynamically significant stenosis).  3.  Patent pedicle LIMA-LAD  4.  Significant elevated resting LVEDP 35 mmHg with no significant transaortic gradient on pullback  5.  Severe 90% stenosis in the mid right common femoral artery (our access site and successful Perclose was above the lesion)    RECOMMENDATIONS:  Lifelong DAPT as tolerated  At least 4 hours of bedrest  HI statin / PCSK9-I: Target LDL < 55 and TG < 150  GDMT and lifestyle modifications for secondary ASCVD prevention  Cardiac Rehab referral  Recommend referral to vascular surgery for intervention of her severe right common femoral artery stenosis    NOTIFICATION:  The patient's daughter - Elina - was called and notified of the results.    Julio Velázquez MD, MPH [facility]  Interventional Cardiologist  [facility]   of Clinical Internal Medicine - [facility]

## 2024-09-28 NOTE — PROGRESS NOTES
Cardiology Follow-up Note    Name:   Julisa Josue   YOB: 1960  Age:   64 y.o.  female   MRN:   [id number]        Attending Provider: Dr Seb Rosales*     Chief Complaint: Chest Pain (Patient transferred from [place] for chest and abdominal pain. Work-up at [facility] mostly unremarkable and patient transferred for further work-up. Patient mildly hypotensive upon arrival. 2L bolus given PTA.)       Reason for cardiology consult: Dyspnea on exertion, chest pain    Outpatient cardiologist: Dr. Estrada       History of Present Illness  Julisa Josue is 64 y.o. female who was transferred from [facility] on 9/24/2024 with chest pain and back pain.    PMH: CAD, HTN, HLD, DM type II, PCI proximal/mid LAD (3.5 x 12 mm, 3.0 x 38 mm ANA) 12/22/2018, history of CABG x1 (LIMA to LAD) 9/2021, NSTEMI 4/23/2023 ( PCI to mid CX- prox OM with overlapping ANA and PTCA of distal LM and ostial LCx stent). NSTEMI 8/9/2024 [place], She underwent PTCA to the LCx.  Echo showed severe low flow low gradient aortic stenosis. Transferred to [facility] at [place]. She underwent Dobutamine Stress Echo which confirmed severe AS. Nav revealed EF 18% and a tricuspid aortic valve with severe AS. CABG turndown due to poor targets. Underwent TAVR on 8/14/2021 23 [place] 3 (+2cc).   On DAPT therapy for recent TAVR and PTCA.     Patient found to have OJ with creatinine of 2.08.  CTA negative for dissection.      Interim Events 09/27/24   :  - Personal Telemetry interpretation: Sinus rhythm on the monitor  - Overnight events: Continues to have chest discomfort, states 10 out of 10 that is anterior radiating to abdomen.  Has been getting as needed nitro tabs and analgesics.  Patient denies edema, dizziness/lightheadedness, or palpitations.   N.p.o. for Cath Lab today.  - Vitals: 134/65, room air  - Labs reviewed: Serum creatinine 0.98  - I/O's: 1.9 L out yesterday  - Weight: 219 pounds    Interim Events  09/28/24   :  - Personal Telemetry interpretation: Sinus rhythm with occasional PVCs  - Overnight events: Underwent left heart cath yesterday.  She had some oozing from her right femoral site overnight that necessitated the FemoStop placement for couple hours.  Currently, patient denies chest pain, shortness of breath, edema, dizziness/lightheadedness, or palpitations.   Right femoral site is soft, no evidence of hematoma.  Lower extremity is warm, nontender.  Distal faint pulses.   - Vitals: 113/80  - Labs reviewed: Hemoglobin dropped from 12.7-10.4.  - I/O's: 1 L out yesterday  - Weight: 222 pounds  Later in the day, I got alerted by the nurse that patient is having some chest discomfort that is aching but much better from when she was admitted.  She is having some achiness on the right femoral site.  Patient tells me its from constant poking when people assess.        Review of Systems   Respiratory:  Negative for cough, sputum production and shortness of breath.    Cardiovascular:  Positive for chest pain (aching). Negative for palpitations and leg swelling.   Musculoskeletal:  Positive for back pain and myalgias.   Neurological:  Positive for sensory change.   Psychiatric/Behavioral:  The patient is nervous/anxious.         Medical History  Past Medical History:   Diagnosis Date    ACC/AHA stage C systolic heart failure (HCC) 4/2/2023    CAD (coronary artery disease)     [facility]    Congestive heart failure (HCC)     [facility]    Diabetes     Diabetes (HCC)     [facility]    Diverticulosis     [facility]    GERD (gastroesophageal reflux disease)     [facility]    High cholesterol 5/15/2011    Hypertension     Intestinal mass 2015    Ischemic cardiomyopathy 4/2/2023    Left ventricular systolic dysfunction, NYHA class 3 4/2/2023    Migraine     Staph skin infection          Family History   Problem Relation Age of Onset    Cancer  "Maternal Aunt         Lung cancer d/t smoking         Social History     Tobacco Use    Smoking status: Former     Current packs/day: 0.00     Average packs/day: 2.0 packs/day for 20.0 years (40.0 ttl pk-yrs)     Types: Cigarettes     Start date:      Quit date:      Years since quittin.7    Smokeless tobacco: Never   Vaping Use    Vaping status: Never Used   Substance Use Topics    Alcohol use: Not Currently    Drug use: Yes     Types: Inhaled     Comment: just prescribed meds         No Active Allergies      Medications   Scheduled Medications   Medication Dose Frequency    tamsulosin  0.4 mg AFTER BREAKFAST    aspirin  81 mg DAILY    clopidogrel  75 mg DAILY    carvedilol  3.125 mg BID WITH MEALS    atorvastatin  80 mg Q EVENING    pregabalin  100 mg BID    melatonin  5 mg Nightly    acetaminophen  1,000 mg TID    omeprazole  40 mg BID    insulin GLARGINE  15 Units AFTER LUNCH    And    insulin lispro  1-6 Units 4X/DAY [facility]    Pharmacy  1 Each PHARMACY TO DOSE           Physical Exam    Body mass index is 36.98 kg/m².     /80   Pulse 83   Temp 36.5 °C (97.7 °F) (Temporal)   Resp 16   Ht 1.651 m (5' 5\")   Wt 101 kg (222 lb 3.6 oz)   SpO2 93%      Vitals:    24 0902 24 0911 24 1054 24 1200   BP: (P) 124/66 124/66 (P) 115/63 113/80   Pulse: (P) 82 82 (P) 86 83   Resp:  16  16   Temp:  36.5 °C (97.7 °F)  36.5 °C (97.7 °F)   TempSrc:  Temporal  Temporal   SpO2:  93%  93%   Weight:       Height:            Oxygen Therapy:  Pulse Oximetry: 93 %, O2 (LPM): 1, O2 Delivery Device: None - Room Air      Physical Exam  Constitutional:       Appearance: She is obese.   Cardiovascular:      Rate and Rhythm: Normal rate and regular rhythm.      Heart sounds: No murmur heard.  Pulmonary:      Breath sounds: No wheezing or rhonchi.   Abdominal:      General: There is no distension.      Palpations: Abdomen is soft.   Musculoskeletal:      Right lower leg: No edema.      Left lower " "leg: No edema.   Skin:     General: Skin is warm and dry.      Capillary Refill: Capillary refill takes 2 to 3 seconds.      Comments: Right femoral site is soft without evidence of hematoma.  Nontender on morning assessment.  CMS +.  Faint pulses distally.  Dressing clean dry intact, no oozing noted.   Neurological:      Mental Status: She is alert and oriented to person, place, and time.   Psychiatric:         Mood and Affect: Mood is anxious. Affect is angry.               Labs (personally reviewed):     Estimated Creatinine Clearance: 72.3 mL/min (by C-G formula based on SCr of 0.95 mg/dL).    Lab Results   Component Value Date/Time    BNPBTYPENAT 98 12/20/2018 05:47 PM     Recent Labs     09/26/24  0835 09/27/24  0909 09/28/24  0450   CREATININE 1.08 0.98 0.95   BUN 23* 21 17   POTASSIUM 4.1 4.2 4.1   SODIUM 135 135 137   CALCIUM 9.4 9.2 8.8   CO2 23 24 24     Recent Labs     09/26/24  0835 09/27/24  0909 09/28/24  0450   GLUCOSE 280* 258* 233*     No results for input(s): \"ASTSGOT\", \"ALTSGPT\", \"ALKPHOSPHAT\", \"INR\" in the last 72 hours.    Invalid input(s): \"BILI\"    Recent Labs     09/28/24  0450   WBC 4.2*   HEMOGLOBIN 10.4*   PLATELETCT 210     Recent Labs     09/26/24  1223   TROPONINT 18     Lab Results   Component Value Date/Time    CHOLSTRLTOT 211 (H) 09/25/2024 05:35 AM     (H) 09/25/2024 05:35 AM    HDL 28 (A) 09/25/2024 05:35 AM    TRIGLYCERIDE 251 (H) 09/25/2024 05:35 AM       Imaging:  EC-ECHOCARDIOGRAM COMPLETE W/ CONT   Final Result      CT-ABDOMEN-PELVIS W/O   Final Result         1.  Hepatomegaly   2.  Dilatation right renal pelvis suggesting extra renal pelvis morphology, slightly more pronounced since prior study.   3.  Atherosclerosis and atherosclerotic coronary artery disease      US-RENAL   Final Result      1.  Right-sided pelviectasis.   2.  No hydronephrosis.      CT-CTA COMPLETE THORACOABDOMINAL AORTA   Final Result         1.  No aortic aneurysm or dissection identified.   2.  " Hepatomegaly   3.  Atherosclerosis and atherosclerotic coronary artery disease                        CL-LEFT HEART CATHETERIZATION WITH POSSIBLE INTERVENTION    (Results Pending)       Cardiac Imaging and Procedures Review        Echocardiogram 9/28/2024  The left ventricular ejection fraction is visually estimated to be 45%.  Septal , inferior wall hypokinesis.  Known bioprosthetic aortic valve that is functioning normally with   normal transvalvular gradients.  Vmax is 2.06 m/s.  Estimated right ventricular systolic pressure is 27 mmHg.      ROSALINA 8/14/2024  Preoperative evaluation:   Normal LV size with severely reduced LV systolic function, LVEF 18%   Normal RV size and RV systolic function   Left atrium appears dilated   Normal-appearing left atrial appendage with no evidence of thrombus   Intact interatrial septum with no evidence of shunt by color Doppler   Severe low flow/low gradient aortic stenosis   Aorta not well visualized   No pericardial effusion     Postoperative evaluation:   Well-seated 23 mm Avery [place] Resilia transcatheter aortic prosthesis   No aortic bioprosthesis stenosis (MG 2.5 mmHg FELIX 2.2 cm2) or   regurgitation   No pericardial effusion   Otherwise, no significant change from prior       Cardiac Catheterization  CATH done at [place], [place] 8/2024  1. Right coronary artery is a small-caliber vessel which originates from the right sinus of Valsalva. Distally the RCA divides into PDA and PLV arteries. There are luminal irregularities in the RCA.  2. Left main artery is a moderate caliber vessel which divides into left anterior descending artery, left circumflex artery and ramus intermedius artery. There is presence of patent stent involving the left main artery.   3. Left circumflex artery is a moderate caliber vessel which has 90% InStent restenosis involving the proximal left circumflex artery. This was treated with balloon angioplasty with 50% residual stenosis.   4. Ramus intermedius artery  is a small-caliber vessel which has diffuse 80% proximal and mid stenosis.   5. Left anterior descending artery is a moderate caliber vessel which is supplied by a patent LIMA. There is presence of stent in the proximal and mid LAD. Mid LAD stent has 80% InStent restenosis. Lad gives rise to small-caliber diagonal 1 artery.        Cardiac catheterization on 9/27/2024 by Dr. Velázquez:  PROCEDURES PERFORMED:  Left heart catheterization  Coronary angiography  Bypass graft angiography  Right common femoral angiography  Percutaneous coronary intervention of proximal RCA  Instantaneous wave free ratio assessment of the proximal RCA  Instantaneous wave free ratio assessment of the ostial left circumflex  IVUS of the RCA to guide PCI  Perclose of the right CFA arteriotomy site  Moderate conscious sedation    IMPRESSION:  1.  Severe proximal RCA stenosis (IFR 0.8) status post successful IVUS guided PCI deploying Synergy 3 x 20 mm ANA postdilated to 3.5 mm  2.  Nonobstructive disease throughout the left circumflex (patent stents, iFR ostial left circumflex ISR 1.04, not hemodynamically significant stenosis).  3.  Patent pedicle LIMA-LAD  4.  Significant elevated resting LVEDP 35 mmHg with no significant transaortic gradient on pullback  5.  Severe 90% stenosis in the mid right common femoral artery (our access site and successful Perclose was above the lesion)     RECOMMENDATIONS:  Lifelong DAPT as tolerated  At least 4 hours of bedrest  HI statin / PCSK9-I: Target LDL < 55 and TG < 150  GDMT and lifestyle modifications for secondary ASCVD prevention  Cardiac Rehab referral  Recommend referral to vascular surgery for intervention of her severe right common femoral artery stenosis          Principal Problem:    Chest pain (POA: Yes)  Active Problems:    Diabetes (HCC) (POA: Yes)    HLD (hyperlipidemia) (POA: Yes)    Acute on chronic renal failure (HCC) (POA: Yes)    Unstable angina (HCC) (POA: Unknown)  Resolved Problems:    *  No resolved hospital problems. *      Assessment and Medical Decision Making:    #.  Unstable angina  #.  Coronary artery disease with history of CABG and multiple recurrent PCI's  #.  Severe aortic stenosis, s/p TAVR  #.  Acute kidney injury  #.  History of HFrEF, severely reduced EF 18% on 8/2024, improved to 45% on 9/28/2024.  #.  Diabetes mellitus type 2, A1c 12.4  #.  Obesity, BMI 36  #.  Severe 90% stenosis in the mid right common femoral artery        Recommendations:  History of CABG x 1 and multiple PCI with known in-stent restenosis of left circumflex artery stents as of August 2024 in [place].  Troponin downtrending from 28 to 18.  Underwent LHC on 9/27/2024 -found severe proximal RCA stenosis, s/p  Synergy 3 x 20 mm ANA.   -Severe 90% stenosis in the mid right common femoral artery.   -Continue aspirin 81 mg and Plavix 75 mg daily  -Continue HI statin-atorvastatin 80 mg daily  -Continue carvedilol 3.125 twice daily  -Echocardiogram -shows mildly reduced EF 45%  -Appears euvolemic on exam, lungs are clear to auscultation, no edema.  Had some hypotension overnight that required to small bolus.  Initiate GDMT for mildly reduced EF   -Start losartan 25 mg daily  -Start spironolactone 12.5 mg daily  -Daily BMP    -Patient had some oozing of the right femoral site overnight that necessitated FemoStop placement.  This morning, no oozing noted.  The site is soft without evidence of hematoma.  No bruising.  CMS intact.  Faint distal pulses assessed.  - Continues to have chest discomfort that is described as light aching at this point.    I ordered an EKG which was similar and then prior to that demonstrates sinus rhythm with ST deviation similar to prior EKG.  I ordered a chest x-ray that shows no acute cardiac or pulmonary abnormalities.  I ordered a CRP level that is only slightly elevated at 1.39.  She is getting as needed nitro, oxycodone, and Dilaudid.    -Recommend vascular surgery consultation to address  severe stenosis in the mid right femoral artery.  Patient voices a concern that she will not be able to drive for appointments since she lives in [place] and does not drive.      Please contact me with any questions.  Thank you for allowing us to participate in the care of Ms. Josue.      Please see Dr. Velázquez  attestation for further details and MDM.     IStacy A.P.R.N. performed a portion of the service face-to-face with the same  patient on the same date of service independently of Dr. Velázquez FOR 15 minutes preparing to see the patient, reviewing hospital notes and tests, obtaining history from the patient, performing a medically appropriate exam, counseling and educating the patient, ordering medications/tests/procedures/referrals as clinically indicated, and documenting information in the electronic medical record.    Please note this dictation was created using voice recognition software.  I have made every reasonable attempt to correct obvious errors, but there may be errors of grammar and possibly content that I did not discover before finalizing the note.    TAYLOR Macile  [facility]  [phone]

## 2024-09-28 NOTE — THERAPY
Physical Therapy   Daily Treatment     Patient Name: Julisa Josue  Age:  64 y.o., Sex:  female  Medical Record #: [id number]  Today's Date: 9/28/2024     Precautions  Precautions: Fall Risk    Assessment    Pt is seen s/p left heart catheterization on 9/27.  Upon re-evaluation, pt with slight decrease in activity tolerance, however still demonstrates supervision or better mobility with the walker and encouragement.  No oozing/draining from R femoral access noted dueing mobility/sitting in chair.  PT can follow while acute, anticipate home discharge with PLOF granddaughter/caregiver assist as needed, owns needed DME.    Plan    Treatment Plan Status: Continue Current Treatment Plan  Type of Treatment: Equipment, Bed Mobility, Family / Caregiver Training, Gait Training, Manual Therapy, Neuro Re-Education / Balance, Self Care / Home Evaluation, Stair Training, Therapeutic Activities, Therapeutic Exercise  Treatment Frequency: 3 Times per Week  Treatment Duration: Until Therapy Goals Met    DC Equipment Recommendations:  (Owns needed DME)  Discharge Recommendations: Recommend outpatient physical therapy services to address higher level deficits (Per EMR, no HH in her area, however, would benefit from continued PT in OP setting for higher level balance and strengthening)      Subjective    Pt is agreeable to participate, emotional at times.     Objective       09/28/24 1025    Services   Is patient using  services for this encounter? No   Precautions   Precautions Fall Risk   Vitals   Vitals Comments Stable and on room air   Pain 0 - 10 Group   Therapist Pain Assessment Post Activity Pain Same as Prior to Activity   Cognition    Comments Able to make needs known, at times emotional, several phone calls during session   Active ROM Lower Body    Active ROM Lower Body  WDL   Comments Special attention to R groin observation as needed through session (prior oozing/draining) - clean/dry - no oozing  observed during mobiltiy   Balance   Sitting Balance (Static) Good   Sitting Balance (Dynamic) Fair +   Standing Balance (Static) Fair +   Standing Balance (Dynamic) Fair   Weight Shift Sitting Good   Weight Shift Standing Fair   Skilled Intervention Verbal Cuing   Comments With FWW   Bed Mobility    Supine to Sit Modified Independent   Sit to Supine Unable to Participate  (up in chair post session)   Comments Brief contact to bed rail, otherwise form a flat bed   Gait Analysis   Gait Level Of Assist Supervised   Assistive Device Front Wheel Walker   Distance (Feet) 25   # of Times Distance was Traveled 1   Deviation Bradykinetic   Skilled Intervention Verbal Cuing   Comments Pt limits distance due to fatigue, no complaints of pain   Functional Mobility   Sit to Stand Supervised   Bed, Chair, Wheelchair Transfer Supervised   Transfer Method Stand Step   Skilled Intervention Verbal Cuing   6 Clicks Assessment - How much HELP from from another person do you currently need... (If the patient hasn't done an activity recently, how much help from another person do you think he/she would need if he/she tried?)   Turning from your back to your side while in a flat bed without using bedrails? 4   Moving from lying on your back to sitting on the side of a flat bed without using bedrails? 4   Moving to and from a bed to a chair (including a wheelchair)? 4   Standing up from a chair using your arms (e.g., wheelchair, or bedside chair)? 4   Walking in hospital room? 4   Climbing 3-5 steps with a railing? 3   6 clicks Mobility Score 23   Activity Tolerance   Sitting in Chair post session   Sitting Edge of Bed 5 mins   Standing 4 mins   Patient / Family Goals    Patient / Family Goal #1 to go home   Short Term Goals    Short Term Goal # 1 Pt will ambulate x 150ft with supervision and no device to ensure independent mobility at home.   Short Term Goal # 2 Pt will demonstrate independenc with daily weights and signs/symptoms of  hypotension wihtin 6 visits to reduce fall risk.   Education Group   Role of Physical Therapist Patient Response Patient;Acceptance;Demonstration;Explanation;Action Demonstration;Verbal Demonstration   Physical Therapy Treatment Plan   Physical Therapy Treatment Plan Continue Current Treatment Plan   Treatment Plan  Equipment;Bed Mobility;Family / Caregiver Training;Gait Training;Manual Therapy;Neuro Re-Education / Balance;Self Care / Home Evaluation;Stair Training;Therapeutic Activities;Therapeutic Exercise   Treatment Frequency 3 Times per Week   Duration Until Therapy Goals Met   Anticipated Discharge Equipment and Recommendations   DC Equipment Recommendations   (Owns needed DME)   Discharge Recommendations Recommend outpatient physical therapy services to address higher level deficits  (Per EMR, no HH in her area, however, would benefit from continued PT in OP setting for higher level balance and strengthening)   Interdisciplinary Plan of Care Collaboration   IDT Collaboration with  Nursing   Patient Position at End of Therapy Seated;Chair Alarm On;Call Light within Reach;Tray Table within Reach;Phone within Reach   Collaboration Comments RN updated on session   Session Information   Date / Session Number  9/28 (2/3, 10/1)

## 2024-09-28 NOTE — PROGRESS NOTES
Patient reportedly had an oozing at right groin site for some time after returning to the floor advised [facility] ICD nursing team to comment apply FemoStop they found that she did not have hematoma does have significant pain in her right hip.  I then evaluated her no sign for arterial compromise no significant hematoma the oozing from groin site I had stopped so I relaxed the femostop to minimal pressure but will leave on for 2 hours to encourage her to keep her leg straight.  We will treat her pain with additional Dilaudid and keep her on bedrest until morning    It is my pleasure to participate in the care of Ms. Josue.  Please do not hesitate to contact me with questions or concerns.    Shyam Vick MD PhD [facility]  Cardiologist [facility]    Please note that this dictation was created using voice recognition software. There may be errors I did not discover before finalizing the note.     9/27/2024  11:09 PM

## 2024-09-28 NOTE — PROGRESS NOTES
2100 Fresh right groin dressing saturated; pressure held for 15 mins which did not stop oozing; Jose Vick Cardiologist contacted; order for femstop  2130 Yoon [place]head came to apply femstop and stayed to release pressure in the prescribed amount of time  2200 Dr Vick at bedside; released more pressure and ordered to keep on for another 2 hours; pt must lay flat until morning; also ordered one time additional dose of dilaudid for pt pain  Pt given oxy at 2129, coreg and other evening meds at 2200; pt requested benadryl which was given at 2250; subsequently BP was 94/51 which was reported to Dr. Vick and Steve Blum; monitoring ordered  Subsequent BP at 0115 88/46; similar retake on other arm; Dr. Vick advised; Bolus ordered; Steve Blum notified as well  Pt preference to remain in reverse trendelenberg which is how she was able to eat her dinner after the femstop was applied

## 2024-09-28 NOTE — DIETARY
Nutrition Services: Cardiac Diet Education Consult   Day 0 of admit.  Julisa Josue is a 64 y.o. female with admitting DX of Acute kidney injury (HCC) [N17.9]    RD received referral for cardiac diet education. Dx list includes DM, chest pain, HLD.  RD provided information via discharge instructions which includes nutrition recommendations and tips to support a heart healthy consistent CHO dietary pattern. This includes outpatient resources to [facility] affiliated [facility] for continued nutrition education and guidance as desired.     No other education needs identified at this time. Please re-consult RD for supplemental education or at the request of patient.    Please re-consult RD PRN

## 2024-09-28 NOTE — DISCHARGE INSTR - DIET
Heart-Healthy Consistent Carbohydrate Nutrition Therapy    A heart-healthy and consistent carbohydrate diet is recommended to manage heart disease and diabetes.  To follow a heart-healthy and consistent carbohydrate diet,  Eat a balanced diet with whole grains, fruits and vegetables, and lean protein sources.  Choose heart-healthy unsaturated fats. Limit saturated fats, trans fats, and cholesterol intake. Eat more plant-based or vegetarian meals using beans and soy foods for protein.  Eat whole, unprocessed foods to limit the amount of sodium (salt) you eat.  Choose a consistent amount of carbohydrate at each meal and snack. Limit refined carbohydrates especially sugar, sweets and sugar-sweetened beverages.  If you drink alcohol, do so in moderation: one serving per day (women) and two servings per day (men).  o One serving is equivalent to 12 ounces beer, 5 ounces wine, or 1.5 ounces distilled spirits    Tips for Choosing Heart-Healthy Fats    Choose lean protein and low-fat dairy foods to reduce saturated fat intake.  Saturated fat is usually found in animal-based protein and is associated with certain health risks. Saturated fat is the biggest contributor to raise low-density lipoprotein (LDL) cholesterol levels. Research shows that limiting saturated fat lowers unhealthy cholesterol levels. Eat no more than 7% of your total calories each day from saturated fat. Ask your RDN to help you determine how much saturated fat is right for you.  There are many foods that do not contain large amounts of saturated fats. Swapping these foods to replace foods high in saturated fats will help you limit the saturated fat you eat and improve your cholesterol levels. You can also try eating more plant-based or vegetarian meals.    Instead of… Try:   Whole milk, cheese, yogurt, and ice cream 1% or skim milk, low-fat cheese, non-fat yogurt, and low-fat ice cream   Fatty, marbled beef and pork Lean beef, pork, or venison   Poultry  with skin Poultry without skin   Butter, stick margarine Reduced-fat, whipped, or liquid spreads   Coconut oil, palm oil Liquid vegetable oils: corn, canola, olive, soybean and safflower oils     Avoid foods that contain trans fats.  Trans fats increase levels of LDL-cholesterol. Hydrogenated fat in processed foods is the main source of trans fats in foods.   Trans fats can be found in stick margarine, shortening, processed sweets, baked goods, some fried foods, and packaged foods made with hydrogenated oils. Avoid foods with “partially hydrogenated oil” on the ingredient list such as: cookies, pastries, baked goods, biscuits, crackers, microwave popcorn, and frozen dinners.  Choose foods with heart healthy fats.  Polyunsaturated and monounsaturated fat are unsaturated fats that may help lower your blood cholesterol level when used in place of saturated fat in your diet.  Ask your RDN about taking a dietary supplement with plant sterols and stanols to help lower your cholesterol level.  Research shows that substituting saturated fats with unsaturated fats is beneficial to cholesterol levels. Try these easy swaps:     Instead of… Try:   Butter, stick margarine, or solid shortening Reduced-fat, whipped, or liquid spreads   Beef, pork, or poultry with skin    Fish and seafood   Chips, crackers, snack foods Raw or unsalted nuts and seeds or nut butters  Hummus with vegetables  Avocado on toast   Coconut oil, palm oil Liquid vegetable oils: corn, canola, olive, soybean and safflower oils      Limit the amount of cholesterol you eat to less than 200 milligrams per day.  Cholesterol is a substance carried through the bloodstream via lipoproteins, which are known as “transporters” of fat. Some body functions need cholesterol to work properly, but too much cholesterol in the bloodstream can damage arteries and build up blood vessel linings (which can lead to heart attack and stroke). You should eat less than 200 milligrams  cholesterol per day.  People respond differently to eating cholesterol. There is no test available right now that can figure out which people will respond more to dietary cholesterol and which will respond less. For individuals with high intake of dietary cholesterol, different types of increase (none, small, moderate, large) in LDL-cholesterol levels are all possible.    Food sources of cholesterol include egg yolks and organ meats such as liver, gizzards.  Limit egg yolks to two to four per week and avoid organ meats like liver and gizzards to control cholesterol intake.    Tips for Choosing Heart-Healthy Carbohydrates  Consume a consistent amount of carbohydrate  It is important to eat foods with carbohydrates in moderation because they impact your blood glucose level. Carbohydrates can be found in many foods such as:  Grains (breads, crackers, rice, pasta, and cereals)  Starchy Vegetables (potatoes, corn, and peas)  Beans and legumes  Milk, soy milk, and yogurt  Fruit and fruit juice  Sweets (cakes, cookies, ice cream, jam and jelly)  Your RDN will help you set a goal for how many carbohydrate servings to eat at your meals and snacks. For many adults, eating 3 to 5 servings of carbohydrate foods at each meal and 1 or 2 carbohydrate servings for each snack works well.  Check your blood glucose level regularly. It can tell you if you need to adjust when you eat carbohydrates.    Choose foods rich in viscous (soluble) fiber  Viscous, or soluble, is found in the walls of plant cells. Viscous fiber is found only in plant-based foods. Eating foods with fiber helps to lower your unhealthy cholesterol and keep your blood glucose in range  Rich sources of viscous fiber include vegetables (asparagus, [place] sprouts, sweet potatoes, turnips) fruit (apricots, mangoes, oranges), legumes, and whole grains (barley, oats, and oat bran).  As you increase your fiber intake gradually, also increase the amount of water you drink.  This will help prevent constipation.  If you have difficulty achieving this goal, ask your RDN about fiber laxatives. Choose fiber supplements made with viscous fibers such as psyllium seed husks or methylcellulose to help lower unhealthy cholesterol.     Limit refined carbohydrates  There are three types of carbohydrates: starches, sugar, and fiber. Some carbohydrates occur naturally in food, like the starches in rice or corn or the sugars in fruits and milk. Refined carbohydrates--foods with high amounts of simple sugars--can raise triglyceride levels. High triglyceride levels are associated with coronary heart disease.  Some examples of refined carbohydrate foods are table sugar, sweets, and beverages sweetened with added sugar.    Tips for Reducing Sodium (Salt)  Although sodium is important for your body to function, too much sodium can be harmful for people with high blood pressure. As sodium and fluid buildup in your tissues and bloodstream, your blood pressure increases. High blood pressure may cause damage to other organs and increase your risk for a stroke.  Even if you take a pill for blood pressure or a water pill (diuretic) to remove fluid, it is still important to have less salt in your diet. Ask your doctor and RDN what amount of sodium is right for you.  Avoid processed foods. Eat more fresh foods.  Fresh fruits and vegetables are naturally low in sodium, as well as frozen vegetables and fruits that have no added juices or sauces.  Fresh meats are lower in sodium than processed meats, such as rosen, sausage, and hotdogs. Read the nutrition label or ask your  to help you find a fresh meat that is low in sodium.  Eat less salt--at the table and when cooking.  A single teaspoon of table salt has 2,300 mg of sodium.  Leave the salt out of recipes for pasta, casseroles, and soups.  Ask your RDN how to cook your favorite recipes without sodium  Be a smart .  Look for food packages that say  “salt-free” or “sodium-free.” These items contain less than 5 milligrams of sodium per serving.  “Very low-sodium” products contain less than 35 milligrams of sodium per serving.  “Low-sodium” products contain less than 140 milligrams of sodium per serving.  Beware for “Unsalted” or “No Added Salt” products. These items may still be high in sodium. Check the nutrition label.  Add flavors to your food without adding sodium.  Try lemon juice, lime juice, fruit juice or vinegar.  Dry or fresh herbs add flavor. Try basil, bay leaf, dill, rosemary, parsley, nelson, dry mustard, nutmeg, thyme, and paprika.  Pepper, red pepper flakes, and cayenne pepper can add spice t your meals without adding sodium. Hot sauce contains sodium, but if you use just a drop or two, it will not add up to much.  Buy a sodium-free seasoning blend or make your own at home.    Additional Lifestyle Tips  Achieve and maintain a healthy weight  Talk with your RDN or your doctor about what is a healthy weight for you.  Set goals to reach and maintain that weight.   To lose weight, reduce your calorie intake along with increasing your physical activity. A weight loss of 10 to 15 pounds could reduce LDL-cholesterol by 5 milligrams per deciliter.    Participate in physical activity  Talk with your health care team to find out what types of physical activity are best for you. Set a plan to get about 30 minutes of exercise on most days.    Food Group Foods Recommended   Grains Whole grain breads and cereals, including whole wheat, barley, rye, buckwheat, corn, teff, quinoa, millet, amaranth, brown or wild rice, sorghum, and oats  Pasta, especially whole wheat or other whole grain types  Brown rice, quinoa or wild rice  Whole grain crackers, bread, rolls, pitas  Home-made bread with reduced-sodium baking soda   Protein Foods Lean cuts of beef and pork (loin, leg, round, extra lean hamburger)  Skinless poultry  Fish  Venison and other wild game  Dried beans  and peas  Nuts and nut butters  Meat alternatives made with soy or textured vegetable protein  Egg whites or egg substitute  Cold cuts made with lean meat or soy protein   Dairy Nonfat (skim), low-fat, or 1%-fat milk  Nonfat or low-fat yogurt or cottage cheese  Fat-free and low-fat cheese   Vegetables Fresh, frozen, or canned vegetables without added fat or salt   Fruits Fresh, frozen, canned, or dried fruit   Oils Unsaturated oils (corn, olive, peanut, soy, sunflower, canola)  Soft or liquid margarines and vegetable oil spreads  Salad dressings  Seeds and nuts  Avocado     Foods Not Recommended  Food Group Foods Not Recommended   Grains Breads or crackers topped with salt  Cereals (hot or cold) with more than 300 mg sodium per serving  Biscuits, cornbread, and other “quick” breads prepared with baking soda  Bread crumbs or stuffing mix from a store  High-fat bakery products, such as doughnuts, biscuits, croissants, Czech pastries, pies, cookies  Instant cooking foods to which you add hot water and stir--potatoes, noodles, rice, etc.  Packaged starchy foods--seasoned noodle or rice dishes, stuffing mix, macaroni and cheese dinner  Snacks made with partially hydrogenated oils, including chips, cheese puffs, snack mixes, regular crackers, butter-flavored popcorn   Protein Foods Higher-fat cuts of meats (ribs, t-bone steak, regular hamburger)  Bowles, sausage, or hot dogs  Cold cuts, such as salami or bologna, deli meats, cured meats, corned beef  Organ meats (liver, brains, gizzards, sweetbreads)  Poultry with skin  Fried or smoked meat, poultry, and fish  Whole eggs and egg yolks (more than 2-4 per week)  Salted legumes, nuts, seeds, or nut/seed butters  Meat alternatives with high levels of sodium (>300 mg per serving) or saturated fat (>5 g per serving)   Dairy Whole milk, 2% fat milk, buttermilk  Whole milk yogurt or ice cream  Cream  Half-&-half  Cream cheese  Sour cream  Cheese   Vegetables Canned or frozen  vegetables with salt, fresh vegetables prepared with salt, butter, cheese, or cream sauce  Fried vegetables  Pickled vegetables such as olives, pickles, or sauerkraut   Fruits Fried fruits  Fruits served with butter or cream   Oils Butter, stick margarine, shortening  Partially hydrogenated oils or trans fats  Tropical oils (coconut, palm, palm kernel oils)   Other Candy, sugar sweetened soft drinks and desserts  Salt, sea salt, garlic salt, and seasoning mixes containing salt  Bouillon cubes  Ketchup, barbecue sauce, Worcestershire sauce, soy sauce, teriyaki sauce  Miso  Salsa  Pickles, olives, relish     Heart Healthy Consistent Carbohydrate Sample 1-Day Menu   Breakfast 1 cup cooked oatmeal (2 carbohydrate servings)  3/4 cup blueberries (1 carbohydrate serving)  1 ounce almonds  1 cup skim milk (1 carbohydrate serving)  1 cup coffee   Morning Snack 1 cup sugar-free nonfat yogurt (1 carbohydrate serving)   Lunch 2 slices whole-wheat bread (2 carbohydrate servings)  2 ounces lean turkey breast  1 ounce low-fat Swiss cheese  1 teaspoon mustard  1 slice tomato  1 lettuce leaf  1 small pear (1 carbohydrate serving)  1 cup skim milk (1 carbohydrate serving)   Afternoon Snack 1 ounce trail mix with unsalted nuts, seeds, and raisins (1 carbohydrate serving)   Evening Meal 3 ounces salmon  2/3 cup cooked brown rice (2 carbohydrate servings)  1 teaspoon soft margarine  1 cup cooked broccoli with 1/2 cup cooked carrots (1 carbohydrate serving  Carrots, cooked, boiled, drained, without salt  1 cup lettuce  1 teaspoon olive oil with vinegar for dressing  1 small whole grain roll (1 carbohydrate serving)  1 teaspoon soft margarine  1 cup unsweetened tea   Evening Snack 1 extra-small banana (1 carbohydrate serving)         Nutrition Counseling  Our expert team offers:   Medical Nutrition Therapy for Chronic Conditions   Weight Management   Diabetes Education and Management   Wellness Services   Body Composition Measurements    Gastrointestinal Health    Nutrition Counseling Services are located at:  [address], [place] [address]  For more information and to schedule a consultation, please call [phone].  A physician referral may be required by your insurance for coverage.

## 2024-09-28 NOTE — PROGRESS NOTES
Hospital Medicine Daily Progress Note    Date of Service  9/28/2024    Chief Complaint  Julisa Josue is a 64 y.o. female admitted 9/24/2024 with OJ    Hospital Course  64 y.o. female PMH CABG, DM, HTN, chronic pain and CAD who presented 9/24/2024 as a transfer from [place] for chest and back pain. She reports the chest pain radiates to her back. Also complaining of abdominal pain. In the ER, CTA thoracicoabdominal was negative for dissection. Patient was found to have acute kidney injury at Cr of 2.08 she ge be admitted for further workup.     Cardiology following for which patient underwent left heart catheterization on 9/27/2024 for which she was noted for RCA occlusion status post PCI with successful drug-eluting stent deployment.  During procedure patient was noted for severe right common femoral artery stenosis with around 90% occlusion    Interval Problem Update  Substernal CP better  Stable vitals  On room air  CBC stable  ASA/Plavix  Statin  Pain mgmt  Cardiology following  I discussed case with vascular surgery, who evaluate case and recommended medical treatment with aspirin, statin and no need for inpatient consult for now  Labs in a.m.    I have discussed this patient's plan of care and discharge plan at IDT rounds today with Case Management, Nursing, Nursing leadership, and other members of the IDT team.    Consultants/Specialty  None    Code Status  Full Code    Disposition  The patient is not medically cleared for discharge to home or a post-acute facility.     I have placed the appropriate orders for post-discharge needs.    Review of Systems  Review of Systems   Constitutional:  Positive for malaise/fatigue.   HENT: Negative.     Eyes: Negative.    Respiratory: Negative.     Cardiovascular: Negative.    Gastrointestinal:  Positive for abdominal pain.   Genitourinary: Negative.    Musculoskeletal: Negative.    Skin: Negative.    Neurological:  Positive for weakness.    Endo/Heme/Allergies: Negative.    Psychiatric/Behavioral: Negative.          Physical Exam  Temp:  [36.1 °C (97 °F)-36.7 °C (98.1 °F)] 36.5 °C (97.7 °F)  Pulse:  [81-93] 83  Resp:  [15-16] 16  BP: ()/(46-84) 113/80  SpO2:  [93 %-100 %] 93 %    Physical Exam  Constitutional:       Appearance: She is obese.   HENT:      Head: Normocephalic and atraumatic.      Mouth/Throat:      Mouth: Mucous membranes are moist.   Eyes:      Extraocular Movements: Extraocular movements intact.      Pupils: Pupils are equal, round, and reactive to light.   Cardiovascular:      Rate and Rhythm: Normal rate and regular rhythm.      Pulses: Normal pulses.      Heart sounds: Normal heart sounds.   Pulmonary:      Effort: Pulmonary effort is normal.      Breath sounds: Normal breath sounds.   Abdominal:      General: Bowel sounds are normal.      Palpations: Abdomen is soft.      Tenderness: There is abdominal tenderness.   Musculoskeletal:         General: No swelling. Normal range of motion.      Cervical back: Normal range of motion and neck supple.   Skin:     General: Skin is warm.      Coloration: Skin is not jaundiced.   Neurological:      General: No focal deficit present.      Mental Status: She is alert and oriented to person, place, and time. Mental status is at baseline.      Cranial Nerves: No cranial nerve deficit.   Psychiatric:         Mood and Affect: Mood normal.         Behavior: Behavior normal.         Thought Content: Thought content normal.         Judgment: Judgment normal.         Fluids    Intake/Output Summary (Last 24 hours) at 9/28/2024 1444  Last data filed at 9/28/2024 0700  Gross per 24 hour   Intake 500 ml   Output 1000 ml   Net -500 ml        Laboratory  Recent Labs     09/28/24  0450   WBC 4.2*   RBC 3.63*   HEMOGLOBIN 10.4*   HEMATOCRIT 32.8*   MCV 90.4   MCH 28.7   MCHC 31.7*   RDW 43.9   PLATELETCT 210   MPV 10.2     Recent Labs     09/26/24  0835 09/27/24  0909 09/28/24  0450   SODIUM 135 135  137   POTASSIUM 4.1 4.2 4.1   CHLORIDE 103 103 104   CO2 23 24 24   GLUCOSE 280* 258* 233*   BUN 23* 21 17   CREATININE 1.08 0.98 0.95   CALCIUM 9.4 9.2 8.8                     Imaging  DX-CHEST-PORTABLE (1 VIEW)   Final Result      No acute cardiac or pulmonary abnormalities are identified.      EC-ECHOCARDIOGRAM COMPLETE W/ CONT   Final Result      CT-ABDOMEN-PELVIS W/O   Final Result         1.  Hepatomegaly   2.  Dilatation right renal pelvis suggesting extra renal pelvis morphology, slightly more pronounced since prior study.   3.  Atherosclerosis and atherosclerotic coronary artery disease      US-RENAL   Final Result      1.  Right-sided pelviectasis.   2.  No hydronephrosis.      CT-CTA COMPLETE THORACOABDOMINAL AORTA   Final Result         1.  No aortic aneurysm or dissection identified.   2.  Hepatomegaly   3.  Atherosclerosis and atherosclerotic coronary artery disease                        CL-LEFT HEART CATHETERIZATION WITH POSSIBLE INTERVENTION    (Results Pending)   US-EXTREMITY ARTERY LOWER BILAT W/YANET (COMBO)    (Results Pending)        Assessment/Plan  * Chest pain- (present on admission)  Assessment & Plan  The ASCVD Risk score (Yuan GARCIA, et al., 2019) failed to calculate for the following reasons:    The patient has a prior MI or stroke diagnosis    ASA/Statin/Plavix  Cardiology following  Aim for LH-C today    Acute on chronic renal failure (HCC)- (present on admission)  Assessment & Plan  Improving    HLD (hyperlipidemia)- (present on admission)  Assessment & Plan  Continue with atorvastatin     Diabetes (HCC)- (present on admission)  Assessment & Plan  SSI+ Accu checks + hypoglycemia protocol          VTE prophylaxis: Heparin    I have performed a physical exam and reviewed and updated ROS and Plan today (9/28/2024). In review of yesterday's note (9/27/2024), there are no changes except as documented above.    Greater than 51 minutes spent prepping to see patient (e.g. review of tests)  obtaining and/or reviewing separately obtained history. Performing a medically appropriate examination and/ evaluation.  Counseling and educating the patient/family/caregiver.  Ordering medications, tests, or procedures.  Referring and communicating with other health care professionals.  Documenting clinical information in EPIC.  Independently interpreting results and communicating results to patient/family/caregiver.  Care coordination

## 2024-09-28 NOTE — THERAPY
Physical Therapy Contact Note    Patient Name: Julisa Josue  Age:  64 y.o., Sex:  female  Medical Record #: [id number]  Today's Date: 9/28/2024    PT re-evaluation completed at 1025, pt is appropriate for home discharge with owned DME and granddaughter/caregiver per baseline - full note to follow.

## 2024-09-28 NOTE — CARE PLAN
The patient is Stable - Low risk of patient condition declining or worsening    Shift Goals  Clinical Goals: Hemodynamic stability; pain control  Patient Goals: pain control  Family Goals: jenna    Progress made toward(s) clinical / shift goals:  Pt AOX4, pleasant and cooperative; pt has c/o pain to right groin treated w/ medication; pt must stay lying flat until morning per Dr Vick as her groin site was oozing and required use of a femstop; site clean, dry and soft w/ no blood on bandage; Femstop started at 2130 and stopped at 0050; pulses remain intact; pt requested to stay in reverse trendelenberg; Bed low and locked; call bell and belongings in reach    Problem: Pain - Standard  Goal: Alleviation of pain or a reduction in pain to the patient’s comfort goal  Description: Target End Date:  Prior to discharge or change in level of care    Document on Vitals flowsheet    1.  Document pain using the appropriate pain scale per order or unit policy  2.  Educate and implement non-pharmacologic comfort measures (i.e. relaxation, distraction, massage, cold/heat therapy, etc.)  3.  Pain management medications as ordered  4.  Reassess pain after pain med administration per policy  5.  If opiods administered assess patient's response to pain medication is appropriate per POSS sedation scale  6.  Follow pain management plan developed in collaboration with patient and interdisciplinary team (including palliative care or pain specialists if applicable)  Outcome: Progressing     Problem: Hemodynamics  Goal: Patient's hemodynamics, fluid balance and neurologic status will be stable or improve  Description: Target End Date:  Prior to discharge or change in level of care    Document on Assessment and I/O flowsheet templates    1.  Monitor vital signs, pulse oximetry and cardiac monitor per provider order and/or policy  2.  Maintain blood pressure per provider order  3.  Hemodynamic monitoring per provider order  4.  Manage IV  fluids and IV infusions  5.  Monitor intake and output  6.  Daily weights per unit policy or provider order  7.  Assess peripheral pulses and capillary refill  8.  Assess color and body temperature  9.  Position patient for maximum circulation/cardiac output  10. Monitor for signs/symptoms of excessive bleeding  11. Assess mental status, restlessness and changes in level of consciousness  12. Monitor temperature and report fever or hypothermia to provider immediately. Consideration of targeted temperature management.  Outcome: Progressing

## 2024-09-29 LAB
ALBUMIN SERPL BCP-MCNC: 3.3 G/DL (ref 3.2–4.9)
ALBUMIN/GLOB SERPL: 1.1 G/DL
ALP SERPL-CCNC: 161 U/L (ref 30–99)
ALT SERPL-CCNC: 71 U/L (ref 2–50)
ANION GAP SERPL CALC-SCNC: 12 MMOL/L (ref 7–16)
AST SERPL-CCNC: 33 U/L (ref 12–45)
BASOPHILS # BLD AUTO: 0.8 % (ref 0–1.8)
BASOPHILS # BLD: 0.05 K/UL (ref 0–0.12)
BILIRUB SERPL-MCNC: 0.2 MG/DL (ref 0.1–1.5)
BUN SERPL-MCNC: 21 MG/DL (ref 8–22)
CALCIUM ALBUM COR SERPL-MCNC: 9.7 MG/DL (ref 8.5–10.5)
CALCIUM SERPL-MCNC: 9.1 MG/DL (ref 8.5–10.5)
CHLORIDE SERPL-SCNC: 99 MMOL/L (ref 96–112)
CO2 SERPL-SCNC: 21 MMOL/L (ref 20–33)
CREAT SERPL-MCNC: 1.12 MG/DL (ref 0.5–1.4)
EOSINOPHIL # BLD AUTO: 0.23 K/UL (ref 0–0.51)
EOSINOPHIL NFR BLD: 3.8 % (ref 0–6.9)
ERYTHROCYTE [DISTWIDTH] IN BLOOD BY AUTOMATED COUNT: 43.3 FL (ref 35.9–50)
GFR SERPLBLD CREATININE-BSD FMLA CKD-EPI: 56 ML/MIN/1.73 M 2
GLOBULIN SER CALC-MCNC: 3.1 G/DL (ref 1.9–3.5)
GLUCOSE BLD STRIP.AUTO-MCNC: 211 MG/DL (ref 65–99)
GLUCOSE BLD STRIP.AUTO-MCNC: 275 MG/DL (ref 65–99)
GLUCOSE BLD STRIP.AUTO-MCNC: 340 MG/DL (ref 65–99)
GLUCOSE SERPL-MCNC: 325 MG/DL (ref 65–99)
HCT VFR BLD AUTO: 32.2 % (ref 37–47)
HGB BLD-MCNC: 10.5 G/DL (ref 12–16)
IMM GRANULOCYTES # BLD AUTO: 0.03 K/UL (ref 0–0.11)
IMM GRANULOCYTES NFR BLD AUTO: 0.5 % (ref 0–0.9)
LYMPHOCYTES # BLD AUTO: 1.92 K/UL (ref 1–4.8)
LYMPHOCYTES NFR BLD: 31.9 % (ref 22–41)
MCH RBC QN AUTO: 29.4 PG (ref 27–33)
MCHC RBC AUTO-ENTMCNC: 32.6 G/DL (ref 32.2–35.5)
MCV RBC AUTO: 90.2 FL (ref 81.4–97.8)
MONOCYTES # BLD AUTO: 0.52 K/UL (ref 0–0.85)
MONOCYTES NFR BLD AUTO: 8.7 % (ref 0–13.4)
NEUTROPHILS # BLD AUTO: 3.26 K/UL (ref 1.82–7.42)
NEUTROPHILS NFR BLD: 54.3 % (ref 44–72)
NRBC # BLD AUTO: 0 K/UL
NRBC BLD-RTO: 0 /100 WBC (ref 0–0.2)
PLATELET # BLD AUTO: 210 K/UL (ref 164–446)
PMV BLD AUTO: 9.9 FL (ref 9–12.9)
POTASSIUM SERPL-SCNC: 4.7 MMOL/L (ref 3.6–5.5)
PROT SERPL-MCNC: 6.4 G/DL (ref 6–8.2)
RBC # BLD AUTO: 3.57 M/UL (ref 4.2–5.4)
SODIUM SERPL-SCNC: 132 MMOL/L (ref 135–145)
WBC # BLD AUTO: 6 K/UL (ref 4.8–10.8)

## 2024-09-29 PROCEDURE — 700111 HCHG RX REV CODE 636 W/ 250 OVERRIDE (IP): Mod: JZ

## 2024-09-29 PROCEDURE — 700102 HCHG RX REV CODE 250 W/ 637 OVERRIDE(OP): Performed by: NURSE PRACTITIONER

## 2024-09-29 PROCEDURE — 700102 HCHG RX REV CODE 250 W/ 637 OVERRIDE(OP): Performed by: STUDENT IN AN ORGANIZED HEALTH CARE EDUCATION/TRAINING PROGRAM

## 2024-09-29 PROCEDURE — 770020 HCHG ROOM/CARE - TELE (206)

## 2024-09-29 PROCEDURE — A9270 NON-COVERED ITEM OR SERVICE: HCPCS | Performed by: STUDENT IN AN ORGANIZED HEALTH CARE EDUCATION/TRAINING PROGRAM

## 2024-09-29 PROCEDURE — A9270 NON-COVERED ITEM OR SERVICE: HCPCS

## 2024-09-29 PROCEDURE — 36415 COLL VENOUS BLD VENIPUNCTURE: CPT

## 2024-09-29 PROCEDURE — 700102 HCHG RX REV CODE 250 W/ 637 OVERRIDE(OP)

## 2024-09-29 PROCEDURE — 99232 SBSQ HOSP IP/OBS MODERATE 35: CPT | Performed by: STUDENT IN AN ORGANIZED HEALTH CARE EDUCATION/TRAINING PROGRAM

## 2024-09-29 PROCEDURE — A9270 NON-COVERED ITEM OR SERVICE: HCPCS | Performed by: NURSE PRACTITIONER

## 2024-09-29 PROCEDURE — 82962 GLUCOSE BLOOD TEST: CPT | Mod: 91

## 2024-09-29 PROCEDURE — 700111 HCHG RX REV CODE 636 W/ 250 OVERRIDE (IP): Mod: JZ | Performed by: STUDENT IN AN ORGANIZED HEALTH CARE EDUCATION/TRAINING PROGRAM

## 2024-09-29 PROCEDURE — 80053 COMPREHEN METABOLIC PANEL: CPT

## 2024-09-29 PROCEDURE — 85025 COMPLETE CBC W/AUTO DIFF WBC: CPT

## 2024-09-29 RX ORDER — HYDROMORPHONE HYDROCHLORIDE 1 MG/ML
1 INJECTION, SOLUTION INTRAMUSCULAR; INTRAVENOUS; SUBCUTANEOUS EVERY 8 HOURS PRN
Status: DISCONTINUED | OUTPATIENT
Start: 2024-09-29 | End: 2024-09-30

## 2024-09-29 RX ORDER — FUROSEMIDE 10 MG/ML
80 INJECTION INTRAMUSCULAR; INTRAVENOUS
Status: DISCONTINUED | OUTPATIENT
Start: 2024-09-29 | End: 2024-09-30

## 2024-09-29 RX ORDER — INSULIN LISPRO 100 [IU]/ML
1-6 INJECTION, SOLUTION INTRAVENOUS; SUBCUTANEOUS
Status: DISCONTINUED | OUTPATIENT
Start: 2024-09-29 | End: 2024-09-30

## 2024-09-29 RX ORDER — SPIRONOLACTONE 25 MG/1
12.5 TABLET ORAL
Status: DISCONTINUED | OUTPATIENT
Start: 2024-09-30 | End: 2024-10-01 | Stop reason: HOSPADM

## 2024-09-29 RX ORDER — DEXTROSE MONOHYDRATE 25 G/50ML
25 INJECTION, SOLUTION INTRAVENOUS
Status: DISCONTINUED | OUTPATIENT
Start: 2024-09-29 | End: 2024-09-30

## 2024-09-29 RX ORDER — DAPAGLIFLOZIN 10 MG/1
10 TABLET, FILM COATED ORAL
Status: DISCONTINUED | OUTPATIENT
Start: 2024-09-29 | End: 2024-10-01 | Stop reason: HOSPADM

## 2024-09-29 RX ORDER — OXYCODONE HCL 20 MG/1
20 TABLET, FILM COATED, EXTENDED RELEASE ORAL EVERY 12 HOURS
Status: DISCONTINUED | OUTPATIENT
Start: 2024-09-29 | End: 2024-09-29

## 2024-09-29 RX ADMIN — CARVEDILOL 3.12 MG: 6.25 TABLET, FILM COATED ORAL at 08:46

## 2024-09-29 RX ADMIN — OXYCODONE HYDROCHLORIDE 10 MG: 10 TABLET ORAL at 18:23

## 2024-09-29 RX ADMIN — CLOPIDOGREL BISULFATE 75 MG: 75 TABLET ORAL at 04:29

## 2024-09-29 RX ADMIN — HYDROMORPHONE HYDROCHLORIDE 1 MG: 1 INJECTION, SOLUTION INTRAMUSCULAR; INTRAVENOUS; SUBCUTANEOUS at 15:07

## 2024-09-29 RX ADMIN — HYDROMORPHONE HYDROCHLORIDE 1 MG: 1 INJECTION, SOLUTION INTRAMUSCULAR; INTRAVENOUS; SUBCUTANEOUS at 03:21

## 2024-09-29 RX ADMIN — DIPHENHYDRAMINE HYDROCHLORIDE 25 MG: 50 INJECTION, SOLUTION INTRAMUSCULAR; INTRAVENOUS at 04:29

## 2024-09-29 RX ADMIN — OMEPRAZOLE 40 MG: 20 CAPSULE, DELAYED RELEASE ORAL at 04:29

## 2024-09-29 RX ADMIN — INSULIN LISPRO 4 UNITS: 100 INJECTION, SOLUTION INTRAVENOUS; SUBCUTANEOUS at 10:36

## 2024-09-29 RX ADMIN — LOSARTAN POTASSIUM 25 MG: 50 TABLET, FILM COATED ORAL at 04:28

## 2024-09-29 RX ADMIN — ACETAMINOPHEN 1000 MG: 500 TABLET ORAL at 04:29

## 2024-09-29 RX ADMIN — INSULIN LISPRO 4 UNITS: 100 INJECTION, SOLUTION INTRAVENOUS; SUBCUTANEOUS at 13:53

## 2024-09-29 RX ADMIN — INSULIN LISPRO 3 UNITS: 100 INJECTION, SOLUTION INTRAVENOUS; SUBCUTANEOUS at 22:15

## 2024-09-29 RX ADMIN — PREGABALIN 100 MG: 100 CAPSULE ORAL at 04:29

## 2024-09-29 RX ADMIN — ASPIRIN 81 MG: 81 TABLET, CHEWABLE ORAL at 04:28

## 2024-09-29 RX ADMIN — HYDROMORPHONE HYDROCHLORIDE 1 MG: 1 INJECTION, SOLUTION INTRAMUSCULAR; INTRAVENOUS; SUBCUTANEOUS at 23:37

## 2024-09-29 RX ADMIN — PREGABALIN 100 MG: 100 CAPSULE ORAL at 18:17

## 2024-09-29 RX ADMIN — OXYCODONE HYDROCHLORIDE 10 MG: 10 TABLET ORAL at 04:28

## 2024-09-29 RX ADMIN — INSULIN LISPRO 2 UNITS: 100 INJECTION, SOLUTION INTRAVENOUS; SUBCUTANEOUS at 19:04

## 2024-09-29 RX ADMIN — Medication 5 MG: at 22:06

## 2024-09-29 RX ADMIN — ATORVASTATIN CALCIUM 80 MG: 80 TABLET, FILM COATED ORAL at 18:17

## 2024-09-29 RX ADMIN — ACETAMINOPHEN 1000 MG: 500 TABLET ORAL at 11:40

## 2024-09-29 RX ADMIN — FUROSEMIDE 80 MG: 10 INJECTION INTRAMUSCULAR; INTRAVENOUS at 11:40

## 2024-09-29 RX ADMIN — TAMSULOSIN HYDROCHLORIDE 0.4 MG: 0.4 CAPSULE ORAL at 08:46

## 2024-09-29 RX ADMIN — SPIRONOLACTONE 12.5 MG: 25 TABLET ORAL at 04:29

## 2024-09-29 RX ADMIN — DAPAGLIFLOZIN 10 MG: 10 TABLET, FILM COATED ORAL at 15:02

## 2024-09-29 RX ADMIN — HYDROMORPHONE HYDROCHLORIDE 1 MG: 1 INJECTION, SOLUTION INTRAMUSCULAR; INTRAVENOUS; SUBCUTANEOUS at 08:48

## 2024-09-29 RX ADMIN — OXYCODONE HYDROCHLORIDE 20 MG: 20 TABLET, FILM COATED, EXTENDED RELEASE ORAL at 11:40

## 2024-09-29 RX ADMIN — OMEPRAZOLE 40 MG: 20 CAPSULE, DELAYED RELEASE ORAL at 18:17

## 2024-09-29 ASSESSMENT — PAIN DESCRIPTION - PAIN TYPE
TYPE: ACUTE PAIN

## 2024-09-29 ASSESSMENT — ENCOUNTER SYMPTOMS
EYES NEGATIVE: 1
WEAKNESS: 1
MUSCULOSKELETAL NEGATIVE: 1
CARDIOVASCULAR NEGATIVE: 1
RESPIRATORY NEGATIVE: 1
PSYCHIATRIC NEGATIVE: 1
SPUTUM PRODUCTION: 0
PALPITATIONS: 0
SENSORY CHANGE: 1
COUGH: 0
SHORTNESS OF BREATH: 0
ABDOMINAL PAIN: 1

## 2024-09-29 ASSESSMENT — FIBROSIS 4 INDEX: FIB4 SCORE: 1.16

## 2024-09-29 NOTE — PROGRESS NOTES
Cardiology Follow-up Note    Name:   Julisa Josue   YOB: 1960  Age:   64 y.o.  female   MRN:   [id number]        Attending Provider: Dr Seb Rosales*     Chief Complaint: Chest Pain (Patient transferred from [place] for chest and abdominal pain. Work-up at [facility] mostly unremarkable and patient transferred for further work-up. Patient mildly hypotensive upon arrival. 2L bolus given PTA.)       Reason for cardiology consult: Dyspnea on exertion, chest pain    Outpatient cardiologist: Dr. Estrada       History of Present Illness  Julisa Josue is 64 y.o. female who was transferred from [facility] on 9/24/2024 with chest pain and back pain.    PMH: CAD, HTN, HLD, DM type II, PCI proximal/mid LAD (3.5 x 12 mm, 3.0 x 38 mm ANA) 12/22/2018, history of CABG x1 (LIMA to LAD) 9/2021, NSTEMI 4/23/2023 ( PCI to mid CX- prox OM with overlapping ANA and PTCA of distal LM and ostial LCx stent). NSTEMI 8/9/2024 [place], She underwent PTCA to the LCx.  Echo showed severe low flow low gradient aortic stenosis. Transferred to [facility] [place]. She underwent Dobutamine Stress Echo which confirmed severe AS. Nav revealed EF 18% and a tricuspid aortic valve with severe AS. CABG turndown due to poor targets. Underwent TAVR on 8/14/2021 23 [place] 3 (+2cc).   On DAPT therapy for recent TAVR and PTCA.     Patient found to have OJ with creatinine of 2.08.  CTA negative for dissection.      Interim Events 09/27/24   :  - Personal Telemetry interpretation: Sinus rhythm on the monitor  - Overnight events: Continues to have chest discomfort, states 10 out of 10 that is anterior radiating to abdomen.  Has been getting as needed nitro tabs and analgesics.  Patient denies edema, dizziness/lightheadedness, or palpitations.   N.p.o. for Cath Lab today.  - Vitals: 134/65, room air  - Labs reviewed: Serum creatinine 0.98  - I/O's: 1.9 L out yesterday  - Weight: 219 pounds    Interim Events  09/28/24   :  - Personal Telemetry interpretation: Sinus rhythm with occasional PVCs  - Overnight events: Underwent left heart cath yesterday.  She had some oozing from her right femoral site overnight that necessitated the FemoStop placement for couple hours.  Currently, patient denies chest pain, shortness of breath, edema, dizziness/lightheadedness, or palpitations.   Right femoral site is soft, no evidence of hematoma.  Lower extremity is warm, nontender.  Distal faint pulses.   - Vitals: 113/80  - Labs reviewed: Hemoglobin dropped from 12.7-10.4.  - I/O's: 1 L out yesterday  - Weight: 222 pounds  Later in the day, I got alerted by the nurse that patient is having some chest discomfort that is aching but much better from when she was admitted.  She is having some achiness on the right femoral site.  Patient tells me its from constant poking when people assess.     Interim Events 09/29/24   :  - Personal Telemetry interpretation: Sinus rhythm 80s-100  - Overnight events: No Cardiac events   Patient denies chest pain, shortness of breath, edema, dizziness/lightheadedness, or palpitations.   - Vitals: 115/72, 1 L of oxygen  - Labs reviewed: Hemoglobin 10.5, creatinine 1.112  - I/O's: 2.8 L out yesterday  - Weight: 222 pounds         Review of Systems   Constitutional:  Positive for malaise/fatigue.   Respiratory:  Negative for cough, sputum production and shortness of breath.    Cardiovascular:  Negative for chest pain, palpitations and leg swelling.   Neurological:  Positive for sensory change and weakness.        Medical History  Past Medical History:   Diagnosis Date    ACC/AHA stage C systolic heart failure (HCC) 4/2/2023    CAD (coronary artery disease)     PER [facility]    Congestive heart failure (HCC)     PER [facility]    Diabetes     Diabetes (HCC)     PER [facility]    Diverticulosis     PER [facility]    GERD (gastroesophageal reflux disease)     PER  [facility]    High cholesterol 5/15/2011    Hypertension     Intestinal mass 2015    Ischemic cardiomyopathy 2023    Left ventricular systolic dysfunction, NYHA class 3 2023    Migraine     Staph skin infection          Family History   Problem Relation Age of Onset    Cancer Maternal Aunt         Lung cancer d/t smoking         Social History     Tobacco Use    Smoking status: Former     Current packs/day: 0.00     Average packs/day: 2.0 packs/day for 20.0 years (40.0 ttl pk-yrs)     Types: Cigarettes     Start date:      Quit date:      Years since quittin.7    Smokeless tobacco: Never   Vaping Use    Vaping status: Never Used   Substance Use Topics    Alcohol use: Not Currently    Drug use: Yes     Types: Inhaled     Comment: just prescribed meds         No Active Allergies      Medications   Scheduled Medications   Medication Dose Frequency    [START ON 2024] spironolactone  12.5 mg AFTER LUNCH    dapagliflozin propanediol  10 mg AFTER LUNCH    furosemide  80 mg BID DIURETIC    oxyCODONE CR  20 mg Q12HRS    insulin GLARGINE  20 Units AFTER LUNCH    And    insulin lispro  1-6 Units 4X/DAY ACHS    losartan  25 mg Q DAY    tamsulosin  0.4 mg AFTER BREAKFAST    aspirin  81 mg DAILY    clopidogrel  75 mg DAILY    carvedilol  3.125 mg BID WITH MEALS    atorvastatin  80 mg Q EVENING    pregabalin  100 mg BID    melatonin  5 mg Nightly    acetaminophen  1,000 mg TID    omeprazole  40 mg BID    Pharmacy  1 Each PHARMACY TO DOSE           Physical Exam    Body mass index is 36.98 kg/m².     BP 94/45   Pulse 81   Temp 36.6 °C (97.9 °F) (Temporal)   Resp 17   Ht 1.651 m (5' 5\")   Wt 101 kg (222 lb 3.6 oz)   SpO2 97%      Vitals:    24 0628 24 0740 24 0848 24 1149   BP:  115/72  94/45   Pulse:  93  81   Resp: 16 17 (P) 17 17   Temp:  36.1 °C (97 °F)  36.6 °C (97.9 °F)   TempSrc:  Temporal  Temporal   SpO2:  99%  97%   Weight:       Height:            Oxygen " "Therapy:  Pulse Oximetry: 97 %, O2 (LPM): 1, O2 Delivery Device: None - Room Air      Physical Exam  Constitutional:       Appearance: She is obese.   Cardiovascular:      Rate and Rhythm: Normal rate and regular rhythm.      Heart sounds: No murmur heard.  Pulmonary:      Breath sounds: No wheezing or rhonchi.   Abdominal:      General: There is no distension.      Palpations: Abdomen is soft.   Musculoskeletal:      Right lower leg: No edema.      Left lower leg: No edema.   Skin:     General: Skin is warm and dry.      Capillary Refill: Capillary refill takes 2 to 3 seconds.      Comments: Right femoral site is soft without evidence of hematoma.  Nontender.  CMS +.  Dressing removed.   Neurological:      Mental Status: She is alert and oriented to person, place, and time.   Psychiatric:         Behavior: Behavior normal.               Labs (personally reviewed):     Estimated Creatinine Clearance: 61.3 mL/min (by C-G formula based on SCr of 1.12 mg/dL).    Lab Results   Component Value Date/Time    BNPBTYPENAT 98 12/20/2018 05:47 PM     Recent Labs     09/27/24  0909 09/28/24 0450 09/29/24  0139   CREATININE 0.98 0.95 1.12   BUN 21 17 21   POTASSIUM 4.2 4.1 4.7   SODIUM 135 137 132*   CALCIUM 9.2 8.8 9.1   CO2 24 24 21   ALBUMIN  --   --  3.3     Recent Labs     09/27/24  0909 09/28/24  0450 09/29/24  0139   GLUCOSE 258* 233* 325*     Recent Labs     09/29/24  0139   ASTSGOT 33   ALTSGPT 71*   ALKPHOSPHAT 161*       Recent Labs     09/28/24  0450 09/29/24  0139   WBC 4.2* 6.0   HEMOGLOBIN 10.4* 10.5*   PLATELETCT 210 210     No results for input(s): \"TROPONINT\", \"NTPROBNP\", \"HBA1C\" in the last 72 hours.    Lab Results   Component Value Date/Time    CHOLSTRLTOT 211 (H) 09/25/2024 05:35 AM     (H) 09/25/2024 05:35 AM    HDL 28 (A) 09/25/2024 05:35 AM    TRIGLYCERIDE 251 (H) 09/25/2024 05:35 AM       Imaging:  US-YANET SINGLE LEVEL BILAT   Final Result      US-EXTREMITY ARTERY LOWER UNILAT LEFT   Final Result "      DX-CHEST-PORTABLE (1 VIEW)   Final Result      No acute cardiac or pulmonary abnormalities are identified.      EC-ECHOCARDIOGRAM COMPLETE W/ CONT   Final Result      CT-ABDOMEN-PELVIS W/O   Final Result         1.  Hepatomegaly   2.  Dilatation right renal pelvis suggesting extra renal pelvis morphology, slightly more pronounced since prior study.   3.  Atherosclerosis and atherosclerotic coronary artery disease      US-RENAL   Final Result      1.  Right-sided pelviectasis.   2.  No hydronephrosis.      CT-CTA COMPLETE THORACOABDOMINAL AORTA   Final Result         1.  No aortic aneurysm or dissection identified.   2.  Hepatomegaly   3.  Atherosclerosis and atherosclerotic coronary artery disease                        CL-LEFT HEART CATHETERIZATION WITH POSSIBLE INTERVENTION    (Results Pending)       Cardiac Imaging and Procedures Review        Echocardiogram 9/28/2024  The left ventricular ejection fraction is visually estimated to be 45%.  Septal , inferior wall hypokinesis.  Known bioprosthetic aortic valve that is functioning normally with   normal transvalvular gradients.  Vmax is 2.06 m/s.  Estimated right ventricular systolic pressure is 27 mmHg.      ROSALINA 8/14/2024  Preoperative evaluation:   Normal LV size with severely reduced LV systolic function, LVEF 18%   Normal RV size and RV systolic function   Left atrium appears dilated   Normal-appearing left atrial appendage with no evidence of thrombus   Intact interatrial septum with no evidence of shunt by color Doppler   Severe low flow/low gradient aortic stenosis   Aorta not well visualized   No pericardial effusion     Postoperative evaluation:   Well-seated 23 mm Avery [place] Resilia transcatheter aortic prosthesis   No aortic bioprosthesis stenosis (MG 2.5 mmHg FELIX 2.2 cm2) or   regurgitation   No pericardial effusion   Otherwise, no significant change from prior       Cardiac Catheterization  CATH done at [place], [place] 8/2024  1. Right coronary  artery is a small-caliber vessel which originates from the right sinus of Valsalva. Distally the RCA divides into PDA and PLV arteries. There are luminal irregularities in the RCA.  2. Left main artery is a moderate caliber vessel which divides into left anterior descending artery, left circumflex artery and ramus intermedius artery. There is presence of patent stent involving the left main artery.   3. Left circumflex artery is a moderate caliber vessel which has 90% InStent restenosis involving the proximal left circumflex artery. This was treated with balloon angioplasty with 50% residual stenosis.   4. Ramus intermedius artery is a small-caliber vessel which has diffuse 80% proximal and mid stenosis.   5. Left anterior descending artery is a moderate caliber vessel which is supplied by a patent LIMA. There is presence of stent in the proximal and mid LAD. Mid LAD stent has 80% InStent restenosis. Lad gives rise to small-caliber diagonal 1 artery.        Cardiac catheterization on 9/27/2024 by Dr. Velázquez:  PROCEDURES PERFORMED:  Left heart catheterization  Coronary angiography  Bypass graft angiography  Right common femoral angiography  Percutaneous coronary intervention of proximal RCA  Instantaneous wave free ratio assessment of the proximal RCA  Instantaneous wave free ratio assessment of the ostial left circumflex  IVUS of the RCA to guide PCI  Perclose of the right CFA arteriotomy site  Moderate conscious sedation    IMPRESSION:  1.  Severe proximal RCA stenosis (IFR 0.8) status post successful IVUS guided PCI deploying Synergy 3 x 20 mm ANA postdilated to 3.5 mm  2.  Nonobstructive disease throughout the left circumflex (patent stents, iFR ostial left circumflex ISR 1.04, not hemodynamically significant stenosis).  3.  Patent pedicle LIMA-LAD  4.  Significant elevated resting LVEDP 35 mmHg with no significant transaortic gradient on pullback  5.  Severe 90% stenosis in the mid right common femoral artery  (our access site and successful Perclose was above the lesion)     RECOMMENDATIONS:  Lifelong DAPT as tolerated  At least 4 hours of bedrest  HI statin / PCSK9-I: Target LDL < 55 and TG < 150  GDMT and lifestyle modifications for secondary ASCVD prevention  Cardiac Rehab referral  Recommend referral to vascular surgery for intervention of her severe right common femoral artery stenosis          Principal Problem:    Chest pain (POA: Yes)  Active Problems:    Diabetes (HCC) (POA: Yes)    HLD (hyperlipidemia) (POA: Yes)    Acute on chronic renal failure (HCC) (POA: Yes)    Unstable angina (HCC) (POA: Unknown)  Resolved Problems:    * No resolved hospital problems. *      Assessment and Medical Decision Making:    #.  Unstable angina  #.  Coronary artery disease with history of CABG and multiple recurrent PCI's  #.  Severe aortic stenosis, s/p TAVR  #.  Acute kidney injury  #.  History of HFrEF, severely reduced EF 18% on 8/2024, improved to 45% on 9/28/2024.  #.  Diabetes mellitus type 2, A1c 12.4  #.  Obesity, BMI 36  #.  Severe 90% stenosis in the mid right common femoral artery        Recommendations:  History of CABG x 1 and multiple PCI with known in-stent restenosis of left circumflex artery stents as of August 2024 in [place].  Troponin downtrending from 28 to 18.  Underwent LHC on 9/27/2024 -found severe proximal RCA stenosis, s/p  Synergy 3 x 20 mm ANA.   -Severe 90% stenosis in the mid right common femoral artery.   -Continue aspirin 81 mg and Plavix 75 mg daily  -Continue HI statin-atorvastatin 80 mg daily  -Continue carvedilol 3.125 twice daily  -Echocardiogram -shows mildly reduced EF 45%  LVEDP is 35 mmHg  -Start furosemide 80 mg IV twice daily with a transition to p.o. hopefully tomorrow   GDMT for mildly reduced EF.  Uptitrate as able   -Continue losartan 25 mg daily  -Continue spironolactone 12.5 mg daily  -Daily BMP      -Drop in hemoglobin from 12.7-10.4 after left heart cath noted.  Upon review, the  hemoglobin of 12.7 on 9/24/2024 is erroneous.  All prior labs are similar with the latest labs and are more consistent with hemoglobin 9-10.     -Referral to vascular surgery in the outpatient setting          Cardiology will sign off      Please contact me with any questions.  Thank you for allowing us to participate in the care of Ms. Josue.      Please see Dr. Velázquez  attestation for further details and MDM.     I, TAYLOR Maciel performed a portion of the service face-to-face with the same  patient on the same date of service independently of Dr. Velázquez FOR 15 minutes preparing to see the patient, reviewing hospital notes and tests, obtaining history from the patient, performing a medically appropriate exam, counseling and educating the patient, ordering medications/tests/procedures/referrals as clinically indicated, and documenting information in the electronic medical record.    Please note this dictation was created using voice recognition software.  I have made every reasonable attempt to correct obvious errors, but there may be errors of grammar and possibly content that I did not discover before finalizing the note.    TAYLOR Maciel  [facility]  [phone]

## 2024-09-29 NOTE — PROGRESS NOTES
Monitor summary: SR-ST, HR , MA .15, QRS .11, QT .48 with rare pvc per strip from monitor room

## 2024-09-29 NOTE — PROGRESS NOTES
Hospital Medicine Daily Progress Note    Date of Service  9/29/2024    Chief Complaint  Julisa Josue is a 64 y.o. female admitted 9/24/2024 with OJ    Hospital Course  64 y.o. female PMH CABG, DM, HTN, chronic pain and CAD who presented 9/24/2024 as a transfer from [place] for chest and back pain. She reports the chest pain radiates to her back. Also complaining of abdominal pain. In the ER, CTA thoracicoabdominal was negative for dissection. Patient was found to have acute kidney injury at Cr of 2.08 she ge be admitted for further workup.     Cardiology following for which patient underwent left heart catheterization on 9/27/2024 for which she was noted for RCA occlusion status post PCI with successful drug-eluting stent deployment.  During procedure patient was noted for severe right common femoral artery stenosis with around 90% occlusion      I discussed case with vascular surgery, who evaluate case and recommended medical treatment with aspirin, statin and no need for inpatient consult for now.    9/28 Arterial U/S showed left Posterior tibial and peroneal arteries are occluded throughout their length, no hemodynamically significant stenosis     Interval Problem Update  Substernal CP better  Stable vitals  On 1L NC  CBC stable  ASA/Plavix  Statin  Stager antihypertensives  Adjust pain mgmt  Cardiology following, started lasix  Labs on a.m.    I have discussed this patient's plan of care and discharge plan at IDT rounds today with Case Management, Nursing, Nursing leadership, and other members of the IDT team.    Consultants/Specialty  None    Code Status  Full Code    Disposition  The patient is not medically cleared for discharge to home or a post-acute facility.     I have placed the appropriate orders for post-discharge needs.    Review of Systems  Review of Systems   Constitutional:  Positive for malaise/fatigue.   HENT: Negative.     Eyes: Negative.    Respiratory: Negative.      Cardiovascular: Negative.    Gastrointestinal:  Positive for abdominal pain.   Genitourinary: Negative.    Musculoskeletal: Negative.    Skin: Negative.    Neurological:  Positive for weakness.   Endo/Heme/Allergies: Negative.    Psychiatric/Behavioral: Negative.          Physical Exam  Temp:  [36.1 °C (97 °F)-36.7 °C (98.1 °F)] 36.1 °C (97 °F)  Pulse:  [82-99] 93  Resp:  [16-17] 17  BP: ()/(53-80) 115/72  SpO2:  [92 %-99 %] 99 %    Physical Exam  Constitutional:       Appearance: She is obese.   HENT:      Head: Normocephalic and atraumatic.      Mouth/Throat:      Mouth: Mucous membranes are moist.   Eyes:      Extraocular Movements: Extraocular movements intact.      Pupils: Pupils are equal, round, and reactive to light.   Cardiovascular:      Rate and Rhythm: Normal rate and regular rhythm.      Pulses: Normal pulses.      Heart sounds: Normal heart sounds.   Pulmonary:      Effort: Pulmonary effort is normal.      Breath sounds: Normal breath sounds.   Abdominal:      General: Bowel sounds are normal.      Palpations: Abdomen is soft.      Tenderness: There is abdominal tenderness.   Musculoskeletal:         General: No swelling. Normal range of motion.      Cervical back: Normal range of motion and neck supple.   Skin:     General: Skin is warm.      Coloration: Skin is not jaundiced.   Neurological:      General: No focal deficit present.      Mental Status: She is alert and oriented to person, place, and time. Mental status is at baseline.      Cranial Nerves: No cranial nerve deficit.   Psychiatric:         Mood and Affect: Mood normal.         Behavior: Behavior normal.         Thought Content: Thought content normal.         Judgment: Judgment normal.       Fluids    Intake/Output Summary (Last 24 hours) at 9/29/2024 0741  Last data filed at 9/29/2024 0321  Gross per 24 hour   Intake 480 ml   Output 2800 ml   Net -2320 ml        Laboratory  Recent Labs     09/28/24  0450 09/29/24  0139   WBC 4.2*  6.0   RBC 3.63* 3.57*   HEMOGLOBIN 10.4* 10.5*   HEMATOCRIT 32.8* 32.2*   MCV 90.4 90.2   MCH 28.7 29.4   MCHC 31.7* 32.6   RDW 43.9 43.3   PLATELETCT 210 210   MPV 10.2 9.9     Recent Labs     09/27/24  0909 09/28/24  0450 09/29/24  0139   SODIUM 135 137 132*   POTASSIUM 4.2 4.1 4.7   CHLORIDE 103 104 99   CO2 24 24 21   GLUCOSE 258* 233* 325*   BUN 21 17 21   CREATININE 0.98 0.95 1.12   CALCIUM 9.2 8.8 9.1                     Imaging  US-YANET SINGLE LEVEL BILAT         US-EXTREMITY ARTERY LOWER UNILAT LEFT         DX-CHEST-PORTABLE (1 VIEW)   Final Result      No acute cardiac or pulmonary abnormalities are identified.      EC-ECHOCARDIOGRAM COMPLETE W/ CONT   Final Result      CT-ABDOMEN-PELVIS W/O   Final Result         1.  Hepatomegaly   2.  Dilatation right renal pelvis suggesting extra renal pelvis morphology, slightly more pronounced since prior study.   3.  Atherosclerosis and atherosclerotic coronary artery disease      US-RENAL   Final Result      1.  Right-sided pelviectasis.   2.  No hydronephrosis.      CT-CTA COMPLETE THORACOABDOMINAL AORTA   Final Result         1.  No aortic aneurysm or dissection identified.   2.  Hepatomegaly   3.  Atherosclerosis and atherosclerotic coronary artery disease                        CL-LEFT HEART CATHETERIZATION WITH POSSIBLE INTERVENTION    (Results Pending)        Assessment/Plan  * Chest pain- (present on admission)  Assessment & Plan  The ASCVD Risk score (Yuan DK, et al., 2019) failed to calculate for the following reasons:    The patient has a prior MI or stroke diagnosis    ASA/Statin/Plavix  Cardiology following  Aim for LH-C today    Acute on chronic renal failure (HCC)- (present on admission)  Assessment & Plan  Improving    HLD (hyperlipidemia)- (present on admission)  Assessment & Plan  Continue with atorvastatin     Diabetes (HCC)- (present on admission)  Assessment & Plan  SSI+ Accu checks + hypoglycemia protocol          VTE prophylaxis: Heparin    I  have performed a physical exam and reviewed and updated ROS and Plan today (9/29/2024). In review of yesterday's note (9/28/2024), there are no changes except as documented above.    Greater than 51 minutes spent prepping to see patient (e.g. review of tests) obtaining and/or reviewing separately obtained history. Performing a medically appropriate examination and/ evaluation.  Counseling and educating the patient/family/caregiver.  Ordering medications, tests, or procedures.  Referring and communicating with other health care professionals.  Documenting clinical information in EPIC.  Independently interpreting results and communicating results to patient/family/caregiver.  Care coordination

## 2024-09-29 NOTE — CARE PLAN
The patient is Stable - Low risk of patient condition declining or worsening    Shift Goals  Clinical Goals: Neuro stability; hemodynamic stability; pain mgmt  Patient Goals: pain control  Family Goals: jenna    Progress made toward(s) clinical / shift goals: Pt AOX4, pleasant and cooperative; no distress; no bleeding; pt c/o pain to groin site 9/10 which only improves to 6 or 7/10 w/ pain medication; pt placed on TAPS and wedges to assist w/ turns; pt posterior is profoundly scratched with some scratches breaking the skin; barrier paste applies; pt states she gets itchy which is treated w/ Benadryl; Bed low and locked; call bell and belongings in reach    Problem: Pain - Standard  Goal: Alleviation of pain or a reduction in pain to the patient’s comfort goal  Description: Target End Date:  Prior to discharge or change in level of care    Document on Vitals flowsheet    1.  Document pain using the appropriate pain scale per order or unit policy  2.  Educate and implement non-pharmacologic comfort measures (i.e. relaxation, distraction, massage, cold/heat therapy, etc.)  3.  Pain management medications as ordered  4.  Reassess pain after pain med administration per policy  5.  If opiods administered assess patient's response to pain medication is appropriate per POSS sedation scale  6.  Follow pain management plan developed in collaboration with patient and interdisciplinary team (including palliative care or pain specialists if applicable)  Outcome: Progressing     Problem: Neuro Status  Goal: Neuro status will remain stable or improve  Description: Target End Date:  Prior to discharge or change in level of care    Document on Neuro assessment in the Assessment flowsheet    1.  Assess and monitor neurologic status per provider order/protocol/unit policy  2.  Assess level of consciousness and orientation  3.  Assess for speech, dysarthria, dysphagia, facial symmetry  4.  Assess visual field, eye movements, gaze  preference, pupil reaction and size  5.  Assess muscle strength and motor response in all four extremities  6.  Assess for sensation (numbness and tingling)  7.  Assess basic neuro reflexes (cough, gag, corneal)  8.  Identify changes in neuro status and report to provider for testing/treatment orders  Outcome: Progressing

## 2024-09-29 NOTE — CARE PLAN
The patient is Stable - Low risk of patient condition declining or worsening    Shift Goals  Clinical Goals: pulso checks  Patient Goals: pain control  Family Goals: jenna    Progress made toward(s) clinical / shift goals:    Problem: Pain - Standard  Goal: Alleviation of pain or a reduction in pain to the patient’s comfort goal  Outcome: Progressing     Problem: Neuro Status  Goal: Neuro status will remain stable or improve  Outcome: Progressing       Patient is not progressing towards the following goals:

## 2024-09-30 VITALS
SYSTOLIC BLOOD PRESSURE: 111 MMHG | BODY MASS INDEX: 38.57 KG/M2 | HEIGHT: 65 IN | TEMPERATURE: 97.7 F | DIASTOLIC BLOOD PRESSURE: 60 MMHG | RESPIRATION RATE: 18 BRPM | HEART RATE: 94 BPM | WEIGHT: 231.48 LBS | OXYGEN SATURATION: 93 %

## 2024-09-30 DIAGNOSIS — R07.89 OTHER CHEST PAIN: Primary | ICD-10-CM

## 2024-09-30 PROBLEM — R33.9 URINARY RETENTION: Status: ACTIVE | Noted: 2024-09-30

## 2024-09-30 PROBLEM — I73.9 PAD (PERIPHERAL ARTERY DISEASE) (HCC): Status: ACTIVE | Noted: 2024-09-30

## 2024-09-30 LAB
ALBUMIN SERPL BCP-MCNC: 3.3 G/DL (ref 3.2–4.9)
ALBUMIN/GLOB SERPL: 1 G/DL
ALP SERPL-CCNC: 210 U/L (ref 30–99)
ALT SERPL-CCNC: 73 U/L (ref 2–50)
ANION GAP SERPL CALC-SCNC: 10 MMOL/L (ref 7–16)
ANISOCYTOSIS BLD QL SMEAR: ABNORMAL
AST SERPL-CCNC: 39 U/L (ref 12–45)
BASOPHILS # BLD AUTO: 0 % (ref 0–1.8)
BASOPHILS # BLD: 0 K/UL (ref 0–0.12)
BILIRUB SERPL-MCNC: 0.2 MG/DL (ref 0.1–1.5)
BUN SERPL-MCNC: 22 MG/DL (ref 8–22)
CALCIUM ALBUM COR SERPL-MCNC: 9.9 MG/DL (ref 8.5–10.5)
CALCIUM SERPL-MCNC: 9.3 MG/DL (ref 8.5–10.5)
CHLORIDE SERPL-SCNC: 101 MMOL/L (ref 96–112)
CO2 SERPL-SCNC: 25 MMOL/L (ref 20–33)
CREAT SERPL-MCNC: 1.34 MG/DL (ref 0.5–1.4)
EKG IMPRESSION: NORMAL
EOSINOPHIL # BLD AUTO: 0.06 K/UL (ref 0–0.51)
EOSINOPHIL NFR BLD: 0.9 % (ref 0–6.9)
ERYTHROCYTE [DISTWIDTH] IN BLOOD BY AUTOMATED COUNT: 43.9 FL (ref 35.9–50)
GFR SERPLBLD CREATININE-BSD FMLA CKD-EPI: 45 ML/MIN/1.73 M 2
GLOBULIN SER CALC-MCNC: 3.2 G/DL (ref 1.9–3.5)
GLUCOSE BLD STRIP.AUTO-MCNC: 196 MG/DL (ref 65–99)
GLUCOSE BLD STRIP.AUTO-MCNC: 263 MG/DL (ref 65–99)
GLUCOSE BLD STRIP.AUTO-MCNC: 273 MG/DL (ref 65–99)
GLUCOSE BLD STRIP.AUTO-MCNC: 302 MG/DL (ref 65–99)
GLUCOSE BLD STRIP.AUTO-MCNC: 302 MG/DL (ref 65–99)
GLUCOSE SERPL-MCNC: 234 MG/DL (ref 65–99)
HCT VFR BLD AUTO: 32.1 % (ref 37–47)
HGB BLD-MCNC: 10.5 G/DL (ref 12–16)
LYMPHOCYTES # BLD AUTO: 1.29 K/UL (ref 1–4.8)
LYMPHOCYTES NFR BLD: 20.8 % (ref 22–41)
MAGNESIUM SERPL-MCNC: 1.9 MG/DL (ref 1.5–2.5)
MANUAL DIFF BLD: NORMAL
MCH RBC QN AUTO: 29.6 PG (ref 27–33)
MCHC RBC AUTO-ENTMCNC: 32.7 G/DL (ref 32.2–35.5)
MCV RBC AUTO: 90.4 FL (ref 81.4–97.8)
MICROCYTES BLD QL SMEAR: ABNORMAL
MONOCYTES # BLD AUTO: 0.38 K/UL (ref 0–0.85)
MONOCYTES NFR BLD AUTO: 6.1 % (ref 0–13.4)
MORPHOLOGY BLD-IMP: NORMAL
NEUTROPHILS # BLD AUTO: 4.48 K/UL (ref 1.82–7.42)
NEUTROPHILS NFR BLD: 72.2 % (ref 44–72)
NRBC # BLD AUTO: 0 K/UL
NRBC BLD-RTO: 0 /100 WBC (ref 0–0.2)
PLATELET # BLD AUTO: 230 K/UL (ref 164–446)
PLATELET BLD QL SMEAR: NORMAL
PMV BLD AUTO: 9.9 FL (ref 9–12.9)
POIKILOCYTOSIS BLD QL SMEAR: NORMAL
POTASSIUM SERPL-SCNC: 4.3 MMOL/L (ref 3.6–5.5)
PROT SERPL-MCNC: 6.5 G/DL (ref 6–8.2)
RBC # BLD AUTO: 3.55 M/UL (ref 4.2–5.4)
RBC BLD AUTO: PRESENT
SODIUM SERPL-SCNC: 136 MMOL/L (ref 135–145)
STOMATOCYTES BLD QL SMEAR: NORMAL
WBC # BLD AUTO: 6.2 K/UL (ref 4.8–10.8)

## 2024-09-30 PROCEDURE — 93005 ELECTROCARDIOGRAM TRACING: CPT

## 2024-09-30 PROCEDURE — 85027 COMPLETE CBC AUTOMATED: CPT

## 2024-09-30 PROCEDURE — 700102 HCHG RX REV CODE 250 W/ 637 OVERRIDE(OP): Performed by: STUDENT IN AN ORGANIZED HEALTH CARE EDUCATION/TRAINING PROGRAM

## 2024-09-30 PROCEDURE — 51798 US URINE CAPACITY MEASURE: CPT

## 2024-09-30 PROCEDURE — 99233 SBSQ HOSP IP/OBS HIGH 50: CPT | Performed by: STUDENT IN AN ORGANIZED HEALTH CARE EDUCATION/TRAINING PROGRAM

## 2024-09-30 PROCEDURE — 85007 BL SMEAR W/DIFF WBC COUNT: CPT

## 2024-09-30 PROCEDURE — A9270 NON-COVERED ITEM OR SERVICE: HCPCS | Performed by: STUDENT IN AN ORGANIZED HEALTH CARE EDUCATION/TRAINING PROGRAM

## 2024-09-30 PROCEDURE — 770020 HCHG ROOM/CARE - TELE (206)

## 2024-09-30 PROCEDURE — 83735 ASSAY OF MAGNESIUM: CPT

## 2024-09-30 PROCEDURE — 80053 COMPREHEN METABOLIC PANEL: CPT

## 2024-09-30 PROCEDURE — 700102 HCHG RX REV CODE 250 W/ 637 OVERRIDE(OP): Performed by: NURSE PRACTITIONER

## 2024-09-30 PROCEDURE — A9270 NON-COVERED ITEM OR SERVICE: HCPCS | Performed by: NURSE PRACTITIONER

## 2024-09-30 PROCEDURE — 82962 GLUCOSE BLOOD TEST: CPT

## 2024-09-30 RX ORDER — DEXTROSE MONOHYDRATE 25 G/50ML
25 INJECTION, SOLUTION INTRAVENOUS
Status: DISCONTINUED | OUTPATIENT
Start: 2024-09-30 | End: 2024-10-01 | Stop reason: HOSPADM

## 2024-09-30 RX ORDER — INSULIN LISPRO 100 [IU]/ML
1-6 INJECTION, SOLUTION INTRAVENOUS; SUBCUTANEOUS
Status: DISCONTINUED | OUTPATIENT
Start: 2024-09-30 | End: 2024-10-01 | Stop reason: HOSPADM

## 2024-09-30 RX ADMIN — ATORVASTATIN CALCIUM 80 MG: 80 TABLET, FILM COATED ORAL at 17:43

## 2024-09-30 RX ADMIN — CLOPIDOGREL BISULFATE 75 MG: 75 TABLET ORAL at 05:17

## 2024-09-30 RX ADMIN — OXYCODONE HYDROCHLORIDE 10 MG: 10 TABLET ORAL at 23:43

## 2024-09-30 RX ADMIN — ASPIRIN 81 MG: 81 TABLET, CHEWABLE ORAL at 05:17

## 2024-09-30 RX ADMIN — Medication 5 MG: at 21:14

## 2024-09-30 RX ADMIN — OMEPRAZOLE 40 MG: 20 CAPSULE, DELAYED RELEASE ORAL at 05:17

## 2024-09-30 RX ADMIN — PREGABALIN 100 MG: 100 CAPSULE ORAL at 17:43

## 2024-09-30 RX ADMIN — OXYCODONE HYDROCHLORIDE 10 MG: 10 TABLET ORAL at 17:43

## 2024-09-30 RX ADMIN — CARVEDILOL 3.12 MG: 6.25 TABLET, FILM COATED ORAL at 08:56

## 2024-09-30 RX ADMIN — PREGABALIN 100 MG: 100 CAPSULE ORAL at 06:00

## 2024-09-30 RX ADMIN — LOSARTAN POTASSIUM 25 MG: 50 TABLET, FILM COATED ORAL at 05:17

## 2024-09-30 RX ADMIN — INSULIN LISPRO 1 UNITS: 100 INJECTION, SOLUTION INTRAVENOUS; SUBCUTANEOUS at 09:05

## 2024-09-30 RX ADMIN — OXYCODONE HYDROCHLORIDE 10 MG: 10 TABLET ORAL at 05:17

## 2024-09-30 RX ADMIN — OXYCODONE HYDROCHLORIDE 10 MG: 10 TABLET ORAL at 11:48

## 2024-09-30 RX ADMIN — INSULIN LISPRO 3 UNITS: 100 INJECTION, SOLUTION INTRAVENOUS; SUBCUTANEOUS at 17:44

## 2024-09-30 RX ADMIN — TAMSULOSIN HYDROCHLORIDE 0.4 MG: 0.4 CAPSULE ORAL at 08:56

## 2024-09-30 RX ADMIN — OMEPRAZOLE 40 MG: 20 CAPSULE, DELAYED RELEASE ORAL at 17:43

## 2024-09-30 RX ADMIN — INSULIN LISPRO 4 UNITS: 100 INJECTION, SOLUTION INTRAVENOUS; SUBCUTANEOUS at 13:12

## 2024-09-30 RX ADMIN — CARVEDILOL 3.12 MG: 6.25 TABLET, FILM COATED ORAL at 17:43

## 2024-09-30 RX ADMIN — DAPAGLIFLOZIN 10 MG: 10 TABLET, FILM COATED ORAL at 13:13

## 2024-09-30 RX ADMIN — INSULIN LISPRO 3 UNITS: 100 INJECTION, SOLUTION INTRAVENOUS; SUBCUTANEOUS at 21:14

## 2024-09-30 ASSESSMENT — FIBROSIS 4 INDEX: FIB4 SCORE: 1.16

## 2024-09-30 ASSESSMENT — PAIN DESCRIPTION - PAIN TYPE
TYPE: ACUTE PAIN

## 2024-09-30 ASSESSMENT — ENCOUNTER SYMPTOMS
WEAKNESS: 1
EYES NEGATIVE: 1
ABDOMINAL PAIN: 1
CARDIOVASCULAR NEGATIVE: 1
MUSCULOSKELETAL NEGATIVE: 1
RESPIRATORY NEGATIVE: 1
PSYCHIATRIC NEGATIVE: 1

## 2024-09-30 NOTE — PROGRESS NOTES
Monitor summary: SR, HR 76-93, LA .16, QRS .09, QT .38 with rare pvc per strip from monitor room

## 2024-09-30 NOTE — ASSESSMENT & PLAN NOTE
The ASCVD Risk score (Yuan GARCIA, et al., 2019) failed to calculate for the following reasons:    The patient has a prior MI or stroke diagnosis    Cardiology following for which patient underwent left heart catheterization on 9/27/2024 for which she was noted for RCA occlusion status post PCI with successful drug-eluting stent deployment.  Continue aspirin/Plavix  Continue statin  Outpatient cardiology follow-up

## 2024-09-30 NOTE — CARE PLAN
The patient is Stable - Low risk of patient condition declining or worsening    Shift Goals  Clinical Goals: Pain management, mobility  Patient Goals: Watch football, walk  Family Goals: [facility]    Progress made toward(s) clinical / shift goals:    Problem: Knowledge Deficit - Standard  Goal: Patient and family/care givers will demonstrate understanding of plan of care, disease process/condition, diagnostic tests and medications  Outcome: Progressing     Problem: Skin Integrity  Goal: Skin integrity is maintained or improved  Outcome: Progressing     Problem: Fall Risk  Goal: Patient will remain free from falls  Outcome: Progressing     Problem: Neuro Status  Goal: Neuro status will remain stable or improve  Outcome: Progressing     Problem: Self Care  Goal: Patient will have the ability to perform ADLs independently or with assistance (bathe, groom, dress, toilet and feed)  Outcome: Progressing       Patient is not progressing towards the following goals:

## 2024-09-30 NOTE — ASSESSMENT & PLAN NOTE
Repeat echocardiogram with improved ejection fraction to 45% from 18% on 8/2024.    Continue losartan, Farxiga and spironolactone.    IV diuresis was attempted but patient did not tolerate as she developed hypotension for which diuresis was discontinued.  Outpatient cardiology follow-up

## 2024-09-30 NOTE — CARE PLAN
The patient is Stable - Low risk of patient condition declining or worsening    Shift Goals  Clinical Goals: monitor for retention, safety  Patient Goals: pain control  Family Goals: n/a    Progress made toward(s) clinical / shift goals:      Problem: Pain - Standard  Goal: Alleviation of pain or a reduction in pain to the patient’s comfort goal  Outcome: Progressing  Note: Pt medicated per MAR for pain control. Distraction and repositioning used as non-pharmacological adjuncts.      Problem: Knowledge Deficit - Standard  Goal: Patient and family/care givers will demonstrate understanding of plan of care, disease process/condition, diagnostic tests and medications  Outcome: Progressing  Note: Pt updated on POC for the day. Plan to monitor urine output and PVRs. Hopeful discharge this afternoon pending voiding.      Problem: Fall Risk  Goal: Patient will remain free from falls  Outcome: Progressing  Note: Bed low and locked with strip alarm in place. Pt educated on fall prevention measures in place and importance of calling prior to mobilizing.        Patient is not progressing towards the following goals: n/a

## 2024-09-30 NOTE — ASSESSMENT & PLAN NOTE
Required Umanzor placement  Umanzor catheter discontinued 9/30  Frequent postvoid residual, call provider if more than 400  Continue tamsulosin

## 2024-09-30 NOTE — PROGRESS NOTES
Hospital Medicine Daily Progress Note    Date of Service  9/30/2024    Chief Complaint  Julisa Josue is a 64 y.o. female admitted 9/24/2024 with OJ    Hospital Course  64 y.o. female PMH CABG, DM, HTN, chronic pain and CAD who presented 9/24/2024 as a transfer from [place] for chest and back pain. She reports the chest pain radiates to her back. Also complaining of abdominal pain. In the ER, CTA thoracicoabdominal was negative for dissection. Patient was found to have acute kidney injury at Cr of 2.08 she ge be admitted for further workup.     Cardiology following for which patient underwent left heart catheterization on 9/27/2024 for which she was noted for RCA occlusion status post PCI with successful drug-eluting stent deployment.  She was started on losartan and spironolactone.  Follow-up echocardiogram with improved ejection fraction to 45% from 18% on 8/2024.  IV diuresis was attempted but patient did not tolerate as she developed hypotension for which diuresis was discontinued.    During left heart catheterization procedure patient was noted for severe right common femoral artery stenosis with around 90% occlusion. Follow up 9/28 Arterial U/S showed left posterior tibial and peroneal arteries are occluded throughout their length, no hemodynamically significant stenosis. Case was discussed with Vascular surgery, who evaluate case and recommended medical management with aspirin, statin and outpatient follow up.  Overall, patient had improvement with chest pain and was ultimately titrated off of oxygen.  Patient does not require oxygen as per home oxygen evaluation.    Interval Problem Update  Reporting LE pain today  Stable vitals  On RA  Cr 1.3 today  DC IV lasix  DC henriquez  Tamsulosin  Frequent PVR, page provider if > 400 ml  Continue ASA/Plavix/Statin  Continue Losartan and Spironolactone   Stager antihypertensives  Titrate opioids  PT/OT rec HH  Labs on a.m.    I have discussed this  patient's plan of care and discharge plan at IDT rounds today with Case Management, Nursing, Nursing leadership, and other members of the IDT team.    Consultants/Specialty  Cardiology sign of on 9/29    Code Status  Full Code    Disposition  The patient is not medically cleared for discharge to home or a post-acute facility.     I have placed the appropriate orders for post-discharge needs.    Review of Systems  Review of Systems   Constitutional:  Positive for malaise/fatigue.   HENT: Negative.     Eyes: Negative.    Respiratory: Negative.     Cardiovascular: Negative.    Gastrointestinal:  Positive for abdominal pain.   Genitourinary: Negative.    Musculoskeletal: Negative.    Skin: Negative.    Neurological:  Positive for weakness.   Endo/Heme/Allergies: Negative.    Psychiatric/Behavioral: Negative.          Physical Exam  Temp:  [36.2 °C (97.2 °F)-36.8 °C (98.2 °F)] 36.8 °C (98.2 °F)  Pulse:  [88-97] 89  Resp:  [15-20] 18  BP: ()/() 106/56  SpO2:  [90 %-97 %] 95 %    Physical Exam  Constitutional:       Appearance: She is obese.   HENT:      Head: Normocephalic and atraumatic.      Mouth/Throat:      Mouth: Mucous membranes are moist.   Eyes:      Extraocular Movements: Extraocular movements intact.      Pupils: Pupils are equal, round, and reactive to light.   Cardiovascular:      Rate and Rhythm: Normal rate and regular rhythm.      Pulses: Normal pulses.      Heart sounds: Normal heart sounds.   Pulmonary:      Effort: Pulmonary effort is normal.      Breath sounds: Normal breath sounds.   Abdominal:      General: Bowel sounds are normal.      Palpations: Abdomen is soft.      Tenderness: There is abdominal tenderness.   Musculoskeletal:         General: No swelling. Normal range of motion.      Cervical back: Normal range of motion and neck supple.   Skin:     General: Skin is warm.      Coloration: Skin is not jaundiced.   Neurological:      General: No focal deficit present.      Mental  Status: She is alert and oriented to person, place, and time. Mental status is at baseline.      Cranial Nerves: No cranial nerve deficit.   Psychiatric:         Mood and Affect: Mood normal.         Behavior: Behavior normal.         Thought Content: Thought content normal.         Judgment: Judgment normal.         Fluids    Intake/Output Summary (Last 24 hours) at 9/30/2024 1614  Last data filed at 9/30/2024 1545  Gross per 24 hour   Intake 800 ml   Output 200 ml   Net 600 ml        Laboratory  Recent Labs     09/28/24  0450 09/29/24  0139 09/30/24  0549   WBC 4.2* 6.0 6.2   RBC 3.63* 3.57* 3.55*   HEMOGLOBIN 10.4* 10.5* 10.5*   HEMATOCRIT 32.8* 32.2* 32.1*   MCV 90.4 90.2 90.4   MCH 28.7 29.4 29.6   MCHC 31.7* 32.6 32.7   RDW 43.9 43.3 43.9   PLATELETCT 210 210 230   MPV 10.2 9.9 9.9     Recent Labs     09/28/24  0450 09/29/24  0139 09/30/24  0549   SODIUM 137 132* 136   POTASSIUM 4.1 4.7 4.3   CHLORIDE 104 99 101   CO2 24 21 25   GLUCOSE 233* 325* 234*   BUN 17 21 22   CREATININE 0.95 1.12 1.34   CALCIUM 8.8 9.1 9.3                     Imaging  US-YANET SINGLE LEVEL BILAT   Final Result      US-EXTREMITY ARTERY LOWER UNILAT LEFT   Final Result      DX-CHEST-PORTABLE (1 VIEW)   Final Result      No acute cardiac or pulmonary abnormalities are identified.      EC-ECHOCARDIOGRAM COMPLETE W/ CONT   Final Result      CT-ABDOMEN-PELVIS W/O   Final Result         1.  Hepatomegaly   2.  Dilatation right renal pelvis suggesting extra renal pelvis morphology, slightly more pronounced since prior study.   3.  Atherosclerosis and atherosclerotic coronary artery disease      US-RENAL   Final Result      1.  Right-sided pelviectasis.   2.  No hydronephrosis.      CT-CTA COMPLETE THORACOABDOMINAL AORTA   Final Result         1.  No aortic aneurysm or dissection identified.   2.  Hepatomegaly   3.  Atherosclerosis and atherosclerotic coronary artery disease                        CL-LEFT HEART CATHETERIZATION WITH POSSIBLE  INTERVENTION    (Results Pending)        Assessment/Plan  * Unstable angina (HCC)  Assessment & Plan  The ASCVD Risk score (Yuan GARCIA, et al., 2019) failed to calculate for the following reasons:    The patient has a prior MI or stroke diagnosis    Cardiology following for which patient underwent left heart catheterization on 9/27/2024 for which she was noted for RCA occlusion status post PCI with successful drug-eluting stent deployment.  Continue aspirin/Plavix  Continue statin  Outpatient cardiology follow-up    Chest pain- (present on admission)  Assessment & Plan  The ASCVD Risk score (Yuan GARCIA, et al., 2019) failed to calculate for the following reasons:    The patient has a prior MI or stroke diagnosis    ASA/Statin/Plavix  Cardiology following  Aim for LH-C today    Urinary retention  Assessment & Plan  Required Umanzor placement  Umanzor catheter discontinued 9/30  Frequent postvoid residual, call provider if more than 400  Continue tamsulosin    ACC/AHA stage C systolic heart failure (HCC)- (present on admission)  Assessment & Plan  Repeat echocardiogram with improved ejection fraction to 45% from 18% on 8/2024.    Continue losartan, Farxiga and spironolactone.    IV diuresis was attempted but patient did not tolerate as she developed hypotension for which diuresis was discontinued.  Outpatient cardiology follow-up    Type 2 diabetes mellitus with neurologic complication, with long-term current use of insulin (HCC)- (present on admission)  Assessment & Plan  A1c 12  Diabetic diet  Lantus  Farxiga  Insulin sliding scale    PAD (peripheral artery disease) ([facility])  Assessment & Plan  9/28 Arterial U/S showed left posterior tibial and peroneal arteries are occluded throughout their length, no hemodynamically significant stenosis.   Continue aspirin  Continue statin  Case was discussed with Vascular surgery, who evaluate case and recommended medical management with aspirin, statin and outpatient follow up.         Acute on chronic renal failure (HCC)- (present on admission)  Assessment & Plan  Improving    Ischemic cardiomyopathy- (present on admission)  Assessment & Plan  Chronic history of  Presenting with nausea and angina      Dyslipidemia  Assessment & Plan  Statin    HLD (hyperlipidemia)- (present on admission)  Assessment & Plan  Continue with atorvastatin     Diabetes (HCC)- (present on admission)  Assessment & Plan  SSI+ Accu checks + hypoglycemia protocol     Essential hypertension- (present on admission)  Assessment & Plan  Losartan  Spironolactone  Carvedilol  As needed antihypertensives    Opioid dependence (HCC)- (present on admission)  Assessment & Plan  Chronic history of  Titrate opiates as tolerable         VTE prophylaxis: Heparin    I have performed a physical exam and reviewed and updated ROS and Plan today (9/30/2024). In review of yesterday's note (9/29/2024), there are no changes except as documented above.    Greater than 51 minutes spent prepping to see patient (e.g. review of tests) obtaining and/or reviewing separately obtained history. Performing a medically appropriate examination and/ evaluation.  Counseling and educating the patient/family/caregiver.  Ordering medications, tests, or procedures.  Referring and communicating with other health care professionals.  Documenting clinical information in EPIC.  Independently interpreting results and communicating results to patient/family/caregiver.  Care coordination

## 2024-09-30 NOTE — PROGRESS NOTES
Umanzor pulled at 0900 without issue. Pt educated on importance of calling prior to going to the bathroom and our bladder scan protocol.

## 2024-09-30 NOTE — DISCHARGE PLANNING
Case Management Discharge Planning    Admission Date: 9/24/2024  GMLOS: 4.3  ALOS: 2    6-Clicks ADL Score: 24  6-Clicks Mobility Score: 23      Anticipated Discharge Dispo: Discharge Disposition: Discharged to home/self care (01)  Discharge Address: [address] [place] [id number]  Discharge Contact Phone Number: [phone]    DME Needed: No    Action(s) Taken: OTHER    Pt was discussed in IDT rounds, MD aware that pt's insurance and residential location is a barrier to pt receiving HH services as ordered by MD.  MD confirmed will consult pt and discuss following up with PCP for outpatient physical therapy.      Escalations Completed: None    Medically Clear: No    Next Steps: Case management to continue to follow pt for any discharge planning needs.    Barriers to Discharge: Pending home oxygen evaluation.    Is the patient up for discharge tomorrow: No

## 2024-09-30 NOTE — CARE PLAN
The patient is Stable - Low risk of patient condition declining or worsening    Shift Goals  Clinical Goals: Neuro stability; safety  Patient Goals: pain control  Family Goals: jenna    Progress made toward(s) clinical / shift goals: Pt AOX4 pleasant and cooperative; pt c/o pain to legs; chronic; pt calls appropriately; fall precautions in place; Bed low and locked; call bell and belongings in reach    Problem: Pain - Standard  Goal: Alleviation of pain or a reduction in pain to the patient’s comfort goal  Description: Target End Date:  Prior to discharge or change in level of care    Document on Vitals flowsheet    1.  Document pain using the appropriate pain scale per order or unit policy  2.  Educate and implement non-pharmacologic comfort measures (i.e. relaxation, distraction, massage, cold/heat therapy, etc.)  3.  Pain management medications as ordered  4.  Reassess pain after pain med administration per policy  5.  If opiods administered assess patient's response to pain medication is appropriate per POSS sedation scale  6.  Follow pain management plan developed in collaboration with patient and interdisciplinary team (including palliative care or pain specialists if applicable)  Outcome: Progressing     Problem: Fall Risk  Goal: Patient will remain free from falls  Description: Target End Date:  Prior to discharge or change in level of care    Document interventions on the May Guerra Fall Risk Assessment    1.  Assess for fall risk factors  2.  Implement fall precautions  Outcome: Progressing

## 2024-09-30 NOTE — ASSESSMENT & PLAN NOTE
9/28 Arterial U/S showed left posterior tibial and peroneal arteries are occluded throughout their length, no hemodynamically significant stenosis.   Continue aspirin  Continue statin  Case was discussed with Vascular surgery, who evaluate case and recommended medical management with aspirin, statin and outpatient follow up.

## 2024-10-01 ENCOUNTER — PHARMACY VISIT (OUTPATIENT)
Dept: PHARMACY | Facility: MEDICAL CENTER | Age: 64
End: 2024-10-01
Payer: COMMERCIAL

## 2024-10-01 VITALS
RESPIRATION RATE: 16 BRPM | HEART RATE: 79 BPM | WEIGHT: 231.48 LBS | HEIGHT: 65 IN | OXYGEN SATURATION: 94 % | SYSTOLIC BLOOD PRESSURE: 127 MMHG | BODY MASS INDEX: 38.57 KG/M2 | DIASTOLIC BLOOD PRESSURE: 63 MMHG | TEMPERATURE: 97.2 F

## 2024-10-01 LAB — GLUCOSE BLD STRIP.AUTO-MCNC: 201 MG/DL (ref 65–99)

## 2024-10-01 PROCEDURE — 99239 HOSP IP/OBS DSCHRG MGMT >30: CPT | Performed by: STUDENT IN AN ORGANIZED HEALTH CARE EDUCATION/TRAINING PROGRAM

## 2024-10-01 PROCEDURE — A9270 NON-COVERED ITEM OR SERVICE: HCPCS | Performed by: STUDENT IN AN ORGANIZED HEALTH CARE EDUCATION/TRAINING PROGRAM

## 2024-10-01 PROCEDURE — 700102 HCHG RX REV CODE 250 W/ 637 OVERRIDE(OP): Performed by: STUDENT IN AN ORGANIZED HEALTH CARE EDUCATION/TRAINING PROGRAM

## 2024-10-01 PROCEDURE — 97530 THERAPEUTIC ACTIVITIES: CPT

## 2024-10-01 PROCEDURE — 700102 HCHG RX REV CODE 250 W/ 637 OVERRIDE(OP): Performed by: NURSE PRACTITIONER

## 2024-10-01 PROCEDURE — A9270 NON-COVERED ITEM OR SERVICE: HCPCS | Performed by: NURSE PRACTITIONER

## 2024-10-01 PROCEDURE — 51798 US URINE CAPACITY MEASURE: CPT

## 2024-10-01 PROCEDURE — 82962 GLUCOSE BLOOD TEST: CPT

## 2024-10-01 PROCEDURE — RXMED WILLOW AMBULATORY MEDICATION CHARGE: Performed by: STUDENT IN AN ORGANIZED HEALTH CARE EDUCATION/TRAINING PROGRAM

## 2024-10-01 RX ORDER — SPIRONOLACTONE 25 MG/1
12.5 TABLET ORAL
Qty: 15 TABLET | Refills: 0 | Status: SHIPPED | OUTPATIENT
Start: 2024-10-01 | End: 2024-10-31

## 2024-10-01 RX ORDER — CLOPIDOGREL BISULFATE 75 MG/1
75 TABLET ORAL DAILY
Qty: 90 TABLET | Refills: 0 | Status: SHIPPED | OUTPATIENT
Start: 2024-10-01 | End: 2024-12-30

## 2024-10-01 RX ORDER — CARVEDILOL 3.12 MG/1
3.12 TABLET ORAL 2 TIMES DAILY WITH MEALS
Qty: 180 TABLET | Refills: 0 | Status: SHIPPED | OUTPATIENT
Start: 2024-10-01 | End: 2024-12-30

## 2024-10-01 RX ORDER — TAMSULOSIN HYDROCHLORIDE 0.4 MG/1
0.4 CAPSULE ORAL
Qty: 30 CAPSULE | Refills: 0 | Status: SHIPPED | OUTPATIENT
Start: 2024-10-02 | End: 2024-11-01

## 2024-10-01 RX ORDER — ASPIRIN 81 MG/1
81 TABLET, CHEWABLE ORAL DAILY
Qty: 90 TABLET | Refills: 0 | Status: SHIPPED | OUTPATIENT
Start: 2024-10-02 | End: 2024-12-31

## 2024-10-01 RX ORDER — ATORVASTATIN CALCIUM 80 MG/1
80 TABLET, FILM COATED ORAL EVERY EVENING
Qty: 30 TABLET | Refills: 0 | Status: SHIPPED | OUTPATIENT
Start: 2024-10-01

## 2024-10-01 RX ADMIN — INSULIN LISPRO 2 UNITS: 100 INJECTION, SOLUTION INTRAVENOUS; SUBCUTANEOUS at 09:40

## 2024-10-01 RX ADMIN — OMEPRAZOLE 40 MG: 20 CAPSULE, DELAYED RELEASE ORAL at 05:17

## 2024-10-01 RX ADMIN — PREGABALIN 100 MG: 100 CAPSULE ORAL at 05:15

## 2024-10-01 RX ADMIN — ASPIRIN 81 MG: 81 TABLET, CHEWABLE ORAL at 05:18

## 2024-10-01 RX ADMIN — OXYCODONE HYDROCHLORIDE 10 MG: 10 TABLET ORAL at 08:05

## 2024-10-01 RX ADMIN — CLOPIDOGREL BISULFATE 75 MG: 75 TABLET ORAL at 05:17

## 2024-10-01 RX ADMIN — CARVEDILOL 3.12 MG: 6.25 TABLET, FILM COATED ORAL at 08:05

## 2024-10-01 RX ADMIN — TAMSULOSIN HYDROCHLORIDE 0.4 MG: 0.4 CAPSULE ORAL at 08:05

## 2024-10-01 ASSESSMENT — COGNITIVE AND FUNCTIONAL STATUS - GENERAL
SUGGESTED CMS G CODE MODIFIER MOBILITY: CK
MOVING TO AND FROM BED TO CHAIR: A LITTLE
TURNING FROM BACK TO SIDE WHILE IN FLAT BAD: A LITTLE
WALKING IN HOSPITAL ROOM: A LITTLE
STANDING UP FROM CHAIR USING ARMS: A LITTLE
CLIMB 3 TO 5 STEPS WITH RAILING: A LITTLE
MOBILITY SCORE: 18
MOVING FROM LYING ON BACK TO SITTING ON SIDE OF FLAT BED: A LITTLE

## 2024-10-01 ASSESSMENT — GAIT ASSESSMENTS
GAIT LEVEL OF ASSIST: SUPERVISED
ASSISTIVE DEVICE: FRONT WHEEL WALKER
DISTANCE (FEET): 200

## 2024-10-01 ASSESSMENT — PAIN DESCRIPTION - PAIN TYPE
TYPE: ACUTE PAIN

## 2024-10-01 NOTE — PROGRESS NOTES
Monitor summary: SR 76-87, OR -0.16, QRS -0.08, QT -0.34, with rare PVCs per strip from the monitor room.

## 2024-10-01 NOTE — THERAPY
Physical Therapy   Discharge Note     Patient Name: Julisa Josue  Age:  64 y.o., Sex:  female  Medical Record #: [id number]  Today's Date: 10/1/2024     Precautions  Precautions: Fall Risk    Assessment    Pt agreeable to PT tx session.  Pt continues to progress with functional mobility, ambulated in hallway with FWW and SPV as detailed below.  In room pt able to walk short distances without AD to get her personal bag.  Pt appropriately verbalized signs & symptoms of hypotension and strategies for daily weight monitoring.  Encouraged pt to continue mobilizing daily and progress activity as tolerated.  No further acute PT needs.    Plan    Reason for Discharge From Therapy: Discharge Secondary to Goals Met    DC Equipment Recommendations: None  Discharge Recommendations: Recommend outpatient physical therapy services to address higher level deficits (Per chart no HH in her area.)    Objective     10/01/24 0914   Precautions   Precautions Fall Risk   Pain 0 - 10 Group   Therapist Pain Assessment Post Activity Pain Same as Prior to Activity;Nurse Notified;0   Cognition    Cognition / Consciousness WDL   Level of Consciousness Alert   Comments Pleasant & cooperative   Other Treatments   Other Treatments Provided Pt verbalized signs & symptoms of hypotension & verbalized understanding of daily weights & what to monitor for.   Balance   Sitting Balance (Static) Good   Sitting Balance (Dynamic) Fair +   Standing Balance (Static) Fair +   Standing Balance (Dynamic) Fair   Weight Shift Sitting Good   Weight Shift Standing Fair   Skilled Intervention Verbal Cuing   Bed Mobility    Supine to Sit Supervised   Sit to Supine Supervised   Skilled Intervention Verbal Cuing   Gait Analysis   Gait Level Of Assist Supervised   Assistive Device Front Wheel Walker   Distance (Feet) 200   # of Times Distance was Traveled 1   Weight Bearing Status No restrictions   Skilled Intervention Verbal Cuing   Functional Mobility   Sit to Stand  Supervised   Bed, Chair, Wheelchair Transfer Supervised   Transfer Method Stand Step   Mobility bed mob, amb, back to bed   Skilled Intervention Verbal Cuing   Activity Tolerance   Comments limited by SOB   Short Term Goals    Short Term Goal # 1 Pt will ambulate x 150ft with supervision and LRAD to ensure independent mobility at home.   Goal Outcome # 1 Goal met   Short Term Goal # 2 Pt will demonstrate independenc with daily weights and signs/symptoms of hypotension wihtin 6 visits to reduce fall risk.   Goal Outcome # 2 Goal met   Physical Therapy Treatment Plan   Reason For Discharge Discharge Secondary to Goals Met

## 2024-10-01 NOTE — CARE PLAN
Problem: Pain - Standard  Goal: Alleviation of pain or a reduction in pain to the patient’s comfort goal  Description: Target End Date:  Prior to discharge or change in level of care    Document on Vitals flowsheet    1.  Document pain using the appropriate pain scale per order or unit policy  2.  Educate and implement non-pharmacologic comfort measures (i.e. relaxation, distraction, massage, cold/heat therapy, etc.)  3.  Pain management medications as ordered  4.  Reassess pain after pain med administration per policy  5.  If opiods administered assess patient's response to pain medication is appropriate per POSS sedation scale  6.  Follow pain management plan developed in collaboration with patient and interdisciplinary team (including palliative care or pain specialists if applicable)  Outcome: Progressing     Problem: Fall Risk  Goal: Patient will remain free from falls  Description: Target End Date:  Prior to discharge or change in level of care    Document interventions on the [facility] Fall Risk Assessment    1.  Assess for fall risk factors  2.  Implement fall precautions  Outcome: Progressing     Problem: Urinary Elimination  Goal: Establish and maintain regular urinary output  Description: Target End Date:  Prior to discharge or change in level of care    Document on I/O and Assessment flowsheets    1.  Evaluate need to continue indwelling catheter every shift  2.  Assess signs and symptoms of urinary retention  3.  Assess post-void residual volumes  4.  Implement bladder training program  5.  Encourage scheduled voidings  6.  Assist patient to sit on bedside commode or toilet for voiding  7.  Educate patient and family/caregiver on use and purpose of urine collection devices (document in Patient Education)  Outcome: Progressing   The patient is Stable - Low risk of patient condition declining or worsening    Shift Goals  Clinical Goals: Hemodynamically stable  Patient Goals: comfort,rest  Family  Goals: jenna    Progress made toward(s) clinical / shift goals:  patient is A0x4 throughout shift . Pain in her leg noted, prn given . Bladder scanned for retention . Anticipate discharge today . Health education done . Call bell within reach .     Patient is not progressing towards the following goals:

## 2024-10-01 NOTE — DISCHARGE SUMMARY
Discharge Summary    CHIEF COMPLAINT ON ADMISSION  Chief Complaint   Patient presents with    Chest Pain     Patient transferred from [place] for chest and abdominal pain. Work-up at [facility] mostly unremarkable and patient transferred for further work-up. Patient mildly hypotensive upon arrival. 2L bolus given PTA.       Reason for Admission  ems     Admission Date  9/24/2024    CODE STATUS  Full Code    HPI & HOSPITAL COURSE  For full details please refer to HPI    64 y.o. female PMH CABG, DM, HTN, chronic pain and CAD who presented 9/24/2024 as a transfer from [place] for chest and back pain. She reports the chest pain radiates to her back. Also complaining of abdominal pain. In the ER, CTA thoracicoabdominal was negative for dissection. Patient was found to have acute kidney injury at Cr of 2.08 she ge be admitted for further workup.      Cardiology following for which patient underwent left heart catheterization on 9/27/2024 for which she was noted for RCA occlusion status post PCI with successful drug-eluting stent deployment.  She was started on losartan and spironolactone.  Follow-up echocardiogram with improved ejection fraction to 45% from 18% on 8/2024.  IV diuresis was attempted but patient did not tolerate as she developed hypotension for which diuresis was discontinued.     During left heart catheterization procedure patient was noted for severe right common femoral artery stenosis with around 90% occlusion. Follow up 9/28 Arterial U/S showed left posterior tibial and peroneal arteries are occluded throughout their length, no hemodynamically significant stenosis. Case was discussed with Vascular surgery, who evaluate case and recommended medical management with aspirin, statin and outpatient follow up.  Overall, patient had improvement with chest pain and was ultimately titrated off of oxygen. Thus discharged in stable condition with follow up with PCP and cardiology and vascular surgery.  Return to ER if there is any concern.       Therefore, she is discharged in good and stable condition to home with close outpatient follow-up.    The patient met 2-midnight criteria for an inpatient stay at the time of discharge.    Discharge Date  10/1    FOLLOW UP ITEMS POST DISCHARGE  PCP  Cardiology   Vascular surgery     DISCHARGE DIAGNOSES  Principal Problem:    Unstable angina (HCC) (POA: No)  Active Problems:    Essential hypertension (POA: Yes)    Diabetes (HCC) (POA: Yes)    Opioid dependence (HCC) (Chronic) (POA: Yes)    Chest pain (POA: Yes)    HLD (hyperlipidemia) (POA: Yes)    Type 2 diabetes mellitus with neurologic complication, with long-term current use of insulin (HCC) (POA: Yes)    Ischemic cardiomyopathy (POA: Yes)    ACC/AHA stage C systolic heart failure (HCC) (POA: Yes)    Acute on chronic renal failure (HCC) (POA: Yes)    Urinary retention (POA: No)    PAD (peripheral artery disease) (HCC) (POA: No)  Resolved Problems:    * No resolved hospital problems. *      FOLLOW UP  No future appointments.  [facility]  [address]  [address]  [facility] [id number] [phone]  Call  Your doctor has referred you for Cardiac Rehab which is important in your recovery. Please call to make an appointment.      MEDICATIONS ON DISCHARGE     Medication List        START taking these medications        Instructions   aspirin 81 MG Chew chewable tablet  Start taking on: October 2, 2024  Commonly known as: Asa   Chew 1 Tablet every day for 90 days.  Dose: 81 mg     carvedilol 3.125 MG Tabs  Commonly known as: Coreg   Take 1 Tablet by mouth 2 times a day with meals for 60 days.  Dose: 3.125 mg     Empagliflozin 10 MG Tabs tablet  Commonly known as: Jardiance   Take 1 Tablet by mouth every day for 30 days.  Dose: 10 mg     spironolactone 25 MG Tabs  Commonly known as: Aldactone   Take 0.5 Tablets by mouth 1/2 hour after lunch for 30 days.  Dose: 12.5 mg     tamsulosin 0.4 MG  capsule  Start taking on: October 2, 2024  Commonly known as: Flomax   Take 1 Capsule by mouth 1/2 hour after breakfast for 30 days.  Dose: 0.4 mg            CONTINUE taking these medications        Instructions   atorvastatin 80 MG tablet  Commonly known as: Lipitor   Take 1 Tablet by mouth every evening.  Dose: 80 mg     clopidogrel 75 MG Tabs  Commonly known as: Plavix   Take 1 Tablet by mouth every day for 90 days.  Dose: 75 mg     insulin GLARGINE 100 UNIT/ML Soln  Commonly known as: LantRolando munguiaee   Inject 20 Units under the skin 2 times a day.  Dose: 20 Units     losartan 25 MG Tabs  Commonly known as: Cozaar   Take 25 mg by mouth every day.  Dose: 25 mg     metFORMIN 500 MG Tabs  Commonly known as: Glucophage   Take 500 mg by mouth every day.  Dose: 500 mg     pregabalin 100 MG Caps  Commonly known as: Lyrica   Take 100 mg by mouth 2 times a day.  Dose: 100 mg     SITagliptin 50 MG Tabs  Commonly known as: Januvia   Take 50 mg by mouth every day.  Dose: 50 mg     traZODone 150 MG Tabs  Commonly known as: Desyrel   Doctor's comments: Already has at home.  Take 1 Tablet by mouth at bedtime.  Dose: 150 mg            STOP taking these medications      furosemide 40 MG Tabs  Commonly known as: Lasix              Allergies  No Active Allergies    DIET  Orders Placed This Encounter   Procedures    Diet Order Diet: Cardiac; Second Modifier: (optional): Consistent CHO (Diabetic)     Standing Status:   Standing     Number of Occurrences:   1     Order Specific Question:   Diet:     Answer:   Cardiac [6]     Order Specific Question:   Second Modifier: (optional)     Answer:   Consistent CHO (Diabetic) [4]       ACTIVITY  As tolerated.  Weight bearing as tolerated    CONSULTATIONS  Cardiology     PROCEDURES  Cardiac cath   1.  Severe proximal RCA stenosis (IFR 0.8) status post successful IVUS guided PCI deploying Synergy 3 x 20 mm ANA postdilated to 3.5 mm  2.  Nonobstructive disease throughout the left circumflex  (patent stents, iFR ostial left circumflex ISR 1.04, not hemodynamically significant stenosis).  3.  Patent pedicle LIMA-LAD  4.  Significant elevated resting LVEDP 35 mmHg with no significant transaortic gradient on pullback  5.  Severe 90% stenosis in the mid right common femoral artery (our access site and successful Perclose was above the lesion)    LABORATORY  Lab Results   Component Value Date    SODIUM 136 09/30/2024    POTASSIUM 4.3 09/30/2024    CHLORIDE 101 09/30/2024    CO2 25 09/30/2024    GLUCOSE 234 (H) 09/30/2024    BUN 22 09/30/2024    CREATININE 1.34 09/30/2024        Lab Results   Component Value Date    WBC 6.2 09/30/2024    HEMOGLOBIN 10.5 (L) 09/30/2024    HEMATOCRIT 32.1 (L) 09/30/2024    PLATELETCT 230 09/30/2024        Total time of the discharge process exceeds 35 minutes.

## 2024-10-01 NOTE — PROGRESS NOTES
Monitor summary: SR, HR 83-97, PA .16, QRS .13, QT .43 with rare pvc per strip from monitor room

## 2024-10-01 NOTE — DISCHARGE INSTRUCTIONS
Radial Site Care  The following information offers guidance on how to care for yourself after your procedure. Your health care provider may also give you more specific instructions. If you have problems or questions, contact your health care provider.  What can I expect after the procedure?  After the procedure, it is common to have bruising and tenderness in the incision area.  Follow these instructions at home:  Incision site care    Follow instructions from your health care provider about how to take care of your incision site. Make sure you:  Wash your hands with soap and water for at least 20 seconds before and after you change your bandage (dressing). If soap and water are not available, use hand .  Change or remove your dressing as told by your health care provider.  Leave stitches (sutures), skin glue, or adhesive strips in place. These skin closures may need to stay in place for 2 weeks or longer. If adhesive strip edges start to loosen and curl up, you may trim the loose edges. Do not remove adhesive strips completely unless your health care provider tells you to do that.  Do not take baths, swim, or use a hot tub until your health care provider approves.  You may shower 24-48 hours after the procedure or as told by your health care provider.  Remove the dressing and gently wash the incision area with plain soap and water.  Pat the area dry with a clean towel.  Do not rub the site. That could cause bleeding.  Do not apply powder or lotion to the site.  Check your incision site every day for signs of infection. Check for:  Redness, swelling, or pain.  Fluid or blood.  Warmth.  Pus or a bad smell.  Activity  For 24 hours after the procedure, or as directed by your health care provider:  Do not flex or bend the affected arm.  Do not push or pull heavy objects with the affected arm.  Do not operate machinery or power tools.  Do not drive. You should not drive yourself home from the hospital or  clinic if you go home during that time period. You may drive 24 hours after the procedure unless your health care provider tells you not to.  Do not lift anything that is heavier than 10 lb (4.5 kg), or the limit that you are told, until your health care provider says that it is safe.  Return to your normal activities as told by your health care provider. Ask your health care provider what activities are safe for you and when you can return to work.  If you were given a sedative during the procedure, it can affect you for several hours. Do not drive or operate machinery until your health care provider says that it is safe.  General instructions  Take over-the-counter and prescription medicines only as told by your health care provider.  If you will be going home right after the procedure, plan to have a responsible adult care for you for the time you are told. This is important.  Keep all follow-up visits. This is important.  Contact a health care provider if:  You have a fever or chills.  You have any of these signs of infection at your incision site:  Redness, swelling, or pain.  Fluid or blood.  Warmth.  Pus or a bad smell.  Get help right away if:  The incision area swells very fast.  The incision area is bleeding, and the bleeding does not stop when you hold steady pressure on the area.  Your arm or hand becomes pale, cool, tingly, or numb.  These symptoms may represent a serious problem that is an emergency. Do not wait to see if the symptoms will go away. Get medical help right away. Call your local emergency services (911 in the [place] Do not drive yourself to the hospital.  Summary  After the procedure, it is common to have bruising and tenderness at the incision site.  Follow instructions from your health care provider about how to take care of your radial site incision. Check the incision every day for signs of infection.  Do not lift anything that is heavier than 10 lb (4.5 kg), or the limit that you are  told, until your health care provider says that it is safe.  Get help right away if the incision area swells very fast, you have bleeding at the incision site that will not stop, or your arm or hand becomes pale, cool, or numb.  This information is not intended to replace advice given to you by your health care provider. Make sure you discuss any questions you have with your health care provider.  Document Revised: 02/06/2022 Document Reviewed: 02/06/2022  Elsevier Patient Education © 2023 [employer]  Angina    Angina is discomfort or pain in the chest, neck, arm, jaw, or back. The discomfort is caused by a lack of blood in the middle layer of the heart wall. The middle layer of the heart wall is called the myocardium.  What are the causes?  This condition is caused by a buildup of fat and cholesterol, or plaque, in your arteries. This buildup narrows the arteries and makes it hard for blood to flow.  What increases the risk?  Main risks  High levels of cholesterol in your blood.  High blood pressure.  Diabetes.  Family history of heart disease.  Not exercising or moving enough.  Depression.  Having had radiation treatment on the left side of your chest.  Other risks  Tobacco use.  Too much body weight (obesity).  A diet that is high in unhealthy fats (saturated fats).  Stress.  Using drugs, such as cocaine.  Risks for women  Being older than 55 years.  Being in menopause. This is the time when a woman no longer has a menstrual period.  What are the signs or symptoms?  Symptoms in all people  Chest pain, which may:  Feel like a crushing or squeezing in the chest.  Feel like a tightness, pressure, fullness, or heaviness in the chest.  Last for more than a few minutes at a time.  Stop and come back (recur) after a few minutes.  Pain in the neck, arm, jaw, or back.  Heartburn or upset stomach (indigestion) for no reason.  Being short of breath.  Feeling like you may vomit (nauseous).  Sudden cold sweats.  Symptoms  in women and people with diabetes  Tiredness.  Worry and anxiety.  Weakness.  Dizziness or fainting.  How is this treated?  This condition may be treated with:  Medicines. These can be given to prevent blood clots, stop a heart attack, lower blood pressure, or treat other risk factors.  A procedure to widen a narrowed or blocked artery in the heart.  Surgery to allow blood to go around a blocked artery.  Follow these instructions at home:  Medicines  Take over-the-counter and prescription medicines only as told by your doctor.  Do not take these medicines unless your doctor says that you can:  NSAIDs. These include:  Ibuprofen.  Naproxen.  Vitamin supplements that have vitamin A, vitamin E, or both.  Hormone therapy that contains estrogen with or without progestin.  Eating and drinking    Eat a heart-healthy diet that includes:  Lots of fresh fruits and vegetables.  Whole grains.  Low-fat (lean) protein.  Low-fat dairy products.  Follow instructions from your doctor about what you cannot eat or drink.  Activity  Follow an exercise program that your doctor tells you.  Talk with your doctor about joining a program to make your heart strong again (cardiac rehab).  When you feel tired, take a break. Plan breaks if you know you are going to feel tired.  Lifestyle    Do not smoke or use any products that contain nicotine or tobacco. If you need help quitting, ask your doctor.  If your doctor says you can drink alcohol:  Limit how much you have to:  0-1 drink a day for women who are not pregnant.  0-2 drinks a day for men.  Know how much alcohol is in your drink. In the [place] one drink equals one 12 oz bottle of beer (355 mL), one 5 oz glass of wine (148 mL), or one 1½ oz glass of hard liquor (44 mL).  General instructions  Stay at a healthy weight. If told to lose weight, work with your doctor to do lose weight safely.  Learn to manage stress. If you need help, ask your doctor.  Keep your vaccines up to date. Get a flu  shot every year.  Talk with your doctor if you feel sad. Take a screening test to see if you are at risk for depression.  Work with your doctor to manage any other health problems that you have. These may include diabetes or high blood pressure.  Keep all follow-up visits.  Get help right away if:  You have pain in your chest, neck, arm, jaw, or back, and the pain:  Lasts more than a few minutes.  Comes back.  Does not get better after you take medicine under your tongue (sublingual nitroglycerin).  Keeps getting worse.  Comes more often.  You have any of these problems:  Sweating a lot.  Heartburn or upset stomach.  Shortness of breath.  Trouble breathing.  Feeling like you may vomit.  Vomiting.  Feeling more tired than normal.  Feeling nervous or worrying more than normal.  Weakness.  You are dizzy or light-headed all of a sudden.  You faint.  These symptoms may be an emergency. Get help right away. Call your local emergency services (911 in the [place]  Do not wait to see if the symptoms will go away.  Do not drive yourself to the hospital.  Summary  Angina is discomfort or pain in the chest, neck, arm, neck, or back.  Symptoms include chest pain, heartburn or upset stomach, and shortness of breath.  Women or people with diabetes may have other symptoms, such as feeling nervous, being worried, or being weak or tired.  Take all medicines only as told by your doctor.  You should eat a heart-healthy diet and follow an exercise program.  This information is not intended to replace advice given to you by your health care provider. Make sure you discuss any questions you have with your health care provider.  Document Revised: 06/11/2021 Document Reviewed: 06/11/2021  Elsevier Patient Education © 2023 [employer]

## 2024-10-02 ENCOUNTER — TELEPHONE (OUTPATIENT)
Dept: CARDIOLOGY | Facility: MEDICAL CENTER | Age: 64
End: 2024-10-02
Payer: MEDICAID

## 2024-10-04 DIAGNOSIS — R07.9 CHEST PAIN, UNSPECIFIED TYPE: Primary | ICD-10-CM

## 2024-10-17 LAB — EKG IMPRESSION: NORMAL

## 2024-10-17 PROCEDURE — 93010 ELECTROCARDIOGRAM REPORT: CPT | Performed by: INTERNAL MEDICINE

## 2024-11-05 DIAGNOSIS — R07.89 OTHER CHEST PAIN: Primary | ICD-10-CM

## 2024-11-05 LAB — EKG IMPRESSION: NORMAL

## 2024-11-05 PROCEDURE — 93010 ELECTROCARDIOGRAM REPORT: CPT | Performed by: INTERNAL MEDICINE

## 2024-12-12 NOTE — ED TRIAGE NOTES
PATIENT INSTRUCTIONS    Treatment:  Dressing removed  Continue physical therapy  Continue Xarelto for another 4 weeks  Ok to take showers and pat dry at this time (avoid creams, lotions, and soaps on incision)   No submerging the healing incision in a bath tub or swimming pool        Staple/Suture Removal    Staple/Suture were removed today.  If you had Steri-strip applied, these will remain attached until they come off on their own.  Keep the wound area dry for 24 hours.  You may shower after 24 hours, but do not submerge the area under water for at least one week unless otherwise instructed.  Please call us if the incision has increased in redness, swelling, warmth or drainage.     Follow-Up:  Follow up in 4 weeks with Kandis Berumen PA-C  New XRs will be obtained at your following office visit. Please arrive 15 mins earlier then your scheduled appointment time to complete those XRs.         Please note: 24 hour notice for cancellation of appointment is required.    You may receive a survey in the mail, or via the e-mail address that you have provided.  We would appreciate if you could fill out the survey and provide us with any feedback on your experience regarding your visit today. Thank you for allowing us to provide you with your health care needs.     Do not hesitate to call if you are experiencing severe pain, worsening or change in your pain, have symptoms of infection (fever, warmth, redness, increased drainage), or have any other problem that concerns you ~ [phone] (or [phone] after hours).    Please remember when requesting refills on pain medication that the request should be made by Thursday at the latest. [facility] is open Monday-Friday, 8am-5pm, and closed on the weekends.  No narcotic refills will be filled after hours.    Additional Educational Resources:  For additional resources regarding your symptoms, diagnosis, or further health information, please visit the  PT IS AAOX4. PT IS IN NAD. PT IS C/O ABD PAIN WITH N/V. PT HAS A HX OF. PT WAS AT [facility] YESTERDAY FOR THE SAME PROBLEM .   PT ABD IS DISTENDED AND SOFT.    Health Resources section on [email or web address] or the Online Health Resources section in [place].

## 2025-01-10 DIAGNOSIS — R11.0 NAUSEA: Primary | ICD-10-CM

## 2025-01-10 LAB — EKG IMPRESSION: NORMAL

## 2025-01-10 PROCEDURE — 93010 ELECTROCARDIOGRAM REPORT: CPT | Performed by: INTERNAL MEDICINE

## 2025-02-26 DIAGNOSIS — R07.9 CHEST PAIN, UNSPECIFIED TYPE: Primary | ICD-10-CM

## 2025-02-26 LAB — EKG IMPRESSION: NORMAL

## 2025-04-14 DIAGNOSIS — R07.9 CHEST PAIN, UNSPECIFIED TYPE: Primary | ICD-10-CM

## 2025-04-15 LAB
EKG IMPRESSION: NORMAL
EKG IMPRESSION: NORMAL

## 2025-04-15 PROCEDURE — 93010 ELECTROCARDIOGRAM REPORT: CPT | Performed by: INTERNAL MEDICINE

## 2025-04-25 DIAGNOSIS — R06.02 SOB (SHORTNESS OF BREATH): Primary | ICD-10-CM

## 2025-04-25 LAB — EKG IMPRESSION: NORMAL

## 2025-04-25 PROCEDURE — 93010 ELECTROCARDIOGRAM REPORT: CPT | Performed by: INTERNAL MEDICINE

## 2025-07-18 NOTE — DISCHARGE PLANNING
Care Transition Team Final Discharge Disposition    Actual Discharge Information  Discharge Disposition: Discharged to home/self care (01)    Pt to discharge home today with out patient PT. Family to provide transport home. Meds to beds ordered by MD.     No CM needs identified.    Patient counseled, sunblock and sun avoidance recommended, and monitor skin for any changes. Detail Level: Zone

## (undated) DEVICE — TUBE CONNECTING SUCTION - CLEAR PLASTIC STERILE 72 IN (50EA/CA)

## (undated) DEVICE — MANIFOLD NEPTUNE 1 PORT (20/PK)

## (undated) DEVICE — FORCEP RADIAL JAW 4 STANDARD CAPACITY W/NEEDLE 240CM (40EA/BX)

## (undated) DEVICE — KIT CUSTOM PROCEDURE SINGLE FOR ENDO  (15/CA)

## (undated) DEVICE — TUBE NG SALEM SUMP 14FR (50EA/BX)

## (undated) DEVICE — SYRINGE ALLIANCE INFLATION (5EA/BX)

## (undated) DEVICE — KIT PROCEDURE DOUBLE ENDO ONLY (5/CA)

## (undated) DEVICE — BALLOON CRE WG 15-18MM 240CM 5.5 F G

## (undated) DEVICE — BITEBLOCK ENDOSCOPIC PEDI. - (25/BX)

## (undated) DEVICE — CONTAINER, SPECIMEN, STERILE

## (undated) DEVICE — NEPTUNE 4 PORT MANIFOLD - (20/PK)

## (undated) DEVICE — BASIN EMESIS DISP. - (250/CA)

## (undated) DEVICE — BITE BLOCK ADULT 60FR (100EA/CA)

## (undated) DEVICE — CATHETER IV 20 GA X 1-1/4 ---SURG.& SDS ONLY--- (50EA/BX)

## (undated) DEVICE — SPONGE GAUZE NON-STERILE 4X4 - (2000/CA 10PK/CA)

## (undated) DEVICE — CANISTER SUCTION RIGID RED 1500CC (40EA/CA)

## (undated) DEVICE — GOWN SURGEONS X-LARGE - DISP. (30/CA)